# Patient Record
Sex: MALE | Race: WHITE
[De-identification: names, ages, dates, MRNs, and addresses within clinical notes are randomized per-mention and may not be internally consistent; named-entity substitution may affect disease eponyms.]

---

## 2017-05-17 ENCOUNTER — APPOINTMENT (OUTPATIENT)
Dept: ELECTROPHYSIOLOGY | Facility: CLINIC | Age: 82
End: 2017-05-17

## 2017-05-17 VITALS
DIASTOLIC BLOOD PRESSURE: 70 MMHG | SYSTOLIC BLOOD PRESSURE: 150 MMHG | HEIGHT: 68 IN | BODY MASS INDEX: 28.34 KG/M2 | WEIGHT: 187 LBS | HEART RATE: 60 BPM

## 2017-07-12 ENCOUNTER — INPATIENT (INPATIENT)
Facility: HOSPITAL | Age: 82
LOS: 4 days | Discharge: HOME | End: 2017-07-17
Attending: INTERNAL MEDICINE | Admitting: FAMILY MEDICINE
Payer: MEDICARE

## 2017-07-12 VITALS — WEIGHT: 190.04 LBS | HEIGHT: 69 IN

## 2017-07-12 LAB
ADD ON TEST-SPECIMEN IN LAB: SIGNIFICANT CHANGE UP
ALBUMIN SERPL ELPH-MCNC: 3.3 G/DL — SIGNIFICANT CHANGE UP (ref 3.3–5)
ALP SERPL-CCNC: 75 U/L — SIGNIFICANT CHANGE UP (ref 40–120)
ALT FLD-CCNC: 18 U/L — SIGNIFICANT CHANGE UP (ref 12–78)
ANION GAP SERPL CALC-SCNC: 6 MMOL/L — SIGNIFICANT CHANGE UP (ref 5–17)
APPEARANCE UR: (no result)
APTT BLD: 32.1 SEC — SIGNIFICANT CHANGE UP (ref 27.5–37.4)
AST SERPL-CCNC: 19 U/L — SIGNIFICANT CHANGE UP (ref 15–37)
BACTERIA # UR AUTO: (no result)
BASOPHILS # BLD AUTO: 0.1 K/UL — SIGNIFICANT CHANGE UP (ref 0–0.2)
BASOPHILS NFR BLD AUTO: 0.8 % — SIGNIFICANT CHANGE UP (ref 0–2)
BILIRUB SERPL-MCNC: 0.9 MG/DL — SIGNIFICANT CHANGE UP (ref 0.2–1.2)
BILIRUB UR-MCNC: (no result)
BUN SERPL-MCNC: 38 MG/DL — HIGH (ref 7–23)
CALCIUM SERPL-MCNC: 8.9 MG/DL — SIGNIFICANT CHANGE UP (ref 8.5–10.1)
CHLORIDE SERPL-SCNC: 102 MMOL/L — SIGNIFICANT CHANGE UP (ref 96–108)
CO2 SERPL-SCNC: 28 MMOL/L — SIGNIFICANT CHANGE UP (ref 22–31)
COLOR SPEC: YELLOW — SIGNIFICANT CHANGE UP
CREAT SERPL-MCNC: 2.11 MG/DL — HIGH (ref 0.5–1.3)
DIFF PNL FLD: (no result)
EOSINOPHIL # BLD AUTO: 0.1 K/UL — SIGNIFICANT CHANGE UP (ref 0–0.5)
EOSINOPHIL NFR BLD AUTO: 0.4 % — SIGNIFICANT CHANGE UP (ref 0–6)
EPI CELLS # UR: NEGATIVE — SIGNIFICANT CHANGE UP
GLUCOSE SERPL-MCNC: 142 MG/DL — HIGH (ref 70–99)
GLUCOSE UR QL: NEGATIVE MG/DL — SIGNIFICANT CHANGE UP
HCT VFR BLD CALC: 34.6 % — LOW (ref 39–50)
HGB BLD-MCNC: 11.6 G/DL — LOW (ref 13–17)
INR BLD: 1.65 RATIO — HIGH (ref 0.88–1.16)
KETONES UR-MCNC: NEGATIVE — SIGNIFICANT CHANGE UP
LACTATE SERPL-SCNC: 1.5 MMOL/L — SIGNIFICANT CHANGE UP (ref 0.7–2)
LEUKOCYTE ESTERASE UR-ACNC: (no result)
LYMPHOCYTES # BLD AUTO: 1.6 K/UL — SIGNIFICANT CHANGE UP (ref 1–3.3)
LYMPHOCYTES # BLD AUTO: 10.3 % — LOW (ref 13–44)
MCHC RBC-ENTMCNC: 32.3 PG — SIGNIFICANT CHANGE UP (ref 27–34)
MCHC RBC-ENTMCNC: 33.4 GM/DL — SIGNIFICANT CHANGE UP (ref 32–36)
MCV RBC AUTO: 96.7 FL — SIGNIFICANT CHANGE UP (ref 80–100)
MONOCYTES # BLD AUTO: 1.3 K/UL — HIGH (ref 0–0.9)
MONOCYTES NFR BLD AUTO: 8.2 % — SIGNIFICANT CHANGE UP (ref 2–14)
NEUTROPHILS # BLD AUTO: 12.5 K/UL — HIGH (ref 1.8–7.4)
NEUTROPHILS NFR BLD AUTO: 80.3 % — HIGH (ref 43–77)
NITRITE UR-MCNC: POSITIVE
PH UR: 5 — SIGNIFICANT CHANGE UP (ref 5–8)
PLATELET # BLD AUTO: 170 K/UL — SIGNIFICANT CHANGE UP (ref 150–400)
POTASSIUM SERPL-MCNC: 5.1 MMOL/L — SIGNIFICANT CHANGE UP (ref 3.5–5.3)
POTASSIUM SERPL-SCNC: 5.1 MMOL/L — SIGNIFICANT CHANGE UP (ref 3.5–5.3)
PROT SERPL-MCNC: 7.5 GM/DL — SIGNIFICANT CHANGE UP (ref 6–8.3)
PROT UR-MCNC: 500 MG/DL
PROTHROM AB SERPL-ACNC: 18 SEC — HIGH (ref 9.8–12.7)
RBC # BLD: 3.58 M/UL — LOW (ref 4.2–5.8)
RBC # FLD: 13.6 % — SIGNIFICANT CHANGE UP (ref 10.3–14.5)
RBC CASTS # UR COMP ASSIST: >50 /HPF (ref 0–4)
SODIUM SERPL-SCNC: 136 MMOL/L — SIGNIFICANT CHANGE UP (ref 135–145)
SP GR SPEC: 1.02 — SIGNIFICANT CHANGE UP (ref 1.01–1.02)
TROPONIN I SERPL-MCNC: 0.04 NG/ML — SIGNIFICANT CHANGE UP (ref 0.01–0.04)
UROBILINOGEN FLD QL: NEGATIVE MG/DL — SIGNIFICANT CHANGE UP
WBC # BLD: 15.5 K/UL — HIGH (ref 3.8–10.5)
WBC # FLD AUTO: 15.5 K/UL — HIGH (ref 3.8–10.5)
WBC UR QL: >50

## 2017-07-12 PROCEDURE — 93010 ELECTROCARDIOGRAM REPORT: CPT

## 2017-07-12 PROCEDURE — 71010: CPT | Mod: 26

## 2017-07-12 PROCEDURE — 99285 EMERGENCY DEPT VISIT HI MDM: CPT

## 2017-07-12 RX ORDER — VENLAFAXINE HCL 75 MG
75 CAPSULE, EXT RELEASE 24 HR ORAL DAILY
Qty: 0 | Refills: 0 | Status: DISCONTINUED | OUTPATIENT
Start: 2017-07-12 | End: 2017-07-17

## 2017-07-12 RX ORDER — DEXTROSE 50 % IN WATER 50 %
25 SYRINGE (ML) INTRAVENOUS ONCE
Qty: 0 | Refills: 0 | Status: DISCONTINUED | OUTPATIENT
Start: 2017-07-12 | End: 2017-07-17

## 2017-07-12 RX ORDER — CEFTRIAXONE 500 MG/1
1 INJECTION, POWDER, FOR SOLUTION INTRAMUSCULAR; INTRAVENOUS EVERY 24 HOURS
Qty: 0 | Refills: 0 | Status: DISCONTINUED | OUTPATIENT
Start: 2017-07-13 | End: 2017-07-17

## 2017-07-12 RX ORDER — INSULIN LISPRO 100/ML
VIAL (ML) SUBCUTANEOUS
Qty: 0 | Refills: 0 | Status: DISCONTINUED | OUTPATIENT
Start: 2017-07-12 | End: 2017-07-17

## 2017-07-12 RX ORDER — INSULIN GLARGINE 100 [IU]/ML
10 INJECTION, SOLUTION SUBCUTANEOUS AT BEDTIME
Qty: 0 | Refills: 0 | Status: DISCONTINUED | OUTPATIENT
Start: 2017-07-12 | End: 2017-07-17

## 2017-07-12 RX ORDER — SODIUM CHLORIDE 9 MG/ML
1000 INJECTION, SOLUTION INTRAVENOUS
Qty: 0 | Refills: 0 | Status: DISCONTINUED | OUTPATIENT
Start: 2017-07-12 | End: 2017-07-17

## 2017-07-12 RX ORDER — SIMVASTATIN 20 MG/1
20 TABLET, FILM COATED ORAL AT BEDTIME
Qty: 0 | Refills: 0 | Status: DISCONTINUED | OUTPATIENT
Start: 2017-07-12 | End: 2017-07-17

## 2017-07-12 RX ORDER — WARFARIN SODIUM 2.5 MG/1
5 TABLET ORAL ONCE
Qty: 0 | Refills: 0 | Status: COMPLETED | OUTPATIENT
Start: 2017-07-12 | End: 2017-07-12

## 2017-07-12 RX ORDER — DEXTROSE 50 % IN WATER 50 %
12.5 SYRINGE (ML) INTRAVENOUS ONCE
Qty: 0 | Refills: 0 | Status: DISCONTINUED | OUTPATIENT
Start: 2017-07-12 | End: 2017-07-17

## 2017-07-12 RX ORDER — GLUCAGON INJECTION, SOLUTION 0.5 MG/.1ML
1 INJECTION, SOLUTION SUBCUTANEOUS ONCE
Qty: 0 | Refills: 0 | Status: DISCONTINUED | OUTPATIENT
Start: 2017-07-12 | End: 2017-07-17

## 2017-07-12 RX ORDER — GABAPENTIN 400 MG/1
100 CAPSULE ORAL THREE TIMES A DAY
Qty: 0 | Refills: 0 | Status: DISCONTINUED | OUTPATIENT
Start: 2017-07-12 | End: 2017-07-17

## 2017-07-12 RX ORDER — DOXAZOSIN MESYLATE 4 MG
4 TABLET ORAL AT BEDTIME
Qty: 0 | Refills: 0 | Status: DISCONTINUED | OUTPATIENT
Start: 2017-07-12 | End: 2017-07-17

## 2017-07-12 RX ORDER — DEXTROSE 50 % IN WATER 50 %
1 SYRINGE (ML) INTRAVENOUS ONCE
Qty: 0 | Refills: 0 | Status: DISCONTINUED | OUTPATIENT
Start: 2017-07-12 | End: 2017-07-17

## 2017-07-12 RX ORDER — ACETAMINOPHEN 500 MG
650 TABLET ORAL EVERY 6 HOURS
Qty: 0 | Refills: 0 | Status: DISCONTINUED | OUTPATIENT
Start: 2017-07-12 | End: 2017-07-17

## 2017-07-12 RX ORDER — SODIUM CHLORIDE 9 MG/ML
1000 INJECTION INTRAMUSCULAR; INTRAVENOUS; SUBCUTANEOUS
Qty: 0 | Refills: 0 | Status: DISCONTINUED | OUTPATIENT
Start: 2017-07-12 | End: 2017-07-14

## 2017-07-12 RX ORDER — CEFTRIAXONE 500 MG/1
1 INJECTION, POWDER, FOR SOLUTION INTRAMUSCULAR; INTRAVENOUS ONCE
Qty: 0 | Refills: 0 | Status: COMPLETED | OUTPATIENT
Start: 2017-07-12 | End: 2017-07-12

## 2017-07-12 RX ADMIN — SODIUM CHLORIDE 75 MILLILITER(S): 9 INJECTION INTRAMUSCULAR; INTRAVENOUS; SUBCUTANEOUS at 22:38

## 2017-07-12 RX ADMIN — Medication 4 MILLIGRAM(S): at 23:10

## 2017-07-12 RX ADMIN — CEFTRIAXONE 100 GRAM(S): 500 INJECTION, POWDER, FOR SOLUTION INTRAMUSCULAR; INTRAVENOUS at 20:03

## 2017-07-12 RX ADMIN — INSULIN GLARGINE 10 UNIT(S): 100 INJECTION, SOLUTION SUBCUTANEOUS at 22:51

## 2017-07-12 RX ADMIN — SIMVASTATIN 20 MILLIGRAM(S): 20 TABLET, FILM COATED ORAL at 23:10

## 2017-07-12 RX ADMIN — WARFARIN SODIUM 5 MILLIGRAM(S): 2.5 TABLET ORAL at 22:50

## 2017-07-12 RX ADMIN — GABAPENTIN 100 MILLIGRAM(S): 400 CAPSULE ORAL at 23:10

## 2017-07-12 RX ADMIN — Medication 650 MILLIGRAM(S): at 21:47

## 2017-07-12 RX ADMIN — Medication 75 MILLIGRAM(S): at 23:10

## 2017-07-12 RX ADMIN — Medication 1 TABLET(S): at 22:51

## 2017-07-12 NOTE — H&P ADULT - NSHPREVIEWOFSYSTEMS_GEN_ALL_CORE
(-) cough, chest pain, sob, headache, dizziness, palpitations, abd pain, leg swelling  (+)fever, chills, nausea, weakness

## 2017-07-12 NOTE — H&P ADULT - HISTORY OF PRESENT ILLNESS
Patient is 89yo male with PMhx of Afib on Coumadin, HLD, IDDM, HTN, PPM, presents from home with fatigue, weakness, and nausea. Pt's son who provided most of the history, reports patients symptoms began yesterday characterized by weakness, fatigue, nausea without vomiting, associated with fever/chills. Denies wosening sob, cp, cough, dysuria, diarrhea, abd pain. No other constitutional symptoms.     In the ER, febrile, 102.2

## 2017-07-12 NOTE — ED PROVIDER NOTE - OBJECTIVE STATEMENT
87 y/o M with PMHx of bradycardia, pacemaker, IDDM, HTN presents to ED for weakness and nausea x1 day. Pt was seen by a NP for  earlier today and went home but started feeling worse, increasing weakness, so family was advised to come to ED for an evaluation. Pt was sent to Trauma room from intake because  felt pts HR was in the upper 30's. No HA, SOB, CP, fever, chills, V/D, body aches. PMD Moe patient

## 2017-07-12 NOTE — ED ADULT NURSE NOTE - OBJECTIVE STATEMENT
pt presents to ED from home, sent in by MD's office 2* to feeling increasingly weak over the last two days. Pt is pale and drowsy. Family at bedside, denies pain. Pt is short of breath, placed on 2lpm nasal cannula. Dr. Willson in to examine

## 2017-07-12 NOTE — H&P ADULT - NSHPPHYSICALEXAM_GEN_ALL_CORE
T(C): 39 (07-12-17 @ 19:50), Max: 39 (07-12-17 @ 19:50)  HR: 127 (07-12-17 @ 18:57) (127 - 127)  BP: 138/63 (07-12-17 @ 18:57) (138/63 - 138/63)  RR: 18 (07-12-17 @ 18:57) (18 - 18)  SpO2: 97% (07-12-17 @ 18:57) (97% - 97%)  Wt(kg): --    Gen: AAOx3, NAD  HEENT: NCAT, EOMI  Neck: Supple  CV: nml S1S2, RRR  Lungs: CTABL  Abd: Soft, NT, ND, BS+, +hernia  Ext: No edema  Neuro: Non focal

## 2017-07-12 NOTE — ED STATDOCS - PROGRESS NOTE DETAILS
Alec Patel, on behalf of Attending. 88 y Male c/o N/V for the past two days. Patient has been instructed by RABIA Mccabe at [facility] to go to ER due to Gradual increase of weakness and pale appearing skin. Patient has a PMHx of Bradycardia. Patient sent to the Main ED for further workup of sepsis and Bradycardia.

## 2017-07-12 NOTE — H&P ADULT - ASSESSMENT
87yo male a/w UTI    # Weakness/UTI/Fever/Leukocytosis/Nausea  - currently febrile, HD stable, comfortable  - elevated WBC, positive UA  - aaox3  - Rocephin  - hold ARB  - IVF hydration  - Zofran PRN    # HTN  - Hold ARB, Torpol XL  - -110s    # DM  - Lantus  - sliding scale    # Psych  - Effexor    # Afib  - Coumadin  - hold Toprol XL for now, SBP 100s    # DVT ppx, Coumadin    # Admit, stable

## 2017-07-12 NOTE — H&P ADULT - NSHPLABSRESULTS_GEN_ALL_CORE
CARDIAC MARKERS ( 2017 19:24 )  0.039 ng/mL / x     / x     / x     / x                                11.6   15.5  )-----------( 170      ( 2017 19:24 )             34.6     2017 19:24    136    |  102    |  38     ----------------------------<  142    5.1     |  28     |  2.11     Ca    8.9        2017 19:24  Mg     2.0       2017 19:24    TPro  7.5    /  Alb  3.3    /  TBili  0.9    /  DBili  x      /  AST  19     /  ALT  18     /  AlkPhos  75     2017 19:24    PT/INR - ( 2017 19:24 )   PT: 18.0 sec;   INR: 1.65 ratio         PTT - ( 2017 19:24 )  PTT:32.1 sec  CAPILLARY BLOOD GLUCOSE        LIVER FUNCTIONS - ( 2017 19:24 )  Alb: 3.3 g/dL / Pro: 7.5 gm/dL / ALK PHOS: 75 U/L / ALT: 18 U/L / AST: 19 U/L / GGT: x           Urinalysis Basic - ( 2017 19:24 )    Color: Yellow / Appearance: x / S.020 / pH: x  Gluc: x / Ketone: Negative  / Bili: Small / Urobili: Negative mg/dL   Blood: x / Protein: 500 mg/dL / Nitrite: Positive   Leuk Esterase: Moderate / RBC: >50 /HPF / WBC >50   Sq Epi: x / Non Sq Epi: Negative / Bacteria: Many

## 2017-07-13 LAB
ANION GAP SERPL CALC-SCNC: 6 MMOL/L — SIGNIFICANT CHANGE UP (ref 5–17)
ANISOCYTOSIS BLD QL: SLIGHT — SIGNIFICANT CHANGE UP
BUN SERPL-MCNC: 45 MG/DL — HIGH (ref 7–23)
CALCIUM SERPL-MCNC: 8.4 MG/DL — LOW (ref 8.5–10.1)
CHLORIDE SERPL-SCNC: 104 MMOL/L — SIGNIFICANT CHANGE UP (ref 96–108)
CO2 SERPL-SCNC: 25 MMOL/L — SIGNIFICANT CHANGE UP (ref 22–31)
CREAT SERPL-MCNC: 2.35 MG/DL — HIGH (ref 0.5–1.3)
ELLIPTOCYTES BLD QL SMEAR: SLIGHT — SIGNIFICANT CHANGE UP
GLUCOSE SERPL-MCNC: 147 MG/DL — HIGH (ref 70–99)
HBA1C BLD-MCNC: 7.4 % — HIGH (ref 4–5.6)
HCT VFR BLD CALC: 31.3 % — LOW (ref 39–50)
HGB BLD-MCNC: 10.6 G/DL — LOW (ref 13–17)
HYPOCHROMIA BLD QL: SLIGHT — SIGNIFICANT CHANGE UP
INR BLD: 1.88 RATIO — HIGH (ref 0.88–1.16)
LYMPHOCYTES # BLD AUTO: 8 % — LOW (ref 13–44)
MANUAL DIF COMMENT BLD-IMP: SIGNIFICANT CHANGE UP
MCHC RBC-ENTMCNC: 32.8 PG — SIGNIFICANT CHANGE UP (ref 27–34)
MCHC RBC-ENTMCNC: 33.8 GM/DL — SIGNIFICANT CHANGE UP (ref 32–36)
MCV RBC AUTO: 97.1 FL — SIGNIFICANT CHANGE UP (ref 80–100)
MONOCYTES NFR BLD AUTO: 10 % — SIGNIFICANT CHANGE UP (ref 2–14)
NEUTROPHILS NFR BLD AUTO: 65 % — SIGNIFICANT CHANGE UP (ref 43–77)
NEUTS BAND # BLD: 17 % — HIGH (ref 0–8)
OVALOCYTES BLD QL SMEAR: SLIGHT — SIGNIFICANT CHANGE UP
PLAT MORPH BLD: NORMAL — SIGNIFICANT CHANGE UP
PLATELET # BLD AUTO: 130 K/UL — LOW (ref 150–400)
POIKILOCYTOSIS BLD QL AUTO: SLIGHT — SIGNIFICANT CHANGE UP
POLYCHROMASIA BLD QL SMEAR: SLIGHT — SIGNIFICANT CHANGE UP
POTASSIUM SERPL-MCNC: 5.2 MMOL/L — SIGNIFICANT CHANGE UP (ref 3.5–5.3)
POTASSIUM SERPL-SCNC: 5.2 MMOL/L — SIGNIFICANT CHANGE UP (ref 3.5–5.3)
PROTHROM AB SERPL-ACNC: 20.6 SEC — HIGH (ref 9.8–12.7)
RBC # BLD: 3.22 M/UL — LOW (ref 4.2–5.8)
RBC # FLD: 13.6 % — SIGNIFICANT CHANGE UP (ref 10.3–14.5)
RBC BLD AUTO: (no result)
SODIUM SERPL-SCNC: 135 MMOL/L — SIGNIFICANT CHANGE UP (ref 135–145)
WBC # BLD: 19 K/UL — HIGH (ref 3.8–10.5)
WBC # FLD AUTO: 19 K/UL — HIGH (ref 3.8–10.5)

## 2017-07-13 RX ORDER — ONDANSETRON 8 MG/1
4 TABLET, FILM COATED ORAL EVERY 8 HOURS
Qty: 0 | Refills: 0 | Status: DISCONTINUED | OUTPATIENT
Start: 2017-07-13 | End: 2017-07-17

## 2017-07-13 RX ORDER — WARFARIN SODIUM 2.5 MG/1
5 TABLET ORAL DAILY
Qty: 0 | Refills: 0 | Status: COMPLETED | OUTPATIENT
Start: 2017-07-13 | End: 2017-07-15

## 2017-07-13 RX ADMIN — INSULIN GLARGINE 10 UNIT(S): 100 INJECTION, SOLUTION SUBCUTANEOUS at 21:28

## 2017-07-13 RX ADMIN — Medication 75 MILLIGRAM(S): at 15:14

## 2017-07-13 RX ADMIN — Medication 4 MILLIGRAM(S): at 21:25

## 2017-07-13 RX ADMIN — SIMVASTATIN 20 MILLIGRAM(S): 20 TABLET, FILM COATED ORAL at 21:25

## 2017-07-13 RX ADMIN — Medication 1 TABLET(S): at 15:12

## 2017-07-13 RX ADMIN — CEFTRIAXONE 100 GRAM(S): 500 INJECTION, POWDER, FOR SOLUTION INTRAMUSCULAR; INTRAVENOUS at 09:58

## 2017-07-13 RX ADMIN — WARFARIN SODIUM 5 MILLIGRAM(S): 2.5 TABLET ORAL at 21:25

## 2017-07-13 RX ADMIN — ONDANSETRON 4 MILLIGRAM(S): 8 TABLET, FILM COATED ORAL at 03:41

## 2017-07-13 RX ADMIN — GABAPENTIN 100 MILLIGRAM(S): 400 CAPSULE ORAL at 21:25

## 2017-07-13 RX ADMIN — GABAPENTIN 100 MILLIGRAM(S): 400 CAPSULE ORAL at 18:45

## 2017-07-13 RX ADMIN — GABAPENTIN 100 MILLIGRAM(S): 400 CAPSULE ORAL at 05:55

## 2017-07-13 RX ADMIN — SODIUM CHLORIDE 75 MILLILITER(S): 9 INJECTION INTRAMUSCULAR; INTRAVENOUS; SUBCUTANEOUS at 18:46

## 2017-07-13 NOTE — PROGRESS NOTE ADULT - SUBJECTIVE AND OBJECTIVE BOX
Patient is 87yo male with PMhx of Afib on Coumadin, HLD, IDDM, HTN, PPM, presents from home with fatigue, weakness, and nausea. C/o suprapubic tenderness, and abd pain with urianry frequency. 3rd uti this year. denies having any cysto with his urologist . Cannot recall name of his urologist. Feels better today however his wbc is rising          ICU Vital Signs Last 24 Hrs  T(C): 37.3 (13 Jul 2017 05:00), Max: 39 (12 Jul 2017 19:50)  T(F): 99.1 (13 Jul 2017 05:00), Max: 102.2 (12 Jul 2017 19:50)  HR: 61 (13 Jul 2017 05:00) (61 - 127)  BP: 144/57 (13 Jul 2017 05:00) (124/51 - 144/57)  BP(mean): 78 (12 Jul 2017 18:57) (78 - 78)  ABP: --  ABP(mean): --  RR: 20 (12 Jul 2017 22:19) (18 - 20)  SpO2: 94% (13 Jul 2017 05:00) (94% - 100%)            PHYSICAL EXAM:    Constitutional: NAD, awake and alert, well-developed  HEENT: PERR, EOMI, Normal Hearing, MMM  Neck: Soft and supple, No LAD, No JVD  Respiratory: Breath sounds are clear bilaterally, No wheezing, rales or rhonchi  Cardiovascular: S1 and S2, regular rate and rhythm, no Murmurs, gallops or rubs  Gastrointestinal: Bowel Sounds present, soft, nontender, nondistended, no guarding, no rebound  Extremities: No peripheral edema  Vascular: 2+ peripheral pulses  Neurological: A/O x 3, no focal deficits  Musculoskeletal: 5/5 strength b/l upper and lower extremities  Skin: No rashes    MEDICATIONS:  MEDICATIONS  (STANDING):  simvastatin 20 milliGRAM(s) Oral at bedtime  venlafaxine XR. 75 milliGRAM(s) Oral daily  gabapentin 100 milliGRAM(s) Oral three times a day  sodium chloride 0.9%. 1000 milliLiter(s) (75 mL/Hr) IV Continuous <Continuous>  multivitamin 1 Tablet(s) Oral daily  cefTRIAXone   IVPB 1 Gram(s) IV Intermittent every 24 hours  insulin glargine Injectable (LANTUS) 10 Unit(s) SubCutaneous at bedtime  insulin lispro (HumaLOG) corrective regimen sliding scale   SubCutaneous three times a day before meals  dextrose 5%. 1000 milliLiter(s) (50 mL/Hr) IV Continuous <Continuous>  dextrose 50% Injectable 12.5 Gram(s) IV Push once  dextrose 50% Injectable 25 Gram(s) IV Push once  dextrose 50% Injectable 25 Gram(s) IV Push once  doxazosin 4 milliGRAM(s) Oral at bedtime  warfarin 5 milliGRAM(s) Oral daily      LABS: All Labs Reviewed:                        10.6   19.0  )-----------( 130      ( 13 Jul 2017 08:12 )             31.3     07-13    135  |  104  |  45<H>  ----------------------------<  147<H>  5.2   |  25  |  2.35<H>    Ca    8.4<L>      13 Jul 2017 08:12  Mg     2.0     07-12    TPro  7.5  /  Alb  3.3  /  TBili  0.9  /  DBili  x   /  AST  19  /  ALT  18  /  AlkPhos  75  07-12    PT/INR - ( 13 Jul 2017 12:03 )   PT: 20.6 sec;   INR: 1.88 ratio         PTT - ( 12 Jul 2017 19:24 )  PTT:32.1 sec  CARDIAC MARKERS ( 12 Jul 2017 19:24 )  0.039 ng/mL / x     / x     / x     / x              Blood Culture:     RADIOLOGY/EKG:  < from: Xray Chest 1 View AP/PA. (07.12.17 @ 19:47) >  Impression: No active pulmonary disease.    < end of copied text >

## 2017-07-14 LAB
-  AMIKACIN: SIGNIFICANT CHANGE UP
-  AMPICILLIN/SULBACTAM: SIGNIFICANT CHANGE UP
-  AMPICILLIN: SIGNIFICANT CHANGE UP
-  AZTREONAM: SIGNIFICANT CHANGE UP
-  CEFAZOLIN: SIGNIFICANT CHANGE UP
-  CEFEPIME: SIGNIFICANT CHANGE UP
-  CEFOXITIN: SIGNIFICANT CHANGE UP
-  CEFTAZIDIME: SIGNIFICANT CHANGE UP
-  CEFTRIAXONE: SIGNIFICANT CHANGE UP
-  CIPROFLOXACIN: SIGNIFICANT CHANGE UP
-  ERTAPENEM: SIGNIFICANT CHANGE UP
-  GENTAMICIN: SIGNIFICANT CHANGE UP
-  IMIPENEM: SIGNIFICANT CHANGE UP
-  LEVOFLOXACIN: SIGNIFICANT CHANGE UP
-  MEROPENEM: SIGNIFICANT CHANGE UP
-  NITROFURANTOIN: SIGNIFICANT CHANGE UP
-  PIPERACILLIN/TAZOBACTAM: SIGNIFICANT CHANGE UP
-  TOBRAMYCIN: SIGNIFICANT CHANGE UP
-  TRIMETHOPRIM/SULFAMETHOXAZOLE: SIGNIFICANT CHANGE UP
ANION GAP SERPL CALC-SCNC: 5 MMOL/L — SIGNIFICANT CHANGE UP (ref 5–17)
BUN SERPL-MCNC: 45 MG/DL — HIGH (ref 7–23)
CALCIUM SERPL-MCNC: 8.2 MG/DL — LOW (ref 8.5–10.1)
CHLORIDE SERPL-SCNC: 105 MMOL/L — SIGNIFICANT CHANGE UP (ref 96–108)
CO2 SERPL-SCNC: 26 MMOL/L — SIGNIFICANT CHANGE UP (ref 22–31)
CREAT SERPL-MCNC: 2.15 MG/DL — HIGH (ref 0.5–1.3)
CULTURE RESULTS: SIGNIFICANT CHANGE UP
GLUCOSE SERPL-MCNC: 139 MG/DL — HIGH (ref 70–99)
HCT VFR BLD CALC: 30.4 % — LOW (ref 39–50)
HGB BLD-MCNC: 9.9 G/DL — LOW (ref 13–17)
INR BLD: 2.21 RATIO — HIGH (ref 0.88–1.16)
MCHC RBC-ENTMCNC: 32.4 PG — SIGNIFICANT CHANGE UP (ref 27–34)
MCHC RBC-ENTMCNC: 32.5 GM/DL — SIGNIFICANT CHANGE UP (ref 32–36)
MCV RBC AUTO: 99.7 FL — SIGNIFICANT CHANGE UP (ref 80–100)
METHOD TYPE: SIGNIFICANT CHANGE UP
ORGANISM # SPEC MICROSCOPIC CNT: SIGNIFICANT CHANGE UP
ORGANISM # SPEC MICROSCOPIC CNT: SIGNIFICANT CHANGE UP
PLATELET # BLD AUTO: 112 K/UL — LOW (ref 150–400)
POTASSIUM SERPL-MCNC: 4.6 MMOL/L — SIGNIFICANT CHANGE UP (ref 3.5–5.3)
POTASSIUM SERPL-SCNC: 4.6 MMOL/L — SIGNIFICANT CHANGE UP (ref 3.5–5.3)
PROTHROM AB SERPL-ACNC: 24.3 SEC — HIGH (ref 9.8–12.7)
RBC # BLD: 3.05 M/UL — LOW (ref 4.2–5.8)
RBC # FLD: 13.8 % — SIGNIFICANT CHANGE UP (ref 10.3–14.5)
SODIUM SERPL-SCNC: 136 MMOL/L — SIGNIFICANT CHANGE UP (ref 135–145)
SPECIMEN SOURCE: SIGNIFICANT CHANGE UP
WBC # BLD: 14.9 K/UL — HIGH (ref 3.8–10.5)
WBC # FLD AUTO: 14.9 K/UL — HIGH (ref 3.8–10.5)

## 2017-07-14 PROCEDURE — 71010: CPT | Mod: 26

## 2017-07-14 PROCEDURE — 76770 US EXAM ABDO BACK WALL COMP: CPT | Mod: 26

## 2017-07-14 RX ORDER — FUROSEMIDE 40 MG
40 TABLET ORAL ONCE
Qty: 0 | Refills: 0 | Status: COMPLETED | OUTPATIENT
Start: 2017-07-14 | End: 2017-07-14

## 2017-07-14 RX ORDER — ALBUTEROL 90 UG/1
1 AEROSOL, METERED ORAL EVERY 4 HOURS
Qty: 0 | Refills: 0 | Status: DISCONTINUED | OUTPATIENT
Start: 2017-07-14 | End: 2017-07-17

## 2017-07-14 RX ORDER — ALBUTEROL 90 UG/1
2.5 AEROSOL, METERED ORAL EVERY 6 HOURS
Qty: 0 | Refills: 0 | Status: DISCONTINUED | OUTPATIENT
Start: 2017-07-14 | End: 2017-07-17

## 2017-07-14 RX ADMIN — Medication 4: at 17:52

## 2017-07-14 RX ADMIN — GABAPENTIN 100 MILLIGRAM(S): 400 CAPSULE ORAL at 16:08

## 2017-07-14 RX ADMIN — ALBUTEROL 2.5 MILLIGRAM(S): 90 AEROSOL, METERED ORAL at 12:45

## 2017-07-14 RX ADMIN — Medication 75 MILLIGRAM(S): at 11:44

## 2017-07-14 RX ADMIN — ALBUTEROL 2.5 MILLIGRAM(S): 90 AEROSOL, METERED ORAL at 19:48

## 2017-07-14 RX ADMIN — WARFARIN SODIUM 5 MILLIGRAM(S): 2.5 TABLET ORAL at 22:55

## 2017-07-14 RX ADMIN — GABAPENTIN 100 MILLIGRAM(S): 400 CAPSULE ORAL at 22:55

## 2017-07-14 RX ADMIN — Medication 40 MILLIGRAM(S): at 16:11

## 2017-07-14 RX ADMIN — SODIUM CHLORIDE 75 MILLILITER(S): 9 INJECTION INTRAMUSCULAR; INTRAVENOUS; SUBCUTANEOUS at 05:24

## 2017-07-14 RX ADMIN — Medication 1 TABLET(S): at 11:41

## 2017-07-14 RX ADMIN — GABAPENTIN 100 MILLIGRAM(S): 400 CAPSULE ORAL at 05:23

## 2017-07-14 RX ADMIN — INSULIN GLARGINE 10 UNIT(S): 100 INJECTION, SOLUTION SUBCUTANEOUS at 22:55

## 2017-07-14 RX ADMIN — CEFTRIAXONE 100 GRAM(S): 500 INJECTION, POWDER, FOR SOLUTION INTRAMUSCULAR; INTRAVENOUS at 10:20

## 2017-07-14 RX ADMIN — Medication 4 MILLIGRAM(S): at 22:55

## 2017-07-14 NOTE — PROGRESS NOTE ADULT - SUBJECTIVE AND OBJECTIVE BOX
Patient is 89yo male with PMhx of Afib on Coumadin, HLD, IDDM, HTN, PPM, presents from home with fatigue, weakness, and nausea. C/o suprapubic tenderness, and abd pain with urianry frequency. 3rd uti this year. denies having any cysto with his urologist . Cannot recall name of his urologist. Feels better today however his wbc is rising    7/14: weak appearing today. c/o of sob desaturated to 88 % on RA. states he has abd hernia which causes him intermittent sob. IVF running o/v @ 75 for poss leah.           ICU Vital Signs Last 24 Hrs  T(C): 37.3 (13 Jul 2017 05:00), Max: 39 (12 Jul 2017 19:50)  T(F): 99.1 (13 Jul 2017 05:00), Max: 102.2 (12 Jul 2017 19:50)  HR: 61 (13 Jul 2017 05:00) (61 - 127)  BP: 144/57 (13 Jul 2017 05:00) (124/51 - 144/57)  BP(mean): 78 (12 Jul 2017 18:57) (78 - 78)  ABP: --  ABP(mean): --  RR: 20 (12 Jul 2017 22:19) (18 - 20)  SpO2: 94% (13 Jul 2017 05:00) (94% - 100%)            PHYSICAL EXAM:    Constitutional: NAD, awake and alert, well-developed  HEENT: PERR, EOMI, Normal Hearing, MMM  Neck: Soft and supple, No LAD, No JVD  Respiratory: rales noted at left base no wheezing  Cardiovascular: S1 and S2, regular rate and rhythm, no Murmurs, gallops or rubs  Gastrointestinal: Bowel Sounds present, soft, nontender, nondistended, no guarding, no rebound; no cva tenderness  Extremities: No peripheral edema  Vascular: 2+ peripheral pulses  Neurological: A/O x 3, no focal deficits  Musculoskeletal: 5/5 strength b/l upper and lower extremities  Skin: No rashes    MEDICATIONS:  MEDICATIONS  (STANDING):  simvastatin 20 milliGRAM(s) Oral at bedtime  venlafaxine XR. 75 milliGRAM(s) Oral daily  gabapentin 100 milliGRAM(s) Oral three times a day  sodium chloride 0.9%. 1000 milliLiter(s) (75 mL/Hr) IV Continuous <Continuous>  multivitamin 1 Tablet(s) Oral daily  cefTRIAXone   IVPB 1 Gram(s) IV Intermittent every 24 hours  insulin glargine Injectable (LANTUS) 10 Unit(s) SubCutaneous at bedtime  insulin lispro (HumaLOG) corrective regimen sliding scale   SubCutaneous three times a day before meals  dextrose 5%. 1000 milliLiter(s) (50 mL/Hr) IV Continuous <Continuous>  dextrose 50% Injectable 12.5 Gram(s) IV Push once  dextrose 50% Injectable 25 Gram(s) IV Push once  dextrose 50% Injectable 25 Gram(s) IV Push once  doxazosin 4 milliGRAM(s) Oral at bedtime  warfarin 5 milliGRAM(s) Oral daily      LABS: All Labs Reviewed:                        10.6   19.0  )-----------( 130      ( 13 Jul 2017 08:12 )             31.3     07-13    135  |  104  |  45<H>  ----------------------------<  147<H>  5.2   |  25  |  2.35<H>    Ca    8.4<L>      13 Jul 2017 08:12  Mg     2.0     07-12    TPro  7.5  /  Alb  3.3  /  TBili  0.9  /  DBili  x   /  AST  19  /  ALT  18  /  AlkPhos  75  07-12    PT/INR - ( 13 Jul 2017 12:03 )   PT: 20.6 sec;   INR: 1.88 ratio         PTT - ( 12 Jul 2017 19:24 )  PTT:32.1 sec  CARDIAC MARKERS ( 12 Jul 2017 19:24 )  0.039 ng/mL / x     / x     / x     / x              Blood Culture:     RADIOLOGY/EKG:  < from: Xray Chest 1 View AP/PA. (07.12.17 @ 19:47) >  Impression: No active pulmonary disease.    < end of copied text >

## 2017-07-14 NOTE — PHYSICAL THERAPY INITIAL EVALUATION ADULT - LEVEL OF INDEPENDENCE: CHAIR TO BED, REHAB EVAL
supervision/chair to bed-at end of session pt refusing sitting in chair and transferred self to bedside, refusing supine as well-nsg notified, bed alrm on

## 2017-07-14 NOTE — PROGRESS NOTE ADULT - ASSESSMENT
87yo male a/w UTI    # Weakness/UTI/Fever/Leukocytosis/Nausea  await pan cultures  Cont current abx   clinically feel improved  will adjust abx as appropriate  - IVF hydration  - Zofran PRN    #RICHY  No baseline lab values for comparison  Obtain urine studies  Cont IVF  Avoid ARB  U/S kidney (blood in UA r/o calculi) obstructive uropathy/source of infection?  If no improvement and no  obstruction on ultrasound today move for renal consult    # HTN  - Hold ARB, Torpol XL  - -110s    # DM  - Lantus  - sliding scale    # Psych  - Effexor    # Afib  - Coumadin  - hold Toprol XL for now, SBP 100s    # DVT ppx, Coumadin
87yo male a/w UTI    # Weakness/UTI/Fever/Leukocytosis/Nausea  Ucx: Ecoli-> await C/S  Cont ceftriaxone for now  U/S neg for hydro or obstructive uropathy  Creatinine improving; Dr Digna Felton office will send fax over most recent labs to hospitalist office  Hold IVF today as developing dyspnea and rales  No documentation as of yet to prove he has underlying heart condition      #Dyspnea  Stop IVF  CXR  Duonebs  Stat dose lasix now and re-asses in several hours  Supplemental O2    #RICHY  No baseline lab values for comparison  as above  Avoid ARB  U/S neg for stone or hydro      # HTN  - Hold ARB, Torpol XL  - -110s    # DM  - Lantus  - sliding scale    # Psych  - Effexor    # Afib  - Coumadin  - hold Toprol XL for now, SBP 100s    # DVT ppx, Coumadin

## 2017-07-15 LAB
ANION GAP SERPL CALC-SCNC: 9 MMOL/L — SIGNIFICANT CHANGE UP (ref 5–17)
BUN SERPL-MCNC: 48 MG/DL — HIGH (ref 7–23)
CALCIUM SERPL-MCNC: 8.7 MG/DL — SIGNIFICANT CHANGE UP (ref 8.5–10.1)
CHLORIDE SERPL-SCNC: 102 MMOL/L — SIGNIFICANT CHANGE UP (ref 96–108)
CO2 SERPL-SCNC: 25 MMOL/L — SIGNIFICANT CHANGE UP (ref 22–31)
CREAT SERPL-MCNC: 2.19 MG/DL — HIGH (ref 0.5–1.3)
GLUCOSE SERPL-MCNC: 146 MG/DL — HIGH (ref 70–99)
HCT VFR BLD CALC: 31.6 % — LOW (ref 39–50)
HGB BLD-MCNC: 10.3 G/DL — LOW (ref 13–17)
INR BLD: 2.16 RATIO — HIGH (ref 0.88–1.16)
MCHC RBC-ENTMCNC: 32.2 PG — SIGNIFICANT CHANGE UP (ref 27–34)
MCHC RBC-ENTMCNC: 32.6 GM/DL — SIGNIFICANT CHANGE UP (ref 32–36)
MCV RBC AUTO: 98.7 FL — SIGNIFICANT CHANGE UP (ref 80–100)
PLATELET # BLD AUTO: 137 K/UL — LOW (ref 150–400)
POTASSIUM SERPL-MCNC: 3.9 MMOL/L — SIGNIFICANT CHANGE UP (ref 3.5–5.3)
POTASSIUM SERPL-SCNC: 3.9 MMOL/L — SIGNIFICANT CHANGE UP (ref 3.5–5.3)
PROTHROM AB SERPL-ACNC: 23.7 SEC — HIGH (ref 9.8–12.7)
RBC # BLD: 3.21 M/UL — LOW (ref 4.2–5.8)
RBC # FLD: 13.8 % — SIGNIFICANT CHANGE UP (ref 10.3–14.5)
SODIUM SERPL-SCNC: 136 MMOL/L — SIGNIFICANT CHANGE UP (ref 135–145)
WBC # BLD: 13.4 K/UL — HIGH (ref 3.8–10.5)
WBC # FLD AUTO: 13.4 K/UL — HIGH (ref 3.8–10.5)

## 2017-07-15 RX ADMIN — ALBUTEROL 2.5 MILLIGRAM(S): 90 AEROSOL, METERED ORAL at 13:19

## 2017-07-15 RX ADMIN — CEFTRIAXONE 100 GRAM(S): 500 INJECTION, POWDER, FOR SOLUTION INTRAMUSCULAR; INTRAVENOUS at 11:51

## 2017-07-15 RX ADMIN — Medication 4 MILLIGRAM(S): at 21:55

## 2017-07-15 RX ADMIN — Medication 1 TABLET(S): at 12:01

## 2017-07-15 RX ADMIN — GABAPENTIN 100 MILLIGRAM(S): 400 CAPSULE ORAL at 16:03

## 2017-07-15 RX ADMIN — SIMVASTATIN 20 MILLIGRAM(S): 20 TABLET, FILM COATED ORAL at 21:55

## 2017-07-15 RX ADMIN — Medication 75 MILLIGRAM(S): at 16:01

## 2017-07-15 RX ADMIN — GABAPENTIN 100 MILLIGRAM(S): 400 CAPSULE ORAL at 06:02

## 2017-07-15 RX ADMIN — GABAPENTIN 100 MILLIGRAM(S): 400 CAPSULE ORAL at 21:55

## 2017-07-15 RX ADMIN — Medication 6: at 17:10

## 2017-07-15 RX ADMIN — ALBUTEROL 2.5 MILLIGRAM(S): 90 AEROSOL, METERED ORAL at 07:58

## 2017-07-15 RX ADMIN — WARFARIN SODIUM 5 MILLIGRAM(S): 2.5 TABLET ORAL at 21:55

## 2017-07-15 RX ADMIN — Medication 6: at 16:04

## 2017-07-15 RX ADMIN — ALBUTEROL 2.5 MILLIGRAM(S): 90 AEROSOL, METERED ORAL at 19:53

## 2017-07-15 RX ADMIN — INSULIN GLARGINE 10 UNIT(S): 100 INJECTION, SOLUTION SUBCUTANEOUS at 21:56

## 2017-07-15 NOTE — PROGRESS NOTE ADULT - SUBJECTIVE AND OBJECTIVE BOX
CHIEF COMPLAINT:    SUBJECTIVE:     REVIEW OF SYSTEMS:    CONSTITUTIONAL: No weakness, fevers or chills  EYES/ENT: No visual changes;  No vertigo or throat pain   NECK: No pain or stiffness  RESPIRATORY: No cough, wheezing, hemoptysis; No shortness of breath  CARDIOVASCULAR: No chest pain or palpitations  GASTROINTESTINAL: No abdominal or epigastric pain. No nausea, vomiting, or hematemesis; No diarrhea or constipation. No melena or hematochezia.  GENITOURINARY: No dysuria, frequency or hematuria  NEUROLOGICAL: No numbness or weakness  SKIN: No itching, burning, rashes, or lesions   All other review of systems is negative unless indicated above    Vital Signs Last 24 Hrs  T(C): 37.4 (15 Jul 2017 05:37), Max: 37.6 (14 Jul 2017 16:36)  T(F): 99.4 (15 Jul 2017 05:37), Max: 99.7 (14 Jul 2017 16:36)  HR: 69 (15 Jul 2017 08:07) (56 - 78)  BP: 121/51 (15 Jul 2017 05:37) (120/62 - 151/56)  BP(mean): --  RR: 17 (15 Jul 2017 05:37) (16 - 17)  SpO2: 96% (15 Jul 2017 08:07) (93% - 100%)    I&O's Summary      CAPILLARY BLOOD GLUCOSE  145 (15 Jul 2017 08:11)  149 (14 Jul 2017 22:49)  221 (14 Jul 2017 16:47)  143 (14 Jul 2017 12:10)          PHYSICAL EXAM:    Constitutional: NAD, awake and alert, well-developed  HEENT: PERR, EOMI, Normal Hearing, MMM  Neck: Soft and supple, No LAD, No JVD  Respiratory: Breath sounds are clear bilaterally, No wheezing, rales or rhonchi  Cardiovascular: S1 and S2, regular rate and rhythm, no Murmurs, gallops or rubs  Gastrointestinal: Bowel Sounds present, soft, nontender, nondistended, no guarding, no rebound  Extremities: No peripheral edema  Vascular: 2+ peripheral pulses  Neurological: A/O x 3, no focal deficits  Musculoskeletal: 5/5 strength b/l upper and lower extremities  Skin: No rashes    MEDICATIONS:  MEDICATIONS  (STANDING):  simvastatin 20 milliGRAM(s) Oral at bedtime  venlafaxine XR. 75 milliGRAM(s) Oral daily  gabapentin 100 milliGRAM(s) Oral three times a day  multivitamin 1 Tablet(s) Oral daily  cefTRIAXone   IVPB 1 Gram(s) IV Intermittent every 24 hours  insulin glargine Injectable (LANTUS) 10 Unit(s) SubCutaneous at bedtime  insulin lispro (HumaLOG) corrective regimen sliding scale   SubCutaneous three times a day before meals  dextrose 5%. 1000 milliLiter(s) (50 mL/Hr) IV Continuous <Continuous>  dextrose 50% Injectable 12.5 Gram(s) IV Push once  dextrose 50% Injectable 25 Gram(s) IV Push once  dextrose 50% Injectable 25 Gram(s) IV Push once  doxazosin 4 milliGRAM(s) Oral at bedtime  warfarin 5 milliGRAM(s) Oral daily  ALBUTerol    0.083% 2.5 milliGRAM(s) Nebulizer every 6 hours  ALBUTerol    90 MICROgram(s) HFA Inhaler 1 Puff(s) Inhalation every 4 hours      LABS: All Labs Reviewed:                        10.3   13.4  )-----------( 137      ( 15 Jul 2017 08:41 )             31.6     07-15    136  |  102  |  48<H>  ----------------------------<  146<H>  3.9   |  25  |  2.19<H>    Ca    8.7      15 Jul 2017 08:41      PT/INR - ( 15 Jul 2017 08:41 )   PT: 23.7 sec;   INR: 2.16 ratio                 Patient is 87yo male with PMhx of Afib on Coumadin, HLD, IDDM, HTN, PPM, presents from home with fatigue, weakness, and nausea. C/o suprapubic tenderness, and abd pain with urianry frequency. 3rd uti this year. denies having any cysto with his urologist . Cannot recall name of his urologist. Feels better today however his wbc is rising    7/14: weak appearing today. c/o of sob desaturated to 88 % on RA. states he has abd hernia which causes him intermittent sob. IVF running o/v @ 75 for poss richy.       # Weakness/UTI/Fever/Leukocytosis/Nausea  Ucx: Ecoli-> await C/S  Cont ceftriaxone for now  U/S neg for hydro or obstructive uropathy  Creatinine improving; Dr Digna Felton office will send fax over most recent labs to hospitalist office  Hold IVF today as developing dyspnea and rales  No documentation as of yet to prove he has underlying heart condition      #Dyspnea  Stop IVF  CXR  Duonebs  Stat dose lasix now and re-asses in several hours  Supplemental O2    #RICHY  No baseline lab values for comparison  as above  Avoid ARB  U/S neg for stone or hydro      # HTN  - Hold ARB, Torpol XL  - -110s    # DM  - Lantus  - sliding scale    # Psych  - Effexor    # Afib  - Coumadin  - hold Toprol XL for now, SBP 100s    # DVT ppx, Coumadin CHIEF COMPLAINT/Diagnosis: UTI/ ARF/ SOB    SUBJECTIVE: no complaints    REVIEW OF SYSTEMS:    CONSTITUTIONAL: No weakness, fevers or chills  EYES/ENT: No visual changes;  No vertigo or throat pain   NECK: No pain or stiffness  RESPIRATORY: No cough, wheezing, hemoptysis; No shortness of breath  CARDIOVASCULAR: No chest pain or palpitations  GASTROINTESTINAL: No abdominal or epigastric pain. No nausea, vomiting, or hematemesis; No diarrhea or constipation. No melena or hematochezia.  GENITOURINARY: No dysuria, frequency or hematuria  NEUROLOGICAL: No numbness or weakness  SKIN: No itching, burning, rashes, or lesions   All other review of systems is negative unless indicated above    Vital Signs Last 24 Hrs  T(C): 37.4 (15 Jul 2017 05:37), Max: 37.6 (14 Jul 2017 16:36)  T(F): 99.4 (15 Jul 2017 05:37), Max: 99.7 (14 Jul 2017 16:36)  HR: 69 (15 Jul 2017 08:07) (56 - 78)  BP: 121/51 (15 Jul 2017 05:37) (120/62 - 151/56)  BP(mean): --  RR: 17 (15 Jul 2017 05:37) (16 - 17)  SpO2: 96% (15 Jul 2017 08:07) (93% - 100%)    I&O's Summary      CAPILLARY BLOOD GLUCOSE  145 (15 Jul 2017 08:11)  149 (14 Jul 2017 22:49)  221 (14 Jul 2017 16:47)  143 (14 Jul 2017 12:10)          PHYSICAL EXAM:    Constitutional: NAD, awake and alert, well-developed  HEENT: PERR, EOMI, Normal Hearing, MMM  Neck: Soft and supple, No LAD, No JVD  Respiratory: Breath sounds are clear bilaterally, No wheezing, rales or rhonchi  Cardiovascular: S1 and S2, regular rate and rhythm, no Murmurs, gallops or rubs  Gastrointestinal: Bowel Sounds present, soft, nontender, nondistended, no guarding, no rebound  Extremities: No peripheral edema  Vascular: 2+ peripheral pulses  Neurological: A/O x 3, no focal deficits  Musculoskeletal: 5/5 strength b/l upper and lower extremities  Skin: No rashes    MEDICATIONS:  MEDICATIONS  (STANDING):  simvastatin 20 milliGRAM(s) Oral at bedtime  venlafaxine XR. 75 milliGRAM(s) Oral daily  gabapentin 100 milliGRAM(s) Oral three times a day  multivitamin 1 Tablet(s) Oral daily  cefTRIAXone   IVPB 1 Gram(s) IV Intermittent every 24 hours  insulin glargine Injectable (LANTUS) 10 Unit(s) SubCutaneous at bedtime  insulin lispro (HumaLOG) corrective regimen sliding scale   SubCutaneous three times a day before meals  dextrose 5%. 1000 milliLiter(s) (50 mL/Hr) IV Continuous <Continuous>  dextrose 50% Injectable 12.5 Gram(s) IV Push once  dextrose 50% Injectable 25 Gram(s) IV Push once  dextrose 50% Injectable 25 Gram(s) IV Push once  doxazosin 4 milliGRAM(s) Oral at bedtime  warfarin 5 milliGRAM(s) Oral daily  ALBUTerol    0.083% 2.5 milliGRAM(s) Nebulizer every 6 hours  ALBUTerol    90 MICROgram(s) HFA Inhaler 1 Puff(s) Inhalation every 4 hours      LABS: All Labs Reviewed:                        10.3   13.4  )-----------( 137      ( 15 Jul 2017 08:41 )             31.6     07-15    136  |  102  |  48<H>  ----------------------------<  146<H>  3.9   |  25  |  2.19<H>    Ca    8.7      15 Jul 2017 08:41      PT/INR - ( 15 Jul 2017 08:41 )   PT: 23.7 sec;   INR: 2.16 ratio                 Patient is 89yo male with PMhx of Afib on Coumadin, HLD, IDDM, HTN, PPM, hx of freuent UTIS, presents from home with fatigue, weakness, and nausea.     1- Weakness/UTI/Fever/Leukocytosis/Nausea secondary to Ecoli UTI  Ucx: Ecoli- , pan sensitive  Cont ceftriaxone for now  U/S neg for hydro or obstructive uropathy      2-Dyspnea  -apparently patient desaturated yesturdays 88 RA, responded to lasix  -last echo on file is in from [id number]  -Repeat echo.    3-Acute renal failure on CKD Stage III  -baseline renal function is 1-58-1.63, and at that time patient has glucosuria and protein uria  -currently  48/2.19  -hold ace/arb   -will get nephrology consultation      4-HTN  - Hold ARB, Torpol XL  - -110s    5-DM  -hba1c 7.4  - Lantus  - sliding scale    6-Psych  - Effexor    7-Afib  - Coumadin  - hold Toprol XL for now, SBP 100s  -inr 2.16    8-DVT ppx, Coumadin

## 2017-07-16 LAB
ANION GAP SERPL CALC-SCNC: 7 MMOL/L — SIGNIFICANT CHANGE UP (ref 5–17)
BUN SERPL-MCNC: 40 MG/DL — HIGH (ref 7–23)
CALCIUM SERPL-MCNC: 8.8 MG/DL — SIGNIFICANT CHANGE UP (ref 8.5–10.1)
CHLORIDE SERPL-SCNC: 103 MMOL/L — SIGNIFICANT CHANGE UP (ref 96–108)
CO2 SERPL-SCNC: 27 MMOL/L — SIGNIFICANT CHANGE UP (ref 22–31)
CREAT ?TM UR-MCNC: 108 MG/DL — SIGNIFICANT CHANGE UP
CREAT SERPL-MCNC: 1.82 MG/DL — HIGH (ref 0.5–1.3)
GLUCOSE SERPL-MCNC: 107 MG/DL — HIGH (ref 70–99)
HCT VFR BLD CALC: 29.5 % — LOW (ref 39–50)
HGB BLD-MCNC: 9.9 G/DL — LOW (ref 13–17)
INR BLD: 3.4 RATIO — HIGH (ref 0.88–1.16)
MCHC RBC-ENTMCNC: 32.9 PG — SIGNIFICANT CHANGE UP (ref 27–34)
MCHC RBC-ENTMCNC: 33.6 GM/DL — SIGNIFICANT CHANGE UP (ref 32–36)
MCV RBC AUTO: 98 FL — SIGNIFICANT CHANGE UP (ref 80–100)
PLATELET # BLD AUTO: 122 K/UL — LOW (ref 150–400)
POTASSIUM SERPL-MCNC: 4.3 MMOL/L — SIGNIFICANT CHANGE UP (ref 3.5–5.3)
POTASSIUM SERPL-SCNC: 4.3 MMOL/L — SIGNIFICANT CHANGE UP (ref 3.5–5.3)
PROTHROM AB SERPL-ACNC: 37.7 SEC — HIGH (ref 9.8–12.7)
RBC # BLD: 3.01 M/UL — LOW (ref 4.2–5.8)
RBC # FLD: 13.3 % — SIGNIFICANT CHANGE UP (ref 10.3–14.5)
SODIUM SERPL-SCNC: 137 MMOL/L — SIGNIFICANT CHANGE UP (ref 135–145)
SODIUM UR-SCNC: 30 MMOL/L — SIGNIFICANT CHANGE UP
WBC # BLD: 14.2 K/UL — HIGH (ref 3.8–10.5)
WBC # FLD AUTO: 14.2 K/UL — HIGH (ref 3.8–10.5)

## 2017-07-16 RX ORDER — FUROSEMIDE 40 MG
20 TABLET ORAL
Qty: 0 | Refills: 0 | Status: DISCONTINUED | OUTPATIENT
Start: 2017-07-16 | End: 2017-07-17

## 2017-07-16 RX ORDER — SIMETHICONE 80 MG/1
80 TABLET, CHEWABLE ORAL DAILY
Qty: 0 | Refills: 0 | Status: DISCONTINUED | OUTPATIENT
Start: 2017-07-16 | End: 2017-07-17

## 2017-07-16 RX ORDER — FUROSEMIDE 40 MG
20 TABLET ORAL ONCE
Qty: 0 | Refills: 0 | Status: COMPLETED | OUTPATIENT
Start: 2017-07-16 | End: 2017-07-16

## 2017-07-16 RX ADMIN — Medication 4 MILLIGRAM(S): at 22:10

## 2017-07-16 RX ADMIN — GABAPENTIN 100 MILLIGRAM(S): 400 CAPSULE ORAL at 16:27

## 2017-07-16 RX ADMIN — Medication 1 TABLET(S): at 12:39

## 2017-07-16 RX ADMIN — SIMVASTATIN 20 MILLIGRAM(S): 20 TABLET, FILM COATED ORAL at 22:10

## 2017-07-16 RX ADMIN — Medication 6: at 18:38

## 2017-07-16 RX ADMIN — Medication 6: at 12:53

## 2017-07-16 RX ADMIN — ALBUTEROL 2.5 MILLIGRAM(S): 90 AEROSOL, METERED ORAL at 07:59

## 2017-07-16 RX ADMIN — CEFTRIAXONE 100 GRAM(S): 500 INJECTION, POWDER, FOR SOLUTION INTRAMUSCULAR; INTRAVENOUS at 12:38

## 2017-07-16 RX ADMIN — GABAPENTIN 100 MILLIGRAM(S): 400 CAPSULE ORAL at 05:45

## 2017-07-16 RX ADMIN — ALBUTEROL 2.5 MILLIGRAM(S): 90 AEROSOL, METERED ORAL at 21:05

## 2017-07-16 RX ADMIN — GABAPENTIN 100 MILLIGRAM(S): 400 CAPSULE ORAL at 22:10

## 2017-07-16 RX ADMIN — Medication 20 MILLIGRAM(S): at 18:11

## 2017-07-16 RX ADMIN — ALBUTEROL 2.5 MILLIGRAM(S): 90 AEROSOL, METERED ORAL at 13:09

## 2017-07-16 RX ADMIN — Medication 75 MILLIGRAM(S): at 12:43

## 2017-07-16 NOTE — PROGRESS NOTE ADULT - SUBJECTIVE AND OBJECTIVE BOX
CHIEF COMPLAINT:    SUBJECTIVE:     REVIEW OF SYSTEMS:    CONSTITUTIONAL: No weakness, fevers or chills  EYES/ENT: No visual changes;  No vertigo or throat pain   NECK: No pain or stiffness  RESPIRATORY: No cough, wheezing, hemoptysis; No shortness of breath  CARDIOVASCULAR: No chest pain or palpitations  GASTROINTESTINAL: No abdominal or epigastric pain. No nausea, vomiting, or hematemesis; No diarrhea or constipation. No melena or hematochezia.  GENITOURINARY: No dysuria, frequency or hematuria  NEUROLOGICAL: No numbness or weakness  SKIN: No itching, burning, rashes, or lesions   All other review of systems is negative unless indicated above    Vital Signs Last 24 Hrs  T(C): 37.3 (16 Jul 2017 05:34), Max: 37.7 (15 Jul 2017 21:51)  T(F): 99.2 (16 Jul 2017 05:34), Max: 99.9 (15 Jul 2017 21:51)  HR: 69 (16 Jul 2017 07:50) (64 - 72)  BP: 119/48 (16 Jul 2017 05:34) (117/58 - 123/55)  BP(mean): --  RR: 19 (16 Jul 2017 05:34) (17 - 19)  SpO2: 97% (16 Jul 2017 05:34) (97% - 100%)    I&O's Summary      CAPILLARY BLOOD GLUCOSE  121 (16 Jul 2017 08:32)  130 (15 Jul 2017 21:19)  277 (15 Jul 2017 17:09)  290 (15 Jul 2017 11:50)          PHYSICAL EXAM:    Constitutional: NAD, awake and alert, well-developed  HEENT: PERR, EOMI, Normal Hearing, MMM  Neck: Soft and supple, No LAD, No JVD  Respiratory: Breath sounds are clear bilaterally, No wheezing, rales or rhonchi  Cardiovascular: S1 and S2, regular rate and rhythm, no Murmurs, gallops or rubs  Gastrointestinal: Bowel Sounds present, soft, nontender, nondistended, no guarding, no rebound  Extremities: No peripheral edema  Vascular: 2+ peripheral pulses  Neurological: A/O x 3, no focal deficits  Musculoskeletal: 5/5 strength b/l upper and lower extremities  Skin: No rashes    MEDICATIONS:  MEDICATIONS  (STANDING):  simvastatin 20 milliGRAM(s) Oral at bedtime  venlafaxine XR. 75 milliGRAM(s) Oral daily  gabapentin 100 milliGRAM(s) Oral three times a day  multivitamin 1 Tablet(s) Oral daily  cefTRIAXone   IVPB 1 Gram(s) IV Intermittent every 24 hours  insulin glargine Injectable (LANTUS) 10 Unit(s) SubCutaneous at bedtime  insulin lispro (HumaLOG) corrective regimen sliding scale   SubCutaneous three times a day before meals  dextrose 5%. 1000 milliLiter(s) (50 mL/Hr) IV Continuous <Continuous>  dextrose 50% Injectable 12.5 Gram(s) IV Push once  dextrose 50% Injectable 25 Gram(s) IV Push once  dextrose 50% Injectable 25 Gram(s) IV Push once  doxazosin 4 milliGRAM(s) Oral at bedtime  ALBUTerol    0.083% 2.5 milliGRAM(s) Nebulizer every 6 hours  ALBUTerol    90 MICROgram(s) HFA Inhaler 1 Puff(s) Inhalation every 4 hours      LABS: All Labs Reviewed:                        9.9    14.2  )-----------( 122      ( 16 Jul 2017 08:19 )             29.5     07-16    137  |  103  |  40<H>  ----------------------------<  107<H>  4.3   |  27  |  1.82<H>    Ca    8.8      16 Jul 2017 08:19      PT/INR - ( 15 Jul 2017 08:41 )   PT: 23.7 sec;   INR: 2.16 ratio         Assessment and plan:      Patient is 87yo male with PMhx of Afib on Coumadin, HLD, IDDM, HTN, PPM, hx of freuent UTIS, presents from home with fatigue, weakness, and nausea.     1- Weakness/UTI/Fever/Leukocytosis/Nausea secondary to Ecoli UTI  Ucx: Ecoli- , pan sensitive  Cont ceftriaxone for now  U/S neg for hydro or obstructive uropathy      2-Dyspnea  -apparently patient desaturated yesturdays 88 RA, responded to lasix  -last echo on file is in from [id number]  -Repeat echo.    3-Acute renal failure on CKD Stage III  -baseline renal function is 1-58-1.63, and at that time patient has glucosuria and protein uria  -currently  48/2.19  -hold ace/arb   -will get nephrology consultation      4-HTN  - Hold ARB, Torpol XL  - -110s    5-DM  -hba1c 7.4  - Lantus  - sliding scale    6-Psych  - Effexor    7-Afib  - Coumadin  - hold Toprol XL for now, SBP 100s  -inr 2.16    8-DVT ppx, Coumadin CHIEF COMPLAINT/Diagnosis: SOB/ hypoxia/     SUBJECTIVE: no complaints    REVIEW OF SYSTEMS:    CONSTITUTIONAL: No weakness, fevers or chills  EYES/ENT: No visual changes;  No vertigo or throat pain   NECK: No pain or stiffness  RESPIRATORY: No cough, wheezing, hemoptysis; No shortness of breath  CARDIOVASCULAR: No chest pain or palpitations  GASTROINTESTINAL: No abdominal or epigastric pain. No nausea, vomiting, or hematemesis; No diarrhea or constipation. No melena or hematochezia.  GENITOURINARY: No dysuria, frequency or hematuria  NEUROLOGICAL: No numbness or weakness  SKIN: No itching, burning, rashes, or lesions   All other review of systems is negative unless indicated above    Vital Signs Last 24 Hrs  T(C): 37.3 (16 Jul 2017 05:34), Max: 37.7 (15 Jul 2017 21:51)  T(F): 99.2 (16 Jul 2017 05:34), Max: 99.9 (15 Jul 2017 21:51)  HR: 69 (16 Jul 2017 07:50) (64 - 72)  BP: 119/48 (16 Jul 2017 05:34) (117/58 - 123/55)  BP(mean): --  RR: 19 (16 Jul 2017 05:34) (17 - 19)  SpO2: 97% (16 Jul 2017 05:34) (97% - 100%)    I&O's Summary      CAPILLARY BLOOD GLUCOSE  121 (16 Jul 2017 08:32)  130 (15 Jul 2017 21:19)  277 (15 Jul 2017 17:09)  290 (15 Jul 2017 11:50)          PHYSICAL EXAM:    Constitutional: NAD, awake and alert, well-developed  HEENT: PERR, EOMI, Normal Hearing, MMM  Neck: Soft and supple, No LAD, No JVD  Respiratory: Breath sounds are clear bilaterally, No wheezing, rales or rhonchi  Cardiovascular: S1 and S2, regular rate and rhythm, no Murmurs, gallops or rubs  Gastrointestinal: Bowel Sounds present, soft, nontender, nondistended, no guarding, no rebound  Extremities: No peripheral edema  Vascular: 2+ peripheral pulses  Neurological: A/O x 3, no focal deficits  Musculoskeletal: 5/5 strength b/l upper and lower extremities  Skin: No rashes    MEDICATIONS:  MEDICATIONS  (STANDING):  simvastatin 20 milliGRAM(s) Oral at bedtime  venlafaxine XR. 75 milliGRAM(s) Oral daily  gabapentin 100 milliGRAM(s) Oral three times a day  multivitamin 1 Tablet(s) Oral daily  cefTRIAXone   IVPB 1 Gram(s) IV Intermittent every 24 hours  insulin glargine Injectable (LANTUS) 10 Unit(s) SubCutaneous at bedtime  insulin lispro (HumaLOG) corrective regimen sliding scale   SubCutaneous three times a day before meals  dextrose 5%. 1000 milliLiter(s) (50 mL/Hr) IV Continuous <Continuous>  dextrose 50% Injectable 12.5 Gram(s) IV Push once  dextrose 50% Injectable 25 Gram(s) IV Push once  dextrose 50% Injectable 25 Gram(s) IV Push once  doxazosin 4 milliGRAM(s) Oral at bedtime  ALBUTerol    0.083% 2.5 milliGRAM(s) Nebulizer every 6 hours  ALBUTerol    90 MICROgram(s) HFA Inhaler 1 Puff(s) Inhalation every 4 hours      LABS: All Labs Reviewed:                        9.9    14.2  )-----------( 122      ( 16 Jul 2017 08:19 )             29.5     07-16    137  |  103  |  40<H>  ----------------------------<  107<H>  4.3   |  27  |  1.82<H>    Ca    8.8      16 Jul 2017 08:19      PT/INR - ( 15 Jul 2017 08:41 )   PT: 23.7 sec;   INR: 2.16 ratio         Assessment and plan:      Patient is 87yo male with PMhx of Afib on Coumadin, HLD, IDDM, HTN, PPM, hx of freuent UTIS, presents from home with fatigue, weakness, and nausea.     1-Weakness/UTI/Fever/Leukocytosis/Nausea secondary to Ecoli UTI  Ucx: Ecoli- , pan sensitive  Cont ceftriaxone for now  U/S neg for hydro or obstructive uropathy      2-Dyspnea/ acute respiratory failure/ hypoxia  -apparently patient desaturated yesturdays 88 RA, responded to lasix  -last echo on file is in from [id number]  -Repeat echo.  -will start lasix 20 iv bid  -patient ambulated in hallway, destaturated with nurses to 88 % on ambulation  -will re-eval in am after lasix    3-Acute renal failure on CKD Stage III  -baseline renal function is 1-58-1.63, and at that time patient has glucosuria and protein uria  -currently  48/2.19  -hold ace/arb   -will get nephrology consultation      4-HTN  - Hold ARB, Torpol XL  - -110s    5-DM  -hba1c 7.4  - Lantus  - sliding scale    6-Psych  - Effexor    7-Afib  - Coumadin  - hold Toprol XL for now, SBP 100s  -inr 2.16    8-DVT ppx, Coumadin

## 2017-07-17 VITALS — OXYGEN SATURATION: 92 %

## 2017-07-17 LAB
ANION GAP SERPL CALC-SCNC: 7 MMOL/L — SIGNIFICANT CHANGE UP (ref 5–17)
APTT BLD: 34.8 SEC — SIGNIFICANT CHANGE UP (ref 27.5–37.4)
BUN SERPL-MCNC: 38 MG/DL — HIGH (ref 7–23)
CALCIUM SERPL-MCNC: 9 MG/DL — SIGNIFICANT CHANGE UP (ref 8.5–10.1)
CHLORIDE SERPL-SCNC: 103 MMOL/L — SIGNIFICANT CHANGE UP (ref 96–108)
CO2 SERPL-SCNC: 27 MMOL/L — SIGNIFICANT CHANGE UP (ref 22–31)
CREAT SERPL-MCNC: 1.71 MG/DL — HIGH (ref 0.5–1.3)
GLUCOSE SERPL-MCNC: 120 MG/DL — HIGH (ref 70–99)
INR BLD: 3.35 RATIO — HIGH (ref 0.88–1.16)
POTASSIUM SERPL-MCNC: 4 MMOL/L — SIGNIFICANT CHANGE UP (ref 3.5–5.3)
POTASSIUM SERPL-SCNC: 4 MMOL/L — SIGNIFICANT CHANGE UP (ref 3.5–5.3)
PROTHROM AB SERPL-ACNC: 37.1 SEC — HIGH (ref 9.8–12.7)
SODIUM SERPL-SCNC: 137 MMOL/L — SIGNIFICANT CHANGE UP (ref 135–145)

## 2017-07-17 PROCEDURE — 93306 TTE W/DOPPLER COMPLETE: CPT | Mod: 26

## 2017-07-17 RX ORDER — DOXAZOSIN MESYLATE 4 MG
1 TABLET ORAL
Qty: 0 | Refills: 0 | COMMUNITY

## 2017-07-17 RX ORDER — GABAPENTIN 400 MG/1
1 CAPSULE ORAL
Qty: 0 | Refills: 0 | COMMUNITY

## 2017-07-17 RX ORDER — DOXAZOSIN MESYLATE 4 MG
1 TABLET ORAL
Qty: 0 | Refills: 0 | DISCHARGE
Start: 2017-07-17

## 2017-07-17 RX ORDER — GABAPENTIN 400 MG/1
1 CAPSULE ORAL
Qty: 0 | Refills: 0 | COMMUNITY
Start: 2017-07-17

## 2017-07-17 RX ORDER — SIMVASTATIN 20 MG/1
1 TABLET, FILM COATED ORAL
Qty: 0 | Refills: 0 | COMMUNITY
Start: 2017-07-17

## 2017-07-17 RX ORDER — FUROSEMIDE 40 MG
1 TABLET ORAL
Qty: 60 | Refills: 0 | OUTPATIENT
Start: 2017-07-17 | End: 2017-08-16

## 2017-07-17 RX ORDER — WARFARIN SODIUM 2.5 MG/1
1 TABLET ORAL
Qty: 0 | Refills: 0 | COMMUNITY

## 2017-07-17 RX ORDER — SIMVASTATIN 20 MG/1
1 TABLET, FILM COATED ORAL
Qty: 0 | Refills: 0 | COMMUNITY

## 2017-07-17 RX ADMIN — SIMETHICONE 80 MILLIGRAM(S): 80 TABLET, CHEWABLE ORAL at 12:01

## 2017-07-17 RX ADMIN — ALBUTEROL 2.5 MILLIGRAM(S): 90 AEROSOL, METERED ORAL at 08:42

## 2017-07-17 RX ADMIN — GABAPENTIN 100 MILLIGRAM(S): 400 CAPSULE ORAL at 06:22

## 2017-07-17 RX ADMIN — Medication 8: at 12:06

## 2017-07-17 RX ADMIN — ALBUTEROL 2.5 MILLIGRAM(S): 90 AEROSOL, METERED ORAL at 14:02

## 2017-07-17 RX ADMIN — GABAPENTIN 100 MILLIGRAM(S): 400 CAPSULE ORAL at 13:42

## 2017-07-17 RX ADMIN — Medication 75 MILLIGRAM(S): at 12:02

## 2017-07-17 RX ADMIN — Medication 20 MILLIGRAM(S): at 06:22

## 2017-07-17 RX ADMIN — Medication 1 TABLET(S): at 12:01

## 2017-07-17 RX ADMIN — CEFTRIAXONE 100 GRAM(S): 500 INJECTION, POWDER, FOR SOLUTION INTRAMUSCULAR; INTRAVENOUS at 10:17

## 2017-07-17 NOTE — DISCHARGE NOTE ADULT - HOSPITAL COURSE
89yo male with PMhx of Afib on Coumadin, HLD, IDDM, HTN, PPM, hx of freuent UTIS, presents from home with fatigue, weakness, and nausea. Patient was admitted with Ecoli UTI and was started on IV antibiotic which has since been compelted.     Vital Signs Last 24 Hrs  T(C): 37 (17 Jul 2017 12:00), Max: 37.3 (16 Jul 2017 21:31)  T(F): 98.6 (17 Jul 2017 12:00), Max: 99.1 (16 Jul 2017 21:31)  HR: 71 (17 Jul 2017 12:00) (61 - 72)  BP: 160/54 (17 Jul 2017 12:00) (122/50 - 160/54)  BP(mean): --  RR: 18 (17 Jul 2017 12:00) (17 - 18)  SpO2: 94% (17 Jul 2017 12:00) (94% - 100%)    ROS:   All 10 systems reviewed and found to be negative with the exception of what has been described above.    PE:  Constitutional: NAD, laying in bed  HEENT: NC/AT, EOMI, PERRLA  Neck: supple  Back: no tenderness  Respiratory: LCTA  Cardiovascular: S1S2 regular, no murmurs  Abdomen: soft, not tender, not distended, positive BS  Genitourinary: voiding  Rectal: deferred  Musculoskeletal: no muscle tenderness, no joint swelling or tenderness  Extremities: no pedal edema   Neurological: no focal deficits    Medications: Reviewed  Laboratory Values- Reviewed    PLAN    1-Weakness/UTI/Fever/Leukocytosis/Nausea secondary to Ecoli UTI  Ucx: Ecoli- , pan sensitive  Completed IV antibiotic Rocephin  U/S neg for hydro or obstructive uropathy      2-Dyspnea/ acute respiratory failure/ hypoxia  -apparently patient desaturated yesterdays' 88 RA, responded to lasix  -last echo on file is in from [id number]  -Repeat echo- f/u results  -continue lasix 20 iv bid  -patient ambulated in hallway, destaturated with nurses to 88 % on ambulation  -Pulse oximetry on ambulation 90-92 today- patient melendrez snot need Home O2    3-Acute renal failure on CKD Stage III  -baseline renal function is 1-58-1.63, and at that time patient has glucosuria and proteinuria  -currently  48/2.19  -restart BP meds on discharge  - recommend repeat Renal function panel in 2-3 days      4-HTN  - cotnienu BP meds    5-DM  -hba1c 7.4  - Lantus- switch to home solostar and tradjenta on discharge  - sliding scale    6-Psych  - Effexor    7-Afib  - Coumadin  - hold Toprol XL for now, SBP 100s  - Supratherapeutic INR  recommend hold coumadin for now and repeat INR on Thursday with his PCP if level within 2-3 recommend to restart coumadin at the previous dose.     Case discussed with Dr Kirkland- plan for discharge home today. 89yo male with PMhx of Afib on Coumadin, HLD, IDDM, HTN, PPM, hx of freuent UTIS, presents from home with fatigue, weakness, and nausea. Patient was admitted with Ecoli UTI and was started on IV antibiotic which has since been compelted.     Vital Signs Last 24 Hrs  T(C): 37 (17 Jul 2017 12:00), Max: 37.3 (16 Jul 2017 21:31)  T(F): 98.6 (17 Jul 2017 12:00), Max: 99.1 (16 Jul 2017 21:31)  HR: 71 (17 Jul 2017 12:00) (61 - 72)  BP: 160/54 (17 Jul 2017 12:00) (122/50 - 160/54)  BP(mean): --  RR: 18 (17 Jul 2017 12:00) (17 - 18)  SpO2: 94% (17 Jul 2017 12:00) (94% - 100%)    ROS:   All 10 systems reviewed and found to be negative with the exception of what has been described above.    PE:  Constitutional: NAD, laying in bed  HEENT: NC/AT, EOMI, PERRLA  Neck: supple  Back: no tenderness  Respiratory: LCTA  Cardiovascular: S1S2 regular, no murmurs  Abdomen: soft, not tender, not distended, positive BS  Genitourinary: voiding  Rectal: deferred  Musculoskeletal: no muscle tenderness, no joint swelling or tenderness  Extremities: no pedal edema   Neurological: no focal deficits    Medications: Reviewed  Laboratory Values- Reviewed    PLAN    1-Weakness/UTI/Fever/Leukocytosis/Nausea secondary to Ecoli UTI  Ucx: Ecoli- , pan sensitive  Completed IV antibiotic Rocephin  U/S neg for hydro or obstructive uropathy      2-Dyspnea/ acute respiratory failure/ hypoxia  -apparently patient desaturated yesterdays' 88 RA, responded to lasix  -last echo on file is in from [id number]  -Repeat echo- f/u results  -continue lasix 20 iv bid  -patient ambulated in hallway, destaturated with nurses to 88 % on ambulation  -Pulse oximetry on ambulation 90-92 today- patient melendrez snot need Home O2  - < from: Transthoracic Echocardiogram (07.17.17 @ 11:23) >  Moderate to severe pulmonary hypertension.- will need to f/u with Pulmonologist for further outapteitn management of pulmonary hypertension.     3-Acute renal failure on CKD Stage III  -baseline renal function is 1-58-1.63, and at that time patient has glucosuria and proteinuria  -currently  48/2.19  -restart BP meds on discharge  - recommend repeat Renal function panel in 2-3 days      4-HTN  - cotnienu BP meds    5-DM  -hba1c 7.4  - Lantus- switch to home solostar and tradjenta on discharge  - sliding scale    6-Psych  - Effexor    7-Afib  - Coumadin  - hold Toprol XL for now, SBP 100s  - Supratherapeutic INR  recommend hold coumadin for now and repeat INR on Thursday with his PCP if level within 2-3 recommend to restart coumadin at the previous dose.     Case discussed with Dr Kirkland- plan for discharge home today.

## 2017-07-17 NOTE — DISCHARGE NOTE ADULT - MEDICATION SUMMARY - MEDICATIONS TO TAKE
I will START or STAY ON the medications listed below when I get home from the hospital:    aspirin 81 mg oral tablet  -- 1 tab(s) by mouth once a day  -- Indication: For Home meds    losartan 25 mg oral tablet  -- 1 tab(s) by mouth once a day  -- Indication: For Hypertension    doxazosin 4 mg oral tablet  -- 1 tab(s) by mouth once a day (at bedtime)  -- Indication: For Hypertension    warfarin 4 mg oral tablet  -- 1 tab(s) by mouth once a day- Will need a repeat INR on thursday before starting dose  -- Please tell patient that he will need a repeat INR on Thursday before he can start his coumadin dose under the supervision of his PCP  -- Indication: For atrial fibrillation    gabapentin 100 mg oral capsule  -- 1 cap(s) by mouth 3 times a day  -- Indication: For pain    clonazePAM 0.5 mg oral tablet  -- 1 tab(s) by mouth once a day (at bedtime)  -- Indication: For Home meds    venlafaxine 75 mg oral capsule, extended release  -- 1 cap(s) by mouth once a day  -- Indication: For antidepressant    Lantus Solostar Pen 100 units/mL subcutaneous solution  -- 25 unit(s) subcutaneous once a day (at bedtime)  -- Indication: For diabeted    Tradjenta 5 mg oral tablet  -- 1 tab(s) by mouth once a day  -- Indication: For diabetes    promethazine 25 mg oral tablet  -- 1 tab(s) by mouth once a day, As Needed for nausea and vomiting  -- Indication: For antinausea    simvastatin 20 mg oral tablet  -- 1 tab(s) by mouth once a day (at bedtime)  -- Indication: For Hyperlipidemia    Toprol-XL 50 mg oral tablet, extended release  -- 1 tab(s) by mouth once a day  -- Indication: For Hypertension/ atrial fibrillation    Lasix 20 mg oral tablet  -- 1 tab(s) by mouth 2 times a day  -- Avoid prolonged or excessive exposure to direct and/or artificial sunlight while taking this medication.  It is very important that you take or use this exactly as directed.  Do not skip doses or discontinue unless directed by your doctor.  It may be advisable to drink a full glass orange juice or eat a banana daily while taking this medication.    -- Indication: For swelling    RABEprazole 20 mg oral delayed release tablet  -- 1 tab(s) by mouth 2 times a day  -- Indication: For GERD    Multiple Vitamins oral tablet  -- 1 tab(s) by mouth once a day  -- Indication: For supplement I will START or STAY ON the medications listed below when I get home from the hospital:    aspirin 81 mg oral tablet  -- 1 tab(s) by mouth once a day  -- Indication: For Home meds    losartan 25 mg oral tablet  -- 1 tab(s) by mouth once a day  -- Indication: For Hypertension    doxazosin 4 mg oral tablet  -- 1 tab(s) by mouth once a day (at bedtime)  -- Indication: For Hypertension    warfarin 4 mg oral tablet  -- 1 tab(s) by mouth once a day- Will need a repeat INR on thursday before starting dose  -- Please tell patient that he will need a repeat INR on Thursday before he can start his coumadin dose under the supervision of his PCP  -- Indication: For Home meds    gabapentin 100 mg oral capsule  -- 1 cap(s) by mouth 3 times a day  -- Indication: For Pain    clonazePAM 0.5 mg oral tablet  -- 1 tab(s) by mouth once a day (at bedtime)  -- Indication: For Pain    venlafaxine 75 mg oral capsule, extended release  -- 1 cap(s) by mouth once a day  -- Indication: For antidepressants     Lantus Solostar Pen 100 units/mL subcutaneous solution  -- 25 unit(s) subcutaneous once a day (at bedtime)  -- Indication: For diabetes    Tradjenta 5 mg oral tablet  -- 1 tab(s) by mouth once a day  -- Indication: For diabetes    promethazine 25 mg oral tablet  -- 1 tab(s) by mouth once a day, As Needed for nausea and vomiting  -- Indication: For antiemetic    simvastatin 20 mg oral tablet  -- 1 tab(s) by mouth once a day (at bedtime)  -- Indication: For Hyperlipidemi    Toprol-XL 50 mg oral tablet, extended release  -- 1 tab(s) by mouth once a day  -- Indication: For Hypertension    Lasix 20 mg oral tablet  -- 1 tab(s) by mouth 2 times a day  -- Avoid prolonged or excessive exposure to direct and/or artificial sunlight while taking this medication.  It is very important that you take or use this exactly as directed.  Do not skip doses or discontinue unless directed by your doctor.  It may be advisable to drink a full glass orange juice or eat a banana daily while taking this medication.    -- Indication: For Hypertnesion    RABEprazole 20 mg oral delayed release tablet  -- 1 tab(s) by mouth 2 times a day  -- Indication: For GERD    Multiple Vitamins oral tablet  -- 1 tab(s) by mouth once a day  -- Indication: For Supplement

## 2017-07-17 NOTE — DISCHARGE NOTE ADULT - CARE PROVIDERS DIRECT ADDRESSES
[email or web address]

## 2017-07-17 NOTE — DISCHARGE NOTE ADULT - PATIENT PORTAL LINK FT
“You can access the Follow[facility] Patient Portal, offered by [facility], by registering with the following website: [email or web address]”

## 2017-07-17 NOTE — DISCHARGE NOTE ADULT - CARE PROVIDER_API CALL
Digna Rosa), Internal Medicine  [address] [place] [address]  Phone: [phone]  Fax: [phone] Digna Rosa), Internal Medicine  [address] [place] [address]  Phone: [phone]  Fax: [phone]    Obdulia Bardales), Critical Care Medicine; Internal Medicine; Pulmonary Disease; Sleep Medicine  [address] [place] [address]  Phone: [phone]  Fax: [phone]

## 2017-07-17 NOTE — DISCHARGE NOTE ADULT - PLAN OF CARE
prevetn recurrence completed IV antibiotic as an inpatient- no further antibiotic requirement prevent worsening continue ral antihypertensive regimen symptom free continue current insulin regimen with solostar and tradjenta  f/u with your PCP as an outpatient INR goal between 2-3----> INR 7/17 3.35 Hold coumadin Mon-Wednesday. Make an appointment to see your PCP on Thursday and have your INR checked if it's within 2-3 restart Coumadin under the supervision of your PCP. found on Echocardiogram continue lasix - repeat blood work in 2-3 days  Please call [facility] Pulmonary Doctor- Dr Bardales to make an appointment to be seen in the next week

## 2017-07-17 NOTE — DISCHARGE NOTE ADULT - CARE PLAN
Principal Discharge DX:	Urinary tract infection, site unspecified  Goal:	prevetn recurrence  Instructions for follow-up, activity and diet:	completed IV antibiotic as an inpatient- no further antibiotic requirement  Secondary Diagnosis:	Secondary hypertension  Goal:	prevent worsening  Instructions for follow-up, activity and diet:	continue ral antihypertensive regimen  Secondary Diagnosis:	IDDM (insulin dependent diabetes mellitus)  Goal:	symptom free  Instructions for follow-up, activity and diet:	continue current insulin regimen with solostar and tradjenta  f/u with your PCP as an outpatient  Secondary Diagnosis:	Paroxysmal atrial fibrillation  Goal:	INR goal between 2-3----> INR 7/17 3.35  Instructions for follow-up, activity and diet:	Hold coumadin Mon-Wednesday. Make an appointment to see your PCP on Thursday and have your INR checked if it's within 2-3 restart Coumadin under the supervision of your PCP. Principal Discharge DX:	Urinary tract infection, site unspecified  Goal:	prevetn recurrence  Instructions for follow-up, activity and diet:	completed IV antibiotic as an inpatient- no further antibiotic requirement  Secondary Diagnosis:	Secondary hypertension  Goal:	prevent worsening  Instructions for follow-up, activity and diet:	continue ral antihypertensive regimen  Secondary Diagnosis:	IDDM (insulin dependent diabetes mellitus)  Goal:	symptom free  Instructions for follow-up, activity and diet:	continue current insulin regimen with solostar and tradjenta  f/u with your PCP as an outpatient  Secondary Diagnosis:	Paroxysmal atrial fibrillation  Goal:	INR goal between 2-3----> INR 7/17 3.35  Instructions for follow-up, activity and diet:	Hold coumadin Mon-Wednesday. Make an appointment to see your PCP on Thursday and have your INR checked if it's within 2-3 restart Coumadin under the supervision of your PCP.  Secondary Diagnosis:	Pulmonary hypertension  Goal:	found on Echocardiogram  Instructions for follow-up, activity and diet:	continue lasix - repeat blood work in 2-3 days  Please call [facility] Pulmonary Doctor- Dr Bardales to make an appointment to be seen in the next week Principal Discharge DX:	Urinary tract infection, site unspecified  Goal:	prevetn recurrence  Instructions for follow-up, activity and diet:	completed IV antibiotic as an inpatient- no further antibiotic requirement  Secondary Diagnosis:	Secondary hypertension  Goal:	prevent worsening  Instructions for follow-up, activity and diet:	continue ral antihypertensive regimen  Secondary Diagnosis:	IDDM (insulin dependent diabetes mellitus)  Goal:	symptom free  Instructions for follow-up, activity and diet:	continue current insulin regimen with solostar and tradjenta  f/u with your PCP as an outpatient  Secondary Diagnosis:	Paroxysmal atrial fibrillation  Goal:	INR goal between 2-3----> INR 7/17 3.35  Instructions for follow-up, activity and diet:	Hold coumadin Mon-Wednesday. Make an appointment to see your PCP on Thursday and have your INR checked if it's within 2-3 restart Coumadin under the supervision of your PCP.  Secondary Diagnosis:	Pulmonary hypertension  Goal:	found on Echocardiogram  Instructions for follow-up, activity and diet:	continue lasix - repeat blood work in 2-3 days  Please call [facility] group Pulmonary Doctor- Dr Bardales to make an appointment to be seen in the next week

## 2017-07-17 NOTE — DISCHARGE NOTE ADULT - SECONDARY DIAGNOSIS.
Secondary hypertension IDDM (insulin dependent diabetes mellitus) Paroxysmal atrial fibrillation Pulmonary hypertension

## 2017-07-18 LAB
CULTURE RESULTS: SIGNIFICANT CHANGE UP
CULTURE RESULTS: SIGNIFICANT CHANGE UP
SPECIMEN SOURCE: SIGNIFICANT CHANGE UP
SPECIMEN SOURCE: SIGNIFICANT CHANGE UP

## 2017-07-20 DIAGNOSIS — I27.2 OTHER SECONDARY PULMONARY HYPERTENSION: ICD-10-CM

## 2017-07-20 DIAGNOSIS — Z79.01 LONG TERM (CURRENT) USE OF ANTICOAGULANTS: ICD-10-CM

## 2017-07-20 DIAGNOSIS — Z95.0 PRESENCE OF CARDIAC PACEMAKER: ICD-10-CM

## 2017-07-20 DIAGNOSIS — I48.91 UNSPECIFIED ATRIAL FIBRILLATION: ICD-10-CM

## 2017-07-20 DIAGNOSIS — J96.01 ACUTE RESPIRATORY FAILURE WITH HYPOXIA: ICD-10-CM

## 2017-07-20 DIAGNOSIS — N39.0 URINARY TRACT INFECTION, SITE NOT SPECIFIED: ICD-10-CM

## 2017-07-20 DIAGNOSIS — E11.9 TYPE 2 DIABETES MELLITUS WITHOUT COMPLICATIONS: ICD-10-CM

## 2017-07-20 DIAGNOSIS — N18.3 CHRONIC KIDNEY DISEASE, STAGE 3 (MODERATE): ICD-10-CM

## 2017-07-20 DIAGNOSIS — Z79.4 LONG TERM (CURRENT) USE OF INSULIN: ICD-10-CM

## 2017-07-20 DIAGNOSIS — N17.9 ACUTE KIDNEY FAILURE, UNSPECIFIED: ICD-10-CM

## 2017-07-20 DIAGNOSIS — I12.9 HYPERTENSIVE CHRONIC KIDNEY DISEASE WITH STAGE 1 THROUGH STAGE 4 CHRONIC KIDNEY DISEASE, OR UNSPECIFIED CHRONIC KIDNEY DISEASE: ICD-10-CM

## 2017-07-20 RX ORDER — WARFARIN SODIUM 2.5 MG/1
1 TABLET ORAL
Qty: 15 | Refills: 0 | OUTPATIENT
Start: 2017-07-20 | End: 2017-08-04

## 2017-08-22 ENCOUNTER — APPOINTMENT (OUTPATIENT)
Dept: ELECTROPHYSIOLOGY | Facility: CLINIC | Age: 82
End: 2017-08-22
Payer: MEDICARE

## 2017-08-22 VITALS
BODY MASS INDEX: 28.34 KG/M2 | SYSTOLIC BLOOD PRESSURE: 144 MMHG | HEIGHT: 68 IN | DIASTOLIC BLOOD PRESSURE: 65 MMHG | HEART RATE: 60 BPM | WEIGHT: 187 LBS

## 2017-08-22 PROBLEM — I10 ESSENTIAL (PRIMARY) HYPERTENSION: Chronic | Status: ACTIVE | Noted: 2017-07-12

## 2017-08-22 PROBLEM — E11.9 TYPE 2 DIABETES MELLITUS WITHOUT COMPLICATIONS: Chronic | Status: ACTIVE | Noted: 2017-07-12

## 2017-08-22 PROCEDURE — 93279 PRGRMG DEV EVAL PM/LDLS PM: CPT

## 2017-11-29 ENCOUNTER — APPOINTMENT (OUTPATIENT)
Dept: ELECTROPHYSIOLOGY | Facility: CLINIC | Age: 82
End: 2017-11-29
Payer: MEDICARE

## 2017-11-29 VITALS
HEIGHT: 68 IN | BODY MASS INDEX: 28.34 KG/M2 | SYSTOLIC BLOOD PRESSURE: 138 MMHG | WEIGHT: 187 LBS | HEART RATE: 64 BPM | DIASTOLIC BLOOD PRESSURE: 86 MMHG

## 2017-11-29 PROCEDURE — 93279 PRGRMG DEV EVAL PM/LDLS PM: CPT

## 2017-12-01 ENCOUNTER — OUTPATIENT (OUTPATIENT)
Dept: OUTPATIENT SERVICES | Facility: HOSPITAL | Age: 82
LOS: 1 days | Discharge: HOME | End: 2017-12-01

## 2017-12-01 DIAGNOSIS — I48.91 UNSPECIFIED ATRIAL FIBRILLATION: ICD-10-CM

## 2017-12-01 LAB
INR BLD: 1.78 RATIO — HIGH (ref 0.88–1.16)
PROTHROM AB SERPL-ACNC: 19.5 SEC — HIGH (ref 9.8–12.7)

## 2017-12-01 RX ORDER — LINAGLIPTIN 5 MG/1
1 TABLET, FILM COATED ORAL
Qty: 0 | Refills: 0 | COMMUNITY

## 2017-12-01 RX ORDER — RABEPRAZOLE 20 MG/1
1 TABLET, DELAYED RELEASE ORAL
Qty: 0 | Refills: 0 | COMMUNITY

## 2017-12-01 RX ORDER — LOSARTAN POTASSIUM 100 MG/1
1 TABLET, FILM COATED ORAL
Qty: 0 | Refills: 0 | COMMUNITY

## 2017-12-01 RX ORDER — ENOXAPARIN SODIUM 100 MG/ML
25 INJECTION SUBCUTANEOUS
Qty: 0 | Refills: 0 | COMMUNITY

## 2017-12-01 NOTE — ASU PATIENT PROFILE, ADULT - ABILITY TO HEAR (WITH HEARING AID OR HEARING APPLIANCE IF NORMALLY USED):
bilateral hearing aides in place/Mildly to Moderately Impaired: difficulty hearing in some environments or speaker may need to increase volume or speak distinctly

## 2017-12-05 ENCOUNTER — OUTPATIENT (OUTPATIENT)
Dept: OUTPATIENT SERVICES | Facility: HOSPITAL | Age: 82
LOS: 1 days | Discharge: HOME | End: 2017-12-05
Payer: MEDICARE

## 2017-12-05 VITALS
SYSTOLIC BLOOD PRESSURE: 178 MMHG | HEART RATE: 65 BPM | OXYGEN SATURATION: 99 % | HEIGHT: 68 IN | WEIGHT: 184.97 LBS | RESPIRATION RATE: 18 BRPM | DIASTOLIC BLOOD PRESSURE: 86 MMHG | TEMPERATURE: 97 F

## 2017-12-05 DIAGNOSIS — Z95.0 PRESENCE OF CARDIAC PACEMAKER: Chronic | ICD-10-CM

## 2017-12-05 LAB
APTT BLD: 35 SEC — SIGNIFICANT CHANGE UP (ref 27.5–37.4)
GLUCOSE BLDC GLUCOMTR-MCNC: 116 MG/DL — HIGH (ref 70–99)
GLUCOSE BLDC GLUCOMTR-MCNC: 156 MG/DL — HIGH (ref 70–99)
GLUCOSE BLDC GLUCOMTR-MCNC: 174 MG/DL — HIGH (ref 70–99)
INR BLD: 1.78 RATIO — HIGH (ref 0.88–1.16)
PROTHROM AB SERPL-ACNC: 19.5 SEC — HIGH (ref 9.8–12.7)

## 2017-12-05 PROCEDURE — 71010: CPT | Mod: 26

## 2017-12-05 PROCEDURE — 33233 REMOVAL OF PM GENERATOR: CPT

## 2017-12-05 PROCEDURE — 0387T: CPT

## 2017-12-05 RX ORDER — LOSARTAN POTASSIUM 100 MG/1
25 TABLET, FILM COATED ORAL DAILY
Qty: 0 | Refills: 0 | Status: DISCONTINUED | OUTPATIENT
Start: 2017-12-05 | End: 2017-12-06

## 2017-12-05 RX ORDER — SODIUM CHLORIDE 9 MG/ML
1000 INJECTION, SOLUTION INTRAVENOUS
Qty: 0 | Refills: 0 | Status: DISCONTINUED | OUTPATIENT
Start: 2017-12-05 | End: 2017-12-06

## 2017-12-05 RX ORDER — METOPROLOL TARTRATE 50 MG
50 TABLET ORAL DAILY
Qty: 0 | Refills: 0 | Status: DISCONTINUED | OUTPATIENT
Start: 2017-12-05 | End: 2017-12-06

## 2017-12-05 RX ORDER — CEFAZOLIN SODIUM 1 G
1000 VIAL (EA) INJECTION EVERY 8 HOURS
Qty: 0 | Refills: 0 | Status: COMPLETED | OUTPATIENT
Start: 2017-12-05 | End: 2017-12-06

## 2017-12-05 RX ORDER — CEFAZOLIN SODIUM 1 G
2000 VIAL (EA) INJECTION ONCE
Qty: 0 | Refills: 0 | Status: COMPLETED | OUTPATIENT
Start: 2017-12-05 | End: 2017-12-05

## 2017-12-05 RX ORDER — INSULIN GLARGINE 100 [IU]/ML
15 INJECTION, SOLUTION SUBCUTANEOUS AT BEDTIME
Qty: 0 | Refills: 0 | Status: DISCONTINUED | OUTPATIENT
Start: 2017-12-05 | End: 2017-12-06

## 2017-12-05 RX ORDER — CLONAZEPAM 1 MG
0.5 TABLET ORAL AT BEDTIME
Qty: 0 | Refills: 0 | Status: DISCONTINUED | OUTPATIENT
Start: 2017-12-05 | End: 2017-12-06

## 2017-12-05 RX ORDER — SIMVASTATIN 20 MG/1
20 TABLET, FILM COATED ORAL AT BEDTIME
Qty: 0 | Refills: 0 | Status: DISCONTINUED | OUTPATIENT
Start: 2017-12-05 | End: 2017-12-06

## 2017-12-05 RX ORDER — DEXTROSE 50 % IN WATER 50 %
1 SYRINGE (ML) INTRAVENOUS ONCE
Qty: 0 | Refills: 0 | Status: DISCONTINUED | OUTPATIENT
Start: 2017-12-05 | End: 2017-12-06

## 2017-12-05 RX ORDER — GLUCAGON INJECTION, SOLUTION 0.5 MG/.1ML
1 INJECTION, SOLUTION SUBCUTANEOUS ONCE
Qty: 0 | Refills: 0 | Status: DISCONTINUED | OUTPATIENT
Start: 2017-12-05 | End: 2017-12-06

## 2017-12-05 RX ORDER — CLONAZEPAM 1 MG
0.5 TABLET ORAL ONCE
Qty: 0 | Refills: 0 | Status: DISCONTINUED | OUTPATIENT
Start: 2017-12-05 | End: 2018-01-06

## 2017-12-05 RX ORDER — ASPIRIN/CALCIUM CARB/MAGNESIUM 324 MG
81 TABLET ORAL DAILY
Qty: 0 | Refills: 0 | Status: DISCONTINUED | OUTPATIENT
Start: 2017-12-05 | End: 2017-12-06

## 2017-12-05 RX ORDER — VENLAFAXINE HCL 75 MG
75 CAPSULE, EXT RELEASE 24 HR ORAL ONCE
Qty: 0 | Refills: 0 | Status: DISCONTINUED | OUTPATIENT
Start: 2017-12-05 | End: 2018-01-06

## 2017-12-05 RX ORDER — INSULIN LISPRO 100/ML
VIAL (ML) SUBCUTANEOUS
Qty: 0 | Refills: 0 | Status: DISCONTINUED | OUTPATIENT
Start: 2017-12-05 | End: 2017-12-06

## 2017-12-05 RX ORDER — METOPROLOL TARTRATE 50 MG
50 TABLET ORAL ONCE
Qty: 0 | Refills: 0 | Status: DISCONTINUED | OUTPATIENT
Start: 2017-12-05 | End: 2018-01-06

## 2017-12-05 RX ORDER — CEFAZOLIN SODIUM 1 G
1000 VIAL (EA) INJECTION ONCE
Qty: 0 | Refills: 0 | Status: DISCONTINUED | OUTPATIENT
Start: 2017-12-05 | End: 2018-01-06

## 2017-12-05 RX ORDER — GABAPENTIN 400 MG/1
100 CAPSULE ORAL DAILY
Qty: 0 | Refills: 0 | Status: DISCONTINUED | OUTPATIENT
Start: 2017-12-05 | End: 2017-12-06

## 2017-12-05 RX ORDER — LOSARTAN POTASSIUM 100 MG/1
25 TABLET, FILM COATED ORAL ONCE
Qty: 0 | Refills: 0 | Status: DISCONTINUED | OUTPATIENT
Start: 2017-12-05 | End: 2018-01-06

## 2017-12-05 RX ORDER — DOXAZOSIN MESYLATE 4 MG
4 TABLET ORAL AT BEDTIME
Qty: 0 | Refills: 0 | Status: DISCONTINUED | OUTPATIENT
Start: 2017-12-05 | End: 2017-12-06

## 2017-12-05 RX ORDER — SIMVASTATIN 20 MG/1
20 TABLET, FILM COATED ORAL ONCE
Qty: 0 | Refills: 0 | Status: DISCONTINUED | OUTPATIENT
Start: 2017-12-05 | End: 2018-01-06

## 2017-12-05 RX ORDER — PANTOPRAZOLE SODIUM 20 MG/1
40 TABLET, DELAYED RELEASE ORAL ONCE
Qty: 0 | Refills: 0 | Status: DISCONTINUED | OUTPATIENT
Start: 2017-12-05 | End: 2018-01-06

## 2017-12-05 RX ORDER — PANTOPRAZOLE SODIUM 20 MG/1
40 TABLET, DELAYED RELEASE ORAL
Qty: 0 | Refills: 0 | Status: DISCONTINUED | OUTPATIENT
Start: 2017-12-05 | End: 2017-12-06

## 2017-12-05 RX ORDER — WARFARIN SODIUM 2.5 MG/1
4 TABLET ORAL DAILY
Qty: 0 | Refills: 0 | Status: DISCONTINUED | OUTPATIENT
Start: 2017-12-05 | End: 2017-12-06

## 2017-12-05 RX ORDER — WARFARIN SODIUM 2.5 MG/1
4 TABLET ORAL ONCE
Qty: 0 | Refills: 0 | Status: DISCONTINUED | OUTPATIENT
Start: 2017-12-05 | End: 2018-01-06

## 2017-12-05 RX ORDER — DOXAZOSIN MESYLATE 4 MG
4 TABLET ORAL ONCE
Qty: 0 | Refills: 0 | Status: DISCONTINUED | OUTPATIENT
Start: 2017-12-05 | End: 2018-01-06

## 2017-12-05 RX ORDER — VENLAFAXINE HCL 75 MG
75 CAPSULE, EXT RELEASE 24 HR ORAL DAILY
Qty: 0 | Refills: 0 | Status: DISCONTINUED | OUTPATIENT
Start: 2017-12-05 | End: 2017-12-06

## 2017-12-05 RX ORDER — NITROFURANTOIN MACROCRYSTAL 50 MG
100 CAPSULE ORAL ONCE
Qty: 0 | Refills: 0 | Status: DISCONTINUED | OUTPATIENT
Start: 2017-12-05 | End: 2018-01-06

## 2017-12-05 RX ORDER — ASPIRIN/CALCIUM CARB/MAGNESIUM 324 MG
81 TABLET ORAL ONCE
Qty: 0 | Refills: 0 | Status: DISCONTINUED | OUTPATIENT
Start: 2017-12-05 | End: 2017-12-05

## 2017-12-05 RX ORDER — GABAPENTIN 400 MG/1
100 CAPSULE ORAL ONCE
Qty: 0 | Refills: 0 | Status: DISCONTINUED | OUTPATIENT
Start: 2017-12-05 | End: 2018-01-06

## 2017-12-05 RX ORDER — ASPIRIN/CALCIUM CARB/MAGNESIUM 324 MG
81 TABLET ORAL ONCE
Qty: 0 | Refills: 0 | Status: DISCONTINUED | OUTPATIENT
Start: 2017-12-05 | End: 2018-01-06

## 2017-12-05 RX ORDER — DEXTROSE 50 % IN WATER 50 %
12.5 SYRINGE (ML) INTRAVENOUS ONCE
Qty: 0 | Refills: 0 | Status: DISCONTINUED | OUTPATIENT
Start: 2017-12-05 | End: 2017-12-06

## 2017-12-05 RX ADMIN — Medication 2: at 17:21

## 2017-12-05 RX ADMIN — Medication 4 MILLIGRAM(S): at 21:16

## 2017-12-05 RX ADMIN — GABAPENTIN 100 MILLIGRAM(S): 400 CAPSULE ORAL at 16:08

## 2017-12-05 RX ADMIN — Medication 81 MILLIGRAM(S): at 16:08

## 2017-12-05 RX ADMIN — PANTOPRAZOLE SODIUM 40 MILLIGRAM(S): 20 TABLET, DELAYED RELEASE ORAL at 16:09

## 2017-12-05 RX ADMIN — Medication 100 MILLIGRAM(S): at 20:32

## 2017-12-05 RX ADMIN — Medication 100 MILLIGRAM(S): at 11:57

## 2017-12-05 RX ADMIN — Medication 0.5 MILLIGRAM(S): at 21:20

## 2017-12-05 RX ADMIN — Medication 50 MILLIGRAM(S): at 16:09

## 2017-12-05 RX ADMIN — Medication 75 MILLIGRAM(S): at 16:09

## 2017-12-05 RX ADMIN — INSULIN GLARGINE 15 UNIT(S): 100 INJECTION, SOLUTION SUBCUTANEOUS at 21:16

## 2017-12-05 RX ADMIN — LOSARTAN POTASSIUM 25 MILLIGRAM(S): 100 TABLET, FILM COATED ORAL at 16:08

## 2017-12-05 RX ADMIN — SIMVASTATIN 20 MILLIGRAM(S): 20 TABLET, FILM COATED ORAL at 21:16

## 2017-12-05 RX ADMIN — WARFARIN SODIUM 4 MILLIGRAM(S): 2.5 TABLET ORAL at 21:41

## 2017-12-06 ENCOUNTER — TRANSCRIPTION ENCOUNTER (OUTPATIENT)
Age: 82
End: 2017-12-06

## 2017-12-06 VITALS
WEIGHT: 178.57 LBS | OXYGEN SATURATION: 91 % | DIASTOLIC BLOOD PRESSURE: 52 MMHG | HEART RATE: 55 BPM | RESPIRATION RATE: 17 BRPM | SYSTOLIC BLOOD PRESSURE: 104 MMHG | TEMPERATURE: 98 F

## 2017-12-06 DIAGNOSIS — Z86.39 PERSONAL HISTORY OF OTHER ENDOCRINE, NUTRITIONAL AND METABOLIC DISEASE: ICD-10-CM

## 2017-12-06 DIAGNOSIS — Z87.440 PERSONAL HISTORY OF URINARY (TRACT) INFECTIONS: ICD-10-CM

## 2017-12-06 DIAGNOSIS — Z86.79 PERSONAL HISTORY OF OTHER DISEASES OF THE CIRCULATORY SYSTEM: ICD-10-CM

## 2017-12-06 DIAGNOSIS — N18.3 CHRONIC KIDNEY DISEASE, STAGE 3 (MODERATE): ICD-10-CM

## 2017-12-06 LAB
ANION GAP SERPL CALC-SCNC: 5 MMOL/L — SIGNIFICANT CHANGE UP (ref 5–17)
BASOPHILS # BLD AUTO: 0 K/UL — SIGNIFICANT CHANGE UP (ref 0–0.2)
BASOPHILS NFR BLD AUTO: 0.4 % — SIGNIFICANT CHANGE UP (ref 0–2)
BUN SERPL-MCNC: 35 MG/DL — HIGH (ref 7–23)
CALCIUM SERPL-MCNC: 8.5 MG/DL — SIGNIFICANT CHANGE UP (ref 8.5–10.1)
CHLORIDE SERPL-SCNC: 107 MMOL/L — SIGNIFICANT CHANGE UP (ref 96–108)
CO2 SERPL-SCNC: 30 MMOL/L — SIGNIFICANT CHANGE UP (ref 22–31)
CREAT SERPL-MCNC: 1.67 MG/DL — HIGH (ref 0.5–1.3)
EOSINOPHIL # BLD AUTO: 0.2 K/UL — SIGNIFICANT CHANGE UP (ref 0–0.5)
EOSINOPHIL NFR BLD AUTO: 2.1 % — SIGNIFICANT CHANGE UP (ref 0–6)
GLUCOSE BLDC GLUCOMTR-MCNC: 102 MG/DL — HIGH (ref 70–99)
GLUCOSE SERPL-MCNC: 109 MG/DL — HIGH (ref 70–99)
HBA1C BLD-MCNC: 7.3 % — HIGH (ref 4–5.6)
HCT VFR BLD CALC: 32.4 % — LOW (ref 39–50)
HGB BLD-MCNC: 10 G/DL — LOW (ref 13–17)
INR BLD: 1.92 RATIO — HIGH (ref 0.88–1.16)
LYMPHOCYTES # BLD AUTO: 1.6 K/UL — SIGNIFICANT CHANGE UP (ref 1–3.3)
LYMPHOCYTES # BLD AUTO: 16 % — SIGNIFICANT CHANGE UP (ref 13–44)
MCHC RBC-ENTMCNC: 31 GM/DL — LOW (ref 32–36)
MCHC RBC-ENTMCNC: 31 PG — SIGNIFICANT CHANGE UP (ref 27–34)
MCV RBC AUTO: 100.1 FL — HIGH (ref 80–100)
MONOCYTES # BLD AUTO: 1 K/UL — HIGH (ref 0–0.9)
MONOCYTES NFR BLD AUTO: 9.9 % — SIGNIFICANT CHANGE UP (ref 2–14)
NEUTROPHILS # BLD AUTO: 7.3 K/UL — SIGNIFICANT CHANGE UP (ref 1.8–7.4)
NEUTROPHILS NFR BLD AUTO: 71.6 % — SIGNIFICANT CHANGE UP (ref 43–77)
PLATELET # BLD AUTO: 125 K/UL — LOW (ref 150–400)
POTASSIUM SERPL-MCNC: 4.9 MMOL/L — SIGNIFICANT CHANGE UP (ref 3.5–5.3)
POTASSIUM SERPL-SCNC: 4.9 MMOL/L — SIGNIFICANT CHANGE UP (ref 3.5–5.3)
PROTHROM AB SERPL-ACNC: 21 SEC — HIGH (ref 9.8–12.7)
RBC # BLD: 3.23 M/UL — LOW (ref 4.2–5.8)
RBC # FLD: 14.6 % — HIGH (ref 10.3–14.5)
SODIUM SERPL-SCNC: 142 MMOL/L — SIGNIFICANT CHANGE UP (ref 135–145)
WBC # BLD: 10.2 K/UL — SIGNIFICANT CHANGE UP (ref 3.8–10.5)
WBC # FLD AUTO: 10.2 K/UL — SIGNIFICANT CHANGE UP (ref 3.8–10.5)

## 2017-12-06 PROCEDURE — 93010 ELECTROCARDIOGRAM REPORT: CPT

## 2017-12-06 RX ADMIN — PANTOPRAZOLE SODIUM 40 MILLIGRAM(S): 20 TABLET, DELAYED RELEASE ORAL at 05:35

## 2017-12-06 RX ADMIN — Medication 100 MILLIGRAM(S): at 05:23

## 2017-12-06 RX ADMIN — LOSARTAN POTASSIUM 25 MILLIGRAM(S): 100 TABLET, FILM COATED ORAL at 05:35

## 2017-12-06 RX ADMIN — Medication 50 MILLIGRAM(S): at 05:36

## 2017-12-06 NOTE — DISCHARGE NOTE ADULT - MEDICATION SUMMARY - MEDICATIONS TO TAKE
I will START or STAY ON the medications listed below when I get home from the hospital:    aspirin 81 mg oral tablet  -- 1 tab(s) by mouth once a day  -- Indication: For CADHTN    Cozaar 25 mg oral tablet  -- 1 tab(s) by mouth once a day  -- Indication: For HTN    doxazosin 4 mg oral tablet  -- 1 tab(s) by mouth once a day (at bedtime)  -- Indication: For BPH    warfarin 4 mg oral tablet  -- 1 tab(s) by mouth once a day- Will need a repeat INR on thursday before starting dose  -- Please tell patient that he will need a repeat INR on Thursday before he can start his coumadin dose under the supervision of his PCP  -- Indication: For AFib    gabapentin 100 mg oral capsule  -- 1 cap(s) orally  -- Indication: For Pain    clonazePAM 0.5 mg oral tablet  -- 1 tab(s) by mouth once a day (at bedtime)  -- Indication: For Anxiety    venlafaxine 75 mg oral capsule, extended release  -- 1 cap(s) by mouth once a day  -- Indication: For Anxiety    Lantus Solostar Pen 100 units/mL subcutaneous solution  -- 15 unit(s) subcutaneous once a day (at bedtime)  -- Indication: For diabetes    Tradjenta 5 mg oral tablet  -- 1 tab(s) by mouth once a day (in the evening)  -- Indication: For diabetes    simvastatin 20 mg oral tablet  -- 1 tab(s) by mouth once a day (at bedtime)  -- Indication: For HL    Toprol-XL 50 mg oral tablet, extended release  -- 1 tab(s) by mouth once a day  -- Indication: For HTN    Gas-X  -- orally 2 times a day  -- Indication: For bloating    AcipHex 20 mg oral delayed release tablet  -- 1 tab(s) by mouth once a day  -- Indication: For GERD    RABEprazole 20 mg oral delayed release tablet  -- 1 tab(s) by mouth , As Needed  -- Indication: For GERD    nitrofurantoin macrocrystals 100 mg oral capsule  -- Indication: For urinary prophylaxis    Atkinson Caps oral capsule  -- 1 cap(s) by mouth once a day  -- Indication: For supplement

## 2017-12-06 NOTE — DISCHARGE NOTE ADULT - CARE PLAN
Principal Discharge DX:	Complete heart block  Goal:	to remain free of infection  Instructions for follow-up, activity and diet:	No heavy lifting or straining.  Avoid unnecessary stairs for 48 hrs.  Monitor right groin for bleeding, drainage or hematoma.  Secondary Diagnosis:	Artificial cardiac pacemaker

## 2017-12-06 NOTE — DISCHARGE NOTE ADULT - PLAN OF CARE
to remain free of infection No heavy lifting or straining.  Avoid unnecessary stairs for 48 hrs.  Monitor right groin for bleeding, drainage or hematoma.

## 2017-12-06 NOTE — DISCHARGE NOTE ADULT - PATIENT PORTAL LINK FT
“You can access the [facility] Patient Portal, offered by [facility], by registering with the following website: [email or web address]”

## 2017-12-06 NOTE — DISCHARGE NOTE ADULT - CARE PROVIDER_API CALL
Chad Aronld), Cardiac Electrophysiology; Cardiovascular Disease  [address] [place] [address]  Phone: [phone]  Fax: [phone]

## 2017-12-06 NOTE — DISCHARGE NOTE ADULT - HOSPITAL COURSE
This is an 88 yr old male with PMhx This is an 88 yr old male with PMhx of atrial fibrillation on coumadin, PPM, HLD, HTN, CKD stage III, frequent UTI's, DM whose PPM reached end of service.  His RV lead was also failing with low impedance and with chronic AF he was candidate for MICRA implant.    12/6/17: s/p MICRA implant, removal of right sided PPM generator (RV lead now capped) POD #1.  TELE: v-paced 60-70 bpm.    PAST MEDICAL & SURGICAL HISTORY:  HTN (hypertension)  IDDM (insulin dependent diabetes mellitus)  Artificial cardiac pacemaker     MEDICATIONS  (STANDING):  aspirin  chewable 81 milliGRAM(s) Oral daily  clonazePAM Tablet 0.5 milliGRAM(s) Oral at bedtime  dextrose 5%. 1000 milliLiter(s) (50 mL/Hr) IV Continuous <Continuous>  dextrose 50% Injectable 12.5 Gram(s) IV Push once  doxazosin 4 milliGRAM(s) Oral at bedtime  gabapentin 100 milliGRAM(s) Oral daily  insulin glargine Injectable (LANTUS) 15 Unit(s) SubCutaneous at bedtime  insulin lispro (HumaLOG) corrective regimen sliding scale   SubCutaneous three times a day before meals  losartan 25 milliGRAM(s) Oral daily  metoprolol succinate ER 50 milliGRAM(s) Oral daily  pantoprazole    Tablet 40 milliGRAM(s) Oral before breakfast  simvastatin 20 milliGRAM(s) Oral at bedtime  venlafaxine XR. 75 milliGRAM(s) Oral daily  warfarin 4 milliGRAM(s) Oral daily    MEDICATIONS  (PRN):  dextrose Gel 1 Dose(s) Oral once PRN Blood Glucose LESS THAN 70 milliGRAM(s)/deciliter  glucagon  Injectable 1 milliGRAM(s) IntraMuscular once PRN Glucose LESS THAN 70 milligrams/deciliter    Allergies    Aleve (Vomiting)  codeine (Unknown)  morphine (Nausea)  naproxen (Vomiting)    Vital Signs Last 24 Hrs  T(C): 36.6 (06 Dec 2017 05:30), Max: 36.8 (05 Dec 2017 13:17)  T(F): 97.8 (06 Dec 2017 05:30), Max: 98.2 (05 Dec 2017 13:17)  HR: 60 (06 Dec 2017 05:30) (60 - 68)  BP: 107/58 (06 Dec 2017 05:30) (107/58 - 178/86)  BP(mean): --  RR: 18 (06 Dec 2017 05:30) (16 - 20)  SpO2: 100% (06 Dec 2017 05:30) (95% - 100%)  12-06    142  |  107  |  35<H>  ----------------------------<  109<H>                        10.0   10.2  )-----------( 125      ( 06 Dec 2017 06:32 )             32.4   4.9   |  30  |  1.67<H>  PT/INR - ( 06 Dec 2017 06:32 )   PT: 21.0 sec;   INR: 1.92 ratio         PTT - ( 05 Dec 2017 20:49 )  PTT:35.0 sec    Ca    8.5      06 Dec 2017 06:32    REVIEW OF SYSTEMS:    CONSTITUTIONAL: No weakness, fevers or chills  EYES/ENT: No visual changes;  No vertigo or throat pain   NECK: No pain or stiffness  RESPIRATORY: No cough, wheezing, hemoptysis; No shortness of breath  CARDIOVASCULAR: No chest pain or palpitations  GASTROINTESTINAL: No abdominal or epigastric pain. No nausea, vomiting, or hematemesis; No diarrhea or constipation. No melena or hematochezia.  GENITOURINARY: No dysuria, frequency or hematuria  NEUROLOGICAL: No numbness or weakness  SKIN: No itching, burning, rashes, or lesions   All other review of systems is negative unless indicated above    PHYSICAL EXAM:    General: WN/WD NAD  HEENT: NC/AT, neck supple, no JVD, EOMI, PERRLA  Neurology: A&Ox3, nonfocal, MONTES x 4  Respiratory: CTA B/L  CV: S1, S2, + systolic murmur III/VI, no rubs or gallops  Abdominal: Soft, NT, ND +BS  Extremities: No edema, + peripheral pulses.  Right groin stitch removed, no bleeding, drainage or hematoma.    PLAN:  Pt to resume all prior meds.  Post op instructions reviewed with pt.  F/U as outpatient on 12/19/17 in device clinic.  Device interrogation reveals normal function.  Pt to call with any questions or concerns.  Discharge to home today.

## 2017-12-11 ENCOUNTER — INPATIENT (INPATIENT)
Facility: HOSPITAL | Age: 82
LOS: 3 days | Discharge: HOME | End: 2017-12-15
Attending: FAMILY MEDICINE | Admitting: INTERNAL MEDICINE
Payer: MEDICARE

## 2017-12-11 VITALS
TEMPERATURE: 100 F | OXYGEN SATURATION: 100 % | DIASTOLIC BLOOD PRESSURE: 75 MMHG | HEIGHT: 68 IN | WEIGHT: 184.97 LBS | HEART RATE: 58 BPM | RESPIRATION RATE: 16 BRPM | SYSTOLIC BLOOD PRESSURE: 167 MMHG

## 2017-12-11 DIAGNOSIS — Z95.0 PRESENCE OF CARDIAC PACEMAKER: Chronic | ICD-10-CM

## 2017-12-11 LAB
ALBUMIN SERPL ELPH-MCNC: 3.1 G/DL — LOW (ref 3.3–5)
ALP SERPL-CCNC: 93 U/L — SIGNIFICANT CHANGE UP (ref 40–120)
ALT FLD-CCNC: 17 U/L — SIGNIFICANT CHANGE UP (ref 12–78)
ANION GAP SERPL CALC-SCNC: 4 MMOL/L — LOW (ref 5–17)
APTT BLD: 32.4 SEC — SIGNIFICANT CHANGE UP (ref 27.5–37.4)
AST SERPL-CCNC: 24 U/L — SIGNIFICANT CHANGE UP (ref 15–37)
BASOPHILS # BLD AUTO: 0.1 K/UL — SIGNIFICANT CHANGE UP (ref 0–0.2)
BASOPHILS NFR BLD AUTO: 0.8 % — SIGNIFICANT CHANGE UP (ref 0–2)
BILIRUB SERPL-MCNC: 0.5 MG/DL — SIGNIFICANT CHANGE UP (ref 0.2–1.2)
BUN SERPL-MCNC: 46 MG/DL — HIGH (ref 7–23)
CALCIUM SERPL-MCNC: 9.1 MG/DL — SIGNIFICANT CHANGE UP (ref 8.5–10.1)
CHLORIDE SERPL-SCNC: 104 MMOL/L — SIGNIFICANT CHANGE UP (ref 96–108)
CO2 SERPL-SCNC: 29 MMOL/L — SIGNIFICANT CHANGE UP (ref 22–31)
CREAT SERPL-MCNC: 1.91 MG/DL — HIGH (ref 0.5–1.3)
EOSINOPHIL # BLD AUTO: 0.2 K/UL — SIGNIFICANT CHANGE UP (ref 0–0.5)
EOSINOPHIL NFR BLD AUTO: 1.6 % — SIGNIFICANT CHANGE UP (ref 0–6)
GLUCOSE SERPL-MCNC: 210 MG/DL — HIGH (ref 70–99)
HCT VFR BLD CALC: 30.1 % — LOW (ref 39–50)
HGB BLD-MCNC: 10.2 G/DL — LOW (ref 13–17)
INR BLD: 1.78 RATIO — HIGH (ref 0.88–1.16)
LYMPHOCYTES # BLD AUTO: 1 K/UL — SIGNIFICANT CHANGE UP (ref 1–3.3)
LYMPHOCYTES # BLD AUTO: 8.2 % — LOW (ref 13–44)
MCHC RBC-ENTMCNC: 32.5 PG — SIGNIFICANT CHANGE UP (ref 27–34)
MCHC RBC-ENTMCNC: 33.9 GM/DL — SIGNIFICANT CHANGE UP (ref 32–36)
MCV RBC AUTO: 95.9 FL — SIGNIFICANT CHANGE UP (ref 80–100)
MONOCYTES # BLD AUTO: 1.2 K/UL — HIGH (ref 0–0.9)
MONOCYTES NFR BLD AUTO: 9.6 % — SIGNIFICANT CHANGE UP (ref 2–14)
NEUTROPHILS # BLD AUTO: 10.1 K/UL — HIGH (ref 1.8–7.4)
NEUTROPHILS NFR BLD AUTO: 79.9 % — HIGH (ref 43–77)
NT-PROBNP SERPL-SCNC: 4510 PG/ML — HIGH (ref 0–450)
PLATELET # BLD AUTO: 149 K/UL — LOW (ref 150–400)
POTASSIUM SERPL-MCNC: 5.4 MMOL/L — HIGH (ref 3.5–5.3)
POTASSIUM SERPL-SCNC: 5.4 MMOL/L — HIGH (ref 3.5–5.3)
PROT SERPL-MCNC: 7.5 GM/DL — SIGNIFICANT CHANGE UP (ref 6–8.3)
PROTHROM AB SERPL-ACNC: 19.4 SEC — HIGH (ref 9.8–12.7)
RBC # BLD: 3.14 M/UL — LOW (ref 4.2–5.8)
RBC # FLD: 13.8 % — SIGNIFICANT CHANGE UP (ref 10.3–14.5)
SODIUM SERPL-SCNC: 137 MMOL/L — SIGNIFICANT CHANGE UP (ref 135–145)
TROPONIN I SERPL-MCNC: 0.02 NG/ML — SIGNIFICANT CHANGE UP (ref 0.01–0.04)
WBC # BLD: 12.6 K/UL — HIGH (ref 3.8–10.5)
WBC # FLD AUTO: 12.6 K/UL — HIGH (ref 3.8–10.5)

## 2017-12-11 PROCEDURE — 99285 EMERGENCY DEPT VISIT HI MDM: CPT

## 2017-12-11 PROCEDURE — 71010: CPT | Mod: 26

## 2017-12-11 PROCEDURE — 93010 ELECTROCARDIOGRAM REPORT: CPT

## 2017-12-11 RX ORDER — FUROSEMIDE 40 MG
40 TABLET ORAL ONCE
Qty: 0 | Refills: 0 | Status: COMPLETED | OUTPATIENT
Start: 2017-12-11 | End: 2017-12-11

## 2017-12-11 RX ORDER — SODIUM CHLORIDE 9 MG/ML
3 INJECTION INTRAMUSCULAR; INTRAVENOUS; SUBCUTANEOUS ONCE
Qty: 0 | Refills: 0 | Status: COMPLETED | OUTPATIENT
Start: 2017-12-11 | End: 2017-12-11

## 2017-12-11 RX ADMIN — SODIUM CHLORIDE 3 MILLILITER(S): 9 INJECTION INTRAMUSCULAR; INTRAVENOUS; SUBCUTANEOUS at 21:21

## 2017-12-11 NOTE — ED PROVIDER NOTE - OBJECTIVE STATEMENT
87 y/o male with hx of pacemaker removal on 12/05, HTN, IDDM presents to the ED c/o SOB. Pt states he feels his heart beating really fast and then becomes SOB. Slight fever at home 99F. Denies CP, cough, contact with anyone who is sick, abd pain.  Pt has no follow up with cardiologist until the 19th of this month. Pt's wife states he also has a hernia which influences his breathing. PCP- Dr. Whelan.

## 2017-12-11 NOTE — ED PROVIDER NOTE - NS_ ATTENDINGSCRIBEDETAILS _ED_A_ED_FT
I, William Miranda MD,  performed the initial face to face bedside interview with this patient regarding history of present illness, review of symptoms and relevant past medical, social and family history.  I completed an independent physical examination.    The history, relevant review of systems, past medical and surgical history, medical decision making, and physical examination was documented by the scribe in my presence and I attest to the accuracy of the documentation.

## 2017-12-11 NOTE — ED ADULT NURSE NOTE - OBJECTIVE STATEMENT
Pt c/o SOB and palpitations s/p pacemaker replacement at  on 12/5. Pt states that he has been feeling "lousy" since his discharge on 12/6. Denies chest pain. On coumadin.

## 2017-12-11 NOTE — ED ADULT NURSE REASSESSMENT NOTE - COMFORT CARE
9pm rounding complete, no other assistance needed
repositioned/warm blanket provided/side rails up/hourly rounding completed

## 2017-12-11 NOTE — ED PROVIDER NOTE - SKIN, MLM
Skin normal color for race, warm, dry and intact. No evidence of rash. +Lean, dry, intact, wound site.

## 2017-12-12 LAB
ALBUMIN SERPL ELPH-MCNC: 3 G/DL — LOW (ref 3.3–5)
ALP SERPL-CCNC: 85 U/L — SIGNIFICANT CHANGE UP (ref 40–120)
ALT FLD-CCNC: 14 U/L — SIGNIFICANT CHANGE UP (ref 12–78)
ANION GAP SERPL CALC-SCNC: 3 MMOL/L — LOW (ref 5–17)
APPEARANCE UR: CLEAR — SIGNIFICANT CHANGE UP
AST SERPL-CCNC: 19 U/L — SIGNIFICANT CHANGE UP (ref 15–37)
BACTERIA # UR AUTO: (no result)
BASOPHILS # BLD AUTO: 0.1 K/UL — SIGNIFICANT CHANGE UP (ref 0–0.2)
BASOPHILS NFR BLD AUTO: 0.6 % — SIGNIFICANT CHANGE UP (ref 0–2)
BILIRUB SERPL-MCNC: 0.8 MG/DL — SIGNIFICANT CHANGE UP (ref 0.2–1.2)
BILIRUB UR-MCNC: NEGATIVE — SIGNIFICANT CHANGE UP
BUN SERPL-MCNC: 42 MG/DL — HIGH (ref 7–23)
CALCIUM SERPL-MCNC: 9.3 MG/DL — SIGNIFICANT CHANGE UP (ref 8.5–10.1)
CHLORIDE SERPL-SCNC: 103 MMOL/L — SIGNIFICANT CHANGE UP (ref 96–108)
CO2 SERPL-SCNC: 32 MMOL/L — HIGH (ref 22–31)
COLOR SPEC: YELLOW — SIGNIFICANT CHANGE UP
CREAT SERPL-MCNC: 1.87 MG/DL — HIGH (ref 0.5–1.3)
DIFF PNL FLD: (no result)
EOSINOPHIL # BLD AUTO: 0.3 K/UL — SIGNIFICANT CHANGE UP (ref 0–0.5)
EOSINOPHIL NFR BLD AUTO: 2.6 % — SIGNIFICANT CHANGE UP (ref 0–6)
EPI CELLS # UR: SIGNIFICANT CHANGE UP
GLUCOSE BLDC GLUCOMTR-MCNC: 130 MG/DL — HIGH (ref 70–99)
GLUCOSE BLDC GLUCOMTR-MCNC: 166 MG/DL — HIGH (ref 70–99)
GLUCOSE BLDC GLUCOMTR-MCNC: 186 MG/DL — HIGH (ref 70–99)
GLUCOSE BLDC GLUCOMTR-MCNC: 196 MG/DL — HIGH (ref 70–99)
GLUCOSE SERPL-MCNC: 123 MG/DL — HIGH (ref 70–99)
GLUCOSE UR QL: NEGATIVE MG/DL — SIGNIFICANT CHANGE UP
HCT VFR BLD CALC: 31.4 % — LOW (ref 39–50)
HGB BLD-MCNC: 10.3 G/DL — LOW (ref 13–17)
INR BLD: 1.82 RATIO — HIGH (ref 0.88–1.16)
KETONES UR-MCNC: NEGATIVE — SIGNIFICANT CHANGE UP
LEUKOCYTE ESTERASE UR-ACNC: (no result)
LYMPHOCYTES # BLD AUTO: 1.9 K/UL — SIGNIFICANT CHANGE UP (ref 1–3.3)
LYMPHOCYTES # BLD AUTO: 18.2 % — SIGNIFICANT CHANGE UP (ref 13–44)
MAGNESIUM SERPL-MCNC: 2.3 MG/DL — SIGNIFICANT CHANGE UP (ref 1.6–2.6)
MCHC RBC-ENTMCNC: 32.2 PG — SIGNIFICANT CHANGE UP (ref 27–34)
MCHC RBC-ENTMCNC: 32.8 GM/DL — SIGNIFICANT CHANGE UP (ref 32–36)
MCV RBC AUTO: 98.1 FL — SIGNIFICANT CHANGE UP (ref 80–100)
MONOCYTES # BLD AUTO: 1.2 K/UL — HIGH (ref 0–0.9)
MONOCYTES NFR BLD AUTO: 11.4 % — SIGNIFICANT CHANGE UP (ref 2–14)
NEUTROPHILS # BLD AUTO: 6.9 K/UL — SIGNIFICANT CHANGE UP (ref 1.8–7.4)
NEUTROPHILS NFR BLD AUTO: 67.2 % — SIGNIFICANT CHANGE UP (ref 43–77)
NITRITE UR-MCNC: NEGATIVE — SIGNIFICANT CHANGE UP
PH UR: 5 — SIGNIFICANT CHANGE UP (ref 5–8)
PHOSPHATE SERPL-MCNC: 3.3 MG/DL — SIGNIFICANT CHANGE UP (ref 2.5–4.5)
PLATELET # BLD AUTO: 145 K/UL — LOW (ref 150–400)
POTASSIUM SERPL-MCNC: 4.1 MMOL/L — SIGNIFICANT CHANGE UP (ref 3.5–5.3)
POTASSIUM SERPL-SCNC: 4.1 MMOL/L — SIGNIFICANT CHANGE UP (ref 3.5–5.3)
PROT SERPL-MCNC: 7.6 GM/DL — SIGNIFICANT CHANGE UP (ref 6–8.3)
PROT UR-MCNC: 100 MG/DL
PROTHROM AB SERPL-ACNC: 19.9 SEC — HIGH (ref 9.8–12.7)
RBC # BLD: 3.2 M/UL — LOW (ref 4.2–5.8)
RBC # FLD: 14 % — SIGNIFICANT CHANGE UP (ref 10.3–14.5)
RBC CASTS # UR COMP ASSIST: SIGNIFICANT CHANGE UP /HPF (ref 0–4)
SODIUM SERPL-SCNC: 138 MMOL/L — SIGNIFICANT CHANGE UP (ref 135–145)
SP GR SPEC: 1.02 — SIGNIFICANT CHANGE UP (ref 1.01–1.02)
TROPONIN I SERPL-MCNC: 0.03 NG/ML — SIGNIFICANT CHANGE UP (ref 0.01–0.04)
TROPONIN I SERPL-MCNC: 0.04 NG/ML — SIGNIFICANT CHANGE UP (ref 0.01–0.04)
UROBILINOGEN FLD QL: NEGATIVE MG/DL — SIGNIFICANT CHANGE UP
WBC # BLD: 10.2 K/UL — SIGNIFICANT CHANGE UP (ref 3.8–10.5)
WBC # FLD AUTO: 10.2 K/UL — SIGNIFICANT CHANGE UP (ref 3.8–10.5)
WBC UR QL: (no result)

## 2017-12-12 PROCEDURE — 93306 TTE W/DOPPLER COMPLETE: CPT | Mod: 26

## 2017-12-12 PROCEDURE — 0389T: CPT | Mod: 26

## 2017-12-12 PROCEDURE — 71250 CT THORAX DX C-: CPT | Mod: 26

## 2017-12-12 RX ORDER — ASPIRIN/CALCIUM CARB/MAGNESIUM 324 MG
81 TABLET ORAL DAILY
Qty: 0 | Refills: 0 | Status: DISCONTINUED | OUTPATIENT
Start: 2017-12-12 | End: 2017-12-15

## 2017-12-12 RX ORDER — LOSARTAN POTASSIUM 100 MG/1
1 TABLET, FILM COATED ORAL
Qty: 0 | Refills: 0 | COMMUNITY

## 2017-12-12 RX ORDER — GLUCAGON INJECTION, SOLUTION 0.5 MG/.1ML
1 INJECTION, SOLUTION SUBCUTANEOUS ONCE
Qty: 0 | Refills: 0 | Status: DISCONTINUED | OUTPATIENT
Start: 2017-12-12 | End: 2017-12-15

## 2017-12-12 RX ORDER — TAMSULOSIN HYDROCHLORIDE 0.4 MG/1
1 CAPSULE ORAL
Qty: 0 | Refills: 0 | COMMUNITY

## 2017-12-12 RX ORDER — DOXAZOSIN MESYLATE 4 MG
4 TABLET ORAL AT BEDTIME
Qty: 0 | Refills: 0 | Status: DISCONTINUED | OUTPATIENT
Start: 2017-12-12 | End: 2017-12-12

## 2017-12-12 RX ORDER — DEXTROSE 50 % IN WATER 50 %
1 SYRINGE (ML) INTRAVENOUS ONCE
Qty: 0 | Refills: 0 | Status: DISCONTINUED | OUTPATIENT
Start: 2017-12-12 | End: 2017-12-15

## 2017-12-12 RX ORDER — RABEPRAZOLE 20 MG/1
1 TABLET, DELAYED RELEASE ORAL
Qty: 0 | Refills: 0 | COMMUNITY

## 2017-12-12 RX ORDER — WARFARIN SODIUM 2.5 MG/1
4 TABLET ORAL ONCE
Qty: 0 | Refills: 0 | Status: COMPLETED | OUTPATIENT
Start: 2017-12-12 | End: 2017-12-12

## 2017-12-12 RX ORDER — METOPROLOL TARTRATE 50 MG
50 TABLET ORAL DAILY
Qty: 0 | Refills: 0 | Status: DISCONTINUED | OUTPATIENT
Start: 2017-12-12 | End: 2017-12-14

## 2017-12-12 RX ORDER — DEXTROSE 50 % IN WATER 50 %
25 SYRINGE (ML) INTRAVENOUS ONCE
Qty: 0 | Refills: 0 | Status: DISCONTINUED | OUTPATIENT
Start: 2017-12-12 | End: 2017-12-15

## 2017-12-12 RX ORDER — GABAPENTIN 400 MG/1
100 CAPSULE ORAL THREE TIMES A DAY
Qty: 0 | Refills: 0 | Status: DISCONTINUED | OUTPATIENT
Start: 2017-12-12 | End: 2017-12-15

## 2017-12-12 RX ORDER — VENLAFAXINE HCL 75 MG
75 CAPSULE, EXT RELEASE 24 HR ORAL DAILY
Qty: 0 | Refills: 0 | Status: DISCONTINUED | OUTPATIENT
Start: 2017-12-12 | End: 2017-12-15

## 2017-12-12 RX ORDER — ACETAMINOPHEN 500 MG
650 TABLET ORAL EVERY 6 HOURS
Qty: 0 | Refills: 0 | Status: DISCONTINUED | OUTPATIENT
Start: 2017-12-12 | End: 2017-12-15

## 2017-12-12 RX ORDER — DEXTROSE 50 % IN WATER 50 %
12.5 SYRINGE (ML) INTRAVENOUS ONCE
Qty: 0 | Refills: 0 | Status: DISCONTINUED | OUTPATIENT
Start: 2017-12-12 | End: 2017-12-15

## 2017-12-12 RX ORDER — PANTOPRAZOLE SODIUM 20 MG/1
40 TABLET, DELAYED RELEASE ORAL
Qty: 0 | Refills: 0 | Status: DISCONTINUED | OUTPATIENT
Start: 2017-12-12 | End: 2017-12-15

## 2017-12-12 RX ORDER — SIMVASTATIN 20 MG/1
20 TABLET, FILM COATED ORAL AT BEDTIME
Qty: 0 | Refills: 0 | Status: DISCONTINUED | OUTPATIENT
Start: 2017-12-12 | End: 2017-12-15

## 2017-12-12 RX ORDER — ASPIRIN/CALCIUM CARB/MAGNESIUM 324 MG
81 TABLET ORAL DAILY
Qty: 0 | Refills: 0 | Status: DISCONTINUED | OUTPATIENT
Start: 2017-12-12 | End: 2017-12-12

## 2017-12-12 RX ORDER — INSULIN LISPRO 100/ML
VIAL (ML) SUBCUTANEOUS
Qty: 0 | Refills: 0 | Status: DISCONTINUED | OUTPATIENT
Start: 2017-12-12 | End: 2017-12-15

## 2017-12-12 RX ORDER — SIMETHICONE 80 MG/1
0 TABLET, CHEWABLE ORAL
Qty: 0 | Refills: 0 | COMMUNITY

## 2017-12-12 RX ORDER — WARFARIN SODIUM 2.5 MG/1
4 TABLET ORAL DAILY
Qty: 0 | Refills: 0 | Status: DISCONTINUED | OUTPATIENT
Start: 2017-12-12 | End: 2017-12-13

## 2017-12-12 RX ORDER — INSULIN GLARGINE 100 [IU]/ML
15 INJECTION, SOLUTION SUBCUTANEOUS AT BEDTIME
Qty: 0 | Refills: 0 | Status: DISCONTINUED | OUTPATIENT
Start: 2017-12-12 | End: 2017-12-12

## 2017-12-12 RX ORDER — TAMSULOSIN HYDROCHLORIDE 0.4 MG/1
0.4 CAPSULE ORAL AT BEDTIME
Qty: 0 | Refills: 0 | Status: DISCONTINUED | OUTPATIENT
Start: 2017-12-12 | End: 2017-12-15

## 2017-12-12 RX ORDER — METHENAMINE MANDELATE 1 G
0 TABLET ORAL
Qty: 0 | Refills: 0 | COMMUNITY

## 2017-12-12 RX ORDER — CLONAZEPAM 1 MG
0.5 TABLET ORAL AT BEDTIME
Qty: 0 | Refills: 0 | Status: DISCONTINUED | OUTPATIENT
Start: 2017-12-12 | End: 2017-12-15

## 2017-12-12 RX ORDER — SODIUM CHLORIDE 9 MG/ML
1000 INJECTION, SOLUTION INTRAVENOUS
Qty: 0 | Refills: 0 | Status: DISCONTINUED | OUTPATIENT
Start: 2017-12-12 | End: 2017-12-15

## 2017-12-12 RX ORDER — INSULIN GLARGINE 100 [IU]/ML
15 INJECTION, SOLUTION SUBCUTANEOUS AT BEDTIME
Qty: 0 | Refills: 0 | Status: DISCONTINUED | OUTPATIENT
Start: 2017-12-12 | End: 2017-12-15

## 2017-12-12 RX ADMIN — WARFARIN SODIUM 4 MILLIGRAM(S): 2.5 TABLET ORAL at 02:19

## 2017-12-12 RX ADMIN — GABAPENTIN 100 MILLIGRAM(S): 400 CAPSULE ORAL at 13:06

## 2017-12-12 RX ADMIN — GABAPENTIN 100 MILLIGRAM(S): 400 CAPSULE ORAL at 05:50

## 2017-12-12 RX ADMIN — GABAPENTIN 100 MILLIGRAM(S): 400 CAPSULE ORAL at 23:22

## 2017-12-12 RX ADMIN — TAMSULOSIN HYDROCHLORIDE 0.4 MILLIGRAM(S): 0.4 CAPSULE ORAL at 23:22

## 2017-12-12 RX ADMIN — Medication 75 MILLIGRAM(S): at 13:06

## 2017-12-12 RX ADMIN — SIMVASTATIN 20 MILLIGRAM(S): 20 TABLET, FILM COATED ORAL at 23:23

## 2017-12-12 RX ADMIN — Medication 50 MILLIGRAM(S): at 05:50

## 2017-12-12 RX ADMIN — Medication 40 MILLIGRAM(S): at 00:27

## 2017-12-12 RX ADMIN — Medication 1: at 13:05

## 2017-12-12 RX ADMIN — Medication 0.5 MILLIGRAM(S): at 23:21

## 2017-12-12 RX ADMIN — Medication 81 MILLIGRAM(S): at 13:06

## 2017-12-12 RX ADMIN — WARFARIN SODIUM 4 MILLIGRAM(S): 2.5 TABLET ORAL at 23:22

## 2017-12-12 RX ADMIN — INSULIN GLARGINE 15 UNIT(S): 100 INJECTION, SOLUTION SUBCUTANEOUS at 23:23

## 2017-12-12 RX ADMIN — PANTOPRAZOLE SODIUM 40 MILLIGRAM(S): 20 TABLET, DELAYED RELEASE ORAL at 05:50

## 2017-12-12 RX ADMIN — Medication 1: at 18:01

## 2017-12-12 NOTE — PROCEDURE NOTE - ADDITIONAL PROCEDURE DETAILS
Right chest with resolving ecchymosis, s/p generator explant.   Normal intracardiac pacemaker function.

## 2017-12-12 NOTE — PROGRESS NOTE ADULT - SUBJECTIVE AND OBJECTIVE BOX
Subjective:  Patient is a 88y old  Male who presents with a chief complaint of weakness     HPI:     87 yo M with PMH of a-fib (on coumadin), dyslipidemia, IDDM, HTN, PPM, GERD, BPH, CKD, admitted on 12/12/17 with c/o  weakness. Patient states he has been feeling generalized weakness that's getting worse, and yesterday he felt so weak he felt like he was going to fall off the his chair while sitting down. He has also been feeling his heart race, and this sometimes makes him feel SOB. No orthopnea.  Patient had PPM replaced on Tuesday. C/o pain in his chest after procedure.  Denies nausea, vomiting, abdominal pain, fever, chills, cough, runny nose, sore throat.     12/12 - Patient seen and examined at bedside earlier today, reports weakness, lightheadedness,  denies fever, cough, chills, HA, abd pain, urinary or BM problems, + chooking on thin liquids    Review of system- Rest of the review of system are negative except mentioned in HPI    OBJECTIVE:   T(C): 36.6 (12-12-17 @ 14:04), Max: 37.6 (12-11-17 @ 19:38)  HR: 60 (12-12-17 @ 14:04) (58 - 62)  BP: 116/58 (12-12-17 @ 14:04) (109/49 - 167/75)  RR: 17 (12-12-17 @ 14:04) (16 - 19)  SpO2: 99% (12-12-17 @ 14:04) (98% - 100%)  Wt(kg): --  Daily Height in cm: 172.72 (11 Dec 2017 19:38)    Daily     PHYSICAL EXAM:  GENERAL: NAD  NERVOUS SYSTEM:  Alert & Oriented X3, non- focal exam, Motor Strength 5/5 B/L upper and lower extremities; DTRs 2+ intact and symmetric  HEAD:  Atraumatic, Normocephalic  EYES: EOMI, PERRLA, conjunctiva and sclera clear  HEENT: Moist mucous membranes  NECK: Supple, No JVD  CHEST/LUNG: Clear to auscultation bilaterally; No rales, no rhonchi, no wheezing, or rubs + right upper chest PPM   HEART: Regular rate and rhythm; No murmurs, rubs, or gallops  ABDOMEN: Soft, Nontender, Nondistended; Bowel sounds present  GENITOURINARY- Voiding, no suprapubic tenderness  EXTREMITIES:  2+ Peripheral Pulses, No clubbing, cyanosis, or edema  MUSCULOSKELETAL:- No muscle tenderness, Muscle tone normal, No joint tenderness, no Joint swelling, Joint range of motion-normal  SKIN-no rash, no lesion    LABS:                        10.3   10.2  )-----------( 145      ( 12 Dec 2017 05:55 )             31.4     12-12    138  |  103  |  42<H>  ----------------------------<  123<H>  4.1   |  32<H>  |  1.87<H>    Ca    9.3      12 Dec 2017 05:55  Phos  3.3     12-12  Mg     2.3     12-12    TPro  7.6  /  Alb  3.0<L>  /  TBili  0.8  /  DBili  x   /  AST  19  /  ALT  14  /  AlkPhos  85  12-12    PT/INR - ( 12 Dec 2017 05:55 )   PT: 19.9 sec;   INR: 1.82 ratio         PTT - ( 11 Dec 2017 20:48 )  PTT:32.4 sec  CARDIAC MARKERS ( 12 Dec 2017 03:30 )  0.037 ng/mL / x     / x     / x     / x      CARDIAC MARKERS ( 12 Dec 2017 00:26 )  0.025 ng/mL / x     / x     / x     / x      CARDIAC MARKERS ( 11 Dec 2017 20:48 )  0.021 ng/mL / x     / x     / x     / x        CAPILLARY BLOOD GLUCOSE      POCT Blood Glucose.: 166 mg/dL (12 Dec 2017 13:00)  POCT Blood Glucose.: 130 mg/dL (12 Dec 2017 07:24)        RECENT CULTURES:    RADIOLOGY & ADDITIONAL TESTS:    < from: CT Chest No Cont (12.12.17 @ 08:39) >    HEART AND VESSELS: Retained right-sided pacing wires are noted without   generator. There is mild soft tissue swelling and air at the generator   site, likely from recent manipulation. No definable fluid collection.   Implantable Micra pacemaker is noted within the right ventricle. Normal   heart size. No pericardial effusion. Extensive coronary artery and aortic   valve calcification. Atherosclerotic thoracic aorta is normal in caliber.    MEDIASTINUM AND CHANI: Stable mediastinal lymph nodes.    UPPER ABDOMEN: Limited visualization is unremarkable.    BONES AND SOFT TISSUES: No acute bony abnormality.    IMPRESSION:     Right-sided pacemaker generator site with a small amount of fluid and   air, likely from recent manipulation. No drainable collection.    Micra pacemaker noted withinthe right ventricle.    < end of copied text >  < from: Xray Chest 1 View AP/PA. (12.11.17 @ 21:46) >  Cardiomegaly with clear lungs.      < end of copied text >    < from: Transthoracic Echocardiogram (07.17.17 @ 11:23) >  The left ventricle is normal in size, wall motion and contractility.   Mild concentric left ventricular hypertrophy is present.   Estimated left ventricular ejection fraction is 60-65 %.   The left atrium is mildly dilated.   The right atrium appears mildly dilated.   A device wire is seen in the RV and RA.   Mild (1+) aortic regurgitation is present.   Mild aortic stenosis is present.   Mild to moderate mitral regurgitation is present.   Mild mitral annular calcification is present.   Moderate (2+) tricuspid valve regurgitation is present.   Moderate to severe pulmonary hypertension.    < end of copied text >    Current medications:  acetaminophen   Tablet 650 milliGRAM(s) Oral every 6 hours PRN  aspirin enteric coated 81 milliGRAM(s) Oral daily  clonazePAM Tablet 0.5 milliGRAM(s) Oral at bedtime  dextrose 5%. 1000 milliLiter(s) IV Continuous <Continuous>  dextrose 50% Injectable 12.5 Gram(s) IV Push once  dextrose 50% Injectable 25 Gram(s) IV Push once  dextrose 50% Injectable 25 Gram(s) IV Push once  dextrose Gel 1 Dose(s) Oral once PRN  gabapentin 100 milliGRAM(s) Oral three times a day  glucagon  Injectable 1 milliGRAM(s) IntraMuscular once PRN  insulin glargine Injectable (LANTUS) 15 Unit(s) SubCutaneous at bedtime  insulin lispro (HumaLOG) corrective regimen sliding scale   SubCutaneous three times a day before meals  metoprolol succinate ER 50 milliGRAM(s) Oral daily  pantoprazole    Tablet 40 milliGRAM(s) Oral before breakfast  simvastatin 20 milliGRAM(s) Oral at bedtime  tamsulosin 0.4 milliGRAM(s) Oral at bedtime  venlafaxine XR. 75 milliGRAM(s) Oral daily

## 2017-12-12 NOTE — PROGRESS NOTE ADULT - ASSESSMENT
87 yo M with PMH of a-fib (on coumadin), dyslipidemia, IDDM, HTN, PPM, GERD, BPH, CKD, p/w weakness    *Palpitations / Weakness /Lightheadedness- r/o underlying infection after recent PPM placement procedure  -Blood cx / UA  -CT chest  - no absess   -Echo - pending  -EP consult for Dr. Palmer  -PPM interrogated - normal function  -SOB is only a result of the palpitations as per patient - no signs of acute CHF at this time  -Trops negative x 2  - check orthostatics  - check O2 on mabulation    *A-fib  -On coumadin  4 mg tonight  -Monitor INR  -Rate controlled     *Anemia, macrocytic, suspected b12 , folate deficiency, thyroid dysfunction  -Trend H/H during hospitalization -> if stable, will need to f/u outpatient for further management    *Hyperkalemia  -Repeat potassium level -> if still elevated -> will need kayexelate  -Will hold losartan for now    *Acute on CKD   -Encourage oral hydration and trend creatinine in AM for improvement. If no improvement, will need to consider gentle hydration  -Losartan held temporarily  - US kidney to r/o obstruction given h/o BPH    * r/o aspiration, as per RN pt chocking on  thin liquids  - speech and swallow evaluation    *DM2  -Humalog ISS  -Diabetic diet   -Lantus    *HTN / GERD / BPH  -C/w home meds + outpatient management     *DVT ppx  -On coumadin    dispo - PT, 2 d echo, us of kidney, check orthostatics and O2 on ambulation

## 2017-12-12 NOTE — H&P ADULT - HISTORY OF PRESENT ILLNESS
89 yo M with PMH of a-fib (on coumadin), dyslipidemia, IDDM, HTN, PPM, GERD, BPH, CKD, p/w weakness. Patient states he has been feeling generalized weakness that's getting worse, and yesterday he felt so weak he felt like he was going to fall off the his chair while sitting down. He has also been feeling his heart race, and this sometimes makes him feel SOB. No orthopnea.     Patient had PPM replaced on Tuesday. C/o pain in his chest after procedure.     Denies nausea, vomiting, abdominal pain, fever, chills, cough, runny nose, sore throat.     PSH: appendectomy, shoulder repair, cholecystectomy   Social Hx: tobacco - quit 1961  Family Hx: Denies

## 2017-12-12 NOTE — H&P ADULT - ASSESSMENT
87 yo M with PMH of a-fib (on coumadin), dyslipidemia, IDDM, HTN, PPM, GERD, BPH, CKD, p/w weakness    *Palpitations / Weakness - r/o underlying infection after recent PPM placement procedure  -Blood cx / UA  -CT chest for further evaluation  -Echo  -EP consult for Dr. Palmer  -SOB is only a result of the palpitations as per patient - no signs of acute CHF at this time  -Trops negative x 2    *A-fib  -On coumadin - will give stat dose as patient did not have his dose yesterday  -Monitor INR  -Rate controlled     *Anemia  -Trend H/H during hospitalization -> if stable, will need to f/u outpatient for further management    *Hyperkalemia  -Repeat potassium level -> if still elevated -> will need kayexelate  -Will hold losartan for now    *Acute on CKD   -Encourage oral hydration and trend creatinine in AM for improvement. If no improvement, will need to consider gentle hydration  -Losartan held temporarily    *DM2  -Humalog ISS  -Diabetic diet   -Lantus    *HTN / GERD / BPH  -C/w home meds + outpatient management     *DVT ppx  -On coumadin

## 2017-12-12 NOTE — ED ADULT NURSE REASSESSMENT NOTE - NS ED NURSE REASSESS COMMENT FT1
assumed pt care laying in bed no c/o sob or chest pain. monitor pacing hr 60. f/s 130 no insulin coverage needed. breakfast was called by pt to dietary.

## 2017-12-13 LAB
ANION GAP SERPL CALC-SCNC: 5 MMOL/L — SIGNIFICANT CHANGE UP (ref 5–17)
BUN SERPL-MCNC: 46 MG/DL — HIGH (ref 7–23)
CALCIUM SERPL-MCNC: 8.8 MG/DL — SIGNIFICANT CHANGE UP (ref 8.5–10.1)
CHLORIDE SERPL-SCNC: 106 MMOL/L — SIGNIFICANT CHANGE UP (ref 96–108)
CO2 SERPL-SCNC: 31 MMOL/L — SIGNIFICANT CHANGE UP (ref 22–31)
CREAT SERPL-MCNC: 1.77 MG/DL — HIGH (ref 0.5–1.3)
FOLATE SERPL-MCNC: >20 NG/ML — SIGNIFICANT CHANGE UP (ref 4.8–24.2)
GLUCOSE BLDC GLUCOMTR-MCNC: 138 MG/DL — HIGH (ref 70–99)
GLUCOSE BLDC GLUCOMTR-MCNC: 191 MG/DL — HIGH (ref 70–99)
GLUCOSE BLDC GLUCOMTR-MCNC: 212 MG/DL — HIGH (ref 70–99)
GLUCOSE BLDC GLUCOMTR-MCNC: 278 MG/DL — HIGH (ref 70–99)
GLUCOSE SERPL-MCNC: 135 MG/DL — HIGH (ref 70–99)
HCT VFR BLD CALC: 31.3 % — LOW (ref 39–50)
HGB BLD-MCNC: 10.2 G/DL — LOW (ref 13–17)
INR BLD: 1.82 RATIO — HIGH (ref 0.88–1.16)
MAGNESIUM SERPL-MCNC: 2.3 MG/DL — SIGNIFICANT CHANGE UP (ref 1.6–2.6)
MCHC RBC-ENTMCNC: 31.9 PG — SIGNIFICANT CHANGE UP (ref 27–34)
MCHC RBC-ENTMCNC: 32.4 GM/DL — SIGNIFICANT CHANGE UP (ref 32–36)
MCV RBC AUTO: 98.4 FL — SIGNIFICANT CHANGE UP (ref 80–100)
PLATELET # BLD AUTO: 145 K/UL — LOW (ref 150–400)
POTASSIUM SERPL-MCNC: 4.4 MMOL/L — SIGNIFICANT CHANGE UP (ref 3.5–5.3)
POTASSIUM SERPL-SCNC: 4.4 MMOL/L — SIGNIFICANT CHANGE UP (ref 3.5–5.3)
PROTHROM AB SERPL-ACNC: 19.9 SEC — HIGH (ref 9.8–12.7)
RBC # BLD: 3.18 M/UL — LOW (ref 4.2–5.8)
RBC # FLD: 14.1 % — SIGNIFICANT CHANGE UP (ref 10.3–14.5)
SODIUM SERPL-SCNC: 142 MMOL/L — SIGNIFICANT CHANGE UP (ref 135–145)
TSH SERPL-MCNC: 0.62 UU/ML — SIGNIFICANT CHANGE UP (ref 0.36–3.74)
VIT B12 SERPL-MCNC: 472 PG/ML — SIGNIFICANT CHANGE UP (ref 243–894)
WBC # BLD: 9.7 K/UL — SIGNIFICANT CHANGE UP (ref 3.8–10.5)
WBC # FLD AUTO: 9.7 K/UL — SIGNIFICANT CHANGE UP (ref 3.8–10.5)

## 2017-12-13 PROCEDURE — 73630 X-RAY EXAM OF FOOT: CPT | Mod: 26

## 2017-12-13 PROCEDURE — 76770 US EXAM ABDO BACK WALL COMP: CPT | Mod: 26

## 2017-12-13 RX ORDER — CEFTRIAXONE 500 MG/1
1000 INJECTION, POWDER, FOR SOLUTION INTRAMUSCULAR; INTRAVENOUS EVERY 24 HOURS
Qty: 0 | Refills: 0 | Status: DISCONTINUED | OUTPATIENT
Start: 2017-12-13 | End: 2017-12-15

## 2017-12-13 RX ORDER — CEFTRIAXONE 500 MG/1
INJECTION, POWDER, FOR SOLUTION INTRAMUSCULAR; INTRAVENOUS
Qty: 0 | Refills: 0 | Status: DISCONTINUED | OUTPATIENT
Start: 2017-12-13 | End: 2017-12-13

## 2017-12-13 RX ORDER — WARFARIN SODIUM 2.5 MG/1
5 TABLET ORAL DAILY
Qty: 0 | Refills: 0 | Status: DISCONTINUED | OUTPATIENT
Start: 2017-12-13 | End: 2017-12-15

## 2017-12-13 RX ORDER — SODIUM CHLORIDE 9 MG/ML
1000 INJECTION INTRAMUSCULAR; INTRAVENOUS; SUBCUTANEOUS
Qty: 0 | Refills: 0 | Status: DISCONTINUED | OUTPATIENT
Start: 2017-12-13 | End: 2017-12-15

## 2017-12-13 RX ORDER — LIDOCAINE 4 G/100G
1 CREAM TOPICAL DAILY
Qty: 0 | Refills: 0 | Status: DISCONTINUED | OUTPATIENT
Start: 2017-12-13 | End: 2017-12-15

## 2017-12-13 RX ADMIN — CEFTRIAXONE 1000 MILLIGRAM(S): 500 INJECTION, POWDER, FOR SOLUTION INTRAMUSCULAR; INTRAVENOUS at 11:50

## 2017-12-13 RX ADMIN — INSULIN GLARGINE 15 UNIT(S): 100 INJECTION, SOLUTION SUBCUTANEOUS at 22:51

## 2017-12-13 RX ADMIN — Medication 0.5 MILLIGRAM(S): at 22:51

## 2017-12-13 RX ADMIN — Medication 2: at 17:25

## 2017-12-13 RX ADMIN — PANTOPRAZOLE SODIUM 40 MILLIGRAM(S): 20 TABLET, DELAYED RELEASE ORAL at 06:23

## 2017-12-13 RX ADMIN — Medication 75 MILLIGRAM(S): at 11:51

## 2017-12-13 RX ADMIN — Medication 3: at 13:13

## 2017-12-13 RX ADMIN — Medication 50 MILLIGRAM(S): at 06:23

## 2017-12-13 RX ADMIN — Medication 81 MILLIGRAM(S): at 11:51

## 2017-12-13 RX ADMIN — GABAPENTIN 100 MILLIGRAM(S): 400 CAPSULE ORAL at 06:23

## 2017-12-13 RX ADMIN — GABAPENTIN 100 MILLIGRAM(S): 400 CAPSULE ORAL at 22:51

## 2017-12-13 RX ADMIN — Medication 650 MILLIGRAM(S): at 12:01

## 2017-12-13 RX ADMIN — GABAPENTIN 100 MILLIGRAM(S): 400 CAPSULE ORAL at 13:14

## 2017-12-13 RX ADMIN — LIDOCAINE 1 PATCH: 4 CREAM TOPICAL at 20:46

## 2017-12-13 RX ADMIN — SODIUM CHLORIDE 50 MILLILITER(S): 9 INJECTION INTRAMUSCULAR; INTRAVENOUS; SUBCUTANEOUS at 17:26

## 2017-12-13 RX ADMIN — SIMVASTATIN 20 MILLIGRAM(S): 20 TABLET, FILM COATED ORAL at 22:51

## 2017-12-13 RX ADMIN — WARFARIN SODIUM 5 MILLIGRAM(S): 2.5 TABLET ORAL at 22:51

## 2017-12-13 RX ADMIN — TAMSULOSIN HYDROCHLORIDE 0.4 MILLIGRAM(S): 0.4 CAPSULE ORAL at 22:51

## 2017-12-13 NOTE — DIETITIAN INITIAL EVALUATION ADULT. - DIET TYPE
fiber/residue restricted/consistent carbohydrate (evening snack)/DASH/TLC (sodium and cholesterol restricted diet)

## 2017-12-13 NOTE — PROGRESS NOTE ADULT - ASSESSMENT
87 yo M with PMH of a-fib (on coumadin), dyslipidemia, IDDM, HTN, PPM, GERD, BPH, CKD, p/w weakness on 12/12/17    *Palpitations / Weakness /Lightheadedness-  r/o underlying infection after recent PPM placement procedure  -Blood cx / UA  -CT chest  - no abscess   -Echo - EF wnl, moderate AS, 2+ TR  -EP consult for Dr. Arnold  -PPM interrogated - normal function  -SOB is only a result of the palpitations as per patient - no signs of acute CHF at this time  -Trops negative x 2  - check orthostatics  - check O2 on ambulation  - tele - no event - V paced   - cardiology consult to r/o cardiac etiology    * UTI  - start ceftriaxone  - urine cx  - monitor    *A-fib  -On coumadin  4 mg --> 5 mg  -Monitor INR  -Rate controlled     *Anemia, macrocytic, suspected b12 , folate deficiency, thyroid dysfunction  -Trend H/H during hospitalization -> if stable, will need to f/u outpatient for further management    *Hyperkalemia, resolved  -Repeat potassium level -> if still elevated -> will need kayexelate  -Will hold losartan for now    *Acute on CKD stage 3, improving  -Encourage oral hydration and trend creatinine in AM   -Losartan held temporarily  - US kidney - no obstruction  - gentle hydration    * r/o aspiration, as per RN pt chocking on  thin liquids  - speech and swallow evaluation - regular food    *DM2  -Humalog ISS  -Diabetic diet   -Lantus    *HTN / GERD / BPH  -C/w home meds + outpatient management     *DVT ppx  -On coumadin    dispo - PT - home, IV abx, cardiology evaluation

## 2017-12-13 NOTE — SWALLOW BEDSIDE ASSESSMENT ADULT - SWALLOW EVAL: RECOMMENDED FEEDING/EATING TECHNIQUES
PT ENCOURAGED TO EAT SAQIB SLOWED PACE./position upright (90 degrees) position upright (90 degrees)/PT ENCOURAGED TO EAT AT A SLOWED PACE.

## 2017-12-13 NOTE — SWALLOW BEDSIDE ASSESSMENT ADULT - COMMENTS
Pt was admitted to  with generalized weakness. Hospital course is notable for generalized weakness, macrocytic anemia, hyperkalemia, RICHY, and thyroid dysfunction. This profile is superimposed upon a h/o CKD, HTN, IDDM, A-Fib s/p PPM, BPH and GERD

## 2017-12-13 NOTE — SWALLOW BEDSIDE ASSESSMENT ADULT - ORAL PREPARATORY PHASE
Within functional limits/Pt was oriented to feeding situ and demonstrated adequate oral containment.

## 2017-12-13 NOTE — SWALLOW BEDSIDE ASSESSMENT ADULT - SWALLOW EVAL: DIAGNOSIS
1) Pt demonstrates Oropharyngeal Swallowing which subjectively appears to be stable and within functional parameters for age. No behavioral aspiration signs exhibited on exam.   2) Pt is alert and able to verbalize intents without a primary speech-language pathology in conversation. No dysarthria, verbal apraxia or aphasia were evident on exam.

## 2017-12-13 NOTE — SWALLOW BEDSIDE ASSESSMENT ADULT - ASR SWALLOW RECOMMEND DIAG
DEFER MBS AS PT APPEARED CLINICALLY TOLERANT OF SUGGESTED PO TEXTURES ON EXAM AND DID NOT APPEAR TO BE ASPIRATING DURING CLINICAL PROBES. DEFER MBS AS PT APPEARED CLINICALLY TOLERANT OF SUGGESTED PO TEXTURES FROM OROPHARYNGEAL SWALLOW STANCE ON EXAM AND DID NOT APPEAR TO BE ASPIRATING DURING CLINICAL PROBES.

## 2017-12-13 NOTE — SWALLOW BEDSIDE ASSESSMENT ADULT - ORAL PHASE
Within functional limits/Bolus formation/transfer were mechanically functional without an overt oral motor focality or oral pooling.

## 2017-12-13 NOTE — PROGRESS NOTE ADULT - SUBJECTIVE AND OBJECTIVE BOX
Subjective:  Patient is a 88y old  Male who presents with a chief complaint of weakness     HPI:     89 yo M with PMH of a-fib (on coumadin), dyslipidemia, IDDM, HTN, PPM, GERD, BPH, CKD, admitted on 17 with c/o  weakness. Patient states he has been feeling generalized weakness that's getting worse, and yesterday he felt so weak he felt like he was going to fall off the his chair while sitting down. He has also been feeling his heart race, and this sometimes makes him feel SOB. No orthopnea.  Patient had PPM replaced on Tuesday. C/o pain in his chest after procedure.  Denies nausea, vomiting, abdominal pain, fever, chills, cough, runny nose, sore throat.      - Patient seen and examined at bedside earlier today, reports weakness, lightheadedness,  denies fever, cough, chills, HA, abd pain, urinary or BM problems, + chooking on thin liquids  - feels better, reports dysuria, afebrile, denies CP, + weakness, denies abd pain, diarrhea, hard to hear  Review of system- Rest of the review of system are negative except mentioned in HPI    OBJECTIVE:   T(C): 36.2 (17 @ 10:40), Max: 36.7 (17 @ 21:35)  T(F): 97.2 (17 @ 10:40), Max: 98 (17 @ 21:35)  HR: 59 (17 @ 10:40) (59 - 61)  BP: 129/58 (17 @ 10:40) (102/48 - 153/65)  RR: 17 (17 @ 10:40) (16 - 17)  SpO2: 93% (17 @ 10:40) (93% - 99%)  Wt(kg): --    PHYSICAL EXAM:  GENERAL: NAD  NERVOUS SYSTEM:  Alert & Oriented X3, non- focal exam, Motor Strength 5/5 B/L upper and lower extremities; DTRs 2+ intact and symmetric  HEAD:  Atraumatic, Normocephalic  EYES: EOMI, PERRLA, conjunctiva and sclera clear  HEENT: Moist mucous membranes  NECK: Supple, No JVD  CHEST/LUNG: Clear to auscultation bilaterally; No rales, no rhonchi, no wheezing, or rubs + right upper chest PPM   HEART: Regular rate and rhythm; No murmurs, rubs, or gallops  ABDOMEN: Soft, Nontender, Nondistended; Bowel sounds present  GENITOURINARY- Voiding, no suprapubic tenderness  EXTREMITIES:  2+ Peripheral Pulses, No clubbing, cyanosis, or edema  MUSCULOSKELETAL:- No muscle tenderness, Muscle tone normal, No joint tenderness, no Joint swelling, Joint range of motion-normal  SKIN-no rash, no lesion    LABS:      142  |  106  |  46<H>  ----------------------------<  135<H>  4.4   |  31  |  1.77<H>    Ca    8.8      13 Dec 2017 06:46  Phos  3.3       Mg     2.3         TPro  7.6  /  Alb  3.0<L>  /  TBili  0.8  /  DBili  x   /  AST  19  /  ALT  14  /  AlkPhos  85                              10.2   9.7   )-----------( 145      ( 13 Dec 2017 06:46 )             31.3       CARDIAC MARKERS ( 12 Dec 2017 03:30 )  0.037 ng/mL / x     / x     / x     / x      CARDIAC MARKERS ( 12 Dec 2017 00:26 )  0.025 ng/mL / x     / x     / x     / x      CARDIAC MARKERS ( 11 Dec 2017 20:48 )  0.021 ng/mL / x     / x     / x     / x            LIVER FUNCTIONS - ( 12 Dec 2017 05:55 )  Alb: 3.0 g/dL / Pro: 7.6 gm/dL / ALK PHOS: 85 U/L / ALT: 14 U/L / AST: 19 U/L / GGT: x             PT/INR - ( 13 Dec 2017 06:46 )   PT: 19.9 sec;   INR: 1.82 ratio         PTT - ( 11 Dec 2017 20:48 )  PTT:32.4 sec    Urinalysis Basic - ( 12 Dec 2017 18:20 )    Color: Yellow / Appearance: Clear / S.020 / pH: x  Gluc: x / Ketone: Negative  / Bili: Negative / Urobili: Negative mg/dL   Blood: x / Protein: 100 mg/dL / Nitrite: Negative   Leuk Esterase: Small / RBC: 0-5 /HPF / WBC 6-10   Sq Epi: x / Non Sq Epi: Occasional / Bacteria: Few                            10.3   10.2  )-----------( 145      ( 12 Dec 2017 05:55 )             31.4         138  |  103  |  42<H>  ----------------------------<  123<H>  4.1   |  32<H>  |  1.87<H>    Ca    9.3      12 Dec 2017 05:55  Phos  3.3       Mg     2.3         TPro  7.6  /  Alb  3.0<L>  /  TBili  0.8  /  DBili  x   /  AST  19  /  ALT  14  /  AlkPhos  85      PT/INR - ( 12 Dec 2017 05:55 )   PT: 19.9 sec;   INR: 1.82 ratio         PTT - ( 11 Dec 2017 20:48 )  PTT:32.4 sec  CARDIAC MARKERS ( 12 Dec 2017 03:30 )  0.037 ng/mL / x     / x     / x     / x      CARDIAC MARKERS ( 12 Dec 2017 00:26 )  0.025 ng/mL / x     / x     / x     / x      CARDIAC MARKERS ( 11 Dec 2017 20:48 )  0.021 ng/mL / x     / x     / x     / x        CAPILLARY BLOOD GLUCOSE      POCT Blood Glucose.: 166 mg/dL (12 Dec 2017 13:00)  POCT Blood Glucose.: 130 mg/dL (12 Dec 2017 07:24)        RECENT CULTURES:    RADIOLOGY & ADDITIONAL TESTS:  < from: US Kidney and Bladder (17 @ 08:06) >  Right kidney:  11.8 cm. No renal mass, hydronephrosis or calculus.    Normal cortical echogenicity. Large interpolar cyst measuring 8.2 cm   containing a septation. Smaller lower pole cyst measuring 1.9 cm.    Left kidney:  11.3 cm. No renal mass, hydronephrosis or calculus.  Normal   cortical echogenicity. Several cysts, measuring up to 3.7 cm in the upper   pole.    Urinary bladder: Unremarkable. Bladder volume is 166 mL.    IMPRESSION:     No hydronephrosis.      < end of copied text >  < from: Transthoracic Echocardiogram (17 @ 10:38) >  Mild (1+) aortic regurgitation is present.   Moderate aortic stenosis is present.   Normal appearing tricuspid valve structure and function.   Moderate (2+) tricuspid valve regurgitation is present. Mild to Moderate   pulmonary hypertension.   The left atrium is moderately dilated.   Mild concentric left ventricular hypertrophy is present.   Estimated left ventricular ejection fraction is preserved at 50-55 %.    < end of copied text >    < from: CT Chest No Cont (17 @ 08:39) >    HEART AND VESSELS: Retained right-sided pacing wires are noted without   generator. There is mild soft tissue swelling and air at the generator   site, likely from recent manipulation. No definable fluid collection.   Implantable Micra pacemaker is noted within the right ventricle. Normal   heart size. No pericardial effusion. Extensive coronary artery and aortic   valve calcification. Atherosclerotic thoracic aorta is normal in caliber.    MEDIASTINUM AND CHANI: Stable mediastinal lymph nodes.    UPPER ABDOMEN: Limited visualization is unremarkable.    BONES AND SOFT TISSUES: No acute bony abnormality.    IMPRESSION:     Right-sided pacemaker generator site with a small amount of fluid and   air, likely from recent manipulation. No drainable collection.    Micra pacemaker noted withinthe right ventricle.    < end of copied text >  < from: Xray Chest 1 View AP/PA. (17 @ 21:46) >  Cardiomegaly with clear lungs.      < end of copied text >    < from: Transthoracic Echocardiogram (17 @ 11:23) >  The left ventricle is normal in size, wall motion and contractility.   Mild concentric left ventricular hypertrophy is present.   Estimated left ventricular ejection fraction is 60-65 %.   The left atrium is mildly dilated.   The right atrium appears mildly dilated.   A device wire is seen in the RV and RA.   Mild (1+) aortic regurgitation is present.   Mild aortic stenosis is present.   Mild to moderate mitral regurgitation is present.   Mild mitral annular calcification is present.   Moderate (2+) tricuspid valve regurgitation is present.   Moderate to severe pulmonary hypertension.    < end of copied text >    MEDICATIONS  (STANDING):  aspirin enteric coated 81 milliGRAM(s) Oral daily  cefTRIAXone Injectable 1000 milliGRAM(s) IV Push every 24 hours  clonazePAM Tablet 0.5 milliGRAM(s) Oral at bedtime  dextrose 5%. 1000 milliLiter(s) (50 mL/Hr) IV Continuous <Continuous>  dextrose 50% Injectable 12.5 Gram(s) IV Push once  dextrose 50% Injectable 25 Gram(s) IV Push once  dextrose 50% Injectable 25 Gram(s) IV Push once  gabapentin 100 milliGRAM(s) Oral three times a day  insulin glargine Injectable (LANTUS) 15 Unit(s) SubCutaneous at bedtime  insulin lispro (HumaLOG) corrective regimen sliding scale   SubCutaneous three times a day before meals  metoprolol succinate ER 50 milliGRAM(s) Oral daily  pantoprazole    Tablet 40 milliGRAM(s) Oral before breakfast  simvastatin 20 milliGRAM(s) Oral at bedtime  sodium chloride 0.9%. 1000 milliLiter(s) (50 mL/Hr) IV Continuous <Continuous>  tamsulosin 0.4 milliGRAM(s) Oral at bedtime  venlafaxine XR. 75 milliGRAM(s) Oral daily  warfarin 5 milliGRAM(s) Oral daily    MEDICATIONS  (PRN):  acetaminophen   Tablet 650 milliGRAM(s) Oral every 6 hours PRN For Temp greater than 38 C (100.4 F)  dextrose Gel 1 Dose(s) Oral once PRN Blood Glucose LESS THAN 70 milliGRAM(s)/deciliter  glucagon  Injectable 1 milliGRAM(s) IntraMuscular once PRN Glucose LESS THAN 70 milligrams/deciliter

## 2017-12-13 NOTE — SWALLOW BEDSIDE ASSESSMENT ADULT - SWALLOW EVAL: CURRENT DIET
Diet suggestion by ricardo Diet suggestion by speech path is pending a swallowing consult at this time.

## 2017-12-13 NOTE — DIETITIAN INITIAL EVALUATION ADULT. - ENERGY NEEDS
Ht.   68   "        Wt.    84    kg               BMI   28               IBW   68    kg               Pt is at   123 %  IBW

## 2017-12-13 NOTE — CDI QUERY NOTE - NSCDIOTHERTXTBX_GEN_ALL_CORE_HH
Acute on CKD documented    Bun=  46 > 42 > 46  Creat= 1.91 > 1.87 > 1.77  GFR = 31 > 31 > 34    Please further clarify Acute on CKD and stage the CKD  a) RICHY and CKD ( stage of CKD ) ?  b) Chronic CKD , No evidence of Acute renal failure ?  c) Acute renal failure and CKD ( please stage ) ?  d) Other ? Please clarify

## 2017-12-13 NOTE — SWALLOW BEDSIDE ASSESSMENT ADULT - PHARYNGEAL PHASE
Swallow trigger was timely with adequate laryngeal lift on palpation during swallow trials without overt aspiration signs. Odynophagia and Dyspepsia were denied./Within functional limits

## 2017-12-13 NOTE — DIETITIAN INITIAL EVALUATION ADULT. - OTHER INFO
Consult for chew/swallow because pt had trouble swallowing a pill.  Pt observed eating breakfast, reports no problems with food.  Says he "swallows fine. Pt given education and written information on heart health, consistent carbohydrates, and coumadin.  Pt is [place].  "

## 2017-12-13 NOTE — SWALLOW BEDSIDE ASSESSMENT ADULT - SWALLOW EVAL: CRITERIA FOR SKILLED INTERVENTION MET
no problems identified which require skilled intervention/No need for speech or swallow therapy. Thus, WILL NOT ACTIVELY FOLLOW. RECONSULT PRN.

## 2017-12-13 NOTE — SWALLOW BEDSIDE ASSESSMENT ADULT - SWALLOW EVAL: RECOMMENDED DIET
SUGGEST A REGULAR TEXTURE DIET WITH THIN LIQUID CONSISTENCIES AS PT APPEARS TO TOLERATE SAME FROM OROPHARYNGEAL SWALLOW STANCE.

## 2017-12-13 NOTE — SWALLOW BEDSIDE ASSESSMENT ADULT - DIET PRIOR TO ADMI
Pt was reportedly on a regular texture diet with thin liquids PTH. The pt was reportedly on a regular texture diet with thin liquids PTH.

## 2017-12-13 NOTE — SWALLOW BEDSIDE ASSESSMENT ADULT - SLP GENERAL OBSERVATIONS
Pt is alert and able to verbalize intents without a primary speech-language pathology in conversation. No dysarthria, verbal apraxia or aphasia were evident on exam. Pt than asked about swallowing and he stated that he has a tendency to eat a rapid pace but denied Dysphagia.

## 2017-12-14 DIAGNOSIS — N18.3 CHRONIC KIDNEY DISEASE, STAGE 3 (MODERATE): ICD-10-CM

## 2017-12-14 DIAGNOSIS — Z79.01 LONG TERM (CURRENT) USE OF ANTICOAGULANTS: ICD-10-CM

## 2017-12-14 DIAGNOSIS — I49.5 SICK SINUS SYNDROME: ICD-10-CM

## 2017-12-14 DIAGNOSIS — E78.5 HYPERLIPIDEMIA, UNSPECIFIED: ICD-10-CM

## 2017-12-14 DIAGNOSIS — I48.2 CHRONIC ATRIAL FIBRILLATION: ICD-10-CM
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
## 2017-12-14 DIAGNOSIS — I44.2 ATRIOVENTRICULAR BLOCK, COMPLETE: ICD-10-CM

## 2017-12-14 DIAGNOSIS — Z79.4 LONG TERM (CURRENT) USE OF INSULIN: ICD-10-CM

## 2017-12-14 DIAGNOSIS — T82.190A OTHER MECHANICAL COMPLICATION OF CARDIAC ELECTRODE, INITIAL ENCOUNTER: ICD-10-CM

## 2017-12-14 DIAGNOSIS — Z79.82 LONG TERM (CURRENT) USE OF ASPIRIN: ICD-10-CM

## 2017-12-14 DIAGNOSIS — I12.9 HYPERTENSIVE CHRONIC KIDNEY DISEASE WITH STAGE 1 THROUGH STAGE 4 CHRONIC KIDNEY DISEASE, OR UNSPECIFIED CHRONIC KIDNEY DISEASE: ICD-10-CM

## 2017-12-14 LAB
ANION GAP SERPL CALC-SCNC: 6 MMOL/L — SIGNIFICANT CHANGE UP (ref 5–17)
BUN SERPL-MCNC: 48 MG/DL — HIGH (ref 7–23)
CALCIUM SERPL-MCNC: 8.9 MG/DL — SIGNIFICANT CHANGE UP (ref 8.5–10.1)
CHLORIDE SERPL-SCNC: 107 MMOL/L — SIGNIFICANT CHANGE UP (ref 96–108)
CO2 SERPL-SCNC: 31 MMOL/L — SIGNIFICANT CHANGE UP (ref 22–31)
CREAT SERPL-MCNC: 1.66 MG/DL — HIGH (ref 0.5–1.3)
GLUCOSE BLDC GLUCOMTR-MCNC: 126 MG/DL — HIGH (ref 70–99)
GLUCOSE BLDC GLUCOMTR-MCNC: 176 MG/DL — HIGH (ref 70–99)
GLUCOSE BLDC GLUCOMTR-MCNC: 195 MG/DL — HIGH (ref 70–99)
GLUCOSE BLDC GLUCOMTR-MCNC: 279 MG/DL — HIGH (ref 70–99)
GLUCOSE SERPL-MCNC: 121 MG/DL — HIGH (ref 70–99)
HCT VFR BLD CALC: 30.9 % — LOW (ref 39–50)
HGB BLD-MCNC: 10 G/DL — LOW (ref 13–17)
INR BLD: 1.98 RATIO — HIGH (ref 0.88–1.16)
MCHC RBC-ENTMCNC: 31.8 PG — SIGNIFICANT CHANGE UP (ref 27–34)
MCHC RBC-ENTMCNC: 32.3 GM/DL — SIGNIFICANT CHANGE UP (ref 32–36)
MCV RBC AUTO: 98.4 FL — SIGNIFICANT CHANGE UP (ref 80–100)
PLATELET # BLD AUTO: 162 K/UL — SIGNIFICANT CHANGE UP (ref 150–400)
POTASSIUM SERPL-MCNC: 4.4 MMOL/L — SIGNIFICANT CHANGE UP (ref 3.5–5.3)
POTASSIUM SERPL-SCNC: 4.4 MMOL/L — SIGNIFICANT CHANGE UP (ref 3.5–5.3)
PROTHROM AB SERPL-ACNC: 21.7 SEC — HIGH (ref 9.8–12.7)
RBC # BLD: 3.14 M/UL — LOW (ref 4.2–5.8)
RBC # FLD: 13.8 % — SIGNIFICANT CHANGE UP (ref 10.3–14.5)
SODIUM SERPL-SCNC: 144 MMOL/L — SIGNIFICANT CHANGE UP (ref 135–145)
WBC # BLD: 9.8 K/UL — SIGNIFICANT CHANGE UP (ref 3.8–10.5)
WBC # FLD AUTO: 9.8 K/UL — SIGNIFICANT CHANGE UP (ref 3.8–10.5)

## 2017-12-14 RX ORDER — LISINOPRIL 2.5 MG/1
5 TABLET ORAL AT BEDTIME
Qty: 0 | Refills: 0 | Status: DISCONTINUED | OUTPATIENT
Start: 2017-12-14 | End: 2017-12-15

## 2017-12-14 RX ORDER — DILTIAZEM HCL 120 MG
120 CAPSULE, EXT RELEASE 24 HR ORAL DAILY
Qty: 0 | Refills: 0 | Status: DISCONTINUED | OUTPATIENT
Start: 2017-12-14 | End: 2017-12-15

## 2017-12-14 RX ADMIN — PANTOPRAZOLE SODIUM 40 MILLIGRAM(S): 20 TABLET, DELAYED RELEASE ORAL at 06:23

## 2017-12-14 RX ADMIN — Medication 120 MILLIGRAM(S): at 12:15

## 2017-12-14 RX ADMIN — Medication 81 MILLIGRAM(S): at 12:16

## 2017-12-14 RX ADMIN — INSULIN GLARGINE 15 UNIT(S): 100 INJECTION, SOLUTION SUBCUTANEOUS at 22:00

## 2017-12-14 RX ADMIN — CEFTRIAXONE 1000 MILLIGRAM(S): 500 INJECTION, POWDER, FOR SOLUTION INTRAMUSCULAR; INTRAVENOUS at 12:15

## 2017-12-14 RX ADMIN — GABAPENTIN 100 MILLIGRAM(S): 400 CAPSULE ORAL at 06:23

## 2017-12-14 RX ADMIN — SIMVASTATIN 20 MILLIGRAM(S): 20 TABLET, FILM COATED ORAL at 21:59

## 2017-12-14 RX ADMIN — WARFARIN SODIUM 5 MILLIGRAM(S): 2.5 TABLET ORAL at 21:59

## 2017-12-14 RX ADMIN — Medication 1: at 18:18

## 2017-12-14 RX ADMIN — LISINOPRIL 5 MILLIGRAM(S): 2.5 TABLET ORAL at 21:59

## 2017-12-14 RX ADMIN — Medication 75 MILLIGRAM(S): at 16:50

## 2017-12-14 RX ADMIN — GABAPENTIN 100 MILLIGRAM(S): 400 CAPSULE ORAL at 14:16

## 2017-12-14 RX ADMIN — Medication 0.5 MILLIGRAM(S): at 21:58

## 2017-12-14 RX ADMIN — LIDOCAINE 1 PATCH: 4 CREAM TOPICAL at 12:16

## 2017-12-14 RX ADMIN — Medication 50 MILLIGRAM(S): at 06:23

## 2017-12-14 RX ADMIN — Medication 3: at 14:16

## 2017-12-14 RX ADMIN — TAMSULOSIN HYDROCHLORIDE 0.4 MILLIGRAM(S): 0.4 CAPSULE ORAL at 21:59

## 2017-12-14 RX ADMIN — GABAPENTIN 100 MILLIGRAM(S): 400 CAPSULE ORAL at 21:59

## 2017-12-14 NOTE — PROGRESS NOTE ADULT - ASSESSMENT
89 yo M with PMH of a-fib (on coumadin), dyslipidemia, IDDM, HTN, PPM, GERD, BPH, CKD, p/w weakness on 12/12/17    *Palpitations / Weakness /Lightheadedness-  r/o underlying infection after recent PPM placement procedure due to PVC  -Blood cx / UA  -CT chest  - no abscess   -Echo - EF wnl, moderate AS, 2+ TR  -EP consult for Dr. Arnold  -PPM interrogated - normal function  -SOB is only a result of the palpitations as per patient - no signs of acute CHF at this time  -Trops negative x 2  - check orthostatics  - check O2 on ambulation  - tele - no event - V paced   - cardiology consult  - SOB due to PVCs, multifactorial , change BB to cardizem    * UTI  - start ceftriaxone  - urine cx  - monitor    *A-fib  -On coumadin  4 mg --> 5 mg  -Monitor INR 1.9  -Rate controlled     *Anemia, macrocytic, rule out B12/folate deficiency, normal thyroid function  -Trend H/H during hospitalization -> if stable, will need to f/u outpatient for further management    *Hyperkalemia, resolved  -Repeat potassium level -> if still elevated -> will need kayexelate  -Will hold losartan for now    *Acute on CKD stage 3, improving  -Encourage oral hydration and trend creatinine in AM   -Losartan held temporarily  -  kidney - no obstruction  - gentle hydration    * r/o aspiration, as per RN pt chocking on  thin liquids  - speech and swallow evaluation - regular food    *DM2  -Humalog ISS  -Diabetic diet   -Lantus    *HTN / GERD / BPH  -C/w home meds + outpatient management     *DVT ppx  -On coumadin    dispo - PT - home, IV abx, cardiology evaluation, d/c planning

## 2017-12-14 NOTE — CONSULT NOTE ADULT - SUBJECTIVE AND OBJECTIVE BOX
CC Weakness and SOB    HPI  89 yo M with PMH of a-fib (on coumadin), dyslipidemia, IDDM, HTN, PPM, GERD, BPH, CKD, p/w weakness. Patient states he has been feeling generalized weakness that's getting worse, and yesterday he felt so weak he felt like he was going to fall off the his chair while sitting down. He has also been feeling his heart race, and this sometimes makes him feel SOB. No orthopnea. Patient had PPM replaced on Tuesday. C/o pain in his chest after procedure. Denies nausea, vomiting, abdominal pain, fever, chills, cough, runny nose, sore throat.     ROS  Constitutional - No fever, chills  CV - denies  PND, orthopnea.   Resp - denies hemoptysis, wheezing.    GI - denies nausea, no vomiting.     denies frequency   Neuro - denies numbness, motor weakness.   Rest of systems reviewed and negative	    Past med Hx:   HTN  HLP  DM  Permanent AF  CHB  Mild aortic regurgitation  Moderate aortic stenosis.  Moderate tricuspid regurgitation  Mild to Moderate pulmonary artery hypertension.  Mild concentric left ventricular hypertrophy  Possible diastolic heart failure      PSH:   PPM ( with recent gen change) dual ch but currently VVI LRL 60.    Social hx:  no current etoh, drugs, tobacco use, former smoker    Meds: reviewed with pt, see home med tab    Allergy: NSAIDS, opiates.    Fam Hx: (-) CAD     Physical exam  Const - NAD, SV stable at the moment  HEENT - atraumatic, EOMI, PERRLA.   Neck - No JVD, supple neck, no thyromegaly  CV - iRR, normal S1/S2, no S3 or S4, no g/r.      Resp  BLAE, no w//r/r  Abd - Benign, no abd bruits. No visceromegaly.   Hem - No adenopathies noted.   Ext - Normal palpable PP, no cyanosis  Neuro - no motor deficit    ECG and tele: A fib, CHB, mostly V paced at 60 bpm, occasional PVCs (few couplets).  CE nl  CT chest irrelevant from CV stand point  except for recent gen change findings  ECHO reviewed.     Labs. Reviewed  CXR reviewed      A/P  * SOB when having palpitations, possibly d/t occasional PVCs ( sometimes in couplets), most likely multifactorial, possibly also related to his underlying PAH, possible diastolic HF, atrial fibrillation, no tachyarrhythmias on tele or device interrogation other than the occasional PVCs mentioned above. Since his SOB happens occasionally only and associated with palpitations, and PVCs couplets are only possible tachyarrhythmia noted, suggest trial of switching him from metoprolol to cardizem XL. IF SOB was predominantly exertional, would then suggest a trial of PO Lasix.    * HTN, slightly elevated BP, suggest starting low dose ACEi  * HLP, on a statin.   * Permanent AF, cont OAC.  * CHB, s/p recent gen change. Working fine on recent interrogation. IF weakness/fatigue persist, since he has CHB and is being almost 100% , could benefit from d/w EP about future consideration for CRT ( to prevent future potential excessive RV pacing CMP and/or switching PPM mode to VVIR if device is capable).  * Mild aortic regurgitation, optimal BP control.  * Moderate aortic stenosis. optimal BP control.  * Moderate tricuspid regurgitation. Optimal volume status.   * Mild to Moderate pulmonary artery hypertension. Optimal volume status.   * Mild concentric left ventricular hypertrophy. optimal BP control.   Possible diastolic heart failure. Optimal BP and volume status. IF SOB is predominantly exertional or recumbent, suggest trial of Lasix.     I personally reviewed office records, labs, tele, echo, imaging, device interrogation.   Will follow.

## 2017-12-14 NOTE — PROGRESS NOTE ADULT - SUBJECTIVE AND OBJECTIVE BOX
Subjective:  Patient is a 88y old  Male who presents with a chief complaint of weakness     HPI:     89 yo M with PMH of a-fib (on coumadin), dyslipidemia, IDDM, HTN, PPM, GERD, BPH, CKD, admitted on 17 with c/o  weakness. Patient states he has been feeling generalized weakness that's getting worse, and yesterday he felt so weak he felt like he was going to fall off the his chair while sitting down. He has also been feeling his heart race, and this sometimes makes him feel SOB. No orthopnea.  Patient had PPM replaced on Tuesday. C/o pain in his chest after procedure.  Denies nausea, vomiting, abdominal pain, fever, chills, cough, runny nose, sore throat.      - Patient seen and examined at bedside earlier today, reports weakness, lightheadedness,  denies fever, cough, chills, HA, abd pain, urinary or BM problems, + chooking on thin liquids  - feels better, reports dysuria, afebrile, denies CP, + weakness, denies abd pain, diarrhea, hard to hear   - feels better, denies CP ,palpitations , abdominal pain, eager to go home    Review of system- Rest of the review of system are negative except mentioned in HPI    OBJECTIVE:   T(C): 36.2 (17 @ 10:40), Max: 36.7 (17 @ 21:35)  T(F): 97.2 (17 @ 10:40), Max: 98 (17 @ 21:35)  HR: 59 (17 @ 10:40) (59 - 61)  BP: 129/58 (17 @ 10:40) (102/48 - 153/65)  RR: 17 (17 @ 10:40) (16 - 17)  SpO2: 93% (17 @ 10:40) (93% - 99%)  Wt(kg): --    PHYSICAL EXAM:  GENERAL: NAD  NERVOUS SYSTEM:  Alert & Oriented X3, non- focal exam, Motor Strength 5/5 B/L upper and lower extremities; DTRs 2+ intact and symmetric  HEAD:  Atraumatic, Normocephalic  EYES: EOMI, PERRLA, conjunctiva and sclera clear  HEENT: Moist mucous membranes  NECK: Supple, No JVD  CHEST/LUNG: Clear to auscultation bilaterally; No rales, no rhonchi, no wheezing, or rubs + right upper chest PPM   HEART: Regular rate and rhythm; No murmurs, rubs, or gallops  ABDOMEN: Soft, Nontender, Nondistended; Bowel sounds present  GENITOURINARY- Voiding, no suprapubic tenderness  EXTREMITIES:  2+ Peripheral Pulses, No clubbing, cyanosis, or edema  MUSCULOSKELETAL:- No muscle tenderness, Muscle tone normal, No joint tenderness, no Joint swelling, Joint range of motion-normal  SKIN-no rash, no lesion    LABS:      144  |  107  |  48<H>  ----------------------------<  121<H>  4.4   |  31  |  1.66<H>    Ca    8.9      14 Dec 2017 07:16  Mg     2.3                             10.0   9.8   )-----------( 162      ( 14 Dec 2017 07:16 )             30.9     PT/INR - ( 14 Dec 2017 07:16 )   PT: 21.7 sec;   INR: 1.98 ratio       Urinalysis Basic - ( 12 Dec 2017 18:20 )    Color: Yellow / Appearance: Clear / S.020 / pH: x  Gluc: x / Ketone: Negative  / Bili: Negative / Urobili: Negative mg/dL   Blood: x / Protein: 100 mg/dL / Nitrite: Negative   Leuk Esterase: Small / RBC: 0-5 /HPF / WBC 6-10   Sq Epi: x / Non Sq Epi: Occasional / Bacteria: Few          142  |  106  |  46<H>  ----------------------------<  135<H>  4.4   |  31  |  1.77<H>    Ca    8.8      13 Dec 2017 06:46  Phos  3.3       Mg     2.3         TPro  7.6  /  Alb  3.0<L>  /  TBili  0.8  /  DBili  x   /  AST  19  /  ALT  14  /  AlkPhos  85                              10.2   9.7   )-----------( 145      ( 13 Dec 2017 06:46 )             31.3       CARDIAC MARKERS ( 12 Dec 2017 03:30 )  0.037 ng/mL / x     / x     / x     / x      CARDIAC MARKERS ( 12 Dec 2017 00:26 )  0.025 ng/mL / x     / x     / x     / x      CARDIAC MARKERS ( 11 Dec 2017 20:48 )  0.021 ng/mL / x     / x     / x     / x            LIVER FUNCTIONS - ( 12 Dec 2017 05:55 )  Alb: 3.0 g/dL / Pro: 7.6 gm/dL / ALK PHOS: 85 U/L / ALT: 14 U/L / AST: 19 U/L / GGT: x             PT/INR - ( 13 Dec 2017 06:46 )   PT: 19.9 sec;   INR: 1.82 ratio         PTT - ( 11 Dec 2017 20:48 )  PTT:32.4 sec    Urinalysis Basic - ( 12 Dec 2017 18:20 )    Color: Yellow / Appearance: Clear / S.020 / pH: x  Gluc: x / Ketone: Negative  / Bili: Negative / Urobili: Negative mg/dL   Blood: x / Protein: 100 mg/dL / Nitrite: Negative   Leuk Esterase: Small / RBC: 0-5 /HPF / WBC 6-10   Sq Epi: x / Non Sq Epi: Occasional / Bacteria: Few                            10.3   10.2  )-----------( 145      ( 12 Dec 2017 05:55 )             31.4     12-12    138  |  103  |  42<H>  ----------------------------<  123<H>  4.1   |  32<H>  |  1.87<H>    Ca    9.3      12 Dec 2017 05:55  Phos  3.3     12-12  Mg     2.3     12-12    TPro  7.6  /  Alb  3.0<L>  /  TBili  0.8  /  DBili  x   /  AST  19  /  ALT  14  /  AlkPhos  85  12-12    PT/INR - ( 12 Dec 2017 05:55 )   PT: 19.9 sec;   INR: 1.82 ratio         PTT - ( 11 Dec 2017 20:48 )  PTT:32.4 sec  CARDIAC MARKERS ( 12 Dec 2017 03:30 )  0.037 ng/mL / x     / x     / x     / x      CARDIAC MARKERS ( 12 Dec 2017 00:26 )  0.025 ng/mL / x     / x     / x     / x      CARDIAC MARKERS ( 11 Dec 2017 20:48 )  0.021 ng/mL / x     / x     / x     / x        CAPILLARY BLOOD GLUCOSE      POCT Blood Glucose.: 166 mg/dL (12 Dec 2017 13:00)  POCT Blood Glucose.: 130 mg/dL (12 Dec 2017 07:24)        RECENT CULTURES:  Culture - Blood (17 @ 18:33)    Specimen Source: .Blood None    Culture Results:   No growth to date.      RADIOLOGY & ADDITIONAL TESTS:  < from: US Kidney and Bladder (17 @ 08:06) >  Right kidney:  11.8 cm. No renal mass, hydronephrosis or calculus.    Normal cortical echogenicity. Large interpolar cyst measuring 8.2 cm   containing a septation. Smaller lower pole cyst measuring 1.9 cm.    Left kidney:  11.3 cm. No renal mass, hydronephrosis or calculus.  Normal   cortical echogenicity. Several cysts, measuring up to 3.7 cm in the upper   pole.    Urinary bladder: Unremarkable. Bladder volume is 166 mL.    IMPRESSION:     No hydronephrosis.      < end of copied text >  < from: Transthoracic Echocardiogram (17 @ 10:38) >  Mild (1+) aortic regurgitation is present.   Moderate aortic stenosis is present.   Normal appearing tricuspid valve structure and function.   Moderate (2+) tricuspid valve regurgitation is present. Mild to Moderate   pulmonary hypertension.   The left atrium is moderately dilated.   Mild concentric left ventricular hypertrophy is present.   Estimated left ventricular ejection fraction is preserved at 50-55 %.    < end of copied text >    < from: CT Chest No Cont (17 @ 08:39) >    HEART AND VESSELS: Retained right-sided pacing wires are noted without   generator. There is mild soft tissue swelling and air at the generator   site, likely from recent manipulation. No definable fluid collection.   Implantable Micra pacemaker is noted within the right ventricle. Normal   heart size. No pericardial effusion. Extensive coronary artery and aortic   valve calcification. Atherosclerotic thoracic aorta is normal in caliber.    MEDIASTINUM AND CHANI: Stable mediastinal lymph nodes.    UPPER ABDOMEN: Limited visualization is unremarkable.    BONES AND SOFT TISSUES: No acute bony abnormality.    IMPRESSION:     Right-sided pacemaker generator site with a small amount of fluid and   air, likely from recent manipulation. No drainable collection.    Micra pacemaker noted withinthe right ventricle.    < end of copied text >  < from: Xray Chest 1 View AP/PA. (17 @ 21:46) >  Cardiomegaly with clear lungs.      < end of copied text >    < from: Transthoracic Echocardiogram (17 @ 11:23) >  The left ventricle is normal in size, wall motion and contractility.   Mild concentric left ventricular hypertrophy is present.   Estimated left ventricular ejection fraction is 60-65 %.   The left atrium is mildly dilated.   The right atrium appears mildly dilated.   A device wire is seen in the RV and RA.   Mild (1+) aortic regurgitation is present.   Mild aortic stenosis is present.   Mild to moderate mitral regurgitation is present.   Mild mitral annular calcification is present.   Moderate (2+) tricuspid valve regurgitation is present.   Moderate to severe pulmonary hypertension.    < end of copied text >    MEDICATIONS  (STANDING):  aspirin enteric coated 81 milliGRAM(s) Oral daily  cefTRIAXone Injectable 1000 milliGRAM(s) IV Push every 24 hours  clonazePAM Tablet 0.5 milliGRAM(s) Oral at bedtime  dextrose 5%. 1000 milliLiter(s) (50 mL/Hr) IV Continuous <Continuous>  dextrose 50% Injectable 12.5 Gram(s) IV Push once  dextrose 50% Injectable 25 Gram(s) IV Push once  dextrose 50% Injectable 25 Gram(s) IV Push once  gabapentin 100 milliGRAM(s) Oral three times a day  insulin glargine Injectable (LANTUS) 15 Unit(s) SubCutaneous at bedtime  insulin lispro (HumaLOG) corrective regimen sliding scale   SubCutaneous three times a day before meals  metoprolol succinate ER 50 milliGRAM(s) Oral daily  pantoprazole    Tablet 40 milliGRAM(s) Oral before breakfast  simvastatin 20 milliGRAM(s) Oral at bedtime  sodium chloride 0.9%. 1000 milliLiter(s) (50 mL/Hr) IV Continuous <Continuous>  tamsulosin 0.4 milliGRAM(s) Oral at bedtime  venlafaxine XR. 75 milliGRAM(s) Oral daily  warfarin 5 milliGRAM(s) Oral daily    MEDICATIONS  (PRN):  acetaminophen   Tablet 650 milliGRAM(s) Oral every 6 hours PRN For Temp greater than 38 C (100.4 F)  dextrose Gel 1 Dose(s) Oral once PRN Blood Glucose LESS THAN 70 milliGRAM(s)/deciliter  glucagon  Injectable 1 milliGRAM(s) IntraMuscular once PRN Glucose LESS THAN 70 milligrams/deciliter

## 2017-12-15 ENCOUNTER — TRANSCRIPTION ENCOUNTER (OUTPATIENT)
Age: 82
End: 2017-12-15

## 2017-12-15 VITALS — WEIGHT: 178.35 LBS

## 2017-12-15 LAB
-  AMPICILLIN: SIGNIFICANT CHANGE UP
-  CIPROFLOXACIN: SIGNIFICANT CHANGE UP
-  NITROFURANTOIN: SIGNIFICANT CHANGE UP
-  TETRACYCLINE: SIGNIFICANT CHANGE UP
-  VANCOMYCIN: SIGNIFICANT CHANGE UP
CULTURE RESULTS: SIGNIFICANT CHANGE UP
GLUCOSE BLDC GLUCOMTR-MCNC: 209 MG/DL — HIGH (ref 70–99)
GLUCOSE BLDC GLUCOMTR-MCNC: 88 MG/DL — SIGNIFICANT CHANGE UP (ref 70–99)
INR BLD: 2.06 RATIO — HIGH (ref 0.88–1.16)
METHOD TYPE: SIGNIFICANT CHANGE UP
ORGANISM # SPEC MICROSCOPIC CNT: SIGNIFICANT CHANGE UP
ORGANISM # SPEC MICROSCOPIC CNT: SIGNIFICANT CHANGE UP
PROTHROM AB SERPL-ACNC: 22.6 SEC — HIGH (ref 9.8–12.7)
SPECIMEN SOURCE: SIGNIFICANT CHANGE UP

## 2017-12-15 RX ORDER — METOPROLOL TARTRATE 50 MG
1 TABLET ORAL
Qty: 0 | Refills: 0 | COMMUNITY

## 2017-12-15 RX ORDER — CEFUROXIME AXETIL 250 MG
1 TABLET ORAL
Qty: 8 | Refills: 0 | OUTPATIENT
Start: 2017-12-15 | End: 2017-12-18

## 2017-12-15 RX ORDER — DILTIAZEM HCL 120 MG
1 CAPSULE, EXT RELEASE 24 HR ORAL
Qty: 30 | Refills: 0 | OUTPATIENT
Start: 2017-12-15 | End: 2018-01-13

## 2017-12-15 RX ADMIN — Medication 81 MILLIGRAM(S): at 11:48

## 2017-12-15 RX ADMIN — CEFTRIAXONE 1000 MILLIGRAM(S): 500 INJECTION, POWDER, FOR SOLUTION INTRAMUSCULAR; INTRAVENOUS at 11:48

## 2017-12-15 RX ADMIN — Medication 120 MILLIGRAM(S): at 05:39

## 2017-12-15 RX ADMIN — LIDOCAINE 1 PATCH: 4 CREAM TOPICAL at 11:49

## 2017-12-15 RX ADMIN — GABAPENTIN 100 MILLIGRAM(S): 400 CAPSULE ORAL at 05:39

## 2017-12-15 RX ADMIN — PANTOPRAZOLE SODIUM 40 MILLIGRAM(S): 20 TABLET, DELAYED RELEASE ORAL at 05:39

## 2017-12-15 RX ADMIN — Medication 2: at 11:58

## 2017-12-15 NOTE — PROGRESS NOTE ADULT - SUBJECTIVE AND OBJECTIVE BOX
CC Follow up weakness, sob, htn, pvc, atrial fibrillation, ppm.    Interval hx:  12/15 - feels fine, no more sob or palpitations, wants to go home.   Has stable htn, atrial fibrillation, pvc.    HPI  89 yo M with PMH of a-fib (on coumadin), dyslipidemia, IDDM, HTN, PPM, GERD, BPH, CKD, p/w weakness. Patient states he has been feeling generalized weakness that's getting worse, and yesterday he felt so weak he felt like he was going to fall off the his chair while sitting down. He has also been feeling his heart race, and this sometimes makes him feel SOB. No orthopnea. Patient had PPM replaced on Tuesday. C/o pain in his chest after procedure. Denies nausea, vomiting, abdominal pain, fever, chills, cough, runny nose, sore throat.     ROS  Constitutional - No fever, chills  CV - denies  PND, orthopnea.   Resp - denies hemoptysis, wheezing.    GI - denies nausea, no vomiting.     denies frequency   Neuro - denies numbness, motor weakness.   Rest of systems reviewed and negative	    Past med Hx:   HTN  HLP  DM  Permanent AF  CHB  Mild aortic regurgitation  Moderate aortic stenosis.  Moderate tricuspid regurgitation  Mild to Moderate pulmonary artery hypertension.  Mild concentric left ventricular hypertrophy  Possible diastolic heart failure      PSH:   PPM ( with recent gen change) dual ch but currently VVI LRL 60.    Social hx:  no current etoh, drugs, tobacco use, former smoker    Meds: reviewed with pt, see home med tab    Allergy: NSAIDS, opiates.    Fam Hx: (-) CAD     Physical exam  Const - NAD, SV stable at the moment  HEENT - atraumatic, EOMI, PERRLA.   Neck - No JVD, supple neck, no thyromegaly  CV - iRR, normal S1/S2, no S3 or S4, no g/r.      Resp  BLAE, no w//r/r  Abd - Benign, no abd bruits. No visceromegaly.   Hem - No adenopathies noted.   Ext - Normal palpable PP, no cyanosis  Neuro - no motor deficit    ECG and tele: A fib, CHB, mostly V paced at 60 bpm, occasional PVCs (few couplets).  CE nl  CT chest irrelevant from CV stand point  except for recent gen change findings  ECHO reviewed.     Labs. Reviewed  CXR reviewed      A/P  12/15 - feels fine, can go home on current cv regimen.  Follow up with me in the office in 2 weeks  I personally reviewed labs, tele, ecg.   Will follow up while in-house.    ------        * SOB when having palpitations, possibly d/t occasional PVCs ( sometimes in couplets), most likely multifactorial, possibly also related to his underlying PAH, possible diastolic HF, atrial fibrillation, no tachyarrhythmias on tele or device interrogation other than the occasional PVCs mentioned above. Since his SOB happens occasionally only and associated with palpitations, and PVCs couplets are only possible tachyarrhythmia noted, suggest trial of switching him from metoprolol to cardizem XL. IF SOB was predominantly exertional, would then suggest a trial of PO Lasix.    * HTN, slightly elevated BP, suggest starting low dose ACEi  * HLP, on a statin.   * Permanent AF, cont OAC.  * CHB, s/p recent gen change. Working fine on recent interrogation. IF weakness/fatigue persist, since he has CHB and is being almost 100% , could benefit from d/w EP about future consideration for CRT ( to prevent future potential excessive RV pacing CMP and/or switching PPM mode to VVIR if device is capable).  * Mild aortic regurgitation, optimal BP control.  * Moderate aortic stenosis. optimal BP control.  * Moderate tricuspid regurgitation. Optimal volume status.   * Mild to Moderate pulmonary artery hypertension. Optimal volume status.   * Mild concentric left ventricular hypertrophy. optimal BP control.   Possible diastolic heart failure. Optimal BP and volume status. IF SOB is predominantly exertional or recumbent, suggest trial of Lasix.     I personally reviewed office records, labs, tele, echo, imaging, device interrogation.   Will follow.

## 2017-12-15 NOTE — DISCHARGE NOTE ADULT - CARE PROVIDERS DIRECT ADDRESSES
[email or web address]

## 2017-12-15 NOTE — DISCHARGE NOTE ADULT - PLAN OF CARE
prevent recurrence stop toprolol , continue Cardizem , follow up with cardiology within 1 week complete antibiotics, follow up with PCP within 1 week stop losartan, check renal function in 1-2 weeks stop losartan, monitor potassium level outpatient work-up as per PCP continue coumadin 5 mg check INR in 2-3 days follow up with Dr. Arnold within 1-2 weeks

## 2017-12-15 NOTE — DISCHARGE NOTE ADULT - CARE PROVIDER_API CALL
Digna Rosa), Internal Medicine  [address] [place] [address]  Phone: [phone]  Fax: [phone]    Michael Mehta (MD), Internal Medicine  [address] [place] [address]  Phone: [phone]  Fax: [phone]    Chad Arnold), Cardiac Electrophysiology; Cardiovascular Disease  [address] [place] [address]  Phone: [phone]  Fax: [phone]

## 2017-12-15 NOTE — DISCHARGE NOTE ADULT - CARE PLAN
Principal Discharge DX:	Palpitations  Goal:	prevent recurrence  Instructions for follow-up, activity and diet:	stop toprolol , continue Cardizem , follow up with cardiology within 1 week  Secondary Diagnosis:	UTI (urinary tract infection)  Instructions for follow-up, activity and diet:	complete antibiotics, follow up with PCP within 1 week  Secondary Diagnosis:	Acute renal failure  Instructions for follow-up, activity and diet:	stop losartan, check renal function in 1-2 weeks  Secondary Diagnosis:	Hyperkalemia  Instructions for follow-up, activity and diet:	stop losartan, monitor potassium level  Secondary Diagnosis:	Anemia  Instructions for follow-up, activity and diet:	outpatient work-up as per PCP  Secondary Diagnosis:	Atrial fibrillation  Instructions for follow-up, activity and diet:	continue coumadin 5 mg check INR in 2-3 days  Secondary Diagnosis:	Artificial cardiac pacemaker  Instructions for follow-up, activity and diet:	follow up with Dr. Arnold within 1-2 weeks

## 2017-12-15 NOTE — DISCHARGE NOTE ADULT - HOSPITAL COURSE
Patient is a 88y old  Male who presents with a chief complaint of weakness     HPI:     89 yo M with PMH of a-fib (on coumadin), dyslipidemia, IDDM, HTN, PPM, GERD, BPH, CKD, admitted on 12/12/17 with c/o  weakness. Patient states he has been feeling generalized weakness that's getting worse, and yesterday he felt so weak he felt like he was going to fall off the his chair while sitting down. He has also been feeling his heart race, and this sometimes makes him feel SOB. No orthopnea.  Patient had PPM replaced on Tuesday. C/o pain in his chest after procedure.  Denies nausea, vomiting, abdominal pain, fever, chills, cough, runny nose, sore throat.     12/12 - Patient seen and examined at bedside earlier today, reports weakness, lightheadedness,  denies fever, cough, chills, HA, abd pain, urinary or BM problems, + chooking on thin liquids  12/13- feels better, reports dysuria, afebrile, denies CP, + weakness, denies abd pain, diarrhea, hard to hear  12/14 - feels better, denies CP ,palpitations , abdominal pain, eager to go home  12/15 - feels better, eager to go home, denies CP, palpitations, dizziness, dyspnea  Review of system- Rest of the review of system are negative except mentioned in HPI  T(C): 36.8 (12-15-17 @ 05:29), Max: 36.8 (12-14-17 @ 20:00)  T(F): 98.3 (12-15-17 @ 05:29), Max: 98.3 (12-15-17 @ 05:29)  HR: 60 (12-15-17 @ 05:29) (60 - 60)  BP: 146/62 (12-15-17 @ 05:29) (133/50 - 150/64)  RR: 17 (12-15-17 @ 05:29) (17 - 18)  SpO2: 98% (12-15-17 @ 05:29) (93% - 98%)  Wt(kg): --  PHYSICAL EXAM:  GENERAL: NAD  NERVOUS SYSTEM:  Alert & Oriented X3, non- focal exam, Motor Strength 5/5 B/L upper and lower extremities; DTRs 2+ intact and symmetric  HEAD:  Atraumatic, Normocephalic  EYES: EOMI, PERRLA, conjunctiva and sclera clear  HEENT: Moist mucous membranes  NECK: Supple, No JVD  CHEST/LUNG: Clear to auscultation bilaterally; No rales, no rhonchi, no wheezing, or rubs + right upper chest PPM   HEART: Regular rate and rhythm; No murmurs, rubs, or gallops  ABDOMEN: Soft, Nontender, Nondistended; Bowel sounds present  GENITOURINARY- Voiding, no suprapubic tenderness  EXTREMITIES:  2+ Peripheral Pulses, No clubbing, cyanosis, or edema  MUSCULOSKELETAL:- No muscle tenderness, Muscle tone normal, No joint tenderness, no Joint swelling, Joint range of motion-normal  SKIN-no rash, no lesion  89 yo M with PMH of a-fib (on coumadin), dyslipidemia, IDDM, HTN, PPM, GERD, BPH, CKD, p/w weakness on 12/12/17    *Palpitations / Weakness /Lightheadedness-  r/o underlying infection after recent PPM placement procedure due to PVC  -Blood cx / UA  -CT chest  - no abscess   -Echo - EF wnl, moderate AS, 2+ TR  -EP consult for Dr. Arnold  -PPM interrogated - normal function  -SOB is only a result of the palpitations as per patient - no signs of acute CHF at this time  -Trops negative x 2  - check orthostatics - neg  - check O2 on ambulation - wnl  - tele - no event - V paced   - cardiology consult  - SOB due to PVCs, multifactorial , change BB to cardizem   - tele - less PVCs, d/w Dr. Mehta c/w CCB, cleared for discharge  * UTI due to E coli  - start ceftriaxone--> ceftin for 4 days  - urine cx - + E coli  *A-fib  -On coumadin  4 mg --> 5 mg  -Monitor INR 1.9--> 2  -Rate controlled   *Anemia, macrocytic, rule out B12/folate deficiency, normal thyroid function  -Trend H/H during hospitalization -> if stable, will need to f/u outpatient for further management  *Hyperkalemia, resolved  -Repeat potassium level -> if still elevated -> will need kayexelate  -Will hold losartan for now  *Acute on CKD stage 3, improving  -Encourage oral hydration and trend creatinine in AM   -Losartan held temporarily  - US kidney - no obstruction  - gentle hydration  * r/o aspiration, as per RN pt chocking on  thin liquids  - speech and swallow evaluation - regular food  *DM2 A1C 7.3, well controlled  -Humalog ISS  -Diabetic diet   -Lantus  *HTN / GERD / BPH  -C/w home meds + outpatient management   - consider tapering doxasosin - can cause dizziness  Disposition - medically optimized to be discharged home with close follow up with PCP, cardiology within 1 week  complete antibiotics  return to ED if fever, abdominal pain, nausea, vomiting, chest pain, dyspnea  Discharge plan discussed with patient, RN  Patient advised to follow up with PCP within 3-7 days  time spend 40 min  call PCP left the message, note faxed

## 2017-12-15 NOTE — DISCHARGE NOTE ADULT - PATIENT PORTAL LINK FT
“You can access the [facility] Patient Portal, offered by [facility], by registering with the following website: [email or web address]”

## 2017-12-15 NOTE — DISCHARGE NOTE ADULT - MEDICATION SUMMARY - MEDICATIONS TO STOP TAKING
I will STOP taking the medications listed below when I get home from the hospital:    Toprol-XL 50 mg oral tablet, extended release  -- 1 tab(s) by mouth once a day    Flomax 0.4 mg oral capsule  -- 1 cap(s) by mouth once a day

## 2017-12-15 NOTE — DISCHARGE NOTE ADULT - SECONDARY DIAGNOSIS.
UTI (urinary tract infection) Acute renal failure Hyperkalemia Anemia Atrial fibrillation Artificial cardiac pacemaker

## 2017-12-15 NOTE — DISCHARGE NOTE ADULT - MEDICATION SUMMARY - MEDICATIONS TO TAKE
I will START or STAY ON the medications listed below when I get home from the hospital:    aspirin 81 mg oral tablet  -- 1 tab(s) by mouth once a day  -- Indication: For home meds    doxazosin 4 mg oral tablet  -- 1 tab(s) by mouth once a day (at bedtime)  -- Indication: For home meds    dilTIAZem 120 mg/24 hours oral capsule, extended release  -- 1 cap(s) by mouth once a day  -- Indication: For palpitations    warfarin 5 mg oral tablet  -- 1 tab(s) by mouth once a day, check INR in 2-3 days  -- Indication: For  atrial fibrillation    clonazePAM 0.5 mg oral tablet  -- 1 tab(s) by mouth once a day (at bedtime)  -- Indication: For home meds    gabapentin 100 mg oral tablet  -- orally 3 times a day  -- Indication: For home meds    venlafaxine 75 mg oral capsule, extended release  -- 1 cap(s) by mouth once a day  -- Indication: For home meds    Lantus Solostar Pen 100 units/mL subcutaneous solution  -- 15 unit(s) subcutaneous once a day (at bedtime)  -- Indication: For home meds    Tradjenta 5 mg oral tablet  -- 1 tab(s) by mouth once a day (in the evening)  -- Indication: For home meds    simvastatin 20 mg oral tablet  -- 1 tab(s) by mouth once a day (at bedtime)  -- Indication: For home meds    Ceftin 250 mg oral tablet  -- 1 tab(s) by mouth 2 times a day   -- Finish all this medication unless otherwise directed by prescriber.  Medication should be taken with plenty of water.  Take with food or milk.    -- Indication: For UTI    RABEprazole 20 mg oral delayed release tablet  -- 1 tab(s) by mouth , As Needed  -- Indication: For home meds    Perdido Caps oral capsule  -- 1 cap(s) by mouth once a day  -- Indication: For home meds

## 2017-12-15 NOTE — DISCHARGE NOTE ADULT - OTHER SIGNIFICANT FINDINGS
Complete Blood Count in AM (12.14.17 @ 07:16)    WBC Count: 9.8 K/uL    RBC Count: 3.14 M/uL    Hemoglobin: 10.0 g/dL    Hematocrit: 30.9 %    Mean Cell Volume: 98.4 fl    Mean Cell Hemoglobin: 31.8 pg    Mean Cell Hemoglobin Conc: 32.3 gm/dL    Red Cell Distrib Width: 13.8 %    Platelet Count - Automated: 162 K/uL    Basic Metabolic Panel in AM (12.14.17 @ 07:16)    Sodium, Serum: 144 mmol/L    Potassium, Serum: 4.4 mmol/L    Chloride, Serum: 107 mmol/L    Carbon Dioxide, Serum: 31 mmol/L    Anion Gap, Serum: 6 mmol/L    Blood Urea Nitrogen, Serum: 48 mg/dL    Creatinine, Serum: 1.66 mg/dL    Glucose, Serum: 121 mg/dL    Calcium, Total Serum: 8.9 mg/dL    Troponin I, Serum (12.12.17 @ 03:30)    Troponin I, Serum: 0.037: High Sensitivity Troponin and new reference  range effective 7/6/2016 ng/mL    Serum Pro-Brain Natriuretic Peptide (12.11.17 @ 20:48)    Serum Pro-Brain Natriuretic Peptide: 4510 pg/mL    Thyroid Stimulating Hormone, Serum in AM (12.13.17 @ 06:46)    Thyroid Stimulating Hormone, Serum: 0.619 uU/mL    Vitamin B12, Serum in AM (12.13.17 @ 06:46)    Vitamin B12, Serum: 472 pg/mL    < from: Xray Foot AP + Lateral, Right (12.13.17 @ 16:45) >  No acute fracture or dislocation.    < end of copied text >    < from: US Kidney and Bladder (12.13.17 @ 08:06) >  No hydronephrosis.    < end of copied text >  < from: CT Chest No Cont (12.12.17 @ 08:39) >  Right-sided pacemaker generator site with a small amount of fluid and   air, likely from recent manipulation. No drainable collection.    Micra pacemaker noted withinthe right ventricle.      < end of copied text >  < from: Transthoracic Echocardiogram (12.12.17 @ 10:38) >  Mild (1+) aortic regurgitation is present.   Moderate aortic stenosis is present.   Normal appearing tricuspid valve structure and function.   Moderate (2+) tricuspid valve regurgitation is present. Mild to Moderate   pulmonary hypertension.   The left atrium is moderately dilated.   Mild concentric left ventricular hypertrophy is present.   Estimated left ventricular ejection fraction is preserved at 50-55 %.      < end of copied text >

## 2017-12-15 NOTE — DISCHARGE NOTE ADULT - MEDICATION SUMMARY - MEDICATIONS TO CHANGE
I will SWITCH the dose or number of times a day I take the medications listed below when I get home from the hospital:    warfarin 4 mg oral tablet  -- 1 tab(s) by mouth once a day- Will need a repeat INR on thursday before starting dose  -- Please tell patient that he will need a repeat INR on Thursday before he can start his coumadin dose under the supervision of his PCP

## 2017-12-17 ENCOUNTER — INPATIENT (INPATIENT)
Facility: HOSPITAL | Age: 82
LOS: 2 days | Discharge: HOME | End: 2017-12-20
Attending: FAMILY MEDICINE | Admitting: HOSPITALIST
Payer: MEDICARE

## 2017-12-17 VITALS — HEIGHT: 69 IN | WEIGHT: 190.04 LBS

## 2017-12-17 DIAGNOSIS — Z95.0 PRESENCE OF CARDIAC PACEMAKER: Chronic | ICD-10-CM

## 2017-12-17 LAB
ALBUMIN SERPL ELPH-MCNC: 3.1 G/DL — LOW (ref 3.3–5)
ALP SERPL-CCNC: 95 U/L — SIGNIFICANT CHANGE UP (ref 40–120)
ALT FLD-CCNC: 41 U/L — SIGNIFICANT CHANGE UP (ref 12–78)
ANION GAP SERPL CALC-SCNC: 8 MMOL/L — SIGNIFICANT CHANGE UP (ref 5–17)
APTT BLD: 33.7 SEC — SIGNIFICANT CHANGE UP (ref 27.5–37.4)
AST SERPL-CCNC: 66 U/L — HIGH (ref 15–37)
BASOPHILS # BLD AUTO: 0.1 K/UL — SIGNIFICANT CHANGE UP (ref 0–0.2)
BASOPHILS NFR BLD AUTO: 0.4 % — SIGNIFICANT CHANGE UP (ref 0–2)
BILIRUB SERPL-MCNC: 1 MG/DL — SIGNIFICANT CHANGE UP (ref 0.2–1.2)
BLD GP AB SCN SERPL QL: SIGNIFICANT CHANGE UP
BUN SERPL-MCNC: 37 MG/DL — HIGH (ref 7–23)
CALCIUM SERPL-MCNC: 8.8 MG/DL — SIGNIFICANT CHANGE UP (ref 8.5–10.1)
CHLORIDE SERPL-SCNC: 101 MMOL/L — SIGNIFICANT CHANGE UP (ref 96–108)
CO2 SERPL-SCNC: 27 MMOL/L — SIGNIFICANT CHANGE UP (ref 22–31)
CREAT SERPL-MCNC: 1.98 MG/DL — HIGH (ref 0.5–1.3)
EOSINOPHIL # BLD AUTO: 0.2 K/UL — SIGNIFICANT CHANGE UP (ref 0–0.5)
EOSINOPHIL NFR BLD AUTO: 1.2 % — SIGNIFICANT CHANGE UP (ref 0–6)
GLUCOSE BLDC GLUCOMTR-MCNC: 182 MG/DL — HIGH (ref 70–99)
GLUCOSE SERPL-MCNC: 178 MG/DL — HIGH (ref 70–99)
HCT VFR BLD CALC: 33.1 % — LOW (ref 39–50)
HGB BLD-MCNC: 11 G/DL — LOW (ref 13–17)
INR BLD: 2.03 RATIO — HIGH (ref 0.88–1.16)
LACTATE SERPL-SCNC: 2.2 MMOL/L — HIGH (ref 0.7–2)
LIDOCAIN IGE QN: 136 U/L — SIGNIFICANT CHANGE UP (ref 73–393)
LYMPHOCYTES # BLD AUTO: 1.1 K/UL — SIGNIFICANT CHANGE UP (ref 1–3.3)
LYMPHOCYTES # BLD AUTO: 6.8 % — LOW (ref 13–44)
MCHC RBC-ENTMCNC: 32.2 PG — SIGNIFICANT CHANGE UP (ref 27–34)
MCHC RBC-ENTMCNC: 33.3 GM/DL — SIGNIFICANT CHANGE UP (ref 32–36)
MCV RBC AUTO: 96.8 FL — SIGNIFICANT CHANGE UP (ref 80–100)
MONOCYTES # BLD AUTO: 1 K/UL — HIGH (ref 0–0.9)
MONOCYTES NFR BLD AUTO: 6.3 % — SIGNIFICANT CHANGE UP (ref 2–14)
NEUTROPHILS # BLD AUTO: 13.9 K/UL — HIGH (ref 1.8–7.4)
NEUTROPHILS NFR BLD AUTO: 85.4 % — HIGH (ref 43–77)
NT-PROBNP SERPL-SCNC: 8485 PG/ML — HIGH (ref 0–450)
PLATELET # BLD AUTO: 165 K/UL — SIGNIFICANT CHANGE UP (ref 150–400)
POTASSIUM SERPL-MCNC: 4.5 MMOL/L — SIGNIFICANT CHANGE UP (ref 3.5–5.3)
POTASSIUM SERPL-SCNC: 4.5 MMOL/L — SIGNIFICANT CHANGE UP (ref 3.5–5.3)
PROT SERPL-MCNC: 7.7 GM/DL — SIGNIFICANT CHANGE UP (ref 6–8.3)
PROTHROM AB SERPL-ACNC: 22.2 SEC — HIGH (ref 9.8–12.7)
RBC # BLD: 3.42 M/UL — LOW (ref 4.2–5.8)
RBC # FLD: 13.5 % — SIGNIFICANT CHANGE UP (ref 10.3–14.5)
SODIUM SERPL-SCNC: 136 MMOL/L — SIGNIFICANT CHANGE UP (ref 135–145)
TROPONIN I SERPL-MCNC: 0.08 NG/ML — HIGH (ref 0.01–0.04)
TYPE + AB SCN PNL BLD: SIGNIFICANT CHANGE UP
WBC # BLD: 16.3 K/UL — HIGH (ref 3.8–10.5)
WBC # FLD AUTO: 16.3 K/UL — HIGH (ref 3.8–10.5)

## 2017-12-17 PROCEDURE — 71010: CPT | Mod: 26

## 2017-12-17 PROCEDURE — 93010 ELECTROCARDIOGRAM REPORT: CPT

## 2017-12-17 PROCEDURE — 99285 EMERGENCY DEPT VISIT HI MDM: CPT

## 2017-12-17 RX ORDER — SODIUM CHLORIDE 9 MG/ML
250 INJECTION INTRAMUSCULAR; INTRAVENOUS; SUBCUTANEOUS ONCE
Qty: 0 | Refills: 0 | Status: COMPLETED | OUTPATIENT
Start: 2017-12-17 | End: 2017-12-17

## 2017-12-17 RX ORDER — ACETAMINOPHEN 500 MG
650 TABLET ORAL ONCE
Qty: 0 | Refills: 0 | Status: COMPLETED | OUTPATIENT
Start: 2017-12-17 | End: 2017-12-17

## 2017-12-17 RX ORDER — CEFEPIME 1 G/1
2000 INJECTION, POWDER, FOR SOLUTION INTRAMUSCULAR; INTRAVENOUS EVERY 12 HOURS
Qty: 0 | Refills: 0 | Status: DISCONTINUED | OUTPATIENT
Start: 2017-12-17 | End: 2017-12-18

## 2017-12-17 RX ORDER — SODIUM CHLORIDE 9 MG/ML
3 INJECTION INTRAMUSCULAR; INTRAVENOUS; SUBCUTANEOUS ONCE
Qty: 0 | Refills: 0 | Status: COMPLETED | OUTPATIENT
Start: 2017-12-17 | End: 2017-12-17

## 2017-12-17 RX ORDER — VANCOMYCIN HCL 1 G
1000 VIAL (EA) INTRAVENOUS ONCE
Qty: 0 | Refills: 0 | Status: COMPLETED | OUTPATIENT
Start: 2017-12-17 | End: 2017-12-17

## 2017-12-17 RX ORDER — ONDANSETRON 8 MG/1
4 TABLET, FILM COATED ORAL ONCE
Qty: 0 | Refills: 0 | Status: COMPLETED | OUTPATIENT
Start: 2017-12-17 | End: 2017-12-17

## 2017-12-17 RX ORDER — SODIUM CHLORIDE 9 MG/ML
500 INJECTION INTRAMUSCULAR; INTRAVENOUS; SUBCUTANEOUS
Qty: 0 | Refills: 0 | Status: DISCONTINUED | OUTPATIENT
Start: 2017-12-17 | End: 2017-12-17

## 2017-12-17 RX ADMIN — SODIUM CHLORIDE 2000 MILLILITER(S): 9 INJECTION INTRAMUSCULAR; INTRAVENOUS; SUBCUTANEOUS at 20:14

## 2017-12-17 RX ADMIN — ONDANSETRON 4 MILLIGRAM(S): 8 TABLET, FILM COATED ORAL at 20:18

## 2017-12-17 RX ADMIN — Medication 250 MILLIGRAM(S): at 23:51

## 2017-12-17 RX ADMIN — SODIUM CHLORIDE 3 MILLILITER(S): 9 INJECTION INTRAMUSCULAR; INTRAVENOUS; SUBCUTANEOUS at 20:18

## 2017-12-17 RX ADMIN — SODIUM CHLORIDE 2000 MILLILITER(S): 9 INJECTION INTRAMUSCULAR; INTRAVENOUS; SUBCUTANEOUS at 19:59

## 2017-12-17 RX ADMIN — CEFEPIME 100 MILLIGRAM(S): 1 INJECTION, POWDER, FOR SOLUTION INTRAMUSCULAR; INTRAVENOUS at 22:41

## 2017-12-17 RX ADMIN — Medication 650 MILLIGRAM(S): at 20:18

## 2017-12-17 RX ADMIN — SODIUM CHLORIDE 250 MILLILITER(S): 9 INJECTION INTRAMUSCULAR; INTRAVENOUS; SUBCUTANEOUS at 22:17

## 2017-12-17 NOTE — ED PROVIDER NOTE - OBJECTIVE STATEMENT
89 y/o male with PMHx of UTI, HTN, IDDM presents to the ED c/o generalized CP, abd pain. Pacemaker placed on the 5th, dc'ed in stable condition on 12/6. He presented to  ED on 12/11 with generalized myalgias, LA'ed on 12/15 with dx of UTI. Today, pt presents with CP, abd pain, generalized myalgias, weakness. Notes max T of 99.8. Pt has not taken any medications today.

## 2017-12-17 NOTE — ED ADULT NURSE REASSESSMENT NOTE - NS ED NURSE REASSESS COMMENT FT1
asumed pt care at 2215. pt awake, denies pain, comfortable at this time. will monitor. Cefepime IVABT started.

## 2017-12-18 DIAGNOSIS — Z90.49 ACQUIRED ABSENCE OF OTHER SPECIFIED PARTS OF DIGESTIVE TRACT: Chronic | ICD-10-CM

## 2017-12-18 DIAGNOSIS — M19.019 PRIMARY OSTEOARTHRITIS, UNSPECIFIED SHOULDER: Chronic | ICD-10-CM

## 2017-12-18 LAB
APPEARANCE UR: CLEAR — SIGNIFICANT CHANGE UP
BACTERIA # UR AUTO: (no result)
BILIRUB UR-MCNC: NEGATIVE — SIGNIFICANT CHANGE UP
CK SERPL-CCNC: 452 U/L — HIGH (ref 26–308)
COLOR SPEC: YELLOW — SIGNIFICANT CHANGE UP
COMMENT - URINE: SIGNIFICANT CHANGE UP
CRP SERPL-MCNC: 9.9 MG/DL — HIGH (ref 0–0.4)
CULTURE RESULTS: SIGNIFICANT CHANGE UP
CULTURE RESULTS: SIGNIFICANT CHANGE UP
DIFF PNL FLD: (no result)
EPI CELLS # UR: (no result)
ERYTHROCYTE [SEDIMENTATION RATE] IN BLOOD: 80 MM/HR — HIGH (ref 0–20)
GLUCOSE BLDC GLUCOMTR-MCNC: 134 MG/DL — HIGH (ref 70–99)
GLUCOSE BLDC GLUCOMTR-MCNC: 219 MG/DL — HIGH (ref 70–99)
GLUCOSE BLDC GLUCOMTR-MCNC: 231 MG/DL — HIGH (ref 70–99)
GLUCOSE BLDC GLUCOMTR-MCNC: 248 MG/DL — HIGH (ref 70–99)
GLUCOSE UR QL: 50 MG/DL
INR BLD: 2.17 RATIO — HIGH (ref 0.88–1.16)
KETONES UR-MCNC: NEGATIVE — SIGNIFICANT CHANGE UP
LACTATE SERPL-SCNC: 1 MMOL/L — SIGNIFICANT CHANGE UP (ref 0.7–2)
LEUKOCYTE ESTERASE UR-ACNC: (no result)
NITRITE UR-MCNC: NEGATIVE — SIGNIFICANT CHANGE UP
PH UR: 5 — SIGNIFICANT CHANGE UP (ref 5–8)
PROT UR-MCNC: 100 MG/DL
PROTHROM AB SERPL-ACNC: 23.8 SEC — HIGH (ref 9.8–12.7)
RAPID RVP RESULT: SIGNIFICANT CHANGE UP
RBC CASTS # UR COMP ASSIST: (no result) /HPF (ref 0–4)
SP GR SPEC: 1.01 — SIGNIFICANT CHANGE UP (ref 1.01–1.02)
SPECIMEN SOURCE: SIGNIFICANT CHANGE UP
SPECIMEN SOURCE: SIGNIFICANT CHANGE UP
TROPONIN I SERPL-MCNC: 0.06 NG/ML — HIGH (ref 0.01–0.04)
TROPONIN I SERPL-MCNC: 0.08 NG/ML — HIGH (ref 0.01–0.04)
UROBILINOGEN FLD QL: NEGATIVE MG/DL — SIGNIFICANT CHANGE UP
WBC UR QL: (no result)

## 2017-12-18 PROCEDURE — 93306 TTE W/DOPPLER COMPLETE: CPT | Mod: 26

## 2017-12-18 RX ORDER — ACETAMINOPHEN 500 MG
650 TABLET ORAL EVERY 6 HOURS
Qty: 0 | Refills: 0 | Status: DISCONTINUED | OUTPATIENT
Start: 2017-12-18 | End: 2017-12-20

## 2017-12-18 RX ORDER — DOCUSATE SODIUM 100 MG
100 CAPSULE ORAL THREE TIMES A DAY
Qty: 0 | Refills: 0 | Status: DISCONTINUED | OUTPATIENT
Start: 2017-12-18 | End: 2017-12-20

## 2017-12-18 RX ORDER — VENLAFAXINE HCL 75 MG
75 CAPSULE, EXT RELEASE 24 HR ORAL DAILY
Qty: 0 | Refills: 0 | Status: DISCONTINUED | OUTPATIENT
Start: 2017-12-18 | End: 2017-12-20

## 2017-12-18 RX ORDER — SODIUM CHLORIDE 9 MG/ML
1000 INJECTION, SOLUTION INTRAVENOUS
Qty: 0 | Refills: 0 | Status: DISCONTINUED | OUTPATIENT
Start: 2017-12-18 | End: 2017-12-20

## 2017-12-18 RX ORDER — CEFEPIME 1 G/1
1000 INJECTION, POWDER, FOR SOLUTION INTRAMUSCULAR; INTRAVENOUS EVERY 24 HOURS
Qty: 0 | Refills: 0 | Status: DISCONTINUED | OUTPATIENT
Start: 2017-12-18 | End: 2017-12-18

## 2017-12-18 RX ORDER — WARFARIN SODIUM 2.5 MG/1
5 TABLET ORAL DAILY
Qty: 0 | Refills: 0 | Status: DISCONTINUED | OUTPATIENT
Start: 2017-12-18 | End: 2017-12-20

## 2017-12-18 RX ORDER — ASPIRIN/CALCIUM CARB/MAGNESIUM 324 MG
81 TABLET ORAL DAILY
Qty: 0 | Refills: 0 | Status: DISCONTINUED | OUTPATIENT
Start: 2017-12-18 | End: 2017-12-20

## 2017-12-18 RX ORDER — DEXTROSE 50 % IN WATER 50 %
1 SYRINGE (ML) INTRAVENOUS ONCE
Qty: 0 | Refills: 0 | Status: DISCONTINUED | OUTPATIENT
Start: 2017-12-18 | End: 2017-12-20

## 2017-12-18 RX ORDER — GABAPENTIN 400 MG/1
100 CAPSULE ORAL THREE TIMES A DAY
Qty: 0 | Refills: 0 | Status: DISCONTINUED | OUTPATIENT
Start: 2017-12-18 | End: 2017-12-20

## 2017-12-18 RX ORDER — GLUCAGON INJECTION, SOLUTION 0.5 MG/.1ML
1 INJECTION, SOLUTION SUBCUTANEOUS ONCE
Qty: 0 | Refills: 0 | Status: DISCONTINUED | OUTPATIENT
Start: 2017-12-18 | End: 2017-12-20

## 2017-12-18 RX ORDER — ASPIRIN/CALCIUM CARB/MAGNESIUM 324 MG
325 TABLET ORAL ONCE
Qty: 0 | Refills: 0 | Status: COMPLETED | OUTPATIENT
Start: 2017-12-18 | End: 2017-12-18

## 2017-12-18 RX ORDER — SODIUM CHLORIDE 9 MG/ML
1000 INJECTION INTRAMUSCULAR; INTRAVENOUS; SUBCUTANEOUS
Qty: 0 | Refills: 0 | Status: DISCONTINUED | OUTPATIENT
Start: 2017-12-18 | End: 2017-12-20

## 2017-12-18 RX ORDER — ONDANSETRON 8 MG/1
4 TABLET, FILM COATED ORAL ONCE
Qty: 0 | Refills: 0 | Status: COMPLETED | OUTPATIENT
Start: 2017-12-18 | End: 2017-12-18

## 2017-12-18 RX ORDER — SIMVASTATIN 20 MG/1
20 TABLET, FILM COATED ORAL AT BEDTIME
Qty: 0 | Refills: 0 | Status: DISCONTINUED | OUTPATIENT
Start: 2017-12-18 | End: 2017-12-19

## 2017-12-18 RX ORDER — DEXTROSE 50 % IN WATER 50 %
12.5 SYRINGE (ML) INTRAVENOUS ONCE
Qty: 0 | Refills: 0 | Status: DISCONTINUED | OUTPATIENT
Start: 2017-12-18 | End: 2017-12-20

## 2017-12-18 RX ORDER — DILTIAZEM HCL 120 MG
120 CAPSULE, EXT RELEASE 24 HR ORAL DAILY
Qty: 0 | Refills: 0 | Status: DISCONTINUED | OUTPATIENT
Start: 2017-12-18 | End: 2017-12-20

## 2017-12-18 RX ORDER — INSULIN LISPRO 100/ML
VIAL (ML) SUBCUTANEOUS
Qty: 0 | Refills: 0 | Status: DISCONTINUED | OUTPATIENT
Start: 2017-12-18 | End: 2017-12-20

## 2017-12-18 RX ORDER — PANTOPRAZOLE SODIUM 20 MG/1
40 TABLET, DELAYED RELEASE ORAL
Qty: 0 | Refills: 0 | Status: DISCONTINUED | OUTPATIENT
Start: 2017-12-18 | End: 2017-12-20

## 2017-12-18 RX ORDER — DEXTROSE 50 % IN WATER 50 %
25 SYRINGE (ML) INTRAVENOUS ONCE
Qty: 0 | Refills: 0 | Status: DISCONTINUED | OUTPATIENT
Start: 2017-12-18 | End: 2017-12-20

## 2017-12-18 RX ORDER — INSULIN GLARGINE 100 [IU]/ML
15 INJECTION, SOLUTION SUBCUTANEOUS AT BEDTIME
Qty: 0 | Refills: 0 | Status: DISCONTINUED | OUTPATIENT
Start: 2017-12-18 | End: 2017-12-20

## 2017-12-18 RX ORDER — ASPIRIN/CALCIUM CARB/MAGNESIUM 324 MG
81 TABLET ORAL DAILY
Qty: 0 | Refills: 0 | Status: DISCONTINUED | OUTPATIENT
Start: 2017-12-18 | End: 2017-12-18

## 2017-12-18 RX ORDER — INSULIN GLARGINE 100 [IU]/ML
15 INJECTION, SOLUTION SUBCUTANEOUS AT BEDTIME
Qty: 0 | Refills: 0 | Status: DISCONTINUED | OUTPATIENT
Start: 2017-12-18 | End: 2017-12-18

## 2017-12-18 RX ORDER — DOXAZOSIN MESYLATE 4 MG
4 TABLET ORAL AT BEDTIME
Qty: 0 | Refills: 0 | Status: DISCONTINUED | OUTPATIENT
Start: 2017-12-18 | End: 2017-12-20

## 2017-12-18 RX ORDER — CLONAZEPAM 1 MG
0.5 TABLET ORAL AT BEDTIME
Qty: 0 | Refills: 0 | Status: DISCONTINUED | OUTPATIENT
Start: 2017-12-18 | End: 2017-12-20

## 2017-12-18 RX ORDER — CEFEPIME 1 G/1
1000 INJECTION, POWDER, FOR SOLUTION INTRAMUSCULAR; INTRAVENOUS EVERY 12 HOURS
Qty: 0 | Refills: 0 | Status: DISCONTINUED | OUTPATIENT
Start: 2017-12-18 | End: 2017-12-19

## 2017-12-18 RX ADMIN — ONDANSETRON 4 MILLIGRAM(S): 8 TABLET, FILM COATED ORAL at 21:29

## 2017-12-18 RX ADMIN — Medication 2: at 13:22

## 2017-12-18 RX ADMIN — Medication 1 TABLET(S): at 13:20

## 2017-12-18 RX ADMIN — GABAPENTIN 100 MILLIGRAM(S): 400 CAPSULE ORAL at 13:16

## 2017-12-18 RX ADMIN — Medication 2 DROP(S): at 06:31

## 2017-12-18 RX ADMIN — Medication 0.5 MILLIGRAM(S): at 21:29

## 2017-12-18 RX ADMIN — INSULIN GLARGINE 15 UNIT(S): 100 INJECTION, SOLUTION SUBCUTANEOUS at 21:34

## 2017-12-18 RX ADMIN — Medication 325 MILLIGRAM(S): at 02:13

## 2017-12-18 RX ADMIN — SODIUM CHLORIDE 50 MILLILITER(S): 9 INJECTION INTRAMUSCULAR; INTRAVENOUS; SUBCUTANEOUS at 09:13

## 2017-12-18 RX ADMIN — SIMVASTATIN 20 MILLIGRAM(S): 20 TABLET, FILM COATED ORAL at 21:29

## 2017-12-18 RX ADMIN — GABAPENTIN 100 MILLIGRAM(S): 400 CAPSULE ORAL at 21:30

## 2017-12-18 RX ADMIN — Medication 2: at 17:46

## 2017-12-18 RX ADMIN — Medication 81 MILLIGRAM(S): at 13:20

## 2017-12-18 RX ADMIN — GABAPENTIN 100 MILLIGRAM(S): 400 CAPSULE ORAL at 05:42

## 2017-12-18 RX ADMIN — SODIUM CHLORIDE 50 MILLILITER(S): 9 INJECTION INTRAMUSCULAR; INTRAVENOUS; SUBCUTANEOUS at 02:19

## 2017-12-18 RX ADMIN — Medication 120 MILLIGRAM(S): at 06:31

## 2017-12-18 RX ADMIN — Medication 4 MILLIGRAM(S): at 21:30

## 2017-12-18 RX ADMIN — Medication 2 DROP(S): at 13:21

## 2017-12-18 RX ADMIN — PANTOPRAZOLE SODIUM 40 MILLIGRAM(S): 20 TABLET, DELAYED RELEASE ORAL at 08:16

## 2017-12-18 RX ADMIN — Medication 2 DROP(S): at 21:29

## 2017-12-18 RX ADMIN — Medication 75 MILLIGRAM(S): at 13:17

## 2017-12-18 RX ADMIN — WARFARIN SODIUM 5 MILLIGRAM(S): 2.5 TABLET ORAL at 21:29

## 2017-12-18 RX ADMIN — CEFEPIME 100 MILLIGRAM(S): 1 INJECTION, POWDER, FOR SOLUTION INTRAMUSCULAR; INTRAVENOUS at 17:46

## 2017-12-18 NOTE — CONSULT NOTE ADULT - ASSESSMENT
87 y/o Male with h/o A.fib (on coumadin), dyslipidemia, IDDM, HTN, PPM, GERD, BPH, CKD was admitted on 12/17 for increased weakness. States that he was recently discharged from hospital, and for two days after discharge, he felt great. After that he started feeling weak again. Patient states all the "feelings in his body came back" again, but he is unable to elaborate. In ER, he was given cefepime.    1. Febrile syndrome. Pyuria. Possible UTI. Acute on CRF.  -leukocytosis   -obtain BC x 2, urine c/s  -agree with cefepime 1 gm IV q12h  -reason for abx use and side effects reviewed with patient; monitor BMP  -monitor temps  -f/u CBC  -supportive care  2. Other issues:   -care per medicine

## 2017-12-18 NOTE — CONSULT NOTE ADULT - SUBJECTIVE AND OBJECTIVE BOX
CC " I feel weak"     HPI      Examined at bed side, feels better, denies shortness of breath, palpitations or dizziness so far.  No syncope or pre syncope.    ROS  Constitutional - No fever, chills, weight loss.   CV - denies  PND, orthopnea. Or angina.   Resp - denies hemoptysis, sputum production.    GI - denies diarrhea no vomiting.     denies frequency   Neuro - denies numbness, motor weakness.   Rest of systems reviewed and negative	    Past med Hx:       PSH:       Social hx:  no current etoh, drugs, tobacco use    Meds: reviewed with pt, see home med tab    Allergy: reviewed with pt, see allergy tab.     Fam Hx: (-) CAD     Physical exam  Const - NAD, SV stable at the moment  HEENT - atraumatic, EOMI, PERRLA.   Neck - No JVD, supple neck, no thyromegaly.   CV - RRR, normal S1/S2, no S3 or S4, no g/r.     Resp  BLAE, No rales  Abd - Benign, no abd bruits. No visceromegaly.   Hem - No adenopathies noted.   Ext - Normal palpable PP, no cyanosis, No leg edema  Neuro - no focal motor deficit    ECG- A Fib, V paced. Tele: AF, V paced.      Labs. Reviewed  Imaging reviewed       A/P  * Elevated troponins, possibly d/t type 2 MI. In setting of acute illness. Sugegst 2d echo, MPI. Cont optimal CV care in the interim.     * HTN, controlled  * HLP, on a statin.   * Permanent AF, cont OAC, and Vpaced 100%.   * CHB, s/p recent gen change. Working fine on recent interrogation. IF weakness/fatigue persist, since he has CHB and is being almost 100% , could benefit from d/w EP about future consideration for CRT ( to prevent future potential excessive RV pacing CMP and/or switching PPM mode to VVIR if device is capable).  * Mild aortic regurgitation, optimal BP control.  * Moderate aortic stenosis. optimal BP control.  * Moderate tricuspid regurgitation. Optimal volume status.   * Mild to Moderate pulmonary artery hypertension. Optimal volume status.   * Mild concentric left ventricular hypertrophy. optimal BP control.   * Possible diastolic heart failure. Optimal BP and volume status.    I personally reviewed past hospital records, labs, ecg, tele and imaging.   Will follow. CC " I feel weak"     HPI  87 yo M with PMH of a-fib (on coumadin), dyslipidemia, IDDM, HTN, PPM, GERD, BPH, CKD, p/w weakness. States that he was recently discharged from hospital, and for two days after discharge, he felt great. After that he started feeling weak again. Patient states all the "feelings in his body came back" again, but he is unable to elaborate what the feelings are even though he was asked multiple times to elaborate. ED chart notes that patient c/o CP, however, during my interview patient denied feeling any CP currently or previously. Patient denies any abdominal pain, nausea, vomiting, SOB, cough, runny nose, sore throat, HA, dysuria, diarrhea.     Examined at bed side, feels better, denies shortness of breath, palpitations or dizziness so far.  No syncope or pre syncope.    ROS  Constitutional - No fever, chills, weight loss.   CV - denies  PND, orthopnea. Or angina.   Resp - denies hemoptysis, sputum production.    GI - denies diarrhea no vomiting.     denies frequency   Neuro - denies numbness, motor weakness.   Rest of systems reviewed and negative	    PMH:  HTN, HLP, AF, CHB    PSH: appendectomy, shoulder repair, cholecystectomy   Social Hx: tobacco - quit 1961, denies etoh / drugs   Family Hx: Denies      Meds: reviewed with pt, see home med tab    Allergy: reviewed with pt, see allergy tab.     Fam Hx: (-) CAD     Physical exam  Const - NAD, SV stable at the moment  HEENT - atraumatic, EOMI, PERRLA.   Neck - No JVD, supple neck, no thyromegaly.   CV - RRR, normal S1/S2, no S3 or S4, no g/r.     Resp  BLAE, No rales  Abd - Benign, no abd bruits. No visceromegaly.   Hem - No adenopathies noted.   Ext - Normal palpable PP, no cyanosis, No leg edema  Neuro - no focal motor deficit    ECG- A Fib, V paced. Tele: AF, V paced.      Labs. Reviewed  Imaging reviewed       A/P  * Elevated troponins, possibly d/t type 2 MI. In setting of acute illness. Sugegst 2d echo, MPI. Cont optimal CV care in the interim.     * HTN, controlled  * HLP, on a statin.   * Permanent AF, cont OAC, and Vpaced 100%.   * CHB, s/p recent gen change. Working fine on recent interrogation. IF weakness/fatigue persist, since he has CHB and is being almost 100% , could benefit from d/w EP about future consideration for CRT ( to prevent future potential excessive RV pacing CMP and/or switching PPM mode to VVIR if device is capable).  * Mild aortic regurgitation, optimal BP control.  * Moderate aortic stenosis. optimal BP control.  * Moderate tricuspid regurgitation. Optimal volume status.   * Mild to Moderate pulmonary artery hypertension. Optimal volume status.   * Mild concentric left ventricular hypertrophy. optimal BP control.   * Possible diastolic heart failure. Optimal BP and volume status.    I personally reviewed past hospital records, labs, ecg, tele and imaging.   Will follow.

## 2017-12-18 NOTE — H&P ADULT - ASSESSMENT
87 yo M with PMH of a-fib (on coumadin), dyslipidemia, IDDM, HTN, PPM, GERD, BPH, CKD, p/w weakness    *SIRS vs Sepsis - unknown etiology  -S/p vanco / cefepime in ED  -Will need to do CT chest / CT abdomen for source evaluation  -ESR / CRP  -UA / RVP pending   -Blood cultures  -ID consult   -IVF  -Lactate 2.2 -> 1    *Elevated troponins - possibly 2/2 sepsis vs RICHY  -Denies CP  -Trend JENNIFER   -Tele monitoring  -On ASA  -Cardio consult     *Acute on CKD  -Trend creatinine in AM after IVF  -UA  -Avoid nephrotoxic agents     *A-fib  -C/w coumadin, therapeutic INR    *IDDM  -Humalog ISS  -C/w lantus  -Diabetic diet    *HTN / PPM / GERD/ BPH / transaminitis / anemia   -F/u outpatient for further management     *DVT ppx  -On coumadin 87 yo M with PMH of a-fib (on coumadin), dyslipidemia, IDDM, HTN, PPM, GERD, BPH, CKD, p/w weakness    *SIRS vs Sepsis - unknown etiology  -S/p vanco / cefepime in ED  -Will need to do CT chest / CT abdomen for source evaluation  -ESR / CRP  -UA / RVP pending   -Blood cultures  -ID consult   -IVF  -Lactate 2.2 -> 1    *Elevated troponins - possibly 2/2 sepsis vs RICHY  -Denies CP  -Trend JENNIFER   -Tele monitoring  -On ASA  -Cardio consult     *Acute on CKD  -Trend creatinine in AM after IVF  -UA  -Avoid nephrotoxic agents   -I/Os     *A-fib  -C/w coumadin, therapeutic INR    *IDDM  -Humalog ISS  -C/w lantus  -Diabetic diet    *HTN / PPM / GERD/ BPH / transaminitis / anemia   -F/u outpatient for further management     *DVT ppx  -On coumadin 89 yo M with PMH of a-fib (on coumadin), dyslipidemia, IDDM, HTN, PPM, GERD, BPH, CKD, p/w weakness    *SIRS vs Sepsis - unknown etiology  -S/p vanco / cefepime in ED  -UA / RVP are pending, but if negative -> Will need to do CT chest / CT abdomen for further source evaluation  -ESR / CRP  -Blood cultures  -ID consult   -IVF  -Lactate 2.2 -> 1    *Elevated troponins - possibly 2/2 sepsis vs RICHY  -Denies CP  -Trend JENNIFER   -Tele monitoring  -On ASA  -Cardio consult     *Acute on CKD  -Trend creatinine in AM after IVF  -UA  -Avoid nephrotoxic agents   -I/Os     *A-fib  -C/w coumadin, therapeutic INR    *IDDM  -Humalog ISS  -C/w lantus  -Diabetic diet    *HTN / PPM / GERD/ BPH / transaminitis / anemia   -F/u outpatient for further management     *DVT ppx  -On coumadin

## 2017-12-18 NOTE — PATIENT PROFILE ADULT. - PMH
Atrial fibrillation, unspecified type    Benign prostatic hyperplasia without lower urinary tract symptoms    Chronic congestive heart failure, unspecified congestive heart failure type    Gastroesophageal reflux disease, esophagitis presence not specified    HTN (hypertension)    IDDM (insulin dependent diabetes mellitus)

## 2017-12-18 NOTE — H&P ADULT - HISTORY OF PRESENT ILLNESS
87 yo M with PMH of a-fib (on coumadin), dyslipidemia, IDDM, HTN, PPM, GERD, BPH, CKD, p/w weakness. States that he was recently discharged from hospital, and for two days after discharge, he felt great. After that he started feeling weak again. Patient states all the "feelings in his body came back" again, but he is unable to elaborate what the feelings are even though he was asked multiple times to elaborate. ED chart notes that patient c/o CP, however, during my interview patient denied feeling any CP currently or previously. Patient denies any abdominal pain, nausea, vomiting, SOB, cough, runny nose, sore throat, HA, dysuria, diarrhea.     PSH: appendectomy, shoulder repair, cholecystectomy   Social Hx: tobacco - quit 1961, denies etoh / drugs   Family Hx: Denies

## 2017-12-18 NOTE — CONSULT NOTE ADULT - SUBJECTIVE AND OBJECTIVE BOX
Patient is a 88y old  Male who presents with a chief complaint of weakness/ fever (18 Dec 2017 08:29)    HPI:  89 y/o Male with h/o A.fib (on coumadin), dyslipidemia, IDDM, HTN, PPM, GERD, BPH, CKD was admitted on  for increased weakness. States that he was recently discharged from hospital, and for two days after discharge, he felt great. After that he started feeling weak again. Patient states all the "feelings in his body came back" again, but he is unable to elaborate. In ER, he was found febrile to 102F and was given cefepime.      PSH: appendectomy, shoulder repair, cholecystectomy   PMH: as above  Meds: per reconciliation sheet, noted below  MEDICATIONS  (STANDING):  artificial  tears Solution 2 Drop(s) Both EYES every 8 hours  aspirin enteric coated 81 milliGRAM(s) Oral daily  cefepime  IVPB 1000 milliGRAM(s) IV Intermittent every 24 hours  clonazePAM Tablet 0.5 milliGRAM(s) Oral at bedtime  dextrose 5%. 1000 milliLiter(s) (50 mL/Hr) IV Continuous <Continuous>  dextrose 50% Injectable 12.5 Gram(s) IV Push once  dextrose 50% Injectable 25 Gram(s) IV Push once  dextrose 50% Injectable 25 Gram(s) IV Push once  diltiazem    milliGRAM(s) Oral daily  doxazosin 4 milliGRAM(s) Oral at bedtime  gabapentin 100 milliGRAM(s) Oral three times a day  insulin glargine Injectable (LANTUS) 15 Unit(s) SubCutaneous at bedtime  insulin lispro (HumaLOG) corrective regimen sliding scale   SubCutaneous three times a day before meals  multivitamin 1 Tablet(s) Oral daily  pantoprazole    Tablet 40 milliGRAM(s) Oral before breakfast  simvastatin 20 milliGRAM(s) Oral at bedtime  sodium chloride 0.9%. 1000 milliLiter(s) (50 mL/Hr) IV Continuous <Continuous>  venlafaxine XR. 75 milliGRAM(s) Oral daily  warfarin 5 milliGRAM(s) Oral daily    MEDICATIONS  (PRN):  acetaminophen   Tablet 650 milliGRAM(s) Oral every 6 hours PRN For Temp greater than 38 C (100.4 F)  dextrose Gel 1 Dose(s) Oral once PRN Blood Glucose LESS THAN 70 milliGRAM(s)/deciliter  docusate sodium 100 milliGRAM(s) Oral three times a day PRN Constipation  glucagon  Injectable 1 milliGRAM(s) IntraMuscular once PRN Glucose LESS THAN 70 milligrams/deciliter    Allergies    Aleve (Vomiting)  codeine (Unknown)  morphine (Nausea)  naproxen (Vomiting)    Intolerances      Social: prior smoking, no alcohol, no illegal drugs; no recent travel, no exposure to TB  FAMILY HISTORY:  No pertinent family history in first degree relatives    ROS: the patient denies fever, no chills, no HA, no dizziness, no sore throat, no blurry vision, no CP, no palpitations, no SOB, no cough, no abdominal pain, no diarrhea, no N/V, no dysuria, no leg pain, no claudication, no rash, no joint aches, no rectal pain or bleeding, no night sweats    Vital Signs Last 24 Hrs  T(C): 37.1 (18 Dec 2017 08:00), Max: 38.9 (17 Dec 2017 20:07)  T(F): 98.7 (18 Dec 2017 08:00), Max: 102.1 (17 Dec 2017 20:07)  HR: 60 (18 Dec 2017 08:00) (60 - 76)  BP: 159/55 (18 Dec 2017 08:00) (107/46 - 162/53)  BP(mean): --  RR: 18 (18 Dec 2017 08:00) (16 - 20)  SpO2: 97% (18 Dec 2017 08:00) (97% - 100%)  Daily Height in cm: 175.26 (17 Dec 2017 19:49)    Daily     PE:    Constitutional: frail looking  HEENT: NC/AT, EOMI, PERRLA  Neck: supple  Back: no tenderness  Respiratory: clear  Cardiovascular: S1S2 regular, no murmurs  Abdomen: soft, not tender, not distended, positive BS  Genitourinary: no LN palpable  Rectal: deferred  Musculoskeletal: no muscle tenderness, no joint swelling or tenderness  Extremities: no pedal edema  Neurological: AxOx3, moving all extremities  Skin: no rashes    Labs:                        11.0   16.3  )-----------( 165      ( 17 Dec 2017 20:01 )             33.1     12-17    136  |  101  |  37<H>  ----------------------------<  178<H>  4.5   |  27  |  1.98<H>    Ca    8.8      17 Dec 2017 20:01    TPro  7.7  /  Alb  3.1<L>  /  TBili  1.0  /  DBili  x   /  AST  66<H>  /  ALT  41  /  AlkPhos  95  12-17     LIVER FUNCTIONS - ( 17 Dec 2017 20:01 )  Alb: 3.1 g/dL / Pro: 7.7 gm/dL / ALK PHOS: 95 U/L / ALT: 41 U/L / AST: 66 U/L / GGT: x           Urinalysis Basic - ( 17 Dec 2017 05:27 )    Color: Yellow / Appearance: Clear / S.015 / pH: x  Gluc: x / Ketone: Negative  / Bili: Negative / Urobili: Negative mg/dL   Blood: x / Protein: 100 mg/dL / Nitrite: Negative   Leuk Esterase: Small / RBC: 3-5 /HPF / WBC 11-25   Sq Epi: x / Non Sq Epi: Moderate / Bacteria: Few          Radiology:    < from: Xray Chest 1 View AP/PA. (17 @ 20:26) >   No active pulmonary disease.    < end of copied text >      Advanced directives addressed: full resuscitation

## 2017-12-19 ENCOUNTER — APPOINTMENT (OUTPATIENT)
Dept: ELECTROPHYSIOLOGY | Facility: CLINIC | Age: 82
End: 2017-12-19

## 2017-12-19 LAB
ANION GAP SERPL CALC-SCNC: 5 MMOL/L — SIGNIFICANT CHANGE UP (ref 5–17)
BUN SERPL-MCNC: 38 MG/DL — HIGH (ref 7–23)
CALCIUM SERPL-MCNC: 8.6 MG/DL — SIGNIFICANT CHANGE UP (ref 8.5–10.1)
CHLORIDE SERPL-SCNC: 109 MMOL/L — HIGH (ref 96–108)
CO2 SERPL-SCNC: 28 MMOL/L — SIGNIFICANT CHANGE UP (ref 22–31)
CREAT SERPL-MCNC: 1.73 MG/DL — HIGH (ref 0.5–1.3)
CULTURE RESULTS: NO GROWTH — SIGNIFICANT CHANGE UP
GLUCOSE BLDC GLUCOMTR-MCNC: 133 MG/DL — HIGH (ref 70–99)
GLUCOSE BLDC GLUCOMTR-MCNC: 153 MG/DL — HIGH (ref 70–99)
GLUCOSE BLDC GLUCOMTR-MCNC: 200 MG/DL — HIGH (ref 70–99)
GLUCOSE BLDC GLUCOMTR-MCNC: 249 MG/DL — HIGH (ref 70–99)
GLUCOSE SERPL-MCNC: 122 MG/DL — HIGH (ref 70–99)
HCT VFR BLD CALC: 29.7 % — LOW (ref 39–50)
HGB BLD-MCNC: 9.5 G/DL — LOW (ref 13–17)
INR BLD: 1.97 RATIO — HIGH (ref 0.88–1.16)
MCHC RBC-ENTMCNC: 31.7 PG — SIGNIFICANT CHANGE UP (ref 27–34)
MCHC RBC-ENTMCNC: 31.8 GM/DL — LOW (ref 32–36)
MCV RBC AUTO: 99.6 FL — SIGNIFICANT CHANGE UP (ref 80–100)
PLATELET # BLD AUTO: 154 K/UL — SIGNIFICANT CHANGE UP (ref 150–400)
POTASSIUM SERPL-MCNC: 4.6 MMOL/L — SIGNIFICANT CHANGE UP (ref 3.5–5.3)
POTASSIUM SERPL-SCNC: 4.6 MMOL/L — SIGNIFICANT CHANGE UP (ref 3.5–5.3)
PROTHROM AB SERPL-ACNC: 21.6 SEC — HIGH (ref 9.8–12.7)
RBC # BLD: 2.98 M/UL — LOW (ref 4.2–5.8)
RBC # FLD: 14 % — SIGNIFICANT CHANGE UP (ref 10.3–14.5)
SODIUM SERPL-SCNC: 142 MMOL/L — SIGNIFICANT CHANGE UP (ref 135–145)
SPECIMEN SOURCE: SIGNIFICANT CHANGE UP
WBC # BLD: 8.7 K/UL — SIGNIFICANT CHANGE UP (ref 3.8–10.5)
WBC # FLD AUTO: 8.7 K/UL — SIGNIFICANT CHANGE UP (ref 3.8–10.5)

## 2017-12-19 PROCEDURE — 78452 HT MUSCLE IMAGE SPECT MULT: CPT | Mod: 26

## 2017-12-19 RX ORDER — REGADENOSON 0.08 MG/ML
0.4 INJECTION, SOLUTION INTRAVENOUS ONCE
Qty: 0 | Refills: 0 | Status: COMPLETED | OUTPATIENT
Start: 2017-12-19 | End: 2017-12-19

## 2017-12-19 RX ORDER — ISOSORBIDE MONONITRATE 60 MG/1
30 TABLET, EXTENDED RELEASE ORAL DAILY
Qty: 0 | Refills: 0 | Status: DISCONTINUED | OUTPATIENT
Start: 2017-12-19 | End: 2017-12-20

## 2017-12-19 RX ORDER — SIMVASTATIN 20 MG/1
40 TABLET, FILM COATED ORAL AT BEDTIME
Qty: 0 | Refills: 0 | Status: DISCONTINUED | OUTPATIENT
Start: 2017-12-19 | End: 2017-12-20

## 2017-12-19 RX ADMIN — Medication 0.5 MILLIGRAM(S): at 21:30

## 2017-12-19 RX ADMIN — Medication 2 DROP(S): at 05:42

## 2017-12-19 RX ADMIN — Medication 1: at 13:28

## 2017-12-19 RX ADMIN — GABAPENTIN 100 MILLIGRAM(S): 400 CAPSULE ORAL at 13:30

## 2017-12-19 RX ADMIN — WARFARIN SODIUM 5 MILLIGRAM(S): 2.5 TABLET ORAL at 21:28

## 2017-12-19 RX ADMIN — INSULIN GLARGINE 15 UNIT(S): 100 INJECTION, SOLUTION SUBCUTANEOUS at 21:28

## 2017-12-19 RX ADMIN — Medication 81 MILLIGRAM(S): at 13:27

## 2017-12-19 RX ADMIN — Medication 2: at 17:32

## 2017-12-19 RX ADMIN — Medication 2 DROP(S): at 21:28

## 2017-12-19 RX ADMIN — CEFEPIME 100 MILLIGRAM(S): 1 INJECTION, POWDER, FOR SOLUTION INTRAMUSCULAR; INTRAVENOUS at 05:42

## 2017-12-19 RX ADMIN — Medication 4 MILLIGRAM(S): at 21:28

## 2017-12-19 RX ADMIN — Medication 1 TABLET(S): at 13:26

## 2017-12-19 RX ADMIN — SIMVASTATIN 40 MILLIGRAM(S): 20 TABLET, FILM COATED ORAL at 21:28

## 2017-12-19 RX ADMIN — ISOSORBIDE MONONITRATE 30 MILLIGRAM(S): 60 TABLET, EXTENDED RELEASE ORAL at 17:35

## 2017-12-19 RX ADMIN — GABAPENTIN 100 MILLIGRAM(S): 400 CAPSULE ORAL at 21:28

## 2017-12-19 RX ADMIN — REGADENOSON 0.4 MILLIGRAM(S): 0.08 INJECTION, SOLUTION INTRAVENOUS at 10:35

## 2017-12-19 RX ADMIN — Medication 2 DROP(S): at 13:30

## 2017-12-19 RX ADMIN — Medication 75 MILLIGRAM(S): at 13:27

## 2017-12-19 NOTE — CDI QUERY NOTE - NSCDIOTHERTXTBX_GEN_ALL_CORE_HH
(1) Cardiology consult documenting " possible type 2 MI"  Trop: 0.084, 0.082, 0.060, If you agree with the diagnosis of " possible type 2 MI" please document it in the medical record, if you disagree please document that as well  (2) Documentation indicates SIRS vs Sepsis Lactate=2.2, WBC: 16.3, HR=60, ID consult documenting " Febrile syndrome, doubt UTI". Please clarify  (a) Sepsis ruled out, (b) Sepsis resolved  (c) SIRS with organ dysfunction  (d) Other (please specify condition)  Thanks

## 2017-12-19 NOTE — PROGRESS NOTE ADULT - ASSESSMENT
89 y/o Male with h/o A.fib (on coumadin), dyslipidemia, IDDM, HTN, PPM, GERD, BPH, CKD was admitted on 12/17 for increased weakness. States that he was recently discharged from hospital, and for two days after discharge, he felt great. After that he started feeling weak again. Patient states all the "feelings in his body came back" again, but he is unable to elaborate. In ER, he was given cefepime.    1. Febrile syndrome resolving. Pyuria. Doubt UTI. Acute on CRF.  -leukocytosis is resolving  -BC x 2 are negative to date  -urine culture is negative  -on cefepime 1 gm IV q12h # 2  -tolerating abx well so far; no side effects noted  -tolerating abx well so far; no side effects noted  -d/c cefepime  -continue to observe off abx therapy  -monitor temps  -f/u CBC  -supportive care  2. Other issues:   -care per medicine
89 yo M with PMH of a-fib (on coumadin), dyslipidemia, IDDM, HTN, PPM, GERD, BPH, CKD, p/w weakness    * Febrile syndrome- no clear source of infection (POA)  -S/p vanco / cefepime in ED- now off abx  -cultures - urine and blood, RVP all negative  -ESR / CRP very high?  -ID consult appreicated  -Lactate 2.2 -> 1  - monitor overnight off abx  - outpatient rheumatology eval for high esr/crp    *Elevated troponins - possibly type 2 MI (POA)  -Denies CP  -Tele monitoring  - mildly reversible defect on stress. plan for medical mgmt.  -On ASA  - imdur started, zocor increased  -Cardio consult appreciated    *Acute on CKD 3  -UA  -Avoid nephrotoxic agents   -I/Os     *A-fib  -C/w coumadin, therapeutic INR  - cw dilt    *IDDM  -Humalog ISS  -C/w lantus  -Diabetic diet    *HTN / PPM / GERD/ BPH / transaminitis / anemia   -F/u outpatient for further management     *DVT ppx  -On coumadin

## 2017-12-19 NOTE — PHYSICAL THERAPY INITIAL EVALUATION ADULT - GENERAL OBSERVATIONS, REHAB EVAL
Patient received in bed on 3E, alert and cooperative.  Patient [place], Batteries ran out from hearing aids. R sided PPM incision CD&I. Patient received in bed on 3E, alert and cooperative. Patient denied pain.  Patient [place], Batteries ran out from hearing aids. R sided PPM incision CD&I.

## 2017-12-19 NOTE — PHYSICAL THERAPY INITIAL EVALUATION ADULT - PERTINENT HX OF CURRENT PROBLEM, REHAB EVAL
89 yo M admitted on 12/17 for increased weakness. States that he was recently discharged from hospital for PPM insertion, and for two days after discharge, he felt great. After that he started feeling weak again. Patient states all the "feelings in his body came back" again, but he is unable to elaborate. In ER, he was found febrile to 102F  Blood and urine cultures (-)

## 2017-12-19 NOTE — PHYSICAL THERAPY INITIAL EVALUATION ADULT - MODALITIES TREATMENT COMMENTS
Patient left in chair with CBIR and chair alarm active.  Patient independent in functional mobility, no skilled need in this setting. RW for maximal safety.  Patient has rollator at home.  May ambulate per nursing.  Will d/c from PT. RN informed.

## 2017-12-19 NOTE — PHYSICAL THERAPY INITIAL EVALUATION ADULT - ACTIVE RANGE OF MOTION EXAMINATION, REHAB EVAL
except R shoulder to 90* for PPM precautions./bilateral upper extremity Active ROM was WFL (within functional limits)/bilateral  lower extremity Active ROM was WFL (within functional limits)

## 2017-12-20 ENCOUNTER — TRANSCRIPTION ENCOUNTER (OUTPATIENT)
Age: 82
End: 2017-12-20

## 2017-12-20 VITALS — WEIGHT: 171.96 LBS

## 2017-12-20 DIAGNOSIS — I49.3 VENTRICULAR PREMATURE DEPOLARIZATION: ICD-10-CM

## 2017-12-20 DIAGNOSIS — B96.20 UNSPECIFIED ESCHERICHIA COLI [E. COLI] AS THE CAUSE OF DISEASES CLASSIFIED ELSEWHERE: ICD-10-CM

## 2017-12-20 DIAGNOSIS — N18.3 CHRONIC KIDNEY DISEASE, STAGE 3 (MODERATE): ICD-10-CM

## 2017-12-20 DIAGNOSIS — N39.0 URINARY TRACT INFECTION, SITE NOT SPECIFIED: ICD-10-CM

## 2017-12-20 DIAGNOSIS — I48.2 CHRONIC ATRIAL FIBRILLATION: ICD-10-CM

## 2017-12-20 DIAGNOSIS — I08.2 RHEUMATIC DISORDERS OF BOTH AORTIC AND TRICUSPID VALVES: ICD-10-CM

## 2017-12-20 DIAGNOSIS — Z79.01 LONG TERM (CURRENT) USE OF ANTICOAGULANTS: ICD-10-CM

## 2017-12-20 DIAGNOSIS — E78.5 HYPERLIPIDEMIA, UNSPECIFIED: ICD-10-CM

## 2017-12-20 DIAGNOSIS — I50.9 HEART FAILURE, UNSPECIFIED: ICD-10-CM

## 2017-12-20 DIAGNOSIS — E53.8 DEFICIENCY OF OTHER SPECIFIED B GROUP VITAMINS: ICD-10-CM

## 2017-12-20 DIAGNOSIS — I13.0 HYPERTENSIVE HEART AND CHRONIC KIDNEY DISEASE WITH HEART FAILURE AND STAGE 1 THROUGH STAGE 4 CHRONIC KIDNEY DISEASE, OR UNSPECIFIED CHRONIC KIDNEY DISEASE: ICD-10-CM

## 2017-12-20 DIAGNOSIS — I27.20 PULMONARY HYPERTENSION, UNSPECIFIED: ICD-10-CM

## 2017-12-20 DIAGNOSIS — R00.2 PALPITATIONS: ICD-10-CM

## 2017-12-20 DIAGNOSIS — E11.9 TYPE 2 DIABETES MELLITUS WITHOUT COMPLICATIONS: ICD-10-CM

## 2017-12-20 DIAGNOSIS — R53.1 WEAKNESS: ICD-10-CM

## 2017-12-20 DIAGNOSIS — D53.9 NUTRITIONAL ANEMIA, UNSPECIFIED: ICD-10-CM

## 2017-12-20 DIAGNOSIS — K21.9 GASTRO-ESOPHAGEAL REFLUX DISEASE WITHOUT ESOPHAGITIS: ICD-10-CM

## 2017-12-20 DIAGNOSIS — Z90.49 ACQUIRED ABSENCE OF OTHER SPECIFIED PARTS OF DIGESTIVE TRACT: ICD-10-CM

## 2017-12-20 DIAGNOSIS — N40.0 BENIGN PROSTATIC HYPERPLASIA WITHOUT LOWER URINARY TRACT SYMPTOMS: ICD-10-CM

## 2017-12-20 DIAGNOSIS — Z79.4 LONG TERM (CURRENT) USE OF INSULIN: ICD-10-CM

## 2017-12-20 DIAGNOSIS — Z88.8 ALLERGY STATUS TO OTHER DRUGS, MEDICAMENTS AND BIOLOGICAL SUBSTANCES STATUS: ICD-10-CM

## 2017-12-20 LAB
GLUCOSE BLDC GLUCOMTR-MCNC: 109 MG/DL — HIGH (ref 70–99)
GLUCOSE BLDC GLUCOMTR-MCNC: 257 MG/DL — HIGH (ref 70–99)
INR BLD: 2.18 RATIO — HIGH (ref 0.88–1.16)
PROTHROM AB SERPL-ACNC: 23.9 SEC — HIGH (ref 9.8–12.7)

## 2017-12-20 RX ORDER — ISOSORBIDE MONONITRATE 60 MG/1
1 TABLET, EXTENDED RELEASE ORAL
Qty: 30 | Refills: 0 | OUTPATIENT
Start: 2017-12-20 | End: 2018-01-18

## 2017-12-20 RX ORDER — SIMVASTATIN 20 MG/1
1 TABLET, FILM COATED ORAL
Qty: 0 | Refills: 0 | COMMUNITY

## 2017-12-20 RX ORDER — SIMVASTATIN 20 MG/1
1 TABLET, FILM COATED ORAL
Qty: 0 | Refills: 0 | DISCHARGE
Start: 2017-12-20

## 2017-12-20 RX ORDER — DILTIAZEM HCL 120 MG
1 CAPSULE, EXT RELEASE 24 HR ORAL
Qty: 30 | Refills: 0
Start: 2017-12-20 | End: 2018-01-18

## 2017-12-20 RX ORDER — WARFARIN SODIUM 2.5 MG/1
1 TABLET ORAL
Qty: 30 | Refills: 0 | OUTPATIENT
Start: 2017-12-20 | End: 2018-01-18

## 2017-12-20 RX ADMIN — Medication 120 MILLIGRAM(S): at 06:10

## 2017-12-20 RX ADMIN — Medication 75 MILLIGRAM(S): at 13:14

## 2017-12-20 RX ADMIN — Medication 1 TABLET(S): at 13:14

## 2017-12-20 RX ADMIN — GABAPENTIN 100 MILLIGRAM(S): 400 CAPSULE ORAL at 13:15

## 2017-12-20 RX ADMIN — Medication 2 DROP(S): at 06:11

## 2017-12-20 RX ADMIN — Medication 81 MILLIGRAM(S): at 13:12

## 2017-12-20 RX ADMIN — ISOSORBIDE MONONITRATE 30 MILLIGRAM(S): 60 TABLET, EXTENDED RELEASE ORAL at 13:13

## 2017-12-20 RX ADMIN — Medication 3: at 13:11

## 2017-12-20 RX ADMIN — GABAPENTIN 100 MILLIGRAM(S): 400 CAPSULE ORAL at 06:10

## 2017-12-20 RX ADMIN — PANTOPRAZOLE SODIUM 40 MILLIGRAM(S): 20 TABLET, DELAYED RELEASE ORAL at 06:10

## 2017-12-20 RX ADMIN — Medication 2 DROP(S): at 13:14

## 2017-12-20 NOTE — DISCHARGE NOTE ADULT - PLAN OF CARE
medical management continue the same medications except, start new medication imdur and simvastatin dose has been doubled to 40mg.  Follow up with Dr. Mehta

## 2017-12-20 NOTE — DISCHARGE NOTE ADULT - HOSPITAL COURSE
CHIEF COMPLAINT:  weakness  SUBJECTIVE: No chest pain, sob, n/v. No c/o musce or joint pains. no cough, dysuria. afebrile. +weakness.  REVIEW OF SYSTEMS: All other review of systems is negative unless indicated above    Vital Signs Last 24 Hrs  T(C): 36.6 (20 Dec 2017 10:33), Max: 37.1 (19 Dec 2017 16:54)  T(F): 97.9 (20 Dec 2017 10:33), Max: 98.7 (19 Dec 2017 16:54)  HR: 60 (20 Dec 2017 10:33) (60 - 60)  BP: 105/43 (20 Dec 2017 10:33) (105/43 - 160/55)  BP(mean): 58 (20 Dec 2017 06:21) (58 - 58)  RR: 18 (20 Dec 2017 10:33) (18 - 18)  SpO2: 98% (20 Dec 2017 10:33) (97% - 98%)    PHYSICAL EXAM:  Constitutional: NAD, well-groomed, well-developed  HEENT: PERR, EOMI, [place], MMM  Neck: No LAD, No JVD  Back: Normal spine flexure, No CVA tenderness  Respiratory: CTABL  Cardiovascular: S1 and S2, RRR, no M/G/R  Gastrointestinal: BS+, soft, NT/ND  Extremities: No peripheral edema  Vascular: 2+ peripheral pulses  Neurological: A/O x 3, no focal deficits  Psychiatric: Normal mood, normal affect  Musculoskeletal: 5/5 strength b/l upper and lower extremities with normal ROM all ext.  Skin: No rashes                        9.5    8.7   )-----------( 154      ( 19 Dec 2017 05:39 )             29.7     142  |  109<H>  |  38<H>  ----------------------------<  122<H>  4.6   |  28  |  1.73<H>    Ca    8.6      19 Dec 2017 05:39    PT/INR - ( 20 Dec 2017 06:53 )   PT: 23.9 sec;   INR: 2.18 ratio      Course/Assessment and plan  89 yo M with PMH of a-fib (on coumadin), dyslipidemia, IDDM, HTN, PPM, GERD, BPH, CKD, p/w weakness    * Febrile syndrome- no clear source of infection (POA)  -S/p vanco / cefepime in ED- no recurrence of fever after abx stopped  -cultures - urine and blood, RVP all negative  -ESR / CRP very high?   -ID consult appreicated  -Lactate 2.2 -> 1  - monitor overnight off abx  - outpatient rheumatology eval for high esr/crp    *Elevated troponins - possibly type 2 MI (POA)  * CAD  -Denies CP  - mildly reversible defect on stress. plan for medical mgmt.  -On ASA  - imdur started, zocor increased  -Cardio consult appreciated  - Per cardio note "IF weakness/fatigue persists and it is limiting, since he has CHB and is being consistently 100% , could benefit from d/w EP about future consideration for BiV pacing ( to prevent future potential excessive RV pacing CMP)  and/or switching PPM mode to VVIR if device is capable."  - Pt to f/u with cardio, EP after dc.    * CKD 3    *A-fib  -C/w coumadin, therapeutic INR  - cw dilt    *IDDM  -cw home reg at dc    *HTN / PPM / GERD/ BPH / transaminitis / anemia   -F/u outpatient for further management

## 2017-12-20 NOTE — DISCHARGE NOTE ADULT - MEDICATION SUMMARY - MEDICATIONS TO TAKE
I will START or STAY ON the medications listed below when I get home from the hospital:    aspirin 81 mg oral tablet  -- 1 tab(s) by mouth once a day  -- Indication: For .    doxazosin 4 mg oral tablet  -- 1 tab(s) by mouth once a day (at bedtime)  -- Indication: For .    isosorbide mononitrate 30 mg oral tablet, extended release  -- 1 tab(s) by mouth once a day  -- Indication: For .    dilTIAZem 120 mg/24 hours oral capsule, extended release  -- 1 cap(s) by mouth once a day  -- Indication: For .    warfarin 5 mg oral tablet  -- 1 tab(s) by mouth once a day, check INR in 2-3 days  -- Indication: For .    gabapentin 100 mg oral tablet  -- orally 3 times a day  -- Indication: For .    clonazePAM 0.5 mg oral tablet  -- 1 tab(s) by mouth once a day (at bedtime)  -- Indication: For .    venlafaxine 75 mg oral capsule, extended release  -- 1 cap(s) by mouth once a day  -- Indication: For .    Lantus Solostar Pen 100 units/mL subcutaneous solution  -- 15 unit(s) subcutaneous once a day (at bedtime)  -- Indication: For ..    Tradjenta 5 mg oral tablet  -- 1 tab(s) by mouth once a day (in the evening)  -- Indication: For .    simvastatin 40 mg oral tablet  -- 1 tab(s) by mouth once a day (at bedtime)  -- Indication: For .    RABEprazole 20 mg oral delayed release tablet  -- 1 tab(s) by mouth , As Needed  -- Indication: For .    [place] Caps oral capsule  -- 1 cap(s) by mouth once a day  -- Indication: For . I will START or STAY ON the medications listed below when I get home from the hospital:    aspirin 81 mg oral tablet  -- 1 tab(s) by mouth once a day  -- Indication: For .    doxazosin 4 mg oral tablet  -- 1 tab(s) by mouth once a day (at bedtime)  -- Indication: For .    isosorbide mononitrate 30 mg oral tablet, extended release  -- 1 tab(s) by mouth once a day  -- Indication: For .    dilTIAZem 120 mg/24 hours oral capsule, extended release  -- 1 cap(s) by mouth once a day  -- Indication: For .    warfarin 5 mg oral tablet  -- 1 tab(s) by mouth once a day, check INR in 2-3 days  -- Indication: For .    gabapentin 100 mg oral tablet  -- orally 3 times a day  -- Indication: For .    clonazePAM 0.5 mg oral tablet  -- 1 tab(s) by mouth once a day (at bedtime)  -- Indication: For .    venlafaxine 75 mg oral capsule, extended release  -- 1 cap(s) by mouth once a day  -- Indication: For .    Lantus Solostar Pen 100 units/mL subcutaneous solution  -- 15 unit(s) subcutaneous once a day (at bedtime)  -- Indication: For ..    Tradjenta 5 mg oral tablet  -- 1 tab(s) by mouth once a day (in the evening)  -- Indication: For .    simvastatin 40 mg oral tablet  -- 1 tab(s) by mouth once a day (at bedtime)  -- Indication: For .    RABEprazole 20 mg oral delayed release tablet  -- 1 tab(s) by mouth , As Needed  -- Indication: For .    [place] Caps oral capsule  -- 1 cap(s) by mouth once a day  -- Indication: For .

## 2017-12-20 NOTE — DISCHARGE NOTE ADULT - CARE PROVIDER_API CALL
Michael Mehta), Internal Medicine  [address] [place] [address]  Phone: [phone]  Fax: [phone]    Digna Rosa), Internal Medicine  [address] [place] [address]  Phone: [phone]  Fax: [phone]    Reilly Arreaga (DO), Internal Medicine; Rheumatology  [address] [place] [address]  Phone: [phone]  Fax: [phone]

## 2017-12-20 NOTE — DISCHARGE NOTE ADULT - PATIENT PORTAL LINK FT
“You can access the Follow[facility] Patient Portal, offered by [facility], by registering with the following website: [email or web address]”

## 2017-12-20 NOTE — DISCHARGE NOTE ADULT - CARE PLAN
Principal Discharge DX:	Coronary artery disease  Goal:	medical management  Instructions for follow-up, activity and diet:	continue the same medications except, start new medication imdur and simvastatin dose has been doubled to 40mg.  Follow up with Dr. Mehta

## 2017-12-20 NOTE — DISCHARGE NOTE ADULT - CARE PROVIDERS DIRECT ADDRESSES
,DirectAddress_Unknown,[email or web address]

## 2017-12-27 DIAGNOSIS — I21.A1 MYOCARDIAL INFARCTION TYPE 2: ICD-10-CM

## 2017-12-27 DIAGNOSIS — I12.9 HYPERTENSIVE CHRONIC KIDNEY DISEASE WITH STAGE 1 THROUGH STAGE 4 CHRONIC KIDNEY DISEASE, OR UNSPECIFIED CHRONIC KIDNEY DISEASE: ICD-10-CM

## 2017-12-27 DIAGNOSIS — E78.5 HYPERLIPIDEMIA, UNSPECIFIED: ICD-10-CM

## 2017-12-27 DIAGNOSIS — E11.22 TYPE 2 DIABETES MELLITUS WITH DIABETIC CHRONIC KIDNEY DISEASE: ICD-10-CM

## 2017-12-27 DIAGNOSIS — K21.9 GASTRO-ESOPHAGEAL REFLUX DISEASE WITHOUT ESOPHAGITIS: ICD-10-CM

## 2017-12-27 DIAGNOSIS — R50.9 FEVER, UNSPECIFIED: ICD-10-CM

## 2017-12-27 DIAGNOSIS — I25.10 ATHEROSCLEROTIC HEART DISEASE OF NATIVE CORONARY ARTERY WITHOUT ANGINA PECTORIS: ICD-10-CM

## 2017-12-27 DIAGNOSIS — Z95.0 PRESENCE OF CARDIAC PACEMAKER: ICD-10-CM

## 2017-12-27 DIAGNOSIS — N18.3 CHRONIC KIDNEY DISEASE, STAGE 3 (MODERATE): ICD-10-CM

## 2017-12-27 DIAGNOSIS — I08.3 COMBINED RHEUMATIC DISORDERS OF MITRAL, AORTIC AND TRICUSPID VALVES: ICD-10-CM

## 2017-12-27 DIAGNOSIS — N39.0 URINARY TRACT INFECTION, SITE NOT SPECIFIED: ICD-10-CM

## 2017-12-27 DIAGNOSIS — I48.91 UNSPECIFIED ATRIAL FIBRILLATION: ICD-10-CM

## 2017-12-27 DIAGNOSIS — N40.0 BENIGN PROSTATIC HYPERPLASIA WITHOUT LOWER URINARY TRACT SYMPTOMS: ICD-10-CM

## 2018-04-05 ENCOUNTER — OUTPATIENT (OUTPATIENT)
Dept: OUTPATIENT SERVICES | Facility: HOSPITAL | Age: 83
LOS: 1 days | Discharge: HOME | End: 2018-04-05

## 2018-04-05 ENCOUNTER — APPOINTMENT (OUTPATIENT)
Dept: ELECTROPHYSIOLOGY | Facility: CLINIC | Age: 83
End: 2018-04-05
Payer: MEDICARE

## 2018-04-05 VITALS
DIASTOLIC BLOOD PRESSURE: 71 MMHG | WEIGHT: 174 LBS | HEIGHT: 68 IN | SYSTOLIC BLOOD PRESSURE: 133 MMHG | BODY MASS INDEX: 26.37 KG/M2 | HEART RATE: 60 BPM

## 2018-04-05 DIAGNOSIS — Z95.0 PRESENCE OF CARDIAC PACEMAKER: Chronic | ICD-10-CM

## 2018-04-05 DIAGNOSIS — Z95.0 PRESENCE OF CARDIAC PACEMAKER: ICD-10-CM

## 2018-04-05 DIAGNOSIS — M19.019 PRIMARY OSTEOARTHRITIS, UNSPECIFIED SHOULDER: Chronic | ICD-10-CM

## 2018-04-05 DIAGNOSIS — Z90.49 ACQUIRED ABSENCE OF OTHER SPECIFIED PARTS OF DIGESTIVE TRACT: Chronic | ICD-10-CM

## 2018-04-05 LAB
BASOPHILS # BLD AUTO: 0.05 K/UL — SIGNIFICANT CHANGE UP (ref 0–0.2)
BASOPHILS NFR BLD AUTO: 0.5 % — SIGNIFICANT CHANGE UP (ref 0–2)
EOSINOPHIL # BLD AUTO: 0.26 K/UL — SIGNIFICANT CHANGE UP (ref 0–0.5)
EOSINOPHIL NFR BLD AUTO: 2.4 % — SIGNIFICANT CHANGE UP (ref 0–6)
HCT VFR BLD CALC: 35 % — LOW (ref 39–50)
HGB BLD-MCNC: 11.7 G/DL — LOW (ref 13–17)
IMM GRANULOCYTES NFR BLD AUTO: 0.3 % — SIGNIFICANT CHANGE UP (ref 0–1.5)
INR BLD: 2.29 RATIO — HIGH (ref 0.88–1.16)
LYMPHOCYTES # BLD AUTO: 1.41 K/UL — SIGNIFICANT CHANGE UP (ref 1–3.3)
LYMPHOCYTES # BLD AUTO: 12.9 % — LOW (ref 13–44)
MCHC RBC-ENTMCNC: 30.6 PG — SIGNIFICANT CHANGE UP (ref 27–34)
MCHC RBC-ENTMCNC: 33.4 GM/DL — SIGNIFICANT CHANGE UP (ref 32–36)
MCV RBC AUTO: 91.6 FL — SIGNIFICANT CHANGE UP (ref 80–100)
MONOCYTES # BLD AUTO: 0.77 K/UL — SIGNIFICANT CHANGE UP (ref 0–0.9)
MONOCYTES NFR BLD AUTO: 7.1 % — SIGNIFICANT CHANGE UP (ref 2–14)
NEUTROPHILS # BLD AUTO: 8.39 K/UL — HIGH (ref 1.8–7.4)
NEUTROPHILS NFR BLD AUTO: 76.8 % — SIGNIFICANT CHANGE UP (ref 43–77)
NRBC # BLD: 0 /100 WBCS — SIGNIFICANT CHANGE UP (ref 0–0)
PLATELET # BLD AUTO: 215 K/UL — SIGNIFICANT CHANGE UP (ref 150–400)
PROTHROM AB SERPL-ACNC: 25.2 SEC — HIGH (ref 9.8–12.7)
RBC # BLD: 3.82 M/UL — LOW (ref 4.2–5.8)
RBC # FLD: 14.7 % — HIGH (ref 10.3–14.5)
WBC # BLD: 10.91 K/UL — HIGH (ref 3.8–10.5)
WBC # FLD AUTO: 10.91 K/UL — HIGH (ref 3.8–10.5)

## 2018-04-05 PROCEDURE — 0389T: CPT | Mod: Q0

## 2018-04-05 RX ORDER — LOSARTAN POTASSIUM 100 MG/1
TABLET, FILM COATED ORAL
Refills: 0 | Status: DISCONTINUED | COMMUNITY
End: 2018-04-05

## 2018-04-05 RX ORDER — METOPROLOL SUCCINATE 100 MG/1
TABLET, EXTENDED RELEASE ORAL
Refills: 0 | Status: DISCONTINUED | COMMUNITY
End: 2018-04-05

## 2018-04-21 ENCOUNTER — EMERGENCY (EMERGENCY)
Facility: HOSPITAL | Age: 83
LOS: 0 days | Discharge: HOME | End: 2018-04-21
Attending: EMERGENCY MEDICINE | Admitting: EMERGENCY MEDICINE
Payer: MEDICARE

## 2018-04-21 VITALS
OXYGEN SATURATION: 98 % | TEMPERATURE: 98 F | RESPIRATION RATE: 16 BRPM | HEART RATE: 62 BPM | SYSTOLIC BLOOD PRESSURE: 144 MMHG | DIASTOLIC BLOOD PRESSURE: 74 MMHG

## 2018-04-21 VITALS — WEIGHT: 175.05 LBS

## 2018-04-21 DIAGNOSIS — R11.10 VOMITING, UNSPECIFIED: ICD-10-CM

## 2018-04-21 DIAGNOSIS — Z79.01 LONG TERM (CURRENT) USE OF ANTICOAGULANTS: ICD-10-CM

## 2018-04-21 DIAGNOSIS — E10.9 TYPE 1 DIABETES MELLITUS WITHOUT COMPLICATIONS: ICD-10-CM

## 2018-04-21 DIAGNOSIS — I50.9 HEART FAILURE, UNSPECIFIED: ICD-10-CM

## 2018-04-21 DIAGNOSIS — Z90.49 ACQUIRED ABSENCE OF OTHER SPECIFIED PARTS OF DIGESTIVE TRACT: Chronic | ICD-10-CM

## 2018-04-21 DIAGNOSIS — Z95.0 PRESENCE OF CARDIAC PACEMAKER: Chronic | ICD-10-CM

## 2018-04-21 DIAGNOSIS — M19.019 PRIMARY OSTEOARTHRITIS, UNSPECIFIED SHOULDER: Chronic | ICD-10-CM

## 2018-04-21 DIAGNOSIS — I11.0 HYPERTENSIVE HEART DISEASE WITH HEART FAILURE: ICD-10-CM

## 2018-04-21 DIAGNOSIS — R42 DIZZINESS AND GIDDINESS: ICD-10-CM

## 2018-04-21 DIAGNOSIS — I48.91 UNSPECIFIED ATRIAL FIBRILLATION: ICD-10-CM

## 2018-04-21 DIAGNOSIS — N40.0 BENIGN PROSTATIC HYPERPLASIA WITHOUT LOWER URINARY TRACT SYMPTOMS: ICD-10-CM

## 2018-04-21 DIAGNOSIS — Z95.0 PRESENCE OF CARDIAC PACEMAKER: ICD-10-CM

## 2018-04-21 LAB
ALBUMIN SERPL ELPH-MCNC: 2.9 G/DL — LOW (ref 3.3–5)
ALP SERPL-CCNC: 92 U/L — SIGNIFICANT CHANGE UP (ref 40–120)
ALT FLD-CCNC: 21 U/L — SIGNIFICANT CHANGE UP (ref 12–78)
ANION GAP SERPL CALC-SCNC: 5 MMOL/L — SIGNIFICANT CHANGE UP (ref 5–17)
AST SERPL-CCNC: 20 U/L — SIGNIFICANT CHANGE UP (ref 15–37)
BASOPHILS # BLD AUTO: 0.04 K/UL — SIGNIFICANT CHANGE UP (ref 0–0.2)
BASOPHILS NFR BLD AUTO: 0.4 % — SIGNIFICANT CHANGE UP (ref 0–2)
BILIRUB SERPL-MCNC: 0.4 MG/DL — SIGNIFICANT CHANGE UP (ref 0.2–1.2)
BUN SERPL-MCNC: 29 MG/DL — HIGH (ref 7–23)
CALCIUM SERPL-MCNC: 9.1 MG/DL — SIGNIFICANT CHANGE UP (ref 8.5–10.1)
CHLORIDE SERPL-SCNC: 103 MMOL/L — SIGNIFICANT CHANGE UP (ref 96–108)
CO2 SERPL-SCNC: 32 MMOL/L — HIGH (ref 22–31)
CREAT SERPL-MCNC: 1.59 MG/DL — HIGH (ref 0.5–1.3)
EOSINOPHIL # BLD AUTO: 0.15 K/UL — SIGNIFICANT CHANGE UP (ref 0–0.5)
EOSINOPHIL NFR BLD AUTO: 1.6 % — SIGNIFICANT CHANGE UP (ref 0–6)
GLUCOSE SERPL-MCNC: 171 MG/DL — HIGH (ref 70–99)
HCT VFR BLD CALC: 32.1 % — LOW (ref 39–50)
HGB BLD-MCNC: 10.7 G/DL — LOW (ref 13–17)
IMM GRANULOCYTES NFR BLD AUTO: 0.4 % — SIGNIFICANT CHANGE UP (ref 0–1.5)
LYMPHOCYTES # BLD AUTO: 1.17 K/UL — SIGNIFICANT CHANGE UP (ref 1–3.3)
LYMPHOCYTES # BLD AUTO: 12.3 % — LOW (ref 13–44)
MCHC RBC-ENTMCNC: 31.2 PG — SIGNIFICANT CHANGE UP (ref 27–34)
MCHC RBC-ENTMCNC: 33.3 GM/DL — SIGNIFICANT CHANGE UP (ref 32–36)
MCV RBC AUTO: 93.6 FL — SIGNIFICANT CHANGE UP (ref 80–100)
MONOCYTES # BLD AUTO: 0.76 K/UL — SIGNIFICANT CHANGE UP (ref 0–0.9)
MONOCYTES NFR BLD AUTO: 8 % — SIGNIFICANT CHANGE UP (ref 2–14)
NEUTROPHILS # BLD AUTO: 7.37 K/UL — SIGNIFICANT CHANGE UP (ref 1.8–7.4)
NEUTROPHILS NFR BLD AUTO: 77.3 % — HIGH (ref 43–77)
NRBC # BLD: 0 /100 WBCS — SIGNIFICANT CHANGE UP (ref 0–0)
PLATELET # BLD AUTO: 193 K/UL — SIGNIFICANT CHANGE UP (ref 150–400)
POTASSIUM SERPL-MCNC: 4.8 MMOL/L — SIGNIFICANT CHANGE UP (ref 3.5–5.3)
POTASSIUM SERPL-SCNC: 4.8 MMOL/L — SIGNIFICANT CHANGE UP (ref 3.5–5.3)
PROT SERPL-MCNC: 7.3 GM/DL — SIGNIFICANT CHANGE UP (ref 6–8.3)
RBC # BLD: 3.43 M/UL — LOW (ref 4.2–5.8)
RBC # FLD: 15.3 % — HIGH (ref 10.3–14.5)
SODIUM SERPL-SCNC: 140 MMOL/L — SIGNIFICANT CHANGE UP (ref 135–145)
TROPONIN I SERPL-MCNC: <0.015 NG/ML — SIGNIFICANT CHANGE UP (ref 0.01–0.04)
TROPONIN I SERPL-MCNC: <0.015 NG/ML — SIGNIFICANT CHANGE UP (ref 0.01–0.04)
WBC # BLD: 9.53 K/UL — SIGNIFICANT CHANGE UP (ref 3.8–10.5)
WBC # FLD AUTO: 9.53 K/UL — SIGNIFICANT CHANGE UP (ref 3.8–10.5)

## 2018-04-21 PROCEDURE — 93010 ELECTROCARDIOGRAM REPORT: CPT

## 2018-04-21 PROCEDURE — 71045 X-RAY EXAM CHEST 1 VIEW: CPT | Mod: 26

## 2018-04-21 PROCEDURE — 74176 CT ABD & PELVIS W/O CONTRAST: CPT | Mod: 26

## 2018-04-21 PROCEDURE — 99284 EMERGENCY DEPT VISIT MOD MDM: CPT

## 2018-04-21 RX ORDER — ONDANSETRON 8 MG/1
4 TABLET, FILM COATED ORAL ONCE
Qty: 0 | Refills: 0 | Status: COMPLETED | OUTPATIENT
Start: 2018-04-21 | End: 2018-04-21

## 2018-04-21 RX ORDER — SODIUM CHLORIDE 9 MG/ML
500 INJECTION INTRAMUSCULAR; INTRAVENOUS; SUBCUTANEOUS ONCE
Qty: 0 | Refills: 0 | Status: COMPLETED | OUTPATIENT
Start: 2018-04-21 | End: 2018-04-21

## 2018-04-21 RX ORDER — FAMOTIDINE 10 MG/ML
20 INJECTION INTRAVENOUS ONCE
Qty: 0 | Refills: 0 | Status: COMPLETED | OUTPATIENT
Start: 2018-04-21 | End: 2018-04-21

## 2018-04-21 RX ADMIN — ONDANSETRON 4 MILLIGRAM(S): 8 TABLET, FILM COATED ORAL at 15:05

## 2018-04-21 RX ADMIN — FAMOTIDINE 20 MILLIGRAM(S): 10 INJECTION INTRAVENOUS at 15:15

## 2018-04-21 RX ADMIN — ONDANSETRON 4 MILLIGRAM(S): 8 TABLET, FILM COATED ORAL at 15:15

## 2018-04-21 RX ADMIN — SODIUM CHLORIDE 500 MILLILITER(S): 9 INJECTION INTRAMUSCULAR; INTRAVENOUS; SUBCUTANEOUS at 15:07

## 2018-04-21 NOTE — ED PROVIDER NOTE - PROGRESS NOTE DETAILS
Received sign out from Dr. Melton. Plan is to follow up with labs, CT imaging. As per sign out patient can go home if CT does not show any emergent findings or if patient is feeling well.

## 2018-04-21 NOTE — ED ADULT TRIAGE NOTE - CHIEF COMPLAINT QUOTE
wife states pt woke up sweating with vomitting. pt is a diabetic with cardiac hx, advised to go to ED by MD, pt denies abdominal pain, c/o nausea and vomitting pt blood sugar 145

## 2018-04-21 NOTE — ED PROVIDER NOTE - OBJECTIVE STATEMENT
89 y/o Male with PMHx of A-Fib, CHF, IDDM, HTN, PPM, BPH and a PSHx of Arthropathy of shoulder region, appendectomy presents to ED BIB wife c/o Nausea. +Dizziness, +lightheadedness. Pt's wife states that pt woke up diaphoretic and had small emetic episode. No abd pain. Pt's wife gave pt Primidone. Per family, pt has had similar Sx in the past due to dehydration. Last BM about 10 minutes ago. PMD Moe.

## 2018-04-21 NOTE — ED STATDOCS - PROGRESS NOTE DETAILS
Donaldo Patel on behalf of Attending Dr. Collazo. 89 y/o M with PMHx of BIB wife c/o N/V. Pt's wife states that Pt woke up diaphoretic and Pt started vomiting. Pt's wife gave Pt Primidone. +nausea at this time. +lightheadedness and dizziness. Last BM about 10 minutes ago. Pt will be sent to the Main ED for further evaluation.

## 2018-05-04 ENCOUNTER — INPATIENT (INPATIENT)
Facility: HOSPITAL | Age: 83
LOS: 1 days | Discharge: HOME | End: 2018-05-06
Attending: FAMILY MEDICINE | Admitting: FAMILY MEDICINE
Payer: MEDICARE

## 2018-05-04 VITALS — TEMPERATURE: 98 F

## 2018-05-04 DIAGNOSIS — Z90.49 ACQUIRED ABSENCE OF OTHER SPECIFIED PARTS OF DIGESTIVE TRACT: Chronic | ICD-10-CM

## 2018-05-04 DIAGNOSIS — Z95.0 PRESENCE OF CARDIAC PACEMAKER: Chronic | ICD-10-CM

## 2018-05-04 DIAGNOSIS — M19.019 PRIMARY OSTEOARTHRITIS, UNSPECIFIED SHOULDER: Chronic | ICD-10-CM

## 2018-05-04 LAB
ALBUMIN SERPL ELPH-MCNC: 3.1 G/DL — LOW (ref 3.3–5)
ALP SERPL-CCNC: 106 U/L — SIGNIFICANT CHANGE UP (ref 40–120)
ALT FLD-CCNC: 26 U/L — SIGNIFICANT CHANGE UP (ref 12–78)
ANION GAP SERPL CALC-SCNC: 6 MMOL/L — SIGNIFICANT CHANGE UP (ref 5–17)
APTT BLD: 38.1 SEC — HIGH (ref 27.5–37.4)
AST SERPL-CCNC: 23 U/L — SIGNIFICANT CHANGE UP (ref 15–37)
BASOPHILS # BLD AUTO: 0.04 K/UL — SIGNIFICANT CHANGE UP (ref 0–0.2)
BASOPHILS NFR BLD AUTO: 0.4 % — SIGNIFICANT CHANGE UP (ref 0–2)
BILIRUB SERPL-MCNC: 0.3 MG/DL — SIGNIFICANT CHANGE UP (ref 0.2–1.2)
BUN SERPL-MCNC: 35 MG/DL — HIGH (ref 7–23)
CALCIUM SERPL-MCNC: 9.3 MG/DL — SIGNIFICANT CHANGE UP (ref 8.5–10.1)
CHLORIDE SERPL-SCNC: 105 MMOL/L — SIGNIFICANT CHANGE UP (ref 96–108)
CO2 SERPL-SCNC: 28 MMOL/L — SIGNIFICANT CHANGE UP (ref 22–31)
CREAT SERPL-MCNC: 1.74 MG/DL — HIGH (ref 0.5–1.3)
EOSINOPHIL # BLD AUTO: 0.19 K/UL — SIGNIFICANT CHANGE UP (ref 0–0.5)
EOSINOPHIL NFR BLD AUTO: 1.9 % — SIGNIFICANT CHANGE UP (ref 0–6)
GLUCOSE SERPL-MCNC: 207 MG/DL — HIGH (ref 70–99)
HCT VFR BLD CALC: 34.6 % — LOW (ref 39–50)
HGB BLD-MCNC: 11.5 G/DL — LOW (ref 13–17)
IMM GRANULOCYTES NFR BLD AUTO: 0.2 % — SIGNIFICANT CHANGE UP (ref 0–1.5)
INR BLD: 1.95 RATIO — HIGH (ref 0.88–1.16)
LYMPHOCYTES # BLD AUTO: 1.68 K/UL — SIGNIFICANT CHANGE UP (ref 1–3.3)
LYMPHOCYTES # BLD AUTO: 17.1 % — SIGNIFICANT CHANGE UP (ref 13–44)
MCHC RBC-ENTMCNC: 30.9 PG — SIGNIFICANT CHANGE UP (ref 27–34)
MCHC RBC-ENTMCNC: 33.2 GM/DL — SIGNIFICANT CHANGE UP (ref 32–36)
MCV RBC AUTO: 93 FL — SIGNIFICANT CHANGE UP (ref 80–100)
MONOCYTES # BLD AUTO: 0.97 K/UL — HIGH (ref 0–0.9)
MONOCYTES NFR BLD AUTO: 9.9 % — SIGNIFICANT CHANGE UP (ref 2–14)
NEUTROPHILS # BLD AUTO: 6.93 K/UL — SIGNIFICANT CHANGE UP (ref 1.8–7.4)
NEUTROPHILS NFR BLD AUTO: 70.5 % — SIGNIFICANT CHANGE UP (ref 43–77)
NRBC # BLD: 0 /100 WBCS — SIGNIFICANT CHANGE UP (ref 0–0)
PLATELET # BLD AUTO: 208 K/UL — SIGNIFICANT CHANGE UP (ref 150–400)
POTASSIUM SERPL-MCNC: 4.6 MMOL/L — SIGNIFICANT CHANGE UP (ref 3.5–5.3)
POTASSIUM SERPL-SCNC: 4.6 MMOL/L — SIGNIFICANT CHANGE UP (ref 3.5–5.3)
PROT SERPL-MCNC: 7.7 GM/DL — SIGNIFICANT CHANGE UP (ref 6–8.3)
PROTHROM AB SERPL-ACNC: 21.3 SEC — HIGH (ref 9.8–12.7)
RBC # BLD: 3.72 M/UL — LOW (ref 4.2–5.8)
RBC # FLD: 15.6 % — HIGH (ref 10.3–14.5)
SODIUM SERPL-SCNC: 139 MMOL/L — SIGNIFICANT CHANGE UP (ref 135–145)
TROPONIN I SERPL-MCNC: <0.015 NG/ML — SIGNIFICANT CHANGE UP (ref 0.01–0.04)
WBC # BLD: 9.83 K/UL — SIGNIFICANT CHANGE UP (ref 3.8–10.5)
WBC # FLD AUTO: 9.83 K/UL — SIGNIFICANT CHANGE UP (ref 3.8–10.5)

## 2018-05-04 PROCEDURE — 70450 CT HEAD/BRAIN W/O DYE: CPT | Mod: 26

## 2018-05-04 PROCEDURE — 93010 ELECTROCARDIOGRAM REPORT: CPT

## 2018-05-04 PROCEDURE — 71045 X-RAY EXAM CHEST 1 VIEW: CPT | Mod: 26

## 2018-05-04 PROCEDURE — 99285 EMERGENCY DEPT VISIT HI MDM: CPT

## 2018-05-04 RX ORDER — SODIUM CHLORIDE 9 MG/ML
3 INJECTION INTRAMUSCULAR; INTRAVENOUS; SUBCUTANEOUS ONCE
Qty: 0 | Refills: 0 | Status: COMPLETED | OUTPATIENT
Start: 2018-05-04 | End: 2018-05-04

## 2018-05-04 RX ORDER — ONDANSETRON 8 MG/1
4 TABLET, FILM COATED ORAL ONCE
Qty: 0 | Refills: 0 | Status: COMPLETED | OUTPATIENT
Start: 2018-05-04 | End: 2018-05-04

## 2018-05-04 RX ADMIN — SODIUM CHLORIDE 3 MILLILITER(S): 9 INJECTION INTRAMUSCULAR; INTRAVENOUS; SUBCUTANEOUS at 18:21

## 2018-05-04 RX ADMIN — ONDANSETRON 4 MILLIGRAM(S): 8 TABLET, FILM COATED ORAL at 18:30

## 2018-05-04 NOTE — ED PROVIDER NOTE - OBJECTIVE STATEMENT
88 y/o male with PMHx of Afib, BPH, CHF, HTN, HLD DM, GERD, restless leg syndrome, neuropathy, s/p cardiac pacemaker presents to the ED c/o near-syncope, weakness/fatigue x few days. Notes lightheadedness with associated episode of inability to palpate own pulse. States that he feels pulsations in bilateral ears. +dizziness. +nausea. Pt took Promethazine for nausea at 4pm with no relief. Denies abd pain. On ASA, Coumadin. Pacemaker, Dr. Arnold. Called Dr. Arnold who recommended pt see PCP Dr. Whelan in office. Cardio/Dr. Michael Mehta, [facility].

## 2018-05-04 NOTE — ED PROVIDER NOTE - NS_ ATTENDINGSCRIBEDETAILS _ED_A_ED_FT
I Rigo Rizzo MD saw and examined the patient. Scribe documented for me and under my supervision. I have modified the scribe's documentation where necessary to reflect my history, physical exam and other relevant documentations pertinent to the care of the patient.

## 2018-05-04 NOTE — ED PROVIDER NOTE - NEUROLOGICAL, MLM
Alert and oriented, no focal deficits, no motor or sensory deficits. Alert and oriented, no focal deficits, no motor or sensory deficits. CNs 2-12 grossly intact. No nuchal rigidity.

## 2018-05-04 NOTE — ED PROVIDER NOTE - RESPIRATORY, MLM
Breath sounds clear and equal bilaterally. Breath sounds clear and equal bilaterally. No retractions.

## 2018-05-04 NOTE — ED ADULT NURSE NOTE - OBJECTIVE STATEMENT
Pt presents to ED c/o "popping in his ears". Pt's wife states he was concerned when he was unable to palpate a pulse in his neck and wrists earlier in the day. Pt has strong palpable b/l radial and carotid pulses. Pt reports nausea and weakness. Pt reports "feeling terrible from top to bottom".

## 2018-05-05 LAB
AMMONIA BLD-MCNC: 30 UMOL/L — SIGNIFICANT CHANGE UP (ref 11–32)
ANION GAP SERPL CALC-SCNC: 7 MMOL/L — SIGNIFICANT CHANGE UP (ref 5–17)
BASOPHILS # BLD AUTO: 0.07 K/UL — SIGNIFICANT CHANGE UP (ref 0–0.2)
BASOPHILS NFR BLD AUTO: 0.6 % — SIGNIFICANT CHANGE UP (ref 0–2)
BUN SERPL-MCNC: 30 MG/DL — HIGH (ref 7–23)
CALCIUM SERPL-MCNC: 9 MG/DL — SIGNIFICANT CHANGE UP (ref 8.5–10.1)
CHLORIDE SERPL-SCNC: 106 MMOL/L — SIGNIFICANT CHANGE UP (ref 96–108)
CO2 SERPL-SCNC: 27 MMOL/L — SIGNIFICANT CHANGE UP (ref 22–31)
CREAT SERPL-MCNC: 1.7 MG/DL — HIGH (ref 0.5–1.3)
EOSINOPHIL # BLD AUTO: 0.26 K/UL — SIGNIFICANT CHANGE UP (ref 0–0.5)
EOSINOPHIL NFR BLD AUTO: 2.4 % — SIGNIFICANT CHANGE UP (ref 0–6)
GLUCOSE SERPL-MCNC: 136 MG/DL — HIGH (ref 70–99)
HBA1C BLD-MCNC: 8.6 % — HIGH (ref 4–5.6)
HCT VFR BLD CALC: 36.4 % — LOW (ref 39–50)
HGB BLD-MCNC: 11.7 G/DL — LOW (ref 13–17)
IMM GRANULOCYTES NFR BLD AUTO: 0.4 % — SIGNIFICANT CHANGE UP (ref 0–1.5)
INR BLD: 1.87 RATIO — HIGH (ref 0.88–1.16)
LYMPHOCYTES # BLD AUTO: 19.1 % — SIGNIFICANT CHANGE UP (ref 13–44)
LYMPHOCYTES # BLD AUTO: 2.11 K/UL — SIGNIFICANT CHANGE UP (ref 1–3.3)
MCHC RBC-ENTMCNC: 29.9 PG — SIGNIFICANT CHANGE UP (ref 27–34)
MCHC RBC-ENTMCNC: 32.1 GM/DL — SIGNIFICANT CHANGE UP (ref 32–36)
MCV RBC AUTO: 93.1 FL — SIGNIFICANT CHANGE UP (ref 80–100)
MONOCYTES # BLD AUTO: 1.08 K/UL — HIGH (ref 0–0.9)
MONOCYTES NFR BLD AUTO: 9.8 % — SIGNIFICANT CHANGE UP (ref 2–14)
NEUTROPHILS # BLD AUTO: 7.48 K/UL — HIGH (ref 1.8–7.4)
NEUTROPHILS NFR BLD AUTO: 67.7 % — SIGNIFICANT CHANGE UP (ref 43–77)
NRBC # BLD: 0 /100 WBCS — SIGNIFICANT CHANGE UP (ref 0–0)
PLATELET # BLD AUTO: 213 K/UL — SIGNIFICANT CHANGE UP (ref 150–400)
POTASSIUM SERPL-MCNC: 4.3 MMOL/L — SIGNIFICANT CHANGE UP (ref 3.5–5.3)
POTASSIUM SERPL-SCNC: 4.3 MMOL/L — SIGNIFICANT CHANGE UP (ref 3.5–5.3)
PROTHROM AB SERPL-ACNC: 20.5 SEC — HIGH (ref 9.8–12.7)
RBC # BLD: 3.91 M/UL — LOW (ref 4.2–5.8)
RBC # FLD: 15.7 % — HIGH (ref 10.3–14.5)
SODIUM SERPL-SCNC: 140 MMOL/L — SIGNIFICANT CHANGE UP (ref 135–145)
TROPONIN I SERPL-MCNC: <0.015 NG/ML — SIGNIFICANT CHANGE UP (ref 0.01–0.04)
WBC # BLD: 11.04 K/UL — HIGH (ref 3.8–10.5)
WBC # FLD AUTO: 11.04 K/UL — HIGH (ref 3.8–10.5)

## 2018-05-05 PROCEDURE — 93279 PRGRMG DEV EVAL PM/LDLS PM: CPT | Mod: 26

## 2018-05-05 RX ORDER — SUCRALFATE 1 G
1 TABLET ORAL ONCE
Qty: 0 | Refills: 0 | Status: COMPLETED | OUTPATIENT
Start: 2018-05-05 | End: 2018-05-05

## 2018-05-05 RX ORDER — DOXAZOSIN MESYLATE 4 MG
4 TABLET ORAL AT BEDTIME
Qty: 0 | Refills: 0 | Status: DISCONTINUED | OUTPATIENT
Start: 2018-05-05 | End: 2018-05-06

## 2018-05-05 RX ORDER — GLUCAGON INJECTION, SOLUTION 0.5 MG/.1ML
1 INJECTION, SOLUTION SUBCUTANEOUS ONCE
Qty: 0 | Refills: 0 | Status: DISCONTINUED | OUTPATIENT
Start: 2018-05-05 | End: 2018-05-06

## 2018-05-05 RX ORDER — INSULIN LISPRO 100/ML
VIAL (ML) SUBCUTANEOUS
Qty: 0 | Refills: 0 | Status: DISCONTINUED | OUTPATIENT
Start: 2018-05-05 | End: 2018-05-06

## 2018-05-05 RX ORDER — SODIUM CHLORIDE 9 MG/ML
1000 INJECTION, SOLUTION INTRAVENOUS
Qty: 0 | Refills: 0 | Status: DISCONTINUED | OUTPATIENT
Start: 2018-05-05 | End: 2018-05-06

## 2018-05-05 RX ORDER — GABAPENTIN 400 MG/1
100 CAPSULE ORAL THREE TIMES A DAY
Qty: 0 | Refills: 0 | Status: DISCONTINUED | OUTPATIENT
Start: 2018-05-05 | End: 2018-05-06

## 2018-05-05 RX ORDER — DEXTROSE 50 % IN WATER 50 %
1 SYRINGE (ML) INTRAVENOUS ONCE
Qty: 0 | Refills: 0 | Status: DISCONTINUED | OUTPATIENT
Start: 2018-05-05 | End: 2018-05-06

## 2018-05-05 RX ORDER — DEXTROSE 50 % IN WATER 50 %
12.5 SYRINGE (ML) INTRAVENOUS ONCE
Qty: 0 | Refills: 0 | Status: DISCONTINUED | OUTPATIENT
Start: 2018-05-05 | End: 2018-05-06

## 2018-05-05 RX ORDER — CLONAZEPAM 1 MG
0.5 TABLET ORAL AT BEDTIME
Qty: 0 | Refills: 0 | Status: DISCONTINUED | OUTPATIENT
Start: 2018-05-05 | End: 2018-05-06

## 2018-05-05 RX ORDER — DILTIAZEM HCL 120 MG
120 CAPSULE, EXT RELEASE 24 HR ORAL DAILY
Qty: 0 | Refills: 0 | Status: DISCONTINUED | OUTPATIENT
Start: 2018-05-05 | End: 2018-05-06

## 2018-05-05 RX ORDER — VENLAFAXINE HCL 75 MG
75 CAPSULE, EXT RELEASE 24 HR ORAL DAILY
Qty: 0 | Refills: 0 | Status: DISCONTINUED | OUTPATIENT
Start: 2018-05-05 | End: 2018-05-06

## 2018-05-05 RX ORDER — ONDANSETRON 8 MG/1
4 TABLET, FILM COATED ORAL EVERY 6 HOURS
Qty: 0 | Refills: 0 | Status: DISCONTINUED | OUTPATIENT
Start: 2018-05-05 | End: 2018-05-06

## 2018-05-05 RX ORDER — PANTOPRAZOLE SODIUM 20 MG/1
40 TABLET, DELAYED RELEASE ORAL
Qty: 0 | Refills: 0 | Status: DISCONTINUED | OUTPATIENT
Start: 2018-05-05 | End: 2018-05-06

## 2018-05-05 RX ORDER — ASPIRIN/CALCIUM CARB/MAGNESIUM 324 MG
81 TABLET ORAL DAILY
Qty: 0 | Refills: 0 | Status: DISCONTINUED | OUTPATIENT
Start: 2018-05-05 | End: 2018-05-06

## 2018-05-05 RX ORDER — WARFARIN SODIUM 2.5 MG/1
5 TABLET ORAL DAILY
Qty: 0 | Refills: 0 | Status: DISCONTINUED | OUTPATIENT
Start: 2018-05-05 | End: 2018-05-06

## 2018-05-05 RX ORDER — DEXTROSE 50 % IN WATER 50 %
25 SYRINGE (ML) INTRAVENOUS ONCE
Qty: 0 | Refills: 0 | Status: DISCONTINUED | OUTPATIENT
Start: 2018-05-05 | End: 2018-05-06

## 2018-05-05 RX ORDER — SIMVASTATIN 20 MG/1
40 TABLET, FILM COATED ORAL AT BEDTIME
Qty: 0 | Refills: 0 | Status: DISCONTINUED | OUTPATIENT
Start: 2018-05-05 | End: 2018-05-06

## 2018-05-05 RX ORDER — ACETAMINOPHEN 500 MG
650 TABLET ORAL EVERY 8 HOURS
Qty: 0 | Refills: 0 | Status: DISCONTINUED | OUTPATIENT
Start: 2018-05-05 | End: 2018-05-06

## 2018-05-05 RX ADMIN — GABAPENTIN 100 MILLIGRAM(S): 400 CAPSULE ORAL at 22:30

## 2018-05-05 RX ADMIN — Medication 0.5 MILLIGRAM(S): at 22:30

## 2018-05-05 RX ADMIN — SIMVASTATIN 40 MILLIGRAM(S): 20 TABLET, FILM COATED ORAL at 22:30

## 2018-05-05 RX ADMIN — GABAPENTIN 100 MILLIGRAM(S): 400 CAPSULE ORAL at 10:37

## 2018-05-05 RX ADMIN — GABAPENTIN 100 MILLIGRAM(S): 400 CAPSULE ORAL at 13:48

## 2018-05-05 RX ADMIN — Medication 1: at 17:44

## 2018-05-05 RX ADMIN — WARFARIN SODIUM 5 MILLIGRAM(S): 2.5 TABLET ORAL at 22:31

## 2018-05-05 RX ADMIN — Medication 4 MILLIGRAM(S): at 22:30

## 2018-05-05 RX ADMIN — Medication 2: at 13:47

## 2018-05-05 RX ADMIN — Medication 81 MILLIGRAM(S): at 10:36

## 2018-05-05 RX ADMIN — ONDANSETRON 4 MILLIGRAM(S): 8 TABLET, FILM COATED ORAL at 12:19

## 2018-05-05 RX ADMIN — Medication 75 MILLIGRAM(S): at 13:48

## 2018-05-05 RX ADMIN — Medication 1 GRAM(S): at 03:02

## 2018-05-05 RX ADMIN — PANTOPRAZOLE SODIUM 40 MILLIGRAM(S): 20 TABLET, DELAYED RELEASE ORAL at 06:36

## 2018-05-05 RX ADMIN — Medication 120 MILLIGRAM(S): at 10:36

## 2018-05-05 NOTE — PROGRESS NOTE ADULT - SUBJECTIVE AND OBJECTIVE BOX
ELECTROPHYSIOLOGY  Device Interrogation Performed         5/5/18                         Date/Time:        :               TRENT          Model:           MICRA                         Mode:          VVIR                  Rate:                     Ventricular Lead(s):  RV Lead: R wave amplitude:                          mv          Impedence:                   Ohms      Threshold:        0.38        V@      0.24       ms   Battery Status:                     Good                [id number]                     EOL          Underlying Rhythm:           Events/Observation:    No arrhythmia.      Impression/Plan:  Mostly ventricular pacing  Normal Device function

## 2018-05-05 NOTE — H&P ADULT - NSHPPHYSICALEXAM_GEN_ALL_CORE
Vital Signs Last 24 Hrs  T(C): 36.8 (04 May 2018 17:19), Max: 36.8 (04 May 2018 17:18)  T(F): 98.2 (04 May 2018 17:19), Max: 98.2 (04 May 2018 17:18)  HR: 60 (04 May 2018 23:00) (60 - 60)  BP: 136/48 (04 May 2018 23:00) (136/48 - 181/75)  RR: 18 (04 May 2018 23:00) (17 - 19)  SpO2: 99% (04 May 2018 23:00) (99% - 100%)

## 2018-05-05 NOTE — PROGRESS NOTE ADULT - SUBJECTIVE AND OBJECTIVE BOX
CC: weakness/ fever (05 May 2018 04:09)    HPI:  88 y/o male with PMHx of Afib, BPH, CHF, HTN, HLD DM, GERD, restless leg syndrome, neuropathy, s/p cardiac pacemaker presented to the ED c/o syncope, weakness/fatigue x few days. Notes lightheadedness with associated episode of inability to palpate own pulse. States that he feels pulsations in bilateral ears. +dizziness. +nausea. Pt took Promethazine for nausea at 4pm with no relief. Denies abd pain. On ASA, Coumadin. (05 May 2018 03:19)    INTERVAL HPI/OVERNIGHT EVENTS:    Vital Signs Last 24 Hrs  T(C): 36.5 (05 May 2018 04:07), Max: 36.8 (04 May 2018 17:18)  T(F): 97.7 (05 May 2018 04:07), Max: 98.2 (04 May 2018 17:18)  HR: 86 (05 May 2018 10:00) (60 - 86)  BP: 141/58 (05 May 2018 10:00) (136/48 - 181/75)  BP(mean): 79 (05 May 2018 10:00) (79 - 139)  RR: 18 (05 May 2018 10:00) (14 - 20)  SpO2: 92% (05 May 2018 10:00) (90% - 100%)  I&O's Detail    REVIEW OF SYSTEMS:    CONSTITUTIONAL: No weakness, fevers or chills  EYES/ENT: No visual changes;  No vertigo or throat pain   NECK: No pain or stiffness  RESPIRATORY: No cough, wheezing, hemoptysis; No shortness of breath  CARDIOVASCULAR: No chest pain or palpitations  GASTROINTESTINAL: No abdominal or epigastric pain. No nausea, vomiting, or hematemesis; No diarrhea or constipation. No melena or hematochezia.  GENITOURINARY: No dysuria, frequency or hematuria  NEUROLOGICAL: No numbness or weakness  SKIN: No itching, burning, rashes, or lesions   All other review of systems is negative unless indicated above.  PHYSICAL EXAM:    General: Well developed; well nourished; in no acute distress  Eyes: PERRLA, EOMI; conjunctiva and sclera clear  Head: Normocephalic; atraumatic  ENMT: No nasal discharge; airway clear  Neck: Supple; non tender; no masses  Respiratory: No wheezes, rales or rhonchi  Cardiovascular: Regular rate and rhythm. S1 and S2 Normal; No murmurs, gallops or rubs  Gastrointestinal: Soft non-tender non-distended; Normal bowel sounds  Genitourinary: No costovertebral angle tenderness  Extremities: Normal range of motion, No clubbing, cyanosis or edema  Vascular: Peripheral pulses palpable 2+ bilaterally  Neurological: Alert and oriented x4  Skin: Warm and dry. No acute rash  Lymph Nodes: No acute cervical adenopathy  Musculoskeletal: Normal gait, tone, without deformities  Psychiatric: Cooperative and appropriate  CARDIAC MARKERS ( 05 May 2018 00:24 )  <0.015 ng/mL / x     / x     / x     / x      CARDIAC MARKERS ( 04 May 2018 18:13 )  <0.015 ng/mL / x     / x     / x     / x                                11.7   11.04 )-----------( 213      ( 05 May 2018 06:31 )             36.4     05 May 2018 06:31    140    |  106    |  30     ----------------------------<  136    4.3     |  27     |  1.70     Ca    9.0        05 May 2018 06:31    TPro  7.7    /  Alb  3.1    /  TBili  0.3    /  DBili  x      /  AST  23     /  ALT  26     /  AlkPhos  106    04 May 2018 18:13    PT/INR - ( 05 May 2018 06:31 )   PT: 20.5 sec;   INR: 1.87 ratio         PTT - ( 04 May 2018 18:13 )  PTT:38.1 sec  CAPILLARY BLOOD GLUCOSE      POCT Blood Glucose.: 206 mg/dL (05 May 2018 12:49)  POCT Blood Glucose.: 132 mg/dL (05 May 2018 08:31)  POCT Blood Glucose.: 225 mg/dL (04 May 2018 17:37)    LIVER FUNCTIONS - ( 04 May 2018 18:13 )  Alb: 3.1 g/dL / Pro: 7.7 gm/dL / ALK PHOS: 106 U/L / ALT: 26 U/L / AST: 23 U/L / GGT: x               MEDICATIONS  (STANDING):  aspirin enteric coated 81 milliGRAM(s) Oral daily  clonazePAM Tablet 0.5 milliGRAM(s) Oral at bedtime  dextrose 5%. 1000 milliLiter(s) (50 mL/Hr) IV Continuous <Continuous>  dextrose 50% Injectable 12.5 Gram(s) IV Push once  dextrose 50% Injectable 25 Gram(s) IV Push once  dextrose 50% Injectable 25 Gram(s) IV Push once  diltiazem    milliGRAM(s) Oral daily  doxazosin 4 milliGRAM(s) Oral at bedtime  gabapentin 100 milliGRAM(s) Oral three times a day  insulin lispro (HumaLOG) corrective regimen sliding scale   SubCutaneous three times a day before meals  pantoprazole    Tablet 40 milliGRAM(s) Oral before breakfast  simvastatin 40 milliGRAM(s) Oral at bedtime  venlafaxine XR. 75 milliGRAM(s) Oral daily  warfarin 5 milliGRAM(s) Oral daily    MEDICATIONS  (PRN):  acetaminophen   Tablet. 650 milliGRAM(s) Oral every 8 hours PRN Mild Pain (1 - 3)  dextrose Gel 1 Dose(s) Oral once PRN Blood Glucose LESS THAN 70 milliGRAM(s)/deciliter  glucagon  Injectable 1 milliGRAM(s) IntraMuscular once PRN Glucose LESS THAN 70 milligrams/deciliter  ondansetron Injectable 4 milliGRAM(s) IV Push every 6 hours PRN Nausea and/or Vomiting      RADIOLOGY & ADDITIONAL TESTS: CC: weakness/ fever (05 May 2018 04:09)    HPI:  88 y/o male with PMHx of Afib, BPH, CHF, HTN, HLD DM, GERD, restless leg syndrome, neuropathy, s/p cardiac pacemaker presented to the ED c/o syncope, weakness/fatigue x few days. Notes lightheadedness with associated episode of inability to palpate own pulse. States that he feels pulsations in bilateral ears. +dizziness. +nausea. Pt took Promethazine for nausea at 4pm with no relief. Denies abd pain. On ASA, Coumadin. (05 May 2018 03:19)    INTERVAL HPI/ OVERNIGHT EVENTS:  Pt was seen and examined, poor  historian, reports that did not have any syncope, but  felt palpitations and then no Heart beats,  some lightheadedness, states that ist because of his new pacemaker. Also c/o significant nausea and abd discomfort. On anti nausea med,   which was given by physician in [place], had Nausea  on/off for more then a year.  Was not evaluated by GI for that, but was followed by DR Morrow for many years in the past, but Pt is unable to explain why. Denies CP or SOB       Vital Signs Last 24 Hrs  T(C): 36.5 (05 May 2018 04:07), Max: 36.8 (04 May 2018 17:18)  T(F): 97.7 (05 May 2018 04:07), Max: 98.2 (04 May 2018 17:18)  HR: 86 (05 May 2018 10:00) (60 - 86)  BP: 141/58 (05 May 2018 10:00) (136/48 - 181/75)  BP(mean): 79 (05 May 2018 10:00) (79 - 139)  RR: 18 (05 May 2018 10:00) (14 - 20)  SpO2: 92% (05 May 2018 10:00) (90% - 100%)    REVIEW OF SYSTEMS:  All other review of systems is negative unless indicated above.      PHYSICAL EXAM:  General: Well developed; well nourished; in no acute distress  Eyes: PERRLA, EOMI; conjunctiva and sclera clear  Head: Normocephalic; atraumatic  ENMT: No nasal discharge; airway clear  Neck: Supple; non tender; no masses  Respiratory: Good air entry.  No wheezes, rales or rhonchi  Cardiovascular: Regular rate and rhythm. S1 and S2 Normal; No murmurs  Gastrointestinal: Soft non-tender, mildly distended; Normal bowel sounds  Genitourinary: No costovertebral angle tenderness  Extremities: Normal range of motion, No  edema  Vascular: Peripheral pulses palpable 2+ bilaterally  Neurological: Alert and oriented x4  Skin: Warm and dry. No acute rash  Lymph Nodes: No acute cervical adenopathy  Musculoskeletal: Normal muscle  tone, without deformities  Psychiatric: Cooperative and appropriate      LABS;   CARDIAC MARKERS ( 05 May 2018 00:24 )  <0.015 ng/mL / x     / x     / x     / x      CARDIAC MARKERS ( 04 May 2018 18:13 )  <0.015 ng/mL / x     / x     / x     / x                                11.7   11.04 )-----------( 213      ( 05 May 2018 06:31 )             36.4     05 May 2018 06:31    140    |  106    |  30     ----------------------------<  136    4.3     |  27     |  1.70     Ca    9.0        05 May 2018 06:31    TPro  7.7    /  Alb  3.1    /  TBili  0.3    /  DBili  x      /  AST  23     /  ALT  26     /  AlkPhos  106    04 May 2018 18:13    PT/INR - ( 05 May 2018 06:31 )   PT: 20.5 sec;   INR: 1.87 ratio         PTT - ( 04 May 2018 18:13 )  PTT:38.1 sec    CAPILLARY BLOOD GLUCOSE  POCT Blood Glucose.: 206 mg/dL (05 May 2018 12:49)  POCT Blood Glucose.: 132 mg/dL (05 May 2018 08:31)  POCT Blood Glucose.: 225 mg/dL (04 May 2018 17:37)    LIVER FUNCTIONS - ( 04 May 2018 18:13 )  Alb: 3.1 g/dL / Pro: 7.7 gm/dL / ALK PHOS: 106 U/L / ALT: 26 U/L / AST: 23 U/L / GGT: x               MEDICATIONS  (STANDING):  aspirin enteric coated 81 milliGRAM(s) Oral daily  clonazePAM Tablet 0.5 milliGRAM(s) Oral at bedtime  dextrose 5%. 1000 milliLiter(s) (50 mL/Hr) IV Continuous <Continuous>  dextrose 50% Injectable 12.5 Gram(s) IV Push once  dextrose 50% Injectable 25 Gram(s) IV Push once  dextrose 50% Injectable 25 Gram(s) IV Push once  diltiazem    milliGRAM(s) Oral daily  doxazosin 4 milliGRAM(s) Oral at bedtime  gabapentin 100 milliGRAM(s) Oral three times a day  insulin lispro (HumaLOG) corrective regimen sliding scale   SubCutaneous three times a day before meals  pantoprazole    Tablet 40 milliGRAM(s) Oral before breakfast  simvastatin 40 milliGRAM(s) Oral at bedtime  venlafaxine XR. 75 milliGRAM(s) Oral daily  warfarin 5 milliGRAM(s) Oral daily    MEDICATIONS  (PRN):  acetaminophen   Tablet. 650 milliGRAM(s) Oral every 8 hours PRN Mild Pain (1 - 3)  dextrose Gel 1 Dose(s) Oral once PRN Blood Glucose LESS THAN 70 milliGRAM(s)/deciliter  glucagon  Injectable 1 milliGRAM(s) IntraMuscular once PRN Glucose LESS THAN 70 milligrams/deciliter  ondansetron Injectable 4 milliGRAM(s) IV Push every 6 hours PRN Nausea and/or Vomiting      RADIOLOGY & ADDITIONAL TESTS:    EXAM:  CT BRAIN                          FINDINGS:     INTRACRANIAL FINDINGS: There is age-related atrophy and chronic   microvascular ischemic change. No evidence for acute territorial infarct,   mass lesion, mass effect, or recent hemorrhage. There is no abnormal   extra axial collection.    EXTRACRANIAL FINDINGS: No extracalvarial soft tissue swelling. No   depressed calvarial fracture. The orbital contents are unremarkable. The   visualized paranasal sinuses are well aerated. The mastoid air cells are  clear.    IMPRESSION:   No acute intracranial hemorrhage, mass effect, or midline shift.       EXAM:  CT ABDOMEN AND PELVIS OC                       PROCEDURE DATE:  04/21/2018    IMPRESSION:    1. No bowel inflammatory changes or obstruction.  2. Punctate nonobstructive bilateral renal calculi without hydronephrosis   or obstructive uropathy. Bilateral renal cysts, largest measuring up to   8.5 cm in the right kidney. Incompletely characterized 1.5 cm left upper   pole renal hypodensity, complex cyst or solid lesion. Consider   nonemergent correlation with renal ultrasound for better characterization.  3. Multilevel degenerative changes of the spine. Mild heterogeneity of   the vertebral bodies, which may related to patchy osteopenia. Consider   nonemergent correlation with bone scan if there is concern for osseous   metastasis. Minimal anterolisthesis of L4 on L5, likely due to   degenerative facet spondylolysis.  4. Cardiomegaly with single lead pacer overlying right ventricle.Punctate   bubbles of air identified within the right atrium which may related to   recent instrumentation. Clinical correlation is advised.

## 2018-05-05 NOTE — H&P ADULT - ASSESSMENT
89 yo male presented after syncope.     A/P:    1.  Syncope  h/o Pacemaker placement  ? pause in Heart beat  -will follow in telemetry  -follow cardiology consult    2.  h/o CHF  -clinically stable  -will follow    3.  HTN  -follow BP and adjust meds    4.  DM  -follow Dxt  -keep on ISS    5.  CKD stage 3  -stable  -will follow creatinine and urinary output    6.  Chronic A fib  -HR is controlled  -on coumadin  -follow INR    7.  DVT Ppx: on coumadin    8.  BPH  -on home meds  -stable

## 2018-05-05 NOTE — PROGRESS NOTE ADULT - ASSESSMENT
89 yo male presented after syncope.     A/P:    1.  Syncope  h/o Pacemaker placement  ? pause in Heart beat  -will follow in telemetry  -follow cardiology consult    2.  h/o CHF  -clinically stable  -will follow    3.  HTN  -follow BP and adjust meds    4.  DM  -follow Dxt  -keep on ISS    5.  CKD stage 3  -stable  -will follow creatinine and urinary output    6.  Chronic A fib  -HR is controlled  -on coumadin  -follow INR    7.  DVT Ppx: on coumadin    8.  BPH  -on home meds  -stable 88 y/o male with PMHx of Afib, BPH, CHF, HTN, HLD DM, GERD, restless leg syndrome, neuropathy, s/p cardiac pacemaker admitted for:       S1. Dizziness /Palpitations. S/p PPM replacement, dual chamber was changed to MICRA   had no episode of syncope   Feels better   - C/w tele: no acute events   - JENNIFER neg   - Check orthostatics   - Cardio eval appreciated  - PPM interrogation     2. Chronic diastolic CHF  -clinically  not in Failure   -will follow    3. Chronic AFIB   - HR controlled  - C/w diltiazem  - C/w Coumadin  - Check INR daily     4. Nausea, chronic   - unclear etiology , gastroparesis?  - Will check lipase/LFTs  - CT done recently in ED visit for similar complains, negative for acute findings   - RUQ sono ordered  - Zofran PRN   - GI eval     5. HTN  -follow BP and adjust meds PRN     4.DM  -Monitor BS and cover with ISS   - check glyco HB     5.CKD stage 3  -stable  -will follow creatinine and urinary output      7. BPH  -c/w doxazosin   -stable      8. DVT Ppx: on coumadin

## 2018-05-05 NOTE — CONSULT NOTE ADULT - SUBJECTIVE AND OBJECTIVE BOX
Reason for Admission: complain of syncope	  History of Present Illness: 	  88 y/o male with PMHx of Afib, BPH, CHF, HTN, HLD DM, GERD, restless leg syndrome, neuropathy, s/p cardiac pacemaker presented to the ED c/o syncope, weakness/fatigue x few days. Notes lightheadedness with associated episode of inability to palpate own pulse. States that he feels pulsations in bilateral ears. +dizziness. +nausea. Pt took Promethazine for nausea at 4pm with no relief. Denies abd pain. On ASA, Coumadin.     Examined at bed side, feels much better now. States his problem is really a lot of belly gases since he passed out a few when he was in CT suite and now he feels much better.        Review of Systems:  · Negative General Symptoms	no fever; no chills	  · Negative Skin Symptoms	no rash; no itching	  · Negative Ophthalmologic Symptoms	no diplopia; no photophobia	  · Negative ENMT Symptoms	no hearing difficulty; no ear pain	  · Negative Respiratory and Thorax Symptoms	no wheezing; no dyspnea; no cough	  · Negative Cardiovascular Symptoms	no chest pain; no palpitations	  · Negative Gastrointestinal Symptoms	no nausea; no vomiting	  · Negative General Genitourinary Symptoms	no hematuria; no renal colic	  · Negative Musculoskeletal Symptoms	no arthralgia; no arthritis	  · Negative Neurological Symptoms	no transient paralysis; no weakness	  · Negative Psychiatric Symptoms	no suicidal ideation; no depression	  · Negative Hematology Symptoms	no gum bleeding; no nose bleeding	  · Negative Endocrine Symptoms	no cold intolerance; no heat intolerance	      Allergies and Intolerances:        Allergies:  	naproxen: Drug, Vomiting  	morphine: Drug, Nausea  	Aleve: Drug, Vomiting  	codeine: Drug, Unknown    Home Medications:   * Patient Currently Takes Medications as of 05-May-2018 00:23 documented in Structured Notes  · 	Coumadin 5 mg oral tablet: 1 tab(s) orally once a day , Last Dose Taken:    · 	dilTIAZem 120 mg/24 hours oral capsule, extended release: 1 cap(s) orally once a day, Last Dose Taken:    · 	simvastatin 40 mg oral tablet: 1 tab(s) orally once a day (at bedtime), Last Dose Taken:    · 	warfarin 5 mg oral tablet: 1 tab(s) orally once a day, check INR in 2-3 days, Last Dose Taken:    · 	doxazosin 4 mg oral tablet: 1 tab(s) orally once a day (at bedtime), Last Dose Taken:    · 	Lantus Solostar Pen 100 units/mL subcutaneous solution: 15 unit(s) subcutaneous once a day (at bedtime), Last Dose Taken:    · 	RABEprazole 20 mg oral delayed release tablet: 1 tab(s) orally , As Needed, Last Dose Taken:    · 	Tradjenta 5 mg oral tablet: 1 tab(s) orally once a day (in the evening), Last Dose Taken:    · 	gabapentin 100 mg oral tablet: orally 3 times a day, Last Dose Taken:    · 	aspirin 81 mg oral tablet: 1 tab(s) orally once a day, Last Dose Taken:    · 	clonazePAM 0.5 mg oral tablet: 1 tab(s) orally once a day (at bedtime), Last Dose Taken:    · 	venlafaxine 75 mg oral capsule, extended release: 1 cap(s) orally once a day, Last Dose Taken:      Patient History:    Past Medical History:  Atrial fibrillation, unspecified type    Benign prostatic hyperplasia without lower urinary tract symptoms    Chronic congestive heart failure, unspecified congestive heart failure type    Gastroesophageal reflux disease, esophagitis presence not specified    HTN (hypertension)    IDDM (insulin dependent diabetes mellitus).     Past Surgical History:  Arthropathy of shoulder region    Artificial cardiac pacemaker    History of appendectomy.     Family History:  No pertinent family history in first degree relatives.     Social History:  Social History (marital status, living situation, occupation, tobacco use, alcohol and drug use, and sexual history): Lives at home.  Non smoker. Non alcoholic.	     Tobacco Screening:  · Core Measure Site	Yes	  · Has the patient used tobacco in the past 30 days?	No	    Risk Assessment:    Present on Admission:  Deep Venous Thrombosis	no	  Pulmonary Embolus	no	  Urinary Catheter	no	  Central Venous Catheter/PICC Line	no	  Surgical Site Incision	no	  Pressure Ulcer(s)	no	     Heart Failure:  Does this patient have a history of or has been diagnosed with heart failure? yes.     LV Function Assessment (LVS function was evaluated before arrival and/or during hospitalization) unknown.       Physical Exam:  Physical Exam: Vital Signs Last 24 Hrs  T(C): 36.8 (04 May 2018 17:19), Max: 36.8 (04 May 2018 17:18)  T(F): 98.2 (04 May 2018 17:19), Max: 98.2 (04 May 2018 17:18)  HR: 60 (04 May 2018 23:00) (60 - 60)  BP: 136/48 (04 May 2018 23:00) (136/48 - 181/75)  RR: 18 (04 May 2018 23:00) (17 - 19) SpO2: 99% (04 May 2018 23:00) (99% - 100%)	     Physical Exam:  · Constitutional	detailed exam	  · Constitutional Details	well-developed; well-groomed	  · Eyes	detailed exam	  · Eyes Details	PERRL; EOMI	  · ENMT	detailed exam	  · ENMT Details	ear L; ear R	  · Neck	detailed exam	  · Neck Details	supple; no JVD	  · Back	detailed exam	  · Back Details	ROM intact; strength intact	  · Respiratory	detailed exam	  · Respiratory Details	airway patent; breath sounds equal; respirations non-labored; clear to auscultation bilaterally	  · Cardiovascular	detailed exam	  · Cardiovascular Details	positive S1; positive S2	  · Gastrointestinal	detailed exam	  · GI Normal	soft; nontender; no distention	  · Extremities	detailed exam	  · Extremities Details	no cyanosis; no pedal edema	  · Vascular	detailed exam	  · Radial Pulse	right normal; left normal	  · Neurological	detailed exam	  · Neurological Details	alert and oriented x 3; responds to pain; responds to verbal commands; sensation intact; deep reflexes intact; cranial nerves intact; normal strength	  · Skin	detailed exam	  · Skin Details	warm and dry; color normal	  · Lymph Nodes	No lymphadedenopathy	  · Musculoskeletal	detailed exam	  · Musculoskeletal Details	no joint swelling; no joint erythema	  · Psychiatric	detailed exam	  · Psychiatric Details	normal affect; normal behavior	      Laboratory:   Admit:	    05-May-2018 01:51, Bed Request	  Bed Status: CLEAN	  Patient Bed Information: Nurse Station: IS  Room Number: 0336  Bed Number: 2	  General Chemistry:	    04-May-2018 18:13, Comprehensive Metabolic Panel	  Sodium, Serum: 139, [135 - 145 mmol/L]	  Potassium, Serum: 4.6, [3.5 - 5.3 mmol/L]	  Chloride, Serum: 105, [96 - 108 mmol/L]	  Carbon Dioxide, Serum: 28, [22 - 31 mmol/L]	  Anion Gap, Serum: 6, [5 - 17 mmol/L]	  Blood Urea Nitrogen, Serum:    35, [7 - 23 mg/dL]	  Creatinine, Serum:    1.74, [0.50 - 1.30 mg/dL]	  Glucose, Serum:    207, [70 - 99 mg/dL]	  Calcium, Total Serum: 9.3, [8.5 - 10.1 mg/dL]	  Protein Total, Serum: 7.7, [6.0 - 8.3 gm/dL]	  Albumin, Serum:    3.1, [3.3 - 5.0 g/dL]	  Bilirubin Total, Serum: 0.3, [0.2 - 1.2 mg/dL]	  Alkaline Phosphatase, Serum: 106, [40 - 120 U/L]	  Aspartate Aminotransferase (AST/SGOT): 23, [15 - 37 U/L]	  Alanine Aminotransferase (ALT/SGPT): 26, [12 - 78 U/L]	  eGFR if Non :    34, [>=60 mL/min/1.73M2], Interpretative comment  The units for eGFR are ml/min/1.73m2 (normalized body surface area). The  eGFR is calculated from a serum creatinine using the CKD-EPI equation.  Other variables required for calculation are race, age and sex. Among  patients with chronic kidney disease (CKD), the eGFR is useful in  determining the stage of disease according to KDOQI CKD classification.  All eGFR results are reported numerically with the following  interpretation.          GFR                    With                 Without     (ml/min/1.73 m2)    Kidney Damage       Kidney Damage        >= 90                    Stage 1                     Normal        60-89                    Stage 2                     Decreased GFR        30-59     Stage 3                     Stage 3        15-29                    Stage 4                     Stage 4        < 15                      Stage 5                     Stage 5  Each stage of CKD assumes that the associated GFR level has been in  effect for at least 3 months. Determination of stages one and two (with  eGFR > 59 ml/min/m2) requires estimation of kidney damage for at least 3  months as defined by structural or functional abnormalities.  Limitations: All estimates of GFR will be less accurate for patients at  extremes of muscle mass (including but not limited to frail elderly,  critically ill, or cancer patients), those with unusual diets, and those  with conditions associated with reduced secretion or extrarenal  elimination of creatinine. The eGFR equation is not recommended for use  in patients with unstable creatinine levels.	  eGFR if :    39, [>=60 mL/min/1.73M2]	    04-May-2018 18:13, Troponin I, Serum	  Troponin I, Serum: <0.015, [0.015 - 0.045 ng/mL], High Sensitivity Troponin and new reference  range effective 7/6/2016	    05-May-2018 00:24, Troponin I, Serum	  Troponin I, Serum: <0.015, [0.015 - 0.045 ng/mL], High Sensitivity Troponin and new reference  range effective 7/6/2016	  Coagulation:	    04-May-2018 18:13, Activated Partial Thromboplastin Time	  Activated Partial Thromboplastin Time:    38.1, [27.5 - 37.4 sec], The recommended therapeutic heparin range (full dose) is 58-99 seconds.  Recommended therapeutic Argatroban range is 1.5 to 3.0 times the baseline  APTT value, not to exceed 100 seconds. Recommended therapeutic Refludan  range is 1.5 to 2.5 times thebaseline APTT.	    04-May-2018 18:13, Prothrombin Time and INR, Plasma	  Prothrombin Time, Plasma:    21.3, [9.8 - 12.7 sec]	  INR:    1.95, [0.88 - 1.16 ratio]	  Hematology:	    04-May-2018 18:13, Complete Blood Count + Automated Diff	  WBC Count: 9.83, [3.80 - 10.50 K/uL]	  RBC Count:    3.72, [4.20 - 5.80 M/uL]	  Hemoglobin:    11.5, [13.0 - 17.0 g/dL]	  Hematocrit:    34.6, [39.0 - 50.0 %]	  Mean Cell Volume: 93.0, [80.0 - 100.0 fl]	  Mean Cell Hemoglobin: 30.9, [27.0 - 34.0 pg]	  Mean Cell Hemoglobin Conc: 33.2, [32.0 - 36.0 gm/dL]	  Red Cell Distrib Width:    15.6, [10.3 - 14.5 %]	  Platelet Count - Automated: 208, [150 - 400 K/uL]	  Auto Neutrophil #: 6.93, [1.80 - 7.40 K/uL]	  Auto Lymphocyte #: 1.68, [1.00 - 3.30 K/uL]	  Auto Monocyte #:    0.97, [0.00 - 0.90 K/uL]	  Auto Eosinophil #: 0.19, [0.00 - 0.50 K/uL]	  Auto Basophil #: 0.04, [0.00 - 0.20 K/uL]	  Auto Neutrophil %: 70.5, [43.0 - 77.0 %], Differential percentages must be correlated with absolute numbers for  clinical significance.	  Auto Lymphocyte %: 17.1, [13.0 - 44.0 %]	  Auto Monocyte %: 9.9, [2.0 - 14.0 %]	  Auto Eosinophil %: 1.9, [0.0 - 6.0 %]	  Auto Basophil %: 0.4, [0.0 - 2.0 %]	  Auto Immature Granulocyte %: 0.2, [0.0 - 1.5 %]	    04-May-2018 18:13, Nucleated RBC	  Nucleated RBC: 0, [0 - 0 /100 WBCs]	    Radiology:   CT:	    04-May-2018 19:06, CT Head No Cont	  CT Head No Cont: EXAM:  CT BRAIN                            PROCEDURE DATE:  05/04/2018          INTERPRETATION:  CT BRAIN    HISTORY:  Hypertension, near syncope    TECHNIQUE: CT of the head was performed without intravenous contrast.   Multiplanar reformatted images were then generated from the axial   acquired data.  This study was performed using automatic exposure control   (radiation dose reduction software) to obtain a diagnostic image quality   scan with patient dose as low as reasonably achievable.    COMPARISON: Head CT 8/29/2015    FINDINGS:     INTRACRANIAL FINDINGS: There is age-related atrophy and chronic   microvascular ischemic change. No evidence for acute territorial infarct,   mass lesion, mass effect, or recent hemorrhage. There is no abnormal   extra axial collection.    EXTRACRANIAL FINDINGS: No extracalvarial soft tissue swelling. No   depressed calvarial fracture. The orbital contents are unremarkable. The   visualized paranasal sinuses are well aerated. The mastoid air cells are  clear.    IMPRESSION:     No acute intracranial hemorrhage, mass effect, or midline shift.                 YUAN MENESES   This document has been electronically signed. May  4 2018  7:19PM	  X-Ray, Fluoroscopy:	  PACS Image: Image(s) Available	    Assessment and Plan:    Assessment:  · Assessment		      A/P: Admitted after possible pre syncopal episode. Tele, CE, ECG so far irrelevant.    1.  Pre Syncope  Suggest PPM interrogation, if also negative for significant recent chito or tachy arrhythmia and proper device functioning and provided he continues to feel better, can go home and follow up with me in the office.     2.  h/o CHF  -clinically stable  Cont current regimen.     3.  HTN  -follow BP and adjust meds    4.  Chronic A fib  -HR is controlled  -on coumadin  -follow INR    Will follow up device interrogation.  I personally reviewed office records, hospital records, imaging, telemetry, labs.

## 2018-05-05 NOTE — H&P ADULT - HISTORY OF PRESENT ILLNESS
89 y/o male with PMHx of Afib, BPH, CHF, HTN, HLD DM, GERD, restless leg syndrome, neuropathy, s/p cardiac pacemaker presented to the ED c/o syncope, weakness/fatigue x few days. Notes lightheadedness with associated episode of inability to palpate own pulse. States that he feels pulsations in bilateral ears. +dizziness. +nausea. Pt took Promethazine for nausea at 4pm with no relief. Denies abd pain. On ASA, Coumadin.

## 2018-05-05 NOTE — H&P ADULT - NEUROLOGICAL DETAILS
deep reflexes intact/cranial nerves intact/sensation intact/normal strength/alert and oriented x 3/responds to pain/responds to verbal commands

## 2018-05-06 ENCOUNTER — TRANSCRIPTION ENCOUNTER (OUTPATIENT)
Age: 83
End: 2018-05-06

## 2018-05-06 VITALS — WEIGHT: 178.57 LBS

## 2018-05-06 LAB
ALBUMIN SERPL ELPH-MCNC: 2.7 G/DL — LOW (ref 3.3–5)
ALP SERPL-CCNC: 81 U/L — SIGNIFICANT CHANGE UP (ref 40–120)
ALT FLD-CCNC: 20 U/L — SIGNIFICANT CHANGE UP (ref 12–78)
ANION GAP SERPL CALC-SCNC: 7 MMOL/L — SIGNIFICANT CHANGE UP (ref 5–17)
AST SERPL-CCNC: 24 U/L — SIGNIFICANT CHANGE UP (ref 15–37)
BILIRUB DIRECT SERPL-MCNC: <0.1 MG/DL — SIGNIFICANT CHANGE UP (ref 0–0.2)
BILIRUB INDIRECT FLD-MCNC: >0.3 MG/DL — SIGNIFICANT CHANGE UP (ref 0.2–1)
BILIRUB SERPL-MCNC: 0.4 MG/DL — SIGNIFICANT CHANGE UP (ref 0.2–1.2)
BUN SERPL-MCNC: 33 MG/DL — HIGH (ref 7–23)
CALCIUM SERPL-MCNC: 8.9 MG/DL — SIGNIFICANT CHANGE UP (ref 8.5–10.1)
CHLORIDE SERPL-SCNC: 107 MMOL/L — SIGNIFICANT CHANGE UP (ref 96–108)
CO2 SERPL-SCNC: 28 MMOL/L — SIGNIFICANT CHANGE UP (ref 22–31)
CREAT SERPL-MCNC: 1.89 MG/DL — HIGH (ref 0.5–1.3)
GLUCOSE SERPL-MCNC: 156 MG/DL — HIGH (ref 70–99)
HCT VFR BLD CALC: 31.8 % — LOW (ref 39–50)
HGB BLD-MCNC: 10.4 G/DL — LOW (ref 13–17)
INR BLD: 1.63 RATIO — HIGH (ref 0.88–1.16)
LIDOCAIN IGE QN: 122 U/L — SIGNIFICANT CHANGE UP (ref 73–393)
MCHC RBC-ENTMCNC: 30.5 PG — SIGNIFICANT CHANGE UP (ref 27–34)
MCHC RBC-ENTMCNC: 32.7 GM/DL — SIGNIFICANT CHANGE UP (ref 32–36)
MCV RBC AUTO: 93.3 FL — SIGNIFICANT CHANGE UP (ref 80–100)
NRBC # BLD: 0 /100 WBCS — SIGNIFICANT CHANGE UP (ref 0–0)
PLATELET # BLD AUTO: 186 K/UL — SIGNIFICANT CHANGE UP (ref 150–400)
POTASSIUM SERPL-MCNC: 4.3 MMOL/L — SIGNIFICANT CHANGE UP (ref 3.5–5.3)
POTASSIUM SERPL-SCNC: 4.3 MMOL/L — SIGNIFICANT CHANGE UP (ref 3.5–5.3)
PROT SERPL-MCNC: 6.8 GM/DL — SIGNIFICANT CHANGE UP (ref 6–8.3)
PROTHROM AB SERPL-ACNC: 17.8 SEC — HIGH (ref 9.8–12.7)
RBC # BLD: 3.41 M/UL — LOW (ref 4.2–5.8)
RBC # FLD: 15.9 % — HIGH (ref 10.3–14.5)
SODIUM SERPL-SCNC: 142 MMOL/L — SIGNIFICANT CHANGE UP (ref 135–145)
WBC # BLD: 8.88 K/UL — SIGNIFICANT CHANGE UP (ref 3.8–10.5)
WBC # FLD AUTO: 8.88 K/UL — SIGNIFICANT CHANGE UP (ref 3.8–10.5)

## 2018-05-06 RX ORDER — METOCLOPRAMIDE HCL 10 MG
1 TABLET ORAL
Qty: 14 | Refills: 0
Start: 2018-05-06 | End: 2018-05-12

## 2018-05-06 RX ORDER — PRIMIDONE 250 MG/1
1 TABLET ORAL
Qty: 0 | Refills: 0 | COMMUNITY

## 2018-05-06 RX ADMIN — GABAPENTIN 100 MILLIGRAM(S): 400 CAPSULE ORAL at 06:27

## 2018-05-06 RX ADMIN — Medication 1: at 06:27

## 2018-05-06 RX ADMIN — Medication 81 MILLIGRAM(S): at 12:21

## 2018-05-06 RX ADMIN — GABAPENTIN 100 MILLIGRAM(S): 400 CAPSULE ORAL at 12:21

## 2018-05-06 RX ADMIN — Medication 75 MILLIGRAM(S): at 12:21

## 2018-05-06 RX ADMIN — Medication 120 MILLIGRAM(S): at 06:27

## 2018-05-06 RX ADMIN — PANTOPRAZOLE SODIUM 40 MILLIGRAM(S): 20 TABLET, DELAYED RELEASE ORAL at 06:27

## 2018-05-06 NOTE — DISCHARGE NOTE ADULT - PLAN OF CARE
resolve C/w current meds  F/u with Dr Arnold in 2 weeks evaluate trial of new nausea medication  F/u with PCP will need further evaluation with GI control c/w current meds  Need to check INR in 2 days

## 2018-05-06 NOTE — DISCHARGE NOTE ADULT - CARE PROVIDER_API CALL
Digna Rosa), Internal Medicine  [address] [place] [address]  Phone: [phone]  Fax: [phone]    Michael Mehta), Internal Medicine  [address] [place] [address]  Phone: [phone]  Fax: [phone]    Reno Mares), Gastroenterology; Internal Medicine  [address] [place] [address]  [address] [place] [address]  Phone: [phone]  Fax: [phone]

## 2018-05-06 NOTE — DISCHARGE NOTE ADULT - CARE PLAN
Principal Discharge DX:	Palpitations  Goal:	resolve  Assessment and plan of treatment:	C/w current meds  F/u with Dr Arnold in 2 weeks  Secondary Diagnosis:	Nausea  Goal:	evaluate  Assessment and plan of treatment:	trial of new nausea medication  F/u with PCP will need further evaluation with GI  Secondary Diagnosis:	Atrial fibrillation, unspecified type  Goal:	control  Assessment and plan of treatment:	c/w current meds  Need to check INR in 2 days

## 2018-05-06 NOTE — DISCHARGE NOTE ADULT - CARE PROVIDERS DIRECT ADDRESSES
[email or web address]

## 2018-05-06 NOTE — PROGRESS NOTE ADULT - SUBJECTIVE AND OBJECTIVE BOX
Reason for Admission: complain of syncope	  History of Present Illness: 	  89 y/o male with PMHx of Afib, BPH, CHF, HTN, HLD DM, GERD, restless leg syndrome, neuropathy, s/p cardiac pacemaker presented to the ED c/o syncope, weakness/fatigue x few days. Notes lightheadedness with associated episode of inability to palpate own pulse. States that he feels pulsations in bilateral ears. +dizziness. +nausea. Pt took Promethazine for nausea at 4pm with no relief. Denies abd pain. On ASA, Coumadin.     Examined at bed side, feels much better now. States his problem is really a lot of belly gases since he passed out a few when he was in CT suite and now he feels much better.     5/6 - No new complaints from CV stand point. PPM interrogation showed normal device function and no significant recent arrhythmias,. Tele so far irrelevant as well. Can go home or onto med surg floor from CV stand point. Otherwise, rest of medical issues per primary team. Will follow up as needed.        Review of Systems:  · Negative General Symptoms	no fever; no chills	  · Negative Skin Symptoms	no rash; no itching	  · Negative Ophthalmologic Symptoms	no diplopia; no photophobia	  · Negative ENMT Symptoms	no hearing difficulty; no ear pain	  · Negative Respiratory and Thorax Symptoms	no wheezing; no dyspnea; no cough	  · Negative Cardiovascular Symptoms	no chest pain; no palpitations	  · Negative Gastrointestinal Symptoms	no nausea; no vomiting	  · Negative General Genitourinary Symptoms	no hematuria; no renal colic	  · Negative Musculoskeletal Symptoms	no arthralgia; no arthritis	  · Negative Neurological Symptoms	no transient paralysis; no weakness	  · Negative Psychiatric Symptoms	no suicidal ideation; no depression	  · Negative Hematology Symptoms	no gum bleeding; no nose bleeding	  · Negative Endocrine Symptoms	no cold intolerance; no heat intolerance	      Allergies and Intolerances:        Allergies:  	naproxen: Drug, Vomiting  	morphine: Drug, Nausea  	Aleve: Drug, Vomiting  	codeine: Drug, Unknown    Home Medications:   * Patient Currently Takes Medications as of 05-May-2018 00:23 documented in Structured Notes  · 	Coumadin 5 mg oral tablet: 1 tab(s) orally once a day , Last Dose Taken:    · 	dilTIAZem 120 mg/24 hours oral capsule, extended release: 1 cap(s) orally once a day, Last Dose Taken:    · 	simvastatin 40 mg oral tablet: 1 tab(s) orally once a day (at bedtime), Last Dose Taken:    · 	warfarin 5 mg oral tablet: 1 tab(s) orally once a day, check INR in 2-3 days, Last Dose Taken:    · 	doxazosin 4 mg oral tablet: 1 tab(s) orally once a day (at bedtime), Last Dose Taken:    · 	Lantus Solostar Pen 100 units/mL subcutaneous solution: 15 unit(s) subcutaneous once a day (at bedtime), Last Dose Taken:    · 	RABEprazole 20 mg oral delayed release tablet: 1 tab(s) orally , As Needed, Last Dose Taken:    · 	Tradjenta 5 mg oral tablet: 1 tab(s) orally once a day (in the evening), Last Dose Taken:    · 	gabapentin 100 mg oral tablet: orally 3 times a day, Last Dose Taken:    · 	aspirin 81 mg oral tablet: 1 tab(s) orally once a day, Last Dose Taken:    · 	clonazePAM 0.5 mg oral tablet: 1 tab(s) orally once a day (at bedtime), Last Dose Taken:    · 	venlafaxine 75 mg oral capsule, extended release: 1 cap(s) orally once a day, Last Dose Taken:      Patient History:    Past Medical History:  Atrial fibrillation, unspecified type    Benign prostatic hyperplasia without lower urinary tract symptoms    Chronic congestive heart failure, unspecified congestive heart failure type    Gastroesophageal reflux disease, esophagitis presence not specified    HTN (hypertension)    IDDM (insulin dependent diabetes mellitus).     Past Surgical History:  Arthropathy of shoulder region    Artificial cardiac pacemaker    History of appendectomy.     Family History:  No pertinent family history in first degree relatives.     Social History:  Social History (marital status, living situation, occupation, tobacco use, alcohol and drug use, and sexual history): Lives at home.  Non smoker. Non alcoholic.	     Tobacco Screening:  · Core Measure Site	Yes	  · Has the patient used tobacco in the past 30 days?	No	    Risk Assessment:    Present on Admission:  Deep Venous Thrombosis	no	  Pulmonary Embolus	no	  Urinary Catheter	no	  Central Venous Catheter/PICC Line	no	  Surgical Site Incision	no	  Pressure Ulcer(s)	no	     Heart Failure:  Does this patient have a history of or has been diagnosed with heart failure? yes.     LV Function Assessment (LVS function was evaluated before arrival and/or during hospitalization) unknown.       Physical Exam:  Physical Exam: Vital Signs Last 24 Hrs  T(C): 36.8 (04 May 2018 17:19), Max: 36.8 (04 May 2018 17:18)  T(F): 98.2 (04 May 2018 17:19), Max: 98.2 (04 May 2018 17:18)  HR: 60 (04 May 2018 23:00) (60 - 60)  BP: 136/48 (04 May 2018 23:00) (136/48 - 181/75)  RR: 18 (04 May 2018 23:00) (17 - 19) SpO2: 99% (04 May 2018 23:00) (99% - 100%)	     Physical Exam:  · Constitutional	detailed exam	  · Constitutional Details	well-developed; well-groomed	  · Eyes	detailed exam	  · Eyes Details	PERRL; EOMI	  · ENMT	detailed exam	  · ENMT Details	ear L; ear R	  · Neck	detailed exam	  · Neck Details	supple; no JVD	  · Back	detailed exam	  · Back Details	ROM intact; strength intact	  · Respiratory	detailed exam	  · Respiratory Details	airway patent; breath sounds equal; respirations non-labored; clear to auscultation bilaterally	  · Cardiovascular	detailed exam	  · Cardiovascular Details	positive S1; positive S2	  · Gastrointestinal	detailed exam	  · GI Normal	soft; nontender; no distention	  · Extremities	detailed exam	  · Extremities Details	no cyanosis; no pedal edema	  · Vascular	detailed exam	  · Radial Pulse	right normal; left normal	  · Neurological	detailed exam	  · Neurological Details	alert and oriented x 3; responds to pain; responds to verbal commands; sensation intact; deep reflexes intact; cranial nerves intact; normal strength	  · Skin	detailed exam	  · Skin Details	warm and dry; color normal	  · Lymph Nodes	No lymphadedenopathy	  · Musculoskeletal	detailed exam	  · Musculoskeletal Details	no joint swelling; no joint erythema	  · Psychiatric	detailed exam	  · Psychiatric Details	normal affect; normal behavior	      Laboratory:   Admit:	    05-May-2018 01:51, Bed Request	  Bed Status: CLEAN	  Patient Bed Information: Nurse Station: IS  Room Number: 0336  Bed Number: 2	  General Chemistry:	    04-May-2018 18:13, Comprehensive Metabolic Panel	  Sodium, Serum: 139, [135 - 145 mmol/L]	  Potassium, Serum: 4.6, [3.5 - 5.3 mmol/L]	  Chloride, Serum: 105, [96 - 108 mmol/L]	  Carbon Dioxide, Serum: 28, [22 - 31 mmol/L]	  Anion Gap, Serum: 6, [5 - 17 mmol/L]	  Blood Urea Nitrogen, Serum:    35, [7 - 23 mg/dL]	  Creatinine, Serum:    1.74, [0.50 - 1.30 mg/dL]	  Glucose, Serum:    207, [70 - 99 mg/dL]	  Calcium, Total Serum: 9.3, [8.5 - 10.1 mg/dL]	  Protein Total, Serum: 7.7, [6.0 - 8.3 gm/dL]	  Albumin, Serum:    3.1, [3.3 - 5.0 g/dL]	  Bilirubin Total, Serum: 0.3, [0.2 - 1.2 mg/dL]	  Alkaline Phosphatase, Serum: 106, [40 - 120 U/L]	  Aspartate Aminotransferase (AST/SGOT): 23, [15 - 37 U/L]	  Alanine Aminotransferase (ALT/SGPT): 26, [12 - 78 U/L]	  eGFR if Non :    34, [>=60 mL/min/1.73M2], Interpretative comment  The units for eGFR are ml/min/1.73m2 (normalized body surface area). The  eGFR is calculated from a serum creatinine using the CKD-EPI equation.  Other variables required for calculation are race, age and sex. Among  patients with chronic kidney disease (CKD), the eGFR is useful in  determining the stage of disease according to KDOQI CKD classification.  All eGFR results are reported numerically with the following  interpretation.          GFR                    With                 Without     (ml/min/1.73 m2)    Kidney Damage       Kidney Damage        >= 90                    Stage 1                     Normal        60-89                    Stage 2                     Decreased GFR        30-59     Stage 3                     Stage 3        15-29                    Stage 4                     Stage 4        < 15                      Stage 5                     Stage 5  Each stage of CKD assumes that the associated GFR level has been in  effect for at least 3 months. Determination of stages one and two (with  eGFR > 59 ml/min/m2) requires estimation of kidney damage for at least 3  months as defined by structural or functional abnormalities.  Limitations: All estimates of GFR will be less accurate for patients at  extremes of muscle mass (including but not limited to frail elderly,  critically ill, or cancer patients), those with unusual diets, and those  with conditions associated with reduced secretion or extrarenal  elimination of creatinine. The eGFR equation is not recommended for use  in patients with unstable creatinine levels.	  eGFR if :    39, [>=60 mL/min/1.73M2]	    04-May-2018 18:13, Troponin I, Serum	  Troponin I, Serum: <0.015, [0.015 - 0.045 ng/mL], High Sensitivity Troponin and new reference  range effective 7/6/2016	    05-May-2018 00:24, Troponin I, Serum	  Troponin I, Serum: <0.015, [0.015 - 0.045 ng/mL], High Sensitivity Troponin and new reference  range effective 7/6/2016	  Coagulation:	    04-May-2018 18:13, Activated Partial Thromboplastin Time	  Activated Partial Thromboplastin Time:    38.1, [27.5 - 37.4 sec], The recommended therapeutic heparin range (full dose) is 58-99 seconds.  Recommended therapeutic Argatroban range is 1.5 to 3.0 times the baseline  APTT value, not to exceed 100 seconds. Recommended therapeutic Refludan  range is 1.5 to 2.5 times thebaseline APTT.	    04-May-2018 18:13, Prothrombin Time and INR, Plasma	  Prothrombin Time, Plasma:    21.3, [9.8 - 12.7 sec]	  INR:    1.95, [0.88 - 1.16 ratio]	  Hematology:	    04-May-2018 18:13, Complete Blood Count + Automated Diff	  WBC Count: 9.83, [3.80 - 10.50 K/uL]	  RBC Count:    3.72, [4.20 - 5.80 M/uL]	  Hemoglobin:    11.5, [13.0 - 17.0 g/dL]	  Hematocrit:    34.6, [39.0 - 50.0 %]	  Mean Cell Volume: 93.0, [80.0 - 100.0 fl]	  Mean Cell Hemoglobin: 30.9, [27.0 - 34.0 pg]	  Mean Cell Hemoglobin Conc: 33.2, [32.0 - 36.0 gm/dL]	  Red Cell Distrib Width:    15.6, [10.3 - 14.5 %]	  Platelet Count - Automated: 208, [150 - 400 K/uL]	  Auto Neutrophil #: 6.93, [1.80 - 7.40 K/uL]	  Auto Lymphocyte #: 1.68, [1.00 - 3.30 K/uL]	  Auto Monocyte #:    0.97, [0.00 - 0.90 K/uL]	  Auto Eosinophil #: 0.19, [0.00 - 0.50 K/uL]	  Auto Basophil #: 0.04, [0.00 - 0.20 K/uL]	  Auto Neutrophil %: 70.5, [43.0 - 77.0 %], Differential percentages must be correlated with absolute numbers for  clinical significance.	  Auto Lymphocyte %: 17.1, [13.0 - 44.0 %]	  Auto Monocyte %: 9.9, [2.0 - 14.0 %]	  Auto Eosinophil %: 1.9, [0.0 - 6.0 %]	  Auto Basophil %: 0.4, [0.0 - 2.0 %]	  Auto Immature Granulocyte %: 0.2, [0.0 - 1.5 %]	    04-May-2018 18:13, Nucleated RBC	  Nucleated RBC: 0, [0 - 0 /100 WBCs]	    Radiology:   CT:	    04-May-2018 19:06, CT Head No Cont	  CT Head No Cont: EXAM:  CT BRAIN                            PROCEDURE DATE:  05/04/2018          INTERPRETATION:  CT BRAIN    HISTORY:  Hypertension, near syncope    TECHNIQUE: CT of the head was performed without intravenous contrast.   Multiplanar reformatted images were then generated from the axial   acquired data.  This study was performed using automatic exposure control   (radiation dose reduction software) to obtain a diagnostic image quality   scan with patient dose as low as reasonably achievable.    COMPARISON: Head CT 8/29/2015    FINDINGS:     INTRACRANIAL FINDINGS: There is age-related atrophy and chronic   microvascular ischemic change. No evidence for acute territorial infarct,   mass lesion, mass effect, or recent hemorrhage. There is no abnormal   extra axial collection.    EXTRACRANIAL FINDINGS: No extracalvarial soft tissue swelling. No   depressed calvarial fracture. The orbital contents are unremarkable. The   visualized paranasal sinuses are well aerated. The mastoid air cells are  clear.    IMPRESSION:     No acute intracranial hemorrhage, mass effect, or midline shift.                 [facility]   This document has been electronically signed. May  4 2018  7:19PM	  X-Ray, Fluoroscopy:	  PACS Image: Image(s) Available	    Assessment and Plan:    Assessment:  · Assessment		      A/P: Admitted after possible pre syncopal episode. Tele, CE, ECG so far irrelevant.    1.  Pre Syncope  Suggest PPM interrogation, if also negative for significant recent chito or tachy arrhythmia and proper device functioning and provided he continues to feel better, can go home and follow up with me in the office.     2.  h/o CHF  -clinically stable  Cont current regimen.     3.  HTN  -follow BP and adjust meds    4.  Chronic A fib  -HR is controlled  -on coumadin  -follow INR    Will follow up device interrogation.  I personally reviewed office records, hospital records, imaging, telemetry, labs.

## 2018-05-06 NOTE — DISCHARGE NOTE ADULT - HOSPITAL COURSE
88 y/o male with PMHx of Afib, BPH, CHF, HTN, HLD DM, GERD, restless leg syndrome, neuropathy, s/p cardiac pacemaker presented  to the ED c/o feeling palpitations and then irregular pulse,  weakness/fatigue x few days.. Stated that he feels pulsations in bilateral ears.  Pt was admitted for further evaluation  trop was neg, no ischemic EKG changes. ECHO with normal EF. Pt was evaluated by cardio and EP eval recommended. PPM interrogated and found no malFx, ventricular pacing. Pt was cleared by Cardio for discharge. Hospital course notable for episode of Nausea and retching, resolved with Zofran and Pantoprazole. Pt reports that has nausea episodes on/Off for tyears and takes promethazine PRN.  Pts records reviewed, had recent ER visit to ED and CT abd was done at that time, no acute pathology found. Also as per Pt Portal Pt possibly had slowing stomach emptying, but no formal GI eval or evaluation found. Pt states that did not f/u with GI for years. Has h/o DM, could possibly have gastroparesis. RUQ sono and GI eval ordered. Pt Pt does not want to stay for further evaluation. Denies  nausea or Vomiting no palpitations or dizziness. OutPt follow up with PCP  for further evaluation and GI referral recommended and discussed. Meds discussed.   Vital Signs Last 24 Hrs  T(C): 36.3 (06 May 2018 06:45), Max: 36.9 (05 May 2018 23:25)  T(F): 97.3 (06 May 2018 06:45), Max: 98.4 (05 May 2018 23:25)  HR: 73 (06 May 2018 09:00) (60 - 94)  BP: 118/53 (06 May 2018 09:00) (106/40 - 154/53)  BP(mean): 69 (06 May 2018 09:00) (57 - 99)  RR: 16 (06 May 2018 09:00) (16 - 30)  SpO2: 96% (05 May 2018 17:00) (96% - 100%)    PHYSICAL EXAM:  General: Well developed; well nourished; in no acute distress  Eyes: PERRLA, EOMI; conjunctiva and sclera clear  Head: Normocephalic; atraumatic  ENMT: No nasal discharge; airway clear  Neck: Supple; non tender; no masses  Respiratory: Good air entry.  No wheezes, rales or rhonchi  Cardiovascular: Regular rate and rhythm. S1 and S2 Normal; No murmurs  Gastrointestinal: Soft non-tender, mildly distended; Normal bowel sounds  Genitourinary: No costovertebral angle tenderness  Extremities: Normal range of motion, No  edema  Vascular: Peripheral pulses palpable 2+ bilaterally  Neurological: Alert and oriented x4  Skin: Warm and dry. No acute rash  Lymph Nodes: No acute cervical adenopathy  Musculoskeletal: Normal muscle  tone, without deformities  Psychiatric: Cooperative and appropriate      PLAN:     1. Dizziness /Palpitations. S/p PPM replacement, dual chamber was changed to MICRA   had no episode of syncope, but palpitations    Feels better    tele: no acute events   JENNIFER neg   Check orthostatics: neg   PPM interrogated with normal Fx, will need to f/u with Dr Arnold   Cleared by cardio for d/c, will follow with Dr Mehta in 1 week     2. Chronic diastolic CHF  -clinically  not in Failure       3. Chronic AFIB   - HR controlled  - C/w diltiazem  - C/w Coumadin  - NR  sub Tx today, rosetta missed dose on 5/4   - C/w same dose and F/u with cardio office for INR check in 2 days     4. Nausea, chronic   - possible gastroparesis?  - lipase/LFTs  wnl  - CT done recently in ED visit for similar complains, negative for acute findings   - RUQ sono ordered, but Pt does not want to stay for evaluation, discussed will f/u with Dr Rosa for work up   - Reglan PRN, RX sent   - GI eval outPt     5. HTN  -c/w current meds     4.DM  -c/w diabetic diet  - HB A 1c 8.6  - Resume Lantus and Tradjenta     5.CKD stage 3  -stable        7. BPH  -c/w doxazosin   -stable      Stable for d/c home today      D/c summary faxed over to PCP

## 2018-05-06 NOTE — DISCHARGE NOTE ADULT - MEDICATION SUMMARY - MEDICATIONS TO TAKE
I will START or STAY ON the medications listed below when I get home from the hospital:    aspirin 81 mg oral tablet  -- 1 tab(s) by mouth once a day  -- Indication: For CAD    doxazosin 4 mg oral tablet  -- 1 tab(s) by mouth once a day (at bedtime)  -- Indication: For BPH    isosorbide mononitrate 10 mg oral tablet  -- 1 tab(s) by mouth once a day  -- Indication: For CAD     dilTIAZem 120 mg/24 hours oral capsule, extended release  -- 1 cap(s) by mouth once a day  -- Indication: For AFIB    warfarin 5 mg oral tablet  -- 1 tab(s) by mouth once a day, check INR in 2-3 days  -- Indication: For AFIB     gabapentin 100 mg oral tablet  -- orally 3 times a day  -- Indication: For Neuropathy     clonazePAM 0.5 mg oral tablet  -- 1 tab(s) by mouth once a day (at bedtime)  -- Indication: For Anxiety     venlafaxine 75 mg oral capsule, extended release  -- 1 cap(s) by mouth once a day  -- Indication: For Depression     Lantus Solostar Pen 100 units/mL subcutaneous solution  -- 15 unit(s) subcutaneous once a day (at bedtime)  -- Indication: For DM    Tradjenta 5 mg oral tablet  -- 1 tab(s) by mouth once a day (in the evening)  -- Indication: For DM    metoclopramide 5 mg oral tablet  -- 1 tab(s) by mouth 2 times a day, As Needed -for nausea   -- May cause drowsiness.  Alcohol may intensify this effect.  Use care when operating dangerous machinery.  Take medication on an empty stomach 1 hour before or 2 to 3 hours after a meal unless otherwise directed by your doctor.    -- Indication: For NAusea     simvastatin 40 mg oral tablet  -- 1 tab(s) by mouth once a day (at bedtime)  -- Indication: For HLD    RABEprazole 20 mg oral delayed release tablet  -- 1 tab(s) by mouth , As Needed  -- Indication: For GERD    Calos Caps oral capsule  -- 1 cap(s) by mouth once a day  -- Indication: For CKD

## 2018-05-06 NOTE — DISCHARGE NOTE ADULT - PATIENT PORTAL LINK FT
You can access the [facility] Patient Portal, offered by [facility], by registering with the following website: [email or web address]

## 2018-05-09 DIAGNOSIS — N18.3 CHRONIC KIDNEY DISEASE, STAGE 3 (MODERATE): ICD-10-CM

## 2018-05-09 DIAGNOSIS — N40.0 BENIGN PROSTATIC HYPERPLASIA WITHOUT LOWER URINARY TRACT SYMPTOMS: ICD-10-CM

## 2018-05-09 DIAGNOSIS — E11.22 TYPE 2 DIABETES MELLITUS WITH DIABETIC CHRONIC KIDNEY DISEASE: ICD-10-CM

## 2018-05-09 DIAGNOSIS — K31.84 GASTROPARESIS: ICD-10-CM

## 2018-05-09 DIAGNOSIS — K21.9 GASTRO-ESOPHAGEAL REFLUX DISEASE WITHOUT ESOPHAGITIS: ICD-10-CM

## 2018-05-09 DIAGNOSIS — R55 SYNCOPE AND COLLAPSE: ICD-10-CM

## 2018-05-09 DIAGNOSIS — I50.32 CHRONIC DIASTOLIC (CONGESTIVE) HEART FAILURE: ICD-10-CM

## 2018-05-09 DIAGNOSIS — E11.40 TYPE 2 DIABETES MELLITUS WITH DIABETIC NEUROPATHY, UNSPECIFIED: ICD-10-CM

## 2018-05-09 DIAGNOSIS — I13.0 HYPERTENSIVE HEART AND CHRONIC KIDNEY DISEASE WITH HEART FAILURE AND STAGE 1 THROUGH STAGE 4 CHRONIC KIDNEY DISEASE, OR UNSPECIFIED CHRONIC KIDNEY DISEASE: ICD-10-CM

## 2018-05-09 DIAGNOSIS — Z95.0 PRESENCE OF CARDIAC PACEMAKER: ICD-10-CM

## 2018-05-09 DIAGNOSIS — G25.81 RESTLESS LEGS SYNDROME: ICD-10-CM

## 2018-05-09 DIAGNOSIS — R00.2 PALPITATIONS: ICD-10-CM

## 2018-05-09 DIAGNOSIS — I48.2 CHRONIC ATRIAL FIBRILLATION: ICD-10-CM

## 2018-05-09 DIAGNOSIS — E78.5 HYPERLIPIDEMIA, UNSPECIFIED: ICD-10-CM

## 2018-05-16 ENCOUNTER — APPOINTMENT (OUTPATIENT)
Dept: ELECTROPHYSIOLOGY | Facility: CLINIC | Age: 83
End: 2018-05-16
Payer: MEDICARE

## 2018-05-16 ENCOUNTER — NON-APPOINTMENT (OUTPATIENT)
Age: 83
End: 2018-05-16

## 2018-05-16 VITALS
SYSTOLIC BLOOD PRESSURE: 153 MMHG | WEIGHT: 173 LBS | HEIGHT: 68 IN | DIASTOLIC BLOOD PRESSURE: 75 MMHG | BODY MASS INDEX: 26.22 KG/M2 | HEART RATE: 73 BPM

## 2018-05-16 PROCEDURE — 0389T: CPT | Mod: KX

## 2018-05-16 RX ORDER — WARFARIN 4 MG/1
TABLET ORAL
Refills: 0 | Status: DISCONTINUED | COMMUNITY
End: 2018-05-16

## 2018-07-10 ENCOUNTER — APPOINTMENT (OUTPATIENT)
Dept: ELECTROPHYSIOLOGY | Facility: CLINIC | Age: 83
End: 2018-07-10

## 2018-08-21 ENCOUNTER — APPOINTMENT (OUTPATIENT)
Dept: ELECTROPHYSIOLOGY | Facility: CLINIC | Age: 83
End: 2018-08-21
Payer: MEDICARE

## 2018-08-21 VITALS
HEART RATE: 81 BPM | SYSTOLIC BLOOD PRESSURE: 163 MMHG | WEIGHT: 178 LBS | BODY MASS INDEX: 26.98 KG/M2 | HEIGHT: 68 IN | DIASTOLIC BLOOD PRESSURE: 94 MMHG

## 2018-08-21 PROBLEM — K21.9 GASTRO-ESOPHAGEAL REFLUX DISEASE WITHOUT ESOPHAGITIS: Chronic | Status: ACTIVE | Noted: 2017-12-18

## 2018-08-21 PROBLEM — I50.9 HEART FAILURE, UNSPECIFIED: Chronic | Status: ACTIVE | Noted: 2017-12-18

## 2018-08-21 PROBLEM — N40.0 BENIGN PROSTATIC HYPERPLASIA WITHOUT LOWER URINARY TRACT SYMPTOMS: Chronic | Status: ACTIVE | Noted: 2017-12-18

## 2018-08-21 PROBLEM — I48.91 UNSPECIFIED ATRIAL FIBRILLATION: Chronic | Status: ACTIVE | Noted: 2017-12-18

## 2018-08-21 PROCEDURE — 0389T: CPT | Mod: KX,Q0

## 2018-08-21 RX ORDER — SIMVASTATIN 20 MG/1
20 TABLET, FILM COATED ORAL
Refills: 0 | Status: DISCONTINUED | COMMUNITY
End: 2018-08-21

## 2018-08-21 RX ORDER — DOXYCYCLINE HYCLATE 50 MG/1
CAPSULE ORAL
Refills: 0 | Status: DISCONTINUED | COMMUNITY
End: 2018-08-21

## 2018-09-19 NOTE — PROGRESS NOTE ADULT - I WAS PHYSICALLY PRESENT FOR THE KEY PORTIONS OF THE EVALUATION AND MANAGEMENT (E/M) SERVICE PROVIDED.  I AGREE WITH THE ABOVE HISTORY, PHYSICAL, AND PLAN WHICH I HAVE REVIEWED AND EDITED WHERE APPROPRIATE
MRN:[id number]                      After Visit Summary   9/19/2018    Susan Haq    MRN: [id number]           Thank you!     Thank you for choosing [facility] for your care. Our goal is always to provide you with excellent care. Hearing back from our patients is one way we can continue to improve our services. Please take a few minutes to complete the written survey that you may receive in the mail after you visit with us. Thank you!        Patient Information     Date Of Birth          1/10/1932        About your hospital stay     You were admitted on:  September 20, 2018 You last received care in the:  [facility]    You were discharged on:  September 20, 2018        Reason for your hospital stay       COPD exacerbation                  Who to Call     For medical emergencies, please call 911.  For non-urgent questions about your medical care, please call your primary care provider or clinic, [phone]          Attending Provider     Provider Specialty    Reyes, Husam Ozuna MD Emergency Medicine    [facility], Tarik Bryan MD [facility]       Primary Care Provider Office Phone # Fax #    Ema Melendez [phone]      After Care Instructions     Activity       Your activity upon discharge: activity as tolerated            Diet       Follow this diet upon discharge:  Regular Diet Adult            Oxygen Adult       Renew Home Oxygen Order  Renew previous prescription.  Expected treatment length is indefinite (99 months).    Attending Provider: Jerrod Turk MD  Physician signature: See electronic signature associated with these discharge orders  Date of Order: September 20, 2018                  Follow-up Appointments     Follow-up and recommended labs and tests        Follow up with primary care provider, Ema Melendez, within 7 days for hospital follow- up.  No follow up labs or test are  "needed.                  Pending Results     No orders found for last 3 day(s).            Statement of Approval     Ordered          09/20/18 1212  I have reviewed and agree with all the recommendations and orders detailed in this document.  EFFECTIVE NOW     Approved and electronically signed by:  Jerrod Turk MD             Admission Information     Date & Time Provider Department Dept. Phone    9/19/2018 Salazar Shabazz MD [facility] [place] [facility] [phone]      Your Vitals Were     Blood Pressure Pulse Temperature Respirations Height Weight    134/60 78 98.3  F (36.8  C) (Oral) 22 1.651 m (5' 5\") 114.5 kg (252 lb 6.8 oz)    Pulse Oximetry BMI (Body Mass Index)                95% 42.01 kg/m2          Care EveryWhere ID     This is your Care EveryWhere ID. This could be used by other organizations to access your [place] medical records  [id number]        Equal Access to Services     DESIREE PORTILLO AH: Hadkayce Tony, jeff chinchilla, andressa rodriguezalfloresita luu, nyla baltazar . So [facility] [phone].    ATENCIÓN: Si habla español, tiene a petersen disposición servicios gratuitos de asistencia lingüística. Llame al [phone].    We comply with applicable federal civil rights laws and [place] laws. We do not discriminate on the basis of race, color, national origin, age, disability, sex, sexual orientation, or gender identity.               Review of your medicines      START taking        Dose / Directions    levofloxacin 750 MG tablet   Commonly known as:  LEVAQUIN   Indication:  copd exac   Used for:  COPD exacerbation (H)        Dose:  750 mg   Take 1 tablet (750 mg) by mouth every other day Next dose Saturday, 9/22   Quantity:  2 tablet   Refills:  0       predniSONE 20 MG tablet   Commonly known as:  DELTASONE   Used for:  COPD exacerbation (H)        Dose:  40 mg   Take 2 tablets (40 mg) by mouth daily for 3 days Next dose Friday 9/21   Quantity:  6 tablet "   Refills:  0         CONTINUE these medicines which have NOT CHANGED        Dose / Directions    acetaminophen 500 MG tablet   Commonly known as:  TYLENOL        Dose:  1000 mg   Take 2 tablets (1,000 mg) by mouth every 8 hours   Quantity:  60 tablet   Refills:  6       * albuterol 108 (90 Base) MCG/ACT inhaler   Commonly known as:  PROAIR HFA/PROVENTIL HFA/VENTOLIN HFA   Used for:  Chronic obstructive pulmonary disease, unspecified COPD type (H)        Dose:  2 puff   Inhale 2 puffs into the lungs every 4 hours as needed for shortness of breath / dyspnea or wheezing   Quantity:  3 Inhaler   Refills:  3       * albuterol (2.5 MG/3ML) 0.083% neb solution   Used for:  SOB (shortness of breath), Chronic obstructive pulmonary disease, unspecified COPD type (H)        NEBULIZE ONE VIAL (2.5 MG) EVERY FOUR HOURS AS NEEDED FOR SHORTNESS OF BREATH/DYSPNEA OR WHEEZING   Quantity:  120 vial   Refills:  3       ALLERGY RELIEF 10 MG tablet   Used for:  Acute seasonal allergic rhinitis, unspecified trigger   Generic drug:  loratadine        TAKE ONE TABLET BY MOUTH ONCE DAILY   Quantity:  90 tablet   Refills:  1       amLODIPine 10 MG tablet   Commonly known as:  NORVASC   Used for:  Hypertension goal BP (blood pressure) < 140/90        TAKE 1 TABLET (10 MG) BY MOUTH DAILY   Quantity:  90 tablet   Refills:  1       atenolol 50 MG tablet   Commonly known as:  TENORMIN   Used for:  Hypertension goal BP (blood pressure) < 140/90        Dose:  50 mg   Take 1 tablet (50 mg) by mouth 2 times daily   Quantity:  180 tablet   Refills:  3       atorvastatin 20 MG tablet   Commonly known as:  LIPITOR   Used for:  Hyperlipidemia LDL goal <130        Dose:  20 mg   Take 1 tablet (20 mg) by mouth daily   Quantity:  90 tablet   Refills:  3       budesonide-formoterol 160-4.5 MCG/ACT Inhaler   Commonly known as:  SYMBICORT   Used for:  Chronic obstructive pulmonary disease, unspecified COPD type (H)        INHALE 2 PUFFS INTO THE LUNGS 2 TIMES  Statement Selected DAILY   Quantity:  10.2 g   Refills:  5       CALCIUM 600 + D PO        Take  by mouth.   Refills:  0       camphor-menthol 0.5-0.5 % Lotn   Commonly known as:  DERMASARRA   Used for:  Rash and nonspecific skin eruption        Dose:  1 applicator   Apply 1 mL topically every 8 hours as needed for skin care (apply to rash on legs)   Quantity:  1 Bottle   Refills:  1       escitalopram 5 MG tablet   Commonly known as:  LEXAPRO   Used for:  Major depressive disorder, single episode, moderate (H)        Take 1 tablet (5 mg) daily for 2 weeks, then if no improvement in symptoms can increase to 2 tablets (10 mg) daily   Quantity:  30 tablet   Refills:  1       fish oil-omega-3 fatty acids 1000 MG capsule        1 cap daily   Refills:  0       folic acid 1 MG tablet   Commonly known as:  FOLVITE   Used for:  Interstitial pulmonary fibrosis (H)        Dose:  1 mg   Take 1 tablet (1 mg) by mouth daily   Quantity:  90 tablet   Refills:  3       losartan-hydrochlorothiazide 100-25 MG per tablet   Commonly known as:  HYZAAR   Used for:  Type 2 diabetes mellitus without complication, without long-term current use of insulin (H), Hypertension goal BP (blood pressure) < 140/90        Dose:  1 tablet   Take 1 tablet by mouth daily   Quantity:  90 tablet   Refills:  3       Nebulizer Compressor Kit        Dose:  1 kit   1 kit every 4 hours as needed   Quantity:  1 kit   Refills:  0       nystatin [id number] UNIT/GM Powd   Commonly known as:  MYCOSTATIN   Used for:  Rash and nonspecific skin eruption        Apply topically 3 times daily as needed   Quantity:  60 g   Refills:  1       * order for DME   Used for:  SUSSY (obstructive sleep apnea)        1.  CPAP pressure 12 cm/H20 with heated humidity and auto-titrating capability.  2.  Provide mask to fit and CPAP supplies. 3.  Length of need lifetime. 4.  Ok to d/c O2 bleed in to her PAP overnight.   Refills:  0       * order for DME   Used for:  Leg edema        TEDs stockings knee high to  be worn during the day.   Quantity:  1 Units   Refills:  0       * order for DME   Used for:  Hypoxia        Equipment being ordered: Oxygen   Quantity:  1 Units   Refills:  0       * order for DME   Used for:  SUSSY (obstructive sleep apnea)        Equipment being ordered: CPAP supplies   Quantity:  1 Units   Refills:  0       order for DME   Used for:  COPD (chronic obstructive pulmonary disease) (H)        Equipment being ordered: Oxygen - 3 liters continous   Quantity:  1 Device   Refills:  0       order for DME   Used for:  COPD exacerbation (H), SOB (shortness of breath), Chronic obstructive pulmonary disease, unspecified COPD type (H), Interstitial pulmonary fibrosis (H)        Equipment being ordered: Nebulizer   Quantity:  1 each   Refills:  0       potassium chloride 10 MEQ tablet   Commonly known as:  K-TAB,KLOR-CON   Used for:  Hypokalemia        TAKE 1 TABLET (10 MEQ) BY MOUTH 2 TIMES DAILY   Quantity:  60 tablet   Refills:  9       SENNA PLUS 8.6-50 MG per tablet   Used for:  Constipation   Generic drug:  senna-docusate        TAKE ONE TO TWO TABLETS BY MOUTH TWICE DAILY AS NEEDED FOR CONSTIPATION   Quantity:  100 tablet   Refills:  1       SIMETHICONE-80 PO        Dose:  80 mg   Take 80 mg by mouth every morning And TID prn belching   Refills:  0       tiotropium 18 MCG capsule   Commonly known as:  SPIRIVA HANDIHALER   Used for:  Chronic obstructive pulmonary disease, unspecified COPD type (H), SOB (shortness of breath) on exertion        Inhale  into the lungs. Inhale contents of one capsule daily   Quantity:  90 capsule   Refills:  3       * Notice:  This list has 6 medication(s) that are the same as other medications prescribed for you. Read the directions carefully, and ask your doctor or other care provider to review them with you.         Where to get your medicines      These medications were sent to [facility] [place] [address]  [address], [place] [address]     Phone:  [phone]     albuterol (2.5 MG/3ML) 0.083% neb solution    levofloxacin 750 MG tablet    predniSONE 20 MG tablet                Protect others around you: Learn how to safely use, store and throw away your medicines at www.disposemymeds.org.        ANTIBIOTIC INSTRUCTION     You've Been Prescribed an Antibiotic - Now What?  Your healthcare team thinks that you or your loved one might have an infection. Some infections can be treated with antibiotics, which are powerful, life-saving drugs. Like all medications, antibiotics have side effects and should only be used when necessary. There are some important things you should know about your antibiotic treatment.      Your healthcare team may run tests before you start taking an antibiotic.    Your team may take samples (e.g., from your blood, urine or other areas) to run tests to look for bacteria. These test can be important to determine if you need an antibiotic at all and, if you do, which antibiotic will work best.      Within a few days, your healthcare team might change or even stop your antibiotic.    Your team may start you on an antibiotic while they are working to find out what is making you sick.    Your team might change your antibiotic because test results show that a different antibiotic would be better to treat your infection.    In some cases, once your team has more information, they learn that you do not need an antibiotic at all. They may find out that you don't have an infection, or that the antibiotic you're taking won't work against your infection. For example, an infection caused by a virus can't be treated with antibiotics. Staying on an antibiotic when you don't need it is more likely to be harmful than helpful.      You may experience side effects from your antibiotic.    Like all medications, antibiotics have side effects. Some of these can be serious.    Let you healthcare team know  if you have any known allergies when you are admitted to the hospital.    One significant side effect of nearly all antibiotics is the risk of severe and sometimes deadly diarrhea caused by Clostridium difficile (C. Difficile). This occurs when a person takes antibiotics because some good germs are destroyed. Antibiotic use allows C. diificile to take over, putting patients at high risk for this serious infection.    As a patient or caregiver, it is important to understand your or your loved one's antibiotic treatment. It is especially important for caregivers to speak up when patients can't speak for themselves. Here are some important questions to ask your healthcare team.    What infection is this antibiotic treating and how do you know I have that infection?    What side effects might occur from this antibiotic?    How long will I need to take this antibiotic?    Is it safe to take this antibiotic with other medications or supplements (e.g., vitamins) that I am taking?     Are there any special directions I need to know about taking this antibiotic? For example, should I take it with food?    How will I be monitored to know whether my infection is responding to the antibiotic?    What tests may help to make sure the right antibiotic is prescribed for me?      Information provided by:  www.cdc.gov/getsmart  U.S. Department of Health and Human Services  Centers for disease Control and Prevention  National Center for Emerging and Zoonotic Infectious Diseases  Division of Healthcare Quality Promotion             Medication List: This is a list of all your medications and when to take them. Check marks below indicate your daily home schedule. Keep this list as a reference.      Medications           Morning Afternoon Evening Bedtime As Needed    acetaminophen 500 MG tablet   Commonly known as:  TYLENOL   Take 2 tablets (1,000 mg) by mouth every 8 hours                                * albuterol 108 (90 Base) MCG/ACT  inhaler   Commonly known as:  PROAIR HFA/PROVENTIL HFA/VENTOLIN HFA   Inhale 2 puffs into the lungs every 4 hours as needed for shortness of breath / dyspnea or wheezing                                * albuterol (2.5 MG/3ML) 0.083% neb solution   NEBULIZE ONE VIAL (2.5 MG) EVERY FOUR HOURS AS NEEDED FOR SHORTNESS OF BREATH/DYSPNEA OR WHEEZING                                ALLERGY RELIEF 10 MG tablet   TAKE ONE TABLET BY MOUTH ONCE DAILY   Generic drug:  loratadine                                amLODIPine 10 MG tablet   Commonly known as:  NORVASC   TAKE 1 TABLET (10 MG) BY MOUTH DAILY   Last time this was given:  10 mg on 9/20/2018  9:16 AM                                atenolol 50 MG tablet   Commonly known as:  TENORMIN   Take 1 tablet (50 mg) by mouth 2 times daily   Last time this was given:  50 mg on 9/20/2018  9:16 AM                                atorvastatin 20 MG tablet   Commonly known as:  LIPITOR   Take 1 tablet (20 mg) by mouth daily                                budesonide-formoterol 160-4.5 MCG/ACT Inhaler   Commonly known as:  SYMBICORT   INHALE 2 PUFFS INTO THE LUNGS 2 TIMES DAILY                                CALCIUM 600 + D PO   Take  by mouth.                                camphor-menthol 0.5-0.5 % Lotn   Commonly known as:  DERMASARRA   Apply 1 mL topically every 8 hours as needed for skin care (apply to rash on legs)                                escitalopram 5 MG tablet   Commonly known as:  LEXAPRO   Take 1 tablet (5 mg) daily for 2 weeks, then if no improvement in symptoms can increase to 2 tablets (10 mg) daily                                fish oil-omega-3 fatty acids 1000 MG capsule   1 cap daily                                folic acid 1 MG tablet   Commonly known as:  FOLVITE   Take 1 tablet (1 mg) by mouth daily                                levofloxacin 750 MG tablet   Commonly known as:  LEVAQUIN   Take 1 tablet (750 mg) by mouth every other day Next dose Saturday, 9/22    Last time this was given:  750 mg on 9/20/2018 12:18 PM                                losartan-hydrochlorothiazide 100-25 MG per tablet   Commonly known as:  HYZAAR   Take 1 tablet by mouth daily                                Nebulizer Compressor Kit   1 kit every 4 hours as needed                                nystatin 719026 UNIT/GM Powd   Commonly known as:  MYCOSTATIN   Apply topically 3 times daily as needed                                * order for DME   1.  CPAP pressure 12 cm/H20 with heated humidity and auto-titrating capability.  2.  Provide mask to fit and CPAP supplies. 3.  Length of need lifetime. 4.  Ok to d/c O2 bleed in to her PAP overnight.                                * order for DME   TEDs stockings knee high to be worn during the day.                                * order for DME   Equipment being ordered: Oxygen                                * order for DME   Equipment being ordered: CPAP supplies                                order for DME   Equipment being ordered: Oxygen - 3 liters continous                                order for DME   Equipment being ordered: Nebulizer                                potassium chloride 10 MEQ tablet   Commonly known as:  K-TAB,KLOR-CON   TAKE 1 TABLET (10 MEQ) BY MOUTH 2 TIMES DAILY   Last time this was given:  10 mEq on 9/20/2018  9:16 AM                                predniSONE 20 MG tablet   Commonly known as:  DELTASONE   Take 2 tablets (40 mg) by mouth daily for 3 days Next dose Friday 9/21   Last time this was given:  40 mg on 9/20/2018 12:18 PM                                SENNA PLUS 8.6-50 MG per tablet   TAKE ONE TO TWO TABLETS BY MOUTH TWICE DAILY AS NEEDED FOR CONSTIPATION   Generic drug:  senna-docusate                                SIMETHICONE-80 PO   Take 80 mg by mouth every morning And TID prn belching                                tiotropium 18 MCG capsule   Commonly known as:  SPIRIVA HANDIHALER   Inhale  into the lungs.  Inhale contents of one capsule daily                                * Notice:  This list has 6 medication(s) that are the same as other medications prescribed for you. Read the directions carefully, and ask your doctor or other care provider to review them with you.

## 2018-11-20 ENCOUNTER — APPOINTMENT (OUTPATIENT)
Dept: ELECTROPHYSIOLOGY | Facility: CLINIC | Age: 83
End: 2018-11-20
Payer: MEDICARE

## 2018-11-20 VITALS
WEIGHT: 173 LBS | SYSTOLIC BLOOD PRESSURE: 167 MMHG | HEIGHT: 68 IN | HEART RATE: 72 BPM | DIASTOLIC BLOOD PRESSURE: 83 MMHG | BODY MASS INDEX: 26.22 KG/M2

## 2018-11-20 VITALS — DIASTOLIC BLOOD PRESSURE: 77 MMHG | SYSTOLIC BLOOD PRESSURE: 148 MMHG

## 2018-11-20 PROCEDURE — 0389T: CPT | Mod: Q0

## 2019-04-02 NOTE — DISCHARGE NOTE ADULT - PROVIDER RX CONTACT NUMBER
per pt has been increasing anxious, being treated for anxiety related to traveling in vehicles and worsening anxiety over becoming ill.  has intermittent thoughts of death and what would happen if he were to kill himself but reports he does not have a plan and does want to continue living.   , denies intent or plan.  denies HI/I/P [phone]

## 2019-04-24 ENCOUNTER — APPOINTMENT (OUTPATIENT)
Dept: ELECTROPHYSIOLOGY | Facility: CLINIC | Age: 84
End: 2019-04-24
Payer: MEDICARE

## 2019-04-24 VITALS — DIASTOLIC BLOOD PRESSURE: 71 MMHG | SYSTOLIC BLOOD PRESSURE: 151 MMHG | HEART RATE: 83 BPM

## 2019-04-24 VITALS — BODY MASS INDEX: 26.22 KG/M2 | HEIGHT: 68 IN | WEIGHT: 173 LBS

## 2019-04-24 PROCEDURE — 93279 PRGRMG DEV EVAL PM/LDLS PM: CPT

## 2019-07-19 ENCOUNTER — APPOINTMENT (OUTPATIENT)
Dept: ELECTROPHYSIOLOGY | Facility: CLINIC | Age: 84
End: 2019-07-19
Payer: MEDICARE

## 2019-07-19 VITALS
HEIGHT: 68 IN | WEIGHT: 173 LBS | BODY MASS INDEX: 26.22 KG/M2 | HEART RATE: 76 BPM | SYSTOLIC BLOOD PRESSURE: 182 MMHG | DIASTOLIC BLOOD PRESSURE: 88 MMHG

## 2019-07-19 PROCEDURE — 93279 PRGRMG DEV EVAL PM/LDLS PM: CPT

## 2019-10-22 ENCOUNTER — APPOINTMENT (OUTPATIENT)
Dept: ELECTROPHYSIOLOGY | Facility: CLINIC | Age: 84
End: 2019-10-22
Payer: MEDICARE

## 2019-10-22 VITALS
HEIGHT: 68 IN | DIASTOLIC BLOOD PRESSURE: 72 MMHG | SYSTOLIC BLOOD PRESSURE: 156 MMHG | BODY MASS INDEX: 26.83 KG/M2 | HEART RATE: 65 BPM | OXYGEN SATURATION: 98 % | WEIGHT: 177 LBS

## 2019-10-22 DIAGNOSIS — Z86.79 PERSONAL HISTORY OF OTHER DISEASES OF THE CIRCULATORY SYSTEM: ICD-10-CM

## 2019-10-22 DIAGNOSIS — Z95.0 PRESENCE OF CARDIAC PACEMAKER: ICD-10-CM

## 2019-10-22 PROCEDURE — 93279 PRGRMG DEV EVAL PM/LDLS PM: CPT

## 2019-10-22 RX ORDER — LINAGLIPTIN 5 MG/1
5 TABLET, FILM COATED ORAL DAILY
Refills: 0 | Status: ACTIVE | COMMUNITY

## 2019-10-22 RX ORDER — VENLAFAXINE HYDROCHLORIDE 75 MG/1
75 CAPSULE, EXTENDED RELEASE ORAL DAILY
Refills: 0 | Status: ACTIVE | COMMUNITY

## 2019-10-22 RX ORDER — INSULIN GLARGINE 100 [IU]/ML
INJECTION, SOLUTION SUBCUTANEOUS
Refills: 0 | Status: ACTIVE | COMMUNITY

## 2019-10-22 RX ORDER — ASCORBIC ACID, THIAMINE MONONITRATE,RIBOFLAVIN, NIACINAMIDE, PYRIDOXINE HYDROCHLORIDE, FOLIC ACID, CYANOCOBALAMIN, BIOTIN, CALCIUM PANTOTHENATE, 100; 1.5; 1.7; 20; 10; 1; 6000; 150000; 5 MG/1; MG/1; MG/1; MG/1; MG/1; MG/1; UG/1; UG/1; MG/1
CAPSULE, LIQUID FILLED ORAL
Refills: 0 | Status: ACTIVE | COMMUNITY

## 2019-10-22 RX ORDER — GABAPENTIN 100 MG/1
100 CAPSULE ORAL
Refills: 0 | Status: DISCONTINUED | COMMUNITY
End: 2019-10-22

## 2019-10-22 RX ORDER — PRIMIDONE 50 MG/1
50 TABLET ORAL
Refills: 0 | Status: ACTIVE | COMMUNITY

## 2019-10-22 RX ORDER — ISOSORBIDE DINITRATE 10 MG/1
10 TABLET ORAL
Refills: 0 | Status: ACTIVE | COMMUNITY

## 2019-10-22 RX ORDER — WARFARIN SODIUM 5 MG/1
5 TABLET ORAL DAILY
Refills: 0 | Status: ACTIVE | COMMUNITY

## 2019-10-22 RX ORDER — DILTIAZEM HYDROCHLORIDE 240 MG/1
240 CAPSULE, EXTENDED RELEASE ORAL
Refills: 0 | Status: DISCONTINUED | COMMUNITY
End: 2019-10-22

## 2019-10-22 RX ORDER — GABAPENTIN 100 MG/1
100 CAPSULE ORAL 3 TIMES DAILY
Refills: 0 | Status: ACTIVE | COMMUNITY

## 2019-10-22 RX ORDER — CLONAZEPAM 0.5 MG/1
0.5 TABLET ORAL
Refills: 0 | Status: ACTIVE | COMMUNITY

## 2019-11-18 ENCOUNTER — EMERGENCY (EMERGENCY)
Facility: HOSPITAL | Age: 84
LOS: 0 days | Discharge: HOME | End: 2019-11-19
Attending: EMERGENCY MEDICINE
Payer: MEDICARE

## 2019-11-18 VITALS
OXYGEN SATURATION: 96 % | SYSTOLIC BLOOD PRESSURE: 195 MMHG | HEART RATE: 68 BPM | DIASTOLIC BLOOD PRESSURE: 73 MMHG | RESPIRATION RATE: 19 BRPM | TEMPERATURE: 98 F

## 2019-11-18 DIAGNOSIS — N40.0 BENIGN PROSTATIC HYPERPLASIA WITHOUT LOWER URINARY TRACT SYMPTOMS: ICD-10-CM

## 2019-11-18 DIAGNOSIS — Z88.5 ALLERGY STATUS TO NARCOTIC AGENT: ICD-10-CM

## 2019-11-18 DIAGNOSIS — I50.9 HEART FAILURE, UNSPECIFIED: ICD-10-CM

## 2019-11-18 DIAGNOSIS — I16.0 HYPERTENSIVE URGENCY: ICD-10-CM

## 2019-11-18 DIAGNOSIS — Z95.0 PRESENCE OF CARDIAC PACEMAKER: Chronic | ICD-10-CM

## 2019-11-18 DIAGNOSIS — D64.9 ANEMIA, UNSPECIFIED: ICD-10-CM

## 2019-11-18 DIAGNOSIS — E11.40 TYPE 2 DIABETES MELLITUS WITH DIABETIC NEUROPATHY, UNSPECIFIED: ICD-10-CM

## 2019-11-18 DIAGNOSIS — H34.9 UNSPECIFIED RETINAL VASCULAR OCCLUSION: ICD-10-CM

## 2019-11-18 DIAGNOSIS — Z79.4 LONG TERM (CURRENT) USE OF INSULIN: ICD-10-CM

## 2019-11-18 DIAGNOSIS — R53.1 WEAKNESS: ICD-10-CM

## 2019-11-18 DIAGNOSIS — K21.9 GASTRO-ESOPHAGEAL REFLUX DISEASE WITHOUT ESOPHAGITIS: ICD-10-CM

## 2019-11-18 DIAGNOSIS — E11.22 TYPE 2 DIABETES MELLITUS WITH DIABETIC CHRONIC KIDNEY DISEASE: ICD-10-CM

## 2019-11-18 DIAGNOSIS — Z95.0 PRESENCE OF CARDIAC PACEMAKER: ICD-10-CM

## 2019-11-18 DIAGNOSIS — M19.019 PRIMARY OSTEOARTHRITIS, UNSPECIFIED SHOULDER: Chronic | ICD-10-CM

## 2019-11-18 DIAGNOSIS — I13.0 HYPERTENSIVE HEART AND CHRONIC KIDNEY DISEASE WITH HEART FAILURE AND STAGE 1 THROUGH STAGE 4 CHRONIC KIDNEY DISEASE, OR UNSPECIFIED CHRONIC KIDNEY DISEASE: ICD-10-CM

## 2019-11-18 DIAGNOSIS — I48.91 UNSPECIFIED ATRIAL FIBRILLATION: ICD-10-CM

## 2019-11-18 DIAGNOSIS — Z88.6 ALLERGY STATUS TO ANALGESIC AGENT: ICD-10-CM

## 2019-11-18 DIAGNOSIS — N18.3 CHRONIC KIDNEY DISEASE, STAGE 3 (MODERATE): ICD-10-CM

## 2019-11-18 DIAGNOSIS — R79.89 OTHER SPECIFIED ABNORMAL FINDINGS OF BLOOD CHEMISTRY: ICD-10-CM

## 2019-11-18 DIAGNOSIS — Z90.49 ACQUIRED ABSENCE OF OTHER SPECIFIED PARTS OF DIGESTIVE TRACT: Chronic | ICD-10-CM

## 2019-11-18 LAB
ALBUMIN SERPL ELPH-MCNC: 3.2 G/DL — LOW (ref 3.3–5)
ALP SERPL-CCNC: 113 U/L — SIGNIFICANT CHANGE UP (ref 40–120)
ALT FLD-CCNC: 24 U/L — SIGNIFICANT CHANGE UP (ref 12–78)
ANION GAP SERPL CALC-SCNC: 5 MMOL/L — SIGNIFICANT CHANGE UP (ref 5–17)
APTT BLD: 34.3 SEC — SIGNIFICANT CHANGE UP (ref 27.5–36.3)
AST SERPL-CCNC: 25 U/L — SIGNIFICANT CHANGE UP (ref 15–37)
BILIRUB SERPL-MCNC: 0.4 MG/DL — SIGNIFICANT CHANGE UP (ref 0.2–1.2)
BUN SERPL-MCNC: 39 MG/DL — HIGH (ref 7–23)
CALCIUM SERPL-MCNC: 9.1 MG/DL — SIGNIFICANT CHANGE UP (ref 8.5–10.1)
CHLORIDE SERPL-SCNC: 110 MMOL/L — HIGH (ref 96–108)
CO2 SERPL-SCNC: 29 MMOL/L — SIGNIFICANT CHANGE UP (ref 22–31)
CREAT SERPL-MCNC: 2.06 MG/DL — HIGH (ref 0.5–1.3)
GLUCOSE SERPL-MCNC: 182 MG/DL — HIGH (ref 70–99)
HCT VFR BLD CALC: 31.1 % — LOW (ref 39–50)
HGB BLD-MCNC: 9.6 G/DL — LOW (ref 13–17)
INR BLD: 1.71 RATIO — HIGH (ref 0.88–1.16)
MAGNESIUM SERPL-MCNC: 2.4 MG/DL — SIGNIFICANT CHANGE UP (ref 1.6–2.6)
MCHC RBC-ENTMCNC: 30.2 PG — SIGNIFICANT CHANGE UP (ref 27–34)
MCHC RBC-ENTMCNC: 30.9 GM/DL — LOW (ref 32–36)
MCV RBC AUTO: 97.8 FL — SIGNIFICANT CHANGE UP (ref 80–100)
PLATELET # BLD AUTO: 183 K/UL — SIGNIFICANT CHANGE UP (ref 150–400)
POTASSIUM SERPL-MCNC: 5.1 MMOL/L — SIGNIFICANT CHANGE UP (ref 3.5–5.3)
POTASSIUM SERPL-SCNC: 5.1 MMOL/L — SIGNIFICANT CHANGE UP (ref 3.5–5.3)
PROT SERPL-MCNC: 7.9 GM/DL — SIGNIFICANT CHANGE UP (ref 6–8.3)
PROTHROM AB SERPL-ACNC: 19.3 SEC — HIGH (ref 10–12.9)
RBC # BLD: 3.18 M/UL — LOW (ref 4.2–5.8)
RBC # FLD: 16 % — HIGH (ref 10.3–14.5)
SODIUM SERPL-SCNC: 144 MMOL/L — SIGNIFICANT CHANGE UP (ref 135–145)
TROPONIN I SERPL-MCNC: 0.03 NG/ML — SIGNIFICANT CHANGE UP (ref 0.01–0.04)
TROPONIN I SERPL-MCNC: 0.05 NG/ML — HIGH (ref 0.01–0.04)
WBC # BLD: 9.2 K/UL — SIGNIFICANT CHANGE UP (ref 3.8–10.5)
WBC # FLD AUTO: 9.2 K/UL — SIGNIFICANT CHANGE UP (ref 3.8–10.5)

## 2019-11-18 PROCEDURE — 36415 COLL VENOUS BLD VENIPUNCTURE: CPT

## 2019-11-18 PROCEDURE — 70450 CT HEAD/BRAIN W/O DYE: CPT

## 2019-11-18 PROCEDURE — 93005 ELECTROCARDIOGRAM TRACING: CPT

## 2019-11-18 PROCEDURE — 71045 X-RAY EXAM CHEST 1 VIEW: CPT

## 2019-11-18 PROCEDURE — 71045 X-RAY EXAM CHEST 1 VIEW: CPT | Mod: 26

## 2019-11-18 PROCEDURE — 83735 ASSAY OF MAGNESIUM: CPT

## 2019-11-18 PROCEDURE — 84484 ASSAY OF TROPONIN QUANT: CPT

## 2019-11-18 PROCEDURE — 83036 HEMOGLOBIN GLYCOSYLATED A1C: CPT

## 2019-11-18 PROCEDURE — 99285 EMERGENCY DEPT VISIT HI MDM: CPT

## 2019-11-18 PROCEDURE — 85610 PROTHROMBIN TIME: CPT

## 2019-11-18 PROCEDURE — 82962 GLUCOSE BLOOD TEST: CPT

## 2019-11-18 PROCEDURE — 80053 COMPREHEN METABOLIC PANEL: CPT

## 2019-11-18 PROCEDURE — 99285 EMERGENCY DEPT VISIT HI MDM: CPT | Mod: 25

## 2019-11-18 PROCEDURE — 85027 COMPLETE CBC AUTOMATED: CPT

## 2019-11-18 PROCEDURE — 80048 BASIC METABOLIC PNL TOTAL CA: CPT

## 2019-11-18 PROCEDURE — 85730 THROMBOPLASTIN TIME PARTIAL: CPT

## 2019-11-18 PROCEDURE — 93010 ELECTROCARDIOGRAM REPORT: CPT

## 2019-11-18 PROCEDURE — G0378: CPT

## 2019-11-18 PROCEDURE — 70450 CT HEAD/BRAIN W/O DYE: CPT | Mod: 26

## 2019-11-18 RX ORDER — SODIUM CHLORIDE 9 MG/ML
1000 INJECTION, SOLUTION INTRAVENOUS
Refills: 0 | Status: DISCONTINUED | OUTPATIENT
Start: 2019-11-18 | End: 2019-11-19

## 2019-11-18 RX ORDER — SODIUM CHLORIDE 9 MG/ML
1000 INJECTION INTRAMUSCULAR; INTRAVENOUS; SUBCUTANEOUS ONCE
Refills: 0 | Status: COMPLETED | OUTPATIENT
Start: 2019-11-18 | End: 2019-11-18

## 2019-11-18 RX ORDER — ASPIRIN/CALCIUM CARB/MAGNESIUM 324 MG
325 TABLET ORAL ONCE
Refills: 0 | Status: COMPLETED | OUTPATIENT
Start: 2019-11-18 | End: 2019-11-18

## 2019-11-18 RX ORDER — FERROUS SULFATE 325(65) MG
325 TABLET ORAL DAILY
Refills: 0 | Status: DISCONTINUED | OUTPATIENT
Start: 2019-11-18 | End: 2019-11-19

## 2019-11-18 RX ORDER — ASPIRIN/CALCIUM CARB/MAGNESIUM 324 MG
81 TABLET ORAL DAILY
Refills: 0 | Status: DISCONTINUED | OUTPATIENT
Start: 2019-11-18 | End: 2019-11-19

## 2019-11-18 RX ORDER — DEXTROSE 50 % IN WATER 50 %
25 SYRINGE (ML) INTRAVENOUS ONCE
Refills: 0 | Status: DISCONTINUED | OUTPATIENT
Start: 2019-11-18 | End: 2019-11-19

## 2019-11-18 RX ORDER — HYDRALAZINE HCL 50 MG
10 TABLET ORAL EVERY 6 HOURS
Refills: 0 | Status: DISCONTINUED | OUTPATIENT
Start: 2019-11-18 | End: 2019-11-19

## 2019-11-18 RX ORDER — INSULIN LISPRO 100/ML
VIAL (ML) SUBCUTANEOUS AT BEDTIME
Refills: 0 | Status: DISCONTINUED | OUTPATIENT
Start: 2019-11-18 | End: 2019-11-19

## 2019-11-18 RX ORDER — WARFARIN SODIUM 2.5 MG/1
1 TABLET ORAL
Qty: 5 | Refills: 0

## 2019-11-18 RX ORDER — METOPROLOL TARTRATE 50 MG
25 TABLET ORAL
Refills: 0 | Status: DISCONTINUED | OUTPATIENT
Start: 2019-11-18 | End: 2019-11-19

## 2019-11-18 RX ORDER — ENOXAPARIN SODIUM 100 MG/ML
15 INJECTION SUBCUTANEOUS
Qty: 0 | Refills: 0 | DISCHARGE

## 2019-11-18 RX ORDER — LOSARTAN POTASSIUM 100 MG/1
50 TABLET, FILM COATED ORAL DAILY
Refills: 0 | Status: DISCONTINUED | OUTPATIENT
Start: 2019-11-18 | End: 2019-11-19

## 2019-11-18 RX ORDER — SIMETHICONE 80 MG/1
80 TABLET, CHEWABLE ORAL
Refills: 0 | Status: DISCONTINUED | OUTPATIENT
Start: 2019-11-18 | End: 2019-11-19

## 2019-11-18 RX ORDER — PRIMIDONE 250 MG/1
25 TABLET ORAL AT BEDTIME
Refills: 0 | Status: DISCONTINUED | OUTPATIENT
Start: 2019-11-18 | End: 2019-11-19

## 2019-11-18 RX ORDER — VENLAFAXINE HCL 75 MG
75 CAPSULE, EXT RELEASE 24 HR ORAL DAILY
Refills: 0 | Status: DISCONTINUED | OUTPATIENT
Start: 2019-11-18 | End: 2019-11-19

## 2019-11-18 RX ORDER — ISOSORBIDE MONONITRATE 60 MG/1
10 TABLET, EXTENDED RELEASE ORAL DAILY
Refills: 0 | Status: DISCONTINUED | OUTPATIENT
Start: 2019-11-18 | End: 2019-11-19

## 2019-11-18 RX ORDER — DEXTROSE 50 % IN WATER 50 %
15 SYRINGE (ML) INTRAVENOUS ONCE
Refills: 0 | Status: DISCONTINUED | OUTPATIENT
Start: 2019-11-18 | End: 2019-11-19

## 2019-11-18 RX ORDER — PANTOPRAZOLE SODIUM 20 MG/1
40 TABLET, DELAYED RELEASE ORAL
Refills: 0 | Status: DISCONTINUED | OUTPATIENT
Start: 2019-11-18 | End: 2019-11-19

## 2019-11-18 RX ORDER — CLONAZEPAM 1 MG
1 TABLET ORAL
Qty: 0 | Refills: 0 | DISCHARGE

## 2019-11-18 RX ORDER — INSULIN LISPRO 100/ML
VIAL (ML) SUBCUTANEOUS
Refills: 0 | Status: DISCONTINUED | OUTPATIENT
Start: 2019-11-18 | End: 2019-11-19

## 2019-11-18 RX ORDER — DEXTROSE 50 % IN WATER 50 %
12.5 SYRINGE (ML) INTRAVENOUS ONCE
Refills: 0 | Status: DISCONTINUED | OUTPATIENT
Start: 2019-11-18 | End: 2019-11-19

## 2019-11-18 RX ORDER — WARFARIN SODIUM 2.5 MG/1
5 TABLET ORAL DAILY
Refills: 0 | Status: DISCONTINUED | OUTPATIENT
Start: 2019-11-18 | End: 2019-11-19

## 2019-11-18 RX ORDER — RABEPRAZOLE 20 MG/1
1 TABLET, DELAYED RELEASE ORAL
Qty: 0 | Refills: 0 | DISCHARGE

## 2019-11-18 RX ORDER — GLUCAGON INJECTION, SOLUTION 0.5 MG/.1ML
1 INJECTION, SOLUTION SUBCUTANEOUS ONCE
Refills: 0 | Status: DISCONTINUED | OUTPATIENT
Start: 2019-11-18 | End: 2019-11-19

## 2019-11-18 RX ORDER — GABAPENTIN 400 MG/1
100 CAPSULE ORAL THREE TIMES A DAY
Refills: 0 | Status: DISCONTINUED | OUTPATIENT
Start: 2019-11-18 | End: 2019-11-19

## 2019-11-18 RX ADMIN — PRIMIDONE 25 MILLIGRAM(S): 250 TABLET ORAL at 22:27

## 2019-11-18 RX ADMIN — Medication 325 MILLIGRAM(S): at 17:22

## 2019-11-18 RX ADMIN — GABAPENTIN 100 MILLIGRAM(S): 400 CAPSULE ORAL at 22:15

## 2019-11-18 RX ADMIN — SIMETHICONE 80 MILLIGRAM(S): 80 TABLET, CHEWABLE ORAL at 22:14

## 2019-11-18 RX ADMIN — WARFARIN SODIUM 5 MILLIGRAM(S): 2.5 TABLET ORAL at 22:17

## 2019-11-18 RX ADMIN — Medication 10 MILLIGRAM(S): at 19:45

## 2019-11-18 RX ADMIN — SODIUM CHLORIDE 1000 MILLILITER(S): 9 INJECTION INTRAMUSCULAR; INTRAVENOUS; SUBCUTANEOUS at 17:22

## 2019-11-18 RX ADMIN — Medication 25 MILLIGRAM(S): at 19:45

## 2019-11-18 NOTE — ED ADULT NURSE REASSESSMENT NOTE - NS ED NURSE REASSESS COMMENT FT1
Pt alert and oriented x3, slightly hypertensive but asymptomatic. Dr. Pearson called and made aware-medication ordered and given. Pt finishing supper sitting at side of bed-sore from laying in bed. Plan of care reviewed, all needs addressed and safety maintained. Call bell in reach. Pt ready for transport to floor.

## 2019-11-18 NOTE — ED PROVIDER NOTE - NEUROLOGICAL, MLM
Alert and oriented, no focal deficits, no motor or sensory deficits. no saddle anesthesia, GCS 15 Alert and oriented, no focal deficits, no motor or sensory deficits. no saddle anesthesia, GCS 15 NIH = 0

## 2019-11-18 NOTE — ED PROVIDER NOTE - PMH
Atrial fibrillation, unspecified type    Benign prostatic hyperplasia without lower urinary tract symptoms    Chronic congestive heart failure, unspecified congestive heart failure type    Gastroesophageal reflux disease, esophagitis presence not specified    HTN (hypertension)    IDDM (insulin dependent diabetes mellitus) Atrial fibrillation, unspecified type    Benign prostatic hyperplasia without lower urinary tract symptoms    Chronic congestive heart failure, unspecified congestive heart failure type    Gastroesophageal reflux disease, esophagitis presence not specified    HTN (hypertension)    IDDM (insulin dependent diabetes mellitus)    Syncope and collapse  5/4/2018  Type 2 myocardial infarction  12/17/2017  UTI (urinary tract infection)  7/12/2017

## 2019-11-18 NOTE — H&P ADULT - NSICDXPASTMEDICALHX_GEN_ALL_CORE_FT
PAST MEDICAL HISTORY:  Atrial fibrillation, unspecified type     Benign prostatic hyperplasia without lower urinary tract symptoms     Chronic congestive heart failure, unspecified congestive heart failure type     Gastroesophageal reflux disease, esophagitis presence not specified     HTN (hypertension)     IDDM (insulin dependent diabetes mellitus)

## 2019-11-18 NOTE — ED PROVIDER NOTE - EYES, MLM
Clear bilaterally, pupils equal, round and reactive to light. +rotational nystagmus on left and right gaze

## 2019-11-18 NOTE — ED PROVIDER NOTE - GASTROINTESTINAL, MLM
Abdomen soft, non-tender, no guarding. +ventral abdominal hernia Abdomen soft, non-tender, no guarding. +nontender reducible ventral abdominal hernia

## 2019-11-18 NOTE — H&P ADULT - NSHPPHYSICALEXAM_GEN_ALL_CORE
PHYSICAL EXAM:    Daily Height in cm: 172.72 (18 Nov 2019 13:15)      T(C): 36.6 (18 Nov 2019 15:35), Max: 36.6 (18 Nov 2019 13:05)  T(F): 97.9 (18 Nov 2019 15:35), Max: 97.9 (18 Nov 2019 13:05)  HR: 70 (18 Nov 2019 18:58) (65 - 70)  BP: 190/75 (18 Nov 2019 18:58) (190/75 - 196/78)  RR: 18 (18 Nov 2019 15:35) (18 - 19)  SpO2: 97% (18 Nov 2019 15:35) (96% - 97%)      Constitutional: Well appearing  HEENT: Atraumatic, CONOR, Normal, No congestion  Respiratory: Breath Sounds normal, no rhonchi/wheeze  Cardiovascular: N S1S2;   Gastrointestinal: Abdomen soft, non tender, Bowel Sounds present  Extremities: No edema, peripheral pulses present  Neurological: AAO x 3, no gross focal motor deficits  Skin: Non cellulitic, no rash, ulcers  Lymph Nodes: No lymphadenopathy noted  Back: No CVA tenderness   Musculoskeletal: non tender  Breasts: Deferred  Genitourinary: deferred  Rectal: Deferred

## 2019-11-18 NOTE — H&P ADULT - NSICDXPASTSURGICALHX_GEN_ALL_CORE_FT
PAST SURGICAL HISTORY:  Arthropathy of shoulder region     Artificial cardiac pacemaker     History of appendectomy

## 2019-11-18 NOTE — PHARMACOTHERAPY INTERVENTION NOTE - COMMENTS
Med rec is complete. Checked med hx with Ekta and confirmed with patients wife, Carli over the phone. [phone]

## 2019-11-18 NOTE — ED PROVIDER NOTE - PROGRESS NOTE DETAILS
I Alondra Vickers attest that this documentation has been prepared under the direction and in the presence of Doctor Rizzo.

## 2019-11-18 NOTE — H&P ADULT - NSHPLABSRESULTS_GEN_ALL_CORE
9.6    9.20  )-----------( 183      ( 18 Nov 2019 13:53 )             31.1       CBC Full  -  ( 18 Nov 2019 13:53 )  WBC Count : 9.20 K/uL  RBC Count : 3.18 M/uL  Hemoglobin : 9.6 g/dL  Hematocrit : 31.1 %  Platelet Count - Automated : 183 K/uL  Mean Cell Volume : 97.8 fl  Mean Cell Hemoglobin : 30.2 pg  Mean Cell Hemoglobin Concentration : 30.9 gm/dL  Auto Neutrophil # : x  Auto Lymphocyte # : x  Auto Monocyte # : x  Auto Eosinophil # : x  Auto Basophil # : x  Auto Neutrophil % : x  Auto Lymphocyte % : x  Auto Monocyte % : x  Auto Eosinophil % : x  Auto Basophil % : x      11-18    144  |  110<H>  |  39<H>  ----------------------------<  182<H>  5.1   |  29  |  2.06<H>    Ca    9.1      18 Nov 2019 13:53  Mg     2.4     11-18    TPro  7.9  /  Alb  3.2<L>  /  TBili  0.4  /  DBili  x   /  AST  25  /  ALT  24  /  AlkPhos  113  11-18      LIVER FUNCTIONS - ( 18 Nov 2019 13:53 )  Alb: 3.2 g/dL / Pro: 7.9 gm/dL / ALK PHOS: 113 U/L / ALT: 24 U/L / AST: 25 U/L / GGT: x             PT/INR - ( 18 Nov 2019 13:53 )   PT: 19.3 sec;   INR: 1.71 ratio         PTT - ( 18 Nov 2019 13:53 )  PTT:34.3 sec    CARDIAC MARKERS ( 18 Nov 2019 13:53 )  0.049 ng/mL / x     / x     / x     / x            < from: CT Head No Cont (11.18.19 @ 15:16) >      IMPRESSION:       No acute intracranial findings.    < end of copied text >            MEDICATIONS  (STANDING):  artificial  tears Solution 1 Drop(s) Both EYES four times a day  aspirin  chewable 81 milliGRAM(s) Oral daily  ferrous    sulfate 325 milliGRAM(s) Oral daily  gabapentin 100 milliGRAM(s) Oral three times a day  isosorbide    mononitrate Tablet (ISMO) 10 milliGRAM(s) Oral daily  losartan 50 milliGRAM(s) Oral daily  metoprolol tartrate 25 milliGRAM(s) Oral two times a day  multivitamin 1 Tablet(s) Oral daily  pantoprazole    Tablet 40 milliGRAM(s) Oral before breakfast  primidone 25 milliGRAM(s) Oral at bedtime  venlafaxine XR. 75 milliGRAM(s) Oral daily  warfarin 5 milliGRAM(s) Oral daily

## 2019-11-18 NOTE — ED PROVIDER NOTE - OBJECTIVE STATEMENT
89 y/o male with PMHx of Afib, BPH, CHF, GERD, HTN, IDDM, neuropathy, chronic anemia, embolic retinal artery ischemia, s/p pacemaker, s/p appendectomy presents to the ED c/o generalized weakness, dizziness. Pt said it feels like his arms and legs are "like spaghetti." Also reports some right foot pain. Last night, pt took his BP which was 229 systolic and his cardio told him to come to the ED for evaluation. Denies fever, SOB, chest pain. Has had episodes of vertigo in the past No hx of MI, stents, cardiac cath, CVA. Allergic to morphine, codeine, Naproxen. On Lantus, Neurontin, Coumadin. EP: Maccaro. Cardio: Janine. PMD: Caporuscio. 89 year old male with PMHx of Afib, BPH, CHF, GERD, HTN, IDDM, neuropathy, chronic anemia, embolic retinal artery ischemia, s/p pacemaker, s/p appendectomy presents to the ED c/o generalized weakness, dizziness. Pt said it feels like his arms and legs are "like spaghetti." Also reports some right foot pain. Last night, pt took his BP which was 229 systolic and his cardio told him to come to the ED for evaluation. Denies fever, SOB, chest pain. Has had episodes of vertigo in the past No hx of MI, stents, cardiac cath, CVA. Allergic to morphine, codeine, Naproxen. On Lantus, Neurontin, Coumadin. EP: Maccaro. Cardio: Janine. PMD: Caporuscio.

## 2019-11-18 NOTE — H&P ADULT - ASSESSMENT
89/M with PMHx of Afib on coumadin, BPH, CHF, GERD, HTN, DM2, neuropathy, chronic anemia, embolic retinal artery ischemia, s/p pacemaker, s/p appendectomy, admitted with     1) Uncontrolled HTN + mild abn trop: JENNIFER  observe on tele  Add BB and iv hydralazine to ARB, imdur; titrate for optimal BP control  cardio consult  serial cardiac enz  cont asa,   add statin    2) CKD 3: minimally elevated from baseline; no RICHY.  monitor BUN/Cr  no iv fluids needed    3) DM 2:  ISS    4) A fib s/p PPM: cont coumadin    5) DVT PPX: coumadin

## 2019-11-18 NOTE — H&P ADULT - HISTORY OF PRESENT ILLNESS
89/M with PMHx of Afib on coumadin, BPH, CHF, GERD, HTN, DM2, neuropathy, chronic anemia, embolic retinal artery ischemia, s/p pacemaker, s/p appendectomy presents to the ED c/o generalized weakness, dizzines and elevated BP.  Last night, pt took his BP which was 229 systolic and his cardio told him to come to the ED for evaluation. Denies fever, SOB, chest pain. Has had episodes of vertigo in the past. In ED, BP was 195/73.

## 2019-11-18 NOTE — ED PROVIDER NOTE - CLINICAL SUMMARY MEDICAL DECISION MAKING FREE TEXT BOX
89 y/o male with PMHx of Afib, CHF, BPH, HTN, DM, s/p pacemaker presents to the ED c/o weakness, dizziness, HTN. Plan: Considering pt's lightheadedness, weakness, CHF, pacemaker and due to vertiginousness, age and comorbidity, will r/o CVA, posterior infarct.

## 2019-11-18 NOTE — ED ADULT TRIAGE NOTE - CHIEF COMPLAINT QUOTE
Patient presents with wife, reports patient sent in for high blood pressure weakness and dizziness. Patient denies headache

## 2019-11-19 ENCOUNTER — TRANSCRIPTION ENCOUNTER (OUTPATIENT)
Age: 84
End: 2019-11-19

## 2019-11-19 VITALS
OXYGEN SATURATION: 94 % | HEART RATE: 61 BPM | TEMPERATURE: 97 F | DIASTOLIC BLOOD PRESSURE: 62 MMHG | SYSTOLIC BLOOD PRESSURE: 165 MMHG | RESPIRATION RATE: 17 BRPM

## 2019-11-19 LAB
ANION GAP SERPL CALC-SCNC: 6 MMOL/L — SIGNIFICANT CHANGE UP (ref 5–17)
BUN SERPL-MCNC: 40 MG/DL — HIGH (ref 7–23)
CALCIUM SERPL-MCNC: 9.2 MG/DL — SIGNIFICANT CHANGE UP (ref 8.5–10.1)
CHLORIDE SERPL-SCNC: 110 MMOL/L — HIGH (ref 96–108)
CO2 SERPL-SCNC: 27 MMOL/L — SIGNIFICANT CHANGE UP (ref 22–31)
CREAT SERPL-MCNC: 2.07 MG/DL — HIGH (ref 0.5–1.3)
GLUCOSE SERPL-MCNC: 99 MG/DL — SIGNIFICANT CHANGE UP (ref 70–99)
HBA1C BLD-MCNC: 7.7 % — HIGH (ref 4–5.6)
HCT VFR BLD CALC: 30.7 % — LOW (ref 39–50)
HGB BLD-MCNC: 9.9 G/DL — LOW (ref 13–17)
INR BLD: 1.72 RATIO — HIGH (ref 0.88–1.16)
MCHC RBC-ENTMCNC: 30.7 PG — SIGNIFICANT CHANGE UP (ref 27–34)
MCHC RBC-ENTMCNC: 32.2 GM/DL — SIGNIFICANT CHANGE UP (ref 32–36)
MCV RBC AUTO: 95.3 FL — SIGNIFICANT CHANGE UP (ref 80–100)
PLATELET # BLD AUTO: 188 K/UL — SIGNIFICANT CHANGE UP (ref 150–400)
POTASSIUM SERPL-MCNC: 4.8 MMOL/L — SIGNIFICANT CHANGE UP (ref 3.5–5.3)
POTASSIUM SERPL-SCNC: 4.8 MMOL/L — SIGNIFICANT CHANGE UP (ref 3.5–5.3)
PROTHROM AB SERPL-ACNC: 19.4 SEC — HIGH (ref 10–12.9)
RBC # BLD: 3.22 M/UL — LOW (ref 4.2–5.8)
RBC # FLD: 16.4 % — HIGH (ref 10.3–14.5)
SODIUM SERPL-SCNC: 143 MMOL/L — SIGNIFICANT CHANGE UP (ref 135–145)
WBC # BLD: 9.9 K/UL — SIGNIFICANT CHANGE UP (ref 3.8–10.5)
WBC # FLD AUTO: 9.9 K/UL — SIGNIFICANT CHANGE UP (ref 3.8–10.5)

## 2019-11-19 PROCEDURE — 93010 ELECTROCARDIOGRAM REPORT: CPT

## 2019-11-19 RX ORDER — LOSARTAN POTASSIUM 100 MG/1
2 TABLET, FILM COATED ORAL
Qty: 60 | Refills: 0
Start: 2019-11-19 | End: 2019-12-18

## 2019-11-19 RX ORDER — LOSARTAN POTASSIUM 100 MG/1
1 TABLET, FILM COATED ORAL
Qty: 0 | Refills: 0 | DISCHARGE
Start: 2019-11-19

## 2019-11-19 RX ORDER — LOSARTAN POTASSIUM 100 MG/1
1 TABLET, FILM COATED ORAL
Qty: 0 | Refills: 0 | DISCHARGE

## 2019-11-19 RX ADMIN — Medication 325 MILLIGRAM(S): at 11:11

## 2019-11-19 RX ADMIN — LOSARTAN POTASSIUM 50 MILLIGRAM(S): 100 TABLET, FILM COATED ORAL at 05:24

## 2019-11-19 RX ADMIN — Medication 1 DROP(S): at 05:22

## 2019-11-19 RX ADMIN — PANTOPRAZOLE SODIUM 40 MILLIGRAM(S): 20 TABLET, DELAYED RELEASE ORAL at 05:27

## 2019-11-19 RX ADMIN — GABAPENTIN 100 MILLIGRAM(S): 400 CAPSULE ORAL at 05:32

## 2019-11-19 RX ADMIN — Medication 81 MILLIGRAM(S): at 11:11

## 2019-11-19 RX ADMIN — Medication 25 MILLIGRAM(S): at 05:23

## 2019-11-19 RX ADMIN — Medication 1 DROP(S): at 00:05

## 2019-11-19 RX ADMIN — Medication 1 TABLET(S): at 11:11

## 2019-11-19 RX ADMIN — Medication 75 MILLIGRAM(S): at 11:12

## 2019-11-19 RX ADMIN — Medication 1 DROP(S): at 11:10

## 2019-11-19 RX ADMIN — Medication 4: at 11:10

## 2019-11-19 NOTE — PROGRESS NOTE ADULT - SUBJECTIVE AND OBJECTIVE BOX
cc:   hpi: 89y male w/ pmh afib on coumadin, PPM, chronic diastolic CHF, t2d,   p/w weakness, dizziness, elevated sbp 229    ros-    Vital Signs Last 24 Hrs  T(C): 36.5 (19 Nov 2019 05:20), Max: 36.8 (18 Nov 2019 19:46)  T(F): 97.7 (19 Nov 2019 05:20), Max: 98.2 (18 Nov 2019 19:46)  HR: 63 (19 Nov 2019 05:20) (63 - 86)  BP: 145/67 (19 Nov 2019 05:20) (145/67 - 196/78)  BP(mean): --  RR: 18 (19 Nov 2019 05:20) (18 - 19)  SpO2: 98% (19 Nov 2019 05:20) (96% - 98%)    physical          LABS: All Labs Reviewed:                        9.9    9.90  )-----------( 188      ( 19 Nov 2019 08:04 )             30.7     11-18    144  |  110<H>  |  39<H>  ----------------------------<  182<H>  5.1   |  29  |  2.06<H>    Ca    9.1      18 Nov 2019 13:53  Mg     2.4     11-18    TPro  7.9  /  Alb  3.2<L>  /  TBili  0.4  /  DBili  x   /  AST  25  /  ALT  24  /  AlkPhos  113  11-18    PT/INR - ( 18 Nov 2019 13:53 )   PT: 19.3 sec;   INR: 1.71 ratio         PTT - ( 18 Nov 2019 13:53 )  PTT:34.3 sec  CARDIAC MARKERS ( 18 Nov 2019 20:28 )  0.030 ng/mL / x     / x     / x     / x      CARDIAC MARKERS ( 18 Nov 2019 13:53 )  0.049 ng/mL / x     / x     / x     / x        CT head and cxr unremkarable    MEDICATIONS  (STANDING):  artificial  tears Solution 1 Drop(s) Both EYES four times a day  aspirin  chewable 81 milliGRAM(s) Oral daily  dextrose 5%. 1000 milliLiter(s) (50 mL/Hr) IV Continuous <Continuous>  dextrose 50% Injectable 12.5 Gram(s) IV Push once  dextrose 50% Injectable 25 Gram(s) IV Push once  dextrose 50% Injectable 25 Gram(s) IV Push once  ferrous    sulfate 325 milliGRAM(s) Oral daily  gabapentin 100 milliGRAM(s) Oral three times a day  insulin lispro (HumaLOG) corrective regimen sliding scale   SubCutaneous three times a day before meals  insulin lispro (HumaLOG) corrective regimen sliding scale   SubCutaneous at bedtime  isosorbide    mononitrate Tablet (ISMO) 10 milliGRAM(s) Oral daily  losartan 50 milliGRAM(s) Oral daily  metoprolol tartrate 25 milliGRAM(s) Oral two times a day  multivitamin 1 Tablet(s) Oral daily  pantoprazole    Tablet 40 milliGRAM(s) Oral before breakfast  primidone 25 milliGRAM(s) Oral at bedtime  venlafaxine XR. 75 milliGRAM(s) Oral daily  warfarin 5 milliGRAM(s) Oral daily    MEDICATIONS  (PRN):  dextrose 40% Gel 15 Gram(s) Oral once PRN Blood Glucose LESS THAN 70 milliGRAM(s)/deciliter  glucagon  Injectable 1 milliGRAM(s) IntraMuscular once PRN Glucose LESS THAN 70 milligrams/deciliter  hydrALAZINE Injectable 10 milliGRAM(s) IV Push every 6 hours PRN for SBP more than 160  simethicone 80 milliGRAM(s) Chew two times a day PRN Gas      Assessment and Plan:     89y male w/     1. htn urgency  - continue home med arb  - BB s tarted  - iv hydralazine prn  -     2. CKD3  - repeat labs    3. T2D  - A1c-  - ISS, fs    4. afib , PPM  - INR today- cc:   hpi: 89y male w/ pmh afib on coumadin, PPM, chronic diastolic CHF, t2d,  p/w weakness, dizziness, elevated sbp 229.   blood pressure improved s/p iv hydralazine in Ed.  No elevation since.  Pt w/o complaints.  Eager to go home.     REVIEW OF SYSTEMS:    General: No weakness, fevers, chills  HEENT: No HA, neck pain, no visual changes, no hearing loss   Resp: No cough, wheezing, hemoptysis; No shortness of breath  CV: No chest pain or palpitations  GI: No abdominal pain, no n/v/d, no blood in stool  : No f/u/d  Neuro: No weakness or numbness  MSK: No myalgias, arthralgias  SKIN: No itching, rashes, lesions  All other ROS negative unless indicated above.        Vital Signs Last 24 Hrs  T(C): 36.5 (19 Nov 2019 05:20), Max: 36.8 (18 Nov 2019 19:46)  T(F): 97.7 (19 Nov 2019 05:20), Max: 98.2 (18 Nov 2019 19:46)  HR: 63 (19 Nov 2019 05:20) (63 - 86)  BP: 145/67 (19 Nov 2019 05:20) (145/67 - 196/78)  BP(mean): --  RR: 18 (19 Nov 2019 05:20) (18 - 19)  SpO2: 98% (19 Nov 2019 05:20) (96% - 98%)      PHYSICAL EXAM:    General: NAD, comfortable  Neuro: AAOx3, no focal deficits  HEENT: NCAT, PERRLA, EOMI,   Neck: Soft and supple, No JVD  Respiratory: CTA b/l, no w/r/r  Cardiovascular: S1 and S2, RRR  Gastrointestinal: +BS, soft, NTND, no rebound/guarding  Extremities: No c/c/e  Vascular: 2+ peripheral pulses  Musculoskeletal: 5/5 strength b/l UE and LE, sensation intact  Skin: No rashes          LABS: All Labs Reviewed:                        9.9    9.90  )-----------( 188      ( 19 Nov 2019 08:04 )             30.7     11-18    144  |  110<H>  |  39<H>  ----------------------------<  182<H>  5.1   |  29  |  2.06<H>    Ca    9.1      18 Nov 2019 13:53  Mg     2.4     11-18    TPro  7.9  /  Alb  3.2<L>  /  TBili  0.4  /  DBili  x   /  AST  25  /  ALT  24  /  AlkPhos  113  11-18    PT/INR - ( 18 Nov 2019 13:53 )   PT: 19.3 sec;   INR: 1.71 ratio         PTT - ( 18 Nov 2019 13:53 )  PTT:34.3 sec  CARDIAC MARKERS ( 18 Nov 2019 20:28 )  0.030 ng/mL / x     / x     / x     / x      CARDIAC MARKERS ( 18 Nov 2019 13:53 )  0.049 ng/mL / x     / x     / x     / x        CT head and cxr unremkarable    MEDICATIONS  (STANDING):  artificial  tears Solution 1 Drop(s) Both EYES four times a day  aspirin  chewable 81 milliGRAM(s) Oral daily  dextrose 5%. 1000 milliLiter(s) (50 mL/Hr) IV Continuous <Continuous>  dextrose 50% Injectable 12.5 Gram(s) IV Push once  dextrose 50% Injectable 25 Gram(s) IV Push once  dextrose 50% Injectable 25 Gram(s) IV Push once  ferrous    sulfate 325 milliGRAM(s) Oral daily  gabapentin 100 milliGRAM(s) Oral three times a day  insulin lispro (HumaLOG) corrective regimen sliding scale   SubCutaneous three times a day before meals  insulin lispro (HumaLOG) corrective regimen sliding scale   SubCutaneous at bedtime  isosorbide    mononitrate Tablet (ISMO) 10 milliGRAM(s) Oral daily  losartan 50 milliGRAM(s) Oral daily  metoprolol tartrate 25 milliGRAM(s) Oral two times a day  multivitamin 1 Tablet(s) Oral daily  pantoprazole    Tablet 40 milliGRAM(s) Oral before breakfast  primidone 25 milliGRAM(s) Oral at bedtime  venlafaxine XR. 75 milliGRAM(s) Oral daily  warfarin 5 milliGRAM(s) Oral daily    MEDICATIONS  (PRN):  dextrose 40% Gel 15 Gram(s) Oral once PRN Blood Glucose LESS THAN 70 milliGRAM(s)/deciliter  glucagon  Injectable 1 milliGRAM(s) IntraMuscular once PRN Glucose LESS THAN 70 milligrams/deciliter  hydrALAZINE Injectable 10 milliGRAM(s) IV Push every 6 hours PRN for SBP more than 160  simethicone 80 milliGRAM(s) Chew two times a day PRN Gas      Assessment and Plan:     89y male w/     1. htn urgency  - continue home med arb  - BB s tarted  - iv hydralazine prn  - Cardio f/u appreciated- outpt f/u  - increase losartan to 100mg daily, outpt repeat labs this friday to check Cr    2. CKD3  - repeat labs    3. T2D  - A1c-7.7  - ISS, fs    4. afib , PPM  - INR today- 1.72,  continue coumadin and follow up for INR check friday  stable for d/c home.  Time 45min

## 2019-11-19 NOTE — DISCHARGE NOTE PROVIDER - NSDCMRMEDTOKEN_GEN_ALL_CORE_FT
aspirin 81 mg oral tablet: 1 tab(s) orally once a day  docusate sodium 100 mg oral capsule: 1 cap(s) orally once a day, As Needed  Dry Eye Relief preserved ophthalmic solution: 1 drop(s) to each affected eye 4 times a day, As Needed  ferrous sulfate 325 mg (65 mg elemental iron) oral tablet: 1 tab(s) orally once a day  gabapentin 100 mg oral tablet: orally 3 times a day  Gas-X 80 mg oral tablet, chewable: 1 tab(s) orally 2 times a day, As Needed  isosorbide mononitrate 10 mg oral tablet: 1 tab(s) orally once a day  Lantus Solostar Pen 100 units/mL subcutaneous solution: 18 unit(s) subcutaneous once a day (at bedtime)  losartan 50 mg oral tablet: 2 tab(s) orally once a day   primidone 50 mg oral tablet: 0.5 tab(s) orally once a day (at bedtime)  RABEprazole 20 mg oral delayed release tablet: 1 tab(s) orally 2 times a day at lunch and before bedtime  [place] Caps oral capsule: 1 cap(s) orally once a day  Tradjenta 5 mg oral tablet: 1 tab(s) orally once a day (in the evening)  venlafaxine 75 mg oral capsule, extended release: 1 cap(s) orally once a day  warfarin 5 mg oral tablet: 1 tab(s) orally once a day (in the evening)

## 2019-11-19 NOTE — DISCHARGE NOTE NURSING/CASE MANAGEMENT/SOCIAL WORK - NSDCPEPTCOWAR_GEN_ALL_CORE
Warfarin/Coumadin - Compliance/Warfarin/Coumadin - Follow up monitoring/Warfarin/Coumadin - Potential for adverse drug reactions and interactions/Warfarin/Coumadin - Dietary Advice

## 2019-11-19 NOTE — DISCHARGE NOTE NURSING/CASE MANAGEMENT/SOCIAL WORK - NSTRANSFERBELONGINGSDISPO_GEN_A_NUR
Implemented All Fall with Harm Risk Interventions:  [place] to call system. Call bell, personal items and telephone within reach. Instruct patient to call for assistance. Room bathroom lighting operational. Non-slip footwear when patient is off stretcher. Physically safe environment: no spills, clutter or unnecessary equipment. Stretcher in lowest position, wheels locked, appropriate side rails in place. Provide visual cue, wrist band, yellow gown, etc. Monitor gait and stability. Monitor for mental status changes and reorient to person, place, and time. Review medications for side effects contributing to fall risk. Reinforce activity limits and safety measures with patient and family. Provide visual clues: red socks. with patient

## 2019-11-19 NOTE — CONSULT NOTE ADULT - SUBJECTIVE AND OBJECTIVE BOX
History of Present Illness:  Reason for Admission: not feeling good, uncontrolled HTN	  History of Present Illness: 	  89/M with PMHx of Afib on coumadin, BPH, CHF, GERD, HTN, DM2, neuropathy, chronic anemia, embolic retinal artery ischemia, s/p pacemaker, s/p appendectomy presents to the ED c/o generalized weakness, dizzines and elevated BP.  Last night, pt took his BP which was 229 systolic and his cardio told him to come to the ED for evaluation. Denies fever, SOB, chest pain. Has had episodes of vertigo in the past. In ED, BP was 195/73.     Examined at bed side, feels fine now. Tele is irrelevant and BP is under cortol        Review of Systems:  Other Review of Systems: All other review of systems negative, except as noted in HPI	      Allergies and Intolerances:        Allergies:  	naproxen: Drug, Vomiting  	morphine: Drug, Nausea  	Aleve: Drug, Vomiting  	codeine: Drug, Unknown    Home Medications:   * Patient Currently Takes Medications as of 18-Nov-2019 18:51 documented in Structured Notes  · 	Tradjenta 5 mg oral tablet: Last Dose Taken:  , 1 tab(s) orally once a day (in the evening)  · 	gabapentin 100 mg oral tablet: Last Dose Taken:  , orally 3 times a day  · 	[place] Caps oral capsule: Last Dose Taken:  , 1 cap(s) orally once a day  · 	isosorbide mononitrate 10 mg oral tablet: Last Dose Taken:  , 1 tab(s) orally once a day  · 	aspirin 81 mg oral tablet: Last Dose Taken:  , 1 tab(s) orally once a day  · 	venlafaxine 75 mg oral capsule, extended release: Last Dose Taken:  , 1 cap(s) orally once a day  · 	Lantus Solostar Pen 100 units/mL subcutaneous solution: Last Dose Taken:  , 18 unit(s) subcutaneous once a day (at bedtime)  · 	RABEprazole 20 mg oral delayed release tablet: Last Dose Taken:  , 1 tab(s) orally 2 times a day at lunch and before bedtime  · 	warfarin 5 mg oral tablet: Last Dose Taken:  , 1 tab(s) orally once a day (in the evening)  · 	losartan 50 mg oral tablet: Last Dose Taken:  , 1 tab(s) orally once a day  · 	primidone 50 mg oral tablet: Last Dose Taken:  , 0.5 tab(s) orally once a day (at bedtime)  · 	Gas-X 80 mg oral tablet, chewable: Last Dose Taken:  , 1 tab(s) orally 2 times a day, As Needed  · 	ferrous sulfate 325 mg (65 mg elemental iron) oral tablet: Last Dose Taken:  , 1 tab(s) orally once a day  · 	docusate sodium 100 mg oral capsule: Last Dose Taken:  , 1 cap(s) orally once a day, As Needed  · 	Dry Eye Relief preserved ophthalmic solution: Last Dose Taken:  , 1 drop(s) to each affected eye 4 times a day, As Needed    .    Patient History:    Past Medical, Past Surgical, and Family History:  PAST MEDICAL HISTORY:  Atrial fibrillation, unspecified type     Benign prostatic hyperplasia without lower urinary tract symptoms     Chronic congestive heart failure, unspecified congestive heart failure type     Gastroesophageal reflux disease, esophagitis presence not specified     HTN (hypertension)     IDDM (insulin dependent diabetes mellitus).     PAST SURGICAL HISTORY:  Arthropathy of shoulder region     Artificial cardiac pacemaker     History of appendectomy.     FAMILY HISTORY:  No pertinent family history in first degree relatives.     No Pertinent Family History in first degree relatives of: RICHY.     Social History:  Social History (marital status, living situation, occupation, tobacco use, alcohol and drug use, and sexual history): non smoker non alcoholic	     Tobacco Screening:  · Core Measure Site	Yes	  · Has the patient used tobacco in the past 30 days?	No	    Risk Assessment:    Present on Admission:  Deep Venous Thrombosis	no	  Pulmonary Embolus	no	     Heart Failure:  Does this patient have a history of or has been diagnosed with heart failure? yes.     LV Function Assessment (LVS function was evaluated before arrival and/or during hospitalization) unknown.       Physical Exam:  Physical Exam: PHYSICAL EXAM: VS stable.    Constitutional: Well appearing  HEENT: Atraumatic, CONOR, Normal, No congestion  Respiratory: Breath Sounds normal, no rhonchi/wheeze  Cardiovascular: N S1S2;   Gastrointestinal: Abdomen soft, non tender, Bowel Sounds present  Extremities: No edema, peripheral pulses present  Neurological: AAO x 3, no gross focal motor deficits  Skin: Non cellulitic, no rash, ulcers  Lymph Nodes: No lymphadenopathy noted  Back: No CVA tenderness   Musculoskeletal: non tender  Breasts: Deferred  Genitourinary: deferred Rectal: Deferred	       Labs and Results: reviewed.       Assessment and Plan:   89/M with PMHx of Afib on coumadin, BPH, CHF, GERD, HTN, DM2, neuropathy, chronic anemia, embolic retinal artery ischemia, s/p pacemaker, s/p appendectomy, admitted with     1) Mildly elevated troponin x 1, of unknown significance.  Cont current regimen     2) HTN, better controlled at moment.  Cont current meds.     3) CKD 3: minimally elevated from baseline; no RICHY.  monitor BUN/Cr  no iv fluids needed    Pt feels fine, wants to go home.  Can go home today and follow up with me in the office.

## 2019-11-19 NOTE — DISCHARGE NOTE NURSING/CASE MANAGEMENT/SOCIAL WORK - PATIENT PORTAL LINK FT
You can access the FollowMyHealth Patient Portal offered by [facility] by registering at the following website: [email or web address]. By joining [employer] [facility]’s FollowMyHealth portal, you will also be able to view your health information using other applications (apps) compatible with our system.

## 2019-11-19 NOTE — DISCHARGE NOTE PROVIDER - HOSPITAL COURSE
89y male w/ pmh afib on coumadin, PPM, chronic diastolic CHF, t2d, p/w weakness, dizziness, elevated sbp 229.  Received Iv hydralazine in Ed. bp improved and controlled since. Seen by Cardiology and plan to follow up outpatient.   Increase home dose losartan to 100mg daily.  Follow up PMD. Continue coumadin, inr check with PMD.

## 2019-11-19 NOTE — DISCHARGE NOTE PROVIDER - NSDCCPCAREPLAN_GEN_ALL_CORE_FT
PRINCIPAL DISCHARGE DIAGNOSIS  Diagnosis: HTN (hypertension)  Assessment and Plan of Treatment: htn urgency resolved.  You were seen by Cardiology- follow up in office.  Take increased dose of losartan daily (take 100mg daily).  Please follow up with your Cardiologist or Primary doc for repeat labs (to check kidney function- Cr 2.06 today) by Friday.   Also follow up for your INR checks- 1.71 today.

## 2019-11-19 NOTE — DISCHARGE NOTE PROVIDER - CARE PROVIDER_API CALL
Michael Mehta)  Internal Medicine  [address] [place] [address]  Phone: [phone]  Fax: [phone]  Follow Up Time:     Aminata Chauhan)  Internal Medicine  [address] [place] [address]  Phone: [phone]  Fax: [phone]  Follow Up Time:

## 2020-01-01 ENCOUNTER — TRANSCRIPTION ENCOUNTER (OUTPATIENT)
Age: 85
End: 2020-01-01

## 2020-01-01 ENCOUNTER — INPATIENT (INPATIENT)
Facility: HOSPITAL | Age: 85
LOS: 3 days | Discharge: HOME HEALTH | DRG: 247 | End: 2020-11-07
Attending: FAMILY MEDICINE | Admitting: FAMILY MEDICINE
Payer: MEDICARE

## 2020-01-01 VITALS
SYSTOLIC BLOOD PRESSURE: 138 MMHG | OXYGEN SATURATION: 99 % | RESPIRATION RATE: 18 BRPM | HEART RATE: 61 BPM | DIASTOLIC BLOOD PRESSURE: 83 MMHG

## 2020-01-01 VITALS — HEIGHT: 67 IN | WEIGHT: 164.02 LBS

## 2020-01-01 DIAGNOSIS — I13.0 HYPERTENSIVE HEART AND CHRONIC KIDNEY DISEASE WITH HEART FAILURE AND STAGE 1 THROUGH STAGE 4 CHRONIC KIDNEY DISEASE, OR UNSPECIFIED CHRONIC KIDNEY DISEASE: ICD-10-CM

## 2020-01-01 DIAGNOSIS — I21.4 NON-ST ELEVATION (NSTEMI) MYOCARDIAL INFARCTION: ICD-10-CM

## 2020-01-01 DIAGNOSIS — N40.0 BENIGN PROSTATIC HYPERPLASIA WITHOUT LOWER URINARY TRACT SYMPTOMS: ICD-10-CM

## 2020-01-01 DIAGNOSIS — R06.02 SHORTNESS OF BREATH: ICD-10-CM

## 2020-01-01 DIAGNOSIS — E11.22 TYPE 2 DIABETES MELLITUS WITH DIABETIC CHRONIC KIDNEY DISEASE: ICD-10-CM

## 2020-01-01 DIAGNOSIS — Z95.2 PRESENCE OF PROSTHETIC HEART VALVE: Chronic | ICD-10-CM

## 2020-01-01 DIAGNOSIS — I25.119 ATHEROSCLEROTIC HEART DISEASE OF NATIVE CORONARY ARTERY WITH UNSPECIFIED ANGINA PECTORIS: ICD-10-CM

## 2020-01-01 DIAGNOSIS — Z95.0 PRESENCE OF CARDIAC PACEMAKER: Chronic | ICD-10-CM

## 2020-01-01 DIAGNOSIS — I42.9 CARDIOMYOPATHY, UNSPECIFIED: ICD-10-CM

## 2020-01-01 DIAGNOSIS — M19.019 PRIMARY OSTEOARTHRITIS, UNSPECIFIED SHOULDER: Chronic | ICD-10-CM

## 2020-01-01 DIAGNOSIS — Z88.8 ALLERGY STATUS TO OTHER DRUGS, MEDICAMENTS AND BIOLOGICAL SUBSTANCES: ICD-10-CM

## 2020-01-01 DIAGNOSIS — Z79.01 LONG TERM (CURRENT) USE OF ANTICOAGULANTS: ICD-10-CM

## 2020-01-01 DIAGNOSIS — E87.5 HYPERKALEMIA: ICD-10-CM

## 2020-01-01 DIAGNOSIS — Z79.4 LONG TERM (CURRENT) USE OF INSULIN: ICD-10-CM

## 2020-01-01 DIAGNOSIS — I50.22 CHRONIC SYSTOLIC (CONGESTIVE) HEART FAILURE: ICD-10-CM

## 2020-01-01 DIAGNOSIS — Z90.49 ACQUIRED ABSENCE OF OTHER SPECIFIED PARTS OF DIGESTIVE TRACT: Chronic | ICD-10-CM

## 2020-01-01 DIAGNOSIS — I25.84 CORONARY ATHEROSCLEROSIS DUE TO CALCIFIED CORONARY LESION: ICD-10-CM

## 2020-01-01 DIAGNOSIS — N18.4 CHRONIC KIDNEY DISEASE, STAGE 4 (SEVERE): ICD-10-CM

## 2020-01-01 DIAGNOSIS — I48.20 CHRONIC ATRIAL FIBRILLATION, UNSPECIFIED: ICD-10-CM

## 2020-01-01 DIAGNOSIS — Z95.2 PRESENCE OF PROSTHETIC HEART VALVE: ICD-10-CM

## 2020-01-01 DIAGNOSIS — E78.5 HYPERLIPIDEMIA, UNSPECIFIED: ICD-10-CM

## 2020-01-01 DIAGNOSIS — I25.2 OLD MYOCARDIAL INFARCTION: ICD-10-CM

## 2020-01-01 DIAGNOSIS — K21.9 GASTRO-ESOPHAGEAL REFLUX DISEASE WITHOUT ESOPHAGITIS: ICD-10-CM

## 2020-01-01 DIAGNOSIS — Z79.82 LONG TERM (CURRENT) USE OF ASPIRIN: ICD-10-CM

## 2020-01-01 DIAGNOSIS — Z95.0 PRESENCE OF CARDIAC PACEMAKER: ICD-10-CM

## 2020-01-01 LAB
A1C WITH ESTIMATED AVERAGE GLUCOSE RESULT: 7.8 % — HIGH (ref 4–5.6)
ADD ON TEST-SPECIMEN IN LAB: SIGNIFICANT CHANGE UP
ALBUMIN SERPL ELPH-MCNC: 2.9 G/DL — LOW (ref 3.3–5)
ALP SERPL-CCNC: 107 U/L — SIGNIFICANT CHANGE UP (ref 40–120)
ALT FLD-CCNC: 22 U/L — SIGNIFICANT CHANGE UP (ref 12–78)
ANION GAP SERPL CALC-SCNC: 3 MMOL/L — LOW (ref 5–17)
ANION GAP SERPL CALC-SCNC: 3 MMOL/L — LOW (ref 5–17)
ANION GAP SERPL CALC-SCNC: 4 MMOL/L — LOW (ref 5–17)
ANION GAP SERPL CALC-SCNC: 6 MMOL/L — SIGNIFICANT CHANGE UP (ref 5–17)
ANION GAP SERPL CALC-SCNC: 6 MMOL/L — SIGNIFICANT CHANGE UP (ref 5–17)
ANION GAP SERPL CALC-SCNC: 7 MMOL/L — SIGNIFICANT CHANGE UP (ref 5–17)
APTT BLD: 34.8 SEC — SIGNIFICANT CHANGE UP (ref 27.5–35.5)
APTT BLD: 35.4 SEC — SIGNIFICANT CHANGE UP (ref 27.5–35.5)
APTT BLD: 35.6 SEC — HIGH (ref 27.5–35.5)
AST SERPL-CCNC: 23 U/L — SIGNIFICANT CHANGE UP (ref 15–37)
BASOPHILS # BLD AUTO: 0.04 K/UL — SIGNIFICANT CHANGE UP (ref 0–0.2)
BASOPHILS # BLD AUTO: 0.05 K/UL — SIGNIFICANT CHANGE UP (ref 0–0.2)
BASOPHILS NFR BLD AUTO: 0.4 % — SIGNIFICANT CHANGE UP (ref 0–2)
BASOPHILS NFR BLD AUTO: 0.6 % — SIGNIFICANT CHANGE UP (ref 0–2)
BILIRUB SERPL-MCNC: 0.4 MG/DL — SIGNIFICANT CHANGE UP (ref 0.2–1.2)
BUN SERPL-MCNC: 36 MG/DL — HIGH (ref 7–23)
BUN SERPL-MCNC: 36 MG/DL — HIGH (ref 7–23)
BUN SERPL-MCNC: 37 MG/DL — HIGH (ref 7–23)
BUN SERPL-MCNC: 38 MG/DL — HIGH (ref 7–23)
BUN SERPL-MCNC: 38 MG/DL — HIGH (ref 7–23)
CALCIUM SERPL-MCNC: 8.9 MG/DL — SIGNIFICANT CHANGE UP (ref 8.5–10.1)
CALCIUM SERPL-MCNC: 9 MG/DL — SIGNIFICANT CHANGE UP (ref 8.5–10.1)
CALCIUM SERPL-MCNC: 9 MG/DL — SIGNIFICANT CHANGE UP (ref 8.5–10.1)
CALCIUM SERPL-MCNC: 9.2 MG/DL — SIGNIFICANT CHANGE UP (ref 8.5–10.1)
CALCIUM SERPL-MCNC: 9.2 MG/DL — SIGNIFICANT CHANGE UP (ref 8.5–10.1)
CHLORIDE SERPL-SCNC: 109 MMOL/L — HIGH (ref 96–108)
CHLORIDE SERPL-SCNC: 110 MMOL/L — HIGH (ref 96–108)
CHLORIDE SERPL-SCNC: 110 MMOL/L — HIGH (ref 96–108)
CHLORIDE SERPL-SCNC: 111 MMOL/L — HIGH (ref 96–108)
CO2 SERPL-SCNC: 25 MMOL/L — SIGNIFICANT CHANGE UP (ref 22–31)
CO2 SERPL-SCNC: 25 MMOL/L — SIGNIFICANT CHANGE UP (ref 22–31)
CO2 SERPL-SCNC: 26 MMOL/L — SIGNIFICANT CHANGE UP (ref 22–31)
CO2 SERPL-SCNC: 27 MMOL/L — SIGNIFICANT CHANGE UP (ref 22–31)
CO2 SERPL-SCNC: 28 MMOL/L — SIGNIFICANT CHANGE UP (ref 22–31)
CO2 SERPL-SCNC: 28 MMOL/L — SIGNIFICANT CHANGE UP (ref 22–31)
CREAT SERPL-MCNC: 2.11 MG/DL — HIGH (ref 0.5–1.3)
CREAT SERPL-MCNC: 2.13 MG/DL — HIGH (ref 0.5–1.3)
CREAT SERPL-MCNC: 2.15 MG/DL — HIGH (ref 0.5–1.3)
CREAT SERPL-MCNC: 2.22 MG/DL — HIGH (ref 0.5–1.3)
CREAT SERPL-MCNC: 2.29 MG/DL — HIGH (ref 0.5–1.3)
EOSINOPHIL # BLD AUTO: 0.19 K/UL — SIGNIFICANT CHANGE UP (ref 0–0.5)
EOSINOPHIL # BLD AUTO: 0.3 K/UL — SIGNIFICANT CHANGE UP (ref 0–0.5)
EOSINOPHIL NFR BLD AUTO: 2.2 % — SIGNIFICANT CHANGE UP (ref 0–6)
EOSINOPHIL NFR BLD AUTO: 3.2 % — SIGNIFICANT CHANGE UP (ref 0–6)
ESTIMATED AVERAGE GLUCOSE: 177 MG/DL — HIGH (ref 68–114)
GLUCOSE SERPL-MCNC: 121 MG/DL — HIGH (ref 70–99)
GLUCOSE SERPL-MCNC: 60 MG/DL — LOW (ref 70–99)
GLUCOSE SERPL-MCNC: 70 MG/DL — SIGNIFICANT CHANGE UP (ref 70–99)
GLUCOSE SERPL-MCNC: 78 MG/DL — SIGNIFICANT CHANGE UP (ref 70–99)
GLUCOSE SERPL-MCNC: 87 MG/DL — SIGNIFICANT CHANGE UP (ref 70–99)
HCT VFR BLD CALC: 29.6 % — LOW (ref 39–50)
HCT VFR BLD CALC: 30.1 % — LOW (ref 39–50)
HCT VFR BLD CALC: 30.2 % — LOW (ref 39–50)
HCT VFR BLD CALC: 31.6 % — LOW (ref 39–50)
HCT VFR BLD CALC: 31.7 % — LOW (ref 39–50)
HGB BLD-MCNC: 10 G/DL — LOW (ref 13–17)
HGB BLD-MCNC: 10.1 G/DL — LOW (ref 13–17)
HGB BLD-MCNC: 9.4 G/DL — LOW (ref 13–17)
HGB BLD-MCNC: 9.5 G/DL — LOW (ref 13–17)
HGB BLD-MCNC: 9.6 G/DL — LOW (ref 13–17)
IMM GRANULOCYTES NFR BLD AUTO: 0.3 % — SIGNIFICANT CHANGE UP (ref 0–1.5)
IMM GRANULOCYTES NFR BLD AUTO: 0.5 % — SIGNIFICANT CHANGE UP (ref 0–1.5)
INR BLD: 1.9 RATIO — HIGH (ref 0.88–1.16)
INR BLD: 2.06 RATIO — HIGH (ref 0.88–1.16)
INR BLD: 2.19 RATIO — HIGH (ref 0.88–1.16)
INR BLD: 2.43 RATIO — HIGH (ref 0.88–1.16)
INR BLD: 2.6 RATIO — HIGH (ref 0.88–1.16)
LYMPHOCYTES # BLD AUTO: 1.15 K/UL — SIGNIFICANT CHANGE UP (ref 1–3.3)
LYMPHOCYTES # BLD AUTO: 1.26 K/UL — SIGNIFICANT CHANGE UP (ref 1–3.3)
LYMPHOCYTES # BLD AUTO: 13.3 % — SIGNIFICANT CHANGE UP (ref 13–44)
LYMPHOCYTES # BLD AUTO: 13.3 % — SIGNIFICANT CHANGE UP (ref 13–44)
MAGNESIUM SERPL-MCNC: 2.3 MG/DL — SIGNIFICANT CHANGE UP (ref 1.6–2.6)
MCHC RBC-ENTMCNC: 31.5 GM/DL — LOW (ref 32–36)
MCHC RBC-ENTMCNC: 31.6 GM/DL — LOW (ref 32–36)
MCHC RBC-ENTMCNC: 31.8 GM/DL — LOW (ref 32–36)
MCHC RBC-ENTMCNC: 31.8 GM/DL — LOW (ref 32–36)
MCHC RBC-ENTMCNC: 32 GM/DL — SIGNIFICANT CHANGE UP (ref 32–36)
MCHC RBC-ENTMCNC: 32.9 PG — SIGNIFICANT CHANGE UP (ref 27–34)
MCHC RBC-ENTMCNC: 33 PG — SIGNIFICANT CHANGE UP (ref 27–34)
MCHC RBC-ENTMCNC: 33.1 PG — SIGNIFICANT CHANGE UP (ref 27–34)
MCHC RBC-ENTMCNC: 33.1 PG — SIGNIFICANT CHANGE UP (ref 27–34)
MCHC RBC-ENTMCNC: 33.6 PG — SIGNIFICANT CHANGE UP (ref 27–34)
MCV RBC AUTO: 103.5 FL — HIGH (ref 80–100)
MCV RBC AUTO: 103.8 FL — HIGH (ref 80–100)
MCV RBC AUTO: 104.9 FL — HIGH (ref 80–100)
MCV RBC AUTO: 105 FL — HIGH (ref 80–100)
MCV RBC AUTO: 105 FL — HIGH (ref 80–100)
MONOCYTES # BLD AUTO: 0.65 K/UL — SIGNIFICANT CHANGE UP (ref 0–0.9)
MONOCYTES # BLD AUTO: 0.84 K/UL — SIGNIFICANT CHANGE UP (ref 0–0.9)
MONOCYTES NFR BLD AUTO: 7.5 % — SIGNIFICANT CHANGE UP (ref 2–14)
MONOCYTES NFR BLD AUTO: 8.9 % — SIGNIFICANT CHANGE UP (ref 2–14)
NEUTROPHILS # BLD AUTO: 6.6 K/UL — SIGNIFICANT CHANGE UP (ref 1.8–7.4)
NEUTROPHILS # BLD AUTO: 6.95 K/UL — SIGNIFICANT CHANGE UP (ref 1.8–7.4)
NEUTROPHILS NFR BLD AUTO: 73.7 % — SIGNIFICANT CHANGE UP (ref 43–77)
NEUTROPHILS NFR BLD AUTO: 76.1 % — SIGNIFICANT CHANGE UP (ref 43–77)
NT-PROBNP SERPL-SCNC: 8189 PG/ML — HIGH (ref 0–450)
PLATELET # BLD AUTO: 143 K/UL — LOW (ref 150–400)
PLATELET # BLD AUTO: 144 K/UL — LOW (ref 150–400)
PLATELET # BLD AUTO: 146 K/UL — LOW (ref 150–400)
PLATELET # BLD AUTO: 155 K/UL — SIGNIFICANT CHANGE UP (ref 150–400)
PLATELET # BLD AUTO: 168 K/UL — SIGNIFICANT CHANGE UP (ref 150–400)
POTASSIUM SERPL-MCNC: 4.3 MMOL/L — SIGNIFICANT CHANGE UP (ref 3.5–5.3)
POTASSIUM SERPL-MCNC: 4.3 MMOL/L — SIGNIFICANT CHANGE UP (ref 3.5–5.3)
POTASSIUM SERPL-MCNC: 4.4 MMOL/L — SIGNIFICANT CHANGE UP (ref 3.5–5.3)
POTASSIUM SERPL-MCNC: 4.6 MMOL/L — SIGNIFICANT CHANGE UP (ref 3.5–5.3)
POTASSIUM SERPL-MCNC: 4.8 MMOL/L — SIGNIFICANT CHANGE UP (ref 3.5–5.3)
POTASSIUM SERPL-MCNC: 5.4 MMOL/L — HIGH (ref 3.5–5.3)
POTASSIUM SERPL-SCNC: 4.3 MMOL/L — SIGNIFICANT CHANGE UP (ref 3.5–5.3)
POTASSIUM SERPL-SCNC: 4.3 MMOL/L — SIGNIFICANT CHANGE UP (ref 3.5–5.3)
POTASSIUM SERPL-SCNC: 4.4 MMOL/L — SIGNIFICANT CHANGE UP (ref 3.5–5.3)
POTASSIUM SERPL-SCNC: 4.6 MMOL/L — SIGNIFICANT CHANGE UP (ref 3.5–5.3)
POTASSIUM SERPL-SCNC: 4.8 MMOL/L — SIGNIFICANT CHANGE UP (ref 3.5–5.3)
POTASSIUM SERPL-SCNC: 5.4 MMOL/L — HIGH (ref 3.5–5.3)
PROT SERPL-MCNC: 7.7 GM/DL — SIGNIFICANT CHANGE UP (ref 6–8.3)
PROTHROM AB SERPL-ACNC: 21.6 SEC — HIGH (ref 10.6–13.6)
PROTHROM AB SERPL-ACNC: 23.1 SEC — HIGH (ref 10.6–13.6)
PROTHROM AB SERPL-ACNC: 24.7 SEC — HIGH (ref 10.6–13.6)
PROTHROM AB SERPL-ACNC: 27.3 SEC — HIGH (ref 10.6–13.6)
PROTHROM AB SERPL-ACNC: 28.8 SEC — HIGH (ref 10.6–13.6)
RAPID RVP RESULT: SIGNIFICANT CHANGE UP
RBC # BLD: 2.86 M/UL — LOW (ref 4.2–5.8)
RBC # BLD: 2.87 M/UL — LOW (ref 4.2–5.8)
RBC # BLD: 2.91 M/UL — LOW (ref 4.2–5.8)
RBC # BLD: 3.01 M/UL — LOW (ref 4.2–5.8)
RBC # BLD: 3.02 M/UL — LOW (ref 4.2–5.8)
RBC # FLD: 14 % — SIGNIFICANT CHANGE UP (ref 10.3–14.5)
RBC # FLD: 14.1 % — SIGNIFICANT CHANGE UP (ref 10.3–14.5)
RBC # FLD: 14.1 % — SIGNIFICANT CHANGE UP (ref 10.3–14.5)
RBC # FLD: 14.3 % — SIGNIFICANT CHANGE UP (ref 10.3–14.5)
RBC # FLD: 14.4 % — SIGNIFICANT CHANGE UP (ref 10.3–14.5)
SARS-COV-2 IGG SERPL QL IA: NEGATIVE — SIGNIFICANT CHANGE UP
SARS-COV-2 IGM SERPL IA-ACNC: 0.1 INDEX — SIGNIFICANT CHANGE UP
SARS-COV-2 RNA SPEC QL NAA+PROBE: SIGNIFICANT CHANGE UP
SODIUM SERPL-SCNC: 141 MMOL/L — SIGNIFICANT CHANGE UP (ref 135–145)
SODIUM SERPL-SCNC: 142 MMOL/L — SIGNIFICANT CHANGE UP (ref 135–145)
SODIUM SERPL-SCNC: 142 MMOL/L — SIGNIFICANT CHANGE UP (ref 135–145)
SODIUM SERPL-SCNC: 143 MMOL/L — SIGNIFICANT CHANGE UP (ref 135–145)
TROPONIN I SERPL-MCNC: 0.09 NG/ML — HIGH (ref 0.01–0.04)
TROPONIN I SERPL-MCNC: 0.09 NG/ML — HIGH (ref 0.01–0.04)
TROPONIN I SERPL-MCNC: 0.11 NG/ML — HIGH (ref 0.01–0.04)
WBC # BLD: 10.19 K/UL — SIGNIFICANT CHANGE UP (ref 3.8–10.5)
WBC # BLD: 8.55 K/UL — SIGNIFICANT CHANGE UP (ref 3.8–10.5)
WBC # BLD: 8.67 K/UL — SIGNIFICANT CHANGE UP (ref 3.8–10.5)
WBC # BLD: 8.78 K/UL — SIGNIFICANT CHANGE UP (ref 3.8–10.5)
WBC # BLD: 9.44 K/UL — SIGNIFICANT CHANGE UP (ref 3.8–10.5)
WBC # FLD AUTO: 10.19 K/UL — SIGNIFICANT CHANGE UP (ref 3.8–10.5)
WBC # FLD AUTO: 8.55 K/UL — SIGNIFICANT CHANGE UP (ref 3.8–10.5)
WBC # FLD AUTO: 8.67 K/UL — SIGNIFICANT CHANGE UP (ref 3.8–10.5)
WBC # FLD AUTO: 8.78 K/UL — SIGNIFICANT CHANGE UP (ref 3.8–10.5)
WBC # FLD AUTO: 9.44 K/UL — SIGNIFICANT CHANGE UP (ref 3.8–10.5)

## 2020-01-01 PROCEDURE — 93005 ELECTROCARDIOGRAM TRACING: CPT

## 2020-01-01 PROCEDURE — C1894: CPT

## 2020-01-01 PROCEDURE — G0269: CPT

## 2020-01-01 PROCEDURE — 85379 FIBRIN DEGRADATION QUANT: CPT

## 2020-01-01 PROCEDURE — 99232 SBSQ HOSP IP/OBS MODERATE 35: CPT

## 2020-01-01 PROCEDURE — 85027 COMPLETE CBC AUTOMATED: CPT

## 2020-01-01 PROCEDURE — 86769 SARS-COV-2 COVID-19 ANTIBODY: CPT

## 2020-01-01 PROCEDURE — 99223 1ST HOSP IP/OBS HIGH 75: CPT

## 2020-01-01 PROCEDURE — 83036 HEMOGLOBIN GLYCOSYLATED A1C: CPT

## 2020-01-01 PROCEDURE — 86901 BLOOD TYPING SEROLOGIC RH(D): CPT

## 2020-01-01 PROCEDURE — 86900 BLOOD TYPING SEROLOGIC ABO: CPT

## 2020-01-01 PROCEDURE — C1769: CPT

## 2020-01-01 PROCEDURE — 82947 ASSAY GLUCOSE BLOOD QUANT: CPT

## 2020-01-01 PROCEDURE — 80048 BASIC METABOLIC PNL TOTAL CA: CPT

## 2020-01-01 PROCEDURE — 93010 ELECTROCARDIOGRAM REPORT: CPT | Mod: 76

## 2020-01-01 PROCEDURE — C9600: CPT | Mod: RC

## 2020-01-01 PROCEDURE — 85730 THROMBOPLASTIN TIME PARTIAL: CPT

## 2020-01-01 PROCEDURE — C1874: CPT

## 2020-01-01 PROCEDURE — 71045 X-RAY EXAM CHEST 1 VIEW: CPT | Mod: 26

## 2020-01-01 PROCEDURE — 82962 GLUCOSE BLOOD TEST: CPT

## 2020-01-01 PROCEDURE — 82565 ASSAY OF CREATININE: CPT

## 2020-01-01 PROCEDURE — 82310 ASSAY OF CALCIUM: CPT

## 2020-01-01 PROCEDURE — 80051 ELECTROLYTE PANEL: CPT

## 2020-01-01 PROCEDURE — 93306 TTE W/DOPPLER COMPLETE: CPT | Mod: 26

## 2020-01-01 PROCEDURE — 36415 COLL VENOUS BLD VENIPUNCTURE: CPT

## 2020-01-01 PROCEDURE — 93010 ELECTROCARDIOGRAM REPORT: CPT

## 2020-01-01 PROCEDURE — C1725: CPT

## 2020-01-01 PROCEDURE — C1887: CPT

## 2020-01-01 PROCEDURE — 85610 PROTHROMBIN TIME: CPT

## 2020-01-01 PROCEDURE — 99239 HOSP IP/OBS DSCHRG MGMT >30: CPT

## 2020-01-01 PROCEDURE — 93454 CORONARY ARTERY ANGIO S&I: CPT | Mod: 59

## 2020-01-01 PROCEDURE — 84484 ASSAY OF TROPONIN QUANT: CPT

## 2020-01-01 PROCEDURE — 99233 SBSQ HOSP IP/OBS HIGH 50: CPT

## 2020-01-01 PROCEDURE — C1760: CPT

## 2020-01-01 PROCEDURE — 86850 RBC ANTIBODY SCREEN: CPT

## 2020-01-01 PROCEDURE — 84520 ASSAY OF UREA NITROGEN: CPT

## 2020-01-01 PROCEDURE — 85025 COMPLETE CBC W/AUTO DIFF WBC: CPT

## 2020-01-01 PROCEDURE — 93306 TTE W/DOPPLER COMPLETE: CPT

## 2020-01-01 RX ORDER — DEXTROSE 50 % IN WATER 50 %
25 SYRINGE (ML) INTRAVENOUS ONCE
Refills: 0 | Status: DISCONTINUED | OUTPATIENT
Start: 2020-01-01 | End: 2020-01-01

## 2020-01-01 RX ORDER — GLUCAGON INJECTION, SOLUTION 0.5 MG/.1ML
1 INJECTION, SOLUTION SUBCUTANEOUS ONCE
Refills: 0 | Status: DISCONTINUED | OUTPATIENT
Start: 2020-01-01 | End: 2020-01-01

## 2020-01-01 RX ORDER — METOPROLOL TARTRATE 50 MG
25 TABLET ORAL DAILY
Refills: 0 | Status: DISCONTINUED | OUTPATIENT
Start: 2020-01-01 | End: 2020-01-01

## 2020-01-01 RX ORDER — VENLAFAXINE HCL 75 MG
75 CAPSULE, EXT RELEASE 24 HR ORAL DAILY
Refills: 0 | Status: DISCONTINUED | OUTPATIENT
Start: 2020-01-01 | End: 2020-01-01

## 2020-01-01 RX ORDER — ENOXAPARIN SODIUM 100 MG/ML
18 INJECTION SUBCUTANEOUS
Qty: 0 | Refills: 0 | DISCHARGE

## 2020-01-01 RX ORDER — HYDRALAZINE HCL 50 MG
1 TABLET ORAL
Qty: 90 | Refills: 0
Start: 2020-01-01 | End: 2020-01-01

## 2020-01-01 RX ORDER — FERROUS SULFATE 325(65) MG
325 TABLET ORAL DAILY
Refills: 0 | Status: DISCONTINUED | OUTPATIENT
Start: 2020-01-01 | End: 2020-01-01

## 2020-01-01 RX ORDER — WARFARIN SODIUM 2.5 MG/1
5 TABLET ORAL ONCE
Refills: 0 | Status: COMPLETED | OUTPATIENT
Start: 2020-01-01 | End: 2020-01-01

## 2020-01-01 RX ORDER — ISOSORBIDE MONONITRATE 60 MG/1
30 TABLET, EXTENDED RELEASE ORAL DAILY
Refills: 0 | Status: DISCONTINUED | OUTPATIENT
Start: 2020-01-01 | End: 2020-01-01

## 2020-01-01 RX ORDER — SODIUM CHLORIDE 9 MG/ML
1000 INJECTION, SOLUTION INTRAVENOUS
Refills: 0 | Status: DISCONTINUED | OUTPATIENT
Start: 2020-01-01 | End: 2020-01-01

## 2020-01-01 RX ORDER — ISOSORBIDE DINITRATE 5 MG/1
20 TABLET ORAL THREE TIMES A DAY
Refills: 0 | Status: DISCONTINUED | OUTPATIENT
Start: 2020-01-01 | End: 2020-01-01

## 2020-01-01 RX ORDER — INSULIN GLARGINE 100 [IU]/ML
18 INJECTION, SOLUTION SUBCUTANEOUS AT BEDTIME
Refills: 0 | Status: DISCONTINUED | OUTPATIENT
Start: 2020-01-01 | End: 2020-01-01

## 2020-01-01 RX ORDER — METOPROLOL TARTRATE 50 MG
25 TABLET ORAL
Refills: 0 | Status: DISCONTINUED | OUTPATIENT
Start: 2020-01-01 | End: 2020-01-01

## 2020-01-01 RX ORDER — INSULIN GLARGINE 100 [IU]/ML
14 INJECTION, SOLUTION SUBCUTANEOUS AT BEDTIME
Refills: 0 | Status: DISCONTINUED | OUTPATIENT
Start: 2020-01-01 | End: 2020-01-01

## 2020-01-01 RX ORDER — INSULIN GLARGINE 100 [IU]/ML
10 INJECTION, SOLUTION SUBCUTANEOUS AT BEDTIME
Refills: 0 | Status: DISCONTINUED | OUTPATIENT
Start: 2020-01-01 | End: 2020-01-01

## 2020-01-01 RX ORDER — HYDRALAZINE HCL 50 MG
10 TABLET ORAL THREE TIMES A DAY
Refills: 0 | Status: DISCONTINUED | OUTPATIENT
Start: 2020-01-01 | End: 2020-01-01

## 2020-01-01 RX ORDER — PRIMIDONE 250 MG/1
0.5 TABLET ORAL
Qty: 0 | Refills: 0 | DISCHARGE

## 2020-01-01 RX ORDER — GABAPENTIN 400 MG/1
100 CAPSULE ORAL THREE TIMES A DAY
Refills: 0 | Status: DISCONTINUED | OUTPATIENT
Start: 2020-01-01 | End: 2020-01-01

## 2020-01-01 RX ORDER — ASPIRIN/CALCIUM CARB/MAGNESIUM 324 MG
81 TABLET ORAL DAILY
Refills: 0 | Status: DISCONTINUED | OUTPATIENT
Start: 2020-01-01 | End: 2020-01-01

## 2020-01-01 RX ORDER — ATORVASTATIN CALCIUM 80 MG/1
20 TABLET, FILM COATED ORAL AT BEDTIME
Refills: 0 | Status: DISCONTINUED | OUTPATIENT
Start: 2020-01-01 | End: 2020-01-01

## 2020-01-01 RX ORDER — ISOSORBIDE DINITRATE 5 MG/1
1 TABLET ORAL
Qty: 90 | Refills: 0
Start: 2020-01-01 | End: 2020-01-01

## 2020-01-01 RX ORDER — INSULIN LISPRO 100/ML
VIAL (ML) SUBCUTANEOUS AT BEDTIME
Refills: 0 | Status: DISCONTINUED | OUTPATIENT
Start: 2020-01-01 | End: 2020-01-01

## 2020-01-01 RX ORDER — HYDRALAZINE HYDROCHLORIDE AND ISOSORBIDE DINITRATE 37.5; 2 MG/1; MG/1
1 TABLET, FILM COATED ORAL
Qty: 90 | Refills: 0
Start: 2020-01-01 | End: 2020-01-01

## 2020-01-01 RX ORDER — PANTOPRAZOLE SODIUM 20 MG/1
40 TABLET, DELAYED RELEASE ORAL DAILY
Refills: 0 | Status: DISCONTINUED | OUTPATIENT
Start: 2020-01-01 | End: 2020-01-01

## 2020-01-01 RX ORDER — ATORVASTATIN CALCIUM 80 MG/1
1 TABLET, FILM COATED ORAL
Qty: 0 | Refills: 0 | DISCHARGE

## 2020-01-01 RX ORDER — METOPROLOL TARTRATE 50 MG
1 TABLET ORAL
Qty: 0 | Refills: 0 | DISCHARGE

## 2020-01-01 RX ORDER — WARFARIN SODIUM 2.5 MG/1
5 TABLET ORAL DAILY
Refills: 0 | Status: DISCONTINUED | OUTPATIENT
Start: 2020-01-01 | End: 2020-01-01

## 2020-01-01 RX ORDER — ATORVASTATIN CALCIUM 80 MG/1
40 TABLET, FILM COATED ORAL AT BEDTIME
Refills: 0 | Status: DISCONTINUED | OUTPATIENT
Start: 2020-01-01 | End: 2020-01-01

## 2020-01-01 RX ORDER — DEXTROSE 50 % IN WATER 50 %
15 SYRINGE (ML) INTRAVENOUS ONCE
Refills: 0 | Status: DISCONTINUED | OUTPATIENT
Start: 2020-01-01 | End: 2020-01-01

## 2020-01-01 RX ORDER — ISOSORBIDE MONONITRATE 60 MG/1
1 TABLET, EXTENDED RELEASE ORAL
Qty: 0 | Refills: 0 | DISCHARGE

## 2020-01-01 RX ORDER — ISOSORBIDE DINITRATE 5 MG/1
10 TABLET ORAL THREE TIMES A DAY
Refills: 0 | Status: DISCONTINUED | OUTPATIENT
Start: 2020-01-01 | End: 2020-01-01

## 2020-01-01 RX ORDER — CLOPIDOGREL BISULFATE 75 MG/1
1 TABLET, FILM COATED ORAL
Qty: 30 | Refills: 0
Start: 2020-01-01 | End: 2020-01-01

## 2020-01-01 RX ORDER — DEXTROSE 50 % IN WATER 50 %
12.5 SYRINGE (ML) INTRAVENOUS ONCE
Refills: 0 | Status: DISCONTINUED | OUTPATIENT
Start: 2020-01-01 | End: 2020-01-01

## 2020-01-01 RX ORDER — SODIUM CHLORIDE 9 MG/ML
1000 INJECTION INTRAMUSCULAR; INTRAVENOUS; SUBCUTANEOUS
Refills: 0 | Status: DISCONTINUED | OUTPATIENT
Start: 2020-01-01 | End: 2020-01-01

## 2020-01-01 RX ORDER — METOPROLOL TARTRATE 50 MG
1 TABLET ORAL
Qty: 0 | Refills: 0 | DISCHARGE
Start: 2020-01-01

## 2020-01-01 RX ORDER — CLOPIDOGREL BISULFATE 75 MG/1
75 TABLET, FILM COATED ORAL DAILY
Refills: 0 | Status: DISCONTINUED | OUTPATIENT
Start: 2020-01-01 | End: 2020-01-01

## 2020-01-01 RX ORDER — METOPROLOL TARTRATE 50 MG
1 TABLET ORAL
Qty: 60 | Refills: 0
Start: 2020-01-01 | End: 2020-01-01

## 2020-01-01 RX ORDER — ISOSORBIDE DINITRATE 5 MG/1
10 TABLET ORAL DAILY
Refills: 0 | Status: DISCONTINUED | OUTPATIENT
Start: 2020-01-01 | End: 2020-01-01

## 2020-01-01 RX ORDER — ASPIRIN/CALCIUM CARB/MAGNESIUM 324 MG
1 TABLET ORAL
Qty: 0 | Refills: 0 | DISCHARGE

## 2020-01-01 RX ORDER — AMLODIPINE BESYLATE 2.5 MG/1
1 TABLET ORAL
Qty: 0 | Refills: 0 | DISCHARGE

## 2020-01-01 RX ORDER — INSULIN LISPRO 100/ML
VIAL (ML) SUBCUTANEOUS
Refills: 0 | Status: DISCONTINUED | OUTPATIENT
Start: 2020-01-01 | End: 2020-01-01

## 2020-01-01 RX ADMIN — ISOSORBIDE MONONITRATE 30 MILLIGRAM(S): 60 TABLET, EXTENDED RELEASE ORAL at 10:21

## 2020-01-01 RX ADMIN — PANTOPRAZOLE SODIUM 40 MILLIGRAM(S): 20 TABLET, DELAYED RELEASE ORAL at 09:11

## 2020-01-01 RX ADMIN — INSULIN GLARGINE 18 UNIT(S): 100 INJECTION, SOLUTION SUBCUTANEOUS at 22:02

## 2020-01-01 RX ADMIN — GABAPENTIN 100 MILLIGRAM(S): 400 CAPSULE ORAL at 15:14

## 2020-01-01 RX ADMIN — WARFARIN SODIUM 5 MILLIGRAM(S): 2.5 TABLET ORAL at 21:34

## 2020-01-01 RX ADMIN — Medication 10 MILLIGRAM(S): at 05:22

## 2020-01-01 RX ADMIN — INSULIN GLARGINE 10 UNIT(S): 100 INJECTION, SOLUTION SUBCUTANEOUS at 21:33

## 2020-01-01 RX ADMIN — WARFARIN SODIUM 5 MILLIGRAM(S): 2.5 TABLET ORAL at 22:02

## 2020-01-01 RX ADMIN — Medication 325 MILLIGRAM(S): at 10:21

## 2020-01-01 RX ADMIN — Medication 2: at 12:24

## 2020-01-01 RX ADMIN — Medication 10 MILLIGRAM(S): at 13:00

## 2020-01-01 RX ADMIN — Medication 81 MILLIGRAM(S): at 17:46

## 2020-01-01 RX ADMIN — Medication 325 MILLIGRAM(S): at 10:32

## 2020-01-01 RX ADMIN — GABAPENTIN 100 MILLIGRAM(S): 400 CAPSULE ORAL at 05:17

## 2020-01-01 RX ADMIN — GABAPENTIN 100 MILLIGRAM(S): 400 CAPSULE ORAL at 13:59

## 2020-01-01 RX ADMIN — GABAPENTIN 100 MILLIGRAM(S): 400 CAPSULE ORAL at 21:01

## 2020-01-01 RX ADMIN — Medication 25 MILLIGRAM(S): at 09:35

## 2020-01-01 RX ADMIN — Medication 81 MILLIGRAM(S): at 10:21

## 2020-01-01 RX ADMIN — Medication 25 MILLIGRAM(S): at 21:01

## 2020-01-01 RX ADMIN — GABAPENTIN 100 MILLIGRAM(S): 400 CAPSULE ORAL at 05:21

## 2020-01-01 RX ADMIN — Medication 25 MILLIGRAM(S): at 10:33

## 2020-01-01 RX ADMIN — Medication 2: at 21:34

## 2020-01-01 RX ADMIN — Medication 75 MILLIGRAM(S): at 17:46

## 2020-01-01 RX ADMIN — SODIUM CHLORIDE 50 MILLILITER(S): 9 INJECTION INTRAMUSCULAR; INTRAVENOUS; SUBCUTANEOUS at 14:37

## 2020-01-01 RX ADMIN — ISOSORBIDE DINITRATE 10 MILLIGRAM(S): 5 TABLET ORAL at 05:21

## 2020-01-01 RX ADMIN — GABAPENTIN 100 MILLIGRAM(S): 400 CAPSULE ORAL at 13:00

## 2020-01-01 RX ADMIN — Medication 75 MILLIGRAM(S): at 10:32

## 2020-01-01 RX ADMIN — Medication 75 MILLIGRAM(S): at 09:35

## 2020-01-01 RX ADMIN — Medication 10 MILLIGRAM(S): at 15:14

## 2020-01-01 RX ADMIN — ISOSORBIDE DINITRATE 10 MILLIGRAM(S): 5 TABLET ORAL at 17:46

## 2020-01-01 RX ADMIN — ATORVASTATIN CALCIUM 40 MILLIGRAM(S): 80 TABLET, FILM COATED ORAL at 21:38

## 2020-01-01 RX ADMIN — Medication 75 MILLIGRAM(S): at 11:11

## 2020-01-01 RX ADMIN — Medication 325 MILLIGRAM(S): at 09:09

## 2020-01-01 RX ADMIN — ATORVASTATIN CALCIUM 40 MILLIGRAM(S): 80 TABLET, FILM COATED ORAL at 21:01

## 2020-01-01 RX ADMIN — Medication 10 MILLIGRAM(S): at 21:38

## 2020-01-01 RX ADMIN — ISOSORBIDE DINITRATE 10 MILLIGRAM(S): 5 TABLET ORAL at 21:38

## 2020-01-01 RX ADMIN — GABAPENTIN 100 MILLIGRAM(S): 400 CAPSULE ORAL at 22:02

## 2020-01-01 RX ADMIN — GABAPENTIN 100 MILLIGRAM(S): 400 CAPSULE ORAL at 15:21

## 2020-01-01 RX ADMIN — Medication 25 MILLIGRAM(S): at 17:46

## 2020-01-01 RX ADMIN — Medication 6: at 12:14

## 2020-01-01 RX ADMIN — Medication 10 MILLIGRAM(S): at 21:34

## 2020-01-01 RX ADMIN — Medication 25 MILLIGRAM(S): at 09:10

## 2020-01-01 RX ADMIN — ISOSORBIDE DINITRATE 20 MILLIGRAM(S): 5 TABLET ORAL at 13:00

## 2020-01-01 RX ADMIN — Medication 10 MILLIGRAM(S): at 11:55

## 2020-01-01 RX ADMIN — GABAPENTIN 100 MILLIGRAM(S): 400 CAPSULE ORAL at 05:09

## 2020-01-01 RX ADMIN — Medication 10 MILLIGRAM(S): at 05:30

## 2020-01-01 RX ADMIN — PANTOPRAZOLE SODIUM 40 MILLIGRAM(S): 20 TABLET, DELAYED RELEASE ORAL at 09:35

## 2020-01-01 RX ADMIN — Medication 25 MILLIGRAM(S): at 10:21

## 2020-01-01 RX ADMIN — ISOSORBIDE DINITRATE 20 MILLIGRAM(S): 5 TABLET ORAL at 15:14

## 2020-01-01 RX ADMIN — PANTOPRAZOLE SODIUM 40 MILLIGRAM(S): 20 TABLET, DELAYED RELEASE ORAL at 10:21

## 2020-01-01 RX ADMIN — INSULIN GLARGINE 18 UNIT(S): 100 INJECTION, SOLUTION SUBCUTANEOUS at 21:01

## 2020-01-01 RX ADMIN — INSULIN GLARGINE 14 UNIT(S): 100 INJECTION, SOLUTION SUBCUTANEOUS at 21:49

## 2020-01-01 RX ADMIN — GABAPENTIN 100 MILLIGRAM(S): 400 CAPSULE ORAL at 21:38

## 2020-01-01 RX ADMIN — ISOSORBIDE DINITRATE 20 MILLIGRAM(S): 5 TABLET ORAL at 05:30

## 2020-01-01 RX ADMIN — Medication 75 MILLIGRAM(S): at 09:11

## 2020-01-01 RX ADMIN — GABAPENTIN 100 MILLIGRAM(S): 400 CAPSULE ORAL at 05:30

## 2020-01-01 RX ADMIN — ATORVASTATIN CALCIUM 20 MILLIGRAM(S): 80 TABLET, FILM COATED ORAL at 22:01

## 2020-01-01 RX ADMIN — ATORVASTATIN CALCIUM 40 MILLIGRAM(S): 80 TABLET, FILM COATED ORAL at 21:34

## 2020-01-01 RX ADMIN — Medication 325 MILLIGRAM(S): at 17:46

## 2020-01-01 RX ADMIN — ISOSORBIDE DINITRATE 20 MILLIGRAM(S): 5 TABLET ORAL at 21:34

## 2020-01-01 RX ADMIN — Medication 81 MILLIGRAM(S): at 09:09

## 2020-01-01 RX ADMIN — CLOPIDOGREL BISULFATE 75 MILLIGRAM(S): 75 TABLET, FILM COATED ORAL at 09:35

## 2020-01-01 RX ADMIN — GABAPENTIN 100 MILLIGRAM(S): 400 CAPSULE ORAL at 21:34

## 2020-01-01 RX ADMIN — Medication 325 MILLIGRAM(S): at 09:35

## 2020-01-01 RX ADMIN — PANTOPRAZOLE SODIUM 40 MILLIGRAM(S): 20 TABLET, DELAYED RELEASE ORAL at 10:32

## 2020-01-01 RX ADMIN — Medication 4: at 18:58

## 2020-01-01 RX ADMIN — ISOSORBIDE DINITRATE 10 MILLIGRAM(S): 5 TABLET ORAL at 11:32

## 2020-01-01 RX ADMIN — Medication 81 MILLIGRAM(S): at 10:29

## 2020-01-16 NOTE — PROGRESS NOTE ADULT - SUBJECTIVE AND OBJECTIVE BOX
61 y/o female with TIM for surgical mgmt  Pre operative clearance with Dr Adelina Vines, pt seen & examined without changes.   Thorough discussion of surgical risks, benefits, and alternatives including, but not limited to bleeding/clots, infection, injury to nearb
Patient is a 88y old  Male who presents with a chief complaint of weakness/ fever (18 Dec 2017 08:29)    HPI:  89 y/o Male with h/o A.fib (on coumadin), dyslipidemia, IDDM, HTN, PPM, GERD, BPH, CKD was admitted on  for increased weakness. States that he was recently discharged from hospital, and for two days after discharge, he felt great. After that he started feeling weak again. Patient states all the "feelings in his body came back" again, but he is unable to elaborate. In ER, he was found febrile to 102F and was given cefepime.    Lying in bed in NAD  Fever is down    MEDICATIONS  (STANDING):  artificial  tears Solution 2 Drop(s) Both EYES every 8 hours  aspirin enteric coated 81 milliGRAM(s) Oral daily  clonazePAM Tablet 0.5 milliGRAM(s) Oral at bedtime  dextrose 5%. 1000 milliLiter(s) (50 mL/Hr) IV Continuous <Continuous>  dextrose 50% Injectable 12.5 Gram(s) IV Push once  dextrose 50% Injectable 25 Gram(s) IV Push once  dextrose 50% Injectable 25 Gram(s) IV Push once  diltiazem    milliGRAM(s) Oral daily  doxazosin 4 milliGRAM(s) Oral at bedtime  gabapentin 100 milliGRAM(s) Oral three times a day  insulin glargine Injectable (LANTUS) 15 Unit(s) SubCutaneous at bedtime  insulin lispro (HumaLOG) corrective regimen sliding scale   SubCutaneous three times a day before meals  multivitamin 1 Tablet(s) Oral daily  pantoprazole    Tablet 40 milliGRAM(s) Oral before breakfast  regadenoson Injectable 0.4 milliGRAM(s) IV Push once  simvastatin 20 milliGRAM(s) Oral at bedtime  sodium chloride 0.9%. 1000 milliLiter(s) (50 mL/Hr) IV Continuous <Continuous>  venlafaxine XR. 75 milliGRAM(s) Oral daily  warfarin 5 milliGRAM(s) Oral daily    MEDICATIONS  (PRN):  acetaminophen   Tablet 650 milliGRAM(s) Oral every 6 hours PRN For Temp greater than 38 C (100.4 F)  dextrose Gel 1 Dose(s) Oral once PRN Blood Glucose LESS THAN 70 milliGRAM(s)/deciliter  docusate sodium 100 milliGRAM(s) Oral three times a day PRN Constipation  glucagon  Injectable 1 milliGRAM(s) IntraMuscular once PRN Glucose LESS THAN 70 milligrams/deciliter      Vital Signs Last 24 Hrs  T(C): 36.8 (19 Dec 2017 05:13), Max: 37 (18 Dec 2017 17:15)  T(F): 98.2 (19 Dec 2017 05:13), Max: 98.6 (18 Dec 2017 17:15)  HR: 60 (19 Dec 2017 05:13) (60 - 60)  BP: 137/57 (19 Dec 2017 05:13) (129/48 - 137/57)  BP(mean): --  RR: 20 (19 Dec 2017 05:13) (18 - 20)  SpO2: 96% (19 Dec 2017 05:13) (96% - 96%)    Physical Exam:      Constitutional: frail looking  HEENT: NC/AT, EOMI, PERRLA  Neck: supple  Back: no tenderness  Respiratory: clear  Cardiovascular: S1S2 regular, no murmurs  Abdomen: soft, not tender, not distended, positive BS  Genitourinary: no LN palpable  Rectal: deferred  Musculoskeletal: no muscle tenderness, no joint swelling or tenderness  Extremities: no pedal edema  Neurological: AxOx3, moving all extremities  Skin: no rashes    Labs:                        9.5    8.7   )-----------( 154      ( 19 Dec 2017 05:39 )             29.7         142  |  109<H>  |  38<H>  ----------------------------<  122<H>  4.6   |  28  |  1.73<H>    Ca    8.6      19 Dec 2017 05:39    TPro  7.7  /  Alb  3.1<L>  /  TBili  1.0  /  DBili  x   /  AST  66<H>  /  ALT  41  /  AlkPhos  95                          11.0   16.3  )-----------( 165      ( 17 Dec 2017 20:01 )             33.1         136  |  101  |  37<H>  ----------------------------<  178<H>  4.5   |  27  |  1.98<H>    Ca    8.8      17 Dec 2017 20:01    TPro  7.7  /  Alb  3.1<L>  /  TBili  1.0  /  DBili  x   /  AST  66<H>  /  ALT  41  /  AlkPhos  95  12-17     LIVER FUNCTIONS - ( 17 Dec 2017 20:01 )  Alb: 3.1 g/dL / Pro: 7.7 gm/dL / ALK PHOS: 95 U/L / ALT: 41 U/L / AST: 66 U/L / GGT: x           Urinalysis Basic - ( 17 Dec 2017 05:27 )    Color: Yellow / Appearance: Clear / S.015 / pH: x  Gluc: x / Ketone: Negative  / Bili: Negative / Urobili: Negative mg/dL   Blood: x / Protein: 100 mg/dL / Nitrite: Negative   Leuk Esterase: Small / RBC: 3-5 /HPF / WBC 11-25   Sq Epi: x / Non Sq Epi: Moderate / Bacteria: Few      Culture - Urine (collected 18 Dec 2017 05:28)  Source: .Urine None  Final Report (19 Dec 2017 11:42):    No growth    Culture - Blood (collected 17 Dec 2017 20:01)  Source: .Blood Blood-Peripheral  Preliminary Report (19 Dec 2017 01:03):    No growth to date.    Culture - Blood (collected 17 Dec 2017 20:01)  Source: .Blood Blood-Peripheral  Preliminary Report (19 Dec 2017 01:03):    No growth to date.      Radiology:    < from: Xray Chest 1 View AP/PA. (17 @ 20:26) >   No active pulmonary disease.    < end of copied text >      Advanced directives addressed: full resuscitation
CC  Follow up elevated troponin, CHB, AF, HTN, HLP, AR, AS, PAH, LVH.    Interval hx:  12/19 - no new complaints. For stress MPI today.   Has stable CHB, AF, HTN, HLP, AR, AS, LVH.     HPI  89 yo M with PMH of a-fib (on coumadin), dyslipidemia, IDDM, HTN, PPM, GERD, BPH, CKD, p/w weakness. States that he was recently discharged from hospital, and for two days after discharge, he felt great. After that he started feeling weak again. Patient states all the "feelings in his body came back" again, but he is unable to elaborate what the feelings are even though he was asked multiple times to elaborate. ED chart notes that patient c/o CP, however, during my interview patient denied feeling any CP currently or previously. Patient denies any abdominal pain, nausea, vomiting, SOB, cough, runny nose, sore throat, HA, dysuria, diarrhea.     Examined at bed side, feels better, denies shortness of breath, palpitations or dizziness so far.  No syncope or pre syncope.    ROS  Constitutional - No fever, chills, weight loss.   CV - denies  PND, orthopnea. Or angina.   Resp - denies hemoptysis, sputum production.    GI - denies diarrhea no vomiting.     denies frequency   Neuro - denies numbness, motor weakness.   Rest of systems reviewed and negative	    PMH:  HTN, HLP, AF, CHB    PSH: appendectomy, shoulder repair, cholecystectomy   Social Hx: tobacco - quit 1961, denies etoh / drugs   Family Hx: Denies      Meds: reviewed with pt, see home med tab    Allergy: reviewed with pt, see allergy tab.     Fam Hx: (-) CAD     Physical exam  Const - NAD, SV stable at the moment  HEENT - atraumatic, EOMI, PERRLA.   Neck - No JVD, supple neck, no thyromegaly.   CV - RRR, normal S1/S2, no S3 or S4, no g/r.     Resp  BLAE, No rales  Abd - Benign, no abd bruits. No visceromegaly.   Hem - No adenopathies noted.   Ext - Normal palpable PP, no cyanosis, No leg edema  Neuro - no focal motor deficit    ECG- A Fib, V paced. Tele: AF, V paced.      Labs. Reviewed  Imaging reviewed   ECHO reviewed.     A/P  12/19 - For stress MPI today. Cont current care.  I personally reviewed echo, labs, ecg, tele.  Will follow.    ___      * Elevated troponins, possibly d/t type 2 MI. In setting of acute illness. Sugegst 2d echo, MPI. Cont optimal CV care in the interim.     * HTN, controlled  * HLP, on a statin.   * Permanent AF, cont OAC, and Vpaced 100%.   * CHB, s/p recent gen change. Working fine on recent interrogation. IF weakness/fatigue persist, since he has CHB and is being almost 100% , could benefit from d/w EP about future consideration for CRT ( to prevent future potential excessive RV pacing CMP and/or switching PPM mode to VVIR if device is capable).  * Mild aortic regurgitation, optimal BP control.  * Moderate aortic stenosis. optimal BP control.  * Moderate tricuspid regurgitation. Optimal volume status.   * Mild to Moderate pulmonary artery hypertension. Optimal volume status.   * Mild concentric left ventricular hypertrophy. optimal BP control.   * Possible diastolic heart failure. Optimal BP and volume status.    I personally reviewed past hospital records, labs, ecg, tele and imaging.   Will follow.
CC  Follow up elevated troponin, CHB, AF, HTN, HLP, AR, AS, PAH, LVH.    Interval hx:  12/20- c/o feeling always tired and worn out, no angina, palpitations of dyspnea anymore. MPI with old stable mild reversible defects, not a high risk positive test. ECHO with preserved LVEF and no gross WMA. Cont optimal CAD care.  IF weakness/fatigue persist and it is limiting, since he has CHB and is being almost 100% , could benefit from d/w EP about future consideration for BiV pacing ( to prevent future potential excessive RV pacing CMP)  and/or switching PPM mode to VVIR if device is capable.  Has stable CHB, AF, HTN, HLP, AR, AS, LVH.       12/19 - no new complaints. For stress MPI today.   Has stable CHB, AF, HTN, HLP, AR, AS, LVH.     HPI  89 yo M with PMH of a-fib (on coumadin), dyslipidemia, IDDM, HTN, PPM, GERD, BPH, CKD, p/w weakness. States that he was recently discharged from hospital, and for two days after discharge, he felt great. After that he started feeling weak again. Patient states all the "feelings in his body came back" again, but he is unable to elaborate what the feelings are even though he was asked multiple times to elaborate. ED chart notes that patient c/o CP, however, during my interview patient denied feeling any CP currently or previously. Patient denies any abdominal pain, nausea, vomiting, SOB, cough, runny nose, sore throat, HA, dysuria, diarrhea.     Examined at bed side, feels better, denies shortness of breath, palpitations or dizziness so far.  No syncope or pre syncope.    ROS  Constitutional - No fever, chills, weight loss.   CV - denies  PND, orthopnea. Or angina.   Resp - denies hemoptysis, sputum production.    GI - denies diarrhea no vomiting.     denies frequency   Neuro - denies numbness, motor weakness.   Rest of systems reviewed and negative	    PMH:  HTN, HLP, AF, CHB    PSH: appendectomy, shoulder repair, cholecystectomy   Social Hx: tobacco - quit 1961, denies etoh / drugs   Family Hx: Denies      Meds: reviewed with pt, see home med tab    Allergy: reviewed with pt, see allergy tab.     Fam Hx: (-) CAD     Physical exam  Const - NAD, SV stable at the moment  HEENT - atraumatic, EOMI, PERRLA.   Neck - No JVD, supple neck, no thyromegaly.   CV - RRR, normal S1/S2, no S3 or S4, no g/r.     Resp  BLAE, No rales  Abd - Benign, no abd bruits. No visceromegaly.   Hem - No adenopathies noted.   Ext - Normal palpable PP, no cyanosis, No leg edema  Neuro - no focal motor deficit    ECG- A Fib, V paced. Tele: AF, V paced.      Labs. Reviewed  Imaging reviewed   ECHO reviewed.     A/P    12/20- c/o feeling always tired and worn out, no angina, palpitations of dyspnea anymore. MPI with old stable mild reversible defects, not a high risk positive test. ECHO with preserved LVEF and no gross WMA. Cont optimal CAD care.  IF weakness/fatigue persists and it is limiting, since he has CHB and is being consistently 100% , could benefit from d/w EP about future consideration for BiV pacing ( to prevent future potential excessive RV pacing CMP)  and/or switching PPM mode to VVIR if device is capable.  I personally reviewed echo, labs, ecg, tele.  Will follow while in-house.    12/19 - For stress MPI today. Cont current care.  I personally reviewed echo, labs, ecg, tele.  Will follow.    ___      * Elevated troponins, possibly d/t type 2 MI. In setting of acute illness. Sugegst 2d echo, MPI. Cont optimal CV care in the interim.     * HTN, controlled  * HLP, on a statin.   * Permanent AF, cont OAC, and Vpaced 100%.   * CHB, s/p recent gen change. Working fine on recent interrogation. IF weakness/fatigue persist, since he has CHB and is being almost 100% , could benefit from d/w EP about future consideration for CRT ( to prevent future potential excessive RV pacing CMP and/or switching PPM mode to VVIR if device is capable).  * Mild aortic regurgitation, optimal BP control.  * Moderate aortic stenosis. optimal BP control.  * Moderate tricuspid regurgitation. Optimal volume status.   * Mild to Moderate pulmonary artery hypertension. Optimal volume status.   * Mild concentric left ventricular hypertrophy. optimal BP control.   * Possible diastolic heart failure. Optimal BP and volume status.    I personally reviewed past hospital records, labs, ecg, tele and imaging.   Will follow.
CHIEF COMPLAINT:  weakness    SUBJECTIVE: stress with mild reversible defect, plan for medical mgmt. afebrile. cultures neg- abx stopped. no focal complaints.    REVIEW OF SYSTEMS: All other review of systems is negative unless indicated above    Vital Signs Last 24 Hrs  T(C): 36.8 (19 Dec 2017 05:13), Max: 37 (18 Dec 2017 17:15)  T(F): 98.2 (19 Dec 2017 05:13), Max: 98.6 (18 Dec 2017 17:15)  HR: 60 (19 Dec 2017 05:13) (60 - 60)  BP: 137/57 (19 Dec 2017 05:13) (129/48 - 137/57)  BP(mean): --  RR: 20 (19 Dec 2017 05:13) (18 - 20)  SpO2: 96% (19 Dec 2017 05:13) (96% - 96%)    PHYSICAL EXAM:  · Constitutional	Well-developed, well nourished	  · Eyes	EOMI; PERRL; no drainage or redness	  · ENMT	No oral lesions; no gross abnormalities	  · Respiratory	Breath Sounds equal & clear to percussion & auscultation, no accessory muscle use	  · Cardiovascular	Regular rate & rhythm, normal S1, S2; no murmurs, gallops or rubs; no S3, S4	  · Gastrointestinal	Soft, non-tender, no hepatosplenomegaly, normal bowel sounds	  · Extremities	No cyanosis, clubbing or edema	  · Neurological	Alert & oriented; no sensory, motor or coordination deficits, normal reflexes	  · Musculoskeletal	No joint pain, swelling or deformity; no limitation of movement	  · Psychiatric	Affect and characteristics of appearance, verbalizations, behaviors are appropriate	    MEDICATIONS:  MEDICATIONS  (STANDING):  artificial  tears Solution 2 Drop(s) Both EYES every 8 hours  aspirin enteric coated 81 milliGRAM(s) Oral daily  clonazePAM Tablet 0.5 milliGRAM(s) Oral at bedtime  dextrose 5%. 1000 milliLiter(s) (50 mL/Hr) IV Continuous <Continuous>  dextrose 50% Injectable 12.5 Gram(s) IV Push once  dextrose 50% Injectable 25 Gram(s) IV Push once  dextrose 50% Injectable 25 Gram(s) IV Push once  diltiazem    milliGRAM(s) Oral daily  doxazosin 4 milliGRAM(s) Oral at bedtime  gabapentin 100 milliGRAM(s) Oral three times a day  insulin glargine Injectable (LANTUS) 15 Unit(s) SubCutaneous at bedtime  insulin lispro (HumaLOG) corrective regimen sliding scale   SubCutaneous three times a day before meals  isosorbide   mononitrate ER Tablet (IMDUR) 30 milliGRAM(s) Oral daily  multivitamin 1 Tablet(s) Oral daily  pantoprazole    Tablet 40 milliGRAM(s) Oral before breakfast  simvastatin 40 milliGRAM(s) Oral at bedtime  sodium chloride 0.9%. 1000 milliLiter(s) (50 mL/Hr) IV Continuous <Continuous>  venlafaxine XR. 75 milliGRAM(s) Oral daily  warfarin 5 milliGRAM(s) Oral daily    LABS: All Labs Reviewed:                        9.5    8.7   )-----------( 154      ( 19 Dec 2017 05:39 )             29.7     142  |  109<H>  |  38<H>  ----------------------------<  122<H>  4.6   |  28  |  1.73<H>    Ca    8.6      19 Dec 2017 05:39    TPro  7.7  /  Alb  3.1<L>  /  TBili  1.0  /  DBili  x   /  AST  66<H>  /  ALT  41  /  AlkPhos  95  12-17    PT/INR - ( 19 Dec 2017 05:39 )   PT: 21.6 sec;   INR: 1.97 ratio    PTT - ( 17 Dec 2017 20:01 )  PTT:33.7 sec  CARDIAC MARKERS ( 18 Dec 2017 05:52 )  0.060 ng/mL / x     / 452 U/L / x     / x      CARDIAC MARKERS ( 17 Dec 2017 23:52 )  0.082 ng/mL / x     / x     / x     / x      CARDIAC MARKERS ( 17 Dec 2017 20:01 )  0.084 ng/mL / x     / x     / x     / x        Blood Culture: 12-18 @ 05:28  Organism --  Gram Stain Blood -- Gram Stain --  Specimen Source .Urine None  Culture-Blood --    12-17 @ 20:01  Organism --  Gram Stain Blood -- Gram Stain --  Specimen Source .Blood Blood-Peripheral  Culture-Blood --
MPI results noted, possible mild inferolateral and apical ischemia. With preserved EF, no infarctions.   He had MPI in 2016 in our office d/t DAVIS, never angina, with similar results, opted for medical management then when approached with results.  This time his mildly elevated cTn I can be d/t demand - supply mismatch in setting of fixed old coronary stenosis. He has been feeling better otherwise. His recent echo with preserved LVEF and no resting wall motion abnormalities.  Suggest continuing optimal CAD regimen, increasing atorvastatin to 40 mg QHS, and adding low dose Imdur.  Please give him appointment with me in the office 2 weeks upon dc to continuing monitoring closely clinically.

## 2020-05-01 NOTE — PATIENT PROFILE ADULT - ARE ANY OF THE ITEMS ON THE CHART
How Severe Is Your Skin Lesion?: moderate Have Your Skin Lesions Been Treated?: not been treated Is This A New Presentation, Or A Follow-Up?: Skin Lesion no

## 2020-06-12 PROBLEM — R55 SYNCOPE AND COLLAPSE: Chronic | Status: ACTIVE | Noted: 2019-11-29

## 2020-06-12 PROBLEM — I21.A1 MYOCARDIAL INFARCTION TYPE 2: Chronic | Status: ACTIVE | Noted: 2019-11-29

## 2020-06-12 PROBLEM — N39.0 URINARY TRACT INFECTION, SITE NOT SPECIFIED: Chronic | Status: ACTIVE | Noted: 2019-11-29

## 2020-07-02 NOTE — ED PROVIDER NOTE - MUSCULOSKELETAL, MLM
[Sclera] : the sclera and conjunctiva were normal [General Appearance - Alert] : alert [General Appearance - In No Acute Distress] : in no acute distress [] : no respiratory distress [Outer Ear] : the ears and nose were normal in appearance [Neck Appearance] : the appearance of the neck was normal [Abdomen Soft] : soft [Abdomen Tenderness] : non-tender [Apical Impulse] : the apical impulse was normal [Cranial Nerves] : cranial nerves 2-12 were intact [Abnormal Walk] : normal gait [Skin Color & Pigmentation] : normal skin color and pigmentation [Oriented To Time, Place, And Person] : oriented to person, place, and time Spine appears normal, range of motion is not limited, no muscle or joint tenderness Spine appears normal, range of motion is not limited, no muscle or joint tenderness. FROM x 4. 5/5 strength on flexion and extension of all limbs.

## 2020-07-17 RX ORDER — LOSARTAN POTASSIUM 50 MG/1
50 TABLET, FILM COATED ORAL
Refills: 0 | Status: ACTIVE | COMMUNITY

## 2020-07-17 RX ORDER — CHLORHEXIDINE GLUCONATE 4 %
325 (65 FE) LIQUID (ML) TOPICAL
Refills: 0 | Status: ACTIVE | COMMUNITY
Start: 2020-07-17

## 2020-07-17 RX ORDER — CHOLECALCIFEROL (VITAMIN D3) 1250 MCG
1.25 MG CAPSULE ORAL
Refills: 0 | Status: ACTIVE | COMMUNITY
Start: 2020-07-17

## 2020-07-17 RX ORDER — ALPHA-D-GALACTOSIDASE 600 UNIT
CAPSULE ORAL
Refills: 0 | Status: ACTIVE | COMMUNITY
Start: 2020-07-17

## 2020-07-17 RX ORDER — RABEPRAZOLE SODIUM 20 MG/1
20 TABLET, DELAYED RELEASE ORAL TWICE DAILY
Refills: 0 | Status: ACTIVE | COMMUNITY

## 2020-07-21 ENCOUNTER — APPOINTMENT (OUTPATIENT)
Dept: ELECTROPHYSIOLOGY | Facility: CLINIC | Age: 85
End: 2020-07-21
Payer: MEDICARE

## 2020-07-21 VITALS
BODY MASS INDEX: 25.76 KG/M2 | OXYGEN SATURATION: 98 % | DIASTOLIC BLOOD PRESSURE: 86 MMHG | TEMPERATURE: 98.3 F | WEIGHT: 170 LBS | HEART RATE: 83 BPM | SYSTOLIC BLOOD PRESSURE: 150 MMHG | HEIGHT: 68 IN

## 2020-07-21 DIAGNOSIS — Z95.0 PRESENCE OF CARDIAC PACEMAKER: ICD-10-CM

## 2020-07-21 DIAGNOSIS — I48.91 UNSPECIFIED ATRIAL FIBRILLATION: ICD-10-CM

## 2020-07-21 PROCEDURE — 93279 PRGRMG DEV EVAL PM/LDLS PM: CPT

## 2020-08-17 ENCOUNTER — INPATIENT (INPATIENT)
Facility: HOSPITAL | Age: 85
LOS: 1 days | Discharge: HOME | DRG: 291 | End: 2020-08-19
Attending: STUDENT IN AN ORGANIZED HEALTH CARE EDUCATION/TRAINING PROGRAM | Admitting: INTERNAL MEDICINE
Payer: MEDICARE

## 2020-08-17 VITALS
OXYGEN SATURATION: 95 % | RESPIRATION RATE: 18 BRPM | TEMPERATURE: 98 F | DIASTOLIC BLOOD PRESSURE: 80 MMHG | HEART RATE: 80 BPM | SYSTOLIC BLOOD PRESSURE: 160 MMHG

## 2020-08-17 DIAGNOSIS — R06.02 SHORTNESS OF BREATH: ICD-10-CM

## 2020-08-17 DIAGNOSIS — M19.019 PRIMARY OSTEOARTHRITIS, UNSPECIFIED SHOULDER: Chronic | ICD-10-CM

## 2020-08-17 DIAGNOSIS — Z90.49 ACQUIRED ABSENCE OF OTHER SPECIFIED PARTS OF DIGESTIVE TRACT: Chronic | ICD-10-CM

## 2020-08-17 DIAGNOSIS — Z95.0 PRESENCE OF CARDIAC PACEMAKER: Chronic | ICD-10-CM

## 2020-08-17 LAB
ADD ON TEST-SPECIMEN IN LAB: SIGNIFICANT CHANGE UP
ALBUMIN SERPL ELPH-MCNC: 2.9 G/DL — LOW (ref 3.3–5)
ALBUMIN SERPL ELPH-MCNC: 3.3 G/DL — SIGNIFICANT CHANGE UP (ref 3.3–5)
ALP SERPL-CCNC: 112 U/L — SIGNIFICANT CHANGE UP (ref 40–120)
ALP SERPL-CCNC: 99 U/L — SIGNIFICANT CHANGE UP (ref 40–120)
ALT FLD-CCNC: 27 U/L — SIGNIFICANT CHANGE UP (ref 12–78)
ALT FLD-CCNC: 29 U/L — SIGNIFICANT CHANGE UP (ref 12–78)
ANION GAP SERPL CALC-SCNC: 4 MMOL/L — LOW (ref 5–17)
ANION GAP SERPL CALC-SCNC: 5 MMOL/L — SIGNIFICANT CHANGE UP (ref 5–17)
APPEARANCE UR: CLEAR — SIGNIFICANT CHANGE UP
AST SERPL-CCNC: 22 U/L — SIGNIFICANT CHANGE UP (ref 15–37)
AST SERPL-CCNC: 25 U/L — SIGNIFICANT CHANGE UP (ref 15–37)
BASOPHILS # BLD AUTO: 0.03 K/UL — SIGNIFICANT CHANGE UP (ref 0–0.2)
BASOPHILS NFR BLD AUTO: 0.3 % — SIGNIFICANT CHANGE UP (ref 0–2)
BILIRUB SERPL-MCNC: 0.5 MG/DL — SIGNIFICANT CHANGE UP (ref 0.2–1.2)
BILIRUB SERPL-MCNC: 0.7 MG/DL — SIGNIFICANT CHANGE UP (ref 0.2–1.2)
BILIRUB UR-MCNC: NEGATIVE — SIGNIFICANT CHANGE UP
BUN SERPL-MCNC: 42 MG/DL — HIGH (ref 7–23)
BUN SERPL-MCNC: 45 MG/DL — HIGH (ref 7–23)
CALCIUM SERPL-MCNC: 8.8 MG/DL — SIGNIFICANT CHANGE UP (ref 8.5–10.1)
CALCIUM SERPL-MCNC: 9.5 MG/DL — SIGNIFICANT CHANGE UP (ref 8.5–10.1)
CHLORIDE SERPL-SCNC: 102 MMOL/L — SIGNIFICANT CHANGE UP (ref 96–108)
CHLORIDE SERPL-SCNC: 106 MMOL/L — SIGNIFICANT CHANGE UP (ref 96–108)
CO2 SERPL-SCNC: 29 MMOL/L — SIGNIFICANT CHANGE UP (ref 22–31)
CO2 SERPL-SCNC: 30 MMOL/L — SIGNIFICANT CHANGE UP (ref 22–31)
COLOR SPEC: YELLOW — SIGNIFICANT CHANGE UP
CREAT SERPL-MCNC: 2.22 MG/DL — HIGH (ref 0.5–1.3)
CREAT SERPL-MCNC: 2.43 MG/DL — HIGH (ref 0.5–1.3)
D DIMER BLD IA.RAPID-MCNC: 250 NG/ML DDU — HIGH
DIFF PNL FLD: ABNORMAL
EOSINOPHIL # BLD AUTO: 0.19 K/UL — SIGNIFICANT CHANGE UP (ref 0–0.5)
EOSINOPHIL NFR BLD AUTO: 1.9 % — SIGNIFICANT CHANGE UP (ref 0–6)
GLUCOSE SERPL-MCNC: 176 MG/DL — HIGH (ref 70–99)
GLUCOSE SERPL-MCNC: 295 MG/DL — HIGH (ref 70–99)
GLUCOSE UR QL: 100 MG/DL
HCT VFR BLD CALC: 32.2 % — LOW (ref 39–50)
HGB BLD-MCNC: 10.7 G/DL — LOW (ref 13–17)
IMM GRANULOCYTES NFR BLD AUTO: 0.2 % — SIGNIFICANT CHANGE UP (ref 0–1.5)
KETONES UR-MCNC: NEGATIVE — SIGNIFICANT CHANGE UP
LEUKOCYTE ESTERASE UR-ACNC: NEGATIVE — SIGNIFICANT CHANGE UP
LYMPHOCYTES # BLD AUTO: 1.15 K/UL — SIGNIFICANT CHANGE UP (ref 1–3.3)
LYMPHOCYTES # BLD AUTO: 11.6 % — LOW (ref 13–44)
MCHC RBC-ENTMCNC: 33.2 GM/DL — SIGNIFICANT CHANGE UP (ref 32–36)
MCHC RBC-ENTMCNC: 33.3 PG — SIGNIFICANT CHANGE UP (ref 27–34)
MCV RBC AUTO: 100.3 FL — HIGH (ref 80–100)
MONOCYTES # BLD AUTO: 1.09 K/UL — HIGH (ref 0–0.9)
MONOCYTES NFR BLD AUTO: 11 % — SIGNIFICANT CHANGE UP (ref 2–14)
NEUTROPHILS # BLD AUTO: 7.42 K/UL — HIGH (ref 1.8–7.4)
NEUTROPHILS NFR BLD AUTO: 75 % — SIGNIFICANT CHANGE UP (ref 43–77)
NITRITE UR-MCNC: NEGATIVE — SIGNIFICANT CHANGE UP
NT-PROBNP SERPL-SCNC: 7624 PG/ML — HIGH (ref 0–450)
PH UR: 5 — SIGNIFICANT CHANGE UP (ref 5–8)
PLATELET # BLD AUTO: 172 K/UL — SIGNIFICANT CHANGE UP (ref 150–400)
POTASSIUM SERPL-MCNC: 4.9 MMOL/L — SIGNIFICANT CHANGE UP (ref 3.5–5.3)
POTASSIUM SERPL-MCNC: 5.4 MMOL/L — HIGH (ref 3.5–5.3)
POTASSIUM SERPL-SCNC: 4.9 MMOL/L — SIGNIFICANT CHANGE UP (ref 3.5–5.3)
POTASSIUM SERPL-SCNC: 5.4 MMOL/L — HIGH (ref 3.5–5.3)
PROT SERPL-MCNC: 7.8 GM/DL — SIGNIFICANT CHANGE UP (ref 6–8.3)
PROT SERPL-MCNC: 8.9 GM/DL — HIGH (ref 6–8.3)
PROT UR-MCNC: 100 MG/DL
RBC # BLD: 3.21 M/UL — LOW (ref 4.2–5.8)
RBC # FLD: 14.4 % — SIGNIFICANT CHANGE UP (ref 10.3–14.5)
SODIUM SERPL-SCNC: 137 MMOL/L — SIGNIFICANT CHANGE UP (ref 135–145)
SODIUM SERPL-SCNC: 139 MMOL/L — SIGNIFICANT CHANGE UP (ref 135–145)
SP GR SPEC: 1.01 — SIGNIFICANT CHANGE UP (ref 1.01–1.02)
TROPONIN I SERPL-MCNC: 0.04 NG/ML — SIGNIFICANT CHANGE UP (ref 0.01–0.04)
TROPONIN I SERPL-MCNC: 0.04 NG/ML — SIGNIFICANT CHANGE UP (ref 0.01–0.04)
UROBILINOGEN FLD QL: NEGATIVE MG/DL — SIGNIFICANT CHANGE UP
WBC # BLD: 9.9 K/UL — SIGNIFICANT CHANGE UP (ref 3.8–10.5)
WBC # FLD AUTO: 9.9 K/UL — SIGNIFICANT CHANGE UP (ref 3.8–10.5)

## 2020-08-17 PROCEDURE — 85610 PROTHROMBIN TIME: CPT

## 2020-08-17 PROCEDURE — 93010 ELECTROCARDIOGRAM REPORT: CPT

## 2020-08-17 PROCEDURE — U0003: CPT

## 2020-08-17 PROCEDURE — 99223 1ST HOSP IP/OBS HIGH 75: CPT | Mod: AI

## 2020-08-17 PROCEDURE — 85027 COMPLETE CBC AUTOMATED: CPT

## 2020-08-17 PROCEDURE — 86769 SARS-COV-2 COVID-19 ANTIBODY: CPT

## 2020-08-17 PROCEDURE — 81001 URINALYSIS AUTO W/SCOPE: CPT

## 2020-08-17 PROCEDURE — 84484 ASSAY OF TROPONIN QUANT: CPT

## 2020-08-17 PROCEDURE — 80048 BASIC METABOLIC PNL TOTAL CA: CPT

## 2020-08-17 PROCEDURE — 71045 X-RAY EXAM CHEST 1 VIEW: CPT | Mod: 26

## 2020-08-17 PROCEDURE — 80053 COMPREHEN METABOLIC PANEL: CPT

## 2020-08-17 PROCEDURE — 36415 COLL VENOUS BLD VENIPUNCTURE: CPT

## 2020-08-17 PROCEDURE — 83036 HEMOGLOBIN GLYCOSYLATED A1C: CPT

## 2020-08-17 PROCEDURE — 93306 TTE W/DOPPLER COMPLETE: CPT

## 2020-08-17 PROCEDURE — 82962 GLUCOSE BLOOD TEST: CPT

## 2020-08-17 RX ORDER — ENOXAPARIN SODIUM 100 MG/ML
18 INJECTION SUBCUTANEOUS
Qty: 0 | Refills: 0 | DISCHARGE

## 2020-08-17 RX ORDER — AMLODIPINE BESYLATE 2.5 MG/1
5 TABLET ORAL DAILY
Refills: 0 | Status: DISCONTINUED | OUTPATIENT
Start: 2020-08-17 | End: 2020-08-19

## 2020-08-17 RX ORDER — INSULIN GLARGINE 100 [IU]/ML
16 INJECTION, SOLUTION SUBCUTANEOUS AT BEDTIME
Refills: 0 | Status: DISCONTINUED | OUTPATIENT
Start: 2020-08-17 | End: 2020-08-19

## 2020-08-17 RX ORDER — DEXTROSE 50 % IN WATER 50 %
25 SYRINGE (ML) INTRAVENOUS ONCE
Refills: 0 | Status: DISCONTINUED | OUTPATIENT
Start: 2020-08-17 | End: 2020-08-19

## 2020-08-17 RX ORDER — FUROSEMIDE 40 MG
40 TABLET ORAL ONCE
Refills: 0 | Status: COMPLETED | OUTPATIENT
Start: 2020-08-17 | End: 2020-08-17

## 2020-08-17 RX ORDER — VENLAFAXINE HCL 75 MG
75 CAPSULE, EXT RELEASE 24 HR ORAL DAILY
Refills: 0 | Status: DISCONTINUED | OUTPATIENT
Start: 2020-08-17 | End: 2020-08-19

## 2020-08-17 RX ORDER — PRIMIDONE 250 MG/1
25 TABLET ORAL AT BEDTIME
Refills: 0 | Status: DISCONTINUED | OUTPATIENT
Start: 2020-08-17 | End: 2020-08-19

## 2020-08-17 RX ORDER — ISOSORBIDE MONONITRATE 60 MG/1
15 TABLET, EXTENDED RELEASE ORAL DAILY
Refills: 0 | Status: DISCONTINUED | OUTPATIENT
Start: 2020-08-17 | End: 2020-08-19

## 2020-08-17 RX ORDER — FUROSEMIDE 40 MG
40 TABLET ORAL
Refills: 0 | Status: DISCONTINUED | OUTPATIENT
Start: 2020-08-17 | End: 2020-08-18

## 2020-08-17 RX ORDER — GABAPENTIN 400 MG/1
0 CAPSULE ORAL
Qty: 0 | Refills: 0 | DISCHARGE

## 2020-08-17 RX ORDER — PANTOPRAZOLE SODIUM 20 MG/1
40 TABLET, DELAYED RELEASE ORAL
Refills: 0 | Status: DISCONTINUED | OUTPATIENT
Start: 2020-08-17 | End: 2020-08-19

## 2020-08-17 RX ORDER — GLUCAGON INJECTION, SOLUTION 0.5 MG/.1ML
1 INJECTION, SOLUTION SUBCUTANEOUS ONCE
Refills: 0 | Status: DISCONTINUED | OUTPATIENT
Start: 2020-08-17 | End: 2020-08-19

## 2020-08-17 RX ORDER — DEXTROSE 50 % IN WATER 50 %
12.5 SYRINGE (ML) INTRAVENOUS ONCE
Refills: 0 | Status: DISCONTINUED | OUTPATIENT
Start: 2020-08-17 | End: 2020-08-19

## 2020-08-17 RX ORDER — LINAGLIPTIN 5 MG/1
1 TABLET, FILM COATED ORAL
Qty: 0 | Refills: 0 | DISCHARGE

## 2020-08-17 RX ORDER — DOCUSATE SODIUM 100 MG
1 CAPSULE ORAL
Qty: 0 | Refills: 0 | DISCHARGE

## 2020-08-17 RX ORDER — SODIUM CHLORIDE 9 MG/ML
1000 INJECTION, SOLUTION INTRAVENOUS
Refills: 0 | Status: DISCONTINUED | OUTPATIENT
Start: 2020-08-17 | End: 2020-08-19

## 2020-08-17 RX ORDER — ENOXAPARIN SODIUM 100 MG/ML
16 INJECTION SUBCUTANEOUS
Qty: 0 | Refills: 0 | DISCHARGE

## 2020-08-17 RX ORDER — ERYTHROMYCIN BASE 5 MG/GRAM
1 OINTMENT (GRAM) OPHTHALMIC (EYE)
Qty: 0 | Refills: 0 | DISCHARGE

## 2020-08-17 RX ORDER — DEXTROSE 50 % IN WATER 50 %
15 SYRINGE (ML) INTRAVENOUS ONCE
Refills: 0 | Status: DISCONTINUED | OUTPATIENT
Start: 2020-08-17 | End: 2020-08-19

## 2020-08-17 RX ORDER — CHOLECALCIFEROL (VITAMIN D3) 125 MCG
1000 CAPSULE ORAL DAILY
Refills: 0 | Status: DISCONTINUED | OUTPATIENT
Start: 2020-08-17 | End: 2020-08-19

## 2020-08-17 RX ORDER — INSULIN LISPRO 100/ML
VIAL (ML) SUBCUTANEOUS
Refills: 0 | Status: DISCONTINUED | OUTPATIENT
Start: 2020-08-17 | End: 2020-08-19

## 2020-08-17 RX ORDER — FERROUS SULFATE 325(65) MG
325 TABLET ORAL DAILY
Refills: 0 | Status: DISCONTINUED | OUTPATIENT
Start: 2020-08-17 | End: 2020-08-19

## 2020-08-17 RX ORDER — ASPIRIN/CALCIUM CARB/MAGNESIUM 324 MG
81 TABLET ORAL DAILY
Refills: 0 | Status: DISCONTINUED | OUTPATIENT
Start: 2020-08-17 | End: 2020-08-19

## 2020-08-17 RX ORDER — GABAPENTIN 400 MG/1
100 CAPSULE ORAL THREE TIMES A DAY
Refills: 0 | Status: DISCONTINUED | OUTPATIENT
Start: 2020-08-17 | End: 2020-08-19

## 2020-08-17 RX ORDER — SIMETHICONE 80 MG/1
1 TABLET, CHEWABLE ORAL
Qty: 0 | Refills: 0 | DISCHARGE

## 2020-08-17 RX ORDER — ONDANSETRON 8 MG/1
4 TABLET, FILM COATED ORAL EVERY 6 HOURS
Refills: 0 | Status: DISCONTINUED | OUTPATIENT
Start: 2020-08-17 | End: 2020-08-19

## 2020-08-17 RX ORDER — WARFARIN SODIUM 2.5 MG/1
1 TABLET ORAL
Qty: 0 | Refills: 0 | DISCHARGE

## 2020-08-17 RX ADMIN — Medication 40 MILLIGRAM(S): at 13:29

## 2020-08-17 RX ADMIN — GABAPENTIN 100 MILLIGRAM(S): 400 CAPSULE ORAL at 17:29

## 2020-08-17 RX ADMIN — Medication 75 MILLIGRAM(S): at 17:13

## 2020-08-17 RX ADMIN — PRIMIDONE 25 MILLIGRAM(S): 250 TABLET ORAL at 23:04

## 2020-08-17 RX ADMIN — AMLODIPINE BESYLATE 5 MILLIGRAM(S): 2.5 TABLET ORAL at 17:13

## 2020-08-17 RX ADMIN — Medication 325 MILLIGRAM(S): at 17:13

## 2020-08-17 RX ADMIN — INSULIN GLARGINE 16 UNIT(S): 100 INJECTION, SOLUTION SUBCUTANEOUS at 23:04

## 2020-08-17 RX ADMIN — Medication 81 MILLIGRAM(S): at 17:13

## 2020-08-17 RX ADMIN — GABAPENTIN 100 MILLIGRAM(S): 400 CAPSULE ORAL at 23:04

## 2020-08-17 RX ADMIN — Medication 1 TABLET(S): at 17:13

## 2020-08-17 RX ADMIN — Medication 40 MILLIGRAM(S): at 17:14

## 2020-08-17 RX ADMIN — Medication 1000 UNIT(S): at 17:14

## 2020-08-17 RX ADMIN — PANTOPRAZOLE SODIUM 40 MILLIGRAM(S): 20 TABLET, DELAYED RELEASE ORAL at 17:13

## 2020-08-17 NOTE — H&P ADULT - NSHPPHYSICALEXAM_GEN_ALL_CORE
T(C): 36.7 (08-17-20 @ 10:59), Max: 36.7 (08-17-20 @ 10:59)  HR: 60 (08-17-20 @ 13:30) (60 - 80)  BP: 171/74 (08-17-20 @ 13:30) (160/80 - 171/74)  RR: 20 (08-17-20 @ 13:30) (18 - 26)  SpO2: 95% (08-17-20 @ 13:30) (95% - 98%)  Wt(kg): --      Gen: AAOx3, NAD  HEENT: NCAT, EOMI  Neck: Supple  CV: nml S1S2, RRR  Lungs: decreased breath sounds at base  Abd: Soft, NT, ND, BS+  Ext: No edema  Neuro: Non focal  Skin: normal  Psych: normal

## 2020-08-17 NOTE — ED PROVIDER NOTE - PHYSICAL EXAMINATION
Constitutional: NAD AAOx3  Eyes: PERRLA EOMI  Head: Normocephalic atraumatic  Mouth: MMM  Cardiac: regular rate   Resp: Diminished breath sounds at bases.   GI: Abd s/nt/nd  Neuro: CN2-12 intact  Skin: No rashes. 2+ pitting edema b/l LE. Constitutional: mild distress AAOx3  Eyes: PERRLA EOMI  Head: Normocephalic atraumatic  Mouth: MMM  Cardiac: regular rate   Resp: Diminished breath sounds at bases.   GI: Abd s/nt/nd  Neuro: CN2-12 intact  Skin: No rashes. 2+ pitting edema b/l LE.

## 2020-08-17 NOTE — H&P ADULT - ASSESSMENT
89yo male with PMHx as stated a/w aucte on chronic CHF exacerbation        Afib on A/C  HTN  HLD  CHF, acute on chronic diastolic exacerbation  Pulm HTN  DMII      PLAN:  - supp o2 as needed, MDI PRN  - NO ID issues  - HOLD Coumadin, INR 3  - check INR AM  - Lasix 40mg IV BID  - Sliding scale, Lantus 16u QHS  - resume home BP meds  - Effexor  - DVT ppx, Coumadin  - Admit, Tele

## 2020-08-17 NOTE — ED PROVIDER NOTE - NS_ ATTENDINGSCRIBEDETAILS _ED_A_ED_FT
I, Jeremiah Hdez MD,  performed the initial face to face bedside interview with this patient regarding history of present illness, review of symptoms and relevant past medical, social and family history.  I completed an independent physical examination.  I was the initial provider who evaluated this patient.  The history, relevant review of systems, past medical and surgical history, medical decision making, and physical examination was documented by the scribe in my presence and I attest to the accuracy of the documentation.

## 2020-08-17 NOTE — ED PROVIDER NOTE - OBJECTIVE STATEMENT
89 y/o male with a PMHx of Afib, BPH, chronic CHF, GERD, HTN, IDDM, MI, UTI presents to the ED c/o SOB starting yesterday. Denies cough, fevers, CP. Nonsmoker. No EtOH use. Allergies: Codeine, Morphine, Naproxen, Aleve. No other complaints at this time. 89 y/o male with a PMHx of Afib, BPH, chronic CHF, GERD, HTN, IDDM, MI, UTI presents to the ED c/o SOB starting yesterday. Denies cough, fevers, CP. Nonsmoker. + exertional dyspnea. no orthopnea. No EtOH use. Allergies: Codeine, Morphine, Naproxen, Aleve. No other complaints at this time.

## 2020-08-17 NOTE — ED ADULT TRIAGE NOTE - CHIEF COMPLAINT QUOTE
pt c/o trouble breathing since yesterday. p pt denies chest pain dizziness and weakness. pt denies couhg or fever. pt speaking clearly but [place].  O2 sat at triage 96 percent on room air

## 2020-08-17 NOTE — ED PROVIDER NOTE - CLINICAL SUMMARY MEDICAL DECISION MAKING FREE TEXT BOX
89 y/o male hx of DM, HTN, Afib on Coumadin presents to the ED for dyspnea. Symptoms worsening over the last few days. Exam with diminished breath sounds at bases and 2+ pitting edema b/l LE. Concern for ACS/CHF. No infectious symptoms. Will obtain labs, reassess.

## 2020-08-17 NOTE — H&P ADULT - NSHPREVIEWOFSYSTEMS_GEN_ALL_CORE
(-)Fever, chills, cough, chest pain, headache, dizziness, palpitations, abd pain, n/v/d, leg swelling  (+)SOB

## 2020-08-17 NOTE — ED PROVIDER NOTE - NS ED ROS FT
Constitutional: No fever or chills  Eyes: No visual changes  HEENT: No throat pain  CV: No chest pain  Resp: no cough. +SOB.   GI: No abd pain, nausea or vomiting  : No dysuria  MSK: No musculoskeletal pain  Skin: No rash  Neuro: No headache

## 2020-08-17 NOTE — ED PROVIDER NOTE - PMH
Atrial fibrillation, unspecified type    Benign prostatic hyperplasia without lower urinary tract symptoms    Chronic congestive heart failure, unspecified congestive heart failure type    Gastroesophageal reflux disease, esophagitis presence not specified    HTN (hypertension)    IDDM (insulin dependent diabetes mellitus)    Syncope and collapse  5/4/2018  Type 2 myocardial infarction  12/17/2017  UTI (urinary tract infection)  7/12/2017

## 2020-08-17 NOTE — PHARMACOTHERAPY INTERVENTION NOTE - COMMENTS
Spoke with Dr. Gunter, patient takes IMDUR 10 mg QD.  Closest dose we have is IMDUR 30 mg - To be taken as 1/2 tablet (15 mg) QD.

## 2020-08-17 NOTE — H&P ADULT - HISTORY OF PRESENT ILLNESS
Patient is 89yo male with PMHx of Afib on Coumadin, s/p PPM, chronic diastolic chf ef 55%, DMII, HTN presents with acute on chronic SOB, DAVIS. PT denies fever, chills, cough, CP, palpitations, HA, dizziness. No other constitutional symptoms. Given Lasix in the ER, with good UOP, reports symptoms significantly improved.

## 2020-08-17 NOTE — ED ADULT NURSE NOTE - NSIMPLEMENTINTERV_GEN_ALL_ED
Implemented All Fall with Harm Risk Interventions:  [place] to call system. Call bell, personal items and telephone within reach. Instruct patient to call for assistance. Room bathroom lighting operational. Non-slip footwear when patient is off stretcher. Physically safe environment: no spills, clutter or unnecessary equipment. Stretcher in lowest position, wheels locked, appropriate side rails in place. Provide visual cue, wrist band, yellow gown, etc. Monitor gait and stability. Monitor for mental status changes and reorient to person, place, and time. Review medications for side effects contributing to fall risk. Reinforce activity limits and safety measures with patient and family. Provide visual clues: red socks.

## 2020-08-17 NOTE — H&P ADULT - NSHPLABSRESULTS_GEN_ALL_CORE
CARDIAC MARKERS ( 17 Aug 2020 14:14 )  0.038 ng/mL / x     / x     / x     / x      CARDIAC MARKERS ( 17 Aug 2020 11:33 )  0.041 ng/mL / x     / x     / x     / x                                10.7   9.90  )-----------( 172      ( 17 Aug 2020 11:33 )             32.2     17 Aug 2020 11:33    139    |  106    |  42     ----------------------------<  176    5.4     |  29     |  2.22     Ca    8.8        17 Aug 2020 11:33    TPro  7.8    /  Alb  2.9    /  TBili  0.5    /  DBili  x      /  AST  25     /  ALT  27     /  AlkPhos  99     17 Aug 2020 11:33    PT/INR - ( 17 Aug 2020 11:33 )   PT: 33.3 sec;   INR: 3.00 ratio           CAPILLARY BLOOD GLUCOSE        LIVER FUNCTIONS - ( 17 Aug 2020 11:33 )  Alb: 2.9 g/dL / Pro: 7.8 gm/dL / ALK PHOS: 99 U/L / ALT: 27 U/L / AST: 25 U/L / GGT: x           Urinalysis Basic - ( 17 Aug 2020 15:00 )    Color: Yellow / Appearance: Clear / S.010 / pH: x  Gluc: x / Ketone: Negative  / Bili: Negative / Urobili: Negative mg/dL   Blood: x / Protein: 100 mg/dL / Nitrite: Negative   Leuk Esterase: Negative / RBC: 6-10 /HPF / WBC 0-2   Sq Epi: x / Non Sq Epi: Few / Bacteria: Occasional

## 2020-08-17 NOTE — H&P ADULT - NSICDXPASTMEDICALHX_GEN_ALL_CORE_FT
PAST MEDICAL HISTORY:  Atrial fibrillation, unspecified type     Benign prostatic hyperplasia without lower urinary tract symptoms     Chronic congestive heart failure, unspecified congestive heart failure type     Gastroesophageal reflux disease, esophagitis presence not specified     HTN (hypertension)     IDDM (insulin dependent diabetes mellitus)     Syncope and collapse 5/4/2018    Type 2 myocardial infarction 12/17/2017    UTI (urinary tract infection) 7/12/2017

## 2020-08-18 ENCOUNTER — TRANSCRIPTION ENCOUNTER (OUTPATIENT)
Age: 85
End: 2020-08-18

## 2020-08-18 LAB
A1C WITH ESTIMATED AVERAGE GLUCOSE RESULT: 7.3 % — HIGH (ref 4–5.6)
ESTIMATED AVERAGE GLUCOSE: 163 MG/DL — HIGH (ref 68–114)
HCT VFR BLD CALC: 35.2 % — LOW (ref 39–50)
HGB BLD-MCNC: 11.5 G/DL — LOW (ref 13–17)
INR BLD: 2.85 RATIO — HIGH (ref 0.88–1.16)
MCHC RBC-ENTMCNC: 32.5 PG — SIGNIFICANT CHANGE UP (ref 27–34)
MCHC RBC-ENTMCNC: 32.7 GM/DL — SIGNIFICANT CHANGE UP (ref 32–36)
MCV RBC AUTO: 99.4 FL — SIGNIFICANT CHANGE UP (ref 80–100)
PLATELET # BLD AUTO: 193 K/UL — SIGNIFICANT CHANGE UP (ref 150–400)
PROTHROM AB SERPL-ACNC: 31.4 SEC — HIGH (ref 10.6–13.6)
RBC # BLD: 3.54 M/UL — LOW (ref 4.2–5.8)
RBC # FLD: 14.1 % — SIGNIFICANT CHANGE UP (ref 10.3–14.5)
SARS-COV-2 IGG SERPL QL IA: NEGATIVE — SIGNIFICANT CHANGE UP
SARS-COV-2 IGM SERPL IA-ACNC: 0.06 INDEX — SIGNIFICANT CHANGE UP
SARS-COV-2 RNA SPEC QL NAA+PROBE: SIGNIFICANT CHANGE UP
WBC # BLD: 9.53 K/UL — SIGNIFICANT CHANGE UP (ref 3.8–10.5)
WBC # FLD AUTO: 9.53 K/UL — SIGNIFICANT CHANGE UP (ref 3.8–10.5)

## 2020-08-18 PROCEDURE — 99232 SBSQ HOSP IP/OBS MODERATE 35: CPT

## 2020-08-18 RX ORDER — LANOLIN ALCOHOL/MO/W.PET/CERES
3 CREAM (GRAM) TOPICAL AT BEDTIME
Refills: 0 | Status: DISCONTINUED | OUTPATIENT
Start: 2020-08-18 | End: 2020-08-19

## 2020-08-18 RX ORDER — WARFARIN SODIUM 2.5 MG/1
1 TABLET ORAL
Qty: 0 | Refills: 0 | DISCHARGE
Start: 2020-08-18

## 2020-08-18 RX ORDER — INSULIN LISPRO 100/ML
VIAL (ML) SUBCUTANEOUS AT BEDTIME
Refills: 0 | Status: DISCONTINUED | OUTPATIENT
Start: 2020-08-18 | End: 2020-08-19

## 2020-08-18 RX ORDER — WARFARIN SODIUM 2.5 MG/1
5 TABLET ORAL DAILY
Refills: 0 | Status: DISCONTINUED | OUTPATIENT
Start: 2020-08-18 | End: 2020-08-19

## 2020-08-18 RX ORDER — FUROSEMIDE 40 MG
1 TABLET ORAL
Qty: 30 | Refills: 0
Start: 2020-08-18 | End: 2020-09-01

## 2020-08-18 RX ADMIN — WARFARIN SODIUM 5 MILLIGRAM(S): 2.5 TABLET ORAL at 21:04

## 2020-08-18 RX ADMIN — ISOSORBIDE MONONITRATE 15 MILLIGRAM(S): 60 TABLET, EXTENDED RELEASE ORAL at 09:28

## 2020-08-18 RX ADMIN — GABAPENTIN 100 MILLIGRAM(S): 400 CAPSULE ORAL at 15:23

## 2020-08-18 RX ADMIN — INSULIN GLARGINE 16 UNIT(S): 100 INJECTION, SOLUTION SUBCUTANEOUS at 21:05

## 2020-08-18 RX ADMIN — GABAPENTIN 100 MILLIGRAM(S): 400 CAPSULE ORAL at 21:09

## 2020-08-18 RX ADMIN — Medication 4: at 17:09

## 2020-08-18 RX ADMIN — AMLODIPINE BESYLATE 5 MILLIGRAM(S): 2.5 TABLET ORAL at 09:28

## 2020-08-18 RX ADMIN — PRIMIDONE 25 MILLIGRAM(S): 250 TABLET ORAL at 21:04

## 2020-08-18 RX ADMIN — Medication 4: at 12:49

## 2020-08-18 RX ADMIN — GABAPENTIN 100 MILLIGRAM(S): 400 CAPSULE ORAL at 06:22

## 2020-08-18 RX ADMIN — Medication 81 MILLIGRAM(S): at 09:28

## 2020-08-18 RX ADMIN — Medication 1000 UNIT(S): at 09:28

## 2020-08-18 RX ADMIN — PANTOPRAZOLE SODIUM 40 MILLIGRAM(S): 20 TABLET, DELAYED RELEASE ORAL at 09:31

## 2020-08-18 RX ADMIN — Medication 1 TABLET(S): at 09:28

## 2020-08-18 RX ADMIN — Medication 40 MILLIGRAM(S): at 09:29

## 2020-08-18 RX ADMIN — Medication 3 MILLIGRAM(S): at 21:03

## 2020-08-18 RX ADMIN — Medication 75 MILLIGRAM(S): at 09:31

## 2020-08-18 RX ADMIN — Medication 325 MILLIGRAM(S): at 09:31

## 2020-08-18 NOTE — DISCHARGE NOTE PROVIDER - CARE PROVIDER_API CALL
Michael Mehta  INTERNAL MEDICINE  [address] [place] [address]  Phone: [phone]  Fax: [phone]  Follow Up Time:

## 2020-08-18 NOTE — PROGRESS NOTE ADULT - SUBJECTIVE AND OBJECTIVE BOX
Reason for Admission: SOB, DAVIS	  History of Present Illness: 	  Patient is 89yo male with PMHx of Afib on Coumadin, s/p PPM, chronic diastolic chf ef 55%, DMII, HTN presents with acute on chronic SOB, DAVIS. PT denies fever, chills, cough, CP, palpitations, HA, dizziness. No other constitutional symptoms. Given Lasix in the ER, with good UOP, reports symptoms significantly improved.     REVIEW OF SYSTEMS:  General: NAD, hemodynamically stable   HEENT:  Eyes:  No visual loss   SKIN:  No rash or itching.  CARDIOVASCULAR:  No chest pain   RESPIRATORY:  No shortness of breath   GASTROINTESTINAL:  No anorexia, nausea   NEUROLOGICAL:  No headache, dizziness   MUSCULOSKELETAL:  No muscle, back pain   HEMATOLOGIC:  No anemia   LYMPHATICS:  No enlarged nodes. No history of splenectomy.  ENDOCRINOLOGIC:  No reports of sweating, cold or heat intolerance. No polyuria or polydipsia.  ALLERGIES:  No history of asthma, hives, eczema or rhinitis.    Physical Exam:   GENERAL APPEARANCE:  NAD, hemodynamically stable  T(C): 36.2 (20 @ 09:01), Max: 36.9 (20 @ 16:37)  HR: 62 (20 @ 11:25) (60 - 62)  BP: 138/68 (20 @ 11:25) (138/68 - 182/81)  RR: 18 (20 @ 09:01) (16 - 18)  SpO2: 100% (20 @ 09:01) (96% - 100%)  Wt(kg): --  HEENT:  Head is normocephalic    Skin:  Warm and dry without any rash   NECK:  Supple without lymphadenopathy.   HEART:  Regular rate and rhythm. normal S1 and S2, No M/R/G  LUNGS:  Good ins/exp effort, no W/R/R/C  ABDOMEN:  Soft, nontender, nondistended with good bowel sounds heard  EXTREMITIES:  Without cyanosis, clubbing or edema.   NEUROLOGICAL:  Gross nonfocal     Labs:   CBC Full  -  ( 18 Aug 2020 07:38 )  WBC Count : 9.53 K/uL  RBC Count : 3.54 M/uL  Hemoglobin : 11.5 g/dL  Hematocrit : 35.2 %  Platelet Count - Automated : 193 K/uL    PT/INR - ( 18 Aug 2020 07:38 )   PT: 31.4 sec;   INR: 2.85 ratio           Urinalysis Basic - ( 17 Aug 2020 15:00 )    Color: Yellow / Appearance: Clear / S.010 / pH: x  Gluc: x / Ketone: Negative  / Bili: Negative / Urobili: Negative mg/dL   Blood: x / Protein: 100 mg/dL / Nitrite: Negative   Leuk Esterase: Negative / RBC: 6-10 /HPF / WBC 0-2   Sq Epi: x / Non Sq Epi: Few / Bacteria: Occasional          137  |  102  |  45<H>  ----------------------------<  295<H>  4.9   |  30  |  2.43<H>    Ca    9.5      17 Aug 2020 20:12    TPro  8.9<H>  /  Alb  3.3  /  TBili  0.7  /  DBili  x   /  AST  22  /  ALT  29  /  AlkPhos  112  08-      89yo male with PMHx as stated a/w aucte on chronic CHF exacerbation    # acute on chronic diastolic exacerbation  - Lasix 40mg IV BID  - daily weights  - low salt diet    # Afib on A/C  - restart coumadin  - goal INR 2-3    # HTN  - continue with home meds    # HLD  - STATIN    # Pulm HTN    # DMII  - Sliding scale, Lantus 16u QHS  - ISS Reason for Admission: SOB, DAVIS	  History of Present Illness: 	  Patient is 89yo male with PMHx of Afib on Coumadin, s/p PPM, chronic diastolic chf ef 55%, DMII, HTN presents with acute on chronic SOB, DAVIS. PT denies fever, chills, cough, CP, palpitations, HA, dizziness. No other constitutional symptoms. Given Lasix in the ER, with good UOP, reports symptoms significantly improved.     REVIEW OF SYSTEMS:  General: NAD, hemodynamically stable   HEENT:  Eyes:  No visual loss   SKIN:  No rash or itching.  CARDIOVASCULAR:  No chest pain   RESPIRATORY:  No shortness of breath   GASTROINTESTINAL:  No anorexia, nausea   NEUROLOGICAL:  No headache, dizziness   MUSCULOSKELETAL:  No muscle, back pain   HEMATOLOGIC:  No anemia   LYMPHATICS:  No enlarged nodes. No history of splenectomy.  ENDOCRINOLOGIC:  No reports of sweating, cold or heat intolerance. No polyuria or polydipsia.  ALLERGIES:  No history of asthma, hives, eczema or rhinitis.    Physical Exam:   GENERAL APPEARANCE:  NAD, hemodynamically stable  T(C): 36.2 (20 @ 09:01), Max: 36.9 (20 @ 16:37)  HR: 62 (20 @ 11:25) (60 - 62)  BP: 138/68 (20 @ 11:25) (138/68 - 182/81)  RR: 18 (20 @ 09:01) (16 - 18)  SpO2: 100% (20 @ 09:01) (96% - 100%)  Wt(kg): --  HEENT:  Head is normocephalic    Skin:  Warm and dry without any rash   NECK:  Supple without lymphadenopathy.   HEART:  Regular rate and rhythm. normal S1 and S2, No M/R/G  LUNGS:  Good ins/exp effort, no W/R/R/C  ABDOMEN:  Soft, nontender, nondistended with good bowel sounds heard  EXTREMITIES:  Without cyanosis, clubbing or edema.   NEUROLOGICAL:  Gross nonfocal     Labs:   CBC Full  -  ( 18 Aug 2020 07:38 )  WBC Count : 9.53 K/uL  RBC Count : 3.54 M/uL  Hemoglobin : 11.5 g/dL  Hematocrit : 35.2 %  Platelet Count - Automated : 193 K/uL    PT/INR - ( 18 Aug 2020 07:38 )   PT: 31.4 sec;   INR: 2.85 ratio           Urinalysis Basic - ( 17 Aug 2020 15:00 )    Color: Yellow / Appearance: Clear / S.010 / pH: x  Gluc: x / Ketone: Negative  / Bili: Negative / Urobili: Negative mg/dL   Blood: x / Protein: 100 mg/dL / Nitrite: Negative   Leuk Esterase: Negative / RBC: 6-10 /HPF / WBC 0-2   Sq Epi: x / Non Sq Epi: Few / Bacteria: Occasional          137  |  102  |  45<H>  ----------------------------<  295<H>  4.9   |  30  |  2.43<H>    Ca    9.5      17 Aug 2020 20:12    TPro  8.9<H>  /  Alb  3.3  /  TBili  0.7  /  DBili  x   /  AST  22  /  ALT  29  /  AlkPhos  112  -      89yo male with PMHx as stated a/w aucte on chronic CHF exacerbation    # acute on chronic diastolic exacerbation  - Lasix 40mg IV BID  - daily weights  - low salt diet    # Acute on chronic renal failure  - patient has a baseline CKD appears to be between 1.6-2  - patient has a worsening renal function  - hold lasix    # Afib on A/C  - restart coumadin  - goal INR 2-3    # HTN  - continue with home meds    # HLD  - STATIN    # Pulm HTN    # DMII  - Sliding scale, Lantus 16u QHS  - ISS

## 2020-08-18 NOTE — DISCHARGE NOTE PROVIDER - HOSPITAL COURSE
Reason for Admission: SOB, DAVIS    History of Present Illness:     Patient is 89yo male with PMHx of Afib on Coumadin, s/p PPM, chronic diastolic chf ef 55%, DMII, HTN presents with acute on chronic SOB, DAVIS. PT denies fever, chills, cough, CP, palpitations, HA, dizziness. No other constitutional symptoms. Given Lasix in the ER, with good UOP, reports symptoms significantly improved.         patient feels great after receiving IV lasix with good urination. Denies any chest pain, jaw pain, shoulder pain or back pain. SOB completely improved. patient's care discussed with Dr. Quevedo -  recommend follow up this week,         REVIEW OF SYSTEMS:    General: NAD, hemodynamically stable     HEENT:  Eyes:  No visual loss     SKIN:  No rash or itching.    CARDIOVASCULAR:  No chest pain     RESPIRATORY:  SOB improved    GASTROINTESTINAL:  No anorexia, nausea     NEUROLOGICAL:  No headache, dizziness     MUSCULOSKELETAL:  No muscle, back pain, joint pain or stiffness.    HEMATOLOGIC:  No anemia, bleeding or bruising.    LYMPHATICS:  No enlarged nodes. No history of splenectomy.    ENDOCRINOLOGIC:  No reports of sweating, cold or heat intolerance. No polyuria or polydipsia.    ALLERGIES:  No history of asthma, hives, eczema or rhinitis.        Physical Exam:     GENERAL APPEARANCE:  NAD, hemodynamically stable, elderly, deconditioned     T(C): 36.2 (08-18-20 @ 09:01), Max: 36.9 (08-17-20 @ 16:37)    HR: 62 (08-18-20 @ 11:25) (60 - 62)    BP: 138/68 (08-18-20 @ 11:25) (138/68 - 182/81)    RR: 18 (08-18-20 @ 09:01) (16 - 18)    SpO2: 100% (08-18-20 @ 09:01) (96% - 100%)    HEENT:  Head is normocephalic      Skin:  Warm and dry without any rash     NECK:  Supple without lymphadenopathy.     HEART:  Regular rate and rhythm. normal S1 and S2, No M/R/G    LUNGS:  Good ins/exp effort, no W/R/R/C    ABDOMEN:  Soft, nontender, nondistended with good bowel sounds heard    EXTREMITIES:  Without cyanosis, clubbing or edema.     NEUROLOGICAL:  Gross nonfocal Reason for Admission: SOB, DAVIS    History of Present Illness:     Patient is 89yo male with PMHx of Afib on Coumadin, s/p PPM, chronic diastolic chf ef 55%, DMII, HTN presents with acute on chronic SOB, DAVIS. PT denies fever, chills, cough, CP, palpitations, HA, dizziness. No other constitutional symptoms. Given Lasix in the ER, with good UOP, reports symptoms significantly improved.         patient feels great after receiving IV lasix with good urination. Denies any chest pain, jaw pain, shoulder pain or back pain. SOB completely improved. patient's care discussed with Dr. Quevedo -  recommend follow up this week,        Patient was in euvolumic state this morning. Chest pain free. Patient's echo cardiogram suggestive of new low EF. Patient wants to go home to help his wife. Patient is scheduled to follow up with Dr. Quevedo on friday 1;30. Care discussed with patient and patient's wife post discharge over the phone. I discussed care with Dr. Quevedo - patient might benefit from pacemaker upgrade to CRT. I have discussed care with Dr. Quevedo -  8/19 8:42 pm over the phone. .               ECHO: Left ventricle systolic function appears severely impaired; segmental wall     motion abnormalities noted. Estimated Ejection Fraction is 20-25%.     Right ventricle systolic function is mildly impaired.            REVIEW OF SYSTEMS:    General: NAD, hemodynamically stable     HEENT:  Eyes:  No visual loss     SKIN:  No rash or itching.    CARDIOVASCULAR:  No chest pain     RESPIRATORY:  SOB improved    GASTROINTESTINAL:  No anorexia, nausea     NEUROLOGICAL:  No headache, dizziness     MUSCULOSKELETAL:  No muscle, back pain, joint pain or stiffness.    HEMATOLOGIC:  No anemia, bleeding or bruising.    LYMPHATICS:  No enlarged nodes. No history of splenectomy.    ENDOCRINOLOGIC:  No reports of sweating, cold or heat intolerance. No polyuria or polydipsia.    ALLERGIES:  No history of asthma, hives, eczema or rhinitis.        Physical Exam:     GENERAL APPEARANCE:  NAD, hemodynamically stable, elderly, deconditioned     ICU Vital Signs Last 24 Hrs    T(C): 35.8 (19 Aug 2020 09:00), Max: 36.7 (18 Aug 2020 23:01)    T(F): 96.4 (19 Aug 2020 09:00), Max: 98.1 (18 Aug 2020 23:01)    HR: 60 (19 Aug 2020 09:00) (60 - 62)    BP: 134/70 (19 Aug 2020 09:00) (131/51 - 134/70)    BP(mean): --    ABP: --    ABP(mean): --    RR: 16 (19 Aug 2020 09:00) (16 - 18)    SpO2: 99% (19 Aug 2020 09:00) (94% - 99%)        HEENT:  Head is normocephalic      Skin:  Warm and dry without any rash     NECK:  Supple without lymphadenopathy.     HEART:  Regular rate and rhythm. normal S1 and S2, No M/R/G    LUNGS:  Good ins/exp effort, no W/R/R/C    ABDOMEN:  Soft, nontender, nondistended with good bowel sounds heard    EXTREMITIES:  Without cyanosis, clubbing or edema.     NEUROLOGICAL:  Gross nonfocal

## 2020-08-18 NOTE — DISCHARGE NOTE PROVIDER - NSDCCPCAREPLAN_GEN_ALL_CORE_FT
PRINCIPAL DISCHARGE DIAGNOSIS  Diagnosis: Shortness of breath  Assessment and Plan of Treatment: # acute on chronic diastolic exacerbation  - lasix 40 mg po BID  - daily weights  - low salt diet  - close follow up with Dr. Quevedo -  late this week PRINCIPAL DISCHARGE DIAGNOSIS  Diagnosis: Shortness of breath  Assessment and Plan of Treatment: # acute on chronic diastolic exacerbation  - lasix 20 mg qdaily starting 8/21  - daily weights  - low salt diet  - close follow up with Dr. Quevedo -  early next week        SECONDARY DISCHARGE DIAGNOSES  Diagnosis: Acute kidney injury  Assessment and Plan of Treatment: - most likely some level of cardiorenal / on IV lasix  - Scr -  2.46 discharge  - you must follow up with Dr. Yg Galloway for kidney function monitoring PRINCIPAL DISCHARGE DIAGNOSIS  Diagnosis: Shortness of breath  Assessment and Plan of Treatment: # acute on chronic diastolic exacerbation  - lasix 20 mg qdaily starting 8/21  - daily weights  - low salt diet  - close follow up with Dr. Quevedo -  Friday 8/21 13:30,         SECONDARY DISCHARGE DIAGNOSES  Diagnosis: Acute kidney injury  Assessment and Plan of Treatment: - most likely some level of cardiorenal / on IV lasix  - Scr -  2.46 discharge  - you must follow up with Dr. Yg Galloway for kidney function monitoring

## 2020-08-19 ENCOUNTER — TRANSCRIPTION ENCOUNTER (OUTPATIENT)
Age: 85
End: 2020-08-19

## 2020-08-19 VITALS
SYSTOLIC BLOOD PRESSURE: 134 MMHG | TEMPERATURE: 96 F | HEART RATE: 60 BPM | OXYGEN SATURATION: 99 % | RESPIRATION RATE: 16 BRPM | DIASTOLIC BLOOD PRESSURE: 70 MMHG

## 2020-08-19 LAB
ANION GAP SERPL CALC-SCNC: 4 MMOL/L — LOW (ref 5–17)
BUN SERPL-MCNC: 49 MG/DL — HIGH (ref 7–23)
CALCIUM SERPL-MCNC: 9.4 MG/DL — SIGNIFICANT CHANGE UP (ref 8.5–10.1)
CHLORIDE SERPL-SCNC: 103 MMOL/L — SIGNIFICANT CHANGE UP (ref 96–108)
CO2 SERPL-SCNC: 33 MMOL/L — HIGH (ref 22–31)
CREAT SERPL-MCNC: 2.46 MG/DL — HIGH (ref 0.5–1.3)
GLUCOSE SERPL-MCNC: 82 MG/DL — SIGNIFICANT CHANGE UP (ref 70–99)
HCT VFR BLD CALC: 37.5 % — LOW (ref 39–50)
HGB BLD-MCNC: 12.2 G/DL — LOW (ref 13–17)
MCHC RBC-ENTMCNC: 32.5 GM/DL — SIGNIFICANT CHANGE UP (ref 32–36)
MCHC RBC-ENTMCNC: 32.9 PG — SIGNIFICANT CHANGE UP (ref 27–34)
MCV RBC AUTO: 101.1 FL — HIGH (ref 80–100)
PLATELET # BLD AUTO: 199 K/UL — SIGNIFICANT CHANGE UP (ref 150–400)
POTASSIUM SERPL-MCNC: 4 MMOL/L — SIGNIFICANT CHANGE UP (ref 3.5–5.3)
POTASSIUM SERPL-SCNC: 4 MMOL/L — SIGNIFICANT CHANGE UP (ref 3.5–5.3)
RBC # BLD: 3.71 M/UL — LOW (ref 4.2–5.8)
RBC # FLD: 14 % — SIGNIFICANT CHANGE UP (ref 10.3–14.5)
SODIUM SERPL-SCNC: 140 MMOL/L — SIGNIFICANT CHANGE UP (ref 135–145)
WBC # BLD: 9.56 K/UL — SIGNIFICANT CHANGE UP (ref 3.8–10.5)
WBC # FLD AUTO: 9.56 K/UL — SIGNIFICANT CHANGE UP (ref 3.8–10.5)

## 2020-08-19 PROCEDURE — 93279 PRGRMG DEV EVAL PM/LDLS PM: CPT | Mod: 26

## 2020-08-19 PROCEDURE — 99232 SBSQ HOSP IP/OBS MODERATE 35: CPT

## 2020-08-19 PROCEDURE — 93306 TTE W/DOPPLER COMPLETE: CPT | Mod: 26

## 2020-08-19 RX ORDER — FUROSEMIDE 40 MG
20 TABLET ORAL
Qty: 15 | Refills: 0
Start: 2020-08-19

## 2020-08-19 RX ADMIN — PANTOPRAZOLE SODIUM 40 MILLIGRAM(S): 20 TABLET, DELAYED RELEASE ORAL at 10:41

## 2020-08-19 RX ADMIN — Medication 1000 UNIT(S): at 10:42

## 2020-08-19 RX ADMIN — GABAPENTIN 100 MILLIGRAM(S): 400 CAPSULE ORAL at 05:57

## 2020-08-19 RX ADMIN — Medication 325 MILLIGRAM(S): at 10:41

## 2020-08-19 RX ADMIN — Medication 2: at 12:42

## 2020-08-19 RX ADMIN — AMLODIPINE BESYLATE 5 MILLIGRAM(S): 2.5 TABLET ORAL at 10:41

## 2020-08-19 RX ADMIN — Medication 75 MILLIGRAM(S): at 10:41

## 2020-08-19 RX ADMIN — GABAPENTIN 100 MILLIGRAM(S): 400 CAPSULE ORAL at 13:42

## 2020-08-19 RX ADMIN — ISOSORBIDE MONONITRATE 15 MILLIGRAM(S): 60 TABLET, EXTENDED RELEASE ORAL at 10:41

## 2020-08-19 RX ADMIN — Medication 1 TABLET(S): at 10:41

## 2020-08-19 RX ADMIN — Medication 81 MILLIGRAM(S): at 10:41

## 2020-08-19 NOTE — CONSULT NOTE ADULT - ASSESSMENT
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    CKD III/IV  -Subtle rise in renal function w/ IV diuresis, suspect requires a pre-renal state to maintain euvolemia  -Agree hold lasix for now. If truly lasix naive then agree with lasix 20 qd in 2 days to start. If was on 40 bid lasix PTA then would start 20 bid in 2 days  -See me within 5-7 days, labs within 3  -Renal imaging if further rise in function when outpatient    CM/SOB  -Stabilized clinically, cvs noted  -Tele/lasix as above    HTN  [facility]    Thanks, will follow  d/c with RN staff and Dr Jeffery

## 2020-08-19 NOTE — CONSULT NOTE ADULT - SUBJECTIVE AND OBJECTIVE BOX
History of Present Illness:  Reason for Admission: SOB, DAVIS	  History of Present Illness: 	  Patient is 89yo male with PMHx of Afib on Coumadin, s/p PPM, chronic diastolic chf ef 55%, DMII, HTN presents with acute on chronic SOB, DAVIS. PT denies fever, chills, cough, CP, palpitations, HA, dizziness. No other constitutional symptoms. Examined at bed side today, states feeling much better, denies orthopnea, leg edema or angina.      Review of Systems:  Review of Systems: (-)Fever, chills, cough, chest pain, headache, dizziness, palpitations, abd pain, n/v/d, leg swelling (+)SOB	  Other Review of Systems: All other review of systems negative, except as noted in HPI	      Allergies and Intolerances:        Allergies:  	naproxen: Drug, Vomiting  	morphine: Drug, Nausea  	Aleve: Drug, Vomiting  	codeine: Drug, Unknown    Home Medications:   * Patient Currently Takes Medications as of 17-Aug-2020 15:00 documented in Structured Notes  · 	isosorbide mononitrate 10 mg oral tablet: Last Dose Taken:  , 1 tab(s) orally once a day  · 	aspirin 81 mg oral tablet: Last Dose Taken:  , 1 tab(s) orally once a day  · 	venlafaxine 75 mg oral capsule, extended release: Last Dose Taken:  , 1 cap(s) orally once a day  · 	Vitamin D3 5000 intl units (125 mcg) oral tablet: Last Dose Taken:  , 1 tab(s) orally once a day  · 	B Complex 50 oral tablet, extended release: Last Dose Taken:  , 1 tab(s) orally once a day  · 	gabapentin 100 mg oral capsule: Last Dose Taken:  , 1 cap(s) orally 3 times a day with breakfast, lunch and dinner  · 	amLODIPine 5 mg oral tablet: Last Dose Taken:  , 1 tab(s) orally once a day  · 	RABEprazole 20 mg oral delayed release tablet: Last Dose Taken:  , 1 tab(s) orally 2 times a day at lunch and before bedtime  · 	primidone 50 mg oral tablet: Last Dose Taken:  , 0.5 tab(s) orally once a day (at bedtime)  · 	ferrous sulfate 325 mg (65 mg elemental iron) oral tablet: Last Dose Taken:  , 1 tab(s) orally once a day, As Needed    Patient History:    Past Medical, Past Surgical, and Family History:  PAST MEDICAL HISTORY:  Atrial fibrillation, unspecified type     Benign prostatic hyperplasia without lower urinary tract symptoms     Chronic congestive heart failure, unspecified congestive heart failure type     Gastroesophageal reflux disease, esophagitis presence not specified     HTN (hypertension)     IDDM (insulin dependent diabetes mellitus)     Syncope and collapse 5/4/2018    Type 2 myocardial infarction 12/17/2017    UTI (urinary tract infection) 7/12/2017.     PAST SURGICAL HISTORY:  Arthropathy of shoulder region     Artificial cardiac pacemaker     History of appendectomy.     FAMILY HISTORY:  No pertinent family history in first degree relatives.     No Pertinent Family History in first degree relatives of: NA.     Social History:  Social History (marital status, living situation, occupation, tobacco use, alcohol and drug use, and sexual history): former smoker  former   denies etoh, drug use retired	     Tobacco Screening:  · Core Measure Site	Yes	  · Has the patient used tobacco in the past 30 days?	No	    Risk Assessment:    Present on Admission:  Deep Venous Thrombosis	no	  Pulmonary Embolus	no	     Heart Failure:  Does this patient have a history of or has been diagnosed with heart failure? yes.     LV Function Assessment (LVS function was evaluated before arrival and/or during hospitalization) yes.     Is the Ejection Fraction >40% ? yes.     normal LV function.    Physical Exam:   Physical Exam: VS stable.    Gen: AAOx3, NAD  HEENT: NCAT, EOMI  Neck: Supple  CV: nml S1S2, RRR  Lungs: decreased breath sounds at base  Abd: Soft, NT, ND, BS+  Ext: No edema  Neuro: Non focal  Skin: normal Psych: normal	       Labs and Results: reviewed.       Assessment and Plan:    Assessment:  · Assessment		  89yo male with PMHx as stated a/w aucte on chronic CHF exacerbation  Responded well to IV lasix. Improved clinically from CV stand point  Has mild RICHY on CKD, possibly related to acute decompensation and/or Lasix use.  Holding Lasix now - IF cr stable or better this AM labs, suggest dc home on Lower dose PO Lasix and follow up in the office next week with cards and nephro  ECHO pending today - if no gross abnormalities NEW from past echos, would suggest dc home. IF EF is reduced (NEW) would consider then repeat stress test and/or getting EP consult re: CRT eval.  EP consult placed to interrogate PPM as well.  Otherwise, cont rest of meds as they are.         Afib, stable  HTN, stable  HLD, stable  Pulm HTN, pending echo
89y Male whom presented to the hospital with CKD III/IV with baseline near 2-2.1 Afib on Coumadin, s/p PPM, chronic diastolic chf ef 55%, DMII, HTN presents with acute on chronic SOB, DAVIS. Patient admitted and diuresed with IV lasix x 2 doses, noted with rising creatinine so accordingly renal evaluation. Patient denies lasix use, based on med rec on 40 bid? (pharmacy staff verified as none at home as well). Reports feeling much better, good UOP as well.     PAST MEDICAL & SURGICAL HISTORY:  UTI (urinary tract infection): 2017  Type 2 myocardial infarction: 2017  Syncope and collapse: 2018  Benign prostatic hyperplasia without lower urinary tract symptoms  Gastroesophageal reflux disease, esophagitis presence not specified  Atrial fibrillation, unspecified type  Chronic congestive heart failure, unspecified congestive heart failure type  HTN (hypertension)  IDDM (insulin dependent diabetes mellitus)  Arthropathy of shoulder region  History of appendectomy  Artificial cardiac pacemaker      MEDICATIONS  (STANDING):  amLODIPine   Tablet 5 milliGRAM(s) Oral daily  aspirin  chewable 81 milliGRAM(s) Oral daily  cholecalciferol 1000 Unit(s) Oral daily  dextrose 5%. 1000 milliLiter(s) (50 mL/Hr) IV Continuous <Continuous>  dextrose 50% Injectable 12.5 Gram(s) IV Push once  dextrose 50% Injectable 25 Gram(s) IV Push once  dextrose 50% Injectable 25 Gram(s) IV Push once  ferrous    sulfate 325 milliGRAM(s) Oral daily  gabapentin 100 milliGRAM(s) Oral three times a day  insulin glargine Injectable (LANTUS) 16 Unit(s) SubCutaneous at bedtime  insulin lispro (HumaLOG) corrective regimen sliding scale   SubCutaneous at bedtime  insulin lispro (HumaLOG) corrective regimen sliding scale   SubCutaneous three times a day before meals  isosorbide   mononitrate ER Tablet (IMDUR) 15 milliGRAM(s) Oral daily  melatonin 3 milliGRAM(s) Oral at bedtime  multivitamin 1 Tablet(s) Oral daily  pantoprazole    Tablet 40 milliGRAM(s) Oral before breakfast  primidone 25 milliGRAM(s) Oral at bedtime  venlafaxine XR. 75 milliGRAM(s) Oral daily  warfarin 5 milliGRAM(s) Oral daily    MEDICATIONS  (PRN):  dextrose 40% Gel 15 Gram(s) Oral once PRN Blood Glucose LESS THAN 70 milliGRAM(s)/deciliter  glucagon  Injectable 1 milliGRAM(s) IntraMuscular once PRN Glucose LESS THAN 70 milligrams/deciliter  ondansetron Injectable 4 milliGRAM(s) IV Push every 6 hours PRN Nausea      Allergies    Aleve (Vomiting)  codeine (Unknown)  morphine (Nausea)  naproxen (Vomiting)    Intolerances        SOCIAL HISTORY:  no etoh/cigg    FAMILY HISTORY:  No pertinent family history in first degree relatives      REVIEW OF SYSTEMS:    CONSTITUTIONAL: No weakness, fevers or chills  EYES/ENT: No visual changes;  No vertigo or throat pain   NECK: No pain or stiffness  RESPIRATORY: No cough, wheezing, hemoptysis; No shortness of breath  CARDIOVASCULAR: No chest pain or palpitations  GASTROINTESTINAL: No abdominal or epigastric pain. No nausea, vomiting, or hematemesis; No diarrhea or constipation. No melena or hematochezia.  GENITOURINARY: No dysuria, frequency or hematuria  NEUROLOGICAL: No numbness or weakness  SKIN: No itching, burning, rashes, or lesions   All other review of systems is negative unless indicated above.      T(C): , Max: 36.7 (20 @ 23:01)  T(F): , Max: 98.1 (20 @ 23:01)  HR: 60 (20 @ 09:00)  BP: 134/70 (20 @ 09:00)  BP(mean): --  RR: 16 (20 @ 09:00)  SpO2: 99% (20 @ 09:00)  Wt(kg): --     @ 07:01  -   @ 07:00  --------------------------------------------------------  IN: 0 mL / OUT: 1000 mL / NET: -1000 mL        Weight (kg): 76.5 ( @ 08:04)    PHYSICAL EXAM:    Constitutional: NAD, frail  HEENT: dry  MMM  Neck: No LAD, No JVD  Respiratory: good aeratoin  Cardiovascular: S1 and S2, RRR  Gastrointestinal: BS+, soft, NT/ND  Extremities: No peripheral edema  Neurological: A/O x 3, no focal deficits  Psychiatric: Normal mood, normal affect,  [place]  : No [place]  Skin: No rashes  Access: Not applicable        LABS:                        12.2   9.56  )-----------( 199      ( 19 Aug 2020 08:55 )             37.5     19 Aug 2020 08:55    140    |  103    |  49     ----------------------------<  82     4.0     |  33     |  2.46   17 Aug 2020 20:12    137    |  102    |  45     ----------------------------<  295    4.9     |  30     |  2.43   17 Aug 2020 11:33    139    |  106    |  42     ----------------------------<  176    5.4     |  29     |  2.22     Ca    9.4        19 Aug 2020 08:55  Ca    9.5        17 Aug 2020 20:12  Ca    8.8        17 Aug 2020 11:33    TPro  8.9    /  Alb  3.3    /  TBili  0.7    /  DBili  x      /  AST  22     /  ALT  29     /  AlkPhos  112    17 Aug 2020 20:12  TPro  7.8    /  Alb  2.9    /  TBili  0.5    /  DBili  x      /  AST  25     /  ALT  27     /  AlkPhos  99     17 Aug 2020 11:33          Urine Studies:  Urinalysis Basic - ( 17 Aug 2020 15:00 )    Color: Yellow / Appearance: Clear / S.010 / pH: x  Gluc: x / Ketone: Negative  / Bili: Negative / Urobili: Negative mg/dL   Blood: x / Protein: 100 mg/dL / Nitrite: Negative   Leuk Esterase: Negative / RBC: 6-10 /HPF / WBC 0-2   Sq Epi: x / Non Sq Epi: Few / Bacteria: Occasional            RADIOLOGY & ADDITIONAL STUDIES:

## 2020-08-19 NOTE — PROVIDER CONTACT NOTE (OTHER) - SITUATION
Spoke with Dona
Office made aware of consult, spoke with Rhonda.
followup appt made for patient to see Dr. Mehta for Friday, 8/21 at 1:30pm

## 2020-08-19 NOTE — DISCHARGE NOTE NURSING/CASE MANAGEMENT/SOCIAL WORK - PATIENT PORTAL LINK FT
You can access the FollowMyHealth Patient Portal offered by [facility] by registering at the following website: [email or web address]. By joining [employer]’s FollowMyHealth portal, you will also be able to view your health information using other applications (apps) compatible with our system.

## 2020-08-21 DIAGNOSIS — Z79.4 LONG TERM (CURRENT) USE OF INSULIN: ICD-10-CM

## 2020-08-21 DIAGNOSIS — I25.2 OLD MYOCARDIAL INFARCTION: ICD-10-CM

## 2020-08-21 DIAGNOSIS — N18.4 CHRONIC KIDNEY DISEASE, STAGE 4 (SEVERE): ICD-10-CM

## 2020-08-21 DIAGNOSIS — I13.0 HYPERTENSIVE HEART AND CHRONIC KIDNEY DISEASE WITH HEART FAILURE AND STAGE 1 THROUGH STAGE 4 CHRONIC KIDNEY DISEASE, OR UNSPECIFIED CHRONIC KIDNEY DISEASE: ICD-10-CM

## 2020-08-21 DIAGNOSIS — E11.22 TYPE 2 DIABETES MELLITUS WITH DIABETIC CHRONIC KIDNEY DISEASE: ICD-10-CM

## 2020-08-21 DIAGNOSIS — I50.33 ACUTE ON CHRONIC DIASTOLIC (CONGESTIVE) HEART FAILURE: ICD-10-CM

## 2020-08-21 DIAGNOSIS — Z88.8 ALLERGY STATUS TO OTHER DRUGS, MEDICAMENTS AND BIOLOGICAL SUBSTANCES STATUS: ICD-10-CM

## 2020-08-21 DIAGNOSIS — Z88.5 ALLERGY STATUS TO NARCOTIC AGENT: ICD-10-CM

## 2020-08-21 DIAGNOSIS — K21.9 GASTRO-ESOPHAGEAL REFLUX DISEASE WITHOUT ESOPHAGITIS: ICD-10-CM

## 2020-08-21 DIAGNOSIS — Z87.891 PERSONAL HISTORY OF NICOTINE DEPENDENCE: ICD-10-CM

## 2020-08-21 DIAGNOSIS — N17.9 ACUTE KIDNEY FAILURE, UNSPECIFIED: ICD-10-CM

## 2020-08-21 DIAGNOSIS — I27.20 PULMONARY HYPERTENSION, UNSPECIFIED: ICD-10-CM

## 2020-08-21 DIAGNOSIS — I42.9 CARDIOMYOPATHY, UNSPECIFIED: ICD-10-CM

## 2020-08-21 DIAGNOSIS — N40.0 BENIGN PROSTATIC HYPERPLASIA WITHOUT LOWER URINARY TRACT SYMPTOMS: ICD-10-CM

## 2020-08-21 DIAGNOSIS — Z79.01 LONG TERM (CURRENT) USE OF ANTICOAGULANTS: ICD-10-CM

## 2020-08-21 DIAGNOSIS — Z79.82 LONG TERM (CURRENT) USE OF ASPIRIN: ICD-10-CM

## 2020-08-21 DIAGNOSIS — I48.91 UNSPECIFIED ATRIAL FIBRILLATION: ICD-10-CM

## 2020-08-21 DIAGNOSIS — Z95.0 PRESENCE OF CARDIAC PACEMAKER: ICD-10-CM

## 2020-08-22 NOTE — DIETITIAN INITIAL EVALUATION ADULT. - 25 CAL
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 The clinical team will fulfill your request as soon as they review your message when the office opens on Monday (or after the holiday).\"    
Spoke with patient due to work she is unable to come in. Patient was off of work due to the pandemic previously and is now working 7 days week to make up for it. Patient has scheduled a medication follow up via phone call on 10/06/2020 with Dr Lizarraga at 1:00 pm     Patient was informed her script was sent by Dr Lizarraga this morning to her pharmacy No further questions or concerns   
2100

## 2020-09-01 ENCOUNTER — APPOINTMENT (OUTPATIENT)
Dept: DISASTER EMERGENCY | Facility: CLINIC | Age: 85
End: 2020-09-01

## 2020-09-01 DIAGNOSIS — Z01.818 ENCOUNTER FOR OTHER PREPROCEDURAL EXAMINATION: ICD-10-CM

## 2020-09-02 LAB — SARS-COV-2 N GENE NPH QL NAA+PROBE: NOT DETECTED

## 2020-09-02 NOTE — H&P ADULT - PROBLEM SELECTOR PLAN 1
-Consent obtained for cardiac catheterization w/ coronary angiogram and possible stent placement. Pt is competent, has capacity, and understands risks and benefits of procedure. Risks and benefits discussed. Risk discussed included, but not limited to MI, stroke, mortality, major bleeding, arrythmia, or infection. All questions answered

## 2020-09-02 NOTE — H&P ADULT - NSHPREVIEWOFSYSTEMS_GEN_ALL_CORE
REVIEW OF SYSTEMS:    CONSTITUTIONAL: No fever, weight loss, chills, shakes, or fatigue  EYES: No eye pain, visual disturbances, or discharge  ENMT:  No difficulty hearing, tinnitus, vertigo; No sinus or throat pain  NECK: No pain or stiffness  RESPIRATORY: No cough, wheezing, hemoptysis, or shortness of breath  CARDIOVASCULAR: No chest pain, dyspnea, palpitations, dizziness, syncope, paroxysmal nocturnal dyspnea, orthopnea, or arm or leg swelling  GASTROINTESTINAL: No abdominal  or epigastric pain, nausea, vomiting, hematemesis, diarrhea, constipation, melena or bright red blood.  GENITOURINARY: No dysuria, nocturia, hematuria, or urinary incontinence  NEUROLOGICAL: No headaches, memory loss, slurred speech, limb weakness, loss of strength, numbness, or tremors  SKIN: No itching, burning, rashes, or lesions   MUSCULOSKELETAL: No joint pain or swelling, muscle, back, or extremity pain  PSYCHIATRIC: No depression, anxiety, or difficulty sleeping General: Pt denies recent weight loss/fever/chills    Neurological: denies numbness or  sensation loss    Cardiovascular: denies chest pain/palpitations/leg edema    Respiratory and Thorax: denies cough/wheezing (+)DAVIS    Gastrointestinal: denies abdominal pain/diarrhea/constipation/bloody stool    Genitourinary: denies urinary frequency/urgency/ dysuria    Musculoskeletal: denies joint pain or swelling, denies restricted motion    Hematologic: denies abnormal bleeding

## 2020-09-02 NOTE — H&P ADULT - HISTORY OF PRESENT ILLNESS
89 y/o male with PMHx of HTN, CKD, PPM for CHB, Afib on coumadin last dose 8/30/2020, HLD, CAD, DM presented to cardiology for c/o DAVIS. ECHO done revealing moderate AS with downtrending EF. Referred for cardiac catheterization. COVID negative PST.

## 2020-09-02 NOTE — H&P ADULT - NSHPPHYSICALEXAM_GEN_ALL_CORE
PHYSICAL EXAM  GENERAL: NAD, AAOx3  HEAD:  Atraumatic, Normocephalic  EYES: EOMI, PERRLA, conjunctiva and sclera clear  NECK: Supple, No JVD, No LAD  CHEST/LUNG: Clear to auscultation bilaterally; No wheeze  HEART: s1 s2 Regular rate and rhythm; No murmurs, rubs, or gallops  ABDOMEN: Soft, Nontender, Nondistended; Bowel sounds present X 4 quadrants   EXTREMITIES:  2+ Peripheral Pulses, No clubbing, cyanosis, or edema  SKIN: No rashes or lesions,  b/l LE not red, cool to touch,  no open skin no drainage  NEURO: nonfocal CN/motor/sensory/reflexes  Psych: normal affect and behavior, calm and cooperative Vital Signs : /90       HR 66      RR 16                Constitutional: well developed, well nourished, no deformities and no acute distress    Neurological: Alert & Oriented x 3, MONTES, no focal deficits    HEENT: NC/AT, PERRLA, EOMI,  Neck supple.    Respiratory: CTA B/L, No wheezing/crackles/rhonchi    Cardiovascular: (+) S1 & S2, RRR, No m/r/g    Gastrointestinal: soft, NT, nondistended, (+) BS    Genitourinary: non distended bladder, voiding freely    Extremities: No pedal edema, No clubbing, No cyanosis    Skin:  normal skin color and pigmentation, no skin lesions

## 2020-09-02 NOTE — H&P ADULT - ASSESSMENT
89 y/o male with PMHx of HTN, CKD, PPM for CHB, Afib on coumadin last dose 8/30/2020, HLD, CAD, DM presented to cardiology for c/o DAVIS. ECHO done revealing moderate AS with downtrending EF. Referred for cardiac catheterization. COVID negative PST.    ASA class:  Creatinine:  GFR:  Bleeding  Risk score: 89 y/o male with PMHx of HTN, CKD, PPM for CHB, Afib on coumadin last dose 8/30/2020, HLD, CAD, DM presented to cardiology for c/o DAVIS. ECHO done revealing moderate AS with downtrending EF. Referred for cardiac catheterization. COVID negative PST.    ASA class: II  Creatinine:  GFR:  Bleeding  Risk score: 89 y/o male with PMHx of HTN, CKD, PPM for CHB, Afib on coumadin last dose 8/30/2020, HLD, CAD, DM presented to cardiology for c/o DAVIS. ECHO done revealing moderate AS with downtrending EF. Referred for Rt & Lt  heart cardiac catheterization. COVID negative PST.    ASA class: II  Creatinine: 2.36  GFR: 23  Bleeding  Risk score: 3.6%

## 2020-09-02 NOTE — H&P ADULT - NSHPLABSRESULTS_GEN_ALL_CORE
EKG  -    ECHO- 7/2020- mild aortic regurg/AS    Stress- 7/2020- fixed apical anterior defect. EF 20-25% EKG  - Afib V-paced 63 ppm    ECHO- 7/2020- mild aortic regurg/AS    Stress- 7/2020- fixed apical anterior defect. EF 20-25% EKG  - Afib V-paced 63 ppm    ECHO- 7/2020- EF 45-50% mod AS    Stress- 7/2020- fixed apical anterior defect. EF 20-25% EKG  - Afib V-paced 63 ppm    ECHO- Aug/2020- EF 45-50% mod AS    Stress- 7/2019- fixed apical anterior defect. EF 41%

## 2020-09-03 ENCOUNTER — OUTPATIENT (OUTPATIENT)
Dept: OUTPATIENT SERVICES | Facility: HOSPITAL | Age: 85
LOS: 1 days | Discharge: HOME | End: 2020-09-03
Payer: MEDICARE

## 2020-09-03 VITALS
DIASTOLIC BLOOD PRESSURE: 91 MMHG | SYSTOLIC BLOOD PRESSURE: 157 MMHG | HEIGHT: 67 IN | TEMPERATURE: 98 F | HEART RATE: 69 BPM | RESPIRATION RATE: 18 BRPM | WEIGHT: 169.98 LBS

## 2020-09-03 VITALS — HEART RATE: 63 BPM | RESPIRATION RATE: 18 BRPM | SYSTOLIC BLOOD PRESSURE: 145 MMHG | DIASTOLIC BLOOD PRESSURE: 93 MMHG

## 2020-09-03 DIAGNOSIS — I25.10 ATHEROSCLEROTIC HEART DISEASE OF NATIVE CORONARY ARTERY WITHOUT ANGINA PECTORIS: ICD-10-CM

## 2020-09-03 DIAGNOSIS — R94.39 ABNORMAL RESULT OF OTHER CARDIOVASCULAR FUNCTION STUDY: ICD-10-CM

## 2020-09-03 DIAGNOSIS — Z95.0 PRESENCE OF CARDIAC PACEMAKER: Chronic | ICD-10-CM

## 2020-09-03 DIAGNOSIS — M19.019 PRIMARY OSTEOARTHRITIS, UNSPECIFIED SHOULDER: Chronic | ICD-10-CM

## 2020-09-03 DIAGNOSIS — Z90.49 ACQUIRED ABSENCE OF OTHER SPECIFIED PARTS OF DIGESTIVE TRACT: Chronic | ICD-10-CM

## 2020-09-03 LAB
ANION GAP SERPL CALC-SCNC: 5 MMOL/L — SIGNIFICANT CHANGE UP (ref 5–17)
BUN SERPL-MCNC: 46 MG/DL — HIGH (ref 7–23)
CALCIUM SERPL-MCNC: 9.5 MG/DL — SIGNIFICANT CHANGE UP (ref 8.5–10.1)
CHLORIDE SERPL-SCNC: 110 MMOL/L — HIGH (ref 96–108)
CO2 SERPL-SCNC: 27 MMOL/L — SIGNIFICANT CHANGE UP (ref 22–31)
CREAT SERPL-MCNC: 2.36 MG/DL — HIGH (ref 0.5–1.3)
GLUCOSE SERPL-MCNC: 159 MG/DL — HIGH (ref 70–99)
POTASSIUM SERPL-MCNC: 5.3 MMOL/L — SIGNIFICANT CHANGE UP (ref 3.5–5.3)
POTASSIUM SERPL-SCNC: 5.3 MMOL/L — SIGNIFICANT CHANGE UP (ref 3.5–5.3)
SODIUM SERPL-SCNC: 142 MMOL/L — SIGNIFICANT CHANGE UP (ref 135–145)

## 2020-09-03 PROCEDURE — C1769: CPT

## 2020-09-03 PROCEDURE — 80048 BASIC METABOLIC PNL TOTAL CA: CPT

## 2020-09-03 PROCEDURE — 93010 ELECTROCARDIOGRAM REPORT: CPT

## 2020-09-03 PROCEDURE — C1889: CPT

## 2020-09-03 PROCEDURE — C1760: CPT

## 2020-09-03 PROCEDURE — C1894: CPT

## 2020-09-03 PROCEDURE — 36415 COLL VENOUS BLD VENIPUNCTURE: CPT

## 2020-09-03 PROCEDURE — 93005 ELECTROCARDIOGRAM TRACING: CPT | Mod: XU

## 2020-09-03 PROCEDURE — C1887: CPT

## 2020-09-03 PROCEDURE — 93460 R&L HRT ART/VENTRICLE ANGIO: CPT

## 2020-09-03 RX ORDER — ACETAMINOPHEN 500 MG
650 TABLET ORAL ONCE
Refills: 0 | Status: COMPLETED | OUTPATIENT
Start: 2020-09-03 | End: 2020-09-03

## 2020-09-03 RX ADMIN — Medication 650 MILLIGRAM(S): at 15:37

## 2020-09-03 NOTE — PROGRESS NOTE ADULT - SUBJECTIVE AND OBJECTIVE BOX
Cath Lab Nurse Practitioner Note  HPI:  89 y/o male with PMHx of HTN, CKD, PPM for CHB, Afib on coumadin last dose 8/30/2020, HLD, CAD, DM presented to cardiology for c/o DAVIS. ECHO done revealing moderate AS with downtrending EF. Referred for cardiac catheterization. COVID negative PST.    s/p Rt & LHC : RCA ostial stenosis with severe AS (full report to follow)  Pt denies chest pain/SOB/palpitations post cath.    Physical exam:  Vital Signs:  T(C): 36.8   HR: 60  BP: 150/80  RR: 18   Spo2: 99%    Neuro: A&Ox3  Cardiac : (+)S1S2, (+) JOJO  Lungs: CTA B/L  Abd: soft, NT, (+)BS  Ext: Rt groin (+) perclose closure device , 1+ Rt DP    Labs:    09-03    142  |  110<H>  |  46<H>  ----------------------------<  159<H>  5.3   |  27  |  2.36<H>    Ca    9.5      03 Sep 2020 10:45    A/P : CAD, s/p Rt & LHC : RCA ostial stenosis with severe AS (full report to follow)  -encourage po hydration  -resume coumadin tonight  -d/c home today  -f/u with Dr. Mehta next week to determine plan, possible TAVR/PCI of RCA  - f/u renal function & INR next week by PMD  -Plan discussed with pt/Dr. Morin.

## 2020-09-03 NOTE — PACU DISCHARGE NOTE - COMMENTS
Pt. and son verb. agreement and understanding to and teach back to written and verbal discharge instructions and medication list and MD follow up.   Pt. discharged to home via private auto accompanied by son; escorted to auto via w/c by transport tech.

## 2020-09-04 DIAGNOSIS — I25.2 OLD MYOCARDIAL INFARCTION: ICD-10-CM

## 2020-09-04 DIAGNOSIS — E11.9 TYPE 2 DIABETES MELLITUS WITHOUT COMPLICATIONS: ICD-10-CM

## 2020-09-04 DIAGNOSIS — I35.0 NONRHEUMATIC AORTIC (VALVE) STENOSIS: ICD-10-CM

## 2020-09-04 DIAGNOSIS — N40.0 BENIGN PROSTATIC HYPERPLASIA WITHOUT LOWER URINARY TRACT SYMPTOMS: ICD-10-CM

## 2020-09-04 DIAGNOSIS — I25.119 ATHEROSCLEROTIC HEART DISEASE OF NATIVE CORONARY ARTERY WITH UNSPECIFIED ANGINA PECTORIS: ICD-10-CM

## 2020-09-04 DIAGNOSIS — K21.9 GASTRO-ESOPHAGEAL REFLUX DISEASE WITHOUT ESOPHAGITIS: ICD-10-CM

## 2020-09-04 DIAGNOSIS — I48.91 UNSPECIFIED ATRIAL FIBRILLATION: ICD-10-CM

## 2020-09-04 DIAGNOSIS — I10 ESSENTIAL (PRIMARY) HYPERTENSION: ICD-10-CM

## 2020-09-04 DIAGNOSIS — I20.9 ANGINA PECTORIS, UNSPECIFIED: ICD-10-CM

## 2020-09-23 ENCOUNTER — EMERGENCY (EMERGENCY)
Facility: HOSPITAL | Age: 85
LOS: 0 days | Discharge: HOME | End: 2020-09-23
Attending: STUDENT IN AN ORGANIZED HEALTH CARE EDUCATION/TRAINING PROGRAM
Payer: MEDICARE

## 2020-09-23 VITALS — WEIGHT: 179.9 LBS | HEIGHT: 67 IN

## 2020-09-23 VITALS
RESPIRATION RATE: 17 BRPM | OXYGEN SATURATION: 99 % | TEMPERATURE: 98 F | HEART RATE: 63 BPM | DIASTOLIC BLOOD PRESSURE: 82 MMHG | SYSTOLIC BLOOD PRESSURE: 149 MMHG

## 2020-09-23 DIAGNOSIS — E11.9 TYPE 2 DIABETES MELLITUS WITHOUT COMPLICATIONS: ICD-10-CM

## 2020-09-23 DIAGNOSIS — Z88.6 ALLERGY STATUS TO ANALGESIC AGENT: ICD-10-CM

## 2020-09-23 DIAGNOSIS — N40.0 BENIGN PROSTATIC HYPERPLASIA WITHOUT LOWER URINARY TRACT SYMPTOMS: ICD-10-CM

## 2020-09-23 DIAGNOSIS — I25.10 ATHEROSCLEROTIC HEART DISEASE OF NATIVE CORONARY ARTERY WITHOUT ANGINA PECTORIS: ICD-10-CM

## 2020-09-23 DIAGNOSIS — M19.019 PRIMARY OSTEOARTHRITIS, UNSPECIFIED SHOULDER: Chronic | ICD-10-CM

## 2020-09-23 DIAGNOSIS — Z88.8 ALLERGY STATUS TO OTHER DRUGS, MEDICAMENTS AND BIOLOGICAL SUBSTANCES: ICD-10-CM

## 2020-09-23 DIAGNOSIS — I48.91 UNSPECIFIED ATRIAL FIBRILLATION: ICD-10-CM

## 2020-09-23 DIAGNOSIS — Z95.0 PRESENCE OF CARDIAC PACEMAKER: ICD-10-CM

## 2020-09-23 DIAGNOSIS — Z88.5 ALLERGY STATUS TO NARCOTIC AGENT: ICD-10-CM

## 2020-09-23 DIAGNOSIS — I50.9 HEART FAILURE, UNSPECIFIED: ICD-10-CM

## 2020-09-23 DIAGNOSIS — I11.0 HYPERTENSIVE HEART DISEASE WITH HEART FAILURE: ICD-10-CM

## 2020-09-23 DIAGNOSIS — R06.02 SHORTNESS OF BREATH: ICD-10-CM

## 2020-09-23 DIAGNOSIS — Z95.0 PRESENCE OF CARDIAC PACEMAKER: Chronic | ICD-10-CM

## 2020-09-23 DIAGNOSIS — Z90.49 ACQUIRED ABSENCE OF OTHER SPECIFIED PARTS OF DIGESTIVE TRACT: Chronic | ICD-10-CM

## 2020-09-23 DIAGNOSIS — Z79.4 LONG TERM (CURRENT) USE OF INSULIN: ICD-10-CM

## 2020-09-23 DIAGNOSIS — Z79.01 LONG TERM (CURRENT) USE OF ANTICOAGULANTS: ICD-10-CM

## 2020-09-23 DIAGNOSIS — K21.9 GASTRO-ESOPHAGEAL REFLUX DISEASE WITHOUT ESOPHAGITIS: ICD-10-CM

## 2020-09-23 LAB
ALBUMIN SERPL ELPH-MCNC: 3 G/DL — LOW (ref 3.3–5)
ALP SERPL-CCNC: 105 U/L — SIGNIFICANT CHANGE UP (ref 40–120)
ALT FLD-CCNC: 25 U/L — SIGNIFICANT CHANGE UP (ref 12–78)
ANION GAP SERPL CALC-SCNC: 2 MMOL/L — LOW (ref 5–17)
APTT BLD: 43.8 SEC — HIGH (ref 27.5–35.5)
AST SERPL-CCNC: 25 U/L — SIGNIFICANT CHANGE UP (ref 15–37)
BASOPHILS # BLD AUTO: 0.04 K/UL — SIGNIFICANT CHANGE UP (ref 0–0.2)
BASOPHILS NFR BLD AUTO: 0.4 % — SIGNIFICANT CHANGE UP (ref 0–2)
BILIRUB SERPL-MCNC: 0.6 MG/DL — SIGNIFICANT CHANGE UP (ref 0.2–1.2)
BUN SERPL-MCNC: 42 MG/DL — HIGH (ref 7–23)
CALCIUM SERPL-MCNC: 9.1 MG/DL — SIGNIFICANT CHANGE UP (ref 8.5–10.1)
CHLORIDE SERPL-SCNC: 112 MMOL/L — HIGH (ref 96–108)
CO2 SERPL-SCNC: 28 MMOL/L — SIGNIFICANT CHANGE UP (ref 22–31)
CREAT SERPL-MCNC: 2.29 MG/DL — HIGH (ref 0.5–1.3)
EOSINOPHIL # BLD AUTO: 0.18 K/UL — SIGNIFICANT CHANGE UP (ref 0–0.5)
EOSINOPHIL NFR BLD AUTO: 2 % — SIGNIFICANT CHANGE UP (ref 0–6)
GLUCOSE SERPL-MCNC: 110 MG/DL — HIGH (ref 70–99)
HCT VFR BLD CALC: 36 % — LOW (ref 39–50)
HGB BLD-MCNC: 11.5 G/DL — LOW (ref 13–17)
IMM GRANULOCYTES NFR BLD AUTO: 0.3 % — SIGNIFICANT CHANGE UP (ref 0–1.5)
INR BLD: 3.7 RATIO — HIGH (ref 0.88–1.16)
LYMPHOCYTES # BLD AUTO: 1.24 K/UL — SIGNIFICANT CHANGE UP (ref 1–3.3)
LYMPHOCYTES # BLD AUTO: 13.5 % — SIGNIFICANT CHANGE UP (ref 13–44)
MCHC RBC-ENTMCNC: 31.9 GM/DL — LOW (ref 32–36)
MCHC RBC-ENTMCNC: 32.8 PG — SIGNIFICANT CHANGE UP (ref 27–34)
MCV RBC AUTO: 102.6 FL — HIGH (ref 80–100)
MONOCYTES # BLD AUTO: 0.79 K/UL — SIGNIFICANT CHANGE UP (ref 0–0.9)
MONOCYTES NFR BLD AUTO: 8.6 % — SIGNIFICANT CHANGE UP (ref 2–14)
NEUTROPHILS # BLD AUTO: 6.91 K/UL — SIGNIFICANT CHANGE UP (ref 1.8–7.4)
NEUTROPHILS NFR BLD AUTO: 75.2 % — SIGNIFICANT CHANGE UP (ref 43–77)
NT-PROBNP SERPL-SCNC: HIGH PG/ML (ref 0–450)
PLATELET # BLD AUTO: 157 K/UL — SIGNIFICANT CHANGE UP (ref 150–400)
POTASSIUM SERPL-MCNC: 4.9 MMOL/L — SIGNIFICANT CHANGE UP (ref 3.5–5.3)
POTASSIUM SERPL-SCNC: 4.9 MMOL/L — SIGNIFICANT CHANGE UP (ref 3.5–5.3)
PROT SERPL-MCNC: 7.7 GM/DL — SIGNIFICANT CHANGE UP (ref 6–8.3)
PROTHROM AB SERPL-ACNC: 40.3 SEC — HIGH (ref 10.6–13.6)
RBC # BLD: 3.51 M/UL — LOW (ref 4.2–5.8)
RBC # FLD: 14.3 % — SIGNIFICANT CHANGE UP (ref 10.3–14.5)
SARS-COV-2 RNA SPEC QL NAA+PROBE: SIGNIFICANT CHANGE UP
SODIUM SERPL-SCNC: 142 MMOL/L — SIGNIFICANT CHANGE UP (ref 135–145)
TROPONIN I SERPL-MCNC: 0.06 NG/ML — HIGH (ref 0.01–0.04)
WBC # BLD: 9.19 K/UL — SIGNIFICANT CHANGE UP (ref 3.8–10.5)
WBC # FLD AUTO: 9.19 K/UL — SIGNIFICANT CHANGE UP (ref 3.8–10.5)

## 2020-09-23 PROCEDURE — 85025 COMPLETE CBC W/AUTO DIFF WBC: CPT

## 2020-09-23 PROCEDURE — 71045 X-RAY EXAM CHEST 1 VIEW: CPT

## 2020-09-23 PROCEDURE — 99285 EMERGENCY DEPT VISIT HI MDM: CPT

## 2020-09-23 PROCEDURE — 93010 ELECTROCARDIOGRAM REPORT: CPT

## 2020-09-23 PROCEDURE — U0003: CPT

## 2020-09-23 PROCEDURE — 83880 ASSAY OF NATRIURETIC PEPTIDE: CPT

## 2020-09-23 PROCEDURE — 93005 ELECTROCARDIOGRAM TRACING: CPT

## 2020-09-23 PROCEDURE — 85730 THROMBOPLASTIN TIME PARTIAL: CPT

## 2020-09-23 PROCEDURE — 84484 ASSAY OF TROPONIN QUANT: CPT

## 2020-09-23 PROCEDURE — 85610 PROTHROMBIN TIME: CPT

## 2020-09-23 PROCEDURE — 99284 EMERGENCY DEPT VISIT MOD MDM: CPT | Mod: 25

## 2020-09-23 PROCEDURE — 80053 COMPREHEN METABOLIC PANEL: CPT

## 2020-09-23 PROCEDURE — 71045 X-RAY EXAM CHEST 1 VIEW: CPT | Mod: 26

## 2020-09-23 PROCEDURE — 36415 COLL VENOUS BLD VENIPUNCTURE: CPT

## 2020-09-23 NOTE — ED PROVIDER NOTE - NS ED ROS FT
Constitutional: No fever.  Eyes: No vision changes.   Ears, Nose, Mouth, Throat: No sore throat.  Cardiovascular: No chest pain.  Respiratory: +difficulty breathing.  Gastrointestinal: No nausea or vomiting.  Genitourinary: No dysuria.  Musculoskeletal: No joint pain.  Integumentary (skin and/or breast): No rash.  Neurological: No headache.  Psychiatric: No depression.  Endocrine:  No heat / cold intolerance.  Hematologic/Lymphatic: No easy bruising   Allergic/Immunologic:  No current allergic reactions.

## 2020-09-23 NOTE — ED PROVIDER NOTE - CLINICAL SUMMARY MEDICAL DECISION MAKING FREE TEXT BOX
89 y/o male with SOB when lying flat, now resolving. r/o CHF exacerbation, ACS, may be related to aortic stenosis.

## 2020-09-23 NOTE — ED PROVIDER NOTE - PROGRESS NOTE DETAILS
pt feels better, ambulating in ED. Spoke with Dr. Vargas- results / presentation discussed. patient was for outpatient stress test but missed appointment. patient offered inpatient admission for stress. patient opts for outpatient work up. He understands that his lab work was abnormal. He states he does not want to stay and accepts the risk that he may have a worse outcome because he is leaving.

## 2020-09-23 NOTE — ED STATDOCS - PROGRESS NOTE DETAILS
Amanda Rivas for attending Dr. Song: 89 y/o male with a PMHx of HTN, Afib, IDDM, CHF, peacemaker, presents to the ED c/o SOB today. Pt also notes epistaxis today. No other complaints. Will send pt to main ED for further evaluation.

## 2020-09-23 NOTE — ED PROVIDER NOTE - NS_ ATTENDINGSCRIBEDETAILS _ED_A_ED_FT
I, Abigail Gayle DO,  performed the initial face to face bedside interview with this patient regarding history of present illness, review of symptoms and relevant past medical, social and family history. I completed an independent physical examination. I was the initial provider who evaluated this patient.    The history, relevant review of systems, past medical and surgical history, medical decision making, and physical examination was documented by the scribe in my presence and I attest to the accuracy of the documentation.

## 2020-09-23 NOTE — ED PROVIDER NOTE - PATIENT PORTAL LINK FT
You can access the FollowMyHealth Patient Portal offered by [facility] by registering at the following website: [email or web address]. By joining [employer]’s FollowMyHealth portal, you will also be able to view your health information using other applications (apps) compatible with our system.

## 2020-09-23 NOTE — ED PROVIDER NOTE - CARE PROVIDER_API CALL
Michael Mehta  INTERNAL MEDICINE  [address] [place] [address]  Phone: [phone]  Fax: [phone]  Established Patient  Follow Up Time: Urgent

## 2020-09-23 NOTE — ED PROVIDER NOTE - OBJECTIVE STATEMENT
89 y/o male with PMHx of CAD, MI, BPH, Afib, CHF, HTN, IDDM presents to the ED c/o SOB since today. States SOB was exacerbated last night when lying down, somewhat improved now in ED, but pt states he still does not feel like he has returned to baseline. Denies chest pain, edema, cough. Pt self-ambulatory at baseline. No other complaints at this time.

## 2020-09-23 NOTE — ED PROVIDER NOTE - PHYSICAL EXAMINATION
GENERALIZED APPEARANCE:  Comfortable, no acute distress.  SKIN:  Warm and dry.  HEAD:  Normocephalic.  EYES:  Conjunctiva pink, no icterus.  ENMT:  Mucus membranes moist.  CHEST AND RESPIRATORY:  Clear to auscultation with good air entry bilaterally.  HEART AND CARDIOVASCULAR:  Regular rate, no obvious murmur.  ABDOMEN AND GI:  Soft, non-tender, non-distended.  No rebound, no guarding.  EXTREMITIES:  No deformity, edema, or calf tenderness.  NEURO: AAOx3, gross motor and sensory intact.  PSYCH: Normal affect. GENERALIZED APPEARANCE:  Comfortable, no acute distress.  SKIN:  Warm and dry.  HEAD:  Normocephalic.  EYES:  Conjunctiva pink, no icterus.  ENMT:  Mucus membranes moist.  CHEST AND RESPIRATORY:  Clear to auscultation with good air entry bilaterally.  HEART AND CARDIOVASCULAR:  Regular rate, +systolic crescendo decrescendo murmur heard at left sternal border  ABDOMEN AND GI:  Soft, non-tender, non-distended.  No rebound, no guarding.  EXTREMITIES:  No deformity, edema, or calf tenderness.  NEURO: AAOx3, gross motor and sensory intact.  PSYCH: Normal affect.

## 2020-10-05 ENCOUNTER — EMERGENCY (EMERGENCY)
Facility: HOSPITAL | Age: 85
LOS: 0 days | Discharge: HOME | End: 2020-10-05
Attending: EMERGENCY MEDICINE
Payer: MEDICARE

## 2020-10-05 VITALS
RESPIRATION RATE: 27 BRPM | OXYGEN SATURATION: 99 % | DIASTOLIC BLOOD PRESSURE: 62 MMHG | HEIGHT: 67 IN | HEART RATE: 77 BPM | SYSTOLIC BLOOD PRESSURE: 167 MMHG | TEMPERATURE: 98 F

## 2020-10-05 VITALS
OXYGEN SATURATION: 100 % | TEMPERATURE: 98 F | SYSTOLIC BLOOD PRESSURE: 170 MMHG | HEART RATE: 75 BPM | RESPIRATION RATE: 20 BRPM | DIASTOLIC BLOOD PRESSURE: 80 MMHG

## 2020-10-05 DIAGNOSIS — Z95.0 PRESENCE OF CARDIAC PACEMAKER: ICD-10-CM

## 2020-10-05 DIAGNOSIS — I25.2 OLD MYOCARDIAL INFARCTION: ICD-10-CM

## 2020-10-05 DIAGNOSIS — I11.0 HYPERTENSIVE HEART DISEASE WITH HEART FAILURE: ICD-10-CM

## 2020-10-05 DIAGNOSIS — R06.09 OTHER FORMS OF DYSPNEA: ICD-10-CM

## 2020-10-05 DIAGNOSIS — Z95.0 PRESENCE OF CARDIAC PACEMAKER: Chronic | ICD-10-CM

## 2020-10-05 DIAGNOSIS — Z88.8 ALLERGY STATUS TO OTHER DRUGS, MEDICAMENTS AND BIOLOGICAL SUBSTANCES: ICD-10-CM

## 2020-10-05 DIAGNOSIS — E10.9 TYPE 1 DIABETES MELLITUS WITHOUT COMPLICATIONS: ICD-10-CM

## 2020-10-05 DIAGNOSIS — M19.019 PRIMARY OSTEOARTHRITIS, UNSPECIFIED SHOULDER: Chronic | ICD-10-CM

## 2020-10-05 DIAGNOSIS — Z88.5 ALLERGY STATUS TO NARCOTIC AGENT: ICD-10-CM

## 2020-10-05 DIAGNOSIS — R06.00 DYSPNEA, UNSPECIFIED: ICD-10-CM

## 2020-10-05 DIAGNOSIS — Z90.49 ACQUIRED ABSENCE OF OTHER SPECIFIED PARTS OF DIGESTIVE TRACT: Chronic | ICD-10-CM

## 2020-10-05 DIAGNOSIS — I25.10 ATHEROSCLEROTIC HEART DISEASE OF NATIVE CORONARY ARTERY WITHOUT ANGINA PECTORIS: ICD-10-CM

## 2020-10-05 DIAGNOSIS — K21.9 GASTRO-ESOPHAGEAL REFLUX DISEASE WITHOUT ESOPHAGITIS: ICD-10-CM

## 2020-10-05 DIAGNOSIS — I48.91 UNSPECIFIED ATRIAL FIBRILLATION: ICD-10-CM

## 2020-10-05 DIAGNOSIS — I50.9 HEART FAILURE, UNSPECIFIED: ICD-10-CM

## 2020-10-05 DIAGNOSIS — Z79.01 LONG TERM (CURRENT) USE OF ANTICOAGULANTS: ICD-10-CM

## 2020-10-05 LAB
ALBUMIN SERPL ELPH-MCNC: 3.2 G/DL — LOW (ref 3.3–5)
ALP SERPL-CCNC: 125 U/L — HIGH (ref 40–120)
ALT FLD-CCNC: 27 U/L — SIGNIFICANT CHANGE UP (ref 12–78)
ANION GAP SERPL CALC-SCNC: 6 MMOL/L — SIGNIFICANT CHANGE UP (ref 5–17)
AST SERPL-CCNC: 29 U/L — SIGNIFICANT CHANGE UP (ref 15–37)
BASOPHILS # BLD AUTO: 0.04 K/UL — SIGNIFICANT CHANGE UP (ref 0–0.2)
BASOPHILS NFR BLD AUTO: 0.4 % — SIGNIFICANT CHANGE UP (ref 0–2)
BILIRUB SERPL-MCNC: 0.5 MG/DL — SIGNIFICANT CHANGE UP (ref 0.2–1.2)
BUN SERPL-MCNC: 56 MG/DL — HIGH (ref 7–23)
CALCIUM SERPL-MCNC: 9.2 MG/DL — SIGNIFICANT CHANGE UP (ref 8.5–10.1)
CHLORIDE SERPL-SCNC: 110 MMOL/L — HIGH (ref 96–108)
CO2 SERPL-SCNC: 25 MMOL/L — SIGNIFICANT CHANGE UP (ref 22–31)
CREAT SERPL-MCNC: 2.63 MG/DL — HIGH (ref 0.5–1.3)
EOSINOPHIL # BLD AUTO: 0.12 K/UL — SIGNIFICANT CHANGE UP (ref 0–0.5)
EOSINOPHIL NFR BLD AUTO: 1.3 % — SIGNIFICANT CHANGE UP (ref 0–6)
GLUCOSE SERPL-MCNC: 269 MG/DL — HIGH (ref 70–99)
HCT VFR BLD CALC: 39.3 % — SIGNIFICANT CHANGE UP (ref 39–50)
HGB BLD-MCNC: 12.4 G/DL — LOW (ref 13–17)
IMM GRANULOCYTES NFR BLD AUTO: 0.4 % — SIGNIFICANT CHANGE UP (ref 0–1.5)
LYMPHOCYTES # BLD AUTO: 1.88 K/UL — SIGNIFICANT CHANGE UP (ref 1–3.3)
LYMPHOCYTES # BLD AUTO: 21 % — SIGNIFICANT CHANGE UP (ref 13–44)
MAGNESIUM SERPL-MCNC: 2.3 MG/DL — SIGNIFICANT CHANGE UP (ref 1.6–2.6)
MCHC RBC-ENTMCNC: 31.6 GM/DL — LOW (ref 32–36)
MCHC RBC-ENTMCNC: 32.9 PG — SIGNIFICANT CHANGE UP (ref 27–34)
MCV RBC AUTO: 104.2 FL — HIGH (ref 80–100)
MONOCYTES # BLD AUTO: 0.63 K/UL — SIGNIFICANT CHANGE UP (ref 0–0.9)
MONOCYTES NFR BLD AUTO: 7 % — SIGNIFICANT CHANGE UP (ref 2–14)
NEUTROPHILS # BLD AUTO: 6.24 K/UL — SIGNIFICANT CHANGE UP (ref 1.8–7.4)
NEUTROPHILS NFR BLD AUTO: 69.9 % — SIGNIFICANT CHANGE UP (ref 43–77)
NT-PROBNP SERPL-SCNC: 6067 PG/ML — HIGH (ref 0–450)
PLATELET # BLD AUTO: 195 K/UL — SIGNIFICANT CHANGE UP (ref 150–400)
POTASSIUM SERPL-MCNC: 5.1 MMOL/L — SIGNIFICANT CHANGE UP (ref 3.5–5.3)
POTASSIUM SERPL-SCNC: 5.1 MMOL/L — SIGNIFICANT CHANGE UP (ref 3.5–5.3)
PROT SERPL-MCNC: 8.5 GM/DL — HIGH (ref 6–8.3)
RBC # BLD: 3.77 M/UL — LOW (ref 4.2–5.8)
RBC # FLD: 14.2 % — SIGNIFICANT CHANGE UP (ref 10.3–14.5)
SODIUM SERPL-SCNC: 141 MMOL/L — SIGNIFICANT CHANGE UP (ref 135–145)
TROPONIN I SERPL-MCNC: 0.05 NG/ML — HIGH (ref 0.01–0.04)
WBC # BLD: 8.95 K/UL — SIGNIFICANT CHANGE UP (ref 3.8–10.5)
WBC # FLD AUTO: 8.95 K/UL — SIGNIFICANT CHANGE UP (ref 3.8–10.5)

## 2020-10-05 PROCEDURE — 99285 EMERGENCY DEPT VISIT HI MDM: CPT

## 2020-10-05 PROCEDURE — 84484 ASSAY OF TROPONIN QUANT: CPT

## 2020-10-05 PROCEDURE — 71045 X-RAY EXAM CHEST 1 VIEW: CPT

## 2020-10-05 PROCEDURE — 93005 ELECTROCARDIOGRAM TRACING: CPT

## 2020-10-05 PROCEDURE — 36415 COLL VENOUS BLD VENIPUNCTURE: CPT

## 2020-10-05 PROCEDURE — 80053 COMPREHEN METABOLIC PANEL: CPT

## 2020-10-05 PROCEDURE — 85025 COMPLETE CBC W/AUTO DIFF WBC: CPT

## 2020-10-05 PROCEDURE — 99284 EMERGENCY DEPT VISIT MOD MDM: CPT | Mod: 25

## 2020-10-05 PROCEDURE — 71045 X-RAY EXAM CHEST 1 VIEW: CPT | Mod: 26

## 2020-10-05 PROCEDURE — 83735 ASSAY OF MAGNESIUM: CPT

## 2020-10-05 PROCEDURE — 83880 ASSAY OF NATRIURETIC PEPTIDE: CPT

## 2020-10-05 PROCEDURE — 93010 ELECTROCARDIOGRAM REPORT: CPT

## 2020-10-05 NOTE — ED PROVIDER NOTE - OBJECTIVE STATEMENT
89 y/o male with PMHx of CAD, MI, BPH, GERD, Afib on Coumadin, CHF, HTN, IDDM presents to the ED c/o SOB, tachypnea, x3 weeks. Pt is having trouble speaking in full sentences. Pt had an angiogram last week, with no acute findings. Next week has a stress test scheduled. Pt not on supplemental oxygen at home. Denies chest pain, abd pain. Pt lives at home with his wife. Cardiologist: Dr. Mehta.

## 2020-10-05 NOTE — ED PROVIDER NOTE - SECONDARY DIAGNOSIS.
Artificial cardiac pacemaker Chronic congestive heart failure, unspecified congestive heart failure type

## 2020-10-05 NOTE — ED ADULT TRIAGE NOTE - CHIEF COMPLAINT QUOTE
Pt p/w c/o SOB and tachypnea.  hx of HLD, HTN.  pt having trouble speaking in full sentences upon arrival to ED.

## 2020-10-05 NOTE — ED ADULT NURSE NOTE - OBJECTIVE STATEMENT
PT TO ED FROM HOME FOR SOB THAT HAS BEEN FOR WEEKS BUT WORSENED TODAY. REPORTS HX OF AORTIC STENOSIS. PT REPORTS NEEDING TO GO TO [facility] AS PER HIS MD FOR "TESTING". pt 02 % ON ROOM AIR, RR 2. PT A&OX3

## 2020-10-05 NOTE — ED PROVIDER NOTE - CARE PLAN
Principal Discharge DX:	Dyspnea on exertion  Secondary Diagnosis:	Artificial cardiac pacemaker  Secondary Diagnosis:	Chronic congestive heart failure, unspecified congestive heart failure type

## 2020-10-05 NOTE — ED PROVIDER NOTE - PATIENT PORTAL LINK FT
You can access the FollowMyHealth Patient Portal offered by [facility] by registering at the following website: [email or web address]. By joining [facility]’s FollowMyHealth portal, you will also be able to view your health information using other applications (apps) compatible with our system.

## 2020-10-05 NOTE — ED ADULT NURSE NOTE - NSIMPLEMENTINTERV_GEN_ALL_ED
Implemented All Fall Risk Interventions:  [place] to call system. Call bell, personal items and telephone within reach. Instruct patient to call for assistance. Room bathroom lighting operational. Non-slip footwear when patient is off stretcher. Physically safe environment: no spills, clutter or unnecessary equipment. Stretcher in lowest position, wheels locked, appropriate side rails in place. Provide visual cue, wrist band, yellow gown, etc. Monitor gait and stability. Monitor for mental status changes and reorient to person, place, and time. Review medications for side effects contributing to fall risk. Reinforce activity limits and safety measures with patient and family.

## 2020-10-05 NOTE — ED PROVIDER NOTE - PROGRESS NOTE DETAILS
Amanda MORELAND for ED attending, Dr. Nicole: Spoke with Dr. Mehta, pt has severe aortic stenosis, abnormal coronary artery, which led him to refer pt to [facility]. Due to pt's condition, his course and continued complaints are to be expected. As long as there is nothing acute on workup, including no decline in renal function, pt is free to go home from a cardiac standpoint. Amanda MORELAND for ED attending, Dr. Nicole: Spoke with Dr. Mehta, pt has severe aortic stenosis, abnormal coronary artery, which led him to refer pt to [facility]. Due to pt's condition, his course and continued complaints are to be expected. As long as there is nothing acute on workup, including no decline in renal function, pt is free to go home from a cardiac standpoint.  Rediscussed with patient and he states he feels at his baseline and the only reason he came to the ER today was because he couldn't get in touch with Dr Mehta today, nor his docs at [facility].  Pt would like to return home.  will dc with strict return precautions

## 2020-10-24 NOTE — H&P ADULT - NSCORESITESY/N_GEN_A_CORE_RD
Yes [Well Developed] : well developed [NAD] : in no acute distress [PERRL] : pupils were equal, round, reactive to light  [Moist & Pink Mucous Membranes] : moist and pink mucous membranes [CTAB] : lungs clear to auscultation bilaterally [Regular Rate and Rhythm] : regular rate and rhythm [Normal S1, S2] : normal S1 and S2 [Soft] : soft  [Normal Bowel Sounds] : normal bowel sounds [No HSM] : no hepatosplenomegaly appreciated [Normal Tone] : normal tone [Well-Perfused] : well-perfused [Interactive] : interactive [icteric] : anicteric [Respiratory Distress] : no respiratory distress  [Distended] : non distended [Tender] : non tender [Edema] : no edema [Cyanosis] : no cyanosis [Rash] : no rash [Jaundice] : no jaundice

## 2020-11-03 NOTE — ED ADULT TRIAGE NOTE - CHIEF COMPLAINT QUOTE
pt c/o intermittent SOB since yesterday, pt had recent TAVR on 10/22/2020.  pt denies sob upn arrival to ED, oxygen 99%, hr 80s.    pritesh- wife  -  justo -son

## 2020-11-03 NOTE — ED PROVIDER NOTE - OBJECTIVE STATEMENT
89 y/o male with a PMHx of MI, syncope, BPH, GERD, CHF, Afib, HTN, IDDM, s/p appendectomy, s/p cardiac peacemaker placement, presents to the ED c/o intermittent shortness of breath that began this morning. Pt states a nurse was at his house today and sent pt to the ED for concern of SOB. Pt s/p TAVR on 10/22/2020 at [facility]. Denies chest pain. No other complaints at this time. Allergies: codeine, morphine, naproxen. Cardio: Dr. Mehta 89 y/o male with a PMHx of MI, syncope, BPH, GERD, CHF, Afib, HTN, IDDM, s/p appendectomy, s/p cardiac pacemaker placement, presents to the ED c/o intermittent shortness of breath that began this morning. Pt states a nurse was at his house today and sent pt to the ED for concern of SOB. Pt s/p TAVR on 10/22/2020 at [facility]. Denies chest pain. No other complaints at this time. Allergies: codeine, morphine, naproxen. Cardio: Dr. Mehta

## 2020-11-03 NOTE — H&P ADULT - NSHPPHYSICALEXAM_GEN_ALL_CORE
PHYSICAL EXAM:    General: elderly male in no acute distress  Eyes: PERRLA, EOMI; conjunctiva and sclera clear  Head: Normocephalic; atraumatic  ENMT: No nasal discharge; airway clear  Neck: Supple; non tender; no masses  Respiratory: No wheezes, rales or rhonchi  Cardiovascular: S1, S2 paced, left upper chest old PPM site  Gastrointestinal: Soft non-tender non-distended; Normal bowel sounds  Genitourinary: No costovertebral angle tenderness  Extremities: No clubbing, cyanosis or edema  Vascular: Peripheral pulses palpable 2+ bilaterally  Neurological: Alert and oriented x4  Skin: Warm and dry.   Musculoskeletal: Normal tone, without deformities  Psychiatric: Cooperative and appropriate

## 2020-11-03 NOTE — ED PROVIDER NOTE - PROGRESS NOTE DETAILS
Fawn PIMENTEL: Patient to be admitted at this time; on coumadin- therapeutic; endorsed to Dr. Fuchs for admission. Fawn PIMENTEL: Dr. Smiley contacted- aware that patient is admitted at this time.

## 2020-11-03 NOTE — ED ADULT NURSE NOTE - OBJECTIVE STATEMENT
patient came into ER for shortness of breath beginning this morning while resting in bed and felt worse upon getting out of bed. patient reports feeling this way in the past but "worse this morning". Describes feeling as "breathing in stomach instead of chest", but reports feeling a lot better now than how he felt earlier. Patient denies any pain or additional symptoms but reports a hx of cardiac surgery/pacemaker and hernia repair.  patient alert/orientedx4 and conversive. patient resting in position of comfort while connected to cardiac monitor.

## 2020-11-03 NOTE — H&P ADULT - HISTORY OF PRESENT ILLNESS
89 y.o. male with PMHx of CMP/HFrEF, Severe AS, HTN, HLD, CKD st III, Afib on coumadin, CAD, DMII s/p recent TAVR at [facility] p/w episode of SOB this am - resolved now. Pt denies any other complaints at the moment.

## 2020-11-03 NOTE — ED ADULT NURSE REASSESSMENT NOTE - NS ED NURSE REASSESS COMMENT FT1
patient alert and oriented x4 and conversive, resting in position of comfort. One hearing aid battery  which made conversing difficult. patient given menu and ordered food based on prescribed diet. patient given phone to call wife. Reminded pt of plan of care to be admitted to hospital once bed is available.

## 2020-11-03 NOTE — ED PROVIDER NOTE - CARE PLAN
Principal Discharge DX:	SOB (shortness of breath)  Secondary Diagnosis:	NSTEMI (non-ST elevated myocardial infarction)

## 2020-11-03 NOTE — ED ADULT NURSE REASSESSMENT NOTE - NS ED NURSE REASSESS COMMENT FT1
Reassessed patient symptoms, pt states he is feeling fine now. Discussed plan of care with patient and elevated troponin levels. Notified DO Fawn that patient would like to discuss troponin with doctor

## 2020-11-03 NOTE — PROVIDER CONTACT NOTE (CRITICAL VALUE NOTIFICATION) - PERSON GIVING RESULT:
Telephone Encounter by Lita Samayoa RN at 01/05/17 04:42 PM     Author:  Lita Samayoa RN Service:  (none) Author Type:  Registered Nurse     Filed:  01/05/17 04:45 PM Encounter Date:  1/5/2017 Status:  Signed     :  Lita Samayoa RN (Registered Nurse)            Med ordered  Placed in outgoing mail for patient[AS1.1M]       Revision History        User Key Date/Time User Provider Type Action    > AS1.1 01/05/17 04:45 PM Lita Samayoa RN Registered Nurse Sign    M - Manual             darren cantu from lab

## 2020-11-03 NOTE — H&P ADULT - ASSESSMENT
89 y.o. male with PMHx of CMP/HFrEF, Severe AS, HTN, HLD, CKD st III, Afib on coumadin, CAD, DMII s/p recent TAVR at [facility] p/w episode of SOB this am - resolved now.     1. Episode of SOB this am self limiting with mildly elevated troponin and BNP and no clinical evidence of ADHF s/p recent TAVR - ? etiology   - admit to tele, cardiology eval, TTE, series CE, strict I&Os    2. Afib on coumadin paced - monitor INR    3. CKD st III stable - monitor K as mildly elevated, no ARB/ACIE    4. HFrEF/CMP - no exacerbation, f/u TTE     89 y.o. male with PMHx of CMP/HFrEF, Severe AS, HTN, HLD, CKD st III, Afib on coumadin, CAD, DMII s/p recent TAVR at [facility] p/w episode of SOB this am - resolved now.     1. Episode of SOB this am self limiting with mildly elevated troponin and BNP and no clinical evidence of ADHF s/p recent TAVR - ? etiology   - admit to tele, cardiology eval, TTE, series CE, strict I&Os    2. Afib on coumadin paced - monitor INR    3. CKD st III stable - monitor K as mildly elevated, no ARB/ACIE    4. HFrEF/CMP - no exacerbation, f/u TTE    5. DMII on lantus + HISS

## 2020-11-03 NOTE — H&P ADULT - NSICDXPASTSURGICALHX_GEN_ALL_CORE_FT
PAST SURGICAL HISTORY:  Arthropathy of shoulder region     Artificial cardiac pacemaker     History of appendectomy     S/P TAVR (transcatheter aortic valve replacement)

## 2020-11-03 NOTE — ED ADULT NURSE NOTE - NSIMPLEMENTINTERV_GEN_ALL_ED
Implemented All Fall with Harm Risk Interventions:  [place] to call system. Call bell, personal items and telephone within reach. Instruct patient to call for assistance. Room bathroom lighting operational. Non-slip footwear when patient is off stretcher. Physically safe environment: no spills, clutter or unnecessary equipment. Stretcher in lowest position, wheels locked, appropriate side rails in place. Provide visual cue, wrist band, yellow gown, etc. Monitor gait and stability. Monitor for mental status changes and reorient to person, place, and time. Review medications for side effects contributing to fall risk. Reinforce activity limits and safety measures with patient and family. Provide visual clues: red socks.

## 2020-11-04 NOTE — PROGRESS NOTE ADULT - SUBJECTIVE AND OBJECTIVE BOX
Reason for Admission: SOB  History of Present Illness:   89 y.o. male with PMHx of CMP/HFrEF, Severe AS, HTN, HLD, CKD st III, Afib on coumadin, CAD, DMII s/p recent TAVR at [facility] p/w episode of SOB this am - resolved now. Pt denies any other complaints at the moment.    REVIEW OF SYSTEMS:  General: NAD, hemodynamically stable, (-)  fever, (-) chills, (-) weakness  HEENT:  Eyes:  No visual loss, blurred vision, double vision or yellow sclerae. Ears, Nose, Throat:  No hearing loss, sneezing, congestion, runny nose or sore throat.  SKIN:  No rash or itching.  CARDIOVASCULAR:  No chest pain, chest pressure or chest discomfort. No palpitations or edema.  RESPIRATORY:  No shortness of breath, cough or sputum.  GASTROINTESTINAL:  No anorexia, nausea, vomiting or diarrhea. No abdominal pain or blood.  NEUROLOGICAL:  No headache, dizziness, syncope, paralysis, ataxia, numbness or tingling in the extremities. No change in bowel or bladder control.  MUSCULOSKELETAL:  No muscle, back pain, joint pain or stiffness.  HEMATOLOGIC:  No anemia, bleeding or bruising.  LYMPHATICS:  No enlarged nodes. No history of splenectomy.  ENDOCRINOLOGIC:  No reports of sweating, cold or heat intolerance. No polyuria or polydipsia.  ALLERGIES:  No history of asthma, hives, eczema or rhinitis.    Physical Exam:   GENERAL APPEARANCE:  NAD, hemodynamically stable  T(C): 36.4 (11-04-20 @ 08:45), Max: 36.7 (11-04-20 @ 01:00)  HR: 61 (11-04-20 @ 08:45) (60 - 84)  BP: 152/86 (11-04-20 @ 08:45) (127/56 - 162/87)  RR: 18 (11-04-20 @ 08:45) (18 - 24)  SpO2: 98% (11-04-20 @ 08:45) (95% - 100%)  Wt(kg): --  HEENT:  Head is normocephalic    Skin:  Warm and dry without any rash   NECK:  Supple without lymphadenopathy.   HEART:  Regular rate and rhythm. normal S1 and S2, No M/R/G  LUNGS:  Good ins/exp effort, no W/R/R/C  ABDOMEN:  Soft, nontender, nondistended with good bowel sounds heard  EXTREMITIES:  Without cyanosis, clubbing or edema.   NEUROLOGICAL:  Gross nonfocal       CBC Full  -  ( 04 Nov 2020 07:46 )  WBC Count : 8.55 K/uL  RBC Count : 3.02 M/uL  Hemoglobin : 10.0 g/dL  Hematocrit : 31.7 %  Platelet Count - Automated : 168 K/uL    PT/INR - ( 04 Nov 2020 07:46 )   PT: 28.8 sec;   INR: 2.60 ratio      PTT - ( 03 Nov 2020 12:06 )  PTT:35.4 sec    142  |  111<H>  |  37<H>  ----------------------------<  70  4.6   |  27  |  2.13<H>    Ca    8.9      04 Nov 2020 07:46  Mg     2.3     11-03    TPro  7.7  /  Alb  2.9<L>  /  TBili  0.4  /  DBili  x   /  AST  23  /  ALT  22  /  AlkPhos  107  11-03    Pt is a 89 y.o. male with PMHx of CMP/HFrEF, Severe AS, HTN, HLD, CKD st III, Afib on coumadin, CAD, DMII s/p recent TAVR at [facility] p/w episode of SOB this am - resolved now.     # SOB this am self limiting with mildly elevated troponin and BNP and no clinical evidence of ADHF s/p recent TAVR   - possibly anginal episode, he has a known 80% oRCA that was not stented during recent admission for TAVR. Will discuss with interventional cardiology regarding this to determine whether it is feasible and warranted to try to stent this lesion at the moment.  - will follow up on echo already ordered to assess for any issues post TAVR  - Will also optimize his antianginal regimen    # Afib on coumadin paced   - monitor INR    # CKD st III stable   - monitor K as mildly elevated, no ARB/ACIE    # HFrEF/CMP - no exacerbation, f/u TTE    # DMII on lantus + HISS     Reason for Admission: SOB  History of Present Illness:   89 y.o. male with PMHx of CMP/HFrEF, Severe AS, HTN, HLD, CKD st III, Afib on coumadin, CAD, DMII s/p recent TAVR at [facility] p/w episode of SOB this am - resolved now. Pt denies any other complaints at the moment. Patient notes of having recent TAVR 2 weeks ago -  has been doing well at home.     11/4: Denies any HA, CP, SOB. Comfortable. anticipated angiogram in the am.     REVIEW OF SYSTEMS:  General: NAD, hemodynamically stable  HEENT:  Eyes:  No visual loss  SKIN:  No rash or itching.  CARDIOVASCULAR:  No chest pain, chest pressure or chest discomfort. No palpitations or edema.  RESPIRATORY: some SOB  GASTROINTESTINAL:  No anorexia, nausea, vomiting or diarrhea. No abdominal pain or blood.  NEUROLOGICAL:  No headache, dizziness, syncope, paralysis, ataxia, numbness or tingling in the extremities. No change in bowel or bladder control.  MUSCULOSKELETAL:  No muscle, back pain, joint pain or stiffness.  HEMATOLOGIC:  No anemia, bleeding or bruising.  LYMPHATICS:  No enlarged nodes. No history of splenectomy.  ENDOCRINOLOGIC:  No reports of sweating, cold or heat intolerance. No polyuria or polydipsia.  ALLERGIES:  No history of asthma, hives, eczema or rhinitis.    Physical Exam:   GENERAL APPEARANCE:  deconditioned, frail  T(C): 36.4 (11-04-20 @ 08:45), Max: 36.7 (11-04-20 @ 01:00)  HR: 61 (11-04-20 @ 08:45) (60 - 84)  BP: 152/86 (11-04-20 @ 08:45) (127/56 - 162/87)  RR: 18 (11-04-20 @ 08:45) (18 - 24)  SpO2: 98% (11-04-20 @ 08:45) (95% - 100%)  HEENT:  Head is normocephalic    Skin:  Warm and dry without any rash   NECK:  Supple without lymphadenopathy.   HEART:  Regular rate and rhythm. normal S1 and S2, No M/R/G  LUNGS:  Good ins/exp effort, no W/R/R/C  ABDOMEN:  Soft, nontender, nondistended with good bowel sounds heard  EXTREMITIES:  Without cyanosis, clubbing or edema.   NEUROLOGICAL:  Gross nonfocal     Labs:   CBC Full  -  ( 04 Nov 2020 07:46 )  WBC Count : 8.55 K/uL  RBC Count : 3.02 M/uL  Hemoglobin : 10.0 g/dL  Hematocrit : 31.7 %  Platelet Count - Automated : 168 K/uL    PT/INR - ( 04 Nov 2020 07:46 )   PT: 28.8 sec;   INR: 2.60 ratio      PTT - ( 03 Nov 2020 12:06 )  PTT:35.4 sec    142  |  111<H>  |  37<H>  ----------------------------<  70  4.6   |  27  |  2.13<H>    Ca    8.9      04 Nov 2020 07:46  Mg     2.3     11-03    TPro  7.7  /  Alb  2.9<L>  /  TBili  0.4  /  DBili  x   /  AST  23  /  ALT  22  /  AlkPhos  107  11-03    Pt is a 89 y.o. male with PMHx of CMP/HFrEF, Severe AS, HTN, HLD, CKD st III, Afib on coumadin, CAD, DMII s/p recent TAVR at [facility] p/w episode of SOB this am - resolved now.     # ADHF s/p recent TAVR   - ? 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 Yg Galloway. Last discharge Scr 2.22    # CM/SOB  - Stabilized clinically, cvs noted  -Tele/lasix as above    # DMII on lantus + HISS

## 2020-11-04 NOTE — CONSULT NOTE ADULT - SUBJECTIVE AND OBJECTIVE BOX
History of Present Illness:  Reason for Admission: SOB  History of Present Illness:   89 y.o. male with PMHx of CMP/HFrEF, Severe AS, HTN, HLD, CKD st III, Afib on coumadin, CAD, DMII s/p recent TAVR at [facility] p/w episode of SOB this am - resolved now. Pt denies any other complaints at the moment.  Examined at bed side, states he was experiencing melendrez and chest pain during his physical therapy session yesterday.  He is not having any symptoms at the moment or since admission, but he has been in bed at all time.      Review of Systems:  Review of Systems: none  Other Review of Systems: All other review of systems negative, except as noted in HPI      Allergies and Intolerances:        Allergies:  	naproxen: Drug, Vomiting  	morphine: Drug, Nausea  	Aleve: Drug, Vomiting  	codeine: Drug, Unknown    Home Medications:   * Patient Currently Takes Medications as of 03-Nov-2020 13:22 documented in Structured Notes  · 	warfarin 5 mg oral tablet: Last Dose Taken:  , 1 tab(s) orally once a day  · 	isosorbide mononitrate 10 mg oral tablet: Last Dose Taken:  , 1 tab(s) orally once a day  · 	aspirin 81 mg oral tablet: Last Dose Taken:  , 1 tab(s) orally once a day  · 	venlafaxine 75 mg oral capsule, extended release: Last Dose Taken:  , 1 cap(s) orally once a day at lunch  · 	Vitamin D3 5000 intl units (125 mcg) oral tablet: Last Dose Taken:  , 1 tab(s) orally once a day  · 	B Complex 50 oral tablet, extended release: Last Dose Taken:  , 1 tab(s) orally once a day  · 	gabapentin 100 mg oral capsule: Last Dose Taken:  , 1 cap(s) orally 3 times a day with breakfast, lunch and dinner  · 	RABEprazole 20 mg oral delayed release tablet: Last Dose Taken:  , 1 tab(s) orally 2 times a day at lunch and before dinner  · 	ferrous sulfate 325 mg (65 mg elemental iron) oral tablet: Last Dose Taken:  , 1 tab(s) orally once a day, As Needed  · 	Metoprolol Succinate ER 25 mg oral tablet, extended release: Last Dose Taken:  , 1 tab(s) orally once a day  · 	atorvastatin 20 mg oral tablet: Last Dose Taken:  , 1 tab(s) orally once a day  · 	Lantus Solostar Pen 100 units/mL subcutaneous solution: Last Dose Taken:  , 18 unit(s) subcutaneously once a day (at bedtime)  · 	Iron Caps: Last Dose Taken:  , 1 cap(s) orally once a day  · 	Tradjenta 5 mg oral tablet: Last Dose Taken:  , 1 tab(s) orally once a day  · 	Gas-X 80 mg oral tablet, chewable: Last Dose Taken:  , 1 tab(s) orally 3 times a day (with meals)  · 	repaglinide 0.5 mg oral tablet: Last Dose Taken:  , 1 tab(s) orally once a day before dinner  · 	promethazine 25 mg oral tablet: Last Dose Taken:  , 1 tab(s) orally every 6 hours, As Needed - for nausea    .    Patient History:    Past Medical, Past Surgical, and Family History:  PAST MEDICAL HISTORY:  Atrial fibrillation, unspecified type     Benign prostatic hyperplasia without lower urinary tract symptoms     Chronic congestive heart failure, unspecified congestive heart failure type     Gastroesophageal reflux disease, esophagitis presence not specified     HTN (hypertension)     IDDM (insulin dependent diabetes mellitus)     Syncope and collapse 5/4/2018    Type 2 myocardial infarction 12/17/2017    UTI (urinary tract infection) 7/12/2017.     PAST SURGICAL HISTORY:  Arthropathy of shoulder region     Artificial cardiac pacemaker     History of appendectomy     S/P TAVR (transcatheter aortic valve replacement).     FAMILY HISTORY:  No pertinent family history in first degree relatives.     No Pertinent Family History in first degree relatives of: CAD.     Social History:  Social History (marital status, living situation, occupation, tobacco use, alcohol and drug use, and sexual history): retired, no tobacco, ETOh, IVDA     Tobacco Screening:  · Core Measure Site	Yes  · Has the patient used tobacco in the past 30 days?	No    Risk Assessment:    Present on Admission:  Deep Venous Thrombosis	no  Pulmonary Embolus	no     Heart Failure:  Does this patient have a history of or has been diagnosed with heart failure? yes.     LV Function Assessment (LVS function was evaluated before arrival and/or during hospitalization) yes.     Is the Ejection Fraction >40% ? no.     severely reduced LV function.     Are there any contraindications to ACEI/ARB therapy? Yes: moderate or severe aortic stenosis; hyperkalemia; worsening renal function/renal disease/dysfunction.    Physical Exam:   Physical Exam: PHYSICAL EXAM:    General: elderly male in no acute distress  Eyes: PERRLA, EOMI; conjunctiva and sclera clear  Head: Normocephalic; atraumatic  ENMT: No nasal discharge; airway clear  Neck: Supple; non tender; no masses  Respiratory: No wheezes, rales or rhonchi  Cardiovascular: S1, S2 paced, left upper chest old PPM site  Gastrointestinal: Soft non-tender non-distended; Normal bowel sounds  Genitourinary: No costovertebral angle tenderness  Extremities: No clubbing, cyanosis or edema  Vascular: Peripheral pulses palpable 2+ bilaterally  Neurological: Alert and oriented x4  Skin: Warm and dry.   Musculoskeletal: Normal tone, without deformities  Psychiatric: Cooperative and appropriate      Laboratory: reviewed       Assessment and Plan:   89 y.o. male with PMHx of CMP/HFrEF, Severe AS, HTN, HLD, CKD st III, Afib on coumadin, CAD, DMII s/p recent TAVR at [facility] p/w episode of SOB this am - resolved now.     1. Episode of SOB this am self limiting with mildly elevated troponin and BNP and no clinical evidence of ADHF s/p recent TAVR - possibly anginal episode, he has a known 80% oRCA that was not stented during recent admission for TAVR. Will discuss with interventional cardiology regarding this to determine whether it is feasible and warranted to try to stent this lesion at the moment.  Otherwise, will follow up on echo already ordered to assess for any issues post TAVR  Will also optimize his antianginal regimen.     2. Afib on coumadin paced - monitor INR    3. CKD st III stable - monitor K as mildly elevated, no ARB/ACIE    4. HFrEF/CMP - no exacerbation, f/u TTE

## 2020-11-05 NOTE — PROGRESS NOTE ADULT - SUBJECTIVE AND OBJECTIVE BOX
History of Present Illness:  Reason for Admission: SOB  History of Present Illness:   89 y.o. male with PMHx of CMP/HFrEF, Severe AS, HTN, HLD, CKD st III, Afib on coumadin, CAD, DMII s/p recent TAVR at [facility] p/w episode of SOB this am - resolved now. Pt denies any other complaints at the moment.  Examined at bed side, states he was experiencing melendrez and chest pain during his physical therapy session yesterday.  He is not having any symptoms at the moment or since admission, but he has been in bed at all time.     11/5 - feels ok while in bed, pending INR today, if < 2.3, already planned for cath.  I also changed his isosorbide mononitrate to dinitrate and added hydralazine, if tolerating current dose, can dc on BiDil tablets ( 1/2 tablet TID). Cn not tolerate Aldactone, ACEi or ARBs due to recurrent hyperkalemia.  ECHO results reviewed.   Cont current meds otherwise, dc planning after cath.       Review of Systems:  Review of Systems: none  Other Review of Systems: All other review of systems negative, except as noted in HPI      Allergies and Intolerances:        Allergies:  	naproxen: Drug, Vomiting  	morphine: Drug, Nausea  	Aleve: Drug, Vomiting  	codeine: Drug, Unknown    Home Medications:   * Patient Currently Takes Medications as of 03-Nov-2020 13:22 documented in Structured Notes  · 	warfarin 5 mg oral tablet: Last Dose Taken:  , 1 tab(s) orally once a day  · 	isosorbide mononitrate 10 mg oral tablet: Last Dose Taken:  , 1 tab(s) orally once a day  · 	aspirin 81 mg oral tablet: Last Dose Taken:  , 1 tab(s) orally once a day  · 	venlafaxine 75 mg oral capsule, extended release: Last Dose Taken:  , 1 cap(s) orally once a day at lunch  · 	Vitamin D3 5000 intl units (125 mcg) oral tablet: Last Dose Taken:  , 1 tab(s) orally once a day  · 	B Complex 50 oral tablet, extended release: Last Dose Taken:  , 1 tab(s) orally once a day  · 	gabapentin 100 mg oral capsule: Last Dose Taken:  , 1 cap(s) orally 3 times a day with breakfast, lunch and dinner  · 	RABEprazole 20 mg oral delayed release tablet: Last Dose Taken:  , 1 tab(s) orally 2 times a day at lunch and before dinner  · 	ferrous sulfate 325 mg (65 mg elemental iron) oral tablet: Last Dose Taken:  , 1 tab(s) orally once a day, As Needed  · 	Metoprolol Succinate ER 25 mg oral tablet, extended release: Last Dose Taken:  , 1 tab(s) orally once a day  · 	atorvastatin 20 mg oral tablet: Last Dose Taken:  , 1 tab(s) orally once a day  · 	Lantus Solostar Pen 100 units/mL subcutaneous solution: Last Dose Taken:  , 18 unit(s) subcutaneously once a day (at bedtime)  · 	[place] Caps: Last Dose Taken:  , 1 cap(s) orally once a day  · 	Tradjenta 5 mg oral tablet: Last Dose Taken:  , 1 tab(s) orally once a day  · 	Gas-X 80 mg oral tablet, chewable: Last Dose Taken:  , 1 tab(s) orally 3 times a day (with meals)  · 	repaglinide 0.5 mg oral tablet: Last Dose Taken:  , 1 tab(s) orally once a day before dinner  · 	promethazine 25 mg oral tablet: Last Dose Taken:  , 1 tab(s) orally every 6 hours, As Needed - for nausea    .    Patient History:    Past Medical, Past Surgical, and Family History:  PAST MEDICAL HISTORY:  Atrial fibrillation, unspecified type     Benign prostatic hyperplasia without lower urinary tract symptoms     Chronic congestive heart failure, unspecified congestive heart failure type     Gastroesophageal reflux disease, esophagitis presence not specified     HTN (hypertension)     IDDM (insulin dependent diabetes mellitus)     Syncope and collapse 5/4/2018    Type 2 myocardial infarction 12/17/2017    UTI (urinary tract infection) 7/12/2017.     PAST SURGICAL HISTORY:  Arthropathy of shoulder region     Artificial cardiac pacemaker     History of appendectomy     S/P TAVR (transcatheter aortic valve replacement).     FAMILY HISTORY:  No pertinent family history in first degree relatives.     No Pertinent Family History in first degree relatives of: CAD.     Social History:  Social History (marital status, living situation, occupation, tobacco use, alcohol and drug use, and sexual history): retired, no tobacco, ETOh, IVDA     Tobacco Screening:  · Core Measure Site	Yes  · Has the patient used tobacco in the past 30 days?	No    Risk Assessment:    Present on Admission:  Deep Venous Thrombosis	no  Pulmonary Embolus	no     Heart Failure:  Does this patient have a history of or has been diagnosed with heart failure? yes.     LV Function Assessment (LVS function was evaluated before arrival and/or during hospitalization) yes.     Is the Ejection Fraction >40% ? no.     severely reduced LV function.     Are there any contraindications to ACEI/ARB therapy? Yes: moderate or severe aortic stenosis; hyperkalemia; worsening renal function/renal disease/dysfunction.    Physical Exam:   Physical Exam: PHYSICAL EXAM:    General: elderly male in no acute distress  Eyes: PERRLA, EOMI; conjunctiva and sclera clear  Head: Normocephalic; atraumatic  ENMT: No nasal discharge; airway clear  Neck: Supple; non tender; no masses  Respiratory: No wheezes, rales or rhonchi  Cardiovascular: S1, S2 paced, left upper chest old PPM site  Gastrointestinal: Soft non-tender non-distended; Normal bowel sounds  Genitourinary: No costovertebral angle tenderness  Extremities: No clubbing, cyanosis or edema  Vascular: Peripheral pulses palpable 2+ bilaterally  Neurological: Alert and oriented x4  Skin: Warm and dry.   Musculoskeletal: Normal tone, without deformities  Psychiatric: Cooperative and appropriate      Laboratory: reviewed       Assessment and Plan:   89 y.o. male with PMHx of CMP/HFrEF, Severe AS, HTN, HLD, CKD st III, Afib on coumadin, CAD, DMII s/p recent TAVR at [facility] p/w episode of SOB this am - resolved now.     1. Episode of SOB this am self limiting with mildly elevated troponin and BNP and no clinical evidence of ADHF s/p recent TAVR - possibly anginal episode, he has a known 80% oRCA that was not stented during recent admission for TAVR. Will discuss with interventional cardiology regarding this to determine whether it is feasible and warranted to try to stent this lesion at the moment.  Otherwise, will follow up on echo already ordered to assess for any issues post TAVR  Will also optimize his antianginal regimen.     2. Afib on coumadin paced - monitor INR    3. CKD st III stable - monitor K as mildly elevated, no ARB/ACIE    4. HFrEF/CMP - no exacerbation, f/u TTE

## 2020-11-05 NOTE — PROGRESS NOTE ADULT - SUBJECTIVE AND OBJECTIVE BOX
CC: SOB (05 Nov 2020 08:19)    HPI: 89 y.o. male with PMHx of CAD,  CMP/HFrEF, Severe AS, S/p TAVR, HTN, HLD, CKD III, Afib on coumadin, CAD, DMII had  recent TAVR at [place] p/w episode of SOB.  Pt denied  any other complaints at the moment of evaluation in ED .     INTERVAL HPI/ OVERNIGHT EVENTS: Chart reviewed.  Pt was seen and examined, poor historian due to [place] but denies any other episodes of SOB or CP, states feels well. Awaiting for  cardiac cath     Vital Signs Last 24 Hrs  T(C): 36.1 (05 Nov 2020 13:34), Max: 36.6 (05 Nov 2020 11:37)  T(F): 97 (05 Nov 2020 13:34), Max: 97.8 (05 Nov 2020 11:37)  HR: 80 (05 Nov 2020 13:34) (60 - 80)  BP: 110/80 (05 Nov 2020 13:34) (110/80 - 185/85)  RR: 17 (05 Nov 2020 13:34) (16 - 18)  SpO2: 100% (05 Nov 2020 13:34) (94% - 100%)        REVIEW OF SYSTEMS:  All other review of systems is negative unless indicated above.      PHYSICAL EXAM:  General: Well developed; malnourished; frail elderly male,  in no acute distress  Eyes: PERRLA, EOMI; conjunctiva and sclera clear  Head: Normocephalic; atraumatic  ENMT: No nasal discharge; airway clear  Neck: Supple; non tender; no masses  Respiratory: Decreased BS at bases, No wheezes, rales or rhonchi  Cardiovascular: Regular rate and rhythm. S1 and S2 Normal;  Gastrointestinal: Soft non-tender non-distended; Normal bowel sounds  Genitourinary: No  suprapubic  tenderness  Extremities: No edema  Neurological: Alert and oriented x2-3, non focal   Skin: Warm and dry. No acute rash  Lymph Nodes: No acute cervical adenopathy  Musculoskeletal: Normal muscle tone and strength   Psychiatric: Cooperative    LABS:   CARDIAC MARKERS ( 03 Nov 2020 17:07 )  0.092 ng/mL / x     / x     / x     / x      CARDIAC MARKERS ( 03 Nov 2020 15:05 )  0.091 ng/mL / x     / x     / x     / x                                9.5    10.19 )-----------( 143      ( 05 Nov 2020 08:07 )             30.1     05 Nov 2020 08:07    142    |  111    |  38     ----------------------------<  78     4.8     |  28     |  2.29     Ca    9.2        05 Nov 2020 08:07      PT/INR - ( 05 Nov 2020 08:07 )   PT: 27.3 sec;   INR: 2.43 ratio    PTT - ( 05 Nov 2020 08:07 )  PTT:35.6 sec      CAPILLARY BLOOD GLUCOSE  POCT Blood Glucose.: 102 mg/dL (05 Nov 2020 11:38)  POCT Blood Glucose.: 89 mg/dL (05 Nov 2020 06:08)  POCT Blood Glucose.: 193 mg/dL (04 Nov 2020 20:51)  POCT Blood Glucose.: 147 mg/dL (04 Nov 2020 16:56)          MEDICATIONS  (STANDING):  aspirin  chewable 81 milliGRAM(s) Oral daily  atorvastatin 40 milliGRAM(s) Oral at bedtime  dextrose 5%. 1000 milliLiter(s) (50 mL/Hr) IV Continuous <Continuous>  dextrose 50% Injectable 12.5 Gram(s) IV Push once  dextrose 50% Injectable 25 Gram(s) IV Push once  dextrose 50% Injectable 25 Gram(s) IV Push once  ferrous    sulfate 325 milliGRAM(s) Oral daily  gabapentin 100 milliGRAM(s) Oral three times a day  hydrALAZINE 10 milliGRAM(s) Oral three times a day  insulin glargine Injectable (LANTUS) 14 Unit(s) SubCutaneous at bedtime  insulin lispro (ADMELOG) corrective regimen sliding scale   SubCutaneous three times a day before meals  insulin lispro (ADMELOG) corrective regimen sliding scale   SubCutaneous at bedtime  isosorbide   dinitrate Tablet (ISORDIL) 10 milliGRAM(s) Oral three times a day  metoprolol succinate ER 25 milliGRAM(s) Oral daily  pantoprazole    Tablet 40 milliGRAM(s) Oral daily  venlafaxine XR. 75 milliGRAM(s) Oral daily    MEDICATIONS  (PRN):  dextrose 40% Gel 15 Gram(s) Oral once PRN Blood Glucose LESS THAN 70 milliGRAM(s)/deciliter  glucagon  Injectable 1 milliGRAM(s) IntraMuscular once PRN Glucose LESS THAN 70 milligrams/deciliter  Mentholatum Ointment 28 Gram(s) 1 Application(s) Topical three times a day PRN For minor aches and pains of muscles and joints  promethazine 25 milliGRAM(s) Oral every 6 hours PRN for nausea      RADIOLOGY & ADDITIONAL TESTS:  < from: Xray Chest 1 View- PORTABLE-Urgent (Xray Chest 1 View- PORTABLE-Urgent .) (11.03.20 @ 12:56) >  EXAM:  XR CHEST PORTABLE URGENT 1V                            PROCEDURE DATE:  11/03/2020          INTERPRETATION:  DATE OF STUDY: 11/3/2020    PRIOR:10/5/20    CLINICAL INDICATION: Covid-19 BICC status.    TECHNIQUE: portable chest - done upright.    FINDINGS:  Disconnected cardiac device wire lead is within the right ventricle; no battery pack.  S/P TAVR.  Micra TPS cardiac device in the right ventricle.  Stable cardiomegaly. No hilar mass. Trachea in midline.  Lungs are clear. No pleural effusion or pneumothorax.  No acute bony finding.    IMPRESSION:  No evidence of active chest disease.

## 2020-11-05 NOTE — PROGRESS NOTE ADULT - ASSESSMENT
89 y.o. male with PMHx of CMP/HFrEF, Severe AS, HTN, HLD, CKD st III, Afib on coumadin, CAD, DMII s/p recent TAVR admitted for:       1. Episode of SOB,  self limiting with mildly elevated troponin and BNP. H/o CAD and Chronic systolic CHF  -  clinically not volume overloaded     - ECHO checked, EF  3--45%,  stable AV prosthesis in place    - C/w Toprol, ASA,  statins, on Hydralazine+ isosorbide for reduced EF ( not on ACEI/ARB 2/2 renal Fx)   - Cardio eval appreciated, has known RCA stenosis, plan for  cardiac cath today     2. Afib on coumadin paced   - monitor INR, TX   - C/w Toprol   - Coumadin on hold for procedure, will resume after     3. CKD st III stable   - was K as mildly elevated on admission, improved  - Monitor renal Fx after cardiac cath       4. DMII on lantus + HISS      Dispo: for cardiac cath today

## 2020-11-06 NOTE — PACU DISCHARGE NOTE - COMMENTS
Patient educated on  instructions including medication regime, activity level, follow up appointments, and the signs and symptoms requiring the notification of their provider.  Verbalized understanding utilizing the teach back method.  Written instructions provided as well.

## 2020-11-06 NOTE — CHART NOTE - NSCHARTNOTEFT_GEN_A_CORE
S/P PCI of RCA and OM1. Right femoral site with no bleeding or hematoma  Cardiac rehab recommended. Pt reported that he has been in cardiac rehab since valvular surgery

## 2020-11-06 NOTE — PROGRESS NOTE ADULT - SUBJECTIVE AND OBJECTIVE BOX
History of Present Illness:  Reason for Admission: SOB  History of Present Illness:   89 y.o. male with PMHx of CMP/HFrEF, Severe AS, HTN, HLD, CKD st III, Afib on coumadin, CAD, DMII s/p recent TAVR at [facility] p/w episode of SOB this am - resolved now. Pt denies any other complaints at the moment.  Examined at bed side, states he was experiencing melendrez and chest pain during his physical therapy session yesterday.  He is not having any symptoms at the moment or since admission, but he has been in bed at all time.     11/5 - feels ok while in bed, pending INR today, if < 2.3, already planned for cath.  I also changed his isosorbide mononitrate to dinitrate and added hydralazine, if tolerating current dose, can dc on BiDil tablets ( 1/2 tablet TID). Cn not tolerate Aldactone, ACEi or ARBs due to recurrent hyperkalemia.  ECHO results reviewed.   Cont current meds otherwise, dc planning after cath.    11/6 - Feels ok while at rest, waiting for INR to be < 2.3 to proceed with cath.  I increased his isosorbide today, he can go home on full dose BiDil once dc.      Review of Systems:  Review of Systems: none  Other Review of Systems: All other review of systems negative, except as noted in HPI      Allergies and Intolerances:        Allergies:  	naproxen: Drug, Vomiting  	morphine: Drug, Nausea  	Aleve: Drug, Vomiting  	codeine: Drug, Unknown    Home Medications:   * Patient Currently Takes Medications as of 03-Nov-2020 13:22 documented in Structured Notes  · 	warfarin 5 mg oral tablet: Last Dose Taken:  , 1 tab(s) orally once a day  · 	isosorbide mononitrate 10 mg oral tablet: Last Dose Taken:  , 1 tab(s) orally once a day  · 	aspirin 81 mg oral tablet: Last Dose Taken:  , 1 tab(s) orally once a day  · 	venlafaxine 75 mg oral capsule, extended release: Last Dose Taken:  , 1 cap(s) orally once a day at lunch  · 	Vitamin D3 5000 intl units (125 mcg) oral tablet: Last Dose Taken:  , 1 tab(s) orally once a day  · 	B Complex 50 oral tablet, extended release: Last Dose Taken:  , 1 tab(s) orally once a day  · 	gabapentin 100 mg oral capsule: Last Dose Taken:  , 1 cap(s) orally 3 times a day with breakfast, lunch and dinner  · 	RABEprazole 20 mg oral delayed release tablet: Last Dose Taken:  , 1 tab(s) orally 2 times a day at lunch and before dinner  · 	ferrous sulfate 325 mg (65 mg elemental iron) oral tablet: Last Dose Taken:  , 1 tab(s) orally once a day, As Needed  · 	Metoprolol Succinate ER 25 mg oral tablet, extended release: Last Dose Taken:  , 1 tab(s) orally once a day  · 	atorvastatin 20 mg oral tablet: Last Dose Taken:  , 1 tab(s) orally once a day  · 	Lantus Solostar Pen 100 units/mL subcutaneous solution: Last Dose Taken:  , 18 unit(s) subcutaneously once a day (at bedtime)  · 	[place] Caps: Last Dose Taken:  , 1 cap(s) orally once a day  · 	Tradjenta 5 mg oral tablet: Last Dose Taken:  , 1 tab(s) orally once a day  · 	Gas-X 80 mg oral tablet, chewable: Last Dose Taken:  , 1 tab(s) orally 3 times a day (with meals)  · 	repaglinide 0.5 mg oral tablet: Last Dose Taken:  , 1 tab(s) orally once a day before dinner  · 	promethazine 25 mg oral tablet: Last Dose Taken:  , 1 tab(s) orally every 6 hours, As Needed - for nausea    .    Patient History:    Past Medical, Past Surgical, and Family History:  PAST MEDICAL HISTORY:  Atrial fibrillation, unspecified type     Benign prostatic hyperplasia without lower urinary tract symptoms     Chronic congestive heart failure, unspecified congestive heart failure type     Gastroesophageal reflux disease, esophagitis presence not specified     HTN (hypertension)     IDDM (insulin dependent diabetes mellitus)     Syncope and collapse 5/4/2018    Type 2 myocardial infarction 12/17/2017    UTI (urinary tract infection) 7/12/2017.     PAST SURGICAL HISTORY:  Arthropathy of shoulder region     Artificial cardiac pacemaker     History of appendectomy     S/P TAVR (transcatheter aortic valve replacement).     FAMILY HISTORY:  No pertinent family history in first degree relatives.     No Pertinent Family History in first degree relatives of: CAD.     Social History:  Social History (marital status, living situation, occupation, tobacco use, alcohol and drug use, and sexual history): retired, no tobacco, ETOh, IVDA     Tobacco Screening:  · Core Measure Site	Yes  · Has the patient used tobacco in the past 30 days?	No    Risk Assessment:    Present on Admission:  Deep Venous Thrombosis	no  Pulmonary Embolus	no     Heart Failure:  Does this patient have a history of or has been diagnosed with heart failure? yes.     LV Function Assessment (LVS function was evaluated before arrival and/or during hospitalization) yes.     Is the Ejection Fraction >40% ? no.     severely reduced LV function.     Are there any contraindications to ACEI/ARB therapy? Yes: moderate or severe aortic stenosis; hyperkalemia; worsening renal function/renal disease/dysfunction.    Physical Exam:   Physical Exam: PHYSICAL EXAM:    General: elderly male in no acute distress  Eyes: PERRLA, EOMI; conjunctiva and sclera clear  Head: Normocephalic; atraumatic  ENMT: No nasal discharge; airway clear  Neck: Supple; non tender; no masses  Respiratory: No wheezes, rales or rhonchi  Cardiovascular: S1, S2 paced, left upper chest old PPM site  Gastrointestinal: Soft non-tender non-distended; Normal bowel sounds  Genitourinary: No costovertebral angle tenderness  Extremities: No clubbing, cyanosis or edema  Vascular: Peripheral pulses palpable 2+ bilaterally  Neurological: Alert and oriented x4  Skin: Warm and dry.   Musculoskeletal: Normal tone, without deformities  Psychiatric: Cooperative and appropriate      Laboratory: reviewed       Assessment and Plan:   89 y.o. male with PMHx of CMP/HFrEF, Severe AS, HTN, HLD, CKD st III, Afib on coumadin, CAD, DMII s/p recent TAVR at [facility] p/w episode of SOB this am - resolved now.     1. Episode of SOB this am self limiting with mildly elevated troponin and BNP and no clinical evidence of ADHF s/p recent TAVR - possibly anginal episode, he has a known 80% oRCA that was not stented during recent admission for TAVR. Will discuss with interventional cardiology regarding this to determine whether it is feasible and warranted to try to stent this lesion at the moment.  Otherwise, will follow up on echo already ordered to assess for any issues post TAVR  Will also optimize his antianginal regimen.     2. Afib on coumadin paced - monitor INR    3. CKD st III stable - monitor K as mildly elevated, no ARB/ACIE    4. HFrEF/CMP - no exacerbation, f/u TTE

## 2020-11-06 NOTE — CHART NOTE - NSCHARTNOTEFT_GEN_A_CORE
Nurse Practitioner Progress note:   s/p PCI.  Denies chest pain, shortness of breath, dizziness or palpitations at this time    A+Ox3  CV  Respiratory:  Right groin procedure site CDI.  no bleeding, no hematoma, site soft, non tender, positive pedal pulses bilaterally    T(C): 36.7 (11-06-20 @ 09:53), Max: 36.7 (11-06-20 @ 08:40)  HR: 60 (11-06-20 @ 09:53) (60 - 66)  BP: 112/92 (11-06-20 @ 09:53) (112/92 - 145/56)  RR: 16 (11-06-20 @ 09:53) (16 - 18)  SpO2: 100% (11-06-20 @ 09:53) (97% - 100%)  Wt(kg): --  CBC Full  -  ( 06 Nov 2020 08:21 )  WBC Count : 8.78 K/uL  RBC Count : 2.86 M/uL  Hemoglobin : 9.4 g/dL  Hematocrit : 29.6 %  Platelet Count - Automated : 146 K/uL  Mean Cell Volume : 103.5 fl  Mean Cell Hemoglobin : 32.9 pg  Mean Cell Hemoglobin Concentration : 31.8 gm/dL  Auto Neutrophil # : x  Auto Lymphocyte # : x  Auto Monocyte # : x  Auto Eosinophil # : x  Auto Basophil # : x  Auto Neutrophil % : x  Auto Lymphocyte % : x  Auto Monocyte % : x  Auto Eosinophil % : x  Auto Basophil % : x    11-06    143  |  111<H>  |  36<H>  ----------------------------<  60<L>  4.3   |  26  |  2.15<H>    Ca    9.0      06 Nov 2020 08:21              MEDICATIONS  (STANDING):  atorvastatin 40 milliGRAM(s) Oral at bedtime  dextrose 5%. 1000 milliLiter(s) (50 mL/Hr) IV Continuous <Continuous>  dextrose 50% Injectable 12.5 Gram(s) IV Push once  dextrose 50% Injectable 25 Gram(s) IV Push once  dextrose 50% Injectable 25 Gram(s) IV Push once  ferrous    sulfate 325 milliGRAM(s) Oral daily  gabapentin 100 milliGRAM(s) Oral three times a day  hydrALAZINE 10 milliGRAM(s) Oral three times a day  insulin glargine Injectable (LANTUS) 14 Unit(s) SubCutaneous at bedtime  insulin lispro (ADMELOG) corrective regimen sliding scale   SubCutaneous three times a day before meals  insulin lispro (ADMELOG) corrective regimen sliding scale   SubCutaneous at bedtime  isosorbide   dinitrate Tablet (ISORDIL) 20 milliGRAM(s) Oral three times a day  metoprolol succinate ER 25 milliGRAM(s) Oral daily  pantoprazole    Tablet 40 milliGRAM(s) Oral daily  venlafaxine XR. 75 milliGRAM(s) Oral daily  warfarin 5 milliGRAM(s) Oral once    MEDICATIONS  (PRN):  dextrose 40% Gel 15 Gram(s) Oral once PRN Blood Glucose LESS THAN 70 milliGRAM(s)/deciliter  glucagon  Injectable 1 milliGRAM(s) IntraMuscular once PRN Glucose LESS THAN 70 milligrams/deciliter  Mentholatum Ointment 28 Gram(s) 1 Application(s) Topical three times a day PRN For minor aches and pains of muscles and joints  promethazine 25 milliGRAM(s) Oral every 6 hours PRN for nausea        HPI:  89 y.o. male with PMHx of CMP/HFrEF, Severe AS, HTN, HLD, CKD st III, Afib on coumadin, CAD, DMII s/p recent TAVR at [facility] p/w episode of SOB this am - resolved now. Pt denies any other complaints at the moment. (03 Nov 2020 16:11)  LHC on 9/3 prior to TAVR revealed  ostial RCA stenosis    s/p PCI of RCA and OM  ASSESSMENT/PLAN:    Coronary artery disease/NSTEMI  -Admit to CICU  -VS, labs, diet, activity as per PCI orders  -IV hydration  -Encourage PO fluids  -Plavix  600 mg po load x1 then 75mg PO daily  -Resume Coumadin 5 mg PO today  -No ASA with therapeutic INR and Plavix  -Plan of care discussed with patient and MD  -Follow-up with attending MD  within 1 week after discharge  -Discussed therapeutic lifestyle changes to reduce risk factors such as following a cardiac diet, weight loss, maintaining a healthy weight, exercise, smoking cessation, medication compliance, and regular follow-up  with MD Nurse Practitioner Progress note:   89 year old male with PMHx of AS S/P recent TAVR presented with SOB  s/p PCI.  Denies chest pain, shortness of breath, dizziness or palpitations at this time    A+Ox3  CV: S1 S2 reg  Respiratory: CTAB  Right groin procedure site CDI.  no bleeding, no hematoma, site soft, non tender, positive pedal pulses bilaterally    T(C): 36.7 (11-06-20 @ 09:53), Max: 36.7 (11-06-20 @ 08:40)  HR: 60 (11-06-20 @ 09:53) (60 - 66)  BP: 112/92 (11-06-20 @ 09:53) (112/92 - 145/56)  RR: 16 (11-06-20 @ 09:53) (16 - 18)  SpO2: 100% (11-06-20 @ 09:53) (97% - 100%)  Wt(kg): --  CBC Full  -  ( 06 Nov 2020 08:21 )  WBC Count : 8.78 K/uL  RBC Count : 2.86 M/uL  Hemoglobin : 9.4 g/dL  Hematocrit : 29.6 %  Platelet Count - Automated : 146 K/uL  Mean Cell Volume : 103.5 fl  Mean Cell Hemoglobin : 32.9 pg  Mean Cell Hemoglobin Concentration : 31.8 gm/dL  Auto Neutrophil # : x  Auto Lymphocyte # : x  Auto Monocyte # : x  Auto Eosinophil # : x  Auto Basophil # : x  Auto Neutrophil % : x  Auto Lymphocyte % : x  Auto Monocyte % : x  Auto Eosinophil % : x  Auto Basophil % : x    11-06    143  |  111<H>  |  36<H>  ----------------------------<  60<L>  4.3   |  26  |  2.15<H>    Ca    9.0      06 Nov 2020 08:21              MEDICATIONS  (STANDING):  atorvastatin 40 milliGRAM(s) Oral at bedtime  dextrose 5%. 1000 milliLiter(s) (50 mL/Hr) IV Continuous <Continuous>  dextrose 50% Injectable 12.5 Gram(s) IV Push once  dextrose 50% Injectable 25 Gram(s) IV Push once  dextrose 50% Injectable 25 Gram(s) IV Push once  ferrous    sulfate 325 milliGRAM(s) Oral daily  gabapentin 100 milliGRAM(s) Oral three times a day  hydrALAZINE 10 milliGRAM(s) Oral three times a day  insulin glargine Injectable (LANTUS) 14 Unit(s) SubCutaneous at bedtime  insulin lispro (ADMELOG) corrective regimen sliding scale   SubCutaneous three times a day before meals  insulin lispro (ADMELOG) corrective regimen sliding scale   SubCutaneous at bedtime  isosorbide   dinitrate Tablet (ISORDIL) 20 milliGRAM(s) Oral three times a day  metoprolol succinate ER 25 milliGRAM(s) Oral daily  pantoprazole    Tablet 40 milliGRAM(s) Oral daily  venlafaxine XR. 75 milliGRAM(s) Oral daily  warfarin 5 milliGRAM(s) Oral once    MEDICATIONS  (PRN):  dextrose 40% Gel 15 Gram(s) Oral once PRN Blood Glucose LESS THAN 70 milliGRAM(s)/deciliter  glucagon  Injectable 1 milliGRAM(s) IntraMuscular once PRN Glucose LESS THAN 70 milligrams/deciliter  Mentholatum Ointment 28 Gram(s) 1 Application(s) Topical three times a day PRN For minor aches and pains of muscles and joints  promethazine 25 milliGRAM(s) Oral every 6 hours PRN for nausea        HPI:  89 y.o. male with PMHx of CMP/HFrEF, Severe AS, HTN, HLD, CKD st III, Afib on coumadin, CAD, DMII s/p recent TAVR at [facility] p/w episode of SOB this am - resolved now. Pt denies any other complaints at the moment. (03 Nov 2020 16:11)  LHC on 9/3 prior to TAVR revealed  ostial RCA stenosis    s/p PCI of RCA and OM  ASSESSMENT/PLAN:    Coronary artery disease /NSTEMI  -VS, labs, diet, activity as per PCI orders  -Gentle IV hydration  -Encourage PO fluids  -Plavix  600 mg po load x1 then 75mg PO daily  -Resume Coumadin 5 mg PO today  -No ASA with therapeutic INR and Plavix  -Plan of care discussed with patient and MD  -Follow-up with attending MD  within 1 week after discharge  -Discussed therapeutic lifestyle changes to reduce risk factors such as following a cardiac diet, weight loss, maintaining a healthy weight, exercise, smoking cessation, medication compliance, and regular follow-up  with MD Nurse Practitioner Progress note:   89 year old male with PMHx of AS S/P recent TAVR presented with SOB  s/p PCI.  Denies chest pain, shortness of breath, dizziness or palpitations at this time    A+Ox3  CV: S1 S2 reg  Respiratory: CTAB  Right groin procedure site CDI.  no bleeding, no hematoma, site soft, non tender, positive pedal pulses bilaterally    T(C): 36.7 (11-06-20 @ 09:53), Max: 36.7 (11-06-20 @ 08:40)  HR: 60 (11-06-20 @ 09:53) (60 - 66)  BP: 112/92 (11-06-20 @ 09:53) (112/92 - 145/56)  RR: 16 (11-06-20 @ 09:53) (16 - 18)  SpO2: 100% (11-06-20 @ 09:53) (97% - 100%)  Wt(kg): --  CBC Full  -  ( 06 Nov 2020 08:21 )  WBC Count : 8.78 K/uL  RBC Count : 2.86 M/uL  Hemoglobin : 9.4 g/dL  Hematocrit : 29.6 %  Platelet Count - Automated : 146 K/uL  Mean Cell Volume : 103.5 fl  Mean Cell Hemoglobin : 32.9 pg  Mean Cell Hemoglobin Concentration : 31.8 gm/dL  Auto Neutrophil # : x  Auto Lymphocyte # : x  Auto Monocyte # : x  Auto Eosinophil # : x  Auto Basophil # : x  Auto Neutrophil % : x  Auto Lymphocyte % : x  Auto Monocyte % : x  Auto Eosinophil % : x  Auto Basophil % : x    11-06    143  |  111<H>  |  36<H>  ----------------------------<  60<L>  4.3   |  26  |  2.15<H>    Ca    9.0      06 Nov 2020 08:21              MEDICATIONS  (STANDING):  atorvastatin 40 milliGRAM(s) Oral at bedtime  dextrose 5%. 1000 milliLiter(s) (50 mL/Hr) IV Continuous <Continuous>  dextrose 50% Injectable 12.5 Gram(s) IV Push once  dextrose 50% Injectable 25 Gram(s) IV Push once  dextrose 50% Injectable 25 Gram(s) IV Push once  ferrous    sulfate 325 milliGRAM(s) Oral daily  gabapentin 100 milliGRAM(s) Oral three times a day  hydrALAZINE 10 milliGRAM(s) Oral three times a day  insulin glargine Injectable (LANTUS) 14 Unit(s) SubCutaneous at bedtime  insulin lispro (ADMELOG) corrective regimen sliding scale   SubCutaneous three times a day before meals  insulin lispro (ADMELOG) corrective regimen sliding scale   SubCutaneous at bedtime  isosorbide   dinitrate Tablet (ISORDIL) 20 milliGRAM(s) Oral three times a day  metoprolol succinate ER 25 milliGRAM(s) Oral daily  pantoprazole    Tablet 40 milliGRAM(s) Oral daily  venlafaxine XR. 75 milliGRAM(s) Oral daily  warfarin 5 milliGRAM(s) Oral once    MEDICATIONS  (PRN):  dextrose 40% Gel 15 Gram(s) Oral once PRN Blood Glucose LESS THAN 70 milliGRAM(s)/deciliter  glucagon  Injectable 1 milliGRAM(s) IntraMuscular once PRN Glucose LESS THAN 70 milligrams/deciliter  Mentholatum Ointment 28 Gram(s) 1 Application(s) Topical three times a day PRN For minor aches and pains of muscles and joints  promethazine 25 milliGRAM(s) Oral every 6 hours PRN for nausea        HPI:  89 y.o. male with PMHx of CMP/HFrEF, Severe AS, HTN, HLD, CKD st III, Afib on coumadin, CAD, DMII s/p recent TAVR at [facility] p/w episode of SOB this am - resolved now. Pt denies any other complaints at the moment. (03 Nov 2020 16:11)  LHC on 9/3 prior to TAVR revealed  ostial RCA stenosis    s/p PCI of RCA and OM  ASSESSMENT/PLAN:    Coronary artery disease /NSTEMI  -VS, labs, diet, activity as per PCI orders  -Gentle IV hydration  -Encourage PO fluids  -Plavix  600 mg po load x1 then 75mg PO daily  -Resume Coumadin 5 mg PO today  -No ASA with therapeutic INR and Plavix  -Continue BB, statin  -Plan of care discussed with patient and MD  -Follow-up with attending MD  within 1 week after discharge  -Discussed therapeutic lifestyle changes to reduce risk factors such as following a cardiac diet, weight loss, maintaining a healthy weight, exercise, smoking cessation, medication compliance, and regular follow-up  with MD

## 2020-11-06 NOTE — PROGRESS NOTE ADULT - SUBJECTIVE AND OBJECTIVE BOX
CC: SOB (05 Nov 2020 08:19)    HPI: 89 y.o. male with PMHx of CAD,  CMP/HFrEF, Severe AS, S/p TAVR, HTN, HLD, CKD III, Afib on coumadin, CAD, DMII had  recent TAVR at [facility] p/w episode of SOB.  Pt denied  any other complaints at the moment of evaluation in ED .     INTERVAL HPI/ OVERNIGHT EVENTS: Pt was seen and examined,  S/p LHC with stent to RCA and OM, tolerated well, report sno complains, No CP or SOB. Voided     Vital Signs Last 24 Hrs  T(C): 36.3 (06 Nov 2020 16:31), Max: 36.7 (06 Nov 2020 08:40)  T(F): 97.3 (06 Nov 2020 16:31), Max: 98 (06 Nov 2020 08:40)  HR: 80 (06 Nov 2020 16:31) (60 - 80)  BP: 142/64 (06 Nov 2020 16:31) (112/92 - 167/87)  BP(mean): 78 (06 Nov 2020 05:18) (78 - 78)  RR: 17 (06 Nov 2020 16:31) (16 - 18)  SpO2: 99% (06 Nov 2020 16:31) (97% - 100%)    REVIEW OF SYSTEMS:  All other review of systems is negative unless indicated above.      PHYSICAL EXAM:  General: Well developed; malnourished; frail elderly male,  in no acute distress  Eyes: PERRLA, EOMI; conjunctiva and sclera clear  Head: Normocephalic; atraumatic  ENMT: No nasal discharge; airway clear  Neck: Supple; non tender; no masses  Respiratory: Decreased BS at bases, No wheezes, rales or rhonchi  Cardiovascular: Regular rate and rhythm. S1 and S2 Normal;  Gastrointestinal: Soft non-tender non-distended; Normal bowel sounds  Genitourinary: No  suprapubic  tenderness  Extremities: No edema  Neurological: Alert and oriented x2-3, non focal   Skin: Warm and dry. No acute rash  Lymph Nodes: No acute cervical adenopathy  Musculoskeletal: Normal muscle tone and strength   Psychiatric: Cooperative    LABS:                         9.4    8.78  )-----------( 146      ( 06 Nov 2020 08:21 )             29.6     11-06    141  |  109<H>  |  36<H>  ----------------------------<  87  4.3   |  25  |  2.22<H>    Ca    9.0      06 Nov 2020 16:47      PT/INR - ( 06 Nov 2020 08:21 )   PT: 23.1 sec;   INR: 2.06 ratio    PTT - ( 05 Nov 2020 08:07 )  PTT:35.6 sec      CARDIAC MARKERS ( 03 Nov 2020 17:07 )  0.092 ng/mL / x     / x     / x     / x      CARDIAC MARKERS ( 03 Nov 2020 15:05 )  0.091 ng/mL / x     / x     / x     / x                                9.5    10.19 )-----------( 143      ( 05 Nov 2020 08:07 )             30.1     05 Nov 2020 08:07    142    |  111    |  38     ----------------------------<  78     4.8     |  28     |  2.29     Ca    9.2        05 Nov 2020 08:07      PT/INR - ( 05 Nov 2020 08:07 )   PT: 27.3 sec;   INR: 2.43 ratio    PTT - ( 05 Nov 2020 08:07 )  PTT:35.6 sec      CAPILLARY BLOOD GLUCOSE:   POCT Blood Glucose.: 94 mg/dL (06 Nov 2020 09:39)  POCT Blood Glucose.: 69 mg/dL (06 Nov 2020 09:00)  POCT Blood Glucose.: 108 mg/dL (05 Nov 2020 21:36)  POCT Blood Glucose.: 248 mg/dL (05 Nov 2020 18:48)        MEDICATIONS  (STANDING):  atorvastatin 40 milliGRAM(s) Oral at bedtime  dextrose 5%. 1000 milliLiter(s) (50 mL/Hr) IV Continuous <Continuous>  dextrose 50% Injectable 12.5 Gram(s) IV Push once  dextrose 50% Injectable 25 Gram(s) IV Push once  dextrose 50% Injectable 25 Gram(s) IV Push once  ferrous    sulfate 325 milliGRAM(s) Oral daily  gabapentin 100 milliGRAM(s) Oral three times a day  hydrALAZINE 10 milliGRAM(s) Oral three times a day  insulin glargine Injectable (LANTUS) 10 Unit(s) SubCutaneous at bedtime  insulin lispro (ADMELOG) corrective regimen sliding scale   SubCutaneous three times a day before meals  insulin lispro (ADMELOG) corrective regimen sliding scale   SubCutaneous at bedtime  isosorbide   dinitrate Tablet (ISORDIL) 20 milliGRAM(s) Oral three times a day  metoprolol succinate ER 25 milliGRAM(s) Oral daily  pantoprazole    Tablet 40 milliGRAM(s) Oral daily  sodium chloride 0.9%. 1000 milliLiter(s) (50 mL/Hr) IV Continuous <Continuous>  venlafaxine XR. 75 milliGRAM(s) Oral daily  warfarin 5 milliGRAM(s) Oral once    MEDICATIONS  (PRN):  dextrose 40% Gel 15 Gram(s) Oral once PRN Blood Glucose LESS THAN 70 milliGRAM(s)/deciliter  glucagon  Injectable 1 milliGRAM(s) IntraMuscular once PRN Glucose LESS THAN 70 milligrams/deciliter  Mentholatum Ointment 28 Gram(s) 1 Application(s) Topical three times a day PRN For minor aches and pains of muscles and joints  promethazine 25 milliGRAM(s) Oral every 6 hours PRN for nausea        RADIOLOGY & ADDITIONAL TESTS:  < from: Xray Chest 1 View- PORTABLE-Urgent (Xray Chest 1 View- PORTABLE-Urgent .) (11.03.20 @ 12:56) >  EXAM:  XR CHEST PORTABLE URGENT 1V                            PROCEDURE DATE:  11/03/2020          INTERPRETATION:  DATE OF STUDY: 11/3/2020    PRIOR:10/5/20    CLINICAL INDICATION: Covid-19 BICC status.    TECHNIQUE: portable chest - done upright.    FINDINGS:  Disconnected cardiac device wire lead is within the right ventricle; no battery pack.  S/P TAVR.  Micra TPS cardiac device in the right ventricle.  Stable cardiomegaly. No hilar mass. Trachea in midline.  Lungs are clear. No pleural effusion or pneumothorax.  No acute bony finding.    IMPRESSION:  No evidence of active chest disease.

## 2020-11-06 NOTE — PROGRESS NOTE ADULT - ASSESSMENT
89 y.o. male with PMHx of CMP/HFrEF, Severe AS, HTN, HLD, CKD st III, Afib on coumadin, CAD, DMII s/p recent TAVR admitted for:       1. Episode of SOB,  self limiting with mildly elevated troponin and BNP. H/o CAD and Chronic systolic CHF  -  clinically not volume overloaded     - ECHO checked, EF  35--45%,  stable,  AV prosthesis in place    - C/w Toprol, ASA,  statins, on Hydralazine+ isosorbide for reduced EF ( not on ACEI/ARB 2/2 renal Fx)   - S/p [facility] with MALORIE to  RCA and OM    - D/w cardio     2. Afib on coumadin,  paced   - monitor INR  - C/w Toprol   -  resume Coumadin     3. CKD st III stable   - was K as mildly elevated on admission, improved  - Monitor renal Fx after cardiac cath       4. DMII on lantus + HISS  - BS on low side, will decrease lantus       Dispo: monitor overnight, repeat labs in am, d/c planning id stable

## 2020-11-07 NOTE — DISCHARGE NOTE PROVIDER - HOSPITAL COURSE
89 y.o. male with PMHx of CAD,  CMP/HFrEF, Severe AS, S/p TAVR, HTN, HLD, CKD III, Afib on coumadin, CAD, DMII had  recent TAVR at [facility] p/w episode of SOB.  Pt denied  any other complaints at the moment of evaluation in ED . Found to have mildly elevated troponin and elevated BNP. CXR neg.  ECHO with stable EF, normal Fx AV prosthesis.  Clinically no signs of volume overload. Pt had LHC  with MALORIE to RCA and OM. Started on Plavix, c/w coumadin, stop ASA. CHF meds adjusted, started on Hydralazine with Nitrates  as ACEI/ARBs c/indicated in not stable CR.   Pt today feels well, no complains. D/c planning discussed   Vital Signs Last 24 Hrs  T(C): 36.7 (07 Nov 2020 08:08), Max: 36.8 (06 Nov 2020 21:29)  T(F): 98.1 (07 Nov 2020 08:08), Max: 98.3 (06 Nov 2020 21:29)  HR: 61 (07 Nov 2020 13:00) (59 - 91)  BP: 138/83 (07 Nov 2020 13:00) (133/65 - 167/87)  BP(mean): 74 (06 Nov 2020 21:29) (74 - 74)  RR: 18 (07 Nov 2020 13:00) (16 - 18)  SpO2: 99% (07 Nov 2020 13:00) (98% - 100%)    PHYSICAL EXAM:  General: Well developed; malnourished; frail elderly male,  in no acute distress  Eyes: PERRLA, EOMI; conjunctiva and sclera clear  Head: Normocephalic; atraumatic  ENMT: No nasal discharge; airway clear  Neck: Supple; non tender; no masses  Respiratory: Decreased BS at bases, No wheezes, rales or rhonchi  Cardiovascular: Regular rate and rhythm. S1 and S2 Normal;  Gastrointestinal: Soft non-tender non-distended; Normal bowel sounds  Genitourinary: No  suprapubic  tenderness  Extremities: No edema  Neurological: Alert and oriented x2-3, non focal   Skin: Warm and dry. No acute rash  Lymph Nodes: No acute cervical adenopathy  Musculoskeletal: Normal muscle tone and strength   Psychiatric: Cooperative    89 y.o. male with PMHx of CMP/HFrEF, Severe AS, HTN, HLD, CKD st III, Afib on coumadin, CAD, DMII s/p recent TAVR admitted for:       1. Episode of SOB,  self limiting with mildly elevated troponin and BNP. H/o CAD and Chronic systolic CHF  -  clinically not volume overloaded  , likely anginal episode. NSTEMI  - S/p [facility] with MALORIE to  RCA and OM    - ECHO checked, EF  35--45%,  stable,  AV prosthesis in place    - C/w Toprol, ASA,  statins, on Hydralazine+ isosorbide for reduced EF ( not on ACEI/ARB 2/2 renal Fx)   -  cardio  f/u appreciated, cleared for d/c     2. Afib on coumadin,    - telemetry: V-paced   - monitor INR, SubTx today   - C/w Toprol   -  resumed Coumadin   - Will need to f/u with CArdio to check INR on Monday     3. CKD st III stable   -  K  was mildly elevated on admission, improved  - Monitor renal Fx after cardiac cath , l CR stable this am  - Pt to f/u outPt with Dr Galloway     4. DMII  - resume  on lantus  dose decreased, also on Trajenda and repaglinide         Dispo: Stable for d/c home today   Fax d/c summary to PCP  Total time 40 min

## 2020-11-07 NOTE — PROGRESS NOTE ADULT - SUBJECTIVE AND OBJECTIVE BOX
History of Present Illness:  Reason for Admission: SOB  History of Present Illness:   89 y.o. male with PMHx of CMP/HFrEF, Severe AS, HTN, HLD, CKD st III, Afib on coumadin, CAD, DMII s/p recent TAVR at [facility] p/w episode of SOB this am - resolved now. Pt denies any other complaints at the moment.  Examined at bed side, states he was experiencing melendrez and chest pain during his physical therapy session yesterday.  He is not having any symptoms at the moment or since admission, but he has been in bed at all time.     11/5 - feels ok while in bed, pending INR today, if < 2.3, already planned for cath.  I also changed his isosorbide mononitrate to dinitrate and added hydralazine, if tolerating current dose, can dc on BiDil tablets ( 1/2 tablet TID). Cn not tolerate Aldactone, ACEi or ARBs due to recurrent hyperkalemia.  ECHO results reviewed.   Cont current meds otherwise, dc planning after cath.    11/6 - Feels ok while at rest, waiting for INR to be < 2.3 to proceed with cath.  I increased his isosorbide today, he can go home on full dose BiDil once dc.     11/7 - comfortable in bed, s/p PCIx2 yesterday. Can go home today from cardiac stand point  Please dc home on full dose BiDil TID and I also increased his BB dose today for better BP control.   Needs to have INR and BMP check in our office on Monday.      Review of Systems:  Review of Systems: none  Other Review of Systems: All other review of systems negative, except as noted in HPI      Allergies and Intolerances:        Allergies:  	naproxen: Drug, Vomiting  	morphine: Drug, Nausea  	Aleve: Drug, Vomiting  	codeine: Drug, Unknown    Home Medications:   * Patient Currently Takes Medications as of 03-Nov-2020 13:22 documented in Structured Notes  · 	warfarin 5 mg oral tablet: Last Dose Taken:  , 1 tab(s) orally once a day  · 	isosorbide mononitrate 10 mg oral tablet: Last Dose Taken:  , 1 tab(s) orally once a day  · 	aspirin 81 mg oral tablet: Last Dose Taken:  , 1 tab(s) orally once a day  · 	venlafaxine 75 mg oral capsule, extended release: Last Dose Taken:  , 1 cap(s) orally once a day at lunch  · 	Vitamin D3 5000 intl units (125 mcg) oral tablet: Last Dose Taken:  , 1 tab(s) orally once a day  · 	B Complex 50 oral tablet, extended release: Last Dose Taken:  , 1 tab(s) orally once a day  · 	gabapentin 100 mg oral capsule: Last Dose Taken:  , 1 cap(s) orally 3 times a day with breakfast, lunch and dinner  · 	RABEprazole 20 mg oral delayed release tablet: Last Dose Taken:  , 1 tab(s) orally 2 times a day at lunch and before dinner  · 	ferrous sulfate 325 mg (65 mg elemental iron) oral tablet: Last Dose Taken:  , 1 tab(s) orally once a day, As Needed  · 	Metoprolol Succinate ER 25 mg oral tablet, extended release: Last Dose Taken:  , 1 tab(s) orally once a day  · 	atorvastatin 20 mg oral tablet: Last Dose Taken:  , 1 tab(s) orally once a day  · 	Lantus Solostar Pen 100 units/mL subcutaneous solution: Last Dose Taken:  , 18 unit(s) subcutaneously once a day (at bedtime)  · 	Calos Caps: Last Dose Taken:  , 1 cap(s) orally once a day  · 	Tradjenta 5 mg oral tablet: Last Dose Taken:  , 1 tab(s) orally once a day  · 	Gas-X 80 mg oral tablet, chewable: Last Dose Taken:  , 1 tab(s) orally 3 times a day (with meals)  · 	repaglinide 0.5 mg oral tablet: Last Dose Taken:  , 1 tab(s) orally once a day before dinner  · 	promethazine 25 mg oral tablet: Last Dose Taken:  , 1 tab(s) orally every 6 hours, As Needed - for nausea    .    Patient History:    Past Medical, Past Surgical, and Family History:  PAST MEDICAL HISTORY:  Atrial fibrillation, unspecified type     Benign prostatic hyperplasia without lower urinary tract symptoms     Chronic congestive heart failure, unspecified congestive heart failure type     Gastroesophageal reflux disease, esophagitis presence not specified     HTN (hypertension)     IDDM (insulin dependent diabetes mellitus)     Syncope and collapse 5/4/2018    Type 2 myocardial infarction 12/17/2017    UTI (urinary tract infection) 7/12/2017.     PAST SURGICAL HISTORY:  Arthropathy of shoulder region     Artificial cardiac pacemaker     History of appendectomy     S/P TAVR (transcatheter aortic valve replacement).     FAMILY HISTORY:  No pertinent family history in first degree relatives.     No Pertinent Family History in first degree relatives of: CAD.     Social History:  Social History (marital status, living situation, occupation, tobacco use, alcohol and drug use, and sexual history): retired, no tobacco, ETOh, IVDA     Tobacco Screening:  · Core Measure Site	Yes  · Has the patient used tobacco in the past 30 days?	No    Risk Assessment:    Present on Admission:  Deep Venous Thrombosis	no  Pulmonary Embolus	no     Heart Failure:  Does this patient have a history of or has been diagnosed with heart failure? yes.     LV Function Assessment (LVS function was evaluated before arrival and/or during hospitalization) yes.     Is the Ejection Fraction >40% ? no.     severely reduced LV function.     Are there any contraindications to ACEI/ARB therapy? Yes: moderate or severe aortic stenosis; hyperkalemia; worsening renal function/renal disease/dysfunction.    Physical Exam:   Physical Exam: PHYSICAL EXAM:    General: elderly male in no acute distress  Eyes: PERRLA, EOMI; conjunctiva and sclera clear  Head: Normocephalic; atraumatic  ENMT: No nasal discharge; airway clear  Neck: Supple; non tender; no masses  Respiratory: No wheezes, rales or rhonchi  Cardiovascular: S1, S2 paced, left upper chest old PPM site  Gastrointestinal: Soft non-tender non-distended; Normal bowel sounds  Genitourinary: No costovertebral angle tenderness  Extremities: No clubbing, cyanosis or edema  Vascular: Peripheral pulses palpable 2+ bilaterally  Neurological: Alert and oriented x4  Skin: Warm and dry.   Musculoskeletal: Normal tone, without deformities  Psychiatric: Cooperative and appropriate      Laboratory: reviewed       Assessment and Plan:   89 y.o. male with PMHx of CMP/HFrEF, Severe AS, HTN, HLD, CKD st III, Afib on coumadin, CAD, DMII s/p recent TAVR at [facility] p/w episode of SOB this am - resolved now.     1. Episode of SOB this am self limiting with mildly elevated troponin and BNP and no clinical evidence of ADHF s/p recent TAVR - possibly anginal episode, he has a known 80% oRCA that was not stented during recent admission for TAVR. Will discuss with interventional cardiology regarding this to determine whether it is feasible and warranted to try to stent this lesion at the moment.  Otherwise, will follow up on echo already ordered to assess for any issues post TAVR  Will also optimize his antianginal regimen.     2. Afib on coumadin paced - monitor INR    3. CKD st III stable - monitor K as mildly elevated, no ARB/ACIE    4. HFrEF/CMP - no exacerbation, f/u TTE

## 2020-11-07 NOTE — DISCHARGE NOTE PROVIDER - CARE PROVIDER_API CALL
Michael Mehta  INTERNAL MEDICINE  [address] [place] [address]  Phone: [phone]  Fax: [phone]  Follow Up Time: 1 week    Aminata Chauhan  INTERNAL MEDICINE  [address] [place] [address]  Phone: [phone]  Fax: [phone]  Follow Up Time: 1 week    Yg Galloway  INTERNAL MEDICINE  [address] [place] [address]  Phone: [phone]  Fax: [phone]  Follow Up Time: 1 week

## 2020-11-07 NOTE — DISCHARGE NOTE PROVIDER - PROVIDER TOKENS
PROVIDER:[TOKEN:[[id number]],FOLLOWUP:[1 week]],PROVIDER:[TOKEN:[[id number]],FOLLOWUP:[1 week]],PROVIDER:[TOKEN:[[id number]],FOLLOWUP:[1 week]]

## 2020-11-07 NOTE — DISCHARGE NOTE PROVIDER - NSDCMRMEDTOKEN_GEN_ALL_CORE_FT
atorvastatin 20 mg oral tablet: 2 tab(s) orally once a day  B Complex 50 oral tablet, extended release: 1 tab(s) orally once a day  BiDil 20 mg-37.5 mg oral tablet: 1 tab(s) orally 3 times a day   clopidogrel 75 mg oral tablet: 1 tab(s) orally once a day  ferrous sulfate 325 mg (65 mg elemental iron) oral tablet: 1 tab(s) orally once a day, As Needed  gabapentin 100 mg oral capsule: 1 cap(s) orally 3 times a day with breakfast, lunch and dinner  Gas-X 80 mg oral tablet, chewable: 1 tab(s) orally 3 times a day (with meals)  Lantus Solostar Pen 100 units/mL subcutaneous solution: 10 unit(s) subcutaneously once a day (at bedtime)  Metoprolol Succinate ER 25 mg oral tablet, extended release: 1 tab(s) orally 2 times a day  promethazine 25 mg oral tablet: 1 tab(s) orally every 6 hours, As Needed - for nausea  RABEprazole 20 mg oral delayed release tablet: 1 tab(s) orally 2 times a day at lunch and before dinner  [place] Caps: 1 cap(s) orally once a day  repaglinide 0.5 mg oral tablet: 1 tab(s) orally once a day before dinner  Tradjenta 5 mg oral tablet: 1 tab(s) orally once a day  venlafaxine 75 mg oral capsule, extended release: 1 cap(s) orally once a day at lunch  Vitamin D3 5000 intl units (125 mcg) oral tablet: 1 tab(s) orally once a day  warfarin 5 mg oral tablet: 1 tab(s) orally once a day  Check INR on Monday with your cardio    atorvastatin 20 mg oral tablet: 2 tab(s) orally once a day  B Complex 50 oral tablet, extended release: 1 tab(s) orally once a day  clopidogrel 75 mg oral tablet: 1 tab(s) orally once a day  ferrous sulfate 325 mg (65 mg elemental iron) oral tablet: 1 tab(s) orally once a day, As Needed  gabapentin 100 mg oral capsule: 1 cap(s) orally 3 times a day with breakfast, lunch and dinner  Gas-X 80 mg oral tablet, chewable: 1 tab(s) orally 3 times a day (with meals)  hydrALAZINE 10 mg oral tablet: 1 tab(s) orally 3 times a day  isosorbide dinitrate 20 mg oral tablet: 1 tab(s) orally 3 times a day  Lantus Solostar Pen 100 units/mL subcutaneous solution: 10 unit(s) subcutaneously once a day (at bedtime)  Metoprolol Succinate ER 25 mg oral tablet, extended release: 1 tab(s) orally 2 times a day  promethazine 25 mg oral tablet: 1 tab(s) orally every 6 hours, As Needed - for nausea  RABEprazole 20 mg oral delayed release tablet: 1 tab(s) orally 2 times a day at lunch and before dinner  Calos Caps: 1 cap(s) orally once a day  repaglinide 0.5 mg oral tablet: 1 tab(s) orally once a day before dinner  Tradjenta 5 mg oral tablet: 1 tab(s) orally once a day  venlafaxine 75 mg oral capsule, extended release: 1 cap(s) orally once a day at lunch  Vitamin D3 5000 intl units (125 mcg) oral tablet: 1 tab(s) orally once a day  warfarin 5 mg oral tablet: 1 tab(s) orally once a day  Check INR on Monday with your cardio

## 2020-11-07 NOTE — DISCHARGE NOTE PROVIDER - NSDCCPCAREPLAN_GEN_ALL_CORE_FT
PRINCIPAL DISCHARGE DIAGNOSIS  Diagnosis: CAD (coronary atherosclerotic disease)  Assessment and Plan of Treatment: S/p stents   C/w Plavix and coumadin   Increased dose of Metoprolol and Lipitor  F/u with Cardiologist      SECONDARY DISCHARGE DIAGNOSES  Diagnosis: CKD (chronic kidney disease)  Assessment and Plan of Treatment: f/u with Dr butler    Diagnosis: Afib  Assessment and Plan of Treatment: C/w Toprol   C/w Coumadin 5mg, will need to check INR on Monday with cardio    Diagnosis: Diabetes  Assessment and Plan of Treatment: Lantus decreased to 10Units  C/w Repaglinide and Trajenda  Monitor blood sugar and f/u with endocrinologist or PCP    Diagnosis: CHF with cardiomyopathy  Assessment and Plan of Treatment: C/w Toprol, was increased to twice daily, , Bidil  three times a day  Monitor weight   F/u with CArdiology

## 2020-11-07 NOTE — DISCHARGE NOTE NURSING/CASE MANAGEMENT/SOCIAL WORK - PATIENT PORTAL LINK FT
You can access the FollowMyHealth Patient Portal offered by [facility] by registering at the following website: [email or web address]. By joining [employer]’s FollowMyHealth portal, you will also be able to view your health information using other applications (apps) compatible with our system.

## 2020-11-07 NOTE — DISCHARGE NOTE PROVIDER - CARE PROVIDERS DIRECT ADDRESSES
,DirectAddress_Unknown,[email or web address]

## 2020-12-01 NOTE — ED ADULT TRIAGE NOTE - WEIGHT IN KG
`REVIEW OF SYSTEMS:    CONSTITUTIONAL: No weakness, fevers or chills  EYES/ENT: No visual changes;  No vertigo or throat pain   NECK: No pain or stiffness  RESPIRATORY: +SOB; No cough, wheezing, hemoptysis;  CARDIOVASCULAR: +chest pain no palpitations  GASTROINTESTINAL: No abdominal or epigastric pain. No nausea, vomiting, or hematemesis; No diarrhea or constipation. No melena or hematochezia.  GENITOURINARY: No dysuria, frequency or hematuria  NEUROLOGICAL: No numbness or weakness  SKIN: No itching, rashes 99.8

## 2020-12-08 NOTE — ED PROVIDER NOTE - NS_ ATTENDINGSCRIBEDETAILS _ED_A_ED_FT
Applied I Rigo Rizzo MD saw and examined the patient. Scribe documented for me and under my supervision. I have modified the scribe's documentation where necessary to reflect my history, physical exam and other relevant documentations pertinent to the care of the patient.

## 2020-12-21 NOTE — DISCHARGE NOTE NURSING/CASE MANAGEMENT/SOCIAL WORK - NSDCPEPTCOWACOMP_GEN_ALL_CORE
The patient has received written discharge instructions for Warfarin/Coumadin, including the Warfarin/Coumadin discharge booklet, which contains all of the information listed below. Warfarin/Coumadin is used to prevent new blood clots and keep existing ones from getting bigger. Never skip a dose of Warfarin/Coumadin. If you forget to take your Warfarin/Coumadin, DO NOT take an extra pill to 'catch up'. NEVER TAKE A DOUBLE DOSE. Notify your doctor that you missed a dose. Take Warfarin/Coumadin in the evening at the same time. Warfarin/Coumadin may be taken with other medications or food.
negative...

## 2021-01-01 ENCOUNTER — INPATIENT (INPATIENT)
Facility: HOSPITAL | Age: 86
LOS: 2 days | Discharge: HOME HEALTH | DRG: 86 | End: 2021-09-10
Attending: NEUROLOGICAL SURGERY | Admitting: NEUROLOGICAL SURGERY
Payer: MEDICARE

## 2021-01-01 ENCOUNTER — TRANSCRIPTION ENCOUNTER (OUTPATIENT)
Age: 86
End: 2021-01-01

## 2021-01-01 ENCOUNTER — INPATIENT (INPATIENT)
Facility: HOSPITAL | Age: 86
LOS: 2 days | DRG: 177 | End: 2021-10-28
Attending: HOSPITALIST | Admitting: INTERNAL MEDICINE
Payer: MEDICARE

## 2021-01-01 ENCOUNTER — INPATIENT (INPATIENT)
Facility: HOSPITAL | Age: 86
LOS: 7 days | Discharge: HOME HEALTH | DRG: 65 | End: 2021-09-27
Attending: NEUROLOGICAL SURGERY | Admitting: NEUROLOGICAL SURGERY
Payer: MEDICARE

## 2021-01-01 ENCOUNTER — APPOINTMENT (OUTPATIENT)
Dept: OTOLARYNGOLOGY | Facility: CLINIC | Age: 86
End: 2021-01-01
Payer: MEDICARE

## 2021-01-01 ENCOUNTER — INPATIENT (INPATIENT)
Facility: HOSPITAL | Age: 86
LOS: 2 days | Discharge: NURSING, REHAB OR LONG-TERM CARE | DRG: 521 | End: 2021-10-05
Attending: SURGERY | Admitting: SURGERY
Payer: MEDICARE

## 2021-01-01 ENCOUNTER — EMERGENCY (EMERGENCY)
Facility: HOSPITAL | Age: 86
LOS: 0 days | Discharge: HOME | End: 2021-08-24
Attending: EMERGENCY MEDICINE
Payer: MEDICARE

## 2021-01-01 ENCOUNTER — RESULT REVIEW (OUTPATIENT)
Age: 86
End: 2021-01-01

## 2021-01-01 VITALS
TEMPERATURE: 97 F | HEIGHT: 78 IN | OXYGEN SATURATION: 98 % | WEIGHT: 108.91 LBS | HEART RATE: 76 BPM | SYSTOLIC BLOOD PRESSURE: 144 MMHG | RESPIRATION RATE: 16 BRPM | DIASTOLIC BLOOD PRESSURE: 84 MMHG

## 2021-01-01 VITALS
WEIGHT: 169.98 LBS | OXYGEN SATURATION: 100 % | HEIGHT: 67 IN | RESPIRATION RATE: 26 BRPM | HEART RATE: 81 BPM | TEMPERATURE: 99 F | DIASTOLIC BLOOD PRESSURE: 105 MMHG | SYSTOLIC BLOOD PRESSURE: 212 MMHG

## 2021-01-01 VITALS
DIASTOLIC BLOOD PRESSURE: 70 MMHG | RESPIRATION RATE: 21 BRPM | TEMPERATURE: 98 F | OXYGEN SATURATION: 98 % | HEART RATE: 67 BPM | SYSTOLIC BLOOD PRESSURE: 150 MMHG

## 2021-01-01 VITALS
TEMPERATURE: 98 F | SYSTOLIC BLOOD PRESSURE: 117 MMHG | DIASTOLIC BLOOD PRESSURE: 56 MMHG | RESPIRATION RATE: 18 BRPM | HEART RATE: 94 BPM | OXYGEN SATURATION: 98 %

## 2021-01-01 VITALS
HEART RATE: 71 BPM | WEIGHT: 159.61 LBS | RESPIRATION RATE: 18 BRPM | DIASTOLIC BLOOD PRESSURE: 70 MMHG | OXYGEN SATURATION: 90 % | SYSTOLIC BLOOD PRESSURE: 164 MMHG | HEIGHT: 67 IN | TEMPERATURE: 98 F

## 2021-01-01 VITALS
SYSTOLIC BLOOD PRESSURE: 130 MMHG | DIASTOLIC BLOOD PRESSURE: 76 MMHG | WEIGHT: 170 LBS | BODY MASS INDEX: 25.76 KG/M2 | HEIGHT: 68 IN | TEMPERATURE: 96.8 F | HEART RATE: 80 BPM

## 2021-01-01 VITALS — HEART RATE: 60 BPM | OXYGEN SATURATION: 86 %

## 2021-01-01 VITALS
SYSTOLIC BLOOD PRESSURE: 126 MMHG | RESPIRATION RATE: 16 BRPM | TEMPERATURE: 97 F | DIASTOLIC BLOOD PRESSURE: 60 MMHG | OXYGEN SATURATION: 98 % | HEART RATE: 78 BPM

## 2021-01-01 VITALS — TEMPERATURE: 97 F

## 2021-01-01 VITALS — WEIGHT: 175.05 LBS | HEIGHT: 67 IN

## 2021-01-01 VITALS
SYSTOLIC BLOOD PRESSURE: 144 MMHG | RESPIRATION RATE: 20 BRPM | OXYGEN SATURATION: 97 % | HEART RATE: 66 BPM | TEMPERATURE: 98 F | DIASTOLIC BLOOD PRESSURE: 74 MMHG

## 2021-01-01 DIAGNOSIS — F03.91 UNSPECIFIED DEMENTIA WITH BEHAVIORAL DISTURBANCE: ICD-10-CM

## 2021-01-01 DIAGNOSIS — Z83.3 FAMILY HISTORY OF DIABETES MELLITUS: ICD-10-CM

## 2021-01-01 DIAGNOSIS — Z95.2 PRESENCE OF PROSTHETIC HEART VALVE: ICD-10-CM

## 2021-01-01 DIAGNOSIS — N18.4 CHRONIC KIDNEY DISEASE, STAGE 4 (SEVERE): ICD-10-CM

## 2021-01-01 DIAGNOSIS — Z95.3 PRESENCE OF XENOGENIC HEART VALVE: ICD-10-CM

## 2021-01-01 DIAGNOSIS — S22.009A UNSPECIFIED FRACTURE OF UNSPECIFIED THORACIC VERTEBRA, INITIAL ENCOUNTER FOR CLOSED FRACTURE: ICD-10-CM

## 2021-01-01 DIAGNOSIS — S72.011A UNSPECIFIED INTRACAPSULAR FRACTURE OF RIGHT FEMUR, INITIAL ENCOUNTER FOR CLOSED FRACTURE: ICD-10-CM

## 2021-01-01 DIAGNOSIS — Z88.6 ALLERGY STATUS TO ANALGESIC AGENT: ICD-10-CM

## 2021-01-01 DIAGNOSIS — I13.0 HYPERTENSIVE HEART AND CHRONIC KIDNEY DISEASE WITH HEART FAILURE AND STAGE 1 THROUGH STAGE 4 CHRONIC KIDNEY DISEASE, OR UNSPECIFIED CHRONIC KIDNEY DISEASE: ICD-10-CM

## 2021-01-01 DIAGNOSIS — I50.9 HEART FAILURE, UNSPECIFIED: ICD-10-CM

## 2021-01-01 DIAGNOSIS — F03.90 UNSPECIFIED DEMENTIA, UNSPECIFIED SEVERITY, WITHOUT BEHAVIORAL DISTURBANCE, PSYCHOTIC DISTURBANCE, MOOD DISTURBANCE, AND ANXIETY: ICD-10-CM

## 2021-01-01 DIAGNOSIS — Z90.49 ACQUIRED ABSENCE OF OTHER SPECIFIED PARTS OF DIGESTIVE TRACT: Chronic | ICD-10-CM

## 2021-01-01 DIAGNOSIS — R09.82 POSTNASAL DRIP: ICD-10-CM

## 2021-01-01 DIAGNOSIS — Z95.2 PRESENCE OF PROSTHETIC HEART VALVE: Chronic | ICD-10-CM

## 2021-01-01 DIAGNOSIS — Z95.0 PRESENCE OF CARDIAC PACEMAKER: Chronic | ICD-10-CM

## 2021-01-01 DIAGNOSIS — I48.20 CHRONIC ATRIAL FIBRILLATION, UNSPECIFIED: ICD-10-CM

## 2021-01-01 DIAGNOSIS — Z87.820 PERSONAL HISTORY OF TRAUMATIC BRAIN INJURY: ICD-10-CM

## 2021-01-01 DIAGNOSIS — R07.89 OTHER CHEST PAIN: ICD-10-CM

## 2021-01-01 DIAGNOSIS — S06.6X0A TRAUMATIC SUBARACHNOID HEMORRHAGE WITHOUT LOSS OF CONSCIOUSNESS, INITIAL ENCOUNTER: ICD-10-CM

## 2021-01-01 DIAGNOSIS — R64 CACHEXIA: ICD-10-CM

## 2021-01-01 DIAGNOSIS — Z95.5 PRESENCE OF CORONARY ANGIOPLASTY IMPLANT AND GRAFT: ICD-10-CM
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
## 2021-01-01 DIAGNOSIS — K21.9 GASTRO-ESOPHAGEAL REFLUX DISEASE WITHOUT ESOPHAGITIS: ICD-10-CM
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
## 2021-01-01 DIAGNOSIS — M19.019 PRIMARY OSTEOARTHRITIS, UNSPECIFIED SHOULDER: Chronic | ICD-10-CM

## 2021-01-01 DIAGNOSIS — E11.22 TYPE 2 DIABETES MELLITUS WITH DIABETIC CHRONIC KIDNEY DISEASE: ICD-10-CM

## 2021-01-01 DIAGNOSIS — I61.0 NONTRAUMATIC INTRACEREBRAL HEMORRHAGE IN HEMISPHERE, SUBCORTICAL: ICD-10-CM

## 2021-01-01 DIAGNOSIS — I25.2 OLD MYOCARDIAL INFARCTION: ICD-10-CM

## 2021-01-01 DIAGNOSIS — Z95.0 PRESENCE OF CARDIAC PACEMAKER: ICD-10-CM

## 2021-01-01 DIAGNOSIS — E11.69 TYPE 2 DIABETES MELLITUS WITH OTHER SPECIFIED COMPLICATION: ICD-10-CM

## 2021-01-01 DIAGNOSIS — I61.9 NONTRAUMATIC INTRACEREBRAL HEMORRHAGE, UNSPECIFIED: ICD-10-CM
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
## 2021-01-01 DIAGNOSIS — Z88.5 ALLERGY STATUS TO NARCOTIC AGENT: ICD-10-CM

## 2021-01-01 DIAGNOSIS — I25.10 ATHEROSCLEROTIC HEART DISEASE OF NATIVE CORONARY ARTERY WITHOUT ANGINA PECTORIS: ICD-10-CM

## 2021-01-01 DIAGNOSIS — N40.0 BENIGN PROSTATIC HYPERPLASIA WITHOUT LOWER URINARY TRACT SYMPTOMS: ICD-10-CM

## 2021-01-01 DIAGNOSIS — Z20.822 CONTACT WITH AND (SUSPECTED) EXPOSURE TO COVID-19: ICD-10-CM

## 2021-01-01 DIAGNOSIS — I50.22 CHRONIC SYSTOLIC (CONGESTIVE) HEART FAILURE: ICD-10-CM

## 2021-01-01 DIAGNOSIS — I16.1 HYPERTENSIVE EMERGENCY: ICD-10-CM

## 2021-01-01 DIAGNOSIS — Z90.49 ACQUIRED ABSENCE OF OTHER SPECIFIED PARTS OF DIGESTIVE TRACT: ICD-10-CM

## 2021-01-01 DIAGNOSIS — I50.23 ACUTE ON CHRONIC SYSTOLIC (CONGESTIVE) HEART FAILURE: ICD-10-CM

## 2021-01-01 DIAGNOSIS — Z79.01 LONG TERM (CURRENT) USE OF ANTICOAGULANTS: ICD-10-CM

## 2021-01-01 DIAGNOSIS — J31.2 CHRONIC PHARYNGITIS: ICD-10-CM

## 2021-01-01 DIAGNOSIS — Z79.82 LONG TERM (CURRENT) USE OF ASPIRIN: ICD-10-CM

## 2021-01-01 DIAGNOSIS — I48.91 UNSPECIFIED ATRIAL FIBRILLATION: ICD-10-CM

## 2021-01-01 DIAGNOSIS — R53.1 WEAKNESS: ICD-10-CM

## 2021-01-01 DIAGNOSIS — H90.3 SENSORINEURAL HEARING LOSS, BILATERAL: ICD-10-CM

## 2021-01-01 DIAGNOSIS — R45.1 RESTLESSNESS AND AGITATION: ICD-10-CM

## 2021-01-01 DIAGNOSIS — K21.00 GASTRO-ESOPHAGEAL REFLUX DISEASE WITH ESOPHAGITIS, WITHOUT BLEEDING: ICD-10-CM

## 2021-01-01 DIAGNOSIS — Z88.8 ALLERGY STATUS TO OTHER DRUGS, MEDICAMENTS AND BIOLOGICAL SUBSTANCES: ICD-10-CM

## 2021-01-01 DIAGNOSIS — Z86.73 PERSONAL HISTORY OF TRANSIENT ISCHEMIC ATTACK (TIA), AND CEREBRAL INFARCTION WITHOUT RESIDUAL DEFICITS: ICD-10-CM

## 2021-01-01 DIAGNOSIS — Z86.69 PERSONAL HISTORY OF OTHER DISEASES OF THE NERVOUS SYSTEM AND SENSE ORGANS: ICD-10-CM

## 2021-01-01 DIAGNOSIS — S09.90XA UNSPECIFIED INJURY OF HEAD, INITIAL ENCOUNTER: ICD-10-CM

## 2021-01-01 DIAGNOSIS — Z87.891 PERSONAL HISTORY OF NICOTINE DEPENDENCE: ICD-10-CM

## 2021-01-01 DIAGNOSIS — R09.02 HYPOXEMIA: ICD-10-CM

## 2021-01-01 DIAGNOSIS — K59.00 CONSTIPATION, UNSPECIFIED: ICD-10-CM

## 2021-01-01 DIAGNOSIS — N18.30 CHRONIC KIDNEY DISEASE, STAGE 3 UNSPECIFIED: ICD-10-CM
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
## 2021-01-01 DIAGNOSIS — H91.90 UNSPECIFIED HEARING LOSS, UNSPECIFIED EAR: ICD-10-CM

## 2021-01-01 DIAGNOSIS — Z88.8 ALLERGY STATUS TO OTHER DRUGS, MEDICAMENTS AND BIOLOGICAL SUBSTANCES STATUS: ICD-10-CM

## 2021-01-01 DIAGNOSIS — E87.5 HYPERKALEMIA: ICD-10-CM

## 2021-01-01 DIAGNOSIS — R32 UNSPECIFIED URINARY INCONTINENCE: ICD-10-CM

## 2021-01-01 DIAGNOSIS — S72.001A FRACTURE OF UNSPECIFIED PART OF NECK OF RIGHT FEMUR, INITIAL ENCOUNTER FOR CLOSED FRACTURE: ICD-10-CM

## 2021-01-01 DIAGNOSIS — R49.0 DYSPHONIA: ICD-10-CM

## 2021-01-01 DIAGNOSIS — H61.22 IMPACTED CERUMEN, LEFT EAR: ICD-10-CM

## 2021-01-01 DIAGNOSIS — G93.41 METABOLIC ENCEPHALOPATHY: ICD-10-CM

## 2021-01-01 DIAGNOSIS — S06.6X9A TRAUMATIC SUBARACHNOID HEMORRHAGE WITH LOSS OF CONSCIOUSNESS OF UNSPECIFIED DURATION, INITIAL ENCOUNTER: ICD-10-CM

## 2021-01-01 DIAGNOSIS — Z79.4 LONG TERM (CURRENT) USE OF INSULIN: ICD-10-CM
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
## 2021-01-01 DIAGNOSIS — S01.01XA LACERATION WITHOUT FOREIGN BODY OF SCALP, INITIAL ENCOUNTER: ICD-10-CM

## 2021-01-01 DIAGNOSIS — Z23 ENCOUNTER FOR IMMUNIZATION: ICD-10-CM

## 2021-01-01 DIAGNOSIS — R26.0 ATAXIC GAIT: ICD-10-CM

## 2021-01-01 DIAGNOSIS — Z78.1 PHYSICAL RESTRAINT STATUS: ICD-10-CM

## 2021-01-01 DIAGNOSIS — T41.205A ADVERSE EFFECT OF UNSPECIFIED GENERAL ANESTHETICS, INITIAL ENCOUNTER: ICD-10-CM

## 2021-01-01 DIAGNOSIS — F05 DELIRIUM DUE TO KNOWN PHYSIOLOGICAL CONDITION: ICD-10-CM

## 2021-01-01 DIAGNOSIS — I07.1 RHEUMATIC TRICUSPID INSUFFICIENCY: ICD-10-CM
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
## 2021-01-01 DIAGNOSIS — Z79.84 LONG TERM (CURRENT) USE OF ORAL HYPOGLYCEMIC DRUGS: ICD-10-CM

## 2021-01-01 DIAGNOSIS — J96.01 ACUTE RESPIRATORY FAILURE WITH HYPOXIA: ICD-10-CM

## 2021-01-01 DIAGNOSIS — Z98.890 OTHER SPECIFIED POSTPROCEDURAL STATES: ICD-10-CM

## 2021-01-01 DIAGNOSIS — K21.9 GASTRO-ESOPHAGEAL REFLUX DISEASE W/OUT ESOPHAGITIS: ICD-10-CM

## 2021-01-01 DIAGNOSIS — D62 ACUTE POSTHEMORRHAGIC ANEMIA: ICD-10-CM

## 2021-01-01 DIAGNOSIS — J98.11 ATELECTASIS: ICD-10-CM

## 2021-01-01 DIAGNOSIS — I08.1 RHEUMATIC DISORDERS OF BOTH MITRAL AND TRICUSPID VALVES: ICD-10-CM

## 2021-01-01 DIAGNOSIS — R41.0 DISORIENTATION, UNSPECIFIED: ICD-10-CM

## 2021-01-01 LAB
24R-OH-CALCIDIOL SERPL-MCNC: 37.5 NG/ML — SIGNIFICANT CHANGE UP (ref 30–80)
A1C WITH ESTIMATED AVERAGE GLUCOSE RESULT: 8.7 % — HIGH (ref 4–5.6)
A1C WITH ESTIMATED AVERAGE GLUCOSE RESULT: 9 % — HIGH (ref 4–5.6)
ACETONE SERPL-MCNC: ABNORMAL
ADD ON TEST-SPECIMEN IN LAB: SIGNIFICANT CHANGE UP
ADD ON TEST-SPECIMEN IN LAB: SIGNIFICANT CHANGE UP
ALBUMIN SERPL ELPH-MCNC: 2.3 G/DL — LOW (ref 3.3–5)
ALBUMIN SERPL ELPH-MCNC: 2.5 G/DL — LOW (ref 3.3–5)
ALBUMIN SERPL ELPH-MCNC: 3.1 G/DL — LOW (ref 3.3–5)
ALBUMIN SERPL ELPH-MCNC: 3.2 G/DL — LOW (ref 3.3–5)
ALBUMIN SERPL ELPH-MCNC: 3.3 G/DL — SIGNIFICANT CHANGE UP (ref 3.3–5)
ALBUMIN SERPL ELPH-MCNC: 3.3 G/DL — SIGNIFICANT CHANGE UP (ref 3.3–5)
ALBUMIN SERPL ELPH-MCNC: 3.4 G/DL — SIGNIFICANT CHANGE UP (ref 3.3–5)
ALP SERPL-CCNC: 128 U/L — HIGH (ref 40–120)
ALP SERPL-CCNC: 132 U/L — HIGH (ref 40–120)
ALP SERPL-CCNC: 144 U/L — HIGH (ref 40–120)
ALP SERPL-CCNC: 149 U/L — HIGH (ref 40–120)
ALP SERPL-CCNC: 150 U/L — HIGH (ref 40–120)
ALP SERPL-CCNC: 156 U/L — HIGH (ref 40–120)
ALP SERPL-CCNC: 157 U/L — HIGH (ref 40–120)
ALT FLD-CCNC: 18 U/L — SIGNIFICANT CHANGE UP (ref 12–78)
ALT FLD-CCNC: 23 U/L — SIGNIFICANT CHANGE UP (ref 12–78)
ALT FLD-CCNC: 30 U/L — SIGNIFICANT CHANGE UP (ref 12–78)
ALT FLD-CCNC: 33 U/L — SIGNIFICANT CHANGE UP (ref 12–78)
ALT FLD-CCNC: 36 U/L — SIGNIFICANT CHANGE UP (ref 12–78)
ALT FLD-CCNC: 42 U/L — SIGNIFICANT CHANGE UP (ref 12–78)
ALT FLD-CCNC: 61 U/L — SIGNIFICANT CHANGE UP (ref 12–78)
ANION GAP SERPL CALC-SCNC: 10 MMOL/L — SIGNIFICANT CHANGE UP (ref 5–17)
ANION GAP SERPL CALC-SCNC: 2 MMOL/L — LOW (ref 5–17)
ANION GAP SERPL CALC-SCNC: 3 MMOL/L — LOW (ref 5–17)
ANION GAP SERPL CALC-SCNC: 5 MMOL/L — SIGNIFICANT CHANGE UP (ref 5–17)
ANION GAP SERPL CALC-SCNC: 6 MMOL/L — SIGNIFICANT CHANGE UP (ref 5–17)
ANION GAP SERPL CALC-SCNC: 7 MMOL/L — SIGNIFICANT CHANGE UP (ref 5–17)
ANION GAP SERPL CALC-SCNC: 7 MMOL/L — SIGNIFICANT CHANGE UP (ref 5–17)
ANION GAP SERPL CALC-SCNC: 8 MMOL/L — SIGNIFICANT CHANGE UP (ref 5–17)
ANION GAP SERPL CALC-SCNC: 9 MMOL/L — SIGNIFICANT CHANGE UP (ref 5–17)
ANION GAP SERPL CALC-SCNC: 9 MMOL/L — SIGNIFICANT CHANGE UP (ref 5–17)
APPEARANCE UR: CLEAR — SIGNIFICANT CHANGE UP
APTT BLD: 22.9 SEC — LOW (ref 27.5–35.5)
APTT BLD: 30.7 SEC — SIGNIFICANT CHANGE UP (ref 27.5–35.5)
APTT BLD: 34.8 SEC — SIGNIFICANT CHANGE UP (ref 27.5–35.5)
AST SERPL-CCNC: 30 U/L — SIGNIFICANT CHANGE UP (ref 15–37)
AST SERPL-CCNC: 31 U/L — SIGNIFICANT CHANGE UP (ref 15–37)
AST SERPL-CCNC: 34 U/L — SIGNIFICANT CHANGE UP (ref 15–37)
AST SERPL-CCNC: 39 U/L — HIGH (ref 15–37)
AST SERPL-CCNC: 39 U/L — HIGH (ref 15–37)
AST SERPL-CCNC: 48 U/L — HIGH (ref 15–37)
AST SERPL-CCNC: 64 U/L — HIGH (ref 15–37)
BASE EXCESS BLDA CALC-SCNC: -0.2 MMOL/L — SIGNIFICANT CHANGE UP (ref -2–3)
BASE EXCESS BLDV CALC-SCNC: 2 MMOL/L — SIGNIFICANT CHANGE UP
BASOPHILS # BLD AUTO: 0.02 K/UL — SIGNIFICANT CHANGE UP (ref 0–0.2)
BASOPHILS # BLD AUTO: 0.02 K/UL — SIGNIFICANT CHANGE UP (ref 0–0.2)
BASOPHILS # BLD AUTO: 0.04 K/UL — SIGNIFICANT CHANGE UP (ref 0–0.2)
BASOPHILS # BLD AUTO: 0.05 K/UL — SIGNIFICANT CHANGE UP (ref 0–0.2)
BASOPHILS # BLD AUTO: 0.05 K/UL — SIGNIFICANT CHANGE UP (ref 0–0.2)
BASOPHILS # BLD AUTO: 0.06 K/UL — SIGNIFICANT CHANGE UP (ref 0–0.2)
BASOPHILS NFR BLD AUTO: 0.1 % — SIGNIFICANT CHANGE UP (ref 0–2)
BASOPHILS NFR BLD AUTO: 0.3 % — SIGNIFICANT CHANGE UP (ref 0–2)
BASOPHILS NFR BLD AUTO: 0.3 % — SIGNIFICANT CHANGE UP (ref 0–2)
BASOPHILS NFR BLD AUTO: 0.4 % — SIGNIFICANT CHANGE UP (ref 0–2)
BASOPHILS NFR BLD AUTO: 0.5 % — SIGNIFICANT CHANGE UP (ref 0–2)
BASOPHILS NFR BLD AUTO: 0.7 % — SIGNIFICANT CHANGE UP (ref 0–2)
BILIRUB SERPL-MCNC: 0.4 MG/DL — SIGNIFICANT CHANGE UP (ref 0.2–1.2)
BILIRUB SERPL-MCNC: 0.4 MG/DL — SIGNIFICANT CHANGE UP (ref 0.2–1.2)
BILIRUB SERPL-MCNC: 0.5 MG/DL — SIGNIFICANT CHANGE UP (ref 0.2–1.2)
BILIRUB SERPL-MCNC: 0.7 MG/DL — SIGNIFICANT CHANGE UP (ref 0.2–1.2)
BILIRUB SERPL-MCNC: 0.8 MG/DL — SIGNIFICANT CHANGE UP (ref 0.2–1.2)
BILIRUB SERPL-MCNC: 0.9 MG/DL — SIGNIFICANT CHANGE UP (ref 0.2–1.2)
BILIRUB SERPL-MCNC: 0.9 MG/DL — SIGNIFICANT CHANGE UP (ref 0.2–1.2)
BILIRUB UR-MCNC: NEGATIVE — SIGNIFICANT CHANGE UP
BLOOD GAS COMMENTS ARTERIAL: SIGNIFICANT CHANGE UP
BUN SERPL-MCNC: 46 MG/DL — HIGH (ref 7–23)
BUN SERPL-MCNC: 49 MG/DL — HIGH (ref 7–23)
BUN SERPL-MCNC: 50 MG/DL — HIGH (ref 7–23)
BUN SERPL-MCNC: 52 MG/DL — HIGH (ref 7–23)
BUN SERPL-MCNC: 52 MG/DL — HIGH (ref 7–23)
BUN SERPL-MCNC: 54 MG/DL — HIGH (ref 7–23)
BUN SERPL-MCNC: 55 MG/DL — HIGH (ref 7–23)
BUN SERPL-MCNC: 56 MG/DL — HIGH (ref 7–23)
BUN SERPL-MCNC: 57 MG/DL — HIGH (ref 7–23)
BUN SERPL-MCNC: 59 MG/DL — HIGH (ref 7–23)
BUN SERPL-MCNC: 59 MG/DL — HIGH (ref 7–23)
BUN SERPL-MCNC: 61 MG/DL — HIGH (ref 7–23)
BUN SERPL-MCNC: 61 MG/DL — HIGH (ref 7–23)
BUN SERPL-MCNC: 62 MG/DL — HIGH (ref 7–23)
BUN SERPL-MCNC: 65 MG/DL — HIGH (ref 7–23)
BUN SERPL-MCNC: 66 MG/DL — HIGH (ref 7–23)
BUN SERPL-MCNC: 68 MG/DL — HIGH (ref 7–23)
CALCIUM SERPL-MCNC: 8.8 MG/DL — SIGNIFICANT CHANGE UP (ref 8.5–10.1)
CALCIUM SERPL-MCNC: 8.9 MG/DL — SIGNIFICANT CHANGE UP (ref 8.5–10.1)
CALCIUM SERPL-MCNC: 9.1 MG/DL — SIGNIFICANT CHANGE UP (ref 8.5–10.1)
CALCIUM SERPL-MCNC: 9.2 MG/DL — SIGNIFICANT CHANGE UP (ref 8.5–10.1)
CALCIUM SERPL-MCNC: 9.3 MG/DL — SIGNIFICANT CHANGE UP (ref 8.5–10.1)
CALCIUM SERPL-MCNC: 9.4 MG/DL — SIGNIFICANT CHANGE UP (ref 8.5–10.1)
CALCIUM SERPL-MCNC: 9.4 MG/DL — SIGNIFICANT CHANGE UP (ref 8.5–10.1)
CALCIUM SERPL-MCNC: 9.5 MG/DL — SIGNIFICANT CHANGE UP (ref 8.5–10.1)
CALCIUM SERPL-MCNC: 9.5 MG/DL — SIGNIFICANT CHANGE UP (ref 8.5–10.1)
CALCIUM SERPL-MCNC: 9.8 MG/DL — SIGNIFICANT CHANGE UP (ref 8.5–10.1)
CHLORIDE SERPL-SCNC: 103 MMOL/L — SIGNIFICANT CHANGE UP (ref 96–108)
CHLORIDE SERPL-SCNC: 104 MMOL/L — SIGNIFICANT CHANGE UP (ref 96–108)
CHLORIDE SERPL-SCNC: 105 MMOL/L — SIGNIFICANT CHANGE UP (ref 96–108)
CHLORIDE SERPL-SCNC: 105 MMOL/L — SIGNIFICANT CHANGE UP (ref 96–108)
CHLORIDE SERPL-SCNC: 106 MMOL/L — SIGNIFICANT CHANGE UP (ref 96–108)
CHLORIDE SERPL-SCNC: 106 MMOL/L — SIGNIFICANT CHANGE UP (ref 96–108)
CHLORIDE SERPL-SCNC: 107 MMOL/L — SIGNIFICANT CHANGE UP (ref 96–108)
CHLORIDE SERPL-SCNC: 107 MMOL/L — SIGNIFICANT CHANGE UP (ref 96–108)
CHLORIDE SERPL-SCNC: 108 MMOL/L — SIGNIFICANT CHANGE UP (ref 96–108)
CHLORIDE SERPL-SCNC: 109 MMOL/L — HIGH (ref 96–108)
CHLORIDE SERPL-SCNC: 110 MMOL/L — HIGH (ref 96–108)
CHLORIDE SERPL-SCNC: 113 MMOL/L — HIGH (ref 96–108)
CHOLEST SERPL-MCNC: 101 MG/DL — SIGNIFICANT CHANGE UP
CO2 BLDA-SCNC: 27 MMOL/L — HIGH (ref 19–24)
CO2 BLDV-SCNC: 29 MMOL/L — HIGH (ref 22–26)
CO2 SERPL-SCNC: 20 MMOL/L — LOW (ref 22–31)
CO2 SERPL-SCNC: 24 MMOL/L — SIGNIFICANT CHANGE UP (ref 22–31)
CO2 SERPL-SCNC: 24 MMOL/L — SIGNIFICANT CHANGE UP (ref 22–31)
CO2 SERPL-SCNC: 25 MMOL/L — SIGNIFICANT CHANGE UP (ref 22–31)
CO2 SERPL-SCNC: 26 MMOL/L — SIGNIFICANT CHANGE UP (ref 22–31)
CO2 SERPL-SCNC: 27 MMOL/L — SIGNIFICANT CHANGE UP (ref 22–31)
CO2 SERPL-SCNC: 29 MMOL/L — SIGNIFICANT CHANGE UP (ref 22–31)
CO2 SERPL-SCNC: 29 MMOL/L — SIGNIFICANT CHANGE UP (ref 22–31)
COLOR SPEC: YELLOW — SIGNIFICANT CHANGE UP
COVID-19 SPIKE DOMAIN AB INTERP: POSITIVE
COVID-19 SPIKE DOMAIN ANTIBODY RESULT: 240 U/ML — HIGH
COVID-19 SPIKE DOMAIN ANTIBODY RESULT: >250 U/ML — HIGH
CREAT SERPL-MCNC: 2.29 MG/DL — HIGH (ref 0.5–1.3)
CREAT SERPL-MCNC: 2.35 MG/DL — HIGH (ref 0.5–1.3)
CREAT SERPL-MCNC: 2.38 MG/DL — HIGH (ref 0.5–1.3)
CREAT SERPL-MCNC: 2.44 MG/DL — HIGH (ref 0.5–1.3)
CREAT SERPL-MCNC: 2.52 MG/DL — HIGH (ref 0.5–1.3)
CREAT SERPL-MCNC: 2.56 MG/DL — HIGH (ref 0.5–1.3)
CREAT SERPL-MCNC: 2.56 MG/DL — HIGH (ref 0.5–1.3)
CREAT SERPL-MCNC: 2.62 MG/DL — HIGH (ref 0.5–1.3)
CREAT SERPL-MCNC: 2.68 MG/DL — HIGH (ref 0.5–1.3)
CREAT SERPL-MCNC: 2.68 MG/DL — HIGH (ref 0.5–1.3)
CREAT SERPL-MCNC: 2.7 MG/DL — HIGH (ref 0.5–1.3)
CREAT SERPL-MCNC: 2.77 MG/DL — HIGH (ref 0.5–1.3)
CREAT SERPL-MCNC: 2.85 MG/DL — HIGH (ref 0.5–1.3)
CREAT SERPL-MCNC: 2.98 MG/DL — HIGH (ref 0.5–1.3)
CREAT SERPL-MCNC: 3.03 MG/DL — HIGH (ref 0.5–1.3)
CREAT SERPL-MCNC: 3.13 MG/DL — HIGH (ref 0.5–1.3)
CREAT SERPL-MCNC: 3.15 MG/DL — HIGH (ref 0.5–1.3)
CREAT SERPL-MCNC: 3.16 MG/DL — HIGH (ref 0.5–1.3)
CREAT SERPL-MCNC: 3.22 MG/DL — HIGH (ref 0.5–1.3)
CRP SERPL-MCNC: 190 MG/L — HIGH
CULTURE RESULTS: SIGNIFICANT CHANGE UP
CULTURE RESULTS: SIGNIFICANT CHANGE UP
DIFF PNL FLD: ABNORMAL
EOSINOPHIL # BLD AUTO: 0.01 K/UL — SIGNIFICANT CHANGE UP (ref 0–0.5)
EOSINOPHIL # BLD AUTO: 0.04 K/UL — SIGNIFICANT CHANGE UP (ref 0–0.5)
EOSINOPHIL # BLD AUTO: 0.06 K/UL — SIGNIFICANT CHANGE UP (ref 0–0.5)
EOSINOPHIL # BLD AUTO: 0.15 K/UL — SIGNIFICANT CHANGE UP (ref 0–0.5)
EOSINOPHIL # BLD AUTO: 0.19 K/UL — SIGNIFICANT CHANGE UP (ref 0–0.5)
EOSINOPHIL # BLD AUTO: 0.21 K/UL — SIGNIFICANT CHANGE UP (ref 0–0.5)
EOSINOPHIL NFR BLD AUTO: 0.1 % — SIGNIFICANT CHANGE UP (ref 0–6)
EOSINOPHIL NFR BLD AUTO: 0.4 % — SIGNIFICANT CHANGE UP (ref 0–6)
EOSINOPHIL NFR BLD AUTO: 0.4 % — SIGNIFICANT CHANGE UP (ref 0–6)
EOSINOPHIL NFR BLD AUTO: 2 % — SIGNIFICANT CHANGE UP (ref 0–6)
EOSINOPHIL NFR BLD AUTO: 2.1 % — SIGNIFICANT CHANGE UP (ref 0–6)
EOSINOPHIL NFR BLD AUTO: 2.2 % — SIGNIFICANT CHANGE UP (ref 0–6)
ESTIMATED AVERAGE GLUCOSE: 203 MG/DL — HIGH (ref 68–114)
ESTIMATED AVERAGE GLUCOSE: 212 MG/DL — HIGH (ref 68–114)
FERRITIN SERPL-MCNC: 515 NG/ML — HIGH (ref 30–400)
GAS PNL BLDV: SIGNIFICANT CHANGE UP
GLUCOSE BLDC GLUCOMTR-MCNC: 110 MG/DL — HIGH (ref 70–99)
GLUCOSE BLDC GLUCOMTR-MCNC: 115 MG/DL — HIGH (ref 70–99)
GLUCOSE BLDC GLUCOMTR-MCNC: 123 MG/DL — HIGH (ref 70–99)
GLUCOSE BLDC GLUCOMTR-MCNC: 128 MG/DL — HIGH (ref 70–99)
GLUCOSE BLDC GLUCOMTR-MCNC: 128 MG/DL — HIGH (ref 70–99)
GLUCOSE BLDC GLUCOMTR-MCNC: 143 MG/DL — HIGH (ref 70–99)
GLUCOSE BLDC GLUCOMTR-MCNC: 148 MG/DL — HIGH (ref 70–99)
GLUCOSE BLDC GLUCOMTR-MCNC: 150 MG/DL — HIGH (ref 70–99)
GLUCOSE BLDC GLUCOMTR-MCNC: 156 MG/DL — HIGH (ref 70–99)
GLUCOSE BLDC GLUCOMTR-MCNC: 160 MG/DL — HIGH (ref 70–99)
GLUCOSE BLDC GLUCOMTR-MCNC: 166 MG/DL — HIGH (ref 70–99)
GLUCOSE BLDC GLUCOMTR-MCNC: 180 MG/DL — HIGH (ref 70–99)
GLUCOSE BLDC GLUCOMTR-MCNC: 183 MG/DL — HIGH (ref 70–99)
GLUCOSE BLDC GLUCOMTR-MCNC: 186 MG/DL — HIGH (ref 70–99)
GLUCOSE BLDC GLUCOMTR-MCNC: 200 MG/DL — HIGH (ref 70–99)
GLUCOSE BLDC GLUCOMTR-MCNC: 200 MG/DL — HIGH (ref 70–99)
GLUCOSE BLDC GLUCOMTR-MCNC: 201 MG/DL — HIGH (ref 70–99)
GLUCOSE BLDC GLUCOMTR-MCNC: 208 MG/DL — HIGH (ref 70–99)
GLUCOSE BLDC GLUCOMTR-MCNC: 218 MG/DL — HIGH (ref 70–99)
GLUCOSE BLDC GLUCOMTR-MCNC: 221 MG/DL — HIGH (ref 70–99)
GLUCOSE BLDC GLUCOMTR-MCNC: 226 MG/DL — HIGH (ref 70–99)
GLUCOSE BLDC GLUCOMTR-MCNC: 233 MG/DL — HIGH (ref 70–99)
GLUCOSE BLDC GLUCOMTR-MCNC: 233 MG/DL — HIGH (ref 70–99)
GLUCOSE BLDC GLUCOMTR-MCNC: 239 MG/DL — HIGH (ref 70–99)
GLUCOSE BLDC GLUCOMTR-MCNC: 239 MG/DL — HIGH (ref 70–99)
GLUCOSE BLDC GLUCOMTR-MCNC: 272 MG/DL — HIGH (ref 70–99)
GLUCOSE BLDC GLUCOMTR-MCNC: 287 MG/DL — HIGH (ref 70–99)
GLUCOSE BLDC GLUCOMTR-MCNC: 287 MG/DL — HIGH (ref 70–99)
GLUCOSE BLDC GLUCOMTR-MCNC: 293 MG/DL — HIGH (ref 70–99)
GLUCOSE BLDC GLUCOMTR-MCNC: 317 MG/DL — HIGH (ref 70–99)
GLUCOSE BLDC GLUCOMTR-MCNC: 321 MG/DL — HIGH (ref 70–99)
GLUCOSE BLDC GLUCOMTR-MCNC: 325 MG/DL — HIGH (ref 70–99)
GLUCOSE BLDC GLUCOMTR-MCNC: 338 MG/DL — HIGH (ref 70–99)
GLUCOSE BLDC GLUCOMTR-MCNC: 346 MG/DL — HIGH (ref 70–99)
GLUCOSE BLDC GLUCOMTR-MCNC: 347 MG/DL — HIGH (ref 70–99)
GLUCOSE BLDC GLUCOMTR-MCNC: 366 MG/DL — HIGH (ref 70–99)
GLUCOSE BLDC GLUCOMTR-MCNC: 379 MG/DL — HIGH (ref 70–99)
GLUCOSE BLDC GLUCOMTR-MCNC: 399 MG/DL — HIGH (ref 70–99)
GLUCOSE BLDC GLUCOMTR-MCNC: 67 MG/DL — LOW (ref 70–99)
GLUCOSE BLDC GLUCOMTR-MCNC: 67 MG/DL — LOW (ref 70–99)
GLUCOSE BLDC GLUCOMTR-MCNC: 85 MG/DL — SIGNIFICANT CHANGE UP (ref 70–99)
GLUCOSE BLDC GLUCOMTR-MCNC: 87 MG/DL — SIGNIFICANT CHANGE UP (ref 70–99)
GLUCOSE BLDC GLUCOMTR-MCNC: 89 MG/DL — SIGNIFICANT CHANGE UP (ref 70–99)
GLUCOSE BLDC GLUCOMTR-MCNC: 91 MG/DL — SIGNIFICANT CHANGE UP (ref 70–99)
GLUCOSE SERPL-MCNC: 107 MG/DL — HIGH (ref 70–99)
GLUCOSE SERPL-MCNC: 112 MG/DL — HIGH (ref 70–99)
GLUCOSE SERPL-MCNC: 152 MG/DL — HIGH (ref 70–99)
GLUCOSE SERPL-MCNC: 159 MG/DL — HIGH (ref 70–99)
GLUCOSE SERPL-MCNC: 171 MG/DL — HIGH (ref 70–99)
GLUCOSE SERPL-MCNC: 174 MG/DL — HIGH (ref 70–99)
GLUCOSE SERPL-MCNC: 184 MG/DL — HIGH (ref 70–99)
GLUCOSE SERPL-MCNC: 192 MG/DL — HIGH (ref 70–99)
GLUCOSE SERPL-MCNC: 218 MG/DL — HIGH (ref 70–99)
GLUCOSE SERPL-MCNC: 229 MG/DL — HIGH (ref 70–99)
GLUCOSE SERPL-MCNC: 253 MG/DL — HIGH (ref 70–99)
GLUCOSE SERPL-MCNC: 275 MG/DL — HIGH (ref 70–99)
GLUCOSE SERPL-MCNC: 278 MG/DL — HIGH (ref 70–99)
GLUCOSE SERPL-MCNC: 284 MG/DL — HIGH (ref 70–99)
GLUCOSE SERPL-MCNC: 296 MG/DL — HIGH (ref 70–99)
GLUCOSE SERPL-MCNC: 347 MG/DL — HIGH (ref 70–99)
GLUCOSE SERPL-MCNC: 57 MG/DL — LOW (ref 70–99)
GLUCOSE SERPL-MCNC: 90 MG/DL — SIGNIFICANT CHANGE UP (ref 70–99)
GLUCOSE SERPL-MCNC: 98 MG/DL — SIGNIFICANT CHANGE UP (ref 70–99)
GLUCOSE UR QL: NEGATIVE — SIGNIFICANT CHANGE UP
HCO3 BLDA-SCNC: 25 MMOL/L — SIGNIFICANT CHANGE UP (ref 21–28)
HCO3 BLDV-SCNC: 27 MMOL/L — SIGNIFICANT CHANGE UP (ref 22–29)
HCT VFR BLD CALC: 26.4 % — LOW (ref 39–50)
HCT VFR BLD CALC: 27.8 % — LOW (ref 39–50)
HCT VFR BLD CALC: 28 % — LOW (ref 39–50)
HCT VFR BLD CALC: 28 % — LOW (ref 39–50)
HCT VFR BLD CALC: 28.1 % — LOW (ref 39–50)
HCT VFR BLD CALC: 29.8 % — LOW (ref 39–50)
HCT VFR BLD CALC: 30.9 % — LOW (ref 39–50)
HCT VFR BLD CALC: 31.9 % — LOW (ref 39–50)
HCT VFR BLD CALC: 32.8 % — LOW (ref 39–50)
HCT VFR BLD CALC: 33.2 % — LOW (ref 39–50)
HCT VFR BLD CALC: 33.4 % — LOW (ref 39–50)
HCT VFR BLD CALC: 34.2 % — LOW (ref 39–50)
HCT VFR BLD CALC: 34.6 % — LOW (ref 39–50)
HCT VFR BLD CALC: 35 % — LOW (ref 39–50)
HCT VFR BLD CALC: 35.3 % — LOW (ref 39–50)
HCT VFR BLD CALC: 35.7 % — LOW (ref 39–50)
HCT VFR BLD CALC: 37.5 % — LOW (ref 39–50)
HCT VFR BLD CALC: 39.3 % — SIGNIFICANT CHANGE UP (ref 39–50)
HDLC SERPL-MCNC: 44 MG/DL — SIGNIFICANT CHANGE UP
HGB BLD-MCNC: 10.2 G/DL — LOW (ref 13–17)
HGB BLD-MCNC: 10.4 G/DL — LOW (ref 13–17)
HGB BLD-MCNC: 10.6 G/DL — LOW (ref 13–17)
HGB BLD-MCNC: 11 G/DL — LOW (ref 13–17)
HGB BLD-MCNC: 11.1 G/DL — LOW (ref 13–17)
HGB BLD-MCNC: 11.6 G/DL — LOW (ref 13–17)
HGB BLD-MCNC: 11.7 G/DL — LOW (ref 13–17)
HGB BLD-MCNC: 12.1 G/DL — LOW (ref 13–17)
HGB BLD-MCNC: 12.2 G/DL — LOW (ref 13–17)
HGB BLD-MCNC: 13.1 G/DL — SIGNIFICANT CHANGE UP (ref 13–17)
HGB BLD-MCNC: 8.6 G/DL — LOW (ref 13–17)
HGB BLD-MCNC: 8.7 G/DL — LOW (ref 13–17)
HGB BLD-MCNC: 9 G/DL — LOW (ref 13–17)
HGB BLD-MCNC: 9.2 G/DL — LOW (ref 13–17)
HGB BLD-MCNC: 9.2 G/DL — LOW (ref 13–17)
HGB BLD-MCNC: 9.7 G/DL — LOW (ref 13–17)
IMM GRANULOCYTES NFR BLD AUTO: 0.2 % — SIGNIFICANT CHANGE UP (ref 0–1.5)
IMM GRANULOCYTES NFR BLD AUTO: 0.4 % — SIGNIFICANT CHANGE UP (ref 0–1.5)
IMM GRANULOCYTES NFR BLD AUTO: 0.4 % — SIGNIFICANT CHANGE UP (ref 0–1.5)
IMM GRANULOCYTES NFR BLD AUTO: 0.5 % — SIGNIFICANT CHANGE UP (ref 0–1.5)
IMM GRANULOCYTES NFR BLD AUTO: 0.7 % — SIGNIFICANT CHANGE UP (ref 0–1.5)
IMM GRANULOCYTES NFR BLD AUTO: 0.7 % — SIGNIFICANT CHANGE UP (ref 0–1.5)
INR BLD: 1.13 RATIO — SIGNIFICANT CHANGE UP (ref 0.88–1.16)
INR BLD: 1.18 RATIO — HIGH (ref 0.88–1.16)
INR BLD: 1.22 RATIO — HIGH (ref 0.88–1.16)
INR BLD: 1.23 RATIO — HIGH (ref 0.88–1.16)
INR BLD: 1.54 RATIO — HIGH (ref 0.88–1.16)
INR BLD: 2.15 RATIO — HIGH (ref 0.88–1.16)
KETONES UR-MCNC: NEGATIVE — SIGNIFICANT CHANGE UP
LACTATE SERPL-SCNC: 1 MMOL/L — SIGNIFICANT CHANGE UP (ref 0.7–2)
LACTATE SERPL-SCNC: 1.7 MMOL/L — SIGNIFICANT CHANGE UP (ref 0.7–2)
LDH SERPL L TO P-CCNC: 356 U/L — HIGH (ref 84–241)
LEUKOCYTE ESTERASE UR-ACNC: NEGATIVE — SIGNIFICANT CHANGE UP
LIPID PNL WITH DIRECT LDL SERPL: 35 MG/DL — SIGNIFICANT CHANGE UP
LYMPHOCYTES # BLD AUTO: 0.81 K/UL — LOW (ref 1–3.3)
LYMPHOCYTES # BLD AUTO: 0.87 K/UL — LOW (ref 1–3.3)
LYMPHOCYTES # BLD AUTO: 0.96 K/UL — LOW (ref 1–3.3)
LYMPHOCYTES # BLD AUTO: 1.37 K/UL — SIGNIFICANT CHANGE UP (ref 1–3.3)
LYMPHOCYTES # BLD AUTO: 1.43 K/UL — SIGNIFICANT CHANGE UP (ref 1–3.3)
LYMPHOCYTES # BLD AUTO: 12.6 % — LOW (ref 13–44)
LYMPHOCYTES # BLD AUTO: 12.7 % — LOW (ref 13–44)
LYMPHOCYTES # BLD AUTO: 13.9 % — SIGNIFICANT CHANGE UP (ref 13–44)
LYMPHOCYTES # BLD AUTO: 2.03 K/UL — SIGNIFICANT CHANGE UP (ref 1–3.3)
LYMPHOCYTES # BLD AUTO: 23.4 % — SIGNIFICANT CHANGE UP (ref 13–44)
LYMPHOCYTES # BLD AUTO: 4.9 % — LOW (ref 13–44)
LYMPHOCYTES # BLD AUTO: 5.4 % — LOW (ref 13–44)
MAGNESIUM SERPL-MCNC: 2.3 MG/DL — SIGNIFICANT CHANGE UP (ref 1.6–2.6)
MAGNESIUM SERPL-MCNC: 2.3 MG/DL — SIGNIFICANT CHANGE UP (ref 1.6–2.6)
MAGNESIUM SERPL-MCNC: 2.4 MG/DL — SIGNIFICANT CHANGE UP (ref 1.6–2.6)
MAGNESIUM SERPL-MCNC: 2.6 MG/DL — SIGNIFICANT CHANGE UP (ref 1.6–2.6)
MAGNESIUM SERPL-MCNC: 2.7 MG/DL — HIGH (ref 1.6–2.6)
MAGNESIUM SERPL-MCNC: 2.8 MG/DL — HIGH (ref 1.6–2.6)
MAGNESIUM SERPL-MCNC: 2.9 MG/DL — HIGH (ref 1.6–2.6)
MCHC RBC-ENTMCNC: 31.3 GM/DL — LOW (ref 32–36)
MCHC RBC-ENTMCNC: 31.7 GM/DL — LOW (ref 32–36)
MCHC RBC-ENTMCNC: 32 GM/DL — SIGNIFICANT CHANGE UP (ref 32–36)
MCHC RBC-ENTMCNC: 32.1 GM/DL — SIGNIFICANT CHANGE UP (ref 32–36)
MCHC RBC-ENTMCNC: 32.3 GM/DL — SIGNIFICANT CHANGE UP (ref 32–36)
MCHC RBC-ENTMCNC: 32.5 GM/DL — SIGNIFICANT CHANGE UP (ref 32–36)
MCHC RBC-ENTMCNC: 32.6 GM/DL — SIGNIFICANT CHANGE UP (ref 32–36)
MCHC RBC-ENTMCNC: 32.6 GM/DL — SIGNIFICANT CHANGE UP (ref 32–36)
MCHC RBC-ENTMCNC: 32.9 GM/DL — SIGNIFICANT CHANGE UP (ref 32–36)
MCHC RBC-ENTMCNC: 33 GM/DL — SIGNIFICANT CHANGE UP (ref 32–36)
MCHC RBC-ENTMCNC: 33.1 GM/DL — SIGNIFICANT CHANGE UP (ref 32–36)
MCHC RBC-ENTMCNC: 33.1 GM/DL — SIGNIFICANT CHANGE UP (ref 32–36)
MCHC RBC-ENTMCNC: 33.2 GM/DL — SIGNIFICANT CHANGE UP (ref 32–36)
MCHC RBC-ENTMCNC: 33.3 GM/DL — SIGNIFICANT CHANGE UP (ref 32–36)
MCHC RBC-ENTMCNC: 33.4 GM/DL — SIGNIFICANT CHANGE UP (ref 32–36)
MCHC RBC-ENTMCNC: 33.4 PG — SIGNIFICANT CHANGE UP (ref 27–34)
MCHC RBC-ENTMCNC: 33.7 PG — SIGNIFICANT CHANGE UP (ref 27–34)
MCHC RBC-ENTMCNC: 33.7 PG — SIGNIFICANT CHANGE UP (ref 27–34)
MCHC RBC-ENTMCNC: 33.8 PG — SIGNIFICANT CHANGE UP (ref 27–34)
MCHC RBC-ENTMCNC: 33.9 PG — SIGNIFICANT CHANGE UP (ref 27–34)
MCHC RBC-ENTMCNC: 33.9 PG — SIGNIFICANT CHANGE UP (ref 27–34)
MCHC RBC-ENTMCNC: 34.1 PG — HIGH (ref 27–34)
MCHC RBC-ENTMCNC: 34.2 GM/DL — SIGNIFICANT CHANGE UP (ref 32–36)
MCHC RBC-ENTMCNC: 34.2 PG — HIGH (ref 27–34)
MCHC RBC-ENTMCNC: 34.3 PG — HIGH (ref 27–34)
MCHC RBC-ENTMCNC: 34.4 PG — HIGH (ref 27–34)
MCHC RBC-ENTMCNC: 34.5 PG — HIGH (ref 27–34)
MCHC RBC-ENTMCNC: 34.6 PG — HIGH (ref 27–34)
MCHC RBC-ENTMCNC: 35 PG — HIGH (ref 27–34)
MCHC RBC-ENTMCNC: 35.2 PG — HIGH (ref 27–34)
MCV RBC AUTO: 100.6 FL — HIGH (ref 80–100)
MCV RBC AUTO: 101.7 FL — HIGH (ref 80–100)
MCV RBC AUTO: 102.9 FL — HIGH (ref 80–100)
MCV RBC AUTO: 103.2 FL — HIGH (ref 80–100)
MCV RBC AUTO: 103.2 FL — HIGH (ref 80–100)
MCV RBC AUTO: 103.6 FL — HIGH (ref 80–100)
MCV RBC AUTO: 103.7 FL — HIGH (ref 80–100)
MCV RBC AUTO: 104 FL — HIGH (ref 80–100)
MCV RBC AUTO: 104.4 FL — HIGH (ref 80–100)
MCV RBC AUTO: 104.7 FL — HIGH (ref 80–100)
MCV RBC AUTO: 104.9 FL — HIGH (ref 80–100)
MCV RBC AUTO: 105 FL — HIGH (ref 80–100)
MCV RBC AUTO: 105.3 FL — HIGH (ref 80–100)
MCV RBC AUTO: 105.8 FL — HIGH (ref 80–100)
MCV RBC AUTO: 106 FL — HIGH (ref 80–100)
MCV RBC AUTO: 106.5 FL — HIGH (ref 80–100)
MCV RBC AUTO: 108.2 FL — HIGH (ref 80–100)
MCV RBC AUTO: 109.8 FL — HIGH (ref 80–100)
MONOCYTES # BLD AUTO: 0.61 K/UL — SIGNIFICANT CHANGE UP (ref 0–0.9)
MONOCYTES # BLD AUTO: 0.64 K/UL — SIGNIFICANT CHANGE UP (ref 0–0.9)
MONOCYTES # BLD AUTO: 0.72 K/UL — SIGNIFICANT CHANGE UP (ref 0–0.9)
MONOCYTES # BLD AUTO: 0.76 K/UL — SIGNIFICANT CHANGE UP (ref 0–0.9)
MONOCYTES # BLD AUTO: 0.81 K/UL — SIGNIFICANT CHANGE UP (ref 0–0.9)
MONOCYTES # BLD AUTO: 0.89 K/UL — SIGNIFICANT CHANGE UP (ref 0–0.9)
MONOCYTES NFR BLD AUTO: 3.7 % — SIGNIFICANT CHANGE UP (ref 2–14)
MONOCYTES NFR BLD AUTO: 5.5 % — SIGNIFICANT CHANGE UP (ref 2–14)
MONOCYTES NFR BLD AUTO: 6.4 % — SIGNIFICANT CHANGE UP (ref 2–14)
MONOCYTES NFR BLD AUTO: 7.7 % — SIGNIFICANT CHANGE UP (ref 2–14)
MONOCYTES NFR BLD AUTO: 8.4 % — SIGNIFICANT CHANGE UP (ref 2–14)
MONOCYTES NFR BLD AUTO: 9.3 % — SIGNIFICANT CHANGE UP (ref 2–14)
NEUTROPHILS # BLD AUTO: 14.16 K/UL — HIGH (ref 1.8–7.4)
NEUTROPHILS # BLD AUTO: 15.17 K/UL — HIGH (ref 1.8–7.4)
NEUTROPHILS # BLD AUTO: 5.58 K/UL — SIGNIFICANT CHANGE UP (ref 1.8–7.4)
NEUTROPHILS # BLD AUTO: 5.82 K/UL — SIGNIFICANT CHANGE UP (ref 1.8–7.4)
NEUTROPHILS # BLD AUTO: 7.45 K/UL — HIGH (ref 1.8–7.4)
NEUTROPHILS # BLD AUTO: 9 K/UL — HIGH (ref 1.8–7.4)
NEUTROPHILS NFR BLD AUTO: 64.2 % — SIGNIFICANT CHANGE UP (ref 43–77)
NEUTROPHILS NFR BLD AUTO: 75.3 % — SIGNIFICANT CHANGE UP (ref 43–77)
NEUTROPHILS NFR BLD AUTO: 76 % — SIGNIFICANT CHANGE UP (ref 43–77)
NEUTROPHILS NFR BLD AUTO: 79.7 % — HIGH (ref 43–77)
NEUTROPHILS NFR BLD AUTO: 87.7 % — HIGH (ref 43–77)
NEUTROPHILS NFR BLD AUTO: 90.8 % — HIGH (ref 43–77)
NITRITE UR-MCNC: NEGATIVE — SIGNIFICANT CHANGE UP
NON HDL CHOLESTEROL: 57 MG/DL — SIGNIFICANT CHANGE UP
NT-PROBNP SERPL-SCNC: HIGH PG/ML (ref 0–450)
PCO2 BLDA: 44 MMHG — SIGNIFICANT CHANGE UP (ref 35–48)
PCO2 BLDV: 44 MMHG — SIGNIFICANT CHANGE UP (ref 42–55)
PH BLDA: 7.37 — SIGNIFICANT CHANGE UP (ref 7.35–7.45)
PH BLDV: 7.4 — SIGNIFICANT CHANGE UP (ref 7.32–7.43)
PH UR: 5 — SIGNIFICANT CHANGE UP (ref 5–8)
PHOSPHATE SERPL-MCNC: 2.5 MG/DL — SIGNIFICANT CHANGE UP (ref 2.5–4.5)
PHOSPHATE SERPL-MCNC: 2.9 MG/DL — SIGNIFICANT CHANGE UP (ref 2.5–4.5)
PHOSPHATE SERPL-MCNC: 2.9 MG/DL — SIGNIFICANT CHANGE UP (ref 2.5–4.5)
PHOSPHATE SERPL-MCNC: 3.2 MG/DL — SIGNIFICANT CHANGE UP (ref 2.5–4.5)
PHOSPHATE SERPL-MCNC: 3.8 MG/DL — SIGNIFICANT CHANGE UP (ref 2.5–4.5)
PLATELET # BLD AUTO: 125 K/UL — LOW (ref 150–400)
PLATELET # BLD AUTO: 129 K/UL — LOW (ref 150–400)
PLATELET # BLD AUTO: 129 K/UL — LOW (ref 150–400)
PLATELET # BLD AUTO: 130 K/UL — LOW (ref 150–400)
PLATELET # BLD AUTO: 138 K/UL — LOW (ref 150–400)
PLATELET # BLD AUTO: 151 K/UL — SIGNIFICANT CHANGE UP (ref 150–400)
PLATELET # BLD AUTO: 151 K/UL — SIGNIFICANT CHANGE UP (ref 150–400)
PLATELET # BLD AUTO: 152 K/UL — SIGNIFICANT CHANGE UP (ref 150–400)
PLATELET # BLD AUTO: 153 K/UL — SIGNIFICANT CHANGE UP (ref 150–400)
PLATELET # BLD AUTO: 155 K/UL — SIGNIFICANT CHANGE UP (ref 150–400)
PLATELET # BLD AUTO: 155 K/UL — SIGNIFICANT CHANGE UP (ref 150–400)
PLATELET # BLD AUTO: 163 K/UL — SIGNIFICANT CHANGE UP (ref 150–400)
PLATELET # BLD AUTO: 165 K/UL — SIGNIFICANT CHANGE UP (ref 150–400)
PLATELET # BLD AUTO: 169 K/UL — SIGNIFICANT CHANGE UP (ref 150–400)
PLATELET # BLD AUTO: 175 K/UL — SIGNIFICANT CHANGE UP (ref 150–400)
PLATELET # BLD AUTO: 191 K/UL — SIGNIFICANT CHANGE UP (ref 150–400)
PO2 BLDA: 95 MMHG — SIGNIFICANT CHANGE UP (ref 83–108)
PO2 BLDV: 78 MMHG — SIGNIFICANT CHANGE UP
POTASSIUM SERPL-MCNC: 4 MMOL/L — SIGNIFICANT CHANGE UP (ref 3.5–5.3)
POTASSIUM SERPL-MCNC: 4.1 MMOL/L — SIGNIFICANT CHANGE UP (ref 3.5–5.3)
POTASSIUM SERPL-MCNC: 4.2 MMOL/L — SIGNIFICANT CHANGE UP (ref 3.5–5.3)
POTASSIUM SERPL-MCNC: 4.3 MMOL/L — SIGNIFICANT CHANGE UP (ref 3.5–5.3)
POTASSIUM SERPL-MCNC: 4.4 MMOL/L — SIGNIFICANT CHANGE UP (ref 3.5–5.3)
POTASSIUM SERPL-MCNC: 4.5 MMOL/L — SIGNIFICANT CHANGE UP (ref 3.5–5.3)
POTASSIUM SERPL-MCNC: 4.6 MMOL/L — SIGNIFICANT CHANGE UP (ref 3.5–5.3)
POTASSIUM SERPL-MCNC: 4.8 MMOL/L — SIGNIFICANT CHANGE UP (ref 3.5–5.3)
POTASSIUM SERPL-MCNC: 4.8 MMOL/L — SIGNIFICANT CHANGE UP (ref 3.5–5.3)
POTASSIUM SERPL-MCNC: 4.9 MMOL/L — SIGNIFICANT CHANGE UP (ref 3.5–5.3)
POTASSIUM SERPL-MCNC: 5 MMOL/L — SIGNIFICANT CHANGE UP (ref 3.5–5.3)
POTASSIUM SERPL-MCNC: 5 MMOL/L — SIGNIFICANT CHANGE UP (ref 3.5–5.3)
POTASSIUM SERPL-MCNC: 5.1 MMOL/L — SIGNIFICANT CHANGE UP (ref 3.5–5.3)
POTASSIUM SERPL-MCNC: 5.6 MMOL/L — HIGH (ref 3.5–5.3)
POTASSIUM SERPL-SCNC: 4 MMOL/L — SIGNIFICANT CHANGE UP (ref 3.5–5.3)
POTASSIUM SERPL-SCNC: 4.1 MMOL/L — SIGNIFICANT CHANGE UP (ref 3.5–5.3)
POTASSIUM SERPL-SCNC: 4.2 MMOL/L — SIGNIFICANT CHANGE UP (ref 3.5–5.3)
POTASSIUM SERPL-SCNC: 4.3 MMOL/L — SIGNIFICANT CHANGE UP (ref 3.5–5.3)
POTASSIUM SERPL-SCNC: 4.4 MMOL/L — SIGNIFICANT CHANGE UP (ref 3.5–5.3)
POTASSIUM SERPL-SCNC: 4.5 MMOL/L — SIGNIFICANT CHANGE UP (ref 3.5–5.3)
POTASSIUM SERPL-SCNC: 4.6 MMOL/L — SIGNIFICANT CHANGE UP (ref 3.5–5.3)
POTASSIUM SERPL-SCNC: 4.8 MMOL/L — SIGNIFICANT CHANGE UP (ref 3.5–5.3)
POTASSIUM SERPL-SCNC: 4.8 MMOL/L — SIGNIFICANT CHANGE UP (ref 3.5–5.3)
POTASSIUM SERPL-SCNC: 4.9 MMOL/L — SIGNIFICANT CHANGE UP (ref 3.5–5.3)
POTASSIUM SERPL-SCNC: 5 MMOL/L — SIGNIFICANT CHANGE UP (ref 3.5–5.3)
POTASSIUM SERPL-SCNC: 5 MMOL/L — SIGNIFICANT CHANGE UP (ref 3.5–5.3)
POTASSIUM SERPL-SCNC: 5.1 MMOL/L — SIGNIFICANT CHANGE UP (ref 3.5–5.3)
POTASSIUM SERPL-SCNC: 5.6 MMOL/L — HIGH (ref 3.5–5.3)
PROCALCITONIN SERPL-MCNC: 0.68 NG/ML — HIGH (ref 0.02–0.1)
PROT SERPL-MCNC: 6.8 GM/DL — SIGNIFICANT CHANGE UP (ref 6–8.3)
PROT SERPL-MCNC: 7.2 GM/DL — SIGNIFICANT CHANGE UP (ref 6–8.3)
PROT SERPL-MCNC: 7.4 GM/DL — SIGNIFICANT CHANGE UP (ref 6–8.3)
PROT SERPL-MCNC: 7.4 GM/DL — SIGNIFICANT CHANGE UP (ref 6–8.3)
PROT SERPL-MCNC: 8.1 GM/DL — SIGNIFICANT CHANGE UP (ref 6–8.3)
PROT UR-MCNC: 15
PROTHROM AB SERPL-ACNC: 13.1 SEC — SIGNIFICANT CHANGE UP (ref 10.6–13.6)
PROTHROM AB SERPL-ACNC: 13.6 SEC — SIGNIFICANT CHANGE UP (ref 10.6–13.6)
PROTHROM AB SERPL-ACNC: 14.1 SEC — HIGH (ref 10.6–13.6)
PROTHROM AB SERPL-ACNC: 14.3 SEC — HIGH (ref 10.6–13.6)
PROTHROM AB SERPL-ACNC: 17.5 SEC — HIGH (ref 10.6–13.6)
PROTHROM AB SERPL-ACNC: 24 SEC — HIGH (ref 10.6–13.6)
RAPID RVP RESULT: DETECTED
RAPID RVP RESULT: SIGNIFICANT CHANGE UP
RBC # BLD: 2.49 M/UL — LOW (ref 4.2–5.8)
RBC # BLD: 2.56 M/UL — LOW (ref 4.2–5.8)
RBC # BLD: 2.57 M/UL — LOW (ref 4.2–5.8)
RBC # BLD: 2.67 M/UL — LOW (ref 4.2–5.8)
RBC # BLD: 2.7 M/UL — LOW (ref 4.2–5.8)
RBC # BLD: 2.83 M/UL — LOW (ref 4.2–5.8)
RBC # BLD: 2.96 M/UL — LOW (ref 4.2–5.8)
RBC # BLD: 3.08 M/UL — LOW (ref 4.2–5.8)
RBC # BLD: 3.09 M/UL — LOW (ref 4.2–5.8)
RBC # BLD: 3.17 M/UL — LOW (ref 4.2–5.8)
RBC # BLD: 3.18 M/UL — LOW (ref 4.2–5.8)
RBC # BLD: 3.27 M/UL — LOW (ref 4.2–5.8)
RBC # BLD: 3.29 M/UL — LOW (ref 4.2–5.8)
RBC # BLD: 3.39 M/UL — LOW (ref 4.2–5.8)
RBC # BLD: 3.47 M/UL — LOW (ref 4.2–5.8)
RBC # BLD: 3.55 M/UL — LOW (ref 4.2–5.8)
RBC # BLD: 3.62 M/UL — LOW (ref 4.2–5.8)
RBC # BLD: 3.82 M/UL — LOW (ref 4.2–5.8)
RBC # FLD: 12.8 % — SIGNIFICANT CHANGE UP (ref 10.3–14.5)
RBC # FLD: 13 % — SIGNIFICANT CHANGE UP (ref 10.3–14.5)
RBC # FLD: 13.2 % — SIGNIFICANT CHANGE UP (ref 10.3–14.5)
RBC # FLD: 13.3 % — SIGNIFICANT CHANGE UP (ref 10.3–14.5)
RBC # FLD: 13.3 % — SIGNIFICANT CHANGE UP (ref 10.3–14.5)
RBC # FLD: 13.4 % — SIGNIFICANT CHANGE UP (ref 10.3–14.5)
RBC # FLD: 13.4 % — SIGNIFICANT CHANGE UP (ref 10.3–14.5)
RBC # FLD: 13.5 % — SIGNIFICANT CHANGE UP (ref 10.3–14.5)
RBC # FLD: 13.5 % — SIGNIFICANT CHANGE UP (ref 10.3–14.5)
RBC # FLD: 13.6 % — SIGNIFICANT CHANGE UP (ref 10.3–14.5)
RBC # FLD: 14 % — SIGNIFICANT CHANGE UP (ref 10.3–14.5)
RBC # FLD: 14 % — SIGNIFICANT CHANGE UP (ref 10.3–14.5)
RBC # FLD: 14.6 % — HIGH (ref 10.3–14.5)
RBC # FLD: 14.6 % — HIGH (ref 10.3–14.5)
SAO2 % BLDA: 99 % — SIGNIFICANT CHANGE UP
SAO2 % BLDV: 96.7 % — SIGNIFICANT CHANGE UP
SARS-COV-2 IGG+IGM SERPL QL IA: 240 U/ML — HIGH
SARS-COV-2 IGG+IGM SERPL QL IA: >250 U/ML — HIGH
SARS-COV-2 IGG+IGM SERPL QL IA: POSITIVE
SARS-COV-2 RNA SPEC QL NAA+PROBE: DETECTED
SARS-COV-2 RNA SPEC QL NAA+PROBE: SIGNIFICANT CHANGE UP
SODIUM SERPL-SCNC: 135 MMOL/L — SIGNIFICANT CHANGE UP (ref 135–145)
SODIUM SERPL-SCNC: 136 MMOL/L — SIGNIFICANT CHANGE UP (ref 135–145)
SODIUM SERPL-SCNC: 137 MMOL/L — SIGNIFICANT CHANGE UP (ref 135–145)
SODIUM SERPL-SCNC: 138 MMOL/L — SIGNIFICANT CHANGE UP (ref 135–145)
SODIUM SERPL-SCNC: 139 MMOL/L — SIGNIFICANT CHANGE UP (ref 135–145)
SODIUM SERPL-SCNC: 140 MMOL/L — SIGNIFICANT CHANGE UP (ref 135–145)
SODIUM SERPL-SCNC: 141 MMOL/L — SIGNIFICANT CHANGE UP (ref 135–145)
SODIUM SERPL-SCNC: 142 MMOL/L — SIGNIFICANT CHANGE UP (ref 135–145)
SODIUM SERPL-SCNC: 142 MMOL/L — SIGNIFICANT CHANGE UP (ref 135–145)
SODIUM SERPL-SCNC: 143 MMOL/L — SIGNIFICANT CHANGE UP (ref 135–145)
SODIUM SERPL-SCNC: 143 MMOL/L — SIGNIFICANT CHANGE UP (ref 135–145)
SP GR SPEC: 1.01 — SIGNIFICANT CHANGE UP (ref 1.01–1.02)
SPECIMEN SOURCE: SIGNIFICANT CHANGE UP
SPECIMEN SOURCE: SIGNIFICANT CHANGE UP
TRIGL SERPL-MCNC: 107 MG/DL — SIGNIFICANT CHANGE UP
TROPONIN I SERPL-MCNC: 0.06 NG/ML — HIGH (ref 0.01–0.04)
TROPONIN I SERPL-MCNC: 0.08 NG/ML — HIGH (ref 0.01–0.04)
TROPONIN I SERPL-MCNC: 0.11 NG/ML — HIGH (ref 0.01–0.04)
TROPONIN I, HIGH SENSITIVITY RESULT: 149.98 NG/L — HIGH
TROPONIN I, HIGH SENSITIVITY RESULT: 169.78 NG/L — HIGH
TROPONIN I, HIGH SENSITIVITY RESULT: 194.33 NG/L — HIGH
TSH SERPL-MCNC: 0.32 UU/ML — LOW (ref 0.34–4.82)
UROBILINOGEN FLD QL: NEGATIVE — SIGNIFICANT CHANGE UP
WBC # BLD: 10.86 K/UL — HIGH (ref 3.8–10.5)
WBC # BLD: 11.11 K/UL — HIGH (ref 3.8–10.5)
WBC # BLD: 11.28 K/UL — HIGH (ref 3.8–10.5)
WBC # BLD: 11.35 K/UL — HIGH (ref 3.8–10.5)
WBC # BLD: 12.18 K/UL — HIGH (ref 3.8–10.5)
WBC # BLD: 12.43 K/UL — HIGH (ref 3.8–10.5)
WBC # BLD: 13.15 K/UL — HIGH (ref 3.8–10.5)
WBC # BLD: 13.89 K/UL — HIGH (ref 3.8–10.5)
WBC # BLD: 16.14 K/UL — HIGH (ref 3.8–10.5)
WBC # BLD: 16.69 K/UL — HIGH (ref 3.8–10.5)
WBC # BLD: 20.48 K/UL — HIGH (ref 3.8–10.5)
WBC # BLD: 7.64 K/UL — SIGNIFICANT CHANGE UP (ref 3.8–10.5)
WBC # BLD: 7.73 K/UL — SIGNIFICANT CHANGE UP (ref 3.8–10.5)
WBC # BLD: 8.69 K/UL — SIGNIFICANT CHANGE UP (ref 3.8–10.5)
WBC # BLD: 9.07 K/UL — SIGNIFICANT CHANGE UP (ref 3.8–10.5)
WBC # BLD: 9.09 K/UL — SIGNIFICANT CHANGE UP (ref 3.8–10.5)
WBC # BLD: 9.84 K/UL — SIGNIFICANT CHANGE UP (ref 3.8–10.5)
WBC # BLD: 9.89 K/UL — SIGNIFICANT CHANGE UP (ref 3.8–10.5)
WBC # FLD AUTO: 10.86 K/UL — HIGH (ref 3.8–10.5)
WBC # FLD AUTO: 11.11 K/UL — HIGH (ref 3.8–10.5)
WBC # FLD AUTO: 11.28 K/UL — HIGH (ref 3.8–10.5)
WBC # FLD AUTO: 11.35 K/UL — HIGH (ref 3.8–10.5)
WBC # FLD AUTO: 12.18 K/UL — HIGH (ref 3.8–10.5)
WBC # FLD AUTO: 12.43 K/UL — HIGH (ref 3.8–10.5)
WBC # FLD AUTO: 13.15 K/UL — HIGH (ref 3.8–10.5)
WBC # FLD AUTO: 13.89 K/UL — HIGH (ref 3.8–10.5)
WBC # FLD AUTO: 16.14 K/UL — HIGH (ref 3.8–10.5)
WBC # FLD AUTO: 16.69 K/UL — HIGH (ref 3.8–10.5)
WBC # FLD AUTO: 20.48 K/UL — HIGH (ref 3.8–10.5)
WBC # FLD AUTO: 7.64 K/UL — SIGNIFICANT CHANGE UP (ref 3.8–10.5)
WBC # FLD AUTO: 7.73 K/UL — SIGNIFICANT CHANGE UP (ref 3.8–10.5)
WBC # FLD AUTO: 8.69 K/UL — SIGNIFICANT CHANGE UP (ref 3.8–10.5)
WBC # FLD AUTO: 9.07 K/UL — SIGNIFICANT CHANGE UP (ref 3.8–10.5)
WBC # FLD AUTO: 9.09 K/UL — SIGNIFICANT CHANGE UP (ref 3.8–10.5)
WBC # FLD AUTO: 9.84 K/UL — SIGNIFICANT CHANGE UP (ref 3.8–10.5)
WBC # FLD AUTO: 9.89 K/UL — SIGNIFICANT CHANGE UP (ref 3.8–10.5)

## 2021-01-01 PROCEDURE — 93010 ELECTROCARDIOGRAM REPORT: CPT

## 2021-01-01 PROCEDURE — 99232 SBSQ HOSP IP/OBS MODERATE 35: CPT

## 2021-01-01 PROCEDURE — 73501 X-RAY EXAM HIP UNI 1 VIEW: CPT | Mod: RT

## 2021-01-01 PROCEDURE — 27236 TREAT THIGH FRACTURE: CPT | Mod: RT

## 2021-01-01 PROCEDURE — 36415 COLL VENOUS BLD VENIPUNCTURE: CPT

## 2021-01-01 PROCEDURE — 85027 COMPLETE CBC AUTOMATED: CPT

## 2021-01-01 PROCEDURE — 99233 SBSQ HOSP IP/OBS HIGH 50: CPT

## 2021-01-01 PROCEDURE — 86769 SARS-COV-2 COVID-19 ANTIBODY: CPT

## 2021-01-01 PROCEDURE — 99497 ADVNCD CARE PLAN 30 MIN: CPT | Mod: 25

## 2021-01-01 PROCEDURE — 71045 X-RAY EXAM CHEST 1 VIEW: CPT | Mod: 26

## 2021-01-01 PROCEDURE — 71250 CT THORAX DX C-: CPT | Mod: 26,MA

## 2021-01-01 PROCEDURE — 90792 PSYCH DIAG EVAL W/MED SRVCS: CPT

## 2021-01-01 PROCEDURE — 92523 SPEECH SOUND LANG COMPREHEN: CPT | Mod: GN

## 2021-01-01 PROCEDURE — 80053 COMPREHEN METABOLIC PANEL: CPT

## 2021-01-01 PROCEDURE — 85610 PROTHROMBIN TIME: CPT

## 2021-01-01 PROCEDURE — 99223 1ST HOSP IP/OBS HIGH 75: CPT

## 2021-01-01 PROCEDURE — U0005: CPT

## 2021-01-01 PROCEDURE — 93005 ELECTROCARDIOGRAM TRACING: CPT

## 2021-01-01 PROCEDURE — 97530 THERAPEUTIC ACTIVITIES: CPT | Mod: GP

## 2021-01-01 PROCEDURE — C1776: CPT

## 2021-01-01 PROCEDURE — 99222 1ST HOSP IP/OBS MODERATE 55: CPT

## 2021-01-01 PROCEDURE — 82803 BLOOD GASES ANY COMBINATION: CPT

## 2021-01-01 PROCEDURE — 70450 CT HEAD/BRAIN W/O DYE: CPT | Mod: 26,MA

## 2021-01-01 PROCEDURE — 99285 EMERGENCY DEPT VISIT HI MDM: CPT

## 2021-01-01 PROCEDURE — 82962 GLUCOSE BLOOD TEST: CPT

## 2021-01-01 PROCEDURE — 88305 TISSUE EXAM BY PATHOLOGIST: CPT

## 2021-01-01 PROCEDURE — 83036 HEMOGLOBIN GLYCOSYLATED A1C: CPT

## 2021-01-01 PROCEDURE — 83735 ASSAY OF MAGNESIUM: CPT

## 2021-01-01 PROCEDURE — 84443 ASSAY THYROID STIM HORMONE: CPT

## 2021-01-01 PROCEDURE — 85025 COMPLETE CBC W/AUTO DIFF WBC: CPT

## 2021-01-01 PROCEDURE — 80048 BASIC METABOLIC PNL TOTAL CA: CPT

## 2021-01-01 PROCEDURE — 93306 TTE W/DOPPLER COMPLETE: CPT

## 2021-01-01 PROCEDURE — 90662 IIV NO PRSV INCREASED AG IM: CPT

## 2021-01-01 PROCEDURE — 99284 EMERGENCY DEPT VISIT MOD MDM: CPT | Mod: 25

## 2021-01-01 PROCEDURE — 84100 ASSAY OF PHOSPHORUS: CPT

## 2021-01-01 PROCEDURE — 70450 CT HEAD/BRAIN W/O DYE: CPT | Mod: 26,77

## 2021-01-01 PROCEDURE — 97163 PT EVAL HIGH COMPLEX 45 MIN: CPT | Mod: GP

## 2021-01-01 PROCEDURE — 99233 SBSQ HOSP IP/OBS HIGH 50: CPT | Mod: 25

## 2021-01-01 PROCEDURE — 97116 GAIT TRAINING THERAPY: CPT | Mod: GP

## 2021-01-01 PROCEDURE — 99231 SBSQ HOSP IP/OBS SF/LOW 25: CPT

## 2021-01-01 PROCEDURE — 99204 OFFICE O/P NEW MOD 45 MIN: CPT | Mod: 25

## 2021-01-01 PROCEDURE — 80061 LIPID PANEL: CPT

## 2021-01-01 PROCEDURE — 84484 ASSAY OF TROPONIN QUANT: CPT | Mod: 91

## 2021-01-01 PROCEDURE — P9100: CPT

## 2021-01-01 PROCEDURE — 71250 CT THORAX DX C-: CPT

## 2021-01-01 PROCEDURE — 85379 FIBRIN DEGRADATION QUANT: CPT

## 2021-01-01 PROCEDURE — 36600 WITHDRAWAL OF ARTERIAL BLOOD: CPT

## 2021-01-01 PROCEDURE — 71250 CT THORAX DX C-: CPT | Mod: 26

## 2021-01-01 PROCEDURE — 90715 TDAP VACCINE 7 YRS/> IM: CPT

## 2021-01-01 PROCEDURE — 99285 EMERGENCY DEPT VISIT HI MDM: CPT | Mod: CS

## 2021-01-01 PROCEDURE — 70450 CT HEAD/BRAIN W/O DYE: CPT

## 2021-01-01 PROCEDURE — 83615 LACTATE (LD) (LDH) ENZYME: CPT

## 2021-01-01 PROCEDURE — 99498 ADVNCD CARE PLAN ADDL 30 MIN: CPT

## 2021-01-01 PROCEDURE — 99221 1ST HOSP IP/OBS SF/LOW 40: CPT | Mod: 57

## 2021-01-01 PROCEDURE — 71045 X-RAY EXAM CHEST 1 VIEW: CPT

## 2021-01-01 PROCEDURE — 88311 DECALCIFY TISSUE: CPT | Mod: 26

## 2021-01-01 PROCEDURE — 70450 CT HEAD/BRAIN W/O DYE: CPT | Mod: 26

## 2021-01-01 PROCEDURE — U0003: CPT

## 2021-01-01 PROCEDURE — 99291 CRITICAL CARE FIRST HOUR: CPT

## 2021-01-01 PROCEDURE — 73501 X-RAY EXAM HIP UNI 1 VIEW: CPT | Mod: 26,RT

## 2021-01-01 PROCEDURE — P9037: CPT

## 2021-01-01 PROCEDURE — C9399: CPT

## 2021-01-01 PROCEDURE — 99497 ADVNCD CARE PLAN 30 MIN: CPT

## 2021-01-01 PROCEDURE — 84484 ASSAY OF TROPONIN QUANT: CPT

## 2021-01-01 PROCEDURE — 72125 CT NECK SPINE W/O DYE: CPT | Mod: 26,MA

## 2021-01-01 PROCEDURE — 94660 CPAP INITIATION&MGMT: CPT

## 2021-01-01 PROCEDURE — 73521 X-RAY EXAM HIPS BI 2 VIEWS: CPT | Mod: 26

## 2021-01-01 PROCEDURE — 92610 EVALUATE SWALLOWING FUNCTION: CPT | Mod: GN

## 2021-01-01 PROCEDURE — 82306 VITAMIN D 25 HYDROXY: CPT

## 2021-01-01 PROCEDURE — 99221 1ST HOSP IP/OBS SF/LOW 40: CPT

## 2021-01-01 PROCEDURE — 99239 HOSP IP/OBS DSCHRG MGMT >30: CPT

## 2021-01-01 PROCEDURE — 88311 DECALCIFY TISSUE: CPT

## 2021-01-01 PROCEDURE — 99238 HOSP IP/OBS DSCHRG MGMT 30/<: CPT

## 2021-01-01 PROCEDURE — 93306 TTE W/DOPPLER COMPLETE: CPT | Mod: 26

## 2021-01-01 PROCEDURE — 73551 X-RAY EXAM OF FEMUR 1: CPT | Mod: 26,RT

## 2021-01-01 PROCEDURE — 69210 REMOVE IMPACTED EAR WAX UNI: CPT | Mod: LT

## 2021-01-01 PROCEDURE — 88305 TISSUE EXAM BY PATHOLOGIST: CPT | Mod: 26

## 2021-01-01 PROCEDURE — 74176 CT ABD & PELVIS W/O CONTRAST: CPT | Mod: 26,MA

## 2021-01-01 PROCEDURE — 36430 TRANSFUSION BLD/BLD COMPNT: CPT

## 2021-01-01 PROCEDURE — 97164 PT RE-EVAL EST PLAN CARE: CPT | Mod: GP

## 2021-01-01 PROCEDURE — 31575 DIAGNOSTIC LARYNGOSCOPY: CPT

## 2021-01-01 PROCEDURE — 99223 1ST HOSP IP/OBS HIGH 75: CPT | Mod: 25

## 2021-01-01 RX ORDER — SODIUM CHLORIDE 9 MG/ML
1000 INJECTION, SOLUTION INTRAVENOUS
Refills: 0 | Status: DISCONTINUED | OUTPATIENT
Start: 2021-01-01 | End: 2021-01-01

## 2021-01-01 RX ORDER — ACETAMINOPHEN 500 MG
2 TABLET ORAL
Qty: 0 | Refills: 0 | DISCHARGE
Start: 2021-01-01

## 2021-01-01 RX ORDER — ENOXAPARIN SODIUM 100 MG/ML
10 INJECTION SUBCUTANEOUS
Qty: 0 | Refills: 0 | DISCHARGE

## 2021-01-01 RX ORDER — PANTOPRAZOLE SODIUM 20 MG/1
1 TABLET, DELAYED RELEASE ORAL
Qty: 0 | Refills: 0 | DISCHARGE
Start: 2021-01-01

## 2021-01-01 RX ORDER — MULTIVIT WITH MIN/MFOLATE/K2 340-15/3 G
0.5 POWDER (GRAM) ORAL ONCE
Refills: 0 | Status: COMPLETED | OUTPATIENT
Start: 2021-01-01 | End: 2021-01-01

## 2021-01-01 RX ORDER — HYDRALAZINE HCL 50 MG
25 TABLET ORAL EVERY 8 HOURS
Refills: 0 | Status: DISCONTINUED | OUTPATIENT
Start: 2021-01-01 | End: 2021-01-01

## 2021-01-01 RX ORDER — FERROUS SULFATE 325(65) MG
325 TABLET ORAL AT BEDTIME
Refills: 0 | Status: DISCONTINUED | OUTPATIENT
Start: 2021-01-01 | End: 2021-01-01

## 2021-01-01 RX ORDER — DEXAMETHASONE 0.5 MG/5ML
6 ELIXIR ORAL ONCE
Refills: 0 | Status: COMPLETED | OUTPATIENT
Start: 2021-01-01 | End: 2021-01-01

## 2021-01-01 RX ORDER — ACETAMINOPHEN 500 MG
975 TABLET ORAL EVERY 8 HOURS
Refills: 0 | Status: DISCONTINUED | OUTPATIENT
Start: 2021-01-01 | End: 2021-01-01

## 2021-01-01 RX ORDER — CEFEPIME 1 G/1
INJECTION, POWDER, FOR SOLUTION INTRAMUSCULAR; INTRAVENOUS
Refills: 0 | Status: DISCONTINUED | OUTPATIENT
Start: 2021-01-01 | End: 2021-01-01

## 2021-01-01 RX ORDER — DOCUSATE SODIUM 100 MG
1 CAPSULE ORAL
Qty: 0 | Refills: 0 | DISCHARGE

## 2021-01-01 RX ORDER — INSULIN LISPRO 100/ML
VIAL (ML) SUBCUTANEOUS
Refills: 0 | Status: DISCONTINUED | OUTPATIENT
Start: 2021-01-01 | End: 2021-01-01

## 2021-01-01 RX ORDER — RABEPRAZOLE 20 MG/1
1 TABLET, DELAYED RELEASE ORAL
Qty: 0 | Refills: 0 | DISCHARGE

## 2021-01-01 RX ORDER — SENNA PLUS 8.6 MG/1
2 TABLET ORAL
Qty: 0 | Refills: 0 | DISCHARGE
Start: 2021-01-01

## 2021-01-01 RX ORDER — ATORVASTATIN CALCIUM 80 MG/1
2 TABLET, FILM COATED ORAL
Qty: 0 | Refills: 0 | DISCHARGE

## 2021-01-01 RX ORDER — GABAPENTIN 400 MG/1
100 CAPSULE ORAL THREE TIMES A DAY
Refills: 0 | Status: DISCONTINUED | OUTPATIENT
Start: 2021-01-01 | End: 2021-01-01

## 2021-01-01 RX ORDER — HEPARIN SODIUM 5000 [USP'U]/ML
5000 INJECTION INTRAVENOUS; SUBCUTANEOUS EVERY 12 HOURS
Refills: 0 | Status: DISCONTINUED | OUTPATIENT
Start: 2021-01-01 | End: 2021-01-01

## 2021-01-01 RX ORDER — QUETIAPINE FUMARATE 200 MG/1
12.5 TABLET, FILM COATED ORAL EVERY 6 HOURS
Refills: 0 | Status: DISCONTINUED | OUTPATIENT
Start: 2021-01-01 | End: 2021-01-01

## 2021-01-01 RX ORDER — INSULIN LISPRO 100/ML
VIAL (ML) SUBCUTANEOUS AT BEDTIME
Refills: 0 | Status: DISCONTINUED | OUTPATIENT
Start: 2021-01-01 | End: 2021-01-01

## 2021-01-01 RX ORDER — PANTOPRAZOLE SODIUM 20 MG/1
1 TABLET, DELAYED RELEASE ORAL
Qty: 0 | Refills: 0 | DISCHARGE

## 2021-01-01 RX ORDER — ASPIRIN/CALCIUM CARB/MAGNESIUM 324 MG
1 TABLET ORAL
Qty: 0 | Refills: 0 | DISCHARGE

## 2021-01-01 RX ORDER — ATORVASTATIN CALCIUM 80 MG/1
1 TABLET, FILM COATED ORAL
Qty: 0 | Refills: 0 | DISCHARGE
Start: 2021-01-01

## 2021-01-01 RX ORDER — METOPROLOL TARTRATE 50 MG
25 TABLET ORAL DAILY
Refills: 0 | Status: DISCONTINUED | OUTPATIENT
Start: 2021-01-01 | End: 2021-01-01

## 2021-01-01 RX ORDER — POLYETHYLENE GLYCOL 3350 17 G/17G
17 POWDER, FOR SOLUTION ORAL
Qty: 0 | Refills: 0 | DISCHARGE
Start: 2021-01-01

## 2021-01-01 RX ORDER — LEVETIRACETAM 250 MG/1
500 TABLET, FILM COATED ORAL ONCE
Refills: 0 | Status: COMPLETED | OUTPATIENT
Start: 2021-01-01 | End: 2021-01-01

## 2021-01-01 RX ORDER — ACETAMINOPHEN 500 MG
3 TABLET ORAL
Qty: 0 | Refills: 0 | DISCHARGE
Start: 2021-01-01

## 2021-01-01 RX ORDER — DEXTROSE 50 % IN WATER 50 %
12.5 SYRINGE (ML) INTRAVENOUS ONCE
Refills: 0 | Status: DISCONTINUED | OUTPATIENT
Start: 2021-01-01 | End: 2021-01-01

## 2021-01-01 RX ORDER — DEXMEDETOMIDINE HYDROCHLORIDE IN 0.9% SODIUM CHLORIDE 4 UG/ML
0.01 INJECTION INTRAVENOUS
Qty: 400 | Refills: 0 | Status: DISCONTINUED | OUTPATIENT
Start: 2021-01-01 | End: 2021-01-01

## 2021-01-01 RX ORDER — SIMETHICONE 80 MG/1
80 TABLET, CHEWABLE ORAL
Refills: 0 | Status: DISCONTINUED | OUTPATIENT
Start: 2021-01-01 | End: 2021-01-01

## 2021-01-01 RX ORDER — INSULIN GLARGINE 100 [IU]/ML
8 INJECTION, SOLUTION SUBCUTANEOUS AT BEDTIME
Refills: 0 | Status: DISCONTINUED | OUTPATIENT
Start: 2021-01-01 | End: 2021-01-01

## 2021-01-01 RX ORDER — FUROSEMIDE 40 MG
40 TABLET ORAL DAILY
Refills: 0 | Status: DISCONTINUED | OUTPATIENT
Start: 2021-01-01 | End: 2021-01-01

## 2021-01-01 RX ORDER — VENLAFAXINE HCL 75 MG
75 CAPSULE, EXT RELEASE 24 HR ORAL DAILY
Refills: 0 | Status: DISCONTINUED | OUTPATIENT
Start: 2021-01-01 | End: 2021-01-01

## 2021-01-01 RX ORDER — DEXTROSE 50 % IN WATER 50 %
25 SYRINGE (ML) INTRAVENOUS ONCE
Refills: 0 | Status: DISCONTINUED | OUTPATIENT
Start: 2021-01-01 | End: 2021-01-01

## 2021-01-01 RX ORDER — METOPROLOL TARTRATE 50 MG
1 TABLET ORAL
Qty: 0 | Refills: 0 | DISCHARGE
Start: 2021-01-01

## 2021-01-01 RX ORDER — PREGABALIN 225 MG/1
1000 CAPSULE ORAL DAILY
Refills: 0 | Status: DISCONTINUED | OUTPATIENT
Start: 2021-01-01 | End: 2021-01-01

## 2021-01-01 RX ORDER — INSULIN GLARGINE 100 [IU]/ML
15 INJECTION, SOLUTION SUBCUTANEOUS EVERY MORNING
Refills: 0 | Status: DISCONTINUED | OUTPATIENT
Start: 2021-01-01 | End: 2021-01-01

## 2021-01-01 RX ORDER — LEVETIRACETAM 250 MG/1
500 TABLET, FILM COATED ORAL
Refills: 0 | Status: DISCONTINUED | OUTPATIENT
Start: 2021-01-01 | End: 2021-01-01

## 2021-01-01 RX ORDER — ENOXAPARIN SODIUM 100 MG/ML
30 INJECTION SUBCUTANEOUS DAILY
Refills: 0 | Status: DISCONTINUED | OUTPATIENT
Start: 2021-01-01 | End: 2021-01-01

## 2021-01-01 RX ORDER — ISOSORBIDE MONONITRATE 60 MG/1
1 TABLET, EXTENDED RELEASE ORAL
Qty: 0 | Refills: 0 | DISCHARGE
Start: 2021-01-01

## 2021-01-01 RX ORDER — FUROSEMIDE 40 MG
40 TABLET ORAL ONCE
Refills: 0 | Status: COMPLETED | OUTPATIENT
Start: 2021-01-01 | End: 2021-01-01

## 2021-01-01 RX ORDER — DEXMEDETOMIDINE HYDROCHLORIDE IN 0.9% SODIUM CHLORIDE 4 UG/ML
0.01 INJECTION INTRAVENOUS
Qty: 200 | Refills: 0 | Status: DISCONTINUED | OUTPATIENT
Start: 2021-01-01 | End: 2021-01-01

## 2021-01-01 RX ORDER — HEPARIN SODIUM 5000 [USP'U]/ML
5000 INJECTION INTRAVENOUS; SUBCUTANEOUS EVERY 8 HOURS
Refills: 0 | Status: DISCONTINUED | OUTPATIENT
Start: 2021-01-01 | End: 2021-01-01

## 2021-01-01 RX ORDER — GABAPENTIN 400 MG/1
100 CAPSULE ORAL
Refills: 0 | Status: DISCONTINUED | OUTPATIENT
Start: 2021-01-01 | End: 2021-01-01

## 2021-01-01 RX ORDER — INSULIN GLARGINE 100 [IU]/ML
10 INJECTION, SOLUTION SUBCUTANEOUS AT BEDTIME
Refills: 0 | Status: DISCONTINUED | OUTPATIENT
Start: 2021-01-01 | End: 2021-01-01

## 2021-01-01 RX ORDER — TRAMADOL HYDROCHLORIDE 50 MG/1
25 TABLET ORAL ONCE
Refills: 0 | Status: DISCONTINUED | OUTPATIENT
Start: 2021-01-01 | End: 2021-01-01

## 2021-01-01 RX ORDER — MAGNESIUM OXIDE 400 MG ORAL TABLET 241.3 MG
1 TABLET ORAL
Qty: 0 | Refills: 0 | DISCHARGE

## 2021-01-01 RX ORDER — NICARDIPINE HYDROCHLORIDE 30 MG/1
20 CAPSULE, EXTENDED RELEASE ORAL EVERY 8 HOURS
Refills: 0 | Status: DISCONTINUED | OUTPATIENT
Start: 2021-01-01 | End: 2021-01-01

## 2021-01-01 RX ORDER — DEXTROSE 50 % IN WATER 50 %
15 SYRINGE (ML) INTRAVENOUS ONCE
Refills: 0 | Status: DISCONTINUED | OUTPATIENT
Start: 2021-01-01 | End: 2021-01-01

## 2021-01-01 RX ORDER — INSULIN ASPART 100 [IU]/ML
10 INJECTION, SUSPENSION SUBCUTANEOUS
Qty: 0 | Refills: 0 | DISCHARGE

## 2021-01-01 RX ORDER — INSULIN GLARGINE 100 [IU]/ML
8 INJECTION, SOLUTION SUBCUTANEOUS ONCE
Refills: 0 | Status: DISCONTINUED | OUTPATIENT
Start: 2021-01-01 | End: 2021-01-01

## 2021-01-01 RX ORDER — LEVETIRACETAM 250 MG/1
1 TABLET, FILM COATED ORAL
Qty: 8 | Refills: 0
Start: 2021-01-01 | End: 2021-01-01

## 2021-01-01 RX ORDER — ATORVASTATIN CALCIUM 80 MG/1
40 TABLET, FILM COATED ORAL AT BEDTIME
Refills: 0 | Status: DISCONTINUED | OUTPATIENT
Start: 2021-01-01 | End: 2021-01-01

## 2021-01-01 RX ORDER — AMLODIPINE BESYLATE 2.5 MG/1
10 TABLET ORAL DAILY
Refills: 0 | Status: DISCONTINUED | OUTPATIENT
Start: 2021-01-01 | End: 2021-01-01

## 2021-01-01 RX ORDER — INSULIN HUMAN 100 [IU]/ML
5 INJECTION, SOLUTION SUBCUTANEOUS ONCE
Refills: 0 | Status: COMPLETED | OUTPATIENT
Start: 2021-01-01 | End: 2021-01-01

## 2021-01-01 RX ORDER — SENNA PLUS 8.6 MG/1
2 TABLET ORAL AT BEDTIME
Refills: 0 | Status: DISCONTINUED | OUTPATIENT
Start: 2021-01-01 | End: 2021-01-01

## 2021-01-01 RX ORDER — INSULIN GLARGINE 100 [IU]/ML
10 INJECTION, SOLUTION SUBCUTANEOUS
Refills: 0 | Status: DISCONTINUED | OUTPATIENT
Start: 2021-01-01 | End: 2021-01-01

## 2021-01-01 RX ORDER — METOPROLOL TARTRATE 50 MG
12.5 TABLET ORAL
Refills: 0 | Status: DISCONTINUED | OUTPATIENT
Start: 2021-01-01 | End: 2021-01-01

## 2021-01-01 RX ORDER — CEFTRIAXONE 500 MG/1
INJECTION, POWDER, FOR SOLUTION INTRAMUSCULAR; INTRAVENOUS
Refills: 0 | Status: DISCONTINUED | OUTPATIENT
Start: 2021-01-01 | End: 2021-01-01

## 2021-01-01 RX ORDER — ONDANSETRON 8 MG/1
4 TABLET, FILM COATED ORAL ONCE
Refills: 0 | Status: DISCONTINUED | OUTPATIENT
Start: 2021-01-01 | End: 2021-01-01

## 2021-01-01 RX ORDER — DEXAMETHASONE 0.5 MG/5ML
6 ELIXIR ORAL DAILY
Refills: 0 | Status: DISCONTINUED | OUTPATIENT
Start: 2021-01-01 | End: 2021-01-01

## 2021-01-01 RX ORDER — ONDANSETRON 8 MG/1
4 TABLET, FILM COATED ORAL EVERY 6 HOURS
Refills: 0 | Status: DISCONTINUED | OUTPATIENT
Start: 2021-01-01 | End: 2021-01-01

## 2021-01-01 RX ORDER — PANTOPRAZOLE SODIUM 20 MG/1
40 TABLET, DELAYED RELEASE ORAL DAILY
Refills: 0 | Status: DISCONTINUED | OUTPATIENT
Start: 2021-01-01 | End: 2021-01-01

## 2021-01-01 RX ORDER — INFLUENZA VIRUS VACCINE 15; 15; 15; 15 UG/.5ML; UG/.5ML; UG/.5ML; UG/.5ML
0.7 SUSPENSION INTRAMUSCULAR ONCE
Refills: 0 | Status: COMPLETED | OUTPATIENT
Start: 2021-01-01 | End: 2021-01-01

## 2021-01-01 RX ORDER — HYDROMORPHONE HYDROCHLORIDE 2 MG/ML
0.5 INJECTION INTRAMUSCULAR; INTRAVENOUS; SUBCUTANEOUS
Refills: 0 | Status: DISCONTINUED | OUTPATIENT
Start: 2021-01-01 | End: 2021-01-01

## 2021-01-01 RX ORDER — REPAGLINIDE 1 MG/1
1 TABLET ORAL
Qty: 0 | Refills: 0 | DISCHARGE

## 2021-01-01 RX ORDER — MAGNESIUM OXIDE 400 MG ORAL TABLET 241.3 MG
400 TABLET ORAL DAILY
Refills: 0 | Status: DISCONTINUED | OUTPATIENT
Start: 2021-01-01 | End: 2021-01-01

## 2021-01-01 RX ORDER — INSULIN ASPART 100 [IU]/ML
2 INJECTION, SUSPENSION SUBCUTANEOUS
Qty: 0 | Refills: 0 | DISCHARGE

## 2021-01-01 RX ORDER — ISOSORBIDE MONONITRATE 60 MG/1
1 TABLET, EXTENDED RELEASE ORAL
Qty: 0 | Refills: 0 | DISCHARGE

## 2021-01-01 RX ORDER — GABAPENTIN 400 MG/1
1 CAPSULE ORAL
Qty: 0 | Refills: 0 | DISCHARGE

## 2021-01-01 RX ORDER — OXYCODONE HYDROCHLORIDE 5 MG/1
2.5 TABLET ORAL EVERY 4 HOURS
Refills: 0 | Status: DISCONTINUED | OUTPATIENT
Start: 2021-01-01 | End: 2021-01-01

## 2021-01-01 RX ORDER — PANTOPRAZOLE SODIUM 20 MG/1
40 TABLET, DELAYED RELEASE ORAL
Refills: 0 | Status: DISCONTINUED | OUTPATIENT
Start: 2021-01-01 | End: 2021-01-01

## 2021-01-01 RX ORDER — CHOLECALCIFEROL (VITAMIN D3) 125 MCG
1 CAPSULE ORAL
Qty: 0 | Refills: 0 | DISCHARGE

## 2021-01-01 RX ORDER — AZITHROMYCIN 500 MG/1
500 TABLET, FILM COATED ORAL EVERY 24 HOURS
Refills: 0 | Status: DISCONTINUED | OUTPATIENT
Start: 2021-01-01 | End: 2021-01-01

## 2021-01-01 RX ORDER — SENNOSIDES/DOCUSATE SODIUM 8.6MG-50MG
2 TABLET ORAL
Qty: 0 | Refills: 0 | DISCHARGE

## 2021-01-01 RX ORDER — CEFAZOLIN SODIUM 1 G
2000 VIAL (EA) INJECTION EVERY 8 HOURS
Refills: 0 | Status: COMPLETED | OUTPATIENT
Start: 2021-01-01 | End: 2021-01-01

## 2021-01-01 RX ORDER — DEXTROSE 50 % IN WATER 50 %
50 SYRINGE (ML) INTRAVENOUS ONCE
Refills: 0 | Status: COMPLETED | OUTPATIENT
Start: 2021-01-01 | End: 2021-01-01

## 2021-01-01 RX ORDER — ALBUTEROL 90 UG/1
2 AEROSOL, METERED ORAL EVERY 4 HOURS
Refills: 0 | Status: DISCONTINUED | OUTPATIENT
Start: 2021-01-01 | End: 2021-01-01

## 2021-01-01 RX ORDER — CHOLECALCIFEROL (VITAMIN D3) 125 MCG
1000 CAPSULE ORAL DAILY
Refills: 0 | Status: DISCONTINUED | OUTPATIENT
Start: 2021-01-01 | End: 2021-01-01

## 2021-01-01 RX ORDER — CEFTRIAXONE 500 MG/1
1000 INJECTION, POWDER, FOR SOLUTION INTRAMUSCULAR; INTRAVENOUS ONCE
Refills: 0 | Status: COMPLETED | OUTPATIENT
Start: 2021-01-01 | End: 2021-01-01

## 2021-01-01 RX ORDER — PREGABALIN 225 MG/1
1 CAPSULE ORAL
Qty: 0 | Refills: 0 | DISCHARGE

## 2021-01-01 RX ORDER — MAGNESIUM OXIDE 400 MG ORAL TABLET 241.3 MG
400 TABLET ORAL AT BEDTIME
Refills: 0 | Status: DISCONTINUED | OUTPATIENT
Start: 2021-01-01 | End: 2021-01-01

## 2021-01-01 RX ORDER — LEVETIRACETAM 250 MG/1
500 TABLET, FILM COATED ORAL EVERY 12 HOURS
Refills: 0 | Status: DISCONTINUED | OUTPATIENT
Start: 2021-01-01 | End: 2021-01-01

## 2021-01-01 RX ORDER — ACETAMINOPHEN 500 MG
1000 TABLET ORAL ONCE
Refills: 0 | Status: COMPLETED | OUTPATIENT
Start: 2021-01-01 | End: 2021-01-01

## 2021-01-01 RX ORDER — MULTIVITAMIN/IRON/FOLIC ACID 18MG-0.4MG
TABLET ORAL
Refills: 0 | Status: ACTIVE | COMMUNITY

## 2021-01-01 RX ORDER — INSULIN GLARGINE 100 [IU]/ML
8 INJECTION, SOLUTION SUBCUTANEOUS EVERY MORNING
Refills: 0 | Status: DISCONTINUED | OUTPATIENT
Start: 2021-01-01 | End: 2021-01-01

## 2021-01-01 RX ORDER — CEFEPIME 1 G/1
1000 INJECTION, POWDER, FOR SOLUTION INTRAMUSCULAR; INTRAVENOUS DAILY
Refills: 0 | Status: DISCONTINUED | OUTPATIENT
Start: 2021-01-01 | End: 2021-01-01

## 2021-01-01 RX ORDER — PHYTONADIONE (VIT K1) 5 MG
10 TABLET ORAL ONCE
Refills: 0 | Status: COMPLETED | OUTPATIENT
Start: 2021-01-01 | End: 2021-01-01

## 2021-01-01 RX ORDER — METOPROLOL TARTRATE 50 MG/1
50 TABLET, FILM COATED ORAL
Refills: 0 | Status: ACTIVE | COMMUNITY

## 2021-01-01 RX ORDER — HYDRALAZINE HCL 50 MG
25 TABLET ORAL EVERY 6 HOURS
Refills: 0 | Status: DISCONTINUED | OUTPATIENT
Start: 2021-01-01 | End: 2021-01-01

## 2021-01-01 RX ORDER — ACETAMINOPHEN 500 MG
650 TABLET ORAL EVERY 6 HOURS
Refills: 0 | Status: DISCONTINUED | OUTPATIENT
Start: 2021-01-01 | End: 2021-01-01

## 2021-01-01 RX ORDER — PROTHROMBIN COMPLEX CONCENTRATE (HUMAN) 25.5; 16.5; 24; 22; 22; 26 [IU]/ML; [IU]/ML; [IU]/ML; [IU]/ML; [IU]/ML; [IU]/ML
1500 POWDER, FOR SOLUTION INTRAVENOUS ONCE
Refills: 0 | Status: COMPLETED | OUTPATIENT
Start: 2021-01-01 | End: 2021-01-01

## 2021-01-01 RX ORDER — ZINC SULFATE TAB 220 MG (50 MG ZINC EQUIVALENT) 220 (50 ZN) MG
220 TAB ORAL DAILY
Refills: 0 | Status: DISCONTINUED | OUTPATIENT
Start: 2021-01-01 | End: 2021-01-01

## 2021-01-01 RX ORDER — HYDROMORPHONE HYDROCHLORIDE 2 MG/ML
0.25 INJECTION INTRAMUSCULAR; INTRAVENOUS; SUBCUTANEOUS ONCE
Refills: 0 | Status: DISCONTINUED | OUTPATIENT
Start: 2021-01-01 | End: 2021-01-01

## 2021-01-01 RX ORDER — GABAPENTIN 400 MG/1
1 CAPSULE ORAL
Qty: 0 | Refills: 0 | DISCHARGE
Start: 2021-01-01

## 2021-01-01 RX ORDER — GLUCAGON INJECTION, SOLUTION 0.5 MG/.1ML
1 INJECTION, SOLUTION SUBCUTANEOUS ONCE
Refills: 0 | Status: DISCONTINUED | OUTPATIENT
Start: 2021-01-01 | End: 2021-01-01

## 2021-01-01 RX ORDER — FOLIC ACID 0.8 MG
1 TABLET ORAL DAILY
Refills: 0 | Status: DISCONTINUED | OUTPATIENT
Start: 2021-01-01 | End: 2021-01-01

## 2021-01-01 RX ORDER — SODIUM CHLORIDE 9 MG/ML
3 INJECTION INTRAMUSCULAR; INTRAVENOUS; SUBCUTANEOUS EVERY 8 HOURS
Refills: 0 | Status: DISCONTINUED | OUTPATIENT
Start: 2021-01-01 | End: 2021-01-01

## 2021-01-01 RX ORDER — SIMETHICONE 80 MG/1
1 TABLET, CHEWABLE ORAL
Qty: 0 | Refills: 0 | DISCHARGE

## 2021-01-01 RX ORDER — TUBERCULIN PURIFIED PROTEIN DERIVATIVE 5 [IU]/.1ML
0.1 INJECTION, SOLUTION INTRADERMAL
Qty: 0 | Refills: 0 | DISCHARGE

## 2021-01-01 RX ORDER — PROMETHAZINEHYDROCHLORIDE AND PHENYLEPHRINE HYDROCHLORIDE 6.25; 5 MG/5ML; MG/5ML
6.25-5 SYRUP ORAL
Refills: 0 | Status: ACTIVE | COMMUNITY

## 2021-01-01 RX ORDER — GLIMEPIRIDE 1 MG
1 TABLET ORAL
Qty: 0 | Refills: 0 | DISCHARGE

## 2021-01-01 RX ORDER — AZITHROMYCIN 500 MG/1
TABLET, FILM COATED ORAL
Refills: 0 | Status: DISCONTINUED | OUTPATIENT
Start: 2021-01-01 | End: 2021-01-01

## 2021-01-01 RX ORDER — HYDRALAZINE HCL 50 MG
1 TABLET ORAL
Qty: 0 | Refills: 0 | DISCHARGE
Start: 2021-01-01

## 2021-01-01 RX ORDER — SODIUM CHLORIDE 9 MG/ML
1000 INJECTION INTRAMUSCULAR; INTRAVENOUS; SUBCUTANEOUS
Refills: 0 | Status: DISCONTINUED | OUTPATIENT
Start: 2021-01-01 | End: 2021-01-01

## 2021-01-01 RX ORDER — SODIUM CHLORIDE 9 MG/ML
3 INJECTION INTRAMUSCULAR; INTRAVENOUS; SUBCUTANEOUS ONCE
Refills: 0 | Status: COMPLETED | OUTPATIENT
Start: 2021-01-01 | End: 2021-01-01

## 2021-01-01 RX ORDER — WARFARIN 5 MG/1
5 TABLET ORAL
Refills: 0 | Status: ACTIVE | COMMUNITY

## 2021-01-01 RX ORDER — FOLIC ACID 0.8 MG
1 TABLET ORAL
Qty: 30 | Refills: 0
Start: 2021-01-01

## 2021-01-01 RX ORDER — CX-024414 0.2 MG/ML
0.5 INJECTION, SUSPENSION INTRAMUSCULAR
Qty: 0 | Refills: 0 | DISCHARGE

## 2021-01-01 RX ORDER — HYDRALAZINE HCL 50 MG
50 TABLET ORAL EVERY 8 HOURS
Refills: 0 | Status: DISCONTINUED | OUTPATIENT
Start: 2021-01-01 | End: 2021-01-01

## 2021-01-01 RX ORDER — REPAGLINIDE 1 MG/1
2 TABLET ORAL
Qty: 0 | Refills: 0 | DISCHARGE

## 2021-01-01 RX ORDER — POLYETHYLENE GLYCOL 3350 17 G/17G
17 POWDER, FOR SOLUTION ORAL AT BEDTIME
Refills: 0 | Status: DISCONTINUED | OUTPATIENT
Start: 2021-01-01 | End: 2021-01-01

## 2021-01-01 RX ORDER — INSULIN LISPRO 100/ML
VIAL (ML) SUBCUTANEOUS EVERY 6 HOURS
Refills: 0 | Status: DISCONTINUED | OUTPATIENT
Start: 2021-01-01 | End: 2021-01-01

## 2021-01-01 RX ORDER — HEPARIN SODIUM 5000 [USP'U]/ML
5000 INJECTION INTRAVENOUS; SUBCUTANEOUS
Qty: 0 | Refills: 0 | DISCHARGE
Start: 2021-01-01

## 2021-01-01 RX ORDER — ISOSORBIDE MONONITRATE 60 MG/1
1 TABLET, EXTENDED RELEASE ORAL
Qty: 30 | Refills: 0
Start: 2021-01-01

## 2021-01-01 RX ORDER — TETANUS TOXOID, REDUCED DIPHTHERIA TOXOID AND ACELLULAR PERTUSSIS VACCINE, ADSORBED 5; 2.5; 8; 8; 2.5 [IU]/.5ML; [IU]/.5ML; UG/.5ML; UG/.5ML; UG/.5ML
0.5 SUSPENSION INTRAMUSCULAR ONCE
Refills: 0 | Status: COMPLETED | OUTPATIENT
Start: 2021-01-01 | End: 2021-01-01

## 2021-01-01 RX ORDER — ISOSORBIDE MONONITRATE 60 MG/1
30 TABLET, EXTENDED RELEASE ORAL DAILY
Refills: 0 | Status: DISCONTINUED | OUTPATIENT
Start: 2021-01-01 | End: 2021-01-01

## 2021-01-01 RX ORDER — IPRATROPIUM BROMIDE 42 UG/1
0.06 SPRAY NASAL 3 TIMES DAILY
Qty: 1 | Refills: 5 | Status: ACTIVE | COMMUNITY
Start: 2021-01-01 | End: 1900-01-01

## 2021-01-01 RX ORDER — MEPERIDINE HYDROCHLORIDE 50 MG/ML
12.5 INJECTION INTRAMUSCULAR; INTRAVENOUS; SUBCUTANEOUS
Refills: 0 | Status: DISCONTINUED | OUTPATIENT
Start: 2021-01-01 | End: 2021-01-01

## 2021-01-01 RX ORDER — VENLAFAXINE HCL 75 MG
1 CAPSULE, EXT RELEASE 24 HR ORAL
Qty: 0 | Refills: 0 | DISCHARGE
Start: 2021-01-01

## 2021-01-01 RX ORDER — INSULIN GLARGINE 100 [IU]/ML
5 INJECTION, SOLUTION SUBCUTANEOUS ONCE
Refills: 0 | Status: DISCONTINUED | OUTPATIENT
Start: 2021-01-01 | End: 2021-01-01

## 2021-01-01 RX ORDER — GABAPENTIN 400 MG/1
1 CAPSULE ORAL
Qty: 90 | Refills: 0
Start: 2021-01-01 | End: 2021-01-01

## 2021-01-01 RX ORDER — REPAGLINIDE 1 MG/1
TABLET ORAL
Refills: 0 | Status: ACTIVE | COMMUNITY

## 2021-01-01 RX ORDER — ACETAMINOPHEN 500 MG
650 TABLET ORAL EVERY 4 HOURS
Refills: 0 | Status: DISCONTINUED | OUTPATIENT
Start: 2021-01-01 | End: 2021-01-01

## 2021-01-01 RX ORDER — CHOLECALCIFEROL (VITAMIN D3) 125 MCG
4000 CAPSULE ORAL DAILY
Refills: 0 | Status: DISCONTINUED | OUTPATIENT
Start: 2021-01-01 | End: 2021-01-01

## 2021-01-01 RX ORDER — INSULIN GLARGINE 100 [IU]/ML
10 INJECTION, SOLUTION SUBCUTANEOUS EVERY MORNING
Refills: 0 | Status: DISCONTINUED | OUTPATIENT
Start: 2021-01-01 | End: 2021-01-01

## 2021-01-01 RX ORDER — HALOPERIDOL DECANOATE 100 MG/ML
5 INJECTION INTRAMUSCULAR ONCE
Refills: 0 | Status: COMPLETED | OUTPATIENT
Start: 2021-01-01 | End: 2021-01-01

## 2021-01-01 RX ORDER — CHOLECALCIFEROL (VITAMIN D3) 125 MCG
3500 CAPSULE ORAL DAILY
Refills: 0 | Status: DISCONTINUED | OUTPATIENT
Start: 2021-01-01 | End: 2021-01-01

## 2021-01-01 RX ORDER — ATORVASTATIN CALCIUM 80 MG/1
1 TABLET, FILM COATED ORAL
Qty: 0 | Refills: 0 | DISCHARGE

## 2021-01-01 RX ORDER — NICARDIPINE HYDROCHLORIDE 30 MG/1
5 CAPSULE, EXTENDED RELEASE ORAL
Qty: 40 | Refills: 0 | Status: DISCONTINUED | OUTPATIENT
Start: 2021-01-01 | End: 2021-01-01

## 2021-01-01 RX ORDER — AMLODIPINE BESYLATE 2.5 MG/1
1 TABLET ORAL
Qty: 0 | Refills: 0 | DISCHARGE
Start: 2021-01-01

## 2021-01-01 RX ORDER — HYDRALAZINE HCL 50 MG
10 TABLET ORAL ONCE
Refills: 0 | Status: COMPLETED | OUTPATIENT
Start: 2021-01-01 | End: 2021-01-01

## 2021-01-01 RX ORDER — OXYCODONE HYDROCHLORIDE 5 MG/1
5 TABLET ORAL EVERY 4 HOURS
Refills: 0 | Status: DISCONTINUED | OUTPATIENT
Start: 2021-01-01 | End: 2021-01-01

## 2021-01-01 RX ORDER — HYDRALAZINE HCL 50 MG
1 TABLET ORAL
Qty: 90 | Refills: 0
Start: 2021-01-01

## 2021-01-01 RX ORDER — CYCLOBENZAPRINE HYDROCHLORIDE 10 MG/1
5 TABLET, FILM COATED ORAL ONCE
Refills: 0 | Status: COMPLETED | OUTPATIENT
Start: 2021-01-01 | End: 2021-01-01

## 2021-01-01 RX ORDER — VENLAFAXINE HCL 75 MG
1 CAPSULE, EXT RELEASE 24 HR ORAL
Qty: 0 | Refills: 0 | DISCHARGE

## 2021-01-01 RX ORDER — CHOLECALCIFEROL (VITAMIN D3) 125 MCG
5000 CAPSULE ORAL DAILY
Refills: 0 | Status: DISCONTINUED | OUTPATIENT
Start: 2021-01-01 | End: 2021-01-01

## 2021-01-01 RX ORDER — ACETAMINOPHEN 500 MG
2 TABLET ORAL
Qty: 0 | Refills: 0 | DISCHARGE

## 2021-01-01 RX ORDER — ASCORBIC ACID 60 MG
1000 TABLET,CHEWABLE ORAL DAILY
Refills: 0 | Status: DISCONTINUED | OUTPATIENT
Start: 2021-01-01 | End: 2021-01-01

## 2021-01-01 RX ORDER — HALOPERIDOL DECANOATE 100 MG/ML
0.25 INJECTION INTRAMUSCULAR EVERY 6 HOURS
Refills: 0 | Status: DISCONTINUED | OUTPATIENT
Start: 2021-01-01 | End: 2021-01-01

## 2021-01-01 RX ORDER — INSULIN GLARGINE 100 [IU]/ML
6 INJECTION, SOLUTION SUBCUTANEOUS AT BEDTIME
Refills: 0 | Status: DISCONTINUED | OUTPATIENT
Start: 2021-01-01 | End: 2021-01-01

## 2021-01-01 RX ORDER — FERROUS SULFATE 325(65) MG
1 TABLET ORAL
Qty: 0 | Refills: 0 | DISCHARGE

## 2021-01-01 RX ORDER — SODIUM BICARBONATE 1 MEQ/ML
50 SYRINGE (ML) INTRAVENOUS ONCE
Refills: 0 | Status: COMPLETED | OUTPATIENT
Start: 2021-01-01 | End: 2021-01-01

## 2021-01-01 RX ORDER — HALOPERIDOL DECANOATE 100 MG/ML
1 INJECTION INTRAMUSCULAR ONCE
Refills: 0 | Status: DISCONTINUED | OUTPATIENT
Start: 2021-01-01 | End: 2021-01-01

## 2021-01-01 RX ORDER — NALOXONE HYDROCHLORIDE 4 MG/.1ML
0.2 SPRAY NASAL ONCE
Refills: 0 | Status: DISCONTINUED | OUTPATIENT
Start: 2021-01-01 | End: 2021-01-01

## 2021-01-01 RX ORDER — TRAMADOL HYDROCHLORIDE 50 MG/1
50 TABLET ORAL EVERY 6 HOURS
Refills: 0 | Status: DISCONTINUED | OUTPATIENT
Start: 2021-01-01 | End: 2021-01-01

## 2021-01-01 RX ORDER — AMLODIPINE BESYLATE 2.5 MG/1
1 TABLET ORAL
Qty: 30 | Refills: 0
Start: 2021-01-01

## 2021-01-01 RX ORDER — CEFTRIAXONE 500 MG/1
1000 INJECTION, POWDER, FOR SOLUTION INTRAMUSCULAR; INTRAVENOUS EVERY 24 HOURS
Refills: 0 | Status: DISCONTINUED | OUTPATIENT
Start: 2021-01-01 | End: 2021-01-01

## 2021-01-01 RX ORDER — SITAGLIPTIN 50 MG/1
1 TABLET, FILM COATED ORAL
Qty: 0 | Refills: 0 | DISCHARGE

## 2021-01-01 RX ORDER — LINAGLIPTIN 5 MG/1
1 TABLET, FILM COATED ORAL
Qty: 0 | Refills: 0 | DISCHARGE

## 2021-01-01 RX ORDER — AZITHROMYCIN 500 MG/1
500 TABLET, FILM COATED ORAL ONCE
Refills: 0 | Status: COMPLETED | OUTPATIENT
Start: 2021-01-01 | End: 2021-01-01

## 2021-01-01 RX ADMIN — SODIUM CHLORIDE 75 MILLILITER(S): 9 INJECTION INTRAMUSCULAR; INTRAVENOUS; SUBCUTANEOUS at 00:47

## 2021-01-01 RX ADMIN — MAGNESIUM OXIDE 400 MG ORAL TABLET 400 MILLIGRAM(S): 241.3 TABLET ORAL at 21:59

## 2021-01-01 RX ADMIN — Medication 1 TABLET(S): at 12:04

## 2021-01-01 RX ADMIN — Medication 325 MILLIGRAM(S): at 22:09

## 2021-01-01 RX ADMIN — Medication 25 MILLIGRAM(S): at 05:08

## 2021-01-01 RX ADMIN — GABAPENTIN 100 MILLIGRAM(S): 400 CAPSULE ORAL at 13:42

## 2021-01-01 RX ADMIN — Medication 12.5 MILLIGRAM(S): at 16:02

## 2021-01-01 RX ADMIN — Medication 50 MILLIGRAM(S): at 22:13

## 2021-01-01 RX ADMIN — HEPARIN SODIUM 5000 UNIT(S): 5000 INJECTION INTRAVENOUS; SUBCUTANEOUS at 01:01

## 2021-01-01 RX ADMIN — Medication 50 MILLILITER(S): at 00:26

## 2021-01-01 RX ADMIN — GABAPENTIN 100 MILLIGRAM(S): 400 CAPSULE ORAL at 06:15

## 2021-01-01 RX ADMIN — MAGNESIUM OXIDE 400 MG ORAL TABLET 400 MILLIGRAM(S): 241.3 TABLET ORAL at 22:44

## 2021-01-01 RX ADMIN — Medication 1 MILLIGRAM(S): at 09:55

## 2021-01-01 RX ADMIN — SODIUM CHLORIDE 75 MILLILITER(S): 9 INJECTION INTRAMUSCULAR; INTRAVENOUS; SUBCUTANEOUS at 01:38

## 2021-01-01 RX ADMIN — Medication 975 MILLIGRAM(S): at 08:46

## 2021-01-01 RX ADMIN — Medication 1 MILLIGRAM(S): at 09:36

## 2021-01-01 RX ADMIN — SIMETHICONE 80 MILLIGRAM(S): 80 TABLET, CHEWABLE ORAL at 10:44

## 2021-01-01 RX ADMIN — HEPARIN SODIUM 5000 UNIT(S): 5000 INJECTION INTRAVENOUS; SUBCUTANEOUS at 01:47

## 2021-01-01 RX ADMIN — Medication 975 MILLIGRAM(S): at 13:38

## 2021-01-01 RX ADMIN — SODIUM CHLORIDE 75 MILLILITER(S): 9 INJECTION, SOLUTION INTRAVENOUS at 18:39

## 2021-01-01 RX ADMIN — PANTOPRAZOLE SODIUM 40 MILLIGRAM(S): 20 TABLET, DELAYED RELEASE ORAL at 09:59

## 2021-01-01 RX ADMIN — Medication 975 MILLIGRAM(S): at 05:13

## 2021-01-01 RX ADMIN — MAGNESIUM OXIDE 400 MG ORAL TABLET 400 MILLIGRAM(S): 241.3 TABLET ORAL at 10:43

## 2021-01-01 RX ADMIN — GABAPENTIN 100 MILLIGRAM(S): 400 CAPSULE ORAL at 13:17

## 2021-01-01 RX ADMIN — HEPARIN SODIUM 5000 UNIT(S): 5000 INJECTION INTRAVENOUS; SUBCUTANEOUS at 05:53

## 2021-01-01 RX ADMIN — Medication 5: at 23:02

## 2021-01-01 RX ADMIN — LEVETIRACETAM 500 MILLIGRAM(S): 250 TABLET, FILM COATED ORAL at 22:22

## 2021-01-01 RX ADMIN — Medication 1 MILLIGRAM(S): at 09:58

## 2021-01-01 RX ADMIN — ISOSORBIDE MONONITRATE 30 MILLIGRAM(S): 60 TABLET, EXTENDED RELEASE ORAL at 09:41

## 2021-01-01 RX ADMIN — Medication 75 MILLIGRAM(S): at 09:11

## 2021-01-01 RX ADMIN — LEVETIRACETAM 500 MILLIGRAM(S): 250 TABLET, FILM COATED ORAL at 22:44

## 2021-01-01 RX ADMIN — Medication 50 MILLIEQUIVALENT(S): at 23:48

## 2021-01-01 RX ADMIN — ATORVASTATIN CALCIUM 40 MILLIGRAM(S): 80 TABLET, FILM COATED ORAL at 22:13

## 2021-01-01 RX ADMIN — PANTOPRAZOLE SODIUM 40 MILLIGRAM(S): 20 TABLET, DELAYED RELEASE ORAL at 10:22

## 2021-01-01 RX ADMIN — ISOSORBIDE MONONITRATE 30 MILLIGRAM(S): 60 TABLET, EXTENDED RELEASE ORAL at 09:37

## 2021-01-01 RX ADMIN — ISOSORBIDE MONONITRATE 30 MILLIGRAM(S): 60 TABLET, EXTENDED RELEASE ORAL at 09:55

## 2021-01-01 RX ADMIN — Medication 25 MILLIGRAM(S): at 11:13

## 2021-01-01 RX ADMIN — ISOSORBIDE MONONITRATE 30 MILLIGRAM(S): 60 TABLET, EXTENDED RELEASE ORAL at 17:17

## 2021-01-01 RX ADMIN — DEXMEDETOMIDINE HYDROCHLORIDE IN 0.9% SODIUM CHLORIDE 0.2 MICROGRAM(S)/KG/HR: 4 INJECTION INTRAVENOUS at 22:48

## 2021-01-01 RX ADMIN — Medication 10: at 11:41

## 2021-01-01 RX ADMIN — Medication 0: at 22:23

## 2021-01-01 RX ADMIN — ATORVASTATIN CALCIUM 40 MILLIGRAM(S): 80 TABLET, FILM COATED ORAL at 22:10

## 2021-01-01 RX ADMIN — Medication 2: at 21:58

## 2021-01-01 RX ADMIN — Medication 1 MILLIGRAM(S): at 11:12

## 2021-01-01 RX ADMIN — QUETIAPINE FUMARATE 12.5 MILLIGRAM(S): 200 TABLET, FILM COATED ORAL at 22:44

## 2021-01-01 RX ADMIN — Medication 75 MILLIGRAM(S): at 09:46

## 2021-01-01 RX ADMIN — CEFEPIME 1000 MILLIGRAM(S): 1 INJECTION, POWDER, FOR SOLUTION INTRAMUSCULAR; INTRAVENOUS at 10:34

## 2021-01-01 RX ADMIN — NICARDIPINE HYDROCHLORIDE 25 MG/HR: 30 CAPSULE, EXTENDED RELEASE ORAL at 20:08

## 2021-01-01 RX ADMIN — Medication 1 MILLIGRAM(S): at 10:56

## 2021-01-01 RX ADMIN — MAGNESIUM OXIDE 400 MG ORAL TABLET 400 MILLIGRAM(S): 241.3 TABLET ORAL at 22:13

## 2021-01-01 RX ADMIN — LEVETIRACETAM 500 MILLIGRAM(S): 250 TABLET, FILM COATED ORAL at 09:58

## 2021-01-01 RX ADMIN — Medication 4: at 19:03

## 2021-01-01 RX ADMIN — PREGABALIN 1000 MICROGRAM(S): 225 CAPSULE ORAL at 09:58

## 2021-01-01 RX ADMIN — ISOSORBIDE MONONITRATE 30 MILLIGRAM(S): 60 TABLET, EXTENDED RELEASE ORAL at 09:46

## 2021-01-01 RX ADMIN — INSULIN GLARGINE 10 UNIT(S): 100 INJECTION, SOLUTION SUBCUTANEOUS at 09:59

## 2021-01-01 RX ADMIN — HEPARIN SODIUM 5000 UNIT(S): 5000 INJECTION INTRAVENOUS; SUBCUTANEOUS at 05:22

## 2021-01-01 RX ADMIN — PANTOPRAZOLE SODIUM 40 MILLIGRAM(S): 20 TABLET, DELAYED RELEASE ORAL at 09:52

## 2021-01-01 RX ADMIN — Medication 50 MILLIGRAM(S): at 10:09

## 2021-01-01 RX ADMIN — Medication 1 MILLIGRAM(S): at 10:09

## 2021-01-01 RX ADMIN — Medication 75 MILLIGRAM(S): at 10:22

## 2021-01-01 RX ADMIN — Medication 75 MILLIGRAM(S): at 09:24

## 2021-01-01 RX ADMIN — LEVETIRACETAM 500 MILLIGRAM(S): 250 TABLET, FILM COATED ORAL at 21:24

## 2021-01-01 RX ADMIN — Medication 1: at 17:30

## 2021-01-01 RX ADMIN — Medication 1 MILLIGRAM(S): at 12:04

## 2021-01-01 RX ADMIN — TRAMADOL HYDROCHLORIDE 25 MILLIGRAM(S): 50 TABLET ORAL at 13:37

## 2021-01-01 RX ADMIN — ATORVASTATIN CALCIUM 40 MILLIGRAM(S): 80 TABLET, FILM COATED ORAL at 21:17

## 2021-01-01 RX ADMIN — MAGNESIUM OXIDE 400 MG ORAL TABLET 400 MILLIGRAM(S): 241.3 TABLET ORAL at 21:25

## 2021-01-01 RX ADMIN — Medication 325 MILLIGRAM(S): at 21:57

## 2021-01-01 RX ADMIN — Medication 50 MILLIGRAM(S): at 15:10

## 2021-01-01 RX ADMIN — GABAPENTIN 100 MILLIGRAM(S): 400 CAPSULE ORAL at 22:25

## 2021-01-01 RX ADMIN — AMLODIPINE BESYLATE 10 MILLIGRAM(S): 2.5 TABLET ORAL at 09:52

## 2021-01-01 RX ADMIN — Medication 4: at 21:42

## 2021-01-01 RX ADMIN — HYDROMORPHONE HYDROCHLORIDE 0.25 MILLIGRAM(S): 2 INJECTION INTRAMUSCULAR; INTRAVENOUS; SUBCUTANEOUS at 18:45

## 2021-01-01 RX ADMIN — HALOPERIDOL DECANOATE 5 MILLIGRAM(S): 100 INJECTION INTRAMUSCULAR at 15:16

## 2021-01-01 RX ADMIN — GABAPENTIN 100 MILLIGRAM(S): 400 CAPSULE ORAL at 10:44

## 2021-01-01 RX ADMIN — SIMETHICONE 80 MILLIGRAM(S): 80 TABLET, CHEWABLE ORAL at 22:09

## 2021-01-01 RX ADMIN — CEFTRIAXONE 1000 MILLIGRAM(S): 500 INJECTION, POWDER, FOR SOLUTION INTRAMUSCULAR; INTRAVENOUS at 10:45

## 2021-01-01 RX ADMIN — POLYETHYLENE GLYCOL 3350 17 GRAM(S): 17 POWDER, FOR SOLUTION ORAL at 22:21

## 2021-01-01 RX ADMIN — HEPARIN SODIUM 5000 UNIT(S): 5000 INJECTION INTRAVENOUS; SUBCUTANEOUS at 21:57

## 2021-01-01 RX ADMIN — Medication 75 MILLIGRAM(S): at 18:31

## 2021-01-01 RX ADMIN — Medication 12.5 MILLIGRAM(S): at 22:01

## 2021-01-01 RX ADMIN — ZINC SULFATE TAB 220 MG (50 MG ZINC EQUIVALENT) 220 MILLIGRAM(S): 220 (50 ZN) TAB at 10:43

## 2021-01-01 RX ADMIN — Medication 12.5 MILLIGRAM(S): at 22:44

## 2021-01-01 RX ADMIN — GABAPENTIN 100 MILLIGRAM(S): 400 CAPSULE ORAL at 16:01

## 2021-01-01 RX ADMIN — Medication 1000 UNIT(S): at 09:55

## 2021-01-01 RX ADMIN — Medication 6 MILLIGRAM(S): at 10:44

## 2021-01-01 RX ADMIN — Medication 0.5 BOTTLE: at 17:08

## 2021-01-01 RX ADMIN — HEPARIN SODIUM 5000 UNIT(S): 5000 INJECTION INTRAVENOUS; SUBCUTANEOUS at 22:22

## 2021-01-01 RX ADMIN — Medication 1000 UNIT(S): at 10:56

## 2021-01-01 RX ADMIN — SODIUM CHLORIDE 3 MILLILITER(S): 9 INJECTION INTRAMUSCULAR; INTRAVENOUS; SUBCUTANEOUS at 14:10

## 2021-01-01 RX ADMIN — Medication 1000 UNIT(S): at 12:04

## 2021-01-01 RX ADMIN — Medication 50 MILLIGRAM(S): at 05:50

## 2021-01-01 RX ADMIN — ATORVASTATIN CALCIUM 40 MILLIGRAM(S): 80 TABLET, FILM COATED ORAL at 21:08

## 2021-01-01 RX ADMIN — SODIUM CHLORIDE 3 MILLILITER(S): 9 INJECTION INTRAMUSCULAR; INTRAVENOUS; SUBCUTANEOUS at 13:43

## 2021-01-01 RX ADMIN — TETANUS TOXOID, REDUCED DIPHTHERIA TOXOID AND ACELLULAR PERTUSSIS VACCINE, ADSORBED 0.5 MILLILITER(S): 5; 2.5; 8; 8; 2.5 SUSPENSION INTRAMUSCULAR at 21:52

## 2021-01-01 RX ADMIN — Medication 8: at 12:17

## 2021-01-01 RX ADMIN — ATORVASTATIN CALCIUM 40 MILLIGRAM(S): 80 TABLET, FILM COATED ORAL at 22:44

## 2021-01-01 RX ADMIN — Medication 2: at 06:52

## 2021-01-01 RX ADMIN — AMLODIPINE BESYLATE 10 MILLIGRAM(S): 2.5 TABLET ORAL at 10:44

## 2021-01-01 RX ADMIN — CEFTRIAXONE 1000 MILLIGRAM(S): 500 INJECTION, POWDER, FOR SOLUTION INTRAMUSCULAR; INTRAVENOUS at 11:10

## 2021-01-01 RX ADMIN — Medication 50 MILLIGRAM(S): at 15:45

## 2021-01-01 RX ADMIN — PREGABALIN 1000 MICROGRAM(S): 225 CAPSULE ORAL at 10:56

## 2021-01-01 RX ADMIN — Medication 25 MILLIGRAM(S): at 10:22

## 2021-01-01 RX ADMIN — INSULIN GLARGINE 8 UNIT(S): 100 INJECTION, SOLUTION SUBCUTANEOUS at 09:00

## 2021-01-01 RX ADMIN — Medication 50 MILLIGRAM(S): at 05:22

## 2021-01-01 RX ADMIN — MAGNESIUM OXIDE 400 MG ORAL TABLET 400 MILLIGRAM(S): 241.3 TABLET ORAL at 21:08

## 2021-01-01 RX ADMIN — GABAPENTIN 100 MILLIGRAM(S): 400 CAPSULE ORAL at 21:27

## 2021-01-01 RX ADMIN — Medication 50 MILLIGRAM(S): at 17:18

## 2021-01-01 RX ADMIN — SODIUM CHLORIDE 3 MILLILITER(S): 9 INJECTION INTRAMUSCULAR; INTRAVENOUS; SUBCUTANEOUS at 21:33

## 2021-01-01 RX ADMIN — Medication 1 TABLET(S): at 10:56

## 2021-01-01 RX ADMIN — Medication 50 MILLIGRAM(S): at 13:42

## 2021-01-01 RX ADMIN — Medication 75 MILLIGRAM(S): at 09:52

## 2021-01-01 RX ADMIN — HEPARIN SODIUM 5000 UNIT(S): 5000 INJECTION INTRAVENOUS; SUBCUTANEOUS at 14:16

## 2021-01-01 RX ADMIN — Medication 25 MILLIGRAM(S): at 09:46

## 2021-01-01 RX ADMIN — SODIUM CHLORIDE 3 MILLILITER(S): 9 INJECTION INTRAMUSCULAR; INTRAVENOUS; SUBCUTANEOUS at 05:06

## 2021-01-01 RX ADMIN — Medication 4000 UNIT(S): at 11:11

## 2021-01-01 RX ADMIN — Medication 40 MILLIGRAM(S): at 13:37

## 2021-01-01 RX ADMIN — Medication 4: at 12:46

## 2021-01-01 RX ADMIN — INSULIN GLARGINE 10 UNIT(S): 100 INJECTION, SOLUTION SUBCUTANEOUS at 09:02

## 2021-01-01 RX ADMIN — PANTOPRAZOLE SODIUM 40 MILLIGRAM(S): 20 TABLET, DELAYED RELEASE ORAL at 18:34

## 2021-01-01 RX ADMIN — POLYETHYLENE GLYCOL 3350 17 GRAM(S): 17 POWDER, FOR SOLUTION ORAL at 22:01

## 2021-01-01 RX ADMIN — PREGABALIN 1000 MICROGRAM(S): 225 CAPSULE ORAL at 09:36

## 2021-01-01 RX ADMIN — GABAPENTIN 100 MILLIGRAM(S): 400 CAPSULE ORAL at 21:08

## 2021-01-01 RX ADMIN — LEVETIRACETAM 500 MILLIGRAM(S): 250 TABLET, FILM COATED ORAL at 09:11

## 2021-01-01 RX ADMIN — PANTOPRAZOLE SODIUM 40 MILLIGRAM(S): 20 TABLET, DELAYED RELEASE ORAL at 06:46

## 2021-01-01 RX ADMIN — LEVETIRACETAM 500 MILLIGRAM(S): 250 TABLET, FILM COATED ORAL at 22:00

## 2021-01-01 RX ADMIN — Medication 50 MILLIGRAM(S): at 22:10

## 2021-01-01 RX ADMIN — AMLODIPINE BESYLATE 10 MILLIGRAM(S): 2.5 TABLET ORAL at 09:36

## 2021-01-01 RX ADMIN — PANTOPRAZOLE SODIUM 40 MILLIGRAM(S): 20 TABLET, DELAYED RELEASE ORAL at 09:41

## 2021-01-01 RX ADMIN — AMLODIPINE BESYLATE 10 MILLIGRAM(S): 2.5 TABLET ORAL at 11:12

## 2021-01-01 RX ADMIN — Medication 325 MILLIGRAM(S): at 22:23

## 2021-01-01 RX ADMIN — Medication 1 TABLET(S): at 09:58

## 2021-01-01 RX ADMIN — Medication 1000 UNIT(S): at 10:22

## 2021-01-01 RX ADMIN — Medication 975 MILLIGRAM(S): at 21:41

## 2021-01-01 RX ADMIN — PREGABALIN 1000 MICROGRAM(S): 225 CAPSULE ORAL at 10:21

## 2021-01-01 RX ADMIN — LEVETIRACETAM 500 MILLIGRAM(S): 250 TABLET, FILM COATED ORAL at 21:59

## 2021-01-01 RX ADMIN — PANTOPRAZOLE SODIUM 40 MILLIGRAM(S): 20 TABLET, DELAYED RELEASE ORAL at 09:24

## 2021-01-01 RX ADMIN — Medication 25 MILLIGRAM(S): at 10:44

## 2021-01-01 RX ADMIN — LEVETIRACETAM 500 MILLIGRAM(S): 250 TABLET, FILM COATED ORAL at 13:47

## 2021-01-01 RX ADMIN — AZITHROMYCIN 500 MILLIGRAM(S): 500 TABLET, FILM COATED ORAL at 11:10

## 2021-01-01 RX ADMIN — Medication 1 TABLET(S): at 10:22

## 2021-01-01 RX ADMIN — Medication 100 MILLIGRAM(S): at 23:45

## 2021-01-01 RX ADMIN — SODIUM CHLORIDE 3 MILLILITER(S): 9 INJECTION INTRAMUSCULAR; INTRAVENOUS; SUBCUTANEOUS at 06:12

## 2021-01-01 RX ADMIN — QUETIAPINE FUMARATE 12.5 MILLIGRAM(S): 200 TABLET, FILM COATED ORAL at 22:03

## 2021-01-01 RX ADMIN — Medication 100 MILLIGRAM(S): at 11:12

## 2021-01-01 RX ADMIN — Medication 50 MILLIGRAM(S): at 21:17

## 2021-01-01 RX ADMIN — Medication 2: at 08:11

## 2021-01-01 RX ADMIN — Medication 40 MILLIGRAM(S): at 10:44

## 2021-01-01 RX ADMIN — ISOSORBIDE MONONITRATE 30 MILLIGRAM(S): 60 TABLET, EXTENDED RELEASE ORAL at 09:24

## 2021-01-01 RX ADMIN — HEPARIN SODIUM 5000 UNIT(S): 5000 INJECTION INTRAVENOUS; SUBCUTANEOUS at 09:54

## 2021-01-01 RX ADMIN — INSULIN GLARGINE 8 UNIT(S): 100 INJECTION, SOLUTION SUBCUTANEOUS at 16:09

## 2021-01-01 RX ADMIN — INSULIN GLARGINE 10 UNIT(S): 100 INJECTION, SOLUTION SUBCUTANEOUS at 22:43

## 2021-01-01 RX ADMIN — Medication 1000 UNIT(S): at 18:31

## 2021-01-01 RX ADMIN — PREGABALIN 1000 MICROGRAM(S): 225 CAPSULE ORAL at 10:44

## 2021-01-01 RX ADMIN — Medication 400 MILLIGRAM(S): at 09:26

## 2021-01-01 RX ADMIN — Medication 4: at 11:52

## 2021-01-01 RX ADMIN — GABAPENTIN 100 MILLIGRAM(S): 400 CAPSULE ORAL at 05:18

## 2021-01-01 RX ADMIN — GABAPENTIN 100 MILLIGRAM(S): 400 CAPSULE ORAL at 14:54

## 2021-01-01 RX ADMIN — Medication 1 MILLIGRAM(S): at 10:44

## 2021-01-01 RX ADMIN — HEPARIN SODIUM 5000 UNIT(S): 5000 INJECTION INTRAVENOUS; SUBCUTANEOUS at 09:57

## 2021-01-01 RX ADMIN — Medication 2: at 17:45

## 2021-01-01 RX ADMIN — NICARDIPINE HYDROCHLORIDE 25 MG/HR: 30 CAPSULE, EXTENDED RELEASE ORAL at 00:47

## 2021-01-01 RX ADMIN — Medication 4: at 16:09

## 2021-01-01 RX ADMIN — POLYETHYLENE GLYCOL 3350 17 GRAM(S): 17 POWDER, FOR SOLUTION ORAL at 22:53

## 2021-01-01 RX ADMIN — PANTOPRAZOLE SODIUM 40 MILLIGRAM(S): 20 TABLET, DELAYED RELEASE ORAL at 12:45

## 2021-01-01 RX ADMIN — Medication 25 MILLIGRAM(S): at 21:57

## 2021-01-01 RX ADMIN — LEVETIRACETAM 500 MILLIGRAM(S): 250 TABLET, FILM COATED ORAL at 09:24

## 2021-01-01 RX ADMIN — HEPARIN SODIUM 5000 UNIT(S): 5000 INJECTION INTRAVENOUS; SUBCUTANEOUS at 10:10

## 2021-01-01 RX ADMIN — Medication 8: at 18:38

## 2021-01-01 RX ADMIN — Medication 25 MILLIGRAM(S): at 09:38

## 2021-01-01 RX ADMIN — GABAPENTIN 100 MILLIGRAM(S): 400 CAPSULE ORAL at 22:44

## 2021-01-01 RX ADMIN — Medication 12.5 MILLIGRAM(S): at 09:58

## 2021-01-01 RX ADMIN — LEVETIRACETAM 500 MILLIGRAM(S): 250 TABLET, FILM COATED ORAL at 09:54

## 2021-01-01 RX ADMIN — Medication 4: at 12:14

## 2021-01-01 RX ADMIN — GABAPENTIN 100 MILLIGRAM(S): 400 CAPSULE ORAL at 18:31

## 2021-01-01 RX ADMIN — Medication 1 TABLET(S): at 09:46

## 2021-01-01 RX ADMIN — ISOSORBIDE MONONITRATE 30 MILLIGRAM(S): 60 TABLET, EXTENDED RELEASE ORAL at 11:55

## 2021-01-01 RX ADMIN — ATORVASTATIN CALCIUM 40 MILLIGRAM(S): 80 TABLET, FILM COATED ORAL at 22:22

## 2021-01-01 RX ADMIN — Medication 4: at 11:56

## 2021-01-01 RX ADMIN — Medication 75 MILLIGRAM(S): at 11:12

## 2021-01-01 RX ADMIN — Medication 50 MILLIGRAM(S): at 05:12

## 2021-01-01 RX ADMIN — Medication 75 MILLIGRAM(S): at 10:44

## 2021-01-01 RX ADMIN — Medication 50 MILLIGRAM(S): at 22:44

## 2021-01-01 RX ADMIN — GABAPENTIN 100 MILLIGRAM(S): 400 CAPSULE ORAL at 22:13

## 2021-01-01 RX ADMIN — Medication 1: at 09:01

## 2021-01-01 RX ADMIN — Medication 975 MILLIGRAM(S): at 13:19

## 2021-01-01 RX ADMIN — Medication 25 MILLIGRAM(S): at 12:39

## 2021-01-01 RX ADMIN — Medication 12.5 MILLIGRAM(S): at 10:56

## 2021-01-01 RX ADMIN — CYCLOBENZAPRINE HYDROCHLORIDE 5 MILLIGRAM(S): 10 TABLET, FILM COATED ORAL at 06:42

## 2021-01-01 RX ADMIN — PANTOPRAZOLE SODIUM 40 MILLIGRAM(S): 20 TABLET, DELAYED RELEASE ORAL at 10:10

## 2021-01-01 RX ADMIN — INSULIN GLARGINE 15 UNIT(S): 100 INJECTION, SOLUTION SUBCUTANEOUS at 09:26

## 2021-01-01 RX ADMIN — TRAMADOL HYDROCHLORIDE 25 MILLIGRAM(S): 50 TABLET ORAL at 14:30

## 2021-01-01 RX ADMIN — Medication 50 MILLIGRAM(S): at 16:05

## 2021-01-01 RX ADMIN — ISOSORBIDE MONONITRATE 30 MILLIGRAM(S): 60 TABLET, EXTENDED RELEASE ORAL at 09:58

## 2021-01-01 RX ADMIN — Medication 1 TABLET(S): at 18:31

## 2021-01-01 RX ADMIN — Medication 25 MILLIGRAM(S): at 09:52

## 2021-01-01 RX ADMIN — SODIUM CHLORIDE 3 MILLILITER(S): 9 INJECTION INTRAMUSCULAR; INTRAVENOUS; SUBCUTANEOUS at 14:54

## 2021-01-01 RX ADMIN — PREGABALIN 1000 MICROGRAM(S): 225 CAPSULE ORAL at 09:55

## 2021-01-01 RX ADMIN — POLYETHYLENE GLYCOL 3350 17 GRAM(S): 17 POWDER, FOR SOLUTION ORAL at 22:13

## 2021-01-01 RX ADMIN — LEVETIRACETAM 500 MILLIGRAM(S): 250 TABLET, FILM COATED ORAL at 12:04

## 2021-01-01 RX ADMIN — PANTOPRAZOLE SODIUM 40 MILLIGRAM(S): 20 TABLET, DELAYED RELEASE ORAL at 08:35

## 2021-01-01 RX ADMIN — ATORVASTATIN CALCIUM 40 MILLIGRAM(S): 80 TABLET, FILM COATED ORAL at 21:27

## 2021-01-01 RX ADMIN — GABAPENTIN 100 MILLIGRAM(S): 400 CAPSULE ORAL at 05:58

## 2021-01-01 RX ADMIN — ISOSORBIDE MONONITRATE 30 MILLIGRAM(S): 60 TABLET, EXTENDED RELEASE ORAL at 09:52

## 2021-01-01 RX ADMIN — HEPARIN SODIUM 5000 UNIT(S): 5000 INJECTION INTRAVENOUS; SUBCUTANEOUS at 13:11

## 2021-01-01 RX ADMIN — Medication 50 MILLIGRAM(S): at 05:18

## 2021-01-01 RX ADMIN — GABAPENTIN 100 MILLIGRAM(S): 400 CAPSULE ORAL at 05:08

## 2021-01-01 RX ADMIN — Medication 3: at 11:39

## 2021-01-01 RX ADMIN — POLYETHYLENE GLYCOL 3350 17 GRAM(S): 17 POWDER, FOR SOLUTION ORAL at 21:24

## 2021-01-01 RX ADMIN — Medication 5000 UNIT(S): at 09:46

## 2021-01-01 RX ADMIN — ATORVASTATIN CALCIUM 40 MILLIGRAM(S): 80 TABLET, FILM COATED ORAL at 21:57

## 2021-01-01 RX ADMIN — Medication 1000 UNIT(S): at 10:09

## 2021-01-01 RX ADMIN — PREGABALIN 1000 MICROGRAM(S): 225 CAPSULE ORAL at 09:46

## 2021-01-01 RX ADMIN — Medication 3: at 00:25

## 2021-01-01 RX ADMIN — QUETIAPINE FUMARATE 12.5 MILLIGRAM(S): 200 TABLET, FILM COATED ORAL at 22:22

## 2021-01-01 RX ADMIN — AMLODIPINE BESYLATE 10 MILLIGRAM(S): 2.5 TABLET ORAL at 10:22

## 2021-01-01 RX ADMIN — Medication 5000 UNIT(S): at 09:08

## 2021-01-01 RX ADMIN — Medication 325 MILLIGRAM(S): at 22:44

## 2021-01-01 RX ADMIN — PANTOPRAZOLE SODIUM 40 MILLIGRAM(S): 20 TABLET, DELAYED RELEASE ORAL at 09:01

## 2021-01-01 RX ADMIN — Medication 25 MILLIGRAM(S): at 11:55

## 2021-01-01 RX ADMIN — PREGABALIN 1000 MICROGRAM(S): 225 CAPSULE ORAL at 12:04

## 2021-01-01 RX ADMIN — Medication 1000 UNIT(S): at 09:36

## 2021-01-01 RX ADMIN — Medication 50 MILLIGRAM(S): at 13:12

## 2021-01-01 RX ADMIN — HEPARIN SODIUM 5000 UNIT(S): 5000 INJECTION INTRAVENOUS; SUBCUTANEOUS at 16:05

## 2021-01-01 RX ADMIN — Medication 5000 UNIT(S): at 09:25

## 2021-01-01 RX ADMIN — LEVETIRACETAM 500 MILLIGRAM(S): 250 TABLET, FILM COATED ORAL at 10:11

## 2021-01-01 RX ADMIN — Medication 12.5 MILLIGRAM(S): at 22:13

## 2021-01-01 RX ADMIN — Medication 4: at 12:22

## 2021-01-01 RX ADMIN — GABAPENTIN 100 MILLIGRAM(S): 400 CAPSULE ORAL at 06:46

## 2021-01-01 RX ADMIN — Medication 4: at 08:14

## 2021-01-01 RX ADMIN — Medication 25 MILLIGRAM(S): at 09:41

## 2021-01-01 RX ADMIN — HEPARIN SODIUM 5000 UNIT(S): 5000 INJECTION INTRAVENOUS; SUBCUTANEOUS at 22:13

## 2021-01-01 RX ADMIN — GABAPENTIN 100 MILLIGRAM(S): 400 CAPSULE ORAL at 06:01

## 2021-01-01 RX ADMIN — GABAPENTIN 100 MILLIGRAM(S): 400 CAPSULE ORAL at 14:22

## 2021-01-01 RX ADMIN — HEPARIN SODIUM 5000 UNIT(S): 5000 INJECTION INTRAVENOUS; SUBCUTANEOUS at 10:56

## 2021-01-01 RX ADMIN — PREGABALIN 1000 MICROGRAM(S): 225 CAPSULE ORAL at 09:25

## 2021-01-01 RX ADMIN — PREGABALIN 1000 MICROGRAM(S): 225 CAPSULE ORAL at 18:31

## 2021-01-01 RX ADMIN — Medication 1 TABLET(S): at 09:37

## 2021-01-01 RX ADMIN — Medication 1 TABLET(S): at 09:41

## 2021-01-01 RX ADMIN — Medication 1 TABLET(S): at 10:11

## 2021-01-01 RX ADMIN — LEVETIRACETAM 500 MILLIGRAM(S): 250 TABLET, FILM COATED ORAL at 09:37

## 2021-01-01 RX ADMIN — AMLODIPINE BESYLATE 10 MILLIGRAM(S): 2.5 TABLET ORAL at 09:55

## 2021-01-01 RX ADMIN — LEVETIRACETAM 500 MILLIGRAM(S): 250 TABLET, FILM COATED ORAL at 18:31

## 2021-01-01 RX ADMIN — Medication 1 TABLET(S): at 09:24

## 2021-01-01 RX ADMIN — Medication 4: at 12:41

## 2021-01-01 RX ADMIN — CEFEPIME 1000 MILLIGRAM(S): 1 INJECTION, POWDER, FOR SOLUTION INTRAMUSCULAR; INTRAVENOUS at 00:59

## 2021-01-01 RX ADMIN — INFLUENZA VIRUS VACCINE 0.7 MILLILITER(S): 15; 15; 15; 15 SUSPENSION INTRAMUSCULAR at 13:40

## 2021-01-01 RX ADMIN — Medication 6 MILLIGRAM(S): at 10:38

## 2021-01-01 RX ADMIN — Medication 4: at 16:52

## 2021-01-01 RX ADMIN — LEVETIRACETAM 500 MILLIGRAM(S): 250 TABLET, FILM COATED ORAL at 00:01

## 2021-01-01 RX ADMIN — INSULIN GLARGINE 10 UNIT(S): 100 INJECTION, SOLUTION SUBCUTANEOUS at 10:00

## 2021-01-01 RX ADMIN — SODIUM CHLORIDE 3 MILLILITER(S): 9 INJECTION INTRAMUSCULAR; INTRAVENOUS; SUBCUTANEOUS at 23:12

## 2021-01-01 RX ADMIN — Medication 325 MILLIGRAM(S): at 22:13

## 2021-01-01 RX ADMIN — ISOSORBIDE MONONITRATE 30 MILLIGRAM(S): 60 TABLET, EXTENDED RELEASE ORAL at 18:31

## 2021-01-01 RX ADMIN — Medication 1 TABLET(S): at 09:54

## 2021-01-01 RX ADMIN — ISOSORBIDE MONONITRATE 30 MILLIGRAM(S): 60 TABLET, EXTENDED RELEASE ORAL at 10:22

## 2021-01-01 RX ADMIN — Medication 1 TABLET(S): at 10:10

## 2021-01-01 RX ADMIN — Medication 325 MILLIGRAM(S): at 21:27

## 2021-01-01 RX ADMIN — Medication 50 MILLIGRAM(S): at 21:57

## 2021-01-01 RX ADMIN — SODIUM CHLORIDE 3 MILLILITER(S): 9 INJECTION INTRAMUSCULAR; INTRAVENOUS; SUBCUTANEOUS at 04:12

## 2021-01-01 RX ADMIN — SODIUM CHLORIDE 100 MILLILITER(S): 9 INJECTION INTRAMUSCULAR; INTRAVENOUS; SUBCUTANEOUS at 13:19

## 2021-01-01 RX ADMIN — INSULIN GLARGINE 10 UNIT(S): 100 INJECTION, SOLUTION SUBCUTANEOUS at 21:56

## 2021-01-01 RX ADMIN — Medication 1 TABLET(S): at 09:11

## 2021-01-01 RX ADMIN — Medication 75 MILLIGRAM(S): at 09:37

## 2021-01-01 RX ADMIN — SODIUM CHLORIDE 3 MILLILITER(S): 9 INJECTION INTRAMUSCULAR; INTRAVENOUS; SUBCUTANEOUS at 20:01

## 2021-01-01 RX ADMIN — Medication 975 MILLIGRAM(S): at 13:43

## 2021-01-01 RX ADMIN — GABAPENTIN 100 MILLIGRAM(S): 400 CAPSULE ORAL at 05:53

## 2021-01-01 RX ADMIN — Medication 6 MILLIGRAM(S): at 11:11

## 2021-01-01 RX ADMIN — AMLODIPINE BESYLATE 10 MILLIGRAM(S): 2.5 TABLET ORAL at 09:58

## 2021-01-01 RX ADMIN — Medication 6: at 11:36

## 2021-01-01 RX ADMIN — AMLODIPINE BESYLATE 10 MILLIGRAM(S): 2.5 TABLET ORAL at 10:09

## 2021-01-01 RX ADMIN — ISOSORBIDE MONONITRATE 30 MILLIGRAM(S): 60 TABLET, EXTENDED RELEASE ORAL at 09:08

## 2021-01-01 RX ADMIN — Medication 2: at 17:03

## 2021-01-01 RX ADMIN — Medication 1000 MILLIGRAM(S): at 10:44

## 2021-01-01 RX ADMIN — GABAPENTIN 100 MILLIGRAM(S): 400 CAPSULE ORAL at 13:35

## 2021-01-01 RX ADMIN — Medication 10 MILLIGRAM(S): at 13:34

## 2021-01-01 RX ADMIN — PREGABALIN 1000 MICROGRAM(S): 225 CAPSULE ORAL at 11:13

## 2021-01-01 RX ADMIN — GABAPENTIN 100 MILLIGRAM(S): 400 CAPSULE ORAL at 21:57

## 2021-01-01 RX ADMIN — PREGABALIN 1000 MICROGRAM(S): 225 CAPSULE ORAL at 09:09

## 2021-01-01 RX ADMIN — Medication 4000 UNIT(S): at 10:44

## 2021-01-01 RX ADMIN — Medication 40 MILLIGRAM(S): at 22:09

## 2021-01-01 RX ADMIN — SIMETHICONE 80 MILLIGRAM(S): 80 TABLET, CHEWABLE ORAL at 11:14

## 2021-01-01 RX ADMIN — LEVETIRACETAM 400 MILLIGRAM(S): 250 TABLET, FILM COATED ORAL at 22:39

## 2021-01-01 RX ADMIN — GABAPENTIN 100 MILLIGRAM(S): 400 CAPSULE ORAL at 15:04

## 2021-01-01 RX ADMIN — Medication 1 MILLIGRAM(S): at 10:22

## 2021-01-01 RX ADMIN — SODIUM CHLORIDE 50 MILLILITER(S): 9 INJECTION, SOLUTION INTRAVENOUS at 18:54

## 2021-01-01 RX ADMIN — Medication 4: at 17:16

## 2021-01-01 RX ADMIN — Medication 25 MILLIGRAM(S): at 09:25

## 2021-01-01 RX ADMIN — Medication 4: at 07:44

## 2021-01-01 RX ADMIN — PANTOPRAZOLE SODIUM 40 MILLIGRAM(S): 20 TABLET, DELAYED RELEASE ORAL at 05:23

## 2021-01-01 RX ADMIN — GABAPENTIN 100 MILLIGRAM(S): 400 CAPSULE ORAL at 05:09

## 2021-01-01 RX ADMIN — GABAPENTIN 100 MILLIGRAM(S): 400 CAPSULE ORAL at 14:09

## 2021-01-01 RX ADMIN — Medication 25 MILLIGRAM(S): at 23:04

## 2021-01-01 RX ADMIN — ATORVASTATIN CALCIUM 40 MILLIGRAM(S): 80 TABLET, FILM COATED ORAL at 21:24

## 2021-01-01 RX ADMIN — Medication 325 MILLIGRAM(S): at 21:24

## 2021-01-01 RX ADMIN — Medication 2: at 18:41

## 2021-01-01 RX ADMIN — ISOSORBIDE MONONITRATE 30 MILLIGRAM(S): 60 TABLET, EXTENDED RELEASE ORAL at 10:43

## 2021-01-01 RX ADMIN — Medication 75 MILLIGRAM(S): at 09:41

## 2021-01-01 RX ADMIN — INSULIN GLARGINE 6 UNIT(S): 100 INJECTION, SOLUTION SUBCUTANEOUS at 21:57

## 2021-01-01 RX ADMIN — GABAPENTIN 100 MILLIGRAM(S): 400 CAPSULE ORAL at 11:15

## 2021-01-01 RX ADMIN — Medication 50 MILLIGRAM(S): at 06:01

## 2021-01-01 RX ADMIN — INSULIN HUMAN 5 UNIT(S): 100 INJECTION, SOLUTION SUBCUTANEOUS at 00:26

## 2021-01-01 RX ADMIN — Medication 1000 MILLIGRAM(S): at 11:13

## 2021-01-01 RX ADMIN — Medication 1: at 09:25

## 2021-01-01 RX ADMIN — Medication 6 MILLIGRAM(S): at 15:30

## 2021-01-01 RX ADMIN — Medication 50 MILLIGRAM(S): at 13:41

## 2021-01-01 RX ADMIN — Medication 12.5 MILLIGRAM(S): at 09:55

## 2021-01-01 RX ADMIN — GABAPENTIN 100 MILLIGRAM(S): 400 CAPSULE ORAL at 22:01

## 2021-01-01 RX ADMIN — GABAPENTIN 100 MILLIGRAM(S): 400 CAPSULE ORAL at 22:10

## 2021-01-01 RX ADMIN — HEPARIN SODIUM 5000 UNIT(S): 5000 INJECTION INTRAVENOUS; SUBCUTANEOUS at 06:46

## 2021-01-01 RX ADMIN — Medication 1 MILLIGRAM(S): at 18:30

## 2021-01-01 RX ADMIN — AZITHROMYCIN 255 MILLIGRAM(S): 500 TABLET, FILM COATED ORAL at 10:34

## 2021-01-01 RX ADMIN — Medication 102 MILLIGRAM(S): at 20:55

## 2021-01-01 RX ADMIN — HEPARIN SODIUM 5000 UNIT(S): 5000 INJECTION INTRAVENOUS; SUBCUTANEOUS at 01:10

## 2021-01-01 RX ADMIN — Medication 975 MILLIGRAM(S): at 21:17

## 2021-01-01 RX ADMIN — Medication 25 MILLIGRAM(S): at 14:23

## 2021-01-01 RX ADMIN — SENNA PLUS 2 TABLET(S): 8.6 TABLET ORAL at 22:09

## 2021-01-01 RX ADMIN — GABAPENTIN 100 MILLIGRAM(S): 400 CAPSULE ORAL at 15:05

## 2021-01-01 RX ADMIN — INSULIN GLARGINE 10 UNIT(S): 100 INJECTION, SOLUTION SUBCUTANEOUS at 09:25

## 2021-01-01 RX ADMIN — INSULIN GLARGINE 6 UNIT(S): 100 INJECTION, SOLUTION SUBCUTANEOUS at 21:58

## 2021-01-01 RX ADMIN — HEPARIN SODIUM 5000 UNIT(S): 5000 INJECTION INTRAVENOUS; SUBCUTANEOUS at 22:44

## 2021-01-01 RX ADMIN — Medication 50 MILLIGRAM(S): at 13:37

## 2021-01-01 RX ADMIN — AZITHROMYCIN 255 MILLIGRAM(S): 500 TABLET, FILM COATED ORAL at 10:44

## 2021-01-01 RX ADMIN — Medication 1 TABLET(S): at 10:57

## 2021-01-01 RX ADMIN — Medication 25 MILLIGRAM(S): at 18:32

## 2021-01-01 RX ADMIN — PREGABALIN 1000 MICROGRAM(S): 225 CAPSULE ORAL at 10:10

## 2021-01-01 RX ADMIN — SODIUM CHLORIDE 80 MILLILITER(S): 9 INJECTION INTRAMUSCULAR; INTRAVENOUS; SUBCUTANEOUS at 13:37

## 2021-01-01 RX ADMIN — GABAPENTIN 100 MILLIGRAM(S): 400 CAPSULE ORAL at 05:20

## 2021-01-01 RX ADMIN — LEVETIRACETAM 500 MILLIGRAM(S): 250 TABLET, FILM COATED ORAL at 21:08

## 2021-01-01 RX ADMIN — INSULIN GLARGINE 10 UNIT(S): 100 INJECTION, SOLUTION SUBCUTANEOUS at 22:23

## 2021-01-01 RX ADMIN — Medication 1 TABLET(S): at 09:55

## 2021-01-01 RX ADMIN — PANTOPRAZOLE SODIUM 40 MILLIGRAM(S): 20 TABLET, DELAYED RELEASE ORAL at 16:02

## 2021-01-01 RX ADMIN — Medication 40 MILLIGRAM(S): at 17:07

## 2021-01-01 RX ADMIN — MAGNESIUM OXIDE 400 MG ORAL TABLET 400 MILLIGRAM(S): 241.3 TABLET ORAL at 22:22

## 2021-01-01 RX ADMIN — HALOPERIDOL DECANOATE 5 MILLIGRAM(S): 100 INJECTION INTRAMUSCULAR at 21:24

## 2021-01-01 RX ADMIN — Medication 12.5 MILLIGRAM(S): at 21:59

## 2021-01-01 RX ADMIN — Medication 6: at 08:45

## 2021-01-01 RX ADMIN — Medication 1000 UNIT(S): at 09:58

## 2021-01-01 RX ADMIN — Medication 12.5 MILLIGRAM(S): at 12:03

## 2021-01-01 RX ADMIN — Medication 975 MILLIGRAM(S): at 21:44

## 2021-01-01 RX ADMIN — LEVETIRACETAM 500 MILLIGRAM(S): 250 TABLET, FILM COATED ORAL at 21:27

## 2021-01-01 RX ADMIN — Medication 50 MILLIGRAM(S): at 13:19

## 2021-01-01 RX ADMIN — SODIUM CHLORIDE 75 MILLILITER(S): 9 INJECTION INTRAMUSCULAR; INTRAVENOUS; SUBCUTANEOUS at 13:38

## 2021-01-01 RX ADMIN — ISOSORBIDE MONONITRATE 30 MILLIGRAM(S): 60 TABLET, EXTENDED RELEASE ORAL at 11:14

## 2021-01-01 RX ADMIN — GABAPENTIN 100 MILLIGRAM(S): 400 CAPSULE ORAL at 05:55

## 2021-01-01 RX ADMIN — GABAPENTIN 100 MILLIGRAM(S): 400 CAPSULE ORAL at 21:24

## 2021-01-01 RX ADMIN — Medication 975 MILLIGRAM(S): at 05:12

## 2021-01-01 RX ADMIN — PROTHROMBIN COMPLEX CONCENTRATE (HUMAN) 400 INTERNATIONAL UNIT(S): 25.5; 16.5; 24; 22; 22; 26 POWDER, FOR SOLUTION INTRAVENOUS at 20:54

## 2021-01-01 RX ADMIN — AMLODIPINE BESYLATE 10 MILLIGRAM(S): 2.5 TABLET ORAL at 12:04

## 2021-01-01 RX ADMIN — GABAPENTIN 100 MILLIGRAM(S): 400 CAPSULE ORAL at 13:47

## 2021-01-01 RX ADMIN — ISOSORBIDE MONONITRATE 30 MILLIGRAM(S): 60 TABLET, EXTENDED RELEASE ORAL at 12:44

## 2021-01-01 RX ADMIN — MAGNESIUM OXIDE 400 MG ORAL TABLET 400 MILLIGRAM(S): 241.3 TABLET ORAL at 21:27

## 2021-01-01 RX ADMIN — HEPARIN SODIUM 5000 UNIT(S): 5000 INJECTION INTRAVENOUS; SUBCUTANEOUS at 13:40

## 2021-01-01 RX ADMIN — AMLODIPINE BESYLATE 10 MILLIGRAM(S): 2.5 TABLET ORAL at 09:41

## 2021-01-01 RX ADMIN — Medication 100 MILLIGRAM(S): at 06:00

## 2021-01-01 RX ADMIN — ZINC SULFATE TAB 220 MG (50 MG ZINC EQUIVALENT) 220 MILLIGRAM(S): 220 (50 ZN) TAB at 11:14

## 2021-01-01 RX ADMIN — HEPARIN SODIUM 5000 UNIT(S): 5000 INJECTION INTRAVENOUS; SUBCUTANEOUS at 10:49

## 2021-01-01 RX ADMIN — LEVETIRACETAM 500 MILLIGRAM(S): 250 TABLET, FILM COATED ORAL at 21:58

## 2021-01-01 RX ADMIN — HEPARIN SODIUM 5000 UNIT(S): 5000 INJECTION INTRAVENOUS; SUBCUTANEOUS at 22:10

## 2021-01-01 RX ADMIN — GABAPENTIN 100 MILLIGRAM(S): 400 CAPSULE ORAL at 05:22

## 2021-01-01 RX ADMIN — Medication 50 MILLIGRAM(S): at 06:46

## 2021-01-01 RX ADMIN — Medication 25 MILLIGRAM(S): at 09:08

## 2021-01-01 RX ADMIN — Medication 2: at 23:41

## 2021-01-01 RX ADMIN — LEVETIRACETAM 500 MILLIGRAM(S): 250 TABLET, FILM COATED ORAL at 12:45

## 2021-01-01 RX ADMIN — HEPARIN SODIUM 5000 UNIT(S): 5000 INJECTION INTRAVENOUS; SUBCUTANEOUS at 13:38

## 2021-01-01 RX ADMIN — MAGNESIUM OXIDE 400 MG ORAL TABLET 400 MILLIGRAM(S): 241.3 TABLET ORAL at 11:14

## 2021-01-01 RX ADMIN — SENNA PLUS 2 TABLET(S): 8.6 TABLET ORAL at 22:03

## 2021-01-01 RX ADMIN — LEVETIRACETAM 500 MILLIGRAM(S): 250 TABLET, FILM COATED ORAL at 10:22

## 2021-01-01 RX ADMIN — AMLODIPINE BESYLATE 10 MILLIGRAM(S): 2.5 TABLET ORAL at 10:55

## 2021-01-01 RX ADMIN — Medication 8: at 11:35

## 2021-01-01 RX ADMIN — Medication 50 MILLIGRAM(S): at 21:42

## 2021-01-01 RX ADMIN — ATORVASTATIN CALCIUM 40 MILLIGRAM(S): 80 TABLET, FILM COATED ORAL at 21:41

## 2021-01-01 RX ADMIN — NICARDIPINE HYDROCHLORIDE 25 MG/HR: 30 CAPSULE, EXTENDED RELEASE ORAL at 13:44

## 2021-01-01 RX ADMIN — PANTOPRAZOLE SODIUM 40 MILLIGRAM(S): 20 TABLET, DELAYED RELEASE ORAL at 09:37

## 2021-01-01 RX ADMIN — Medication 10 MILLIGRAM(S): at 14:13

## 2021-01-01 RX ADMIN — Medication 10 MILLIGRAM(S): at 10:19

## 2021-01-07 NOTE — ED PROVIDER NOTE - NS_EDPROVIDERDISPOUSERTYPE_ED_A_ED
[Time Spent: ___ minutes] : I have spent [unfilled] minutes of time on the encounter. Scribe Attestation (For Scribes USE Only)... Attending Attestation (For Attendings USE Only).../Scribe Attestation (For Scribes USE Only)...

## 2021-02-05 NOTE — ED PROVIDER NOTE - NSDCPRINTRESULTS_ED_ALL_ED
CRI (chronic renal insufficiency)    DM (diabetes mellitus)    HTN (hypertension)    
Patient requests all Lab and Radiology Results on their Discharge Instructions

## 2021-04-07 PROBLEM — H90.3 SENSORINEURAL HEARING LOSS (SNHL) OF BOTH EARS: Status: ACTIVE | Noted: 2021-01-01

## 2021-04-07 PROBLEM — R49.0 DYSPHONIA: Status: ACTIVE | Noted: 2021-01-01

## 2021-04-07 PROBLEM — R09.82 PND (POST-NASAL DRIP): Status: ACTIVE | Noted: 2021-01-01

## 2021-04-07 PROBLEM — J31.2 CHRONIC PHARYNGITIS: Status: ACTIVE | Noted: 2021-01-01

## 2021-04-07 PROBLEM — Z87.891 FORMER SMOKER: Status: ACTIVE | Noted: 2021-01-01

## 2021-04-07 PROBLEM — Z83.3 FAMILY HISTORY OF DIABETES MELLITUS: Status: ACTIVE | Noted: 2021-01-01

## 2021-04-07 PROBLEM — K21.9 LARYNGOPHARYNGEAL REFLUX: Status: ACTIVE | Noted: 2021-01-01

## 2021-04-07 PROBLEM — H61.22 IMPACTED CERUMEN OF LEFT EAR: Status: ACTIVE | Noted: 2021-01-01

## 2021-04-07 NOTE — PROCEDURE
[de-identified] : Indication for procedure:Unable to examine laryngeal structures with mirror exam\par Scope # 5\par Topical anesthesia with viscous xylocaine 2% is applied to the anterior nares.\par A flexible fiberoptic laryngoscope is than introduced through the nares.\par The nasopharynx is clear without mass or inflammation.\par The posterior pharyngeal wall is unremarkable.\par The tongue base and vallecula are unremarkable.  The hypopharynx is unremarkable and unobstructed.\par The supraglottic larynx is within normal limits.\par Both vocal cords are fully mobile with no nodule, polyp or other lesion present.\par There is no edema or erythema overlying the arytenoid cartilages or the inter-arytenoid space.\par The subglottic space is clear.\par The voice has a normal quality.\par  [Cerumen Impaction] : Cerumen Impaction [FreeTextEntry6] : Large amount cerumen cleared left ear instrumentation and suction.\par Ear canals and tympanic membranes  unremarkable.\par

## 2021-04-07 NOTE — HISTORY OF PRESENT ILLNESS
[de-identified] : co hearing loss au aids\par co noise throat, no dysphagia, no hoarseness\par neg asthma or copd

## 2021-04-07 NOTE — ASSESSMENT
[FreeTextEntry1] : exam larynx and upper trachea wnl\par functional component to noise in thraot\par ?pnd\par trial ipratropium spray

## 2021-04-07 NOTE — REVIEW OF SYSTEMS
[Negative] : Heme/Lymph [Hearing Loss] : hearing loss [Nose Bleeds] : nose bleeds [Eye Pain] : eye pain [Orthopnea] : orthopnea [Patient Intake Form Reviewed] : Patient intake form was reviewed [FreeTextEntry1] : chills itching  watery eyes

## 2021-07-29 NOTE — PHYSICAL THERAPY INITIAL EVALUATION ADULT - DISCHARGE DISPOSITION, PT EVAL
Called Diane Found back. He is in Dr. Dolores Bernal office with Praveen Fairchild- he will call me back. home/home w/ assist

## 2021-08-24 NOTE — ED ADULT NURSE NOTE - NSIMPLEMENTINTERV_GEN_ALL_ED
Implemented All Fall with Harm Risk Interventions:  [place] to call system. Call bell, personal items and telephone within reach. Instruct patient to call for assistance. Room bathroom lighting operational. Non-slip footwear when patient is off stretcher. Physically safe environment: no spills, clutter or unnecessary equipment. Stretcher in lowest position, wheels locked, appropriate side rails in place. Provide visual cue, wrist band, yellow gown, etc. Monitor gait and stability. Monitor for mental status changes and reorient to person, place, and time. Review medications for side effects contributing to fall risk. Reinforce activity limits and safety measures with patient and family. Provide visual clues: red socks.

## 2021-08-24 NOTE — ED ADULT TRIAGE NOTE - NS ED NURSE DIRECT TO ROOM YN
Yes Detail Level: Simple Instructions: This plan will send the code FBSE to the PM system.  DO NOT or CHANGE the price. Price (Do Not Change): 0.00

## 2021-08-24 NOTE — ED PROVIDER NOTE - OBJECTIVE STATEMENT
89 y/o F with a PMHx of Afib, BPH, chronic CHF, GERD, HTN, IDDM, Type 2 MI, UTI, arthropathy of shoulder region, artifical cardiac pacemaker, s/p appendectomy, s/p TAVR presents to the ED c/o CP 89 y/o M with a PMHx of Afib, BPH, chronic CHF, GERD, HTN, IDDM, Type 2 MI, UTI, arthropathy of shoulder region, artifical cardiac pacemaker, s/p appendectomy, s/p TAVR presents to the ED c/o CP 89 y/o M with a PMHx of Afib, BPH, chronic CHF, GERD, HTN, IDDM, Type 2 MI, UTI, arthropathy of shoulder region, artifical cardiac pacemaker, s/p appendectomy, s/p TAVR presents to the ED c/o intermittent CP, acute on chronic since October 2020 after stent placement. Pt reports he currently has no OP cardiologist and was suggested to go to the gym for PT after his surgery in October 2020 but was unable to do so due to the COVID-19 pandemic. Also c/o worsening fatigue and generalized weakness; states he finds it difficult to exert himself. Pt reports he has difficulty sleeping since the 1960s. Denies cough, fever, chills, dysuria. Pt denies feeling depressed; states he feels angry and frustrated that he feels very fatigued. Offered pt the option of  an inpatient rehab facility, but he is very un-agreeable to it as he states he needs to take care of his wife at home.

## 2021-08-24 NOTE — ED ADULT NURSE NOTE - OBJECTIVE STATEMENT
pt presents to ED c/o chest pain since Friday. hx stent placement and MI. pt states he has not followed up with his PCP or cardiologist in months. pain is non-radiating.

## 2021-08-24 NOTE — ED PROVIDER NOTE - NS ED ROS FT
Gen: No fever, normal appetite  Eyes: No eye irritation or discharge  ENT: No ear pain, congestion, sore throat  Resp: No cough or trouble breathing  Cardiovascular: No chest pain or palpitation  Gastroenteric: No nausea/vomiting, diarrhea, constipation  :  No change in urine output; no dysuria  MS: No joint or muscle pain  Skin: No rashes  Neuro: No headache; no abnormal movements  Remainder negative, except as per the HPI Gen: No fever, normal appetite. (+) generalized weakness. (+) fatigue  Eyes: No eye irritation or discharge  ENT: No ear pain, congestion, sore throat  Resp: No cough or trouble breathing  Cardiovascular: (+) chest pain. No palpitation  Gastroenteric: No nausea/vomiting, diarrhea, constipation  :  No change in urine output; no dysuria  MS: No joint or muscle pain  Skin: No rashes  Neuro: No headache; no abnormal movements  Remainder negative, except as per the HPI

## 2021-08-24 NOTE — ED PROVIDER NOTE - CLINICAL SUMMARY MEDICAL DECISION MAKING FREE TEXT BOX
pt w/ generalized weakness and CP for months, pt appears frustrated w/ aging, pt tried to do PT however PT was closed 2/2 covid, pt states he does not want impatient rehab, appears depressed but denies SI/HI, will obtain labs/ekg, cardiacs, UA.

## 2021-08-24 NOTE — ED ADULT TRIAGE NOTE - CHIEF COMPLAINT QUOTE
pt c/o chest pain weakness and fatigue for 3 days. pt has hx  of Mi and stent placement. pt is ahrd of aring

## 2021-08-24 NOTE — ED PROVIDER NOTE - PATIENT PORTAL LINK FT
You can access the FollowMyHealth Patient Portal offered by [facility] by registering at the following website: [email or web address]. By joining [employer]’s FollowMyHealth portal, you will also be able to view your health information using other applications (apps) compatible with our system.

## 2021-09-07 NOTE — H&P ADULT - HISTORY OF PRESENT ILLNESS
Patient is a 89y old  Male who presents with a chief complaint of     HPI:    89 yo male with PMh TVAR (10/20), CAD/stents (10/20), IDDM, HTN, Afib, GERD on Coumadin presented to the ER after sustaining a mechanical fall at home hitting his head on the corner of a wall. Pt Denies any headache, dizziness, n/v, visual disturbances.   head CT shows a small right temporal SAH.   Pt remains neurologically intact   INR 2.15  GCS 15   neurosurgery was called to evaluate pt, Pt was seen and examined in the ER and d/w  Dr. Sam who reviewed the head CT.       PAST MEDICAL & SURGICAL HISTORY:  IDDM (insulin dependent diabetes mellitus)    HTN (hypertension)    Chronic congestive heart failure, unspecified congestive heart failure type    Atrial fibrillation, unspecified type    Gastroesophageal reflux disease, esophagitis presence not specified    Benign prostatic hyperplasia without lower urinary tract symptoms    Syncope and collapse  5/4/2018    Type 2 myocardial infarction  12/17/2017    UTI (urinary tract infection)  7/12/2017    Artificial cardiac pacemaker    History of appendectomy    Arthropathy of shoulder region    S/P TAVR (transcatheter aortic valve replacement)        FAMILY HISTORY:  No pertinent family history in first degree relatives        Social Hx:  Nonsmoker, no drug or alcohol use    MEDICATIONS  (STANDING):  diphtheria/tetanus/pertussis (acellular) Vaccine (ADAcel) 0.5 milliLiter(s) IntraMuscular once       Allergies    Aleve (Vomiting)  codeine (Unknown)  morphine (Nausea)  naproxen (Vomiting)

## 2021-09-07 NOTE — ED ADULT TRIAGE NOTE - BSA (M2)
2.48 Dermal Autograft Text: The defect edges were debeveled with a #15 scalpel blade.  Given the location of the defect, shape of the defect and the proximity to free margins a dermal autograft was deemed most appropriate.  Using a sterile surgical marker, the primary defect shape was transferred to the donor site. The area thus outlined was incised deep to adipose tissue with a #15 scalpel blade.  The harvested graft was then trimmed of adipose and epidermal tissue until only dermis was left.  The skin graft was then placed in the primary defect and oriented appropriately.

## 2021-09-07 NOTE — ED PROVIDER NOTE - OBJECTIVE STATEMENT
89yo male Afib on coumadin, BPH, chronic CHF, GERD, HTN, IDDM, Type 2 MI, UTI, arthropathy of shoulder region, artifical cardiac pacemaker, s/p appendectomy, s/p TAVR p/w head trauma s/p fall in living room today. Denies LOC, weakness, numbness, change in vision, neck pain. Notes mild R upper back pain.

## 2021-09-07 NOTE — ED PROCEDURE NOTE - ATTENDING CONTRIBUTION TO CARE
Dr. Au: I have personally performed a face to face bedside history and physical examination of this patient. I have discussed the history, examination, review of systems, assessment and plan of management with the resident. I have reviewed the electronic medical record and amended it to reflect my history, review of systems, physical exam, assessment and plan.    skip Burton MD:  I was present during key portion of resident's procedure.

## 2021-09-07 NOTE — ED PROVIDER NOTE - EKG #1 DATE/TIME
Pt state his friend can hear all information related to his procedure today as well as view any pictures.   07-Sep-2021 22:10

## 2021-09-07 NOTE — ED PROVIDER NOTE - NEUROLOGICAL, MLM
Addended by: CAT JOSHI on: 6/8/2020 11:39 AM     Modules accepted: Orders     Alert and oriented x3, cranial nerves intact, no focal motor or sensory deficits.

## 2021-09-07 NOTE — ED PROVIDER NOTE - PHYSICAL EXAMINATION
Physical Exam:  Gen: NAD, AOx3, non-toxic appearing  Head: two 2-cm superficial lacs to posterior scalp  HEENT: EOMI, PEERLA, normal conjunctiva, tongue midline, oral mucosa moist  Lung: CTAB, no respiratory distress, no wheezes/rhonchi/rales B/L, speaking in full sentences  CV: RRR, no murmurs, rubs or gallops, distal pulses 2+ b/l  Abd: soft, NT, ND, no guarding, no rigidity, no rebound tenderness, no CVA tenderness   MSK: no visible deformities, ROM normal in UE/LE, no back TTP  Neuro: CN II-XII intact, normal FTN, no nystagmus, 5/5 strength b/l, sensation intact b/l  Skin: Warm, well perfused, no rash, no leg swelling  Psych: normal affect, calm

## 2021-09-07 NOTE — H&P ADULT - NSHPPHYSICALEXAM_GEN_ALL_CORE
Constitutional: awake and alert.  HEENT: PERRLA, EOMI, occiput laceration s/p staples, no step offs no crepitus   Neck: Supple.  Respiratory: Breath sounds are clear bilaterally  Cardiovascular: S1 and S2, irregular rhythm  Musculoskeletal: no joint swelling/tenderness, no abnormal movements  Skin: No rashes    Neurological exam:  HF: awake,  A x O x 3. Appropriately interactive, normal affect. Speech fluent, No Aphasia or paraphasic errors. Naming /repetition intact   CN: CLEVE, EOMI, VFF, facial sensation normal, no NLFD, tongue midline  Motor: No pronator drift, Strength 5/5 in all 4 ext, normal bulk and tone, no tremor, rigidity or bradykinesia.    Coord:  No FNFA, dysmetria, SANAZ intact   Gait/Balance: Cannot test

## 2021-09-07 NOTE — ED PROVIDER NOTE - ATTENDING CONTRIBUTION TO CARE
Dr. Au: I have personally performed a face to face bedside history and physical examination of this patient. I have discussed the history, examination, review of systems, assessment and plan of management with the resident. I have reviewed the electronic medical record and amended it to reflect my history, review of systems, physical exam, assessment and plan.

## 2021-09-07 NOTE — ED PROVIDER NOTE - CARE PLAN
1 Principal Discharge DX:	Subarachnoid hemorrhage  Secondary Diagnosis:	Laceration of scalp   Principal Discharge DX:	Traumatic subarachnoid hemorrhage  Secondary Diagnosis:	Laceration of scalp  Secondary Diagnosis:	Closed head injury

## 2021-09-07 NOTE — H&P ADULT - ASSESSMENT
89 yo male with PMh TVAR (10/20), CAD/stents (10/20), IDDM, HTN, Afib, GERD on Coumadin presented to the ER after sustaining a mechanical fall at home hitting his head on the corner of a wall.  Head CT shows a small right temporal SAH.   neuro intact   - will admit to step down   - neuro checks q2 hrs   - rpt head CT in 6 AM or sooner for any changes in MS   - hold AC  - all above d/w Dr. Sam who formulated the plan   - plan d/w ER attending and pt's daughter

## 2021-09-07 NOTE — ED PROVIDER NOTE - ENMT, MLM
Mouth with slightly dry mucosa, oropharynx clear, 2 lacerations to occipital scalp, negative muse's or raccoon's sign

## 2021-09-07 NOTE — ED PROVIDER NOTE - CHPI ED SYMPTOMS NEG
no blurred vision/no loss of consciousness/no nausea/no syncope/no vomiting/no weakness/no change in level of consciousness

## 2021-09-07 NOTE — ED PROVIDER NOTE - CLINICAL SUMMARY MEDICAL DECISION MAKING FREE TEXT BOX
89yo male on coumadin p/w head trauma, neuro alert called, found to have subarachnoid hemorrhage, non-focal neuro exam. NSX consult, labs, lac repair, will likely need reversed and possible admission. 89yo male on coumadin p/w head trauma, neuro alert called, found to have subarachnoid hemorrhage, non-focal neuro exam. NSX consult, labs, lac repair, may need AC reversal and possible admission - discuss further with NSX svce.

## 2021-09-07 NOTE — ED PROVIDER NOTE - PROGRESS NOTE DETAILS
Amanda SIMON for attending Dr. Au: CT head verbal report trace subarachnoid hemorrhage & R Sylvian fissure. Amanda SIMON for attending Dr. Au: neurosurgery PA aware of ED consult. C MD Roe:  Daughter Rashmi Rodríguez aware of situation & of SAH.  [phone] Amanda SIMON for attending Dr. Au: 89 y/o M w/ a PMHx of Afib on coumadin, BPH, chronic CHF, GERD, HTN, IDDM, Type 2 MI, UTI, arthropathy of shoulder region, artifical cardiac pacemaker, s/p appendectomy, TAVR presents to ED s/p slip and fall earlier today for evaluation of head trauma. Pt hit his head on the floor and is also c/o mild upper back soreness. Denies LOC, neck pain, and HA. ISAIAS Au MD:  Callback for JAMEY PA: admission accepted under Dr. Sam for observation, repeat CT head in AM. Amanda SIMON for attending Dr. Au: 89 y/o M w/ a PMHx of Afib on coumadin, BPH, chronic CHF, GERD, HTN, IDDM, Type 2 MI, UTI, arthropathy of shoulder region, artifical cardiac pacemaker, s/p appendectomy, TAVR presents to ED s/p slip and fall earlier today for evaluation of head trauma. Pt hit his head on the floor and is also c/o mild upper back soreness. Denies LOC, neck pain, and HA.  Pt reports + ambulatory after incident.

## 2021-09-07 NOTE — ED ADULT NURSE REASSESSMENT NOTE - NS ED NURSE REASSESS COMMENT FT1
hsndof report taken from EMILIA pennington. pt. tess in [facility]. Pt resting comfortably with no acute distress noted. Pt updated on their status, Labs sent. the current plan of care, and available results no needs or requests at this time. Will continue to monitor/reassess.

## 2021-09-07 NOTE — ED ADULT TRIAGE NOTE - CHIEF COMPLAINT QUOTE
Pt arrives to ED to ED s/p mechanical slip and fall heard by wife. pt hit head on floor. denies LOC. takes coumadin daily. neuro alert called in triage.

## 2021-09-07 NOTE — H&P ADULT - NSHPLABSRESULTS_GEN_ALL_CORE
13.1   9.89  )-----------( 153      ( 07 Sep 2021 19:44 )             39.3   PT/INR - ( 07 Sep 2021 19:44 )   PT: 24.0 sec;   INR: 2.15 ratio         PTT - ( 07 Sep 2021 19:44 )  PTT:34.8 sec

## 2021-09-07 NOTE — ED ADULT NURSE NOTE - INTERVENTIONS DEFINITIONS
[place] to call system/Call bell, personal items and telephone within reach/Instruct patient to call for assistance/Room bathroom lighting operational/Non-slip footwear when patient is off stretcher

## 2021-09-08 NOTE — PROVIDER CONTACT NOTE (OTHER) - SITUATION
Medical management for a new patient
Spoke to Hatti.  is aware of consult.
Spoke to Alyssa.  is aware of consult.

## 2021-09-08 NOTE — PHYSICAL THERAPY INITIAL EVALUATION ADULT - GENERAL OBSERVATIONS, REHAB EVAL
Pt rec'd supine in bed in NAD with HM, BP Cuff, Pulse Oxym all intact; Pt denied any pain or discomfort

## 2021-09-08 NOTE — PHYSICAL THERAPY INITIAL EVALUATION ADULT - MODALITIES TREATMENT COMMENTS
Pt left OOB in chair with HM, BP cuff, Pulse Oxym all intact; CBIR: Pt instructed to not get up alone; Chair alarm donned; EMILIA Gonsalez made aware

## 2021-09-08 NOTE — PATIENT PROFILE ADULT - SURGICAL SITE DRAIN
Hi there,     I was able to get an extension for patient's gel one injections for the bilateral knee(s) through [place] Medicaid insurance company. This expires on 5/31/2021. If an extension is needed, please send a staff message to [facility] [[id number]].     Thank you   Sharri[Rosemary]     Injection Insurance Authorization Rep   [facility]      no

## 2021-09-09 NOTE — CONSULT NOTE ADULT - ASSESSMENT
SAH from mechanical fall wth hx. of CAF and on AC with Coumadin.  High DEBI vasc score but fall risk significant and given events would not restart AC at this time. Once recovered can assess possibility of Watchman given neurological status.  Would await advisement from neurosurgery when cleared to restart AC and then address short term AC at that juncture but long term will need to discuss with patient / family about options.

## 2021-09-09 NOTE — CONSULT NOTE ADULT - SUBJECTIVE AND OBJECTIVE BOX
89 yo male PMhx TVAR (10/20), CAD/stents (10/20), IDDM, HTN, Afib, GERD on Coumadin presented to the ER after sustaining a mechanical fall at home hitting his head on the corner of a wall. Head CT shows a small right temporal SAH. On presentation, INR 2.15.  Seen by neurosurgery. Treated conservatively.  Cardiology consulted for long term use of AC.  Patient mildly confused.  Currently has no complaints.  HArd of hearing at baseline and hear aid not working.       PAST MEDICAL & SURGICAL HISTORY:  IDDM (insulin dependent diabetes mellitus)  HTN (hypertension)  Chronic congestive heart failure, unspecified congestive heart failure type  Atrial fibrillation, unspecified type  Gastroesophageal reflux disease, esophagitis presence not specified  Benign prostatic hyperplasia without lower urinary tract symptoms  Syncope and collapse  5/4/2018  Type 2 myocardial infarction  12/17/2017  UTI (urinary tract infection)  7/12/2017  Artificial cardiac pacemaker  History of appendectomy  Arthropathy of shoulder region  S/P TAVR (transcatheter aortic valve replacement)    FAMILY HISTORY:  No pertinent family history in first degree relatives    Social Hx:  Nonsmoker, no drug or alcohol use          Allergies  Aleve (Vomiting)  codeine (Unknown)  morphine (Nausea)  naproxen (Vomiting)      HOSPITAL MEDICATIONS:   MEDICATIONS  (STANDING):  atorvastatin 40 milliGRAM(s) Oral at bedtime  cholecalciferol 5000 Unit(s) Oral daily  cyanocobalamin 1000 MICROGram(s) Oral daily  ferrous    sulfate 325 milliGRAM(s) Oral at bedtime  gabapentin 100 milliGRAM(s) Oral three times a day  glucagon  Injectable 1 milliGRAM(s) IntraMuscular once  insulin lispro (ADMELOG) corrective regimen sliding scale   SubCutaneous three times a day before meals  insulin lispro (ADMELOG) corrective regimen sliding scale   SubCutaneous at bedtime  isosorbide   mononitrate ER Tablet (IMDUR) 30 milliGRAM(s) Oral daily  levETIRAcetam 500 milliGRAM(s) Oral two times a day  magnesium oxide 400 milliGRAM(s) Oral at bedtime  metoprolol succinate ER 25 milliGRAM(s) Oral daily  multivitamin 1 Tablet(s) Oral daily  pantoprazole    Tablet 40 milliGRAM(s) Oral before breakfast  sodium chloride 0.9% lock flush 3 milliLiter(s) IV Push every 8 hours  venlafaxine XR. 75 milliGRAM(s) Oral daily    MEDICATIONS  (PRN):  acetaminophen   Tablet .. 650 milliGRAM(s) Oral every 6 hours PRN Temp greater or equal to 38C (100.4F), Mild Pain (1 - 3)  promethazine 25 milliGRAM(s) Oral every 6 hours PRN for nausea  simethicone 80 milliGRAM(s) Chew two times a day PRN Indigestion    REVIEW OF SYSTEMS: 13 systems were reviewed and all negative except for comments above.      Vital Signs Last 24 Hrs  T(C): 36.8 (09 Sep 2021 09:15), Max: 36.9 (08 Sep 2021 23:35)  T(F): 98.3 (09 Sep 2021 09:15), Max: 98.5 (08 Sep 2021 23:35)  HR: 65 (09 Sep 2021 15:00) (60 - 93)  BP: 105/62 (09 Sep 2021 14:49) (105/62 - 173/95)  BP(mean): 69 (09 Sep 2021 14:49) (69 - 126)  RR: 16 (09 Sep 2021 15:00) (11 - 28)  SpO2: 96% (09 Sep 2021 15:00) (68% - 100%)    PHYSICAL EXAM:  Constitutional: NAD, awake, well-developed  HEENT: PERRLA, EOMI,  No oral cyanosis. Oropharynx Clean and Dry.  Neck:  supple,  No JVD,   Respiratory: Breath sounds are clear bilaterally, No wheezing, rales or rhonchi  Cardiovascular: NL S1 and S2, IR, IR, 1/6 JOJO  Gastrointestinal: Bowel Sounds present   Extremities: No peripheral edema. No clubbing or cyanosis.  Skin: No rashes or lessions.                          13.1   9.89  )-----------( 153      ( 07 Sep 2021 19:44 )             39.3   09-07    137  |  106  |  61<H>  ----------------------------<  192<H>  4.8   |  29  |  2.68<H>    Ca    9.1      07 Sep 2021 19:44    TPro  8.1  /  Alb  3.3  /  TBili  0.4  /  DBili  x   /  AST  39<H>  /  ALT  42  /  AlkPhos  157<H>  09-07        EKG:  < from: 12 Lead ECG (09.07.21 @ 22:10) >  Ventricular-paced rhythm  Abnormal ECG        ECHO:  < from: TTE Echo Complete w/o Contrast w/ Doppler (11.04.20 @ 10:54) >  Summary     The left ventricle is normal in size.   Mild concentric left ventricular hypertrophy is present.   Estimated left ventricular ejection fraction is 30-35 %.   The septum is akinetic   The left atrium is mildly dilated.   The right atrium appears mildly dilated.   A device wire is seen in the RV and RA.   Normal appearing right ventricle structure and function.   An aortic bio TAVR prosthetic valve is present.   Mild (1+) mitral regurgitation is present.   Mild mitral annular calcification is present.   Moderate (2+) tricuspid valve regurgitation is present.   Moderate pulmonary hypertension.   No evidence of pericardial effusion.    CT Head No Cont (09.09.21 @ 10:41) >  Mild periventricular white matter ischemia with old tiny infarction in the posterior LEFT frontal lobe.    Near complete resolution of hemorrhage within the RIGHT sylvian fissure. Tiny chronic subdural hematoma is again noted.            * Fall pt on VKA with  near complete resolution of hemorrhage within the RIGHT sylvian fissure  off VKA for now  PT eval  neurosx to resume AC pt at risk for CVA ( has evidence of prev cva)    * HTN  fast response with hydralazine  dc IV hydralazine    * AF chronic systolic HF per TTE EF 35%  not on ace/arb b/o renal failure most likely  look for Cardio's input    * T2DM going from 150 to 300  a1c 8.7  needs to resume AM HUERTAS 10 unit, one dose STAT now AM starting tomorrow    * CKD3 stable  
HPI:  89 yo male PMhx TVAR (10/20), CAD/stents (10/20), IDDM, HTN, Afib, GERD on Coumadin presented to the ER after sustaining a mechanical fall at home hitting his head on the corner of a wall. Head CT shows a small right temporal SAH. On presentation, INR 2.15.  Seen by neurosurgery. Treated conservatively.  Cardiology consulted for long term use of AC.  Patient mildly confused.  Currently has no complaints.  HArd of hearing at baseline and hear aid not working.       PAST MEDICAL & SURGICAL HISTORY:  IDDM (insulin dependent diabetes mellitus)  HTN (hypertension)  Chronic congestive heart failure, unspecified congestive heart failure type  Atrial fibrillation, unspecified type  Gastroesophageal reflux disease, esophagitis presence not specified  Benign prostatic hyperplasia without lower urinary tract symptoms  Syncope and collapse  2018  Type 2 myocardial infarction  2017  UTI (urinary tract infection)  2017  Artificial cardiac pacemaker  History of appendectomy  Arthropathy of shoulder region  S/P TAVR (transcatheter aortic valve replacement)    FAMILY HISTORY:  No pertinent family history in first degree relatives    Social Hx:  Nonsmoker, no drug or alcohol use    Home Medications:  Aspirin Enteric Coated 81 mg oral delayed release tablet: 1 tab(s) orally once a day (07 Sep 2021 22:28)  atorvastatin 40 mg oral tablet: 1 tab(s) orally once a day (at bedtime) (07 Sep 2021 22:28)  B Complex 50 oral tablet, extended release: 1 tab(s) orally once a day at lunch (07 Sep 2021 22:28)  Colace 100 mg oral capsule: 1 cap(s) orally once a day (07 Sep 2021 22:28)  cyanocobalamin 1000 mcg oral tablet: 1 tab(s) orally once a day with dinner (07 Sep 2021 22:28)  ferrous sulfate 325 mg (65 mg elemental iron) oral tablet: 1 tab(s) orally once a day (at bedtime) (07 Sep 2021 22:28)  gabapentin 100 mg oral capsule: 1 cap(s) orally 3 times a day with breakfast, lunch and at bedtime (07 Sep 2021 22:28)  Gas-X 80 mg oral tablet, chewable: 1 tab(s) orally 2 times a day with lunch and dinner (07 Sep 2021 22:28)  isosorbide mononitrate 30 mg oral tablet, extended release: 1 tab(s) orally once a day (in the morning) (07 Sep 2021 22:28)  Lantus Solostar Pen 100 units/mL subcutaneous solution: 10 unit(s) subcutaneously once a day after breakfast (07 Sep 2021 22:28)  Mag-Ox 400 oral tablet: 1 tab(s) orally once a day (at bedtime) (07 Sep 2021 22:28)  Metoprolol Succinate ER 25 mg oral tablet, extended release: 1 tab(s) orally once a day with lunch (07 Sep 2021 22:28)  Nephro-Devi oral tablet: 1 tab(s) orally once a day at breakfast (07 Sep 2021 22:28)  NovoLOG Mix 70/30 subcutaneous suspension: 10 unit(s) subcutaneously once a day (in the morning) (07 Sep 2021 22:28)  NovoLOG Mix 70/30 subcutaneous suspension: 2 unit(s) subcutaneously once a day at dinner ONLY IF BS &gt; 200  (07 Sep 2021 22:28)  pantoprazole 40 mg oral delayed release tablet: 1 tab(s) orally once a day (07 Sep 2021 22:28)  promethazine 25 mg oral tablet: 1 tab(s) orally every 6 hours, As Needed - for nausea (07 Sep 2021 22:28)  RABEprazole 20 mg oral delayed release tablet: 1 tab(s) orally 2 times a day at lunch and before dinner (07 Sep 2021 22:28)  repaglinide 0.5 mg oral tablet: 1 tab(s) orally once a day with dinner (07 Sep 2021 22:28)  repaglinide 0.5 mg oral tablet: 2 tab(s) orally once a day at dinner ONLY IF BS &lt; 200 (07 Sep 2021 22:28)  venlafaxine 75 mg oral capsule, extended release: 1 cap(s) orally once a day at dinner (07 Sep 2021 22:28)  Vitamin D3 5000 intl units (125 mcg) oral tablet: 1 tab(s) orally once a day at dinner (07 Sep 2021 22:28)  warfarin 5 mg oral tablet: 1 tab(s) orally once a day at dinner  (07 Sep 2021 22:28)    Allergies  Aleve (Vomiting)  codeine (Unknown)  morphine (Nausea)  naproxen (Vomiting)      HOSPITAL MEDICATIONS:   MEDICATIONS  (STANDING):  atorvastatin 40 milliGRAM(s) Oral at bedtime  cholecalciferol 5000 Unit(s) Oral daily  cyanocobalamin 1000 MICROGram(s) Oral daily  ferrous    sulfate 325 milliGRAM(s) Oral at bedtime  gabapentin 100 milliGRAM(s) Oral three times a day  glucagon  Injectable 1 milliGRAM(s) IntraMuscular once  insulin lispro (ADMELOG) corrective regimen sliding scale   SubCutaneous three times a day before meals  insulin lispro (ADMELOG) corrective regimen sliding scale   SubCutaneous at bedtime  isosorbide   mononitrate ER Tablet (IMDUR) 30 milliGRAM(s) Oral daily  levETIRAcetam 500 milliGRAM(s) Oral two times a day  magnesium oxide 400 milliGRAM(s) Oral at bedtime  metoprolol succinate ER 25 milliGRAM(s) Oral daily  multivitamin 1 Tablet(s) Oral daily  pantoprazole    Tablet 40 milliGRAM(s) Oral before breakfast  sodium chloride 0.9% lock flush 3 milliLiter(s) IV Push every 8 hours  venlafaxine XR. 75 milliGRAM(s) Oral daily    MEDICATIONS  (PRN):  acetaminophen   Tablet .. 650 milliGRAM(s) Oral every 6 hours PRN Temp greater or equal to 38C (100.4F), Mild Pain (1 - 3)  promethazine 25 milliGRAM(s) Oral every 6 hours PRN for nausea  simethicone 80 milliGRAM(s) Chew two times a day PRN Indigestion    REVIEW OF SYSTEMS: 13 systems were reviewed and all negative except for comments above.    Vital Signs Last 24 Hrs  T(C): 36.8 (09 Sep 2021 09:15), Max: 36.9 (08 Sep 2021 23:35)  T(F): 98.3 (09 Sep 2021 09:15), Max: 98.5 (08 Sep 2021 23:35)  HR: 60 (09 Sep 2021 08:00) (60 - 94)  BP: 152/82 (09 Sep 2021 08:00) (112/94 - 172/89)  BP(mean): 104 (09 Sep 2021 08:00) (74 - 126)  RR: 22 (09 Sep 2021 08:00) (16 - 28)  SpO2: 68% (09 Sep 2021 05:02) (68% - 100%)Daily     Daily Weight in k.5 (09 Sep 2021 03:45)I&O's Summary    08 Sep 2021 07:01  -  09 Sep 2021 07:00  --------------------------------------------------------  IN: 0 mL / OUT: 750 mL / NET: -750 mL        PHYSICAL EXAM:  Constitutional: NAD, awake, well-developed  HEENT: PERRLA, EOMI,  No oral cyanosis. Oropharynx Clean and Dry.  Neck:  supple,  No JVD,   Respiratory: Breath sounds are clear bilaterally, No wheezing, rales or rhonchi  Cardiovascular: NL S1 and S2, IR, IR, 1/6 JOJO  Gastrointestinal: Bowel Sounds present   Extremities: No peripheral edema. No clubbing or cyanosis.  Skin: No rashes or lessions.      LABS: All Labs Reviewed:                        13.1   9.89  )-----------( 153      ( 07 Sep 2021 19:44 )             39.3     07 Sep 2021 19:44    137    |  106    |  61     ----------------------------<  192    4.8     |  29     |  2.68     Ca    9.1        07 Sep 2021 19:44    TPro  8.1    /  Alb  3.3    /  TBili  0.4    /  DBili  x      /  AST  39     /  ALT  42     /  AlkPhos  157    07 Sep 2021 19:44    PT/INR - ( 07 Sep 2021 19:44 )   PT: 24.0 sec;   INR: 2.15 ratio         PTT - ( 07 Sep 2021 19:44 )  PTT:34.8 sec    EKG:  < from: 12 Lead ECG (21 @ 22:10) >  Ventricular-paced rhythm  Abnormal ECG    < end of copied text >    ECHO:  < from: TTE Echo Complete w/o Contrast w/ Doppler (20 @ 10:54) >  Summary     The left ventricle is normal in size.   Mild concentric left ventricular hypertrophy is present.   Estimated left ventricular ejection fraction is 30-35 %.   The septum is akinetic   The left atrium is mildly dilated.   The right atrium appears mildly dilated.   A device wire is seen in the RV and RA.   Normal appearing right ventricle structure and function.   An aortic bio TAVR prosthetic valve is present.   Mild (1+) mitral regurgitation is present.   Mild mitral annular calcification is present.   Moderate (2+) tricuspid valve regurgitation is present.   Moderate pulmonary hypertension.   No evidence of pericardial effusion.    < end of copied text >

## 2021-09-10 NOTE — DISCHARGE NOTE PROVIDER - HOSPITAL COURSE
Patient is a 89 yo male with PMHx TAVR (10/20), CAD/stents (10/20), IDDM, HTN, Afib, GERD on Coumadin presented to the ER after sustaining a mechanical fall at home hitting his head on the corner of a wall. Pt Denies any headache, dizziness, n/v, visual disturbances. Head CT shows a small right temporal SAH.   Pt remains neurologically intact, INR 2.15, GCS 15 Neurosurgery was called to evaluate pt, Pt was seen and examined in the ER and d/w  Dr. Sam who reviewed the head CT.   Admitted to cardiac stepdown, repeat Head CT showed interval increase in hemorrhage. CT head on 9/9 shows near complete resolution of SAH. Seen by hospitalist, started on lantus for high blood sugar. Cardiology following recommending holding AC due to history of falls. Patient is voiding, tolerating diet. Ambulating with PT recommending discharge home with homecare. Patient is stable from neurosurgical and medical standpoint for discharge.

## 2021-09-10 NOTE — PROGRESS NOTE ADULT - SUBJECTIVE AND OBJECTIVE BOX
89 y.o. 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 Head CT shows a small right temporal SAH. On presentation, INR 2.15.  Seen by neurosurgery. Treated conservatively.  Cardiology consulted for long term use of AC.  Patient mildly confused.  Currently has no complaints.  HArd of hearing at baseline and hear aid not working.     INTERVAL HPI: pt is in NAD, ambulated with PT, not in distress  Other ROS reviewed and neg     Vital Signs Last 24 Hrs  T(C): 35.9 (10 Sep 2021 10:35), Max: 36.1 (10 Sep 2021 06:29)  T(F): 96.7 (10 Sep 2021 10:35), Max: 97 (10 Sep 2021 06:29)  HR: 66 (10 Sep 2021 09:00) (61 - 96)  BP: 143/80 (10 Sep 2021 09:00) (94/59 - 167/63)  BP(mean): 95 (10 Sep 2021 09:00) (65 - 127)  RR: 18 (10 Sep 2021 09:00) (11 - 30)  SpO2: 96% (09 Sep 2021 15:00) (83% - 96%)  I&O's Detail    09 Sep 2021 07:01  -  10 Sep 2021 07:00  --------------------------------------------------------  IN:  Total IN: 0 mL    OUT:    Voided (mL): 600 mL  Total OUT: 600 mL    Total NET: -600 mL      10 Sep 2021 07:01  -  10 Sep 2021 14:24  --------------------------------------------------------  IN:    Oral Fluid: 236 mL  Total IN: 236 mL    OUT:  Total OUT: 0 mL    Total NET: 236 mL                        11.7   9.84  )-----------( 129      ( 10 Sep 2021 06:38 )             35.3     10 Sep 2021 06:38    137    |  105    |  54     ----------------------------<  171    4.3     |  26     |  2.56     Ca    9.2        10 Sep 2021 06:38    CAPILLARY BLOOD GLUCOSE    POCT Blood Glucose.: 287 mg/dL (10 Sep 2021 11:30)  POCT Blood Glucose.: 180 mg/dL (10 Sep 2021 09:03)  POCT Blood Glucose.: 200 mg/dL (09 Sep 2021 21:11)  POCT Blood Glucose.: 347 mg/dL (09 Sep 2021 16:07)    MEDICATIONS  (STANDING):  atorvastatin 40 milliGRAM(s) Oral at bedtime  cholecalciferol 5000 Unit(s) Oral daily  cyanocobalamin 1000 MICROGram(s) Oral daily  dextrose 40% Gel 15 Gram(s) Oral once  dextrose 5%. 1000 milliLiter(s) (50 mL/Hr) IV Continuous <Continuous>  dextrose 5%. 1000 milliLiter(s) (100 mL/Hr) IV Continuous <Continuous>  dextrose 50% Injectable 25 Gram(s) IV Push once  dextrose 50% Injectable 12.5 Gram(s) IV Push once  dextrose 50% Injectable 25 Gram(s) IV Push once  ferrous    sulfate 325 milliGRAM(s) Oral at bedtime  gabapentin 100 milliGRAM(s) Oral three times a day  glucagon  Injectable 1 milliGRAM(s) IntraMuscular once  insulin glargine SubCutaneous Injection (LANTUS) - Peds 8 Unit(s) SubCutaneous at bedtime  insulin glargine SubCutaneous Injection (LANTUS) - Peds 10 Unit(s) SubCutaneous before breakfast  insulin lispro (ADMELOG) corrective regimen sliding scale   SubCutaneous three times a day before meals  insulin lispro (ADMELOG) corrective regimen sliding scale   SubCutaneous at bedtime  isosorbide   mononitrate ER Tablet (IMDUR) 30 milliGRAM(s) Oral daily  levETIRAcetam 500 milliGRAM(s) Oral two times a day  magnesium oxide 400 milliGRAM(s) Oral at bedtime  metoprolol succinate ER 25 milliGRAM(s) Oral daily  multivitamin 1 Tablet(s) Oral daily  pantoprazole    Tablet 40 milliGRAM(s) Oral before breakfast  sodium chloride 0.9% lock flush 3 milliLiter(s) IV Push every 8 hours  venlafaxine XR. 75 milliGRAM(s) Oral daily    MEDICATIONS  (PRN):  acetaminophen   Tablet .. 650 milliGRAM(s) Oral every 6 hours PRN Temp greater or equal to 38C (100.4F), Mild Pain (1 - 3)  promethazine 25 milliGRAM(s) Oral every 6 hours PRN for nausea  simethicone 80 milliGRAM(s) Chew two times a day PRN Indigestion    PHYSICAL EXAM:  Constitutional: male in NAD awake, well-developed  HEENT: PERRLA, EOMI,  No oral cyanosis. Oropharynx Clean and Dry.  Neck:  supple,  No JVD,   Respiratory: Breath sounds are clear bilaterally, No wheezing, rales or rhonchi  Cardiovascular: NL S1 and S2, IR, IR, II/VI JOJO  Gastrointestinal: Bowel Sounds present, NT, soft  Extremities: No peripheral edema. No clubbing or cyanosis.  Skin: No rashes or lesions.    EKG:  < from: 12 Lead ECG (09.07.21 @ 22:10) >  Ventricular-paced rhythm  Abnormal ECG        ECHO:  < from: TTE Echo Complete w/o Contrast w/ Doppler (11.04.20 @ 10:54) >  Summary     The left ventricle is normal in size.   Mild concentric left ventricular hypertrophy is present.   Estimated left ventricular ejection fraction is 30-35 %.   The septum is akinetic   The left atrium is mildly dilated.   The right atrium appears mildly dilated.   A device wire is seen in the RV and RA.   Normal appearing right ventricle structure and function.   An aortic bio TAVR prosthetic valve is present.   Mild (1+) mitral regurgitation is present.   Mild mitral annular calcification is present.   Moderate (2+) tricuspid valve regurgitation is present.   Moderate pulmonary hypertension.   No evidence of pericardial effusion.    CT Head No Cont (09.09.21 @ 10:41) >  Mild periventricular white matter ischemia with old tiny infarction in the posterior LEFT frontal lobe.    Near complete resolution of hemorrhage within the RIGHT sylvian fissure. Tiny chronic subdural hematoma is again noted.    All available medical records personally reviewed        
HPI:  89 yo male with PMh TVAR (10/20), CAD/stents (10/20), IDDM, HTN, Afib, GERD on Coumadin presented to the ER after sustaining a mechanical fall at home hitting his head on the corner of a wall. Pt Denies any headache, dizziness, n/v, visual disturbances. Head CT shows a small right temporal SAH.   Pt remains neurologically intact, INR 2.15, GCS 15   neurosurgery was called to evaluate pt, Pt was seen and examined in the ER and d/w  Dr. Sam who reviewed the head CT.       9/8- Patient seen and examined, no acute events overnight. Head CT reviewed with patient. No new complaints.    9/9- Patient seen and examined, no events overnight, pending Hospitalist consult, pt c/o mild headache and nausea, states he saw a GI doctor the day prior to his fall, on exam is is awake, alert, and oriented X3, 5/5 strength in all extremities, repeat CT head shows near complete resolution of SAH.       Vital Signs Last 24 Hrs  T(C): 36.8 (09 Sep 2021 09:15), Max: 36.9 (08 Sep 2021 23:35)  T(F): 98.3 (09 Sep 2021 09:15), Max: 98.5 (08 Sep 2021 23:35)  HR: 63 (09 Sep 2021 11:00) (60 - 94)  BP: 128/73 (09 Sep 2021 11:00) (112/94 - 173/95)  BP(mean): 87 (09 Sep 2021 11:00) (74 - 126)  RR: 28 (09 Sep 2021 10:30) (16 - 28)  SpO2: 68% (09 Sep 2021 05:02) (68% - 100%)    MEDICATIONS  (STANDING):  atorvastatin 40 milliGRAM(s) Oral at bedtime  cholecalciferol 5000 Unit(s) Oral daily  cyanocobalamin 1000 MICROGram(s) Oral daily  ferrous sulfate 325 milliGRAM(s) Oral at bedtime  gabapentin 100 milliGRAM(s) Oral three times a day  glucagon  Injectable 1 milliGRAM(s) IntraMuscular once  insulin lispro (ADMELOG) corrective regimen sliding scale   SubCutaneous three times a day before meals  insulin lispro (ADMELOG) corrective regimen sliding scale   SubCutaneous at bedtime  isosorbide   mononitrate ER Tablet (IMDUR) 30 milliGRAM(s) Oral daily  levETIRAcetam 500 milliGRAM(s) Oral two times a day  magnesium oxide 400 milliGRAM(s) Oral at bedtime  metoprolol succinate ER 25 milliGRAM(s) Oral daily  multivitamin 1 Tablet(s) Oral daily  pantoprazole    Tablet 40 milliGRAM(s) Oral before breakfast  sodium chloride 0.9% lock flush 3 milliLiter(s) IV Push every 8 hours  venlafaxine XR. 75 milliGRAM(s) Oral daily    MEDICATIONS  (PRN):  acetaminophen   Tablet .. 650 milliGRAM(s) Oral every 6 hours PRN Temp greater or equal to 38C (100.4F), Mild Pain (1 - 3)  promethazine 25 milliGRAM(s) Oral every 6 hours PRN for nausea  simethicone 80 milliGRAM(s) Chew two times a day PRN Indigestion    ROS: pertinent positives in HPI, all other ROS were reviewed and are negative     PHYSICAL EXAM:  Constitutional: awake and alert  HEENT: PERRLA, EOMI, [place] (hearing aide batteries are dead)   Neck: Supple  Respiratory: Breath sounds are clear bilaterally  Cardiovascular: S1 and S2, paced rhythm  Gastrointestinal: soft, +pain to palpation LLQ   Extremities:  no edema  Vascular: Carotid Bruit - no  Musculoskeletal: no joint swelling/tenderness, no abnormal movements  Skin: No rashes    Neurological exam:  HF: A x O x 3. Appropriately interactive, normal affect. Speech fluent, No Aphasia or paraphasic errors. Naming /repetition intact   CN: PEARRL, EOMI, VFF, facial sensation normal, no NLFD, tongue midline, palate moves equally, SCM equal bilaterally  Motor: No pronator drift, Strength 5/5 in all 4 ext, normal bulk and tone, no tremor, rigidity or bradykinesia.    Sens: Intact to light touch / PP/ VS/ JS    Reflexes: Symmetric and normal, downgoing toes b/l  Coord:  No FNFA, dysmetria, SANAZ intact   Gait/Balance: Not tested     LABS:                         13.1   9.89  )-----------( 153      ( 07 Sep 2021 19:44 )             39.3     09-07    137  |  106  |  61<H>  ----------------------------<  192<H>  4.8   |  29  |  2.68<H>    Ca    9.1      07 Sep 2021 19:44    TPro  8.1  /  Alb  3.3  /  TBili  0.4  /  DBili  x   /  AST  39<H>  /  ALT  42  /  AlkPhos  157<H>  09-07    LIVER FUNCTIONS - ( 07 Sep 2021 19:44 )  Alb: 3.3 g/dL / Pro: 8.1 gm/dL / ALK PHOS: 157 U/L / ALT: 42 U/L / AST: 39 U/L / GGT: x           RADIOLOGY:  < from: CT Head No Cont (09.09.21 @ 10:41) >  The brain demonstrates mild periventricular white matter ischemia with old tiny infarction in the posterior LEFT frontal lobe. No acute cerebral cortical infarct is seen. Near complete resolution of hemorrhage within the RIGHT sylvian fissure. Tiny chronic subdural hematoma is again noted.  No mass effect is found in the brain. Moderate vascular calcification in the cavernous sinuses and basilar and vertebral arteries.    The ventricles, sulci and basal cisterns appear unremarkable.    The orbits are unremarkable.  The paranasal sinuses are clear.  The nasal cavity appears intact.  The nasopharynx is symmetric.  The central skull base, petrous temporal bones and calvarium remain intact.    IMPRESSION:   Mild periventricular white matter ischemia with old tiny infarction in the posterior LEFT frontal lobe.      Near complete resolution of hemorrhage within the RIGHT sylvian fissure. Tiny chronic subdural hematoma is again noted.      
HPI:  89 yo male with PMh TVAR (10/20), CAD/stents (10/20), IDDM, HTN, Afib, GERD on Coumadin presented to the ER after sustaining a mechanical fall at home hitting his head on the corner of a wall. Pt Denies any headache, dizziness, n/v, visual disturbances.   head CT shows a small right temporal SAH.   Pt remains neurologically intact   INR 2.15  GCS 15   neurosurgery was called to evaluate pt, Pt was seen and examined in the ER and d/w  Dr. Sam who reviewed the head CT.     9/8- Patient seen and examined, no acute events overnight. Head CT reviewed with patient. No new complaints.    Vital Signs Last 24 Hrs  T(C): 36.4 (08 Sep 2021 03:16), Max: 36.4 (07 Sep 2021 19:58)  T(F): 97.5 (08 Sep 2021 03:16), Max: 97.6 (07 Sep 2021 19:58)  HR: 83 (08 Sep 2021 05:00) (60 - 83)  BP: 164/70 (08 Sep 2021 05:00) (129/90 - 164/70)  BP(mean): 97 (08 Sep 2021 05:00) (97 - 99)  RR: 23 (08 Sep 2021 05:00) (15 - 29)  SpO2: 97% (08 Sep 2021 03:17) (84% - 100%)    MEDICATIONS  (STANDING):  atorvastatin 40 milliGRAM(s) Oral at bedtime  cholecalciferol 5000 Unit(s) Oral daily  cyanocobalamin 1000 MICROGram(s) Oral daily  dextrose 40% Gel 15 Gram(s) Oral once  dextrose 5%. 1000 milliLiter(s) (50 mL/Hr) IV Continuous <Continuous>  dextrose 5%. 1000 milliLiter(s) (100 mL/Hr) IV Continuous <Continuous>  dextrose 50% Injectable 25 Gram(s) IV Push once  dextrose 50% Injectable 12.5 Gram(s) IV Push once  dextrose 50% Injectable 25 Gram(s) IV Push once  ferrous    sulfate 325 milliGRAM(s) Oral at bedtime  gabapentin 100 milliGRAM(s) Oral three times a day  glucagon  Injectable 1 milliGRAM(s) IntraMuscular once  insulin lispro (ADMELOG) corrective regimen sliding scale   SubCutaneous three times a day before meals  insulin lispro (ADMELOG) corrective regimen sliding scale   SubCutaneous at bedtime  isosorbide   mononitrate ER Tablet (IMDUR) 30 milliGRAM(s) Oral daily  levETIRAcetam 500 milliGRAM(s) Oral two times a day  magnesium oxide 400 milliGRAM(s) Oral at bedtime  metoprolol succinate ER 25 milliGRAM(s) Oral daily  multivitamin 1 Tablet(s) Oral daily  pantoprazole    Tablet 40 milliGRAM(s) Oral before breakfast  sodium chloride 0.9% lock flush 3 milliLiter(s) IV Push every 8 hours  venlafaxine XR. 75 milliGRAM(s) Oral daily    MEDICATIONS  (PRN):  acetaminophen   Tablet .. 650 milliGRAM(s) Oral every 6 hours PRN Temp greater or equal to 38C (100.4F), Mild Pain (1 - 3)  promethazine 25 milliGRAM(s) Oral every 6 hours PRN for nausea  simethicone 80 milliGRAM(s) Chew two times a day PRN Indigestion      PHYSICAL EXAM:  Constitutional: awake and alert  HEENT: PERRLA, EOMI, occiput laceration + staples, no step offs no crepitus   Neck: Supple  Respiratory: Breath sounds are clear bilaterally  Cardiovascular: S1 and S2, irregular rhythm  Musculoskeletal: no joint swelling/tenderness, no abnormal movements  Skin: No rashes    Neurological exam:  HF: A x O x 3. Appropriately interactive, normal affect. Speech fluent, No Aphasia or paraphasic errors. Naming /repetition intact   CN: CLEVE, EOMI, VFF, facial sensation normal, no NLFD, tongue midline  Motor: No pronator drift, Strength 5/5 in all 4 ext, normal bulk and tone, no tremor, rigidity or bradykinesia.    Coord:  No FNFA, dysmetria, SANAZ intact   Gait/Balance: Cannot test          LABS:                         13.1   9.89  )-----------( 153      ( 07 Sep 2021 19:44 )             39.3     09-07    137  |  106  |  61<H>  ----------------------------<  192<H>  4.8   |  29  |  2.68<H>    Ca    9.1      07 Sep 2021 19:44    TPro  8.1  /  Alb  3.3  /  TBili  0.4  /  DBili  x   /  AST  39<H>  /  ALT  42  /  AlkPhos  157<H>  09-07    LIVER FUNCTIONS - ( 07 Sep 2021 19:44 )  Alb: 3.3 g/dL / Pro: 8.1 gm/dL / ALK PHOS: 157 U/L / ALT: 42 U/L / AST: 39 U/L / GGT: x                 RADIOLOGY:  CT Head No Cont (09.08.21 @ 07:10)  IMPRESSION: Increased small subarachnoid hemorrhage along the right sylvian fissure. No midline shift, hydrocephalus, or effacement of the cisterns.    
Patient is a 89y old  Male who presents with a chief complaint of head trauma on full dose AC (10 Sep 2021 14:23)    89 yo male PMhx TVAR (10/20), CAD/stents (10/20), IDDM, HTN, Afib, GERD on Coumadin presented to the ER after sustaining a mechanical fall at home hitting his head on the corner of a wall. Head CT shows a small right temporal SAH. On presentation, INR 2.15.  Seen by neurosurgery. Treated conservatively.  Cardiology consulted for long term use of AC.  Patient mildly confused.  Currently has no complaints.  HArd of hearing at baseline and hear aid not working.     2021:  SAH from mechanical fall wth hx. of CAF and on AC with Coumadin.  High DEBI vasc score but fall risk significant and given events would not restart AC at this time. Once recovered can assess possibility of Watchman given neurological status.  Would await advisement from neurosurgery when cleared to restart AC and then address short term AC at that juncture but long term will need to discuss with patient / family about options.      9/10  sleeping soundly.   stable    Allergies    Aleve (Vomiting)  codeine (Unknown)  morphine (Nausea)  naproxen (Vomiting)    Intolerances        MEDICATIONS  (STANDING):  atorvastatin 40 milliGRAM(s) Oral at bedtime  cholecalciferol 5000 Unit(s) Oral daily  cyanocobalamin 1000 MICROGram(s) Oral daily  dextrose 40% Gel 15 Gram(s) Oral once  dextrose 5%. 1000 milliLiter(s) (50 mL/Hr) IV Continuous <Continuous>  dextrose 5%. 1000 milliLiter(s) (100 mL/Hr) IV Continuous <Continuous>  dextrose 50% Injectable 25 Gram(s) IV Push once  dextrose 50% Injectable 12.5 Gram(s) IV Push once  dextrose 50% Injectable 25 Gram(s) IV Push once  ferrous    sulfate 325 milliGRAM(s) Oral at bedtime  gabapentin 100 milliGRAM(s) Oral three times a day  glucagon  Injectable 1 milliGRAM(s) IntraMuscular once  insulin glargine SubCutaneous Injection (LANTUS) - Peds 8 Unit(s) SubCutaneous at bedtime  insulin glargine SubCutaneous Injection (LANTUS) - Peds 10 Unit(s) SubCutaneous before breakfast  insulin lispro (ADMELOG) corrective regimen sliding scale   SubCutaneous three times a day before meals  insulin lispro (ADMELOG) corrective regimen sliding scale   SubCutaneous at bedtime  isosorbide   mononitrate ER Tablet (IMDUR) 30 milliGRAM(s) Oral daily  levETIRAcetam 500 milliGRAM(s) Oral two times a day  magnesium oxide 400 milliGRAM(s) Oral at bedtime  metoprolol succinate ER 25 milliGRAM(s) Oral daily  multivitamin 1 Tablet(s) Oral daily  pantoprazole    Tablet 40 milliGRAM(s) Oral before breakfast  sodium chloride 0.9% lock flush 3 milliLiter(s) IV Push every 8 hours  venlafaxine XR. 75 milliGRAM(s) Oral daily    MEDICATIONS  (PRN):  acetaminophen   Tablet .. 650 milliGRAM(s) Oral every 6 hours PRN Temp greater or equal to 38C (100.4F), Mild Pain (1 - 3)  promethazine 25 milliGRAM(s) Oral every 6 hours PRN for nausea  simethicone 80 milliGRAM(s) Chew two times a day PRN Indigestion    REVIEW OF SYSTEMS:    RESPIRATORY: No cough, wheezing, hemoptysis; No shortness of breath  CARDIOVASCULAR: No chest pain or palpitations  All other review of systems is negative unless indicated above      PHYSICAL EXAM:  Daily     Daily Weight in k.1 (10 Sep 2021 05:34)  Vital Signs Last 24 Hrs  T(C): 36.1 (10 Sep 2021 16:05), Max: 36.1 (10 Sep 2021 06:29)  T(F): 96.9 (10 Sep 2021 16:05), Max: 97 (10 Sep 2021 06:29)  HR: 64 (10 Sep 2021 16:00) (61 - 96)  BP: 159/93 (10 Sep 2021 16:00) (110/70 - 167/63)  BP(mean): 104 (10 Sep 2021 16:00) (78 - 127)  RR: 20 (10 Sep 2021 13:00) (18 - 30)  SpO2: --    Constitutional: NAD, awake and alert, well-developed  HEENT: PERR, EOMI, Normal Hearing, MMM  Neck: Soft and supple, No LAD, No JVD  Respiratory: Breath sounds are clear bilaterally, No wheezing, rales or rhonchi  Cardiovascular: S1 and S2, regular rate and rhythm, no Murmurs, gallops or rubs  Gastrointestinal: Bowel Sounds present, soft, nontender, nondistended, no guarding, no rebound  Extremities: No peripheral edema  Vascular: 2+ peripheral pulses  Neurological: A/O x 3, no focal deficits  Musculoskeletal: 5/5 strength b/l upper and lower extremities  Skin: No rashes    LABS: All Labs Reviewed:                        11.7   9.84  )-----------( 129      ( 10 Sep 2021 06:38 )             35.3     09-10    137  |  105  |  54<H>  ----------------------------<  171<H>  4.3   |  26  |  2.56<H>    Ca    9.2      10 Sep 2021 06:38

## 2021-09-10 NOTE — DISCHARGE NOTE NURSING/CASE MANAGEMENT/SOCIAL WORK - NSDCVIVACCINE_GEN_ALL_CORE_FT
Tdap; 07-Sep-2021 21:52; Amberly Santos (RN); [employer]; [id number] (Exp. Date: 28-Jun-2023); IntraMuscular; Deltoid Left.; 0.5 milliLiter(s); VIS (VIS Published: 09-May-2013, VIS Presented: 07-Sep-2021);

## 2021-09-10 NOTE — DISCHARGE NOTE PROVIDER - CARE PROVIDER_API CALL
Prabhakar Sam)  Neurosurgery  [address] [facility], [address]  [place] [address]  Phone: [phone]  Fax: [phone]  Follow Up Time: 2 weeks    Carmine Galaviz)  Cardiovascular Disease  [address] [place] [address]  Phone: [phone]  Fax: [phone]  Follow Up Time: 2 weeks

## 2021-09-10 NOTE — PROGRESS NOTE ADULT - ASSESSMENT
9/9/2021  SAH from mechanical fall wth hx. of CAF and on AC with Coumadin.  High DEBI vasc score but fall risk significant and given events would not restart AC at this time. Once recovered can assess possibility of Watchman given neurological status.  Would await advisement from neurosurgery when cleared to restart AC and then address short term AC at that juncture but long term will need to discuss with patient / family about options.      9/10  anticoagulation is on hold right now.  will be reevaluated as an outpatient and if stable, may consider a watchman at a later time.  
89 yo male with PMh TVAR (10/20), CAD/stents (10/20), IDDM, HTN, Afib, GERD on Coumadin presented to the ER after sustaining a mechanical fall at home hitting his head on the corner of a wall. Initial head CT showed SAH now complete resolution on repeat CT.    Plan:  - No acute neurosurgical intervention  - Advance diet and activity as tolerated   - Hospitalist consult still pending   - Cardiology appreciated- hold off on restarting AC   - Keppra 500mg twice a day x 1 week  - Maintain SBP < 150   - SCDs DVT ppx    Will evaluate plans for discharge once pt is evaluation by PT and hospitalist service     Discussed with Dr. Sam
89 y.o. 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 Head CT shows a small right temporal SAH    * Fall on VKA with now near complete resolution of hemorrhage within the RIGHT sylvian fissure   - repeat CTH in 2 weeks, f/u with NS and if neg consider to resume VKA then    * HTN - imdur, metoprolol    * AF with chronic compensated systolic HF with TTE EF 35% not on ACEI/ARB b/o renal failure   - possible Watchman if pt willbe able to tolerate 6 weeks of VKA    * DMII with HgbA1C 8.7 - HUERTAS + HISS    * CKD st III - stable    Medically stable for DC  DIscussed with NS PA  Time spent 58 min  
89 yo male with PMh TVAR (10/20), CAD/stents (10/20), IDDM, HTN, Afib, GERD on Coumadin presented to the ER after sustaining a mechanical fall at home hitting his head on the corner of a wall.  Head CT shows a small right temporal SAH, increase on Head CT this AM.    Plan:  - No acute neurosurgical intervention  - Head CT tomorrow AM  - Advance diet  - OOB/PT  - Hospitalist consult  - Cardiology consult  - Continue to hold Coumadin  - Keppra 500mg twice a day x 1 week  - SCDs DVT ppx    Discussed with Dr. Sam

## 2021-09-10 NOTE — PROGRESS NOTE ADULT - REASON FOR ADMISSION
head trauma on full dose AC

## 2021-09-10 NOTE — DISCHARGE NOTE PROVIDER - NSDCMRMEDTOKEN_GEN_ALL_CORE_FT
atorvastatin 40 mg oral tablet: 1 tab(s) orally once a day (at bedtime)  B Complex 50 oral tablet, extended release: 1 tab(s) orally once a day at lunch  Colace 100 mg oral capsule: 1 cap(s) orally once a day  cyanocobalamin 1000 mcg oral tablet: 1 tab(s) orally once a day with dinner  ferrous sulfate 325 mg (65 mg elemental iron) oral tablet: 1 tab(s) orally once a day (at bedtime)  gabapentin 100 mg oral capsule: 1 cap(s) orally 3 times a day with breakfast, lunch and at bedtime  Gas-X 80 mg oral tablet, chewable: 1 tab(s) orally 2 times a day with lunch and dinner  isosorbide mononitrate 30 mg oral tablet, extended release: 1 tab(s) orally once a day (in the morning)  Lantus Solostar Pen 100 units/mL subcutaneous solution: 10 unit(s) subcutaneously once a day after breakfast  levETIRAcetam 500 mg oral tablet: 1 tab(s) orally 2 times a day  Continue for 4 more days until 9/14  Mag-Ox 400 oral tablet: 1 tab(s) orally once a day (at bedtime)  Metoprolol Succinate ER 25 mg oral tablet, extended release: 1 tab(s) orally once a day with lunch  Nephro-Devi oral tablet: 1 tab(s) orally once a day at breakfast  NovoLOG Mix 70/30 subcutaneous suspension: 10 unit(s) subcutaneously once a day (in the morning)  NovoLOG Mix 70/30 subcutaneous suspension: 2 unit(s) subcutaneously once a day at dinner ONLY IF BS &gt; 200   pantoprazole 40 mg oral delayed release tablet: 1 tab(s) orally once a day  promethazine 25 mg oral tablet: 1 tab(s) orally every 6 hours, As Needed - for nausea  RABEprazole 20 mg oral delayed release tablet: 1 tab(s) orally 2 times a day at lunch and before dinner  repaglinide 0.5 mg oral tablet: 1 tab(s) orally once a day with dinner  repaglinide 0.5 mg oral tablet: 2 tab(s) orally once a day at dinner ONLY IF BS &lt; 200  venlafaxine 75 mg oral capsule, extended release: 1 cap(s) orally once a day at dinner  Vitamin D3 5000 intl units (125 mcg) oral tablet: 1 tab(s) orally once a day at dinner

## 2021-09-10 NOTE — DISCHARGE NOTE PROVIDER - NSDCCPCAREPLAN_GEN_ALL_CORE_FT
PRINCIPAL DISCHARGE DIAGNOSIS  Diagnosis: Traumatic subarachnoid hemorrhage  Assessment and Plan of Treatment: Follow up with Dr. Sam in 2 weeks with follow up Head CT scan. Continue Keppra which is an anticonvulsant for 4 more days. Please call the office or visist the nesrest ER if you experience severe headaches, nausea/vomiting, lethargy, sudden weakness. Continue activity as tolerated, no heavy lifting or strenuous activity. Use rolling walker when ambulating. Do not drive.      SECONDARY DISCHARGE DIAGNOSES  Diagnosis: Chronic atrial fibrillation  Assessment and Plan of Treatment: Continue to hold your Coumadin and aspirin until cleared by your neurosurgeon.   Follow up with Cardiologist upon discharge.    Diagnosis: Laceration of scalp  Assessment and Plan of Treatment:     Diagnosis: Closed head injury  Assessment and Plan of Treatment:

## 2021-09-10 NOTE — DISCHARGE NOTE PROVIDER - CARE PROVIDERS DIRECT ADDRESSES
,DirectAddress_Unknown,[email or web address]

## 2021-09-10 NOTE — DISCHARGE NOTE NURSING/CASE MANAGEMENT/SOCIAL WORK - NSDCPEFALRISK_GEN_ALL_CORE
For information on Fall & injury Prevention, visit [email or web address]
1 or 2

## 2021-09-10 NOTE — DISCHARGE NOTE NURSING/CASE MANAGEMENT/SOCIAL WORK - PATIENT PORTAL LINK FT
You can access the FollowMyHealth Patient Portal offered by [facility] by registering at the following website: [email or web address]. By joining [facility]’s FollowMyHealth portal, you will also be able to view your health information using other applications (apps) compatible with our system.

## 2021-09-10 NOTE — DISCHARGE NOTE PROVIDER - NSDCHHPTASSISTHOME_GEN_ALL_CORE
[facility]  [address] [place], [place], [address]  [phone]        August 29, 2018    RE: Zo Qureshi                                                                                                                                                       [id number] [facility] [address]            To Whom It May Concern,   Zo Qureshi was seen today and her functional capacity evaluation was reviewed and I agree with the permanent restriction outlined.             Sincerely,    Terri Robles M.D.    
Patient Needs Assistance to Leave Residence...

## 2021-09-10 NOTE — DISCHARGE NOTE PROVIDER - NSDCACTIVITY_GEN_ALL_CORE
Do not drive or operate machinery/Showering allowed/Stairs allowed/Walking - Indoors allowed/No heavy lifting/straining/Walking - Outdoors allowed/Follow Instructions Provided by your Surgical Team

## 2021-09-10 NOTE — DISCHARGE NOTE PROVIDER - PROVIDER TOKENS
PROVIDER:[TOKEN:[[id number]],FOLLOWUP:[2 weeks]],PROVIDER:[TOKEN:[[id number]],FOLLOWUP:[2 weeks]]

## 2021-09-19 NOTE — H&P ADULT - ASSESSMENT
89 yo male with PMh TVAR (10/20), CAD/stents (10/20), IDDM, HTN, Afib, GERD on Coumadin presented to the ER after sustaining a mechanical fall at home hitting his head on the corner of a wall.  Head CT shows Left head of caudate ICH.  neuro intact     - will admit to ICU under neurosurgery for 24 hours, then to medical service.  - sbp control cardene gtt < 150.  - keppra 500 bid  - reversed ASA and Coumadin effects (Kcentra)   - neuro checks q1 hrs   - rpt head CT in [place] or sooner for any changes in MS   - hold AC  - Accu checks   - ISS keep BS < 180  - Echo in AM  - Neurology eval in AM as Hemorrhagic CVA  - all above d/w Dr. Sam who formulated the plan  - Chemical ppx for DVT is prohibited due to recent ICH.  Mechanical dvt ppx is acceptable.  - LE duplex screening   - plan d/w ER attending and pt's daughter    89 yo male with PMh TVAR (10/20), CAD/stents (10/20), IDDM, HTN, Afib, GERD on Coumadin presented to the ER after sustaining a mechanical fall at home hitting his head on the corner of a wall.  Head CT shows Left head of caudate ICH.  neuro intact     - will admit to ICU under neurosurgery for 24 hours, then to medical service.  - Not a TPA candidate due to Heme on CT scan and coumadin use recent, with low score NIHSS.  - sbp control cardene gtt < 150.  - keppra 500 bid  - reversed ASA and Coumadin effects (Kcentra) / Vit K  - neuro checks q1 hrs   - rpt head CT in [place] or sooner for any changes in MS   - hold AC  - Accu checks  AC / HS  - ISS keep BS < 180  - Echo in AM  - TSH / A1C in AM  - Neurology eval in AM as Hemorrhagic CVA  - MRI brain when able  - all above d/w Dr. Sam who formulated the plan  - Chemical ppx for DVT is prohibited due to recent ICH.  Mechanical dvt ppx is acceptable.  - LE duplex screening   - plan d/w ER attending and pt's daughter

## 2021-09-19 NOTE — ED PROVIDER NOTE - PROGRESS NOTE DETAILS
Pt brought in by EMS for confusion x 3 days s/p trauamtic SAH.  Following exam and chart review, code stroke called to eval for acute bleed.  Left sided ICH on my wet read not present before.  According to chart anticoagulation was discontinued last admission.  Starting on cardene drip for goal  systolic.  Discussed with neurosurgery PA, will consult in ED.  Will consult ICU.  Attempted to call pt's son Julio, unable to get ahold of.  Will call pt's home number, and check waiting room. Fransico Tellez D.O. Amanda MORFIN for Dr. Tellez: Spoke with patient's wife Mary and son Dariel, they are unsure of Code status. Will speak with other brother. They currently want pt to be Full Code. Amanda MORFIN for Dr. Tellez: Spoke with ICU, will consult on pt. Patient improving mentation on Cardene drip.  Kcentra, vitamin K, and platelets given.  Admit to ICU under Dr. Sam.  Fransico Tellez D.O.

## 2021-09-19 NOTE — H&P ADULT - NSHPREVIEWOFSYSTEMS_GEN_ALL_CORE
Review of Systems:  · General	negative  · ENMT	negative  · Respiratory and Thorax	negative  · Cardiovascular	negative  · Gastrointestinal	negative  · Genitourinary	negative  · Musculoskeletal	negative  · Neurological	negative

## 2021-09-19 NOTE — ED PROVIDER NOTE - OBJECTIVE STATEMENT
89 yo M hx of atrial fibrillation on Coumadin, CAD, HTN, IDDM, presents via EMS for AMS.  Pt with recent admission for traumatic SAH.  Discharged 9 days ago from hospital.  Per EMS and family patient has been declining over the last several days, altered mental status, not getting out of bed.  On EMS arrival patient altered, and hypertensive to 200s systolic.  Pt unable to provide HPI or ROS. Per family although Coumadin and ASA were discontinued prior admission, these medications were restarted on 9/14.

## 2021-09-19 NOTE — H&P ADULT - NSHPPHYSICALEXAM_GEN_ALL_CORE
Vital Signs Last 24 Hrs  T(C): 37.5 (19 Sep 2021 20:10), Max: 37.5 (19 Sep 2021 20:10)  T(F): 99.5 (19 Sep 2021 20:10), Max: 99.5 (19 Sep 2021 20:10)  HR: 73 (19 Sep 2021 21:08) (71 - 81)  BP: 170/78 (19 Sep 2021 21:08) (144/72 - 212/105)  BP(mean): 102 (19 Sep 2021 21:08) (93 - 105)  RR: 25 (19 Sep 2021 21:08) (22 - 26)  SpO2: 99% (19 Sep 2021 21:08) (98% - 100%) Vital Signs Last 24 Hrs  T(C): 37.5 (19 Sep 2021 20:10), Max: 37.5 (19 Sep 2021 20:10)  T(F): 99.5 (19 Sep 2021 20:10), Max: 99.5 (19 Sep 2021 20:10)  HR: 73 (19 Sep 2021 21:08) (71 - 81)  BP: 170/78 (19 Sep 2021 21:08) (144/72 - 212/105)  BP(mean): 102 (19 Sep 2021 21:08) (93 - 105)  RR: 25 (19 Sep 2021 21:08) (22 - 26)  SpO2: 99% (19 Sep 2021 21:08) (98% - 100%)    Physical Exam: Constitutional: awake and alert.  HEENT: PERRLA, EOMI, occiput laceration s/p staples, no step offs no crepitus   Neck: Supple.  Respiratory: Breath sounds are clear bilaterally  Cardiovascular: S1 and S2, irregular rhythm  Musculoskeletal: no joint swelling/tenderness, no abnormal movements  Skin: No rashes    Neurological exam:  HF: awake,  A x O x 3. Appropriately interactive, normal affect. Speech fluent, No Aphasia or paraphasic errors. Naming /repetition intact   CN: CLEVE, EOMI, VFF, facial sensation normal, no NLFD, tongue midline  Motor: No pronator drift, Strength 5/5 in all 4 ext, normal bulk and tone, no tremor, rigidity or bradykinesia.    Coord:  No FNFA, dysmetria, SANAZ intact   Gait/Balance: Cannot test

## 2021-09-19 NOTE — CONSULT NOTE ADULT - ASSESSMENT
89 y/o male pmhx HTN, IDDM, Afib on coumadin, GERD, CAD s/p stents, recent admission w/ SDH on 9/7 s/p fall. Patient was discharged and AC was restarted on 9/14. Now presenting to ED w/ worsening mental status over past few days. Patient was code stroke.    Impression:  1. Acute ICH   2. Hypertensive emergency   3. Afib on coumadin   4. HTN  5. DM2   89 y/o male pmhx HTN, IDDM, Afib on coumadin, GERD, CAD s/p stents, recent admission w/ SDH on 9/7 s/p fall. Patient was discharged and AC was restarted on 9/14. Now presenting to ED w/ worsening mental status over past few days. Patient was code stroke.    Impression:  1. Acute ICH   2. Hypertensive emergency   3. Afib on coumadin   4. HTN  5. DM2      Plan:  - Q1hr neurochecks. Repeat CT head in 6 hours, will repeat sooner if worsening neuro exam. Neurosurgery following. Will need MRI   - Keppra 500mg BID for seizure ppx  - Protecting his airway, supplemental O2 as needed  - HTN emergency, started on nicardipine Titrating to maintain SBP <160.   - Afib, rate controlled. Hold AC.   - TTE in am   - NPO for now   - Patient on coumadin at home INR was 1.54, but now s/p Kcentra/ 10mg Vitamin K/ 2 units of platelets. Repeat Coags after Kcentra   - SCDs for DVT ppx  - Insulin sliding scale

## 2021-09-19 NOTE — ED ADULT NURSE NOTE - NSIMPLEMENTINTERV_GEN_ALL_ED
Implemented All Fall with Harm Risk Interventions:  [place] to call system. Call bell, personal items and telephone within reach. Instruct patient to call for assistance. Room bathroom lighting operational. Non-slip footwear when patient is off stretcher. Physically safe environment: no spills, clutter or unnecessary equipment. Stretcher in lowest position, wheels locked, appropriate side rails in place. Provide visual cue, wrist band, yellow gown, etc. Monitor gait and stability. Monitor for mental status changes and reorient to person, place, and time. Review medications for side effects contributing to fall risk. Reinforce activity limits and safety measures with patient and family. Provide visual clues: red socks.

## 2021-09-19 NOTE — H&P ADULT - HISTORY OF PRESENT ILLNESS
89 yo male with PMh TVAR (10/20), CAD/stents (10/20), IDDM, HTN, Afib, GERD on Coumadin presented to the ER today with Left head of caudate ICH 1.5cm (non-surgical).  Pt with recent fall and impact to head on Sept 7th 2021.  Pt noted with 3 days of less activity, sleepiness, less participatory - change in MS.  CTH reveals Left head of caudate 2cm ICH, no IVH.  Admitted to Neuro ICU under neurosurgery for 24 hours of care.  Pt remains neurologically intact.  Pt recently restared on 9/14 ASA and coumadin dosing once again. Reversed in ED with KCentra and given 1 pack platelets and Vit K.  Repeat CTH scheduled around [place] tonight as a stability evaluation scan.`     INR 1.54  GCS 15   Code Stroke in ED called: NIHSS is   neurosurgery was called to evaluate pt, Pt was seen and examined in the ER and d/w  Dr. Sam who reviewed the head CT.       PAST MEDICAL & SURGICAL HISTORY:  IDDM (insulin dependent diabetes mellitus)  HTN (hypertension)  Chronic congestive heart failure, unspecified congestive heart failure type  Atrial fibrillation, unspecified type  Gastroesophageal reflux disease, esophagitis presence not specified  Benign prostatic hyperplasia without lower urinary tract symptoms  Syncope and collapse: 5/4/2018  Type 2 myocardial infarction: 12/17/2017  UTI (urinary tract infection): 7/12/2017  Artificial cardiac pacemaker  History of appendectomy  Arthropathy of shoulder region  S/P TAVR (transcatheter aortic valve replacement)        FAMILY HISTORY:  No pertinent family history in first degree relatives    Social Hx:  Nonsmoker, no drug or alcohol use     Allergies  Aleve (Vomiting)  codeine (Unknown)  morphine (Nausea)  naproxen (Vomiting)    Allergies and Intolerances:        Allergies:  	naproxen: Drug, Vomiting  	morphine: Drug, Nausea  	Aleve: Drug, Vomiting  	codeine: Drug, Unknown    Home Medications:   * Patient Currently Takes Medications as of 07-Nov-2020 13:40 documented in Structured Notes  · 	hydrALAZINE 10 mg oral tablet: 1 tab(s) orally 3 times a day  · 	isosorbide dinitrate 20 mg oral tablet: 1 tab(s) orally 3 times a day  · 	Metoprolol Succinate ER 25 mg oral tablet, extended release: 1 tab(s) orally 2 times a day  · 	warfarin 5 mg oral tablet: 1 tab(s) orally once a day  	Check INR on Monday with your cardio   · 	venlafaxine 75 mg oral capsule, extended release: 1 cap(s) orally once a day at lunch  · 	Vitamin D3 5000 intl units (125 mcg) oral tablet: 1 tab(s) orally once a day  · 	B Complex 50 oral tablet, extended release: 1 tab(s) orally once a day  · 	gabapentin 100 mg oral capsule: 1 cap(s) orally 3 times a day with breakfast, lunch and dinner  · 	RABEprazole 20 mg oral delayed release tablet: 1 tab(s) orally 2 times a day at lunch and before dinner  · 	ferrous sulfate 325 mg (65 mg elemental iron) oral tablet: 1 tab(s) orally once a day, As Needed  · 	Lantus Solostar Pen 100 units/mL subcutaneous solution: 10 unit(s) subcutaneously once a day (at bedtime)  · 	atorvastatin 20 mg oral tablet: 2 tab(s) orally once a day  · 	Calos Caps: 1 cap(s) orally once a day  · 	Tradjenta 5 mg oral tablet: 1 tab(s) orally once a day  · 	Gas-X 80 mg oral tablet, chewable: 1 tab(s) orally 3 times a day (with meals)  · 	repaglinide 0.5 mg oral tablet: 1 tab(s) orally once a day before dinner  · 	promethazine 25 mg oral tablet: 1 tab(s) orally every 6 hours, As Needed - for nausea  *        ASA 81mg     89 yo male with PMh TVAR (10/20), CAD/stents (10/20), IDDM, HTN, Afib, GERD on Coumadin presented to the ER today with Left head of caudate ICH 1.5cm (non-surgical).  Pt with recent fall and impact to head on Sept 7th 2021.  Pt noted with 3 days of less activity, sleepiness, less communicative / participatory - change in MS.  CTH reveals Left head of caudate 2cm ICH, no IVH.  Admitted to Neuro ICU under neurosurgery for 24 hours of care.  Pt remains neurologically intact.  Pt recently restared on 9/14 ASA and coumadin dosing once again. Reversed in ED with KCentra and given 1 pack platelets and Vit K.  Repeat CTH scheduled around [place] tonight as a stability evaluation scan.`     INR 1.54  GCS 15   Code Stroke in ED called (Telestroke?)  Lesly notified -   NIHSS per Neurosurgical eval is zero (prior to Neurosurg evaluation - pt not fully communicative per ED staff)    INPUTS:  1A: Level of consciousness —> 0 = Alert; keenly responsive  1B: Ask month and age —> 0 = Both questions right  1C: 'Blink eyes' & 'squeeze hands' —> 0 = Performs both tasks  2: Horizontal extraocular movements —> 0 = Normal  3: Visual fields —> 0 = No visual loss  4: Facial palsy —> 0 = Normal symmetry  5A: Left arm motor drift —> 0 = No drift for 10 seconds  5B: Right arm motor drift —> 0 = No drift for 10 seconds  6A: Left leg motor drift —> 0 = No drift for 5 seconds  6B: Right leg motor drift —> 0 = No drift for 5 seconds  7: Limb Ataxia —> 0 = No ataxia  8: Sensation —> 0 = Normal; no sensory loss  9: Language/aphasia —> 0 = Normal; no aphasia  10: Dysarthria —> 0 = Normal  11: Extinction/inattention —> 0 = No abnormality      Neurosurgery was called to evaluate pt, Pt was seen and examined in the ER and d/w  Dr. Sam who reviewed the head CT.     PAST MEDICAL & SURGICAL HISTORY:  IDDM (insulin dependent diabetes mellitus)  HTN (hypertension)  Chronic congestive heart failure, unspecified congestive heart failure type  Atrial fibrillation, unspecified type  Gastroesophageal reflux disease, esophagitis presence not specified  Benign prostatic hyperplasia without lower urinary tract symptoms  Syncope and collapse: 5/4/2018  Type 2 myocardial infarction: 12/17/2017  UTI (urinary tract infection): 7/12/2017  Artificial cardiac pacemaker  History of appendectomy  Arthropathy of shoulder region  S/P TAVR (transcatheter aortic valve replacement)        FAMILY HISTORY:  No pertinent family history in first degree relatives    Social Hx:  Nonsmoker, no drug or alcohol use     Allergies  Aleve (Vomiting)  codeine (Unknown)  morphine (Nausea)  naproxen (Vomiting)    Allergies and Intolerances:        Allergies:  	naproxen: Drug, Vomiting  	morphine: Drug, Nausea  	Aleve: Drug, Vomiting  	codeine: Drug, Unknown    Home Medications:   * Patient Currently Takes Medications as of 07-Nov-2020 13:40 documented in Structured Notes  · 	hydrALAZINE 10 mg oral tablet: 1 tab(s) orally 3 times a day  · 	isosorbide dinitrate 20 mg oral tablet: 1 tab(s) orally 3 times a day  · 	Metoprolol Succinate ER 25 mg oral tablet, extended release: 1 tab(s) orally 2 times a day  · 	warfarin 5 mg oral tablet: 1 tab(s) orally once a day  	Check INR on Monday with your cardio   · 	venlafaxine 75 mg oral capsule, extended release: 1 cap(s) orally once a day at lunch  · 	Vitamin D3 5000 intl units (125 mcg) oral tablet: 1 tab(s) orally once a day  · 	B Complex 50 oral tablet, extended release: 1 tab(s) orally once a day  · 	gabapentin 100 mg oral capsule: 1 cap(s) orally 3 times a day with breakfast, lunch and dinner  · 	RABEprazole 20 mg oral delayed release tablet: 1 tab(s) orally 2 times a day at lunch and before dinner  · 	ferrous sulfate 325 mg (65 mg elemental iron) oral tablet: 1 tab(s) orally once a day, As Needed  · 	Lantus Solostar Pen 100 units/mL subcutaneous solution: 10 unit(s) subcutaneously once a day (at bedtime)  · 	atorvastatin 20 mg oral tablet: 2 tab(s) orally once a day  · 	Sperry Caps: 1 cap(s) orally once a day  · 	Tradjenta 5 mg oral tablet: 1 tab(s) orally once a day  · 	Gas-X 80 mg oral tablet, chewable: 1 tab(s) orally 3 times a day (with meals)  · 	repaglinide 0.5 mg oral tablet: 1 tab(s) orally once a day before dinner  · 	promethazine 25 mg oral tablet: 1 tab(s) orally every 6 hours, As Needed - for nausea  *        ASA 81mg

## 2021-09-19 NOTE — ED PROVIDER NOTE - CONVERSATION DETAILS
Spoke with Wife Mary and son Dariel. Pt had prior documentation that stated he would want to be DNR.

## 2021-09-19 NOTE — ED PROVIDER NOTE - CLINICAL SUMMARY MEDICAL DECISION MAKING FREE TEXT BOX
Patient with new ICH.  S/p anticoagulation and antiplatelet reversal.  Discussed with family, patient is DNR.  Appreciate neurosurgery and ICU consults.  Admit to ICU under Dr. Sam.

## 2021-09-19 NOTE — H&P ADULT - NSHPLABSRESULTS_GEN_ALL_CORE
Labs:                        12.2   11.28 )-----------( 165      ( 19 Sep 2021 19:39 )             35.7     09-19    137  |  106  |  56<H>  ----------------------------<  347<H>  5.1   |  25  |  2.62<H>    Ca    9.4      19 Sep 2021 19:39    TPro  8.1  /  Alb  3.4  /  TBili  0.7  /  DBili  x   /  AST  31  /  ALT  33  /  AlkPhos  156<H>  09-19    PT/INR - ( 19 Sep 2021 19:39 )   PT: 17.5 sec;   INR: 1.54 ratio    PTT - ( 19 Sep 2021 19:39 )  PTT:22.9 sec    Radiology report:  - CT head:   IMPRESSION:  New 2 cm anterior left gangliocapsular hemorrhage with local mass effect and compression of the frontal and left lateral ventricle. No midline shift.  Findings were discussed with Dr. Tellez at 8:26 PM on 9/19/2021 who indicated that the communication was understood.

## 2021-09-19 NOTE — ED PROVIDER NOTE - PHYSICAL EXAMINATION
Pt altered, oriented X 1 moaning  Moving all extremities, but not following commands  Upgoing toes with bilateral Babinski  Lungs clear  Heart sounds normal  Scab on posterior scalp  No signs of acute trauma on exam.

## 2021-09-19 NOTE — CONSULT NOTE ADULT - SUBJECTIVE AND OBJECTIVE BOX
Patient is a 89y old  Male who presents with a chief complaint of Left Head of Caudate - Non surgical hemorrhage (19 Sep 2021 21:57)      89 y/o male pmhx HTN, IDDM, Afib on coumadin, GERD, CAD s/p stents, recent admission w/ SDH on 9/7 s/p fall. Patient was discharged and AC was restarted on 9/14. Now presenting to ED w/ worsening mental status over past few days. Patient was code stroke. CT head showed 2cm left ICH. He was given Kcentra, Vitamin K and 2 units of platelets in the ER. Found to be hypertensive to 200s on arrival,started on nicardipine infusion. Admitted to ICU under neurosurgery service.     PAST MEDICAL & SURGICAL HISTORY:  IDDM (insulin dependent diabetes mellitus)    HTN (hypertension)    Chronic congestive heart failure, unspecified congestive heart failure type    Atrial fibrillation, unspecified type    Gastroesophageal reflux disease, esophagitis presence not specified    Benign prostatic hyperplasia without lower urinary tract symptoms    Syncope and collapse  5/4/2018    Type 2 myocardial infarction  12/17/2017    UTI (urinary tract infection)  7/12/2017    Artificial cardiac pacemaker    History of appendectomy    Arthropathy of shoulder region    S/P TAVR (transcatheter aortic valve replacement)      Allergies    Aleve (Vomiting)  codeine (Unknown)  morphine (Nausea)  naproxen (Vomiting)    Intolerances      FAMILY HISTORY:  No pertinent family history in first degree relatives        Review of Systems:  Unable to obtain due to clinical condition.     Medications:  isosorbide   mononitrate ER Tablet (IMDUR) 30 milliGRAM(s) Oral daily  metoprolol succinate ER 25 milliGRAM(s) Oral daily  niCARdipine Infusion 5 mG/Hr IV Continuous <Continuous>  gabapentin 100 milliGRAM(s) Oral three times a day  levETIRAcetam 500 milliGRAM(s) Oral every 12 hours  ondansetron Injectable 4 milliGRAM(s) IV Push every 6 hours PRN  promethazine 25 milliGRAM(s) Oral every 6 hours PRN  venlafaxine XR. 75 milliGRAM(s) Oral daily  pantoprazole    Tablet 40 milliGRAM(s) Oral before breakfast  polyethylene glycol 3350 17 Gram(s) Oral at bedtime  senna 2 Tablet(s) Oral at bedtime PRN  atorvastatin 40 milliGRAM(s) Oral at bedtime  dextrose 40% Gel 15 Gram(s) Oral once  dextrose 50% Injectable 25 Gram(s) IV Push once  glucagon  Injectable 1 milliGRAM(s) IntraMuscular once  insulin lispro (ADMELOG) corrective regimen sliding scale   SubCutaneous at bedtime  cholecalciferol 1000 Unit(s) Oral daily  cyanocobalamin 1000 MICROGram(s) Oral daily  dextrose 5%. 1000 milliLiter(s) IV Continuous <Continuous>  ferrous    sulfate 325 milliGRAM(s) Oral at bedtime  folic acid 1 milliGRAM(s) Oral daily  magnesium oxide 400 milliGRAM(s) Oral at bedtime  Nephro-chris 1 Tablet(s) Oral daily  sodium chloride 0.9%. 1000 milliLiter(s) IV Continuous <Continuous>  vitamin B complex with vitamin C 1 Tablet(s) Oral daily                ICU Vital Signs Last 24 Hrs  T(C): 37.5 (19 Sep 2021 20:10), Max: 37.5 (19 Sep 2021 20:10)  T(F): 99.5 (19 Sep 2021 20:10), Max: 99.5 (19 Sep 2021 20:10)  HR: 68 (19 Sep 2021 22:28) (67 - 81)  BP: 138/72 (19 Sep 2021 22:28) (138/72 - 212/105)  BP(mean): 86 (19 Sep 2021 22:28) (86 - 105)  RR: 16 (19 Sep 2021 22:28) (16 - 26)  SpO2: 98% (19 Sep 2021 22:28) (98% - 100%)    Vital Signs Last 24 Hrs  T(C): 37.5 (19 Sep 2021 20:10), Max: 37.5 (19 Sep 2021 20:10)  T(F): 99.5 (19 Sep 2021 20:10), Max: 99.5 (19 Sep 2021 20:10)  HR: 68 (19 Sep 2021 22:28) (67 - 81)  BP: 138/72 (19 Sep 2021 22:28) (138/72 - 212/105)  BP(mean): 86 (19 Sep 2021 22:28) (86 - 105)  RR: 16 (19 Sep 2021 22:28) (16 - 26)  SpO2: 98% (19 Sep 2021 22:28) (98% - 100%)        I&O's Detail        LABS:                        12.2   11.28 )-----------( 165      ( 19 Sep 2021 19:39 )             35.7     09-19    137  |  106  |  56<H>  ----------------------------<  347<H>  5.1   |  25  |  2.62<H>    Ca    9.4      19 Sep 2021 19:39    TPro  8.1  /  Alb  3.4  /  TBili  0.7  /  DBili  x   /  AST  31  /  ALT  33  /  AlkPhos  156<H>  09-19      CARDIAC MARKERS ( 19 Sep 2021 19:39 )  0.061 ng/mL / x     / x     / x     / x          CAPILLARY BLOOD GLUCOSE      POCT Blood Glucose.: 338 mg/dL (19 Sep 2021 22:19)    PT/INR - ( 19 Sep 2021 19:39 )   PT: 17.5 sec;   INR: 1.54 ratio         PTT - ( 19 Sep 2021 19:39 )  PTT:22.9 sec    CULTURES:      Physical Examination:    General: Awake/Alert. No acute distress     HEENT: Pupils equal, reactive to light.  Symmetric.    PULM: Clear to auscultation bilaterally, no significant sputum production    CVS: Irregular rhythm. +S1/S2.    ABD: Soft, nondistended, nontender, normoactive bowel sounds, no masses    EXT: No edema, nontender    SKIN: Warm and well perfused, no rashes noted.    NEURO: Awake/Alert, Moving all extremities. Strength 5/5 in all extremities. No pronator drift.     RADIOLOGY: < from: CT Brain Stroke Protocol (09.19.21 @ 19:58) >    EXAM:  CT BRAIN STROKE PROTOCOL                            PROCEDURE DATE:  09/19/2021          INTERPRETATION:  HISTORY: Stroke code.    COMPARISON: CT head 9/7/2021, 9/8/2021, 9/9/2021    TECHNIQUE: Axial noncontrast CT images from the skull baseto the vertex were obtained and submitted for interpretation. Coronal and sagittal reformatted images were performed. Bone and soft tissue windows were evaluated.    FINDINGS:    New ovoid hyperattenuating region centered in the anterior left gangliocapsular region measuring 2.0 cm in diameter, in keeping with acute parenchymal hematoma. Local mass effect compresses the frontal horn of the left lateral ventricle without midline shift.    There is interval fading of hyperattenuating component of right temporal/sylvian fissure subarachnoid bleed.    Mild chronic microvascular ischemic changes and moderate central volume loss is again noted. Heavy atheromatous calcification along the carotid siphons, vertebral arteries, basilar artery are identified. No hydrocephalus. Basal cisterns are patent.    Visualized paranasal sinuses and mastoid air cells are clear. Calvarium is intact. Hyperostosis frontalis interna.    IMPRESSION:    New 2 cm anterior left gangliocapsular hemorrhage with local mass effect and compression of the frontal and left lateral ventricle. No midline shift.    Findings were discussed with Dr. Tellez at 8:26 PM on 9/19/2021 who indicated that the communication was understood.    .    < end of copied text >      Critical Care  TIME SPENT:  40 min   Evaluating/treating patient, reviewing data/labs/imaging, discussing case with multidisciplinary team, discussing plan/goals of care with patient/family. Non-inclusive of procedure time.

## 2021-09-19 NOTE — ED ADULT TRIAGE NOTE - CHIEF COMPLAINT QUOTE
patient bibems from home for decreased responsiveness. as per ems, patient was just in HH for head bleed, and ever since he has been discharged home he has been declining as per ems. pt hypertensive in ED, verbally responsive, lethargic and warm to the touch. bgm with ems elevated in 300's.

## 2021-09-20 NOTE — PROGRESS NOTE ADULT - ASSESSMENT
89 y/o male pmhx HTN, IDDM, Afib on coumadin, GERD, CAD s/p stents, recent admission w/ SDH on 9/7 s/p fall. Patient was discharged and AC was restarted on 9/14. Now presenting to ED w/ worsening mental status over past few days. Patient was code stroke.    Impression:  1. Acute ICH   2. Hypertensive emergency   3. Afib on coumadin   4. HTN  5. DM2      Plan:  ICU  - Keppra 500mg BID for seizure ppx  - Protecting his airway, supplemental O2 as needed  - try to wean off cardene  - Afib, rate controlled. Hold AC.   - NPO for now.  Swallow eval  - SCDs for DVT ppx  - Insulin sliding scale

## 2021-09-20 NOTE — SWALLOW BEDSIDE ASSESSMENT ADULT - COMMENTS
The patient was admitted to  status post a mechanical fall. Imaging of brain notable for hemorrhages in left Basal Ganglia/Internal Capsule regions. This profile is superimposed upon a history of valvular heart disease s/p TAVR, CAD s/p MI-stent placements, A-Fib s/p PPM, CHF, IDDM, HTN, BPH, GERD, past shoulder surgery and previous appy.

## 2021-09-20 NOTE — PROGRESS NOTE ADULT - ASSESSMENT
A:    89M  HD # 3  FULL CODE    Here for:    1. Traumatic ICH  2. HTN emergency  3. AF on coumadin  4. HTN  5. NIDDM  6. CKD    This patient requires critical care for support of one or more vital organ systems with a high probability of imminent or life threatening deterioration in his/her condition    P:    Neuro checks. HCT stable on interval imaging. Avoid deleriogenic meds. AED keppra, no s/s seizures.  HD monitoring. Cardene drip for HTN emergency, currently at 2.5 mg/hr, goal SBP < 150. Now on diet start cardene PO to wean off drip. Toprol 25mg PO XR qd.  IS, OOB as able.   Started on PO diet.   No s/s acute infection, monitor off abx.   VTE ppx SCD only, PUD ppx PPI.  Statin.  Hgb 11.4, Plt 204; no s/s active bleeding.  s/p 2 pools SDP, kcentra, vitamin K, INR 1.5 on arrival, now 1.2. Hold AC for now, poor candidate given recent falls, now with ICH. Will require conversation with family, risk/benefit eval for further consideration.   f/u AM labs, replete lytes PRN.  No invasive lines.    Dispo: Cont critical care.    TOTAL CRITICAL CARE TIME: 35 minutes  (EXCLUSIVE of any non bundled procedures)    Note: This time spent INCLUDES time spent directly as this patient's bedside with evaluation, review of chart including review of laboratory and imaging studies, interpretation of vital signs and cardiac output measurements, any necessary ventilator management, and time spent discussing plan of care with patient and family, including goals of care discussion.

## 2021-09-20 NOTE — PROGRESS NOTE ADULT - SUBJECTIVE AND OBJECTIVE BOX
HPI:  Patient is a 89 yo male with PMh TVAR (10/20), CAD/stents (10/20), IDDM, HTN, Afib, GERD on Coumadin presented to the ER today with Left head of caudate ICH 1.5cm (non-surgical).  Pt with recent fall and impact to head on Sept 7th 2021.  Pt noted with 3 days of less activity, sleepiness, less communicative / participatory - change in MS.  CTH reveals Left head of caudate 2cm ICH, no IVH.  Admitted to Neuro ICU under neurosurgery for 24 hours of care.  Pt remains neurologically intact.  Pt recently restarted on 9/14 ASA and coumadin dosing once again. Reversed in ED with KCentra and given 1 pack platelets and Vit K.  Repeat CTH scheduled around [place] tonight as a stability evaluation scan.`  INR 1.54  GCS 15   Code Stroke in ED called (Telestroke?)  Lesly notified -   NIHSS per Neurosurgical eval is zero (prior to Neurosurg evaluation - pt not fully communicative per ED staff)    9/20- Patient seen and examined this AM in ICU. Continues on Cardene gtt. Sleepy, opens eyes for exam, follows simple commands. Repeat HCT at midnight prelim appears stable.    Vital Signs Last 24 Hrs  T(C): 36.6 (20 Sep 2021 08:00), Max: 37.5 (19 Sep 2021 20:10)  T(F): 97.9 (20 Sep 2021 08:00), Max: 99.5 (19 Sep 2021 20:10)  HR: 61 (20 Sep 2021 08:00) (61 - 93)  BP: 144/56 (20 Sep 2021 08:00) (123/23 - 212/105)  BP(mean): 78 (20 Sep 2021 08:00) (71 - 114)  RR: 17 (20 Sep 2021 08:00) (16 - 36)  SpO2: 99% (20 Sep 2021 08:00) (95% - 100%)    MEDICATIONS  (STANDING):  atorvastatin 40 milliGRAM(s) Oral at bedtime  cholecalciferol 1000 Unit(s) Oral daily  cyanocobalamin 1000 MICROGram(s) Oral daily  dextrose 40% Gel 15 Gram(s) Oral once  dextrose 5%. 1000 milliLiter(s) (50 mL/Hr) IV Continuous <Continuous>  dextrose 50% Injectable 25 Gram(s) IV Push once  ferrous    sulfate 325 milliGRAM(s) Oral at bedtime  folic acid 1 milliGRAM(s) Oral daily  gabapentin 100 milliGRAM(s) Oral three times a day  glucagon  Injectable 1 milliGRAM(s) IntraMuscular once  insulin glargine Injectable (LANTUS) 10 Unit(s) SubCutaneous every morning  insulin glargine Injectable (LANTUS) 10 Unit(s) SubCutaneous at bedtime  insulin lispro (ADMELOG) corrective regimen sliding scale   SubCutaneous at bedtime  isosorbide   mononitrate ER Tablet (IMDUR) 30 milliGRAM(s) Oral daily  levETIRAcetam 500 milliGRAM(s) Oral every 12 hours  magnesium oxide 400 milliGRAM(s) Oral at bedtime  metoprolol succinate ER 25 milliGRAM(s) Oral daily  Nephro-chris 1 Tablet(s) Oral daily  niCARdipine Infusion 5 mG/Hr (25 mL/Hr) IV Continuous <Continuous>  pantoprazole    Tablet 40 milliGRAM(s) Oral before breakfast  polyethylene glycol 3350 17 Gram(s) Oral at bedtime  sodium chloride 0.9%. 1000 milliLiter(s) (75 mL/Hr) IV Continuous <Continuous>  venlafaxine XR. 75 milliGRAM(s) Oral daily  vitamin B complex with vitamin C 1 Tablet(s) Oral daily    MEDICATIONS  (PRN):  ondansetron Injectable 4 milliGRAM(s) IV Push every 6 hours PRN Nausea and/or Vomiting  promethazine 25 milliGRAM(s) Oral every 6 hours PRN for nausea  senna 2 Tablet(s) Oral at bedtime PRN Constipation      PHYSICAL EXAM:  Constitutional: drowsy, awakens briefly for exam  HEENT: PERRLA, EOMI  Neck: Supple  Respiratory: Breath sounds are clear bilaterally  Cardiovascular: S1 and S2, irregular rhythm  Musculoskeletal: no joint swelling/tenderness, no abnormal movements  Skin: No rashes    Neurological exam:  HF: Drowsy, opens eyes briefly for exam. A&O x1 to self only. Mouths his name, otherwise non verbal  CN: CLEVE, EOMI, blinks to threat b/l, no NLFD  Motor: MONTES antigravity x 4  Coord:  unable to assess  Gait/Balance: Cannot test          LABS:                         12.2   11.28 )-----------( 165      ( 19 Sep 2021 19:39 )             35.7     09-20    140  |  108  |  59<H>  ----------------------------<  278<H>  4.4   |  26  |  2.68<H>    Ca    9.3      20 Sep 2021 06:48  Mg     2.3     09-20    TPro  7.4  /  Alb  3.2<L>  /  TBili  0.8  /  DBili  x   /  AST  30  /  ALT  30  /  AlkPhos  132<H>  09-20    LIVER FUNCTIONS - ( 20 Sep 2021 06:48 )  Alb: 3.2 g/dL / Pro: 7.4 gm/dL / ALK PHOS: 132 U/L / ALT: 30 U/L / AST: 30 U/L / GGT: x                 RADIOLOGY:  CT Head No Cont (09.20.21 @ 00:48)  IMPRESSION:  Diffuse atrophy and chronic ischemic changes with stable appearance of an acute  intraparenchymal hemorrhage in the region of the left basal ganglia and  anterior limb of the internal capsule is described above.  No other new sites  of intracranial hemorrhage are detected.     HPI:  Patient is a 89 yo male with PMh TVAR (10/20), CAD/stents (10/20), IDDM, HTN, Afib, GERD on Coumadin presented to the ER today with Left head of caudate ICH 1.5cm (non-surgical).  Pt with recent fall and impact to head on Sept 7th 2021.  Pt noted with 3 days of less activity, sleepiness, less communicative / participatory - change in MS.  CTH reveals Left head of caudate 2cm ICH, no IVH.  Admitted to Neuro ICU under neurosurgery for 24 hours of care.  Pt remains neurologically intact.  Pt recently restarted on 9/14 ASA and coumadin dosing once again. Reversed in ED with KCentra and given 1 pack platelets and Vit K.  Repeat CTH scheduled around MN tonight as a stability evaluation scan.`  INR 1.54  GCS 15   Code Stroke in ED called (Telestroke?)  Lesly notified -   NIHSS per Neurosurgical eval is zero (prior to Neurosurg evaluation - pt not fully communicative per ED staff)    9/20- Patient seen and examined this AM in ICU. Continues on Cardene gtt. Sleepy, opens eyes for exam, follows simple commands. Repeat HCT at midnight prelim appears stable.    Vital Signs Last 24 Hrs  T(C): 36.6 (20 Sep 2021 08:00), Max: 37.5 (19 Sep 2021 20:10)  T(F): 97.9 (20 Sep 2021 08:00), Max: 99.5 (19 Sep 2021 20:10)  HR: 61 (20 Sep 2021 08:00) (61 - 93)  BP: 144/56 (20 Sep 2021 08:00) (123/23 - 212/105)  BP(mean): 78 (20 Sep 2021 08:00) (71 - 114)  RR: 17 (20 Sep 2021 08:00) (16 - 36)  SpO2: 99% (20 Sep 2021 08:00) (95% - 100%)    MEDICATIONS  (STANDING):  atorvastatin 40 milliGRAM(s) Oral at bedtime  cholecalciferol 1000 Unit(s) Oral daily  cyanocobalamin 1000 MICROGram(s) Oral daily  dextrose 40% Gel 15 Gram(s) Oral once  dextrose 5%. 1000 milliLiter(s) (50 mL/Hr) IV Continuous <Continuous>  dextrose 50% Injectable 25 Gram(s) IV Push once  ferrous    sulfate 325 milliGRAM(s) Oral at bedtime  folic acid 1 milliGRAM(s) Oral daily  gabapentin 100 milliGRAM(s) Oral three times a day  glucagon  Injectable 1 milliGRAM(s) IntraMuscular once  insulin glargine Injectable (LANTUS) 10 Unit(s) SubCutaneous every morning  insulin glargine Injectable (LANTUS) 10 Unit(s) SubCutaneous at bedtime  insulin lispro (ADMELOG) corrective regimen sliding scale   SubCutaneous at bedtime  isosorbide   mononitrate ER Tablet (IMDUR) 30 milliGRAM(s) Oral daily  levETIRAcetam 500 milliGRAM(s) Oral every 12 hours  magnesium oxide 400 milliGRAM(s) Oral at bedtime  metoprolol succinate ER 25 milliGRAM(s) Oral daily  Nephro-chris 1 Tablet(s) Oral daily  niCARdipine Infusion 5 mG/Hr (25 mL/Hr) IV Continuous <Continuous>  pantoprazole    Tablet 40 milliGRAM(s) Oral before breakfast  polyethylene glycol 3350 17 Gram(s) Oral at bedtime  sodium chloride 0.9%. 1000 milliLiter(s) (75 mL/Hr) IV Continuous <Continuous>  venlafaxine XR. 75 milliGRAM(s) Oral daily  vitamin B complex with vitamin C 1 Tablet(s) Oral daily    MEDICATIONS  (PRN):  ondansetron Injectable 4 milliGRAM(s) IV Push every 6 hours PRN Nausea and/or Vomiting  promethazine 25 milliGRAM(s) Oral every 6 hours PRN for nausea  senna 2 Tablet(s) Oral at bedtime PRN Constipation      PHYSICAL EXAM:  Constitutional: drowsy, awakens briefly for exam  HEENT: PERRLA, EOMI. [place] b/l w/o hearing aids  Neck: Supple  Respiratory: Breath sounds are clear bilaterally  Cardiovascular: S1 and S2, irregular rhythm  Musculoskeletal: no joint swelling/tenderness, no abnormal movements  Skin: No rashes    Neurological exam:  HF: Drowsy, opens eyes briefly for exam. A&O x1 to self only. Mouths his name, otherwise non verbal  CN: CLEVE, EOMI, blinks to threat b/l, no NLFD  Motor: MONTES antigravity x 4  Coord:  unable to assess  Gait/Balance: Cannot test          LABS:                         12.2   11.28 )-----------( 165      ( 19 Sep 2021 19:39 )             35.7     09-20    140  |  108  |  59<H>  ----------------------------<  278<H>  4.4   |  26  |  2.68<H>    Ca    9.3      20 Sep 2021 06:48  Mg     2.3     09-20    TPro  7.4  /  Alb  3.2<L>  /  TBili  0.8  /  DBili  x   /  AST  30  /  ALT  30  /  AlkPhos  132<H>  09-20    LIVER FUNCTIONS - ( 20 Sep 2021 06:48 )  Alb: 3.2 g/dL / Pro: 7.4 gm/dL / ALK PHOS: 132 U/L / ALT: 30 U/L / AST: 30 U/L / GGT: x                 RADIOLOGY:  CT Head No Cont (09.20.21 @ 00:48)  IMPRESSION:  Diffuse atrophy and chronic ischemic changes with stable appearance of an acute  intraparenchymal hemorrhage in the region of the left basal ganglia and  anterior limb of the internal capsule is described above.  No other new sites  of intracranial hemorrhage are detected.

## 2021-09-20 NOTE — PROGRESS NOTE ADULT - ASSESSMENT
Patient is a 89 yo male with PMh TVAR (10/20), CAD/stents (10/20), IDDM, HTN, Afib, GERD on Coumadin presented to the ER after sustaining a mechanical fall at home hitting his head on the corner of a wall. Head CT shows Left head of caudate ICH, stable on repeat Head CT. s/p Kcentra, Vit K and plts in ED.    Plan:  - No acute neurosurgical intervention indicated  - Continue ICU care  - SBP < 150 on cardene gtt  - Continue keppra 500 twice a day seizure prophylaxis  - Continue to hold ASA and Coumadin for now  - Cardiology consult  - Lipid Panel  - Accu checks  AC / HS  - ISS keep BS < 180  - Echo in AM  - MRI brain when able  - SCDs DVT ppx  - PPI GI ppx    Discussed with Dr. Sam

## 2021-09-20 NOTE — PHYSICAL THERAPY INITIAL EVALUATION ADULT - MODALITIES TREATMENT COMMENTS
pt left in bed supine post Eval; bed alarm on; all above lines/monitors in place; heels unloaded on pillow; callbell in reach; inez well; no signs of pain

## 2021-09-20 NOTE — SWALLOW BEDSIDE ASSESSMENT ADULT - SWALLOW EVAL: RECOMMENDED DIET
SUGGEST A REGULAR TEXTURE DIET WITH THIN LIQUID CONSISTENCIES AS THE PATIENT TOLERATED THESE FOOD CONSISTENCIES FROM AN OROPHARYNGEAL SWALLOWING STANCE ON CLINICAL EXAM. PT MUST BE IN AN ALERT STATE WHEN FEEDING.

## 2021-09-20 NOTE — PHYSICAL THERAPY INITIAL EVALUATION ADULT - GENERAL OBSERVATIONS, REHAB EVAL
IV; flowtrons; HM; BP cuff; pulse oxym; pt rec'd in bed supine; HR 63; /48; O2 Sat 96%; RR 20; no signs of pain; RN Bob present

## 2021-09-20 NOTE — SWALLOW BEDSIDE ASSESSMENT ADULT - SWALLOW EVAL: CRITERIA FOR SKILLED INTERVENTION MET
DO NOT FEEL THAT ACUTE SPEECH PATHOLOGY FOLLOW UP WOULD CHANGE CLINICAL MANAGEMENT/OUTCOME WHILE IN ACUTE HOSPITAL SETTING. PT'S OROPHARYNGEAL SWALLOWING ABILITIES ARE WITHIN FUNCTIONAL PARAMETERS AND PT'S SPEECH-LANGUAGE ABILITIES ARE FUNCTIONAL. PT IS ABLE TO VERBALIZE HIS NEEDS AND SWALLOW INTEGRITY APPEARS STABLE. GIVEN ABOVE, WILL NOT ACTIVELY FOLLOW. RECONSULT PRN SHOULD STATUS CHANGE AND CONDITION WARRANT.

## 2021-09-20 NOTE — SWALLOW BEDSIDE ASSESSMENT ADULT - SLP GENERAL OBSERVATIONS
The pt was alert & interactive. He was prominently [place] and had hearing aids in both ears. Reduced hearing acuity hindered patient's ability to participate in testing. With that being stated, the pt was able to verbalize during communicative probes. At these times, his motor speech abilities were intact and speech intelligibility was 100%. Moreover, pt produced automatized verbal sequences, repeated phrases and confrontationally named objects without error. Additionally, verbalizations during more elaborative verbal tasks were linguistically intact & contextually appropriate. Pt able to verbalize needs.

## 2021-09-20 NOTE — SWALLOW BEDSIDE ASSESSMENT ADULT - SWALLOW EVAL: DIAGNOSIS
1) The pt was alert & interactive. He was prominently [place] and had hearing aids in both ears. Reduced hearing acuity hindered patient's ability to participate in testing. With that being stated, the pt was able to verbalize during communicative probes. At these times, his motor speech abilities were intact and speech intelligibility was 100%. Moreover, pt produced automatized verbal sequences, repeated phrases and confrontationally named objects without error. Additionally, verbalizations during more elaborative tasks were linguistically intact & contextually appropriate. Pt able to verbalize needs.   2) Pt exhibits functional Oropharyngeal Swallowing abilities for age without behavioral aspiration signs when in an alert state. Odynophagia denied.

## 2021-09-20 NOTE — PROGRESS NOTE ADULT - SUBJECTIVE AND OBJECTIVE BOX
ICU Progress Note    HPI:    S:    Pt seen and examined  HD #  Pt here for    PRESSORS: [ ] YES [ ] NO  WHICH:  DOSE:    Allergies    Aleve (Vomiting)  codeine (Unknown)  morphine (Nausea)  naproxen (Vomiting)    Intolerances        MEDICATIONS  (STANDING):  atorvastatin 40 milliGRAM(s) Oral at bedtime  cholecalciferol 1000 Unit(s) Oral daily  cyanocobalamin 1000 MICROGram(s) Oral daily  dextrose 40% Gel 15 Gram(s) Oral once  dextrose 5%. 1000 milliLiter(s) (50 mL/Hr) IV Continuous <Continuous>  dextrose 50% Injectable 25 Gram(s) IV Push once  ferrous    sulfate 325 milliGRAM(s) Oral at bedtime  folic acid 1 milliGRAM(s) Oral daily  gabapentin 100 milliGRAM(s) Oral three times a day  glucagon  Injectable 1 milliGRAM(s) IntraMuscular once  insulin glargine Injectable (LANTUS) 10 Unit(s) SubCutaneous every morning  insulin glargine Injectable (LANTUS) 10 Unit(s) SubCutaneous at bedtime  insulin lispro (ADMELOG) corrective regimen sliding scale   SubCutaneous every 6 hours  insulin lispro (ADMELOG) corrective regimen sliding scale   SubCutaneous at bedtime  isosorbide   mononitrate ER Tablet (IMDUR) 30 milliGRAM(s) Oral daily  levETIRAcetam 500 milliGRAM(s) Oral every 12 hours  magnesium oxide 400 milliGRAM(s) Oral at bedtime  metoprolol succinate ER 25 milliGRAM(s) Oral daily  Nephro-chris 1 Tablet(s) Oral daily  niCARdipine Infusion 5 mG/Hr (25 mL/Hr) IV Continuous <Continuous>  pantoprazole    Tablet 40 milliGRAM(s) Oral before breakfast  polyethylene glycol 3350 17 Gram(s) Oral at bedtime  sodium chloride 0.9%. 1000 milliLiter(s) (75 mL/Hr) IV Continuous <Continuous>  venlafaxine XR. 75 milliGRAM(s) Oral daily  vitamin B complex with vitamin C 1 Tablet(s) Oral daily    MEDICATIONS  (PRN):  ondansetron Injectable 4 milliGRAM(s) IV Push every 6 hours PRN Nausea and/or Vomiting  promethazine 25 milliGRAM(s) Oral every 6 hours PRN for nausea  senna 2 Tablet(s) Oral at bedtime PRN Constipation      Drug Dosing Weight  Height (cm): 170.2 (19 Sep 2021 19:34)  Weight (kg): 77.1 (19 Sep 2021 19:34)  BMI (kg/m2): 26.6 (19 Sep 2021 19:34)  BSA (m2): 1.89 (19 Sep 2021 19:34)    CENTRAL LINE: [ ] YES [ ] NO  LOCATION:   DATE INSERTED:  REMOVE: [ ] YES [ ] NO  EXPLAIN:    OLIVARES: [ ] YES [ ] NO    DATE INSERTED:  REMOVE: [ ] YES [ ] NO  EXPLAIN:    A-LINE: [ ] YES [ ] NO  LOCATION:   DATE INSERTED:  REMOVE: [ ] YES [ ] NO  EXPLAIN:    PAST MEDICAL & SURGICAL HISTORY:  IDDM (insulin dependent diabetes mellitus)    HTN (hypertension)    Chronic congestive heart failure, unspecified congestive heart failure type    Atrial fibrillation, unspecified type    Gastroesophageal reflux disease, esophagitis presence not specified    Benign prostatic hyperplasia without lower urinary tract symptoms    Syncope and collapse  5/4/2018    Type 2 myocardial infarction  12/17/2017    UTI (urinary tract infection)  7/12/2017    Artificial cardiac pacemaker    History of appendectomy    Arthropathy of shoulder region    S/P TAVR (transcatheter aortic valve replacement)        FAMILY HISTORY:  No pertinent family history in first degree relatives            ROS: See HPI; otherwise, all systems reviewed and negative.    O:    ICU Vital Signs Last 24 Hrs  T(C): 36.7 (20 Sep 2021 23:00), Max: 36.7 (20 Sep 2021 06:00)  T(F): 98 (20 Sep 2021 23:00), Max: 98 (20 Sep 2021 06:00)  HR: 68 (20 Sep 2021 23:00) (61 - 85)  BP: 144/63 (20 Sep 2021 23:00) (110/46 - 166/64)  BP(mean): 84 (20 Sep 2021 23:00) (57 - 114)  ABP: --  ABP(mean): --  RR: 19 (20 Sep 2021 23:00) (16 - 36)  SpO2: 97% (20 Sep 2021 23:00) (91% - 100%)          I&O's Detail    19 Sep 2021 07:01  -  20 Sep 2021 07:00  --------------------------------------------------------  IN:    NiCARdipine: 68 mL    sodium chloride 0.9%: 346 mL  Total IN: 414 mL    OUT:    Voided (mL): 250 mL  Total OUT: 250 mL    Total NET: 164 mL      20 Sep 2021 07:01  -  20 Sep 2021 23:32  --------------------------------------------------------  IN:    NiCARdipine: 114 mL    sodium chloride 0.9%: 900 mL  Total IN: 1014 mL    OUT:    Voided (mL): 350 mL  Total OUT: 350 mL    Total NET: 664 mL              PE:    Constitutional: Healthy M lying in bed in NAD.   Neck: No JVD, trachea midline.   Respiratory: CTA B/L good BS B/L no W/R/R.  Cardiovascular: S1S2+ RRR no M/R/G.  Gastrointestinal: Soft, NTND.  Extremities: No peripheral edema, No cyanosis, clubbing.  Neurological: Awake, conversive, alert, no gross deficits.  Skin: No rashes, warm, moist.    LABS:    CBC Full  -  ( 20 Sep 2021 06:48 )  WBC Count : 11.89 K/uL  RBC Count : 3.31 M/uL  Hemoglobin : 11.4 g/dL  Hematocrit : 34.2 %  Platelet Count - Automated : 204 K/uL  Mean Cell Volume : 103.3 fl  Mean Cell Hemoglobin : 34.4 pg  Mean Cell Hemoglobin Concentration : 33.3 gm/dL  Auto Neutrophil # : x  Auto Lymphocyte # : x  Auto Monocyte # : x  Auto Eosinophil # : x  Auto Basophil # : x  Auto Neutrophil % : x  Auto Lymphocyte % : x  Auto Monocyte % : x  Auto Eosinophil % : x  Auto Basophil % : x    09-20    140  |  108  |  59<H>  ----------------------------<  278<H>  4.4   |  26  |  2.68<H>    Ca    9.3      20 Sep 2021 06:48  Mg     2.3     09-20    TPro  7.4  /  Alb  3.2<L>  /  TBili  0.8  /  DBili  x   /  AST  30  /  ALT  30  /  AlkPhos  132<H>  09-20    PT/INR - ( 20 Sep 2021 06:48 )   PT: 14.1 sec;   INR: 1.22 ratio         PTT - ( 19 Sep 2021 19:39 )  PTT:22.9 sec    CAPILLARY BLOOD GLUCOSE      POCT Blood Glucose.: 399 mg/dL (20 Sep 2021 22:43)  POCT Blood Glucose.: 239 mg/dL (20 Sep 2021 18:25)  POCT Blood Glucose.: 272 mg/dL (20 Sep 2021 13:36)  POCT Blood Glucose.: 366 mg/dL (20 Sep 2021 00:02)    CARDIAC MARKERS ( 19 Sep 2021 19:39 )  0.061 ng/mL / x     / x     / x     / x          LIVER FUNCTIONS - ( 20 Sep 2021 06:48 )  Alb: 3.2 g/dL / Pro: 7.4 gm/dL / ALK PHOS: 132 U/L / ALT: 30 U/L / AST: 30 U/L / GGT: x               A:    89yMale  HD #    Here for:    1.    This patient requires critical care for support of one or more vital organ systems with a high probability of imminent or life threatening deterioration in his/her condition    P:    Neuro: GCS 15. Monitor for delirium.  Continue to optimize pain control. Serial Neurologic assessments.    HEENT: No issues.    CV: Continue hemodynamic monitoring    Pulm: Pulmonary toilet.  Continue incentive spirometer.  Chest PT.  Encourage OOB to chair and ambulation. Nebs. f/u ABG, CXR.    GI/Nutrition: Cont diet, bowel regimen.    /Renal: Monitor UOP. Monitor BMP.  Replete Lytes as needed.    HEME- Chemical and mechanical DVT ppx. f/u CBC. f/u coags as needed.    ID:  Cont abx. f/u Cx's.    Lines/Tubes:     Endo: Maintain euglycemia.    Skin:  Cont skin care, pressure ulcer prevention.    Dispo: Cont critical care.    TOTAL CRITICAL CARE TIME:   (EXCLUSIVE of any non bundled procedures)    Note: This time spent INCLUDES time spent directly as this patient's bedside with evaluation, review of chart including review of laboratory and imaging studies, interpretation of vital signs and cardiac output measurements, any necessary ventilator management, and time spent discussing plan of care with patient and family, including goals of care discussion.     ICU Progress Note    HPI:    S:    Pt seen and examined  HD # 3  FULL CODE  PMHx  HTN, IDDM, Afib on coumadin, GERD, CAD s/p stents  Recent admission w/ SDH on 9/7 s/p fall.  ow presenting to ED w/ worsening mental status over past few days. Patient was code stroke. CT head showed 2cm left ICH. He was given Kcentra, Vitamin K and 2 units of platelets in the ER. Found to be hypertensive to 200s on arrival,started on nicardipine infusion. Admitted to ICU under neurosurgery service    9/21 AM: Remains on cardene drip. HCT stable with interval imaging. Off AC.    ROS: Unable to obtain 2/2 Pt status (confusion)    Allergies    Aleve (Vomiting)  codeine (Unknown)  morphine (Nausea)  naproxen (Vomiting)    Intolerances    MEDICATIONS  (STANDING):    atorvastatin 40 milliGRAM(s) Oral at bedtime  cholecalciferol 1000 Unit(s) Oral daily  cyanocobalamin 1000 MICROGram(s) Oral daily  dextrose 40% Gel 15 Gram(s) Oral once  dextrose 5%. 1000 milliLiter(s) (50 mL/Hr) IV Continuous <Continuous>  dextrose 50% Injectable 25 Gram(s) IV Push once  ferrous    sulfate 325 milliGRAM(s) Oral at bedtime  folic acid 1 milliGRAM(s) Oral daily  gabapentin 100 milliGRAM(s) Oral three times a day  glucagon  Injectable 1 milliGRAM(s) IntraMuscular once  insulin glargine Injectable (LANTUS) 10 Unit(s) SubCutaneous every morning  insulin glargine Injectable (LANTUS) 10 Unit(s) SubCutaneous at bedtime  insulin lispro (ADMELOG) corrective regimen sliding scale   SubCutaneous every 6 hours  insulin lispro (ADMELOG) corrective regimen sliding scale   SubCutaneous at bedtime  isosorbide   mononitrate ER Tablet (IMDUR) 30 milliGRAM(s) Oral daily  levETIRAcetam 500 milliGRAM(s) Oral every 12 hours  magnesium oxide 400 milliGRAM(s) Oral at bedtime  metoprolol succinate ER 25 milliGRAM(s) Oral daily  Nephro-chris 1 Tablet(s) Oral daily  niCARdipine Infusion 5 mG/Hr (25 mL/Hr) IV Continuous <Continuous>  pantoprazole    Tablet 40 milliGRAM(s) Oral before breakfast  polyethylene glycol 3350 17 Gram(s) Oral at bedtime  sodium chloride 0.9%. 1000 milliLiter(s) (75 mL/Hr) IV Continuous <Continuous>  venlafaxine XR. 75 milliGRAM(s) Oral daily  vitamin B complex with vitamin C 1 Tablet(s) Oral daily    MEDICATIONS  (PRN):    ondansetron Injectable 4 milliGRAM(s) IV Push every 6 hours PRN Nausea and/or Vomiting  promethazine 25 milliGRAM(s) Oral every 6 hours PRN for nausea  senna 2 Tablet(s) Oral at bedtime PRN Constipation    Drug Dosing Weight    Height (cm): 170.2 (19 Sep 2021 19:34)  Weight (kg): 77.1 (19 Sep 2021 19:34)  BMI (kg/m2): 26.6 (19 Sep 2021 19:34)  BSA (m2): 1.89 (19 Sep 2021 19:34)    PAST MEDICAL & SURGICAL HISTORY:    IDDM (insulin dependent diabetes mellitus)    HTN (hypertension)    Chronic congestive heart failure, unspecified congestive heart failure type    Atrial fibrillation, unspecified type    Gastroesophageal reflux disease, esophagitis presence not specified    Benign prostatic hyperplasia without lower urinary tract symptoms    Syncope and collapse  5/4/2018    Type 2 myocardial infarction  12/17/2017    UTI (urinary tract infection)  7/12/2017    Artificial cardiac pacemaker    History of appendectomy    Arthropathy of shoulder region    S/P TAVR (transcatheter aortic valve replacement)    FAMILY HISTORY:    No pertinent family history in first degree relatives    ROS: See HPI; otherwise, all systems reviewed and negative.    O:    ICU Vital Signs Last 24 Hrs  T(C): 36.7 (20 Sep 2021 23:00), Max: 36.7 (20 Sep 2021 06:00)  T(F): 98 (20 Sep 2021 23:00), Max: 98 (20 Sep 2021 06:00)  HR: 68 (20 Sep 2021 23:00) (61 - 85)  BP: 144/63 (20 Sep 2021 23:00) (110/46 - 166/64)  BP(mean): 84 (20 Sep 2021 23:00) (57 - 114)  ABP: --  ABP(mean): --  RR: 19 (20 Sep 2021 23:00) (16 - 36)  SpO2: 97% (20 Sep 2021 23:00) (91% - 100%)    I&O's Detail    19 Sep 2021 07:01  -  20 Sep 2021 07:00  --------------------------------------------------------  IN:    NiCARdipine: 68 mL    sodium chloride 0.9%: 346 mL  Total IN: 414 mL    OUT:    Voided (mL): 250 mL  Total OUT: 250 mL    Total NET: 164 mL    20 Sep 2021 07:01  -  20 Sep 2021 23:32  --------------------------------------------------------  IN:    NiCARdipine: 114 mL    sodium chloride 0.9%: 900 mL  Total IN: 1014 mL    OUT:    Voided (mL): 350 mL  Total OUT: 350 mL    Total NET: 664 mL    PE:    Elderly M lying in bed  No JVD trachea midline  S1S2+ irregular  CTA B/L  Abd soft NTND  No leg swelling/edema noted  Awake, confused  Skin pink and well perfused    LABS:    CBC Full  -  ( 20 Sep 2021 06:48 )  WBC Count : 11.89 K/uL  RBC Count : 3.31 M/uL  Hemoglobin : 11.4 g/dL  Hematocrit : 34.2 %  Platelet Count - Automated : 204 K/uL  Mean Cell Volume : 103.3 fl  Mean Cell Hemoglobin : 34.4 pg  Mean Cell Hemoglobin Concentration : 33.3 gm/dL  Auto Neutrophil # : x  Auto Lymphocyte # : x  Auto Monocyte # : x  Auto Eosinophil # : x  Auto Basophil # : x  Auto Neutrophil % : x  Auto Lymphocyte % : x  Auto Monocyte % : x  Auto Eosinophil % : x  Auto Basophil % : x    09-20    140  |  108  |  59<H>  ----------------------------<  278<H>  4.4   |  26  |  2.68<H>    Ca    9.3      20 Sep 2021 06:48  Mg     2.3     09-20    TPro  7.4  /  Alb  3.2<L>  /  TBili  0.8  /  DBili  x   /  AST  30  /  ALT  30  /  AlkPhos  132<H>  09-20    PT/INR - ( 20 Sep 2021 06:48 )   PT: 14.1 sec;   INR: 1.22 ratio         PTT - ( 19 Sep 2021 19:39 )  PTT:22.9 sec    CAPILLARY BLOOD GLUCOSE      POCT Blood Glucose.: 399 mg/dL (20 Sep 2021 22:43)  POCT Blood Glucose.: 239 mg/dL (20 Sep 2021 18:25)  POCT Blood Glucose.: 272 mg/dL (20 Sep 2021 13:36)  POCT Blood Glucose.: 366 mg/dL (20 Sep 2021 00:02)    CARDIAC MARKERS ( 19 Sep 2021 19:39 )  0.061 ng/mL / x     / x     / x     / x          LIVER FUNCTIONS - ( 20 Sep 2021 06:48 )  Alb: 3.2 g/dL / Pro: 7.4 gm/dL / ALK PHOS: 132 U/L / ALT: 30 U/L / AST: 30 U/L / GGT: x

## 2021-09-21 NOTE — PROGRESS NOTE ADULT - ASSESSMENT
Patient is a 89 yo male with PMh TVAR (10/20), CAD/stents (10/20), IDDM, HTN, Afib, GERD on Coumadin presented to the ER after sustaining a mechanical fall at home hitting his head on the corner of a wall. Head CT shows Left head of caudate ICH, stable on repeat Head CT. s/p Kcentra, Vit K and plts in ED.    Plan:  - No acute neurosurgical intervention indicated  - Advance diet  - SBP < 150 now off cardene on Po BP meds  - Continue keppra 500 twice a day seizure prophylaxis  - Continue to hold ASA and Coumadin for now  - Cardiology consult appreciated  - ISS keep BS < 180  - PT/OOB to chair  - SCDs DVT ppx  - PPI GI ppx  - Transfer to medicine upon transfer out of ICU    Discussed with Dr. Sam

## 2021-09-21 NOTE — PHYSICAL THERAPY INITIAL EVALUATION ADULT - PERTINENT HX OF CURRENT PROBLEM, REHAB EVAL
recent fall / head trauma; decreased activity / altered mental status
recent fall / head trauma; decreased activity / altered mental status

## 2021-09-21 NOTE — PROGRESS NOTE ADULT - SUBJECTIVE AND OBJECTIVE BOX
ICU Progress Note    HPI:    S:    Pt seen and examined  HD # 4  FULL CODE  PMHx  HTN, IDDM, Afib on coumadin, GERD, CAD s/p stents  Recent admission w/ SDH on 9/7 s/p fall.  ow presenting to ED w/ worsening mental status over past few days. Patient was code stroke. CT head showed 2cm left ICH. He was given Kcentra, Vitamin K and 2 units of platelets in the ER. Found to be hypertensive to 200s on arrival,started on nicardipine infusion. Admitted to ICU under neurosurgery service    9/21 PM: Remains on cardene drip. HCT stable with interval imaging. Off AC.  9/22 PM: Off cardene drip. More agitated this evening.     ROS: Unable to obtain 2/2 Pt status (confusion)    Allergies    Aleve (Vomiting)  codeine (Unknown)  morphine (Nausea)  naproxen (Vomiting)    Intolerances    MEDICATIONS  (STANDING):    atorvastatin 40 milliGRAM(s) Oral at bedtime  cholecalciferol 1000 Unit(s) Oral daily  cyanocobalamin 1000 MICROGram(s) Oral daily  dextrose 40% Gel 15 Gram(s) Oral once  dextrose 5%. 1000 milliLiter(s) (50 mL/Hr) IV Continuous <Continuous>  dextrose 50% Injectable 25 Gram(s) IV Push once  ferrous    sulfate 325 milliGRAM(s) Oral at bedtime  folic acid 1 milliGRAM(s) Oral daily  gabapentin 100 milliGRAM(s) Oral three times a day  glucagon  Injectable 1 milliGRAM(s) IntraMuscular once  insulin glargine Injectable (LANTUS) 10 Unit(s) SubCutaneous every morning  insulin glargine Injectable (LANTUS) 10 Unit(s) SubCutaneous at bedtime  insulin lispro (ADMELOG) corrective regimen sliding scale   SubCutaneous every 6 hours  insulin lispro (ADMELOG) corrective regimen sliding scale   SubCutaneous at bedtime  isosorbide   mononitrate ER Tablet (IMDUR) 30 milliGRAM(s) Oral daily  levETIRAcetam 500 milliGRAM(s) Oral every 12 hours  magnesium oxide 400 milliGRAM(s) Oral at bedtime  metoprolol succinate ER 25 milliGRAM(s) Oral daily  Nephro-chris 1 Tablet(s) Oral daily  niCARdipine Infusion 5 mG/Hr (25 mL/Hr) IV Continuous <Continuous>  pantoprazole    Tablet 40 milliGRAM(s) Oral before breakfast  polyethylene glycol 3350 17 Gram(s) Oral at bedtime  sodium chloride 0.9%. 1000 milliLiter(s) (75 mL/Hr) IV Continuous <Continuous>  venlafaxine XR. 75 milliGRAM(s) Oral daily  vitamin B complex with vitamin C 1 Tablet(s) Oral daily    MEDICATIONS  (PRN):    ondansetron Injectable 4 milliGRAM(s) IV Push every 6 hours PRN Nausea and/or Vomiting  promethazine 25 milliGRAM(s) Oral every 6 hours PRN for nausea  senna 2 Tablet(s) Oral at bedtime PRN Constipation    Drug Dosing Weight    Height (cm): 170.2 (19 Sep 2021 19:34)  Weight (kg): 77.1 (19 Sep 2021 19:34)  BMI (kg/m2): 26.6 (19 Sep 2021 19:34)  BSA (m2): 1.89 (19 Sep 2021 19:34)    PAST MEDICAL & SURGICAL HISTORY:    IDDM (insulin dependent diabetes mellitus)    HTN (hypertension)    Chronic congestive heart failure, unspecified congestive heart failure type    Atrial fibrillation, unspecified type    Gastroesophageal reflux disease, esophagitis presence not specified    Benign prostatic hyperplasia without lower urinary tract symptoms    Syncope and collapse  5/4/2018    Type 2 myocardial infarction  12/17/2017    UTI (urinary tract infection)  7/12/2017    Artificial cardiac pacemaker    History of appendectomy    Arthropathy of shoulder region    S/P TAVR (transcatheter aortic valve replacement)    FAMILY HISTORY:    No pertinent family history in first degree relatives    ROS: See HPI; otherwise, all systems reviewed and negative.    O:    ICU Vital Signs Last 24 Hrs  T(C): 36.7 (20 Sep 2021 23:00), Max: 36.7 (20 Sep 2021 06:00)  T(F): 98 (20 Sep 2021 23:00), Max: 98 (20 Sep 2021 06:00)  HR: 68 (20 Sep 2021 23:00) (61 - 85)  BP: 144/63 (20 Sep 2021 23:00) (110/46 - 166/64)  BP(mean): 84 (20 Sep 2021 23:00) (57 - 114)  ABP: --  ABP(mean): --  RR: 19 (20 Sep 2021 23:00) (16 - 36)  SpO2: 97% (20 Sep 2021 23:00) (91% - 100%)    I&O's Detail    19 Sep 2021 07:01  -  20 Sep 2021 07:00  --------------------------------------------------------  IN:    NiCARdipine: 68 mL    sodium chloride 0.9%: 346 mL  Total IN: 414 mL    OUT:    Voided (mL): 250 mL  Total OUT: 250 mL    Total NET: 164 mL    20 Sep 2021 07:01  -  20 Sep 2021 23:32  --------------------------------------------------------  IN:    NiCARdipine: 114 mL    sodium chloride 0.9%: 900 mL  Total IN: 1014 mL    OUT:    Voided (mL): 350 mL  Total OUT: 350 mL    Total NET: 664 mL    PE:    Elderly M lying in bed  No JVD trachea midline  S1S2+ irregular  CTA B/L  Abd soft NTND  No leg swelling/edema noted  Awake, confused, agitated  Skin pink and well perfused    LABS:    CBC Full  -  ( 20 Sep 2021 06:48 )  WBC Count : 11.89 K/uL  RBC Count : 3.31 M/uL  Hemoglobin : 11.4 g/dL  Hematocrit : 34.2 %  Platelet Count - Automated : 204 K/uL  Mean Cell Volume : 103.3 fl  Mean Cell Hemoglobin : 34.4 pg  Mean Cell Hemoglobin Concentration : 33.3 gm/dL  Auto Neutrophil # : x  Auto Lymphocyte # : x  Auto Monocyte # : x  Auto Eosinophil # : x  Auto Basophil # : x  Auto Neutrophil % : x  Auto Lymphocyte % : x  Auto Monocyte % : x  Auto Eosinophil % : x  Auto Basophil % : x    09-20    140  |  108  |  59<H>  ----------------------------<  278<H>  4.4   |  26  |  2.68<H>    Ca    9.3      20 Sep 2021 06:48  Mg     2.3     09-20    TPro  7.4  /  Alb  3.2<L>  /  TBili  0.8  /  DBili  x   /  AST  30  /  ALT  30  /  AlkPhos  132<H>  09-20    PT/INR - ( 20 Sep 2021 06:48 )   PT: 14.1 sec;   INR: 1.22 ratio         PTT - ( 19 Sep 2021 19:39 )  PTT:22.9 sec    CAPILLARY BLOOD GLUCOSE      POCT Blood Glucose.: 399 mg/dL (20 Sep 2021 22:43)  POCT Blood Glucose.: 239 mg/dL (20 Sep 2021 18:25)  POCT Blood Glucose.: 272 mg/dL (20 Sep 2021 13:36)  POCT Blood Glucose.: 366 mg/dL (20 Sep 2021 00:02)    CARDIAC MARKERS ( 19 Sep 2021 19:39 )  0.061 ng/mL / x     / x     / x     / x          LIVER FUNCTIONS - ( 20 Sep 2021 06:48 )  Alb: 3.2 g/dL / Pro: 7.4 gm/dL / ALK PHOS: 132 U/L / ALT: 30 U/L / AST: 30 U/L / GGT: x

## 2021-09-21 NOTE — DISCHARGE NOTE NURSING/CASE MANAGEMENT/SOCIAL WORK - [id number]_GEN_[id number]_CORE_FT
Tdap; 07-Sep-2021 21:52; Amberly Santos (RN); [employer]; [id number] (Exp. Date: 28-Jun-2023); IntraMuscular; Deltoid Left.; 0.5 milliLiter(s); VIS (VIS Published: 09-May-2013, VIS Presented: 07-Sep-2021);

## 2021-09-21 NOTE — DISCHARGE NOTE NURSING/CASE MANAGEMENT/SOCIAL WORK - PATIENT PORTAL LINK FT
You can access the FollowMyHealth Patient Portal offered by [facility] by registering at the following website: [email or web address]. By joining [facility]’s FollowMyHealth portal, you will also be able to view your health information using other applications (apps) compatible with our system.

## 2021-09-21 NOTE — PROGRESS NOTE ADULT - SUBJECTIVE AND OBJECTIVE BOX
89 y/o male pmhx HTN, IDDM, Afib on coumadin, GERD, CAD s/p stents, recent admission w/ SDH on 9/7 s/p fall. Patient was discharged and AC was restarted on 9/14. Now presenting to ED w/ worsening mental status over past few days. Patient was code stroke. CT head showed 2cm left ICH. He was given Kcentra, Vitamin K and 2 units of platelets in the ER. Found to be hypertensive to 200s on arrival, started on nicardipine infusion.     9/20: Patient seen in bed.  He is AAO x 1, moving all extremities and extremely hypophonic, and on a Cardene drip  9/21: Much more awake and alert today, OOB, tolerating PO, however he remains on Cardene       PAST MEDICAL & SURGICAL HISTORY:  IDDM (insulin dependent diabetes mellitus)    HTN (hypertension)    Chronic congestive heart failure, unspecified congestive heart failure type    Atrial fibrillation, unspecified type    Gastroesophageal reflux disease, esophagitis presence not specified    Benign prostatic hyperplasia without lower urinary tract symptoms    Syncope and collapse  5/4/2018    Type 2 myocardial infarction  12/17/2017    UTI (urinary tract infection)  7/12/2017    Artificial cardiac pacemaker    History of appendectomy    Arthropathy of shoulder region    S/P TAVR (transcatheter aortic valve replacement)        FAMILY HISTORY:  No pertinent family history in first degree relatives        Social Hx:    Allergies    Aleve (Vomiting)  codeine (Unknown)  morphine (Nausea)  naproxen (Vomiting)    Intolerances            ICU Vital Signs Last 24 Hrs  T(C): 36.6 (21 Sep 2021 10:00), Max: 37 (21 Sep 2021 05:00)  T(F): 97.8 (21 Sep 2021 10:00), Max: 98.6 (21 Sep 2021 05:00)  HR: 67 (21 Sep 2021 10:00) (60 - 85)  BP: 139/102 (21 Sep 2021 10:00) (104/40 - 157/65)  BP(mean): 107 (21 Sep 2021 10:00) (54 - 107)  ABP: --  ABP(mean): --  RR: 17 (21 Sep 2021 10:00) (14 - 29)  SpO2: 98% (21 Sep 2021 10:00) (91% - 100%)          I&O's Summary    20 Sep 2021 07:01  -  21 Sep 2021 07:00  --------------------------------------------------------  IN: 2616 mL / OUT: 350 mL / NET: 2266 mL                              11.0   12.18 )-----------( 191      ( 21 Sep 2021 06:09 )             33.4       09-21    143  |  113<H>  |  57<H>  ----------------------------<  152<H>  4.1   |  25  |  2.56<H>    Ca    9.2      21 Sep 2021 06:09  Phos  2.9     09-21  Mg     2.4     09-21    TPro  7.4  /  Alb  3.2<L>  /  TBili  0.8  /  DBili  x   /  AST  30  /  ALT  30  /  AlkPhos  132<H>  09-20      CARDIAC MARKERS ( 19 Sep 2021 19:39 )  0.061 ng/mL / x     / x     / x     / x                    MEDICATIONS  (STANDING):  amLODIPine   Tablet 10 milliGRAM(s) Oral daily  atorvastatin 40 milliGRAM(s) Oral at bedtime  cholecalciferol 1000 Unit(s) Oral daily  cyanocobalamin 1000 MICROGram(s) Oral daily  dextrose 40% Gel 15 Gram(s) Oral once  dextrose 5%. 1000 milliLiter(s) (50 mL/Hr) IV Continuous <Continuous>  dextrose 50% Injectable 25 Gram(s) IV Push once  ferrous    sulfate 325 milliGRAM(s) Oral at bedtime  folic acid 1 milliGRAM(s) Oral daily  gabapentin 100 milliGRAM(s) Oral three times a day  glucagon  Injectable 1 milliGRAM(s) IntraMuscular once  insulin glargine Injectable (LANTUS) 15 Unit(s) SubCutaneous every morning  insulin glargine Injectable (LANTUS) 10 Unit(s) SubCutaneous at bedtime  insulin lispro (ADMELOG) corrective regimen sliding scale   SubCutaneous three times a day with meals  insulin lispro (ADMELOG) corrective regimen sliding scale.   SubCutaneous at bedtime  isosorbide   mononitrate ER Tablet (IMDUR) 30 milliGRAM(s) Oral daily  levETIRAcetam 500 milliGRAM(s) Oral every 12 hours  magnesium oxide 400 milliGRAM(s) Oral at bedtime  metoprolol succinate ER 25 milliGRAM(s) Oral daily  Nephro-chris 1 Tablet(s) Oral daily  niCARdipine Infusion 5 mG/Hr (25 mL/Hr) IV Continuous <Continuous>  pantoprazole    Tablet 40 milliGRAM(s) Oral before breakfast  polyethylene glycol 3350 17 Gram(s) Oral at bedtime  venlafaxine XR. 75 milliGRAM(s) Oral daily  vitamin B complex with vitamin C 1 Tablet(s) Oral daily    MEDICATIONS  (PRN):  ondansetron Injectable 4 milliGRAM(s) IV Push every 6 hours PRN Nausea and/or Vomiting  promethazine 25 milliGRAM(s) Oral every 6 hours PRN for nausea  senna 2 Tablet(s) Oral at bedtime PRN Constipation      DVT Prophylaxis:    Advanced Directives:  Discussed with:    Visit Information: 30 min    ** Time is exclusive of billed procedures and/or teaching and/or routine family updates.

## 2021-09-21 NOTE — PHYSICAL THERAPY INITIAL EVALUATION ADULT - CRITERIA FOR SKILLED THERAPEUTIC INTERVENTIONS
will attempt completion of Eval @ later date when appropriate; pt currently lethargic; unable to participate; further course of PT intervention pending
impairments found

## 2021-09-21 NOTE — PROGRESS NOTE ADULT - ASSESSMENT
89 y/o male pmhx HTN, IDDM, Afib on coumadin, GERD, CAD s/p stents, recent admission w/ SDH on 9/7 s/p fall. Patient was discharged and AC was restarted on 9/14. Now presenting to ED w/ worsening mental status over past few days. Patient was code stroke.    Impression:  1. Acute ICH   2. Hypertensive emergency   3. Afib on coumadin   4. HTN  5. DM2      Plan:  ICU  - Keppra 500mg BID for seizure ppx  - try to wean off cardene.  Norvasc added   - Afib, rate controlled. Hold AC. D/W Cardio and he should remained off AC  - Po Diet  - SCDs for DVT ppx  - Insulin sliding scale

## 2021-09-21 NOTE — CONSULT NOTE ADULT - ASSESSMENT
recurring to intracranial bleeding without trauma in the setting of prior intracranial bleeding with trauma.  Patient on anticoagulation at time of 2nd bleeding with re-initiation after evaluation by Neurology.  Cardiac-wise recent percutaneous intervention in October of 2020.   would not re-initiate Coumadin despite chronic atrial fibrillation and elevated Andrei Vasc score in  this patient.  The risk of recurrence bleeding of 3rd time is  high given there was no precipitating event to cause this.  INR also was subtherapeutic at the time of the bleed.  He has at least 1 year post his stenting procedure and therefore the urgency of starting aspirin is also low though it should be done at some point.  Currently stable from a cardiac standpoint.  Continue to monitor.        Discussed with patient, staff, and ICU MD Dr. Kilpatrick.

## 2021-09-21 NOTE — PHYSICAL THERAPY INITIAL EVALUATION ADULT - GENERAL OBSERVATIONS, REHAB EVAL
IV; flowtrons; HM; BP cuff; pulse oxym; pt rec'd in bed supine; HR 63; /48; O2 Sat 96%; RR 20; no signs of pain; RN Bob present IV; flowtrons; HM; BP cuff; pulse oxym; pt rec'd in bed supine. Pt alert but [place]. B hearing aides in place. No c/o pain. Tolerated well and would benefit from further skilled PT for functional mobility training.

## 2021-09-21 NOTE — PHYSICAL THERAPY INITIAL EVALUATION ADULT - FOLLOWS COMMANDS/ANSWERS QUESTIONS, REHAB EVAL
unable to follow commands/unable to answer questions
unable to follow commands/unable to answer questions

## 2021-09-21 NOTE — PHYSICAL THERAPY INITIAL EVALUATION ADULT - DIAGNOSIS, PT EVAL
L basal Ganglia / Internal Capsule ICH; HTN Emergency
L basal Ganglia / Internal Capsule ICH; HTN Emergency

## 2021-09-21 NOTE — PHYSICAL THERAPY INITIAL EVALUATION ADULT - MANUAL MUSCLE TESTING RESULTS, REHAB EVAL
unable to accurately assess strength due to inability to follow commands; non-purposeful LE movements noted L > R
3+/5 throughout

## 2021-09-21 NOTE — PHYSICAL THERAPY INITIAL EVALUATION ADULT - ORIENTATION, REHAB EVAL
not oriented to person, place, time or situation Able to state where he was, , address and hx: nsg states he becomes more confused at end of day/oriented to person, place, time and situation

## 2021-09-21 NOTE — PROGRESS NOTE ADULT - ASSESSMENT
A:    89M  HD # 4  FULL CODE    Here for:    1. Traumatic ICH  2. HTN emergency  3. AF on coumadin  4. HTN  5. NIDDM  6. CKD    This patient requires critical care for support of one or more vital organ systems with a high probability of imminent or life threatening deterioration in his/her condition    P:    Neuro checks. HCT stable on interval imaging. Avoid deleriogenic meds. AED keppra, no s/s seizures. PRN medication for agitation, will defer to NSGY.  HD monitoring. Cardene drip for HTN emergency, weaned off. Goal SBP < 150. Norvasc PO. Toprol 25mg PO XR qd.  IS, OOB as able.   Started on PO diet.   No s/s acute infection, monitor off abx.   VTE ppx SCD only, PUD ppx PPI.  Statin.  Hgb 11.0, Plt 191; no s/s active bleeding.  s/p 2 pools SDP, kcentra, vitamin K, INR 1.5 on arrival, now 1.2. Hold AC for now, poor candidate given recent falls, now with ICH. Will require conversation with family, risk/benefit eval for further consideration.   f/u AM labs, replete lytes PRN.  No invasive lines.    Dispo: Cont care.

## 2021-09-21 NOTE — CONSULT NOTE ADULT - SUBJECTIVE AND OBJECTIVE BOX
HPI:  89 yo male with PMh TVAR (10/20), CAD/stents (10/20), IDDM, HTN, Afib, GERD on Coumadin presented with 3 days of less activity, sleepiness, less communicative / participatory - change in MS.  CTH reveals Left head of caudate 2cm ICH, no IVH.  Patient was seen in follow up after he was hospitalized prior to this with a intracranial bleed and subarachnoid have ridge from a fall where he hit his head while on aspirin and Coumadin.  He was seen in follow-up without any recurrence of seizure but was slightly confused from the medication and his wife complained at that time that this was a new issues since his hospitalization.  He denied any symptoms of chest discomfort or shortness of breath and he was referred to neurology for further evaluation for re-initiation of his medication.   Neurology revealed that there was no recurrence of  bleeding and that was okay to restart aspirin and Coumadin and this was done on the 17th and he had an INR checked at that time it was 1.07.   After presentation to the emergency department he  was treated with KCentra and given 1 pack platelets and Vit K.   On presentation his INR 1.54.  He currently is awake in the ICU though confused and the nurses commented that he sun downs at night frequently.   Denies chest pain, palpitations, SOB, orthopnea, PND, claudication or LE edema.    PAST MEDICAL & SURGICAL HISTORY:  IDDM (insulin dependent diabetes mellitus)  HTN (hypertension)  Chronic congestive heart failure, unspecified congestive heart failure type  Atrial fibrillation, unspecified type  Gastroesophageal reflux disease, esophagitis presence not specified  Benign prostatic hyperplasia without lower urinary tract symptoms  Syncope and collapse: 5/4/2018  Type 2 myocardial infarction: 12/17/2017  UTI (urinary tract infection): 7/12/2017  Artificial cardiac pacemaker  History of appendectomy  Arthropathy of shoulder region  S/P TAVR (transcatheter aortic valve replacement)    FAMILY HISTORY:  No pertinent family history in first degree relatives  Brother Penile cancer    Social Hx:  Nonsmoker, no drug or alcohol use     Allergies  Aleve (Vomiting)  codeine (Unknown)  morphine (Nausea)  naproxen (Vomiting)    Allergies and Intolerances:        Allergies:  	naproxen: Drug, Vomiting  	morphine: Drug, Nausea  	Aleve: Drug, Vomiting  	codeine: Drug, Unknown    Home Medications:   * Patient Currently Takes Medications as of 07-Nov-2020 13:40 documented in Structured Notes  · 	hydrALAZINE 10 mg oral tablet: 1 tab(s) orally 3 times a day  · 	isosorbide dinitrate 20 mg oral tablet: 1 tab(s) orally 3 times a day  · 	Metoprolol Succinate ER 25 mg oral tablet, extended release: 1 tab(s) orally 2 times a day  · 	warfarin 5 mg oral tablet: 1 tab(s) orally once a day  	Check INR on Monday with your cardio   · 	venlafaxine 75 mg oral capsule, extended release: 1 cap(s) orally once a day at lunch  · 	Vitamin D3 5000 intl units (125 mcg) oral tablet: 1 tab(s) orally once a day  · 	B Complex 50 oral tablet, extended release: 1 tab(s) orally once a day  · 	gabapentin 100 mg oral capsule: 1 cap(s) orally 3 times a day with breakfast, lunch and dinner  · 	RABEprazole 20 mg oral delayed release tablet: 1 tab(s) orally 2 times a day at lunch and before dinner  · 	ferrous sulfate 325 mg (65 mg elemental iron) oral tablet: 1 tab(s) orally once a day, As Needed  · 	Lantus Solostar Pen 100 units/mL subcutaneous solution: 10 unit(s) subcutaneously once a day (at bedtime)  · 	atorvastatin 20 mg oral tablet: 2 tab(s) orally once a day  · 	[place] Caps: 1 cap(s) orally once a day  · 	Tradjenta 5 mg oral tablet: 1 tab(s) orally once a day  · 	Gas-X 80 mg oral tablet, chewable: 1 tab(s) orally 3 times a day (with meals)  · 	repaglinide 0.5 mg oral tablet: 1 tab(s) orally once a day before dinner  · 	promethazine 25 mg oral tablet: 1 tab(s) orally every 6 hours, As Needed - for nausea  *        ASA 81mg      HOSPITAL MEDICATIONS:   MEDICATIONS  (STANDING):  amLODIPine   Tablet 10 milliGRAM(s) Oral daily  atorvastatin 40 milliGRAM(s) Oral at bedtime  cholecalciferol 1000 Unit(s) Oral daily  cyanocobalamin 1000 MICROGram(s) Oral daily  dextrose 40% Gel 15 Gram(s) Oral once  dextrose 5%. 1000 milliLiter(s) (50 mL/Hr) IV Continuous <Continuous>  dextrose 50% Injectable 25 Gram(s) IV Push once  ferrous    sulfate 325 milliGRAM(s) Oral at bedtime  folic acid 1 milliGRAM(s) Oral daily  gabapentin 100 milliGRAM(s) Oral three times a day  glucagon  Injectable 1 milliGRAM(s) IntraMuscular once  insulin glargine Injectable (LANTUS) 15 Unit(s) SubCutaneous every morning  insulin glargine Injectable (LANTUS) 10 Unit(s) SubCutaneous at bedtime  insulin lispro (ADMELOG) corrective regimen sliding scale   SubCutaneous every 6 hours  isosorbide   mononitrate ER Tablet (IMDUR) 30 milliGRAM(s) Oral daily  levETIRAcetam 500 milliGRAM(s) Oral every 12 hours  magnesium oxide 400 milliGRAM(s) Oral at bedtime  metoprolol succinate ER 25 milliGRAM(s) Oral daily  Nephro-chris 1 Tablet(s) Oral daily  niCARdipine Infusion 5 mG/Hr (25 mL/Hr) IV Continuous <Continuous>  pantoprazole    Tablet 40 milliGRAM(s) Oral before breakfast  polyethylene glycol 3350 17 Gram(s) Oral at bedtime  venlafaxine XR. 75 milliGRAM(s) Oral daily  vitamin B complex with vitamin C 1 Tablet(s) Oral daily    MEDICATIONS  (PRN):  ondansetron Injectable 4 milliGRAM(s) IV Push every 6 hours PRN Nausea and/or Vomiting  promethazine 25 milliGRAM(s) Oral every 6 hours PRN for nausea  senna 2 Tablet(s) Oral at bedtime PRN Constipation      REVIEW OF SYSTEMS:   Unreliable in obtaining.  systems reviewed but not accurate.    Vital Signs Last 24 Hrs  T(C): 37 (21 Sep 2021 05:00), Max: 37 (21 Sep 2021 05:00)  T(F): 98.6 (21 Sep 2021 05:00), Max: 98.6 (21 Sep 2021 05:00)  HR: 65 (21 Sep 2021 07:00) (60 - 85)  BP: 130/77 (21 Sep 2021 07:00) (104/40 - 157/58)  BP(mean): 84 (21 Sep 2021 07:00) (54 - 103)  RR: 14 (21 Sep 2021 07:00) (14 - 29)  SpO2: 99% (21 Sep 2021 07:00) (91% - 100%)Daily     Daily I&O's Summary    20 Sep 2021 07:01  -  21 Sep 2021 07:00  --------------------------------------------------------  IN: 2616 mL / OUT: 350 mL / NET: 2266 mL        PHYSICAL EXAM:  Constitutional: NAD, awake, well-developed  HEENT: PERRLA, EOMI,  No oral cyanosis. Oropharynx Clean and Dry.  Neck:  supple,  No JVD, No Thyroid enlargement. No Carotid Bruits bilaterally.  Respiratory: Breath sounds are clear bilaterally, No wheezing, rales or rhonchi  Cardiovascular: NL S1 and S2, IR, IR, 1/6 JOJO ro RUSB, No s3, No s4,   Gastrointestinal: Bowel Sounds present  Extremities: No peripheral edema. No clubbing or cyanosis.  Vascular: 1+ peripheral pulses in LE   Musculoskeletal: no calf tenderness  Skin: No rashes.       LABS: All Labs Reviewed:                        11.0   12.18 )-----------( 191      ( 21 Sep 2021 06:09 )             33.4                         11.4   11.89 )-----------( 204      ( 20 Sep 2021 06:48 )             34.2                         12.2   11.28 )-----------( 165      ( 19 Sep 2021 19:39 )             35.7     21 Sep 2021 06:09    143    |  113    |  57     ----------------------------<  152    4.1     |  25     |  2.56   20 Sep 2021 06:48    140    |  108    |  59     ----------------------------<  278    4.4     |  26     |  2.68   19 Sep 2021 19:39    137    |  106    |  56     ----------------------------<  347    5.1     |  25     |  2.62     Ca    9.2        21 Sep 2021 06:09  Ca    9.3        20 Sep 2021 06:48  Ca    9.4        19 Sep 2021 19:39  Phos  2.9       21 Sep 2021 06:09  Mg     2.4       21 Sep 2021 06:09  Mg     2.3       20 Sep 2021 06:48    TPro  7.4    /  Alb  3.2    /  TBili  0.8    /  DBili  x      /  AST  30     /  ALT  30     /  AlkPhos  132    20 Sep 2021 06:48  TPro  8.1    /  Alb  3.4    /  TBili  0.7    /  DBili  x      /  AST  31     /  ALT  33     /  AlkPhos  156    19 Sep 2021 19:39    PT/INR - ( 20 Sep 2021 06:48 )   PT: 14.1 sec;   INR: 1.22 ratio         PTT - ( 19 Sep 2021 19:39 )  PTT:22.9 sec  CARDIAC MARKERS ( 19 Sep 2021 19:39 )  0.061 ng/mL / x     / x     / x     / x        09-20 @ 06:48  TSH: 0.32      RADIOLOGY:    < from: CT Brain Stroke Protocol (09.19.21 @ 19:58) >  IMPRESSION:    New 2 cm anterior left gangliocapsular hemorrhage with local mass effect and compression of the frontal and left lateral ventricle. No midline shift.    Findings were discussed with Dr. Tellez at 8:26 PM on 9/19/2021 who indicated that the communication was understood.    < end of copied text >  EKG:    < from: 12 Lead ECG (09.19.21 @ 22:07) >  Atrial fibrillation with frequent ventricular-paced complexesand with premature ventricular or aberrantly conducted complexes  Anterior infarct , age undetermined  ST & T wave abnormality, consider lateral ischemia  Prolonged QT  Abnormal ECG  When compared with ECG of 07-SEP-2021 22:10,  Vent. rate has decreased BY  10 BPM    < end of copied text >  ECHO:  < from: TTE Echo Complete w/o Contrast w/ Doppler (09.20.21 @ 11:47) >   Summary     The left ventricle is normal in size.   Mild concentric left ventricular hypertrophy is present.   Mildly reduced LV function.   Estimated left ventricular ejection fraction is 45-50%.   The left atrium is mildly dilated.   The right atrium appears mildly dilated.   The right ventricle is normal in size, wall thickness, wall motion, and   contractility.   A device wire is seen in the RV and RA.   Moderate pulmonary hypertension is present.     Well seated TAVR prosthetic valve in the aortic position.   Peak trans-prosthetic gradient is within normal limitations for this type   of prosthesis.   Fibrocalcific changes noted to the mitral valve leaflets with preserved   leaflet excursion.   Mild to moderate mitral regurgitation is present.   Moderate to severe (3+) tricuspid valve regurgitation is present.     No evidence of pericardial effusion.   The IVC is dilated with decreased respiratory variation.      < end of copied text >

## 2021-09-21 NOTE — PROGRESS NOTE ADULT - SUBJECTIVE AND OBJECTIVE BOX
HPI:  Patient is a 89 yo male with PMh TAVR (10/20), CAD/stents (10/20), IDDM, HTN, Afib, GERD on Coumadin presented to the ER today with Left head of caudate ICH 1.5cm (non-surgical).  Pt with recent fall and impact to head on Sept 7th 2021.  Pt noted with 3 days of less activity, sleepiness, less communicative / participatory - change in MS.  CTH reveals Left head of caudate 2cm ICH, no IVH.  Admitted to Neuro ICU under neurosurgery for 24 hours of care.  Pt remains neurologically intact.  Pt recently restarted on 9/14 ASA and coumadin dosing once again. Reversed in ED with KCentra and given 1 pack platelets and Vit K.  Repeat CTH scheduled around MN tonight as a stability evaluation scan.`  INR 1.54  GCS 15   Code Stroke in ED called (Telestroke?)  Lesly notified -   NIHSS per Neurosurgical eval is zero (prior to Neurosurg evaluation - pt not fully communicative per ED staff)    9/20- Patient seen and examined this AM in ICU. Continues on Cardene gtt. Sleepy, opens eyes for exam, follows simple commands. Repeat HCT at midnight prelim appears stable.    9/21- Patient seen and examine, more awake/alert today, Cardene gtt stopped this AM. Patient sitting up in bed, states he feels cold. No other complaints.    Vital Signs Last 24 Hrs  T(C): 36.6 (21 Sep 2021 10:00), Max: 37 (21 Sep 2021 05:00)  T(F): 97.8 (21 Sep 2021 10:00), Max: 98.6 (21 Sep 2021 05:00)  HR: 60 (21 Sep 2021 11:00) (60 - 85)  BP: 147/57 (21 Sep 2021 11:00) (104/40 - 157/65)  BP(mean): 81 (21 Sep 2021 11:00) (54 - 107)  RR: 18 (21 Sep 2021 11:00) (14 - 29)  SpO2: 98% (21 Sep 2021 11:00) (91% - 100%)    MEDICATIONS  (STANDING):  amLODIPine   Tablet 10 milliGRAM(s) Oral daily  atorvastatin 40 milliGRAM(s) Oral at bedtime  cholecalciferol 1000 Unit(s) Oral daily  cyanocobalamin 1000 MICROGram(s) Oral daily  dextrose 40% Gel 15 Gram(s) Oral once  dextrose 5%. 1000 milliLiter(s) (50 mL/Hr) IV Continuous <Continuous>  dextrose 50% Injectable 25 Gram(s) IV Push once  ferrous    sulfate 325 milliGRAM(s) Oral at bedtime  folic acid 1 milliGRAM(s) Oral daily  gabapentin 100 milliGRAM(s) Oral three times a day  glucagon  Injectable 1 milliGRAM(s) IntraMuscular once  insulin glargine Injectable (LANTUS) 15 Unit(s) SubCutaneous every morning  insulin glargine Injectable (LANTUS) 10 Unit(s) SubCutaneous at bedtime  insulin lispro (ADMELOG) corrective regimen sliding scale   SubCutaneous three times a day with meals  insulin lispro (ADMELOG) corrective regimen sliding scale.   SubCutaneous at bedtime  isosorbide   mononitrate ER Tablet (IMDUR) 30 milliGRAM(s) Oral daily  levETIRAcetam 500 milliGRAM(s) Oral every 12 hours  magnesium oxide 400 milliGRAM(s) Oral at bedtime  metoprolol succinate ER 25 milliGRAM(s) Oral daily  Nephro-chris 1 Tablet(s) Oral daily  niCARdipine Infusion 5 mG/Hr (25 mL/Hr) IV Continuous <Continuous>  pantoprazole    Tablet 40 milliGRAM(s) Oral before breakfast  polyethylene glycol 3350 17 Gram(s) Oral at bedtime  venlafaxine XR. 75 milliGRAM(s) Oral daily  vitamin B complex with vitamin C 1 Tablet(s) Oral daily    MEDICATIONS  (PRN):  ondansetron Injectable 4 milliGRAM(s) IV Push every 6 hours PRN Nausea and/or Vomiting  promethazine 25 milliGRAM(s) Oral every 6 hours PRN for nausea  senna 2 Tablet(s) Oral at bedtime PRN Constipation      PHYSICAL EXAM:  Constitutional: awake/ alert  HEENT: PERRLA, EOMI. [place] b/l w/o hearing aids  Neck: Supple  Respiratory: Breath sounds are clear bilaterally  Cardiovascular: S1 and S2, irregular rhythm  Musculoskeletal: no joint swelling/tenderness, no abnormal movements  Skin: No rashes    Neurological exam:  HF: A&Ox2 to person, place, unsure of year. [place] b/l, no aphasia or paraphasic erros  CN: CLEVE, EOMI, VFF, no NLFD, tongue midline  Motor: Strength 5/5 b/l UE, moving b/l LE antigravity  Coord:  unable to assess  Gait/Balance: Cannot test          LABS:                         11.0   12.18 )-----------( 191      ( 21 Sep 2021 06:09 )             33.4     09-21    143  |  113<H>  |  57<H>  ----------------------------<  152<H>  4.1   |  25  |  2.56<H>    Ca    9.2      21 Sep 2021 06:09  Phos  2.9     09-21  Mg     2.4     09-21    TPro  7.4  /  Alb  3.2<L>  /  TBili  0.8  /  DBili  x   /  AST  30  /  ALT  30  /  AlkPhos  132<H>  09-20    LIVER FUNCTIONS - ( 20 Sep 2021 06:48 )  Alb: 3.2 g/dL / Pro: 7.4 gm/dL / ALK PHOS: 132 U/L / ALT: 30 U/L / AST: 30 U/L / GGT: x           09-20 Chol 101 LDL -- HDL 40<L> Trig 111

## 2021-09-21 NOTE — PHYSICAL THERAPY INITIAL EVALUATION ADULT - ADDITIONAL COMMENTS
pt unable to provide Hx Lives at homewith wife. Wife and son takes care of IADL's (cooking and laundry/food shopping)No AD.

## 2021-09-22 NOTE — PROGRESS NOTE ADULT - SUBJECTIVE AND OBJECTIVE BOX
HPI:  Patient is a 89 yo male with PMHx TAVR (10/20), CAD/stents (10/20), IDDM, HTN, Afib, GERD on Coumadin presented to the ER today with Left head of caudate ICH 1.5cm (non-surgical).  Pt with recent fall and impact to head on Sept 7th 2021.  Pt noted with 3 days of less activity, sleepiness, less communicative / participatory - change in MS.  CTH reveals Left head of caudate 2cm ICH, no IVH.  Admitted to Neuro ICU under neurosurgery for 24 hours of care.  Pt remains neurologically intact.  Pt recently restarted on 9/14 ASA and coumadin dosing once again. Reversed in ED with KCentra and given 1 pack platelets and Vit K.  Repeat CTH scheduled around [place] tonight as a stability evaluation scan.`  INR 1.54  GCS 15   Code Stroke in ED called (Telestroke?)  Lesly notified -   NIHSS per Neurosurgical eval is zero (prior to Neurosurg evaluation - pt not fully communicative per ED staff)    9/20- Patient seen and examined this AM in ICU. Continues on Cardene gtt. Sleepy, opens eyes for exam, follows simple commands. Repeat HCT at midnight prelim appears stable.    9/21- Patient seen and examine, more awake/alert today, Cardene gtt stopped this AM. Patient sitting up in bed, states he feels cold. No other complaints.    9/22- Patient seen and examined, restless overnight, attempted to get OOB. Given Haldol without improvement. Was started on Precedex, now stopped. Patient awake/alert, confused, + wrist restraints.    Vital Signs Last 24 Hrs  T(C): 36.1 (22 Sep 2021 08:00), Max: 37.7 (22 Sep 2021 04:00)  T(F): 96.9 (22 Sep 2021 08:00), Max: 99.9 (22 Sep 2021 04:00)  HR: 68 (22 Sep 2021 10:00) (60 - 69)  BP: 151/68 (22 Sep 2021 10:00) (97/69 - 158/103)  BP(mean): 89 (22 Sep 2021 10:00) (58 - 115)  RR: 27 (22 Sep 2021 10:00) (12 - 28)  SpO2: 96% (22 Sep 2021 10:00) (79% - 99%)    MEDICATIONS  (STANDING):  amLODIPine   Tablet 10 milliGRAM(s) Oral daily  atorvastatin 40 milliGRAM(s) Oral at bedtime  cholecalciferol 1000 Unit(s) Oral daily  cyanocobalamin 1000 MICROGram(s) Oral daily  dextrose 40% Gel 15 Gram(s) Oral once  dextrose 5%. 1000 milliLiter(s) (50 mL/Hr) IV Continuous <Continuous>  dextrose 50% Injectable 25 Gram(s) IV Push once  ferrous    sulfate 325 milliGRAM(s) Oral at bedtime  folic acid 1 milliGRAM(s) Oral daily  gabapentin 100 milliGRAM(s) Oral three times a day  glucagon  Injectable 1 milliGRAM(s) IntraMuscular once  insulin glargine Injectable (LANTUS) 15 Unit(s) SubCutaneous every morning  insulin glargine Injectable (LANTUS) 10 Unit(s) SubCutaneous at bedtime  insulin lispro (ADMELOG) corrective regimen sliding scale   SubCutaneous three times a day with meals  insulin lispro (ADMELOG) corrective regimen sliding scale.   SubCutaneous at bedtime  isosorbide   mononitrate ER Tablet (IMDUR) 30 milliGRAM(s) Oral daily  levETIRAcetam 500 milliGRAM(s) Oral every 12 hours  magnesium oxide 400 milliGRAM(s) Oral at bedtime  metoprolol succinate ER 25 milliGRAM(s) Oral daily  Nephro-chris 1 Tablet(s) Oral daily  niCARdipine Infusion 5 mG/Hr (25 mL/Hr) IV Continuous <Continuous>  pantoprazole    Tablet 40 milliGRAM(s) Oral before breakfast  polyethylene glycol 3350 17 Gram(s) Oral at bedtime  venlafaxine XR. 75 milliGRAM(s) Oral daily  vitamin B complex with vitamin C 1 Tablet(s) Oral daily    MEDICATIONS  (PRN):  ondansetron Injectable 4 milliGRAM(s) IV Push every 6 hours PRN Nausea and/or Vomiting  promethazine 25 milliGRAM(s) Oral every 6 hours PRN for nausea  senna 2 Tablet(s) Oral at bedtime PRN Constipation      PHYSICAL EXAM:  Constitutional: awake, restless, + b/l wrist restraints intact  HEENT: PERRLA, EOMI. [place] b/l + hearing aids  Neck: Supple  Respiratory: Breath sounds are clear bilaterally  Cardiovascular: S1 and S2, irregular rhythm  Musculoskeletal: no joint swelling/tenderness, no abnormal movements  Skin: No rashes    Neurological exam:  HF: Awake/alert, confused, groaning not able to respond if in pain, + wrist restraints, [place] b/l  CN: CLEVE, EOMI, no NLFD, tongue midline  Motor: MONTES antigravity equal strength  Coord:  unable to assess  Gait/Balance: Cannot test          LABS:                         10.4   10.86 )-----------( 169      ( 22 Sep 2021 05:21 )             32.8     09-22    143  |  110<H>  |  52<H>  ----------------------------<  112<H>  4.2   |  25  |  2.38<H>    Ca    9.3      22 Sep 2021 05:21  Phos  2.5     09-22  Mg     2.3     09-22 09-21 Chol 101 LDL -- HDL 44 Trig 107, 09-20 Chol 101 LDL -- HDL 40<L> Trig 111   HPI:  Patient is a 89 yo male with PMHx TAVR (10/20), CAD/stents (10/20), IDDM, HTN, Afib, GERD on Coumadin presented to the ER today with Left head of caudate ICH 1.5cm (non-surgical).  Pt with recent fall and impact to head on Sept 7th 2021.  Pt noted with 3 days of less activity, sleepiness, less communicative / participatory - change in MS.  CTH reveals Left head of caudate 2cm ICH, no IVH.  Admitted to Neuro ICU under neurosurgery for 24 hours of care.  Pt remains neurologically intact.  Pt recently restarted on 9/14 ASA and coumadin dosing once again. Reversed in ED with KCentra and given 1 pack platelets and Vit K.  Repeat CTH scheduled around [place] tonight as a stability evaluation scan.`  INR 1.54  GCS 15   Code Stroke in ED called (Telestroke?)  Lesly notified -   NIHSS per Neurosurgical eval is zero (prior to Neurosurg evaluation - pt not fully communicative per ED staff)    9/20- Patient seen and examined this AM in ICU. Continues on Cardene gtt. Sleepy, opens eyes for exam, follows simple commands. Repeat HCT at midnight prelim appears stable.    9/21- Patient seen and examine, more awake/alert today, Cardene gtt stopped this AM. Patient sitting up in bed, states he feels cold. No other complaints.    9/22- Patient seen and examined, restless overnight, attempted to get OOB. Given Haldol without improvement. Was started on Precedex, now stopped. Patient awake/alert, confused, + wrist restraints.    Vital Signs Last 24 Hrs  T(C): 36.1 (22 Sep 2021 08:00), Max: 37.7 (22 Sep 2021 04:00)  T(F): 96.9 (22 Sep 2021 08:00), Max: 99.9 (22 Sep 2021 04:00)  HR: 68 (22 Sep 2021 10:00) (60 - 69)  BP: 151/68 (22 Sep 2021 10:00) (97/69 - 158/103)  BP(mean): 89 (22 Sep 2021 10:00) (58 - 115)  RR: 27 (22 Sep 2021 10:00) (12 - 28)  SpO2: 96% (22 Sep 2021 10:00) (79% - 99%)    MEDICATIONS  (STANDING):  amLODIPine   Tablet 10 milliGRAM(s) Oral daily  atorvastatin 40 milliGRAM(s) Oral at bedtime  cholecalciferol 1000 Unit(s) Oral daily  cyanocobalamin 1000 MICROGram(s) Oral daily  dextrose 40% Gel 15 Gram(s) Oral once  dextrose 5%. 1000 milliLiter(s) (50 mL/Hr) IV Continuous <Continuous>  dextrose 50% Injectable 25 Gram(s) IV Push once  ferrous    sulfate 325 milliGRAM(s) Oral at bedtime  folic acid 1 milliGRAM(s) Oral daily  gabapentin 100 milliGRAM(s) Oral three times a day  glucagon  Injectable 1 milliGRAM(s) IntraMuscular once  insulin glargine Injectable (LANTUS) 15 Unit(s) SubCutaneous every morning  insulin glargine Injectable (LANTUS) 10 Unit(s) SubCutaneous at bedtime  insulin lispro (ADMELOG) corrective regimen sliding scale   SubCutaneous three times a day with meals  insulin lispro (ADMELOG) corrective regimen sliding scale.   SubCutaneous at bedtime  isosorbide   mononitrate ER Tablet (IMDUR) 30 milliGRAM(s) Oral daily  levETIRAcetam 500 milliGRAM(s) Oral every 12 hours  magnesium oxide 400 milliGRAM(s) Oral at bedtime  metoprolol succinate ER 25 milliGRAM(s) Oral daily  Nephro-chris 1 Tablet(s) Oral daily  niCARdipine Infusion 5 mG/Hr (25 mL/Hr) IV Continuous <Continuous>  pantoprazole    Tablet 40 milliGRAM(s) Oral before breakfast  polyethylene glycol 3350 17 Gram(s) Oral at bedtime  venlafaxine XR. 75 milliGRAM(s) Oral daily  vitamin B complex with vitamin C 1 Tablet(s) Oral daily    MEDICATIONS  (PRN):  ondansetron Injectable 4 milliGRAM(s) IV Push every 6 hours PRN Nausea and/or Vomiting  promethazine 25 milliGRAM(s) Oral every 6 hours PRN for nausea  senna 2 Tablet(s) Oral at bedtime PRN Constipation      PHYSICAL EXAM:  Constitutional: awake, restless, + b/l wrist restraints intact  HEENT: PERRLA, EOMI. [place] b/l + hearing aids  Neck: Supple  Respiratory: Breath sounds are clear bilaterally  Cardiovascular: S1 and S2, irregular rhythm  Musculoskeletal: no joint swelling/tenderness, no abnormal movements  Skin: No rashes    Neurological exam:  HF: Awake/alert, confused, not following simple commands + wrist restraints, [place] b/l R>L  CN: CLEVE, EOMI, no NLFD, tongue midline  Motor: MONTES spontaneously antigravity equal strength  Coord:  unable to assess  Gait/Balance: Cannot test          LABS:                         10.4   10.86 )-----------( 169      ( 22 Sep 2021 05:21 )             32.8     09-22    143  |  110<H>  |  52<H>  ----------------------------<  112<H>  4.2   |  25  |  2.38<H>    Ca    9.3      22 Sep 2021 05:21  Phos  2.5     09-22  Mg     2.3     09-22 09-21 Chol 101 LDL -- HDL 44 Trig 107, 09-20 Chol 101 LDL -- HDL 40<L> Trig 111

## 2021-09-22 NOTE — PROGRESS NOTE ADULT - SUBJECTIVE AND OBJECTIVE BOX
HPI:  89 yo male with PMh TVAR (10/20), CAD/stents (10/20), IDDM, HTN, Afib, GERD on Coumadin presented with 3 days of less activity, sleepiness, less communicative / participatory - change in MS.  CTH reveals Left head of caudate 2cm ICH, no IVH.  Patient was seen in follow up after he was hospitalized prior to this with a intracranial bleed and subarachnoid have ridge from a fall where he hit his head while on aspirin and Coumadin.  He was seen in follow-up without any recurrence of seizure but was slightly confused from the medication and his wife complained at that time that this was a new issues since his hospitalization.  He denied any symptoms of chest discomfort or shortness of breath and he was referred to neurology for further evaluation for re-initiation of his medication.   Neurology revealed that there was no recurrence of  bleeding and that was okay to restart aspirin and Coumadin and this was done on the 17th and he had an INR checked at that time it was 1.07.   After presentation to the emergency department he  was treated with KCentra and given 1 pack platelets and Vit K.   On presentation his INR 1.54.  He currently is awake in the ICU though confused and the nurses commented that he sun downs at night frequently.   Denies chest pain, palpitations, SOB, orthopnea, PND, claudication or LE edema.    9/22/2021: no new major issues.  Overall seems improved.      PAST MEDICAL & SURGICAL HISTORY:  IDDM (insulin dependent diabetes mellitus)  HTN (hypertension)  Chronic congestive heart failure, unspecified congestive heart failure type  Atrial fibrillation, unspecified type  Gastroesophageal reflux disease, esophagitis presence not specified  Benign prostatic hyperplasia without lower urinary tract symptoms  Syncope and collapse: 5/4/2018  Type 2 myocardial infarction: 12/17/2017  UTI (urinary tract infection): 7/12/2017  Artificial cardiac pacemaker  History of appendectomy  Arthropathy of shoulder region  S/P TAVR (transcatheter aortic valve replacement)    FAMILY HISTORY:  No pertinent family history in first degree relatives  Brother Penile cancer    Social Hx:  Nonsmoker, no drug or alcohol use     Allergies  Aleve (Vomiting)  codeine (Unknown)  morphine (Nausea)  naproxen (Vomiting)    Allergies and Intolerances:        Allergies:  	naproxen: Drug, Vomiting  	morphine: Drug, Nausea  	Aleve: Drug, Vomiting  	codeine: Drug, Unknown    Home Medications:   * Patient Currently Takes Medications as of 07-Nov-2020 13:40 documented in Structured Notes  · 	hydrALAZINE 10 mg oral tablet: 1 tab(s) orally 3 times a day  · 	isosorbide dinitrate 20 mg oral tablet: 1 tab(s) orally 3 times a day  · 	Metoprolol Succinate ER 25 mg oral tablet, extended release: 1 tab(s) orally 2 times a day  · 	warfarin 5 mg oral tablet: 1 tab(s) orally once a day  	Check INR on Monday with your cardio   · 	venlafaxine 75 mg oral capsule, extended release: 1 cap(s) orally once a day at lunch  · 	Vitamin D3 5000 intl units (125 mcg) oral tablet: 1 tab(s) orally once a day  · 	B Complex 50 oral tablet, extended release: 1 tab(s) orally once a day  · 	gabapentin 100 mg oral capsule: 1 cap(s) orally 3 times a day with breakfast, lunch and dinner  · 	RABEprazole 20 mg oral delayed release tablet: 1 tab(s) orally 2 times a day at lunch and before dinner  · 	ferrous sulfate 325 mg (65 mg elemental iron) oral tablet: 1 tab(s) orally once a day, As Needed  · 	Lantus Solostar Pen 100 units/mL subcutaneous solution: 10 unit(s) subcutaneously once a day (at bedtime)  · 	atorvastatin 20 mg oral tablet: 2 tab(s) orally once a day  · 	Calos Caps: 1 cap(s) orally once a day  · 	Tradjenta 5 mg oral tablet: 1 tab(s) orally once a day  · 	Gas-X 80 mg oral tablet, chewable: 1 tab(s) orally 3 times a day (with meals)  · 	repaglinide 0.5 mg oral tablet: 1 tab(s) orally once a day before dinner  · 	promethazine 25 mg oral tablet: 1 tab(s) orally every 6 hours, As Needed - for nausea  *        ASA 81mg      MEDICATIONS  (STANDING):  amLODIPine   Tablet 10 milliGRAM(s) Oral daily  atorvastatin 40 milliGRAM(s) Oral at bedtime  cholecalciferol 1000 Unit(s) Oral daily  cyanocobalamin 1000 MICROGram(s) Oral daily  dextrose 40% Gel 15 Gram(s) Oral once  dextrose 5%. 1000 milliLiter(s) (50 mL/Hr) IV Continuous <Continuous>  dextrose 50% Injectable 25 Gram(s) IV Push once  ferrous    sulfate 325 milliGRAM(s) Oral at bedtime  folic acid 1 milliGRAM(s) Oral daily  gabapentin 100 milliGRAM(s) Oral three times a day  glucagon  Injectable 1 milliGRAM(s) IntraMuscular once  insulin glargine Injectable (LANTUS) 15 Unit(s) SubCutaneous every morning  insulin glargine Injectable (LANTUS) 10 Unit(s) SubCutaneous at bedtime  insulin lispro (ADMELOG) corrective regimen sliding scale   SubCutaneous three times a day with meals  insulin lispro (ADMELOG) corrective regimen sliding scale.   SubCutaneous at bedtime  isosorbide   mononitrate ER Tablet (IMDUR) 30 milliGRAM(s) Oral daily  levETIRAcetam 500 milliGRAM(s) Oral every 12 hours  magnesium oxide 400 milliGRAM(s) Oral at bedtime  metoprolol succinate ER 25 milliGRAM(s) Oral daily  Nephro-chris 1 Tablet(s) Oral daily  niCARdipine Infusion 5 mG/Hr (25 mL/Hr) IV Continuous <Continuous>  pantoprazole    Tablet 40 milliGRAM(s) Oral before breakfast  polyethylene glycol 3350 17 Gram(s) Oral at bedtime  venlafaxine XR. 75 milliGRAM(s) Oral daily  vitamin B complex with vitamin C 1 Tablet(s) Oral daily    MEDICATIONS  (PRN):  ondansetron Injectable 4 milliGRAM(s) IV Push every 6 hours PRN Nausea and/or Vomiting  promethazine 25 milliGRAM(s) Oral every 6 hours PRN for nausea  senna 2 Tablet(s) Oral at bedtime PRN Constipation      Vital Signs Last 24 Hrs  T(C): 36.1 (22 Sep 2021 08:00), Max: 37.7 (22 Sep 2021 04:00)  T(F): 96.9 (22 Sep 2021 08:00), Max: 99.9 (22 Sep 2021 04:00)  HR: 63 (22 Sep 2021 08:00) (60 - 69)  BP: 154/81 (22 Sep 2021 08:00) (97/69 - 158/103)  BP(mean): 100 (22 Sep 2021 08:00) (58 - 115)  RR: 20 (22 Sep 2021 08:00) (12 - 28)  SpO2: 97% (22 Sep 2021 08:00) (79% - 98%)    I&O's Detail    21 Sep 2021 07:01  -  22 Sep 2021 07:00  --------------------------------------------------------  IN:    Dexmedetomidine: 72 mL    Oral Fluid: 200 mL  Total IN: 272 mL    OUT:    Voided (mL): 200 mL  Total OUT: 200 mL    Total NET: 72 mL          Daily     Daily     PHYSICAL EXAM:  Constitutional: NAD, awake, well-developed  HEENT: PERRLA, EOMI,  No oral cyanosis. Oropharynx Clean and Dry.  Neck:  supple,  No JVD, No Thyroid enlargement. No Carotid Bruits bilaterally.  Respiratory: Breath sounds are clear bilaterally, No wheezing, rales or rhonchi  Cardiovascular: NL S1 and S2, IR, IR, 1/6 JOJO ro RUSB, No s3, No s4,   Gastrointestinal: Bowel Sounds present  Extremities: No peripheral edema. No clubbing or cyanosis.  Vascular: 1+ peripheral pulses in LE   Musculoskeletal: no calf tenderness  Skin: No rashes.       LABS: All Labs Reviewed:                        10.4   10.86 )-----------( 169      ( 22 Sep 2021 05:21 )             32.8     09-22    143  |  110<H>  |  52<H>  ----------------------------<  112<H>  4.2   |  25  |  2.38<H>    Ca    9.3      22 Sep 2021 05:21  Phos  2.5     09-22  Mg     2.3     09-22 09-21 Chol 101 LDL -- HDL 44 Trig 107, 09-20 Chol 101 LDL -- HDL 40<L> Trig 111      RADIOLOGY:    < from: CT Brain Stroke Protocol (09.19.21 @ 19:58) >  IMPRESSION:    New 2 cm anterior left gangliocapsular hemorrhage with local mass effect and compression of the frontal and left lateral ventricle. No midline shift.    Findings were discussed with Dr. Tellez at 8:26 PM on 9/19/2021 who indicated that the communication was understood.    < end of copied text >  EKG:    < from: 12 Lead ECG (09.19.21 @ 22:07) >  Atrial fibrillation with frequent ventricular-paced complexesand with premature ventricular or aberrantly conducted complexes  Anterior infarct , age undetermined  ST & T wave abnormality, consider lateral ischemia  Prolonged QT  Abnormal ECG  When compared with ECG of 07-SEP-2021 22:10,  Vent. rate has decreased BY  10 BPM    < end of copied text >  ECHO:  < from: TTE Echo Complete w/o Contrast w/ Doppler (09.20.21 @ 11:47) >   Summary     The left ventricle is normal in size.   Mild concentric left ventricular hypertrophy is present.   Mildly reduced LV function.   Estimated left ventricular ejection fraction is 45-50%.   The left atrium is mildly dilated.   The right atrium appears mildly dilated.   The right ventricle is normal in size, wall thickness, wall motion, and   contractility.   A device wire is seen in the RV and RA.   Moderate pulmonary hypertension is present.     Well seated TAVR prosthetic valve in the aortic position.   Peak trans-prosthetic gradient is within normal limitations for this type   of prosthesis.   Fibrocalcific changes noted to the mitral valve leaflets with preserved   leaflet excursion.   Mild to moderate mitral regurgitation is present.   Moderate to severe (3+) tricuspid valve regurgitation is present.     No evidence of pericardial effusion.   The IVC is dilated with decreased respiratory variation.      < end of copied text >

## 2021-09-22 NOTE — CONSULT NOTE ADULT - ASSESSMENT
89M w/PMH HTN, T2DM, Afib on coumadin, GERD, CAD s/p stents, recent admission w/ [facility] on 9/7 s/p fall, d/c'd stable, AC restarted 9/14, presented w/ AMS, found w/ new ICH, and hypertensive, admitted to ICU for NSG eval and started on cardene gtt for bp control. Required precedex for agitation d/t sundowning. Now transferred to med/surg as pt's CT head repeat stable and bp improved off gtt.       #HTN, essential  - pt on amlodipine 10mg, metoprolol 25mg QD  - recent [id number]  - monitor bp closely, adjust meds as tolerated- pt's HR 60's, will not increase dose of bb  - will cover w/ prn hydralazine for next 24hr to determine pt's long term needs    #T2DM  - SSI, monitor FS, c/w lantus 15 u  - HA1c 9.5  - carb controlled diet    #CKD4- appears to be at baseline  - monitor and avoid nephrotoxins    #afib- off AC d/t ICH x2  - recommend Palliative consult   -cardio f/u     #dementia, sundowning  - rec cs psych to evaluate   - was recently on precedex for agitation   - supportive care  - fall/aspiration precautions    #ICH- stable  - further per NSG f/u    #PPX  - for DVT - SCDs   89M w/PMH HTN, T2DM, Afib on coumadin, GERD, CAD s/p stents, HFrEF (40%), CKD4, recent admission w/ SDH on 9/7 s/p fall, d/c'd stable, AC restarted 9/14, presented w/ AMS, found w/ new ICH, and hypertensive, admitted to ICU for NSG eval and started on cardene gtt for bp control. Required precedex for agitation d/t sundowning. Now transferred to med/surg as pt's CT head repeat stable and bp improved off gtt.       #HTN, essential  - pt on amlodipine 10mg, metoprolol 25mg QD  - recent [id number]  - monitor bp closely, adjust meds as tolerated- pt's HR 60's, will not increase dose of bb  - will cover w/ prn hydralazine for next 24hr to determine pt's long term needs    #T2DM  - SSI, monitor FS, c/w lantus 15 u  - HA1c 9.5  - carb controlled diet    #CKD4- appears to be at baseline  - monitor and avoid nephrotoxins    #afib- off AC d/t ICH x2  - recommend Palliative consult   -cardio f/u     #dementia, sundowning  - rec cs psych to evaluate   - was recently on precedex for agitation   - supportive care  - fall/aspiration precautions    #ICH- stable  - further per NSG f/u    #PPX  - for DVT - SCDs

## 2021-09-22 NOTE — BEHAVIORAL HEALTH ASSESSMENT NOTE - SUMMARY
Pt is a 89 yowm with hx of depression followed by Dr Ash in community. Per wife at the bedside pt started being demented "just recently, after he fell". PT has no hx of SA or psych hospitalization. he is [place] but able to understand and answer the questions.  He denied being depressed, he is able to  sleep. Appetite is  satisfying. Denies SI, does not want to die, No HI,  denies AH, and VH.   med hx is significant of HTN, T2DM, Afib on coumadin, GERD, CAD s/p stents, HFrEF (40%), CKD4,  on 9/7 s/p fall.  CT head showed new 2cm left ICH.  PT knows he is in hospital  but he is "42 years old" and when asked about dates he keep repeating "forty two".  There is no need for psych hospitalization.   Would hold  venlafaxin until sensorium clears: HTN s/o ICB  For agitation Seroquel 12.5 mg po q6 H prn for agitation and I if opt can not take it oraly   Haldol 0.25 mg IM q6 h PRN severe agitation.   behavioral modifications

## 2021-09-22 NOTE — PROGRESS NOTE ADULT - ASSESSMENT
Patient is a 89 yo male with PMh TAVR (10/20), CAD/stents (10/20), IDDM, HTN, Afib, GERD on Coumadin presented to the ER today with AMS for past few days. Known to our service from admission on 9/7 found to have right traumatic SAH, resolved and discharged home. Patient was seen on follow up- cleared to resume ASA and coumadin on 9/14. Head CT revealed Left head of caudate ICH, stable on repeat Head CT. s/p Kcentra, Vit K and plts in ED.       Plan:  - No acute neurosurgical intervention indicated  - SBP < 150 now off cardene  - Ok for transfer to floor  - Episode sundowning overnight + wrist restraints  - Fall precautions  - Continue keppra 500 twice a day seizure prophylaxis  - Cardiology consult appreciated  - Would continue to hold Coumadin and ASA until follow up with Dr. Sam in 1-2 weeks with follow up HCT  - ISS keep BS < 180  - PT/OOB to chair  - SCDs DVT ppx  - PPI GI ppx  - Transfer to medicine upon transfer out of ICU discussed with admitting hospitalist    Discussed with Dr. 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 Known to our service from admission on 9/7 found to have right traumatic SAH, resolved and discharged home. Patient was seen on follow up- cleared to resume ASA and coumadin on 9/14. Head CT revealed Left head of caudate ICH, stable on repeat Head CT. s/p Kcentra, Vit K and plts in ED.       Plan:  - No acute neurosurgical intervention indicated  - SBP < 150 now off cardene  - Ok for transfer to floor  - Episode sundowning overnight + wrist restraints  - Fall precautions  - Continue keppra 500 twice a day seizure prophylaxis  - Cardiology consult appreciated  - Would continue to hold Coumadin and ASA until follow up with Dr. Sam in 1-2 weeks with follow up HCT  - ISS keep BS < 180  - PT/OOB to chair  - Start SQH, SCDs DVT ppx  - PPI GI ppx  - Transfer to medicine upon transfer out of ICU discussed with admitting hospitalist    Discussed with Dr. 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 Known to our service from admission on 9/7 found to have right traumatic SAH, resolved and discharged home. Patient was seen on follow up- cleared to resume ASA and Coumadin on 9/14. Head CT revealed Left head of caudate ICH, stable on repeat Head CT. s/p Kcentra, Vit K and plts in ED.       Plan:  - No acute neurosurgical intervention indicated  - SBP < 150 now off cardene  - Ok for transfer to floor  - Episode sundowning overnight + wrist restraints  - Fall precautions  - Continue keppra 500 twice a day seizure prophylaxis  - Cardiology consult appreciated  - Would continue to hold Coumadin and ASA until follow up with Dr. Sam in 1-2 weeks with follow up HCT  - ISS keep BS < 180  - PT/OOB to chair  - Start SQH, SCDs DVT ppx  - PPI GI ppx  - Transfer to medicine upon transfer out of ICU discussed with admitting hospitalist    Discussed with Dr. Sam

## 2021-09-22 NOTE — PROGRESS NOTE ADULT - SUBJECTIVE AND OBJECTIVE BOX
89 y/o male pmhx HTN, IDDM, Afib on coumadin, GERD, CAD s/p stents, recent admission w/ SDH on 9/7 s/p fall. Patient was discharged and AC was restarted on 9/14. Now presenting to ED w/ worsening mental status over past few days. Patient was code stroke. CT head showed 2cm left ICH. He was given Kcentra, Vitamin K and 2 units of platelets in the ER. Found to be hypertensive to 200s on arrival, started on nicardipine infusion.     9/20: Patient seen in bed.  He is AAO x 1, moving all extremities and extremely hypophonic, and on a Cardene drip  9/21: Much more awake and alert today, OOB, tolerating PO, however he remains on Cardene   9/22: continues with periods of confusion, was on precedex over night but has been DC.            PAST MEDICAL & SURGICAL HISTORY:  IDDM (insulin dependent diabetes mellitus)    HTN (hypertension)    Chronic congestive heart failure, unspecified congestive heart failure type    Atrial fibrillation, unspecified type    Gastroesophageal reflux disease, esophagitis presence not specified    Benign prostatic hyperplasia without lower urinary tract symptoms    Syncope and collapse  5/4/2018    Type 2 myocardial infarction  12/17/2017    UTI (urinary tract infection)  7/12/2017    Artificial cardiac pacemaker    History of appendectomy    Arthropathy of shoulder region    S/P TAVR (transcatheter aortic valve replacement)        FAMILY HISTORY:  No pertinent family history in first degree relatives        Social Hx:    Allergies    Aleve (Vomiting)  codeine (Unknown)  morphine (Nausea)  naproxen (Vomiting)    Intolerances            ICU Vital Signs Last 24 Hrs  T(C): 36.1 (22 Sep 2021 08:00), Max: 37.7 (22 Sep 2021 04:00)  T(F): 96.9 (22 Sep 2021 08:00), Max: 99.9 (22 Sep 2021 04:00)  HR: 68 (22 Sep 2021 10:00) (60 - 69)  BP: 151/68 (22 Sep 2021 10:00) (97/69 - 158/103)  BP(mean): 89 (22 Sep 2021 10:00) (58 - 115)  ABP: --  ABP(mean): --  RR: 27 (22 Sep 2021 10:00) (12 - 28)  SpO2: 96% (22 Sep 2021 10:00) (79% - 99%)          I&O's Summary    21 Sep 2021 07:01  -  22 Sep 2021 07:00  --------------------------------------------------------  IN: 272 mL / OUT: 200 mL / NET: 72 mL                              10.4   10.86 )-----------( 169      ( 22 Sep 2021 05:21 )             32.8       09-22    143  |  110<H>  |  52<H>  ----------------------------<  112<H>  4.2   |  25  |  2.38<H>    Ca    9.3      22 Sep 2021 05:21  Phos  2.5     09-22  Mg     2.3     09-22                      MEDICATIONS  (STANDING):  amLODIPine   Tablet 10 milliGRAM(s) Oral daily  atorvastatin 40 milliGRAM(s) Oral at bedtime  cholecalciferol 1000 Unit(s) Oral daily  cyanocobalamin 1000 MICROGram(s) Oral daily  dextrose 40% Gel 15 Gram(s) Oral once  dextrose 5%. 1000 milliLiter(s) (50 mL/Hr) IV Continuous <Continuous>  dextrose 50% Injectable 25 Gram(s) IV Push once  ferrous    sulfate 325 milliGRAM(s) Oral at bedtime  folic acid 1 milliGRAM(s) Oral daily  gabapentin 100 milliGRAM(s) Oral three times a day  glucagon  Injectable 1 milliGRAM(s) IntraMuscular once  insulin glargine Injectable (LANTUS) 15 Unit(s) SubCutaneous every morning  insulin glargine Injectable (LANTUS) 10 Unit(s) SubCutaneous at bedtime  insulin lispro (ADMELOG) corrective regimen sliding scale   SubCutaneous three times a day with meals  insulin lispro (ADMELOG) corrective regimen sliding scale.   SubCutaneous at bedtime  isosorbide   mononitrate ER Tablet (IMDUR) 30 milliGRAM(s) Oral daily  levETIRAcetam 500 milliGRAM(s) Oral every 12 hours  magnesium oxide 400 milliGRAM(s) Oral at bedtime  metoprolol succinate ER 25 milliGRAM(s) Oral daily  Nephro-chris 1 Tablet(s) Oral daily  niCARdipine Infusion 5 mG/Hr (25 mL/Hr) IV Continuous <Continuous>  pantoprazole    Tablet 40 milliGRAM(s) Oral before breakfast  polyethylene glycol 3350 17 Gram(s) Oral at bedtime  venlafaxine XR. 75 milliGRAM(s) Oral daily  vitamin B complex with vitamin C 1 Tablet(s) Oral daily    MEDICATIONS  (PRN):  ondansetron Injectable 4 milliGRAM(s) IV Push every 6 hours PRN Nausea and/or Vomiting  promethazine 25 milliGRAM(s) Oral every 6 hours PRN for nausea  senna 2 Tablet(s) Oral at bedtime PRN Constipation      DVT Prophylaxis:    Advanced Directives:  Discussed with:    Visit Information:    ** Time is exclusive of billed procedures and/or teaching and/or routine family updates.

## 2021-09-22 NOTE — BEHAVIORAL HEALTH ASSESSMENT NOTE - HPI (INCLUDE ILLNESS QUALITY, SEVERITY, DURATION, TIMING, CONTEXT, MODIFYING FACTORS, ASSOCIATED SIGNS AND SYMPTOMS)
Pt is a 89 yowm with hx of depression followed by Dr Ash in community. Per wife at the bedside pt started being demented "just recently, after he fell". PT has no hx of SA or psych hospitalization. he is [place] but able to understand and answer the questions.  He denied being depressed, he is able to  sleep. Appetite is  satisfying. Denies SI, does not want to die, No HI,  denies AH, and VH.   med hx is significant of HTN, T2DM, Afib on coumadin, GERD, CAD s/p stents, HFrEF (40%), CKD4,  on 9/7 s/p fall.  CT head showed new 2cm left ICH.  PT knows he is in hospital  but he is "42 years old" and when asked about dates he keep repeating "[age]".

## 2021-09-22 NOTE — CONSULT NOTE ADULT - SUBJECTIVE AND OBJECTIVE BOX
HPI:       PAST MEDICAL & SURGICAL HISTORY:  IDDM (insulin dependent diabetes mellitus)  HTN (hypertension)  Chronic congestive heart failure, unspecified congestive heart failure type  Atrial fibrillation, unspecified type  Gastroesophageal reflux disease, esophagitis presence not specified  Benign prostatic hyperplasia without lower urinary tract symptoms  Syncope and collapse 5/4/2018  Type 2 myocardial infarction 12/17/2017  UTI (urinary tract infection)   Artificial cardiac pacemaker    History of appendectomy  Arthropathy of shoulder region  S/P TAVR (transcatheter aortic valve replacement)    Review of Systems:   unable to complete d/t pt's condition     Allergies  Aleve (Vomiting)  codeine (Unknown)  morphine (Nausea)  naproxen (Vomiting)    Social History:     FAMILY HISTORY:  unable to discern from this elderly gentleman        Home Medications:  atorvastatin 40 mg oral tablet: 1 tab(s) orally once a day (at bedtime) (09 Sep 2021 15:36)  B Complex 50 oral tablet, extended release: 1 tab(s) orally once a day at lunch (09 Sep 2021 15:36)  Colace 100 mg oral capsule: 1 cap(s) orally once a day (09 Sep 2021 15:36)  cyanocobalamin 1000 mcg oral tablet: 1 tab(s) orally once a day with dinner (09 Sep 2021 15:36)  ferrous sulfate 325 mg (65 mg elemental iron) oral tablet: 1 tab(s) orally once a day (at bedtime) (09 Sep 2021 15:36)  gabapentin 100 mg oral capsule: 1 cap(s) orally 3 times a day with breakfast, lunch and at bedtime (09 Sep 2021 15:36)  Gas-X 80 mg oral tablet, chewable: 1 tab(s) orally 2 times a day with lunch and dinner (09 Sep 2021 15:36)  isosorbide mononitrate 30 mg oral tablet, extended release: 1 tab(s) orally once a day (in the morning) (09 Sep 2021 15:36)  Lantus Solostar Pen 100 units/mL subcutaneous solution: 10 unit(s) subcutaneously once a day after breakfast (09 Sep 2021 15:36)  Mag-Ox 400 oral tablet: 1 tab(s) orally once a day (at bedtime) (09 Sep 2021 15:36)  Metoprolol Succinate ER 25 mg oral tablet, extended release: 1 tab(s) orally once a day with lunch (09 Sep 2021 15:36)  Nephro-Chris oral tablet: 1 tab(s) orally once a day at breakfast (09 Sep 2021 15:36)  NovoLOG Mix 70/30 subcutaneous suspension: 10 unit(s) subcutaneously once a day (in the morning) (09 Sep 2021 15:36)  NovoLOG Mix 70/30 subcutaneous suspension: 2 unit(s) subcutaneously once a day at dinner ONLY IF BS &gt; 200  (09 Sep 2021 15:36)  pantoprazole 40 mg oral delayed release tablet: 1 tab(s) orally once a day (09 Sep 2021 15:36)  promethazine 25 mg oral tablet: 1 tab(s) orally every 6 hours, As Needed - for nausea (09 Sep 2021 15:36)  RABEprazole 20 mg oral delayed release tablet: 1 tab(s) orally 2 times a day at lunch and before dinner (09 Sep 2021 15:36)  repaglinide 0.5 mg oral tablet: 1 tab(s) orally once a day with dinner (09 Sep 2021 15:36)  repaglinide 0.5 mg oral tablet: 2 tab(s) orally once a day at dinner ONLY IF BS &lt; 200 (09 Sep 2021 15:36)  venlafaxine 75 mg oral capsule, extended release: 1 cap(s) orally once a day at dinner (09 Sep 2021 15:36)  Vitamin D3 5000 intl units (125 mcg) oral tablet: 1 tab(s) orally once a day at dinner (09 Sep 2021 15:36)      MEDICATIONS  (STANDING):  amLODIPine   Tablet 10 milliGRAM(s) Oral daily  atorvastatin 40 milliGRAM(s) Oral at bedtime  cholecalciferol 1000 Unit(s) Oral daily  cyanocobalamin 1000 MICROGram(s) Oral daily  dextrose 40% Gel 15 Gram(s) Oral once  dextrose 5%. 1000 milliLiter(s) (50 mL/Hr) IV Continuous <Continuous>  dextrose 50% Injectable 25 Gram(s) IV Push once  ferrous    sulfate 325 milliGRAM(s) Oral at bedtime  folic acid 1 milliGRAM(s) Oral daily  gabapentin 100 milliGRAM(s) Oral three times a day  glucagon  Injectable 1 milliGRAM(s) IntraMuscular once  insulin glargine Injectable (LANTUS) 15 Unit(s) SubCutaneous every morning  insulin glargine Injectable (LANTUS) 10 Unit(s) SubCutaneous at bedtime  insulin lispro (ADMELOG) corrective regimen sliding scale   SubCutaneous three times a day with meals  insulin lispro (ADMELOG) corrective regimen sliding scale.   SubCutaneous at bedtime  isosorbide   mononitrate ER Tablet (IMDUR) 30 milliGRAM(s) Oral daily  levETIRAcetam  Solution 500 milliGRAM(s) Oral two times a day  magnesium oxide 400 milliGRAM(s) Oral at bedtime  metoprolol succinate ER 25 milliGRAM(s) Oral daily  Nephro-chris 1 Tablet(s) Oral daily  niCARdipine Infusion 5 mG/Hr (25 mL/Hr) IV Continuous <Continuous>  pantoprazole    Tablet 40 milliGRAM(s) Oral before breakfast  polyethylene glycol 3350 17 Gram(s) Oral at bedtime  venlafaxine XR. 75 milliGRAM(s) Oral daily  vitamin B complex with vitamin C 1 Tablet(s) Oral daily    MEDICATIONS  (PRN):  ondansetron Injectable 4 milliGRAM(s) IV Push every 6 hours PRN Nausea and/or Vomiting  promethazine 25 milliGRAM(s) Oral every 6 hours PRN for nausea  senna 2 Tablet(s) Oral at bedtime PRN Constipation      CAPILLARY BLOOD GLUCOSE  POCT Blood Glucose.: 85 mg/dL (22 Sep 2021 10:26)  POCT Blood Glucose.: 239 mg/dL (21 Sep 2021 21:53)  POCT Blood Glucose.: 128 mg/dL (21 Sep 2021 17:37)    I&O's Summary    21 Sep 2021 07:01  -  22 Sep 2021 07:00  --------------------------------------------------------  IN: 272 mL / OUT: 200 mL / NET: 72 mL        PHYSICAL EXAM:  Vital Signs Last 24 Hrs  T(C): 36.5 (22 Sep 2021 11:23), Max: 37.7 (22 Sep 2021 04:00)  T(F): 97.7 (22 Sep 2021 11:23), Max: 99.9 (22 Sep 2021 04:00)  HR: 73 (22 Sep 2021 11:23) (60 - 73)  BP: 165/57 (22 Sep 2021 11:23) (97/69 - 165/57)  BP(mean): 89 (22 Sep 2021 10:00) (58 - 115)  RR: 16 (22 Sep 2021 11:23) (12 - 28)  SpO2: 96% (22 Sep 2021 11:23) (79% - 99%)  GENERAL: NAD, well-developed  HEAD:  Atraumatic, Normocephalic  EYES: EOMI, PERRLA, conjunctiva and sclera clear  ENT: normal hearing, no nasal discharge, throat clear, dentition normal  NECK: Supple, No JVD, no LAD, no thyromegaly   CHEST/LUNG: Clear to auscultation bilaterally; No wheeze, respirations unlabored  HEART: Regular rate and rhythm; No murmurs, rubs, or gallops  ABDOMEN: Soft, Nontender, Nondistended; Bowel sounds present, no HSM  EXTREMITIES:  2+ Peripheral Pulses, No clubbing, cyanosis, or edema  PSYCH: AAOx3, normal behavior  NEUROLOGY: non-focal, sensory and cn 2-12 intact, speech/language intact  SKIN: No visible rashes or lesions    LABS:                        10.4   10.86 )-----------( 169      ( 22 Sep 2021 05:21 )             32.8     09-22    143  |  110<H>  |  52<H>  ----------------------------<  112<H>  4.2   |  25  |  2.38<H>    Ca    9.3      22 Sep 2021 05:21  Phos  2.5     09-22  Mg     2.3     09-22        RADIOLOGY & ADDITIONAL TESTS:    Imaging Personally Reviewed:    EKG Personally Reviewed:    Consultant(s) Notes Reviewed:    cardio  Care Discussed with Consultants/Other Providers:  n/a HPI:   89M w/PMH HTN, T2DM, Afib on coumadin, GERD, CAD s/p stents, recent admission w/ SDH on 9/7 s/p fall. Patient was discharged and AC was restarted on 9/14 as per NSG after he was determined to be stable to resume. He presented on this admission w/ worsening mental status over several days. Patient was code stroke. CT head showed new 2cm left ICH. He was given Kcentra, Vitamin K and 2 units of platelets in the ER. INR was subtherapeutic, also found to be hypertensive to 200s on arrival, started on nicardipine infusion. Per Intensivist note:   9/20: Patient seen in bed.  He is AAO x 1, moving all extremities and extremely hypophonic, and on a Cardene drip  9/21: Much more awake and alert today, OOB, tolerating PO, however he remains on Cardene   9/22: continues with periods of confusion, was on precedex over night but has been DC.      Pt determined to be stable to transfer to med/surg, bp stable and controlled. Cardio and NSG notes reviewed. Pt has episodes of sundowning.   We have been consulted to manage acute and chronic medical issues that include HTN, T2DM.       PAST MEDICAL & SURGICAL HISTORY:  IDDM (insulin dependent diabetes mellitus)  HTN (hypertension)  Chronic congestive heart failure, unspecified congestive heart failure type  Atrial fibrillation, unspecified type  Gastroesophageal reflux disease, esophagitis presence not specified  Benign prostatic hyperplasia without lower urinary tract symptoms  Syncope and collapse 5/4/2018  Type 2 myocardial infarction 12/17/2017  UTI (urinary tract infection)   Artificial cardiac pacemaker    History of appendectomy  Arthropathy of shoulder region  S/P TAVR (transcatheter aortic valve replacement)    Review of Systems:   unable to complete d/t pt's condition     Allergies  Aleve (Vomiting)  codeine (Unknown)  morphine (Nausea)  naproxen (Vomiting)    Social History:     FAMILY HISTORY:  unable to discern from this elderly gentleman    Home Medications:  atorvastatin 40 mg oral tablet: 1 tab(s) orally once a day (at bedtime) (09 Sep 2021 15:36)  B Complex 50 oral tablet, extended release: 1 tab(s) orally once a day at lunch (09 Sep 2021 15:36)  Colace 100 mg oral capsule: 1 cap(s) orally once a day (09 Sep 2021 15:36)  cyanocobalamin 1000 mcg oral tablet: 1 tab(s) orally once a day with dinner (09 Sep 2021 15:36)  ferrous sulfate 325 mg (65 mg elemental iron) oral tablet: 1 tab(s) orally once a day (at bedtime) (09 Sep 2021 15:36)  gabapentin 100 mg oral capsule: 1 cap(s) orally 3 times a day with breakfast, lunch and at bedtime (09 Sep 2021 15:36)  Gas-X 80 mg oral tablet, chewable: 1 tab(s) orally 2 times a day with lunch and dinner (09 Sep 2021 15:36)  isosorbide mononitrate 30 mg oral tablet, extended release: 1 tab(s) orally once a day (in the morning) (09 Sep 2021 15:36)  Lantus Solostar Pen 100 units/mL subcutaneous solution: 10 unit(s) subcutaneously once a day after breakfast (09 Sep 2021 15:36)  Mag-Ox 400 oral tablet: 1 tab(s) orally once a day (at bedtime) (09 Sep 2021 15:36)  Metoprolol Succinate ER 25 mg oral tablet, extended release: 1 tab(s) orally once a day with lunch (09 Sep 2021 15:36)  Nephro-Chris oral tablet: 1 tab(s) orally once a day at breakfast (09 Sep 2021 15:36)  NovoLOG Mix 70/30 subcutaneous suspension: 10 unit(s) subcutaneously once a day (in the morning) (09 Sep 2021 15:36)  NovoLOG Mix 70/30 subcutaneous suspension: 2 unit(s) subcutaneously once a day at dinner ONLY IF BS &gt; 200  (09 Sep 2021 15:36)  pantoprazole 40 mg oral delayed release tablet: 1 tab(s) orally once a day (09 Sep 2021 15:36)  promethazine 25 mg oral tablet: 1 tab(s) orally every 6 hours, As Needed - for nausea (09 Sep 2021 15:36)  RABEprazole 20 mg oral delayed release tablet: 1 tab(s) orally 2 times a day at lunch and before dinner (09 Sep 2021 15:36)  repaglinide 0.5 mg oral tablet: 1 tab(s) orally once a day with dinner (09 Sep 2021 15:36)  repaglinide 0.5 mg oral tablet: 2 tab(s) orally once a day at dinner ONLY IF BS &lt; 200 (09 Sep 2021 15:36)  venlafaxine 75 mg oral capsule, extended release: 1 cap(s) orally once a day at dinner (09 Sep 2021 15:36)  Vitamin D3 5000 intl units (125 mcg) oral tablet: 1 tab(s) orally once a day at dinner (09 Sep 2021 15:36)      MEDICATIONS  (STANDING):  amLODIPine   Tablet 10 milliGRAM(s) Oral daily  atorvastatin 40 milliGRAM(s) Oral at bedtime  cholecalciferol 1000 Unit(s) Oral daily  cyanocobalamin 1000 MICROGram(s) Oral daily  dextrose 40% Gel 15 Gram(s) Oral once  dextrose 5%. 1000 milliLiter(s) (50 mL/Hr) IV Continuous <Continuous>  dextrose 50% Injectable 25 Gram(s) IV Push once  ferrous    sulfate 325 milliGRAM(s) Oral at bedtime  folic acid 1 milliGRAM(s) Oral daily  gabapentin 100 milliGRAM(s) Oral three times a day  glucagon  Injectable 1 milliGRAM(s) IntraMuscular once  insulin glargine Injectable (LANTUS) 15 Unit(s) SubCutaneous every morning  insulin glargine Injectable (LANTUS) 10 Unit(s) SubCutaneous at bedtime  insulin lispro (ADMELOG) corrective regimen sliding scale   SubCutaneous three times a day with meals  insulin lispro (ADMELOG) corrective regimen sliding scale.   SubCutaneous at bedtime  isosorbide   mononitrate ER Tablet (IMDUR) 30 milliGRAM(s) Oral daily  levETIRAcetam  Solution 500 milliGRAM(s) Oral two times a day  magnesium oxide 400 milliGRAM(s) Oral at bedtime  metoprolol succinate ER 25 milliGRAM(s) Oral daily  Nephro-chris 1 Tablet(s) Oral daily  niCARdipine Infusion 5 mG/Hr (25 mL/Hr) IV Continuous <Continuous>  pantoprazole    Tablet 40 milliGRAM(s) Oral before breakfast  polyethylene glycol 3350 17 Gram(s) Oral at bedtime  venlafaxine XR. 75 milliGRAM(s) Oral daily  vitamin B complex with vitamin C 1 Tablet(s) Oral daily    MEDICATIONS  (PRN):  ondansetron Injectable 4 milliGRAM(s) IV Push every 6 hours PRN Nausea and/or Vomiting  promethazine 25 milliGRAM(s) Oral every 6 hours PRN for nausea  senna 2 Tablet(s) Oral at bedtime PRN Constipation      CAPILLARY BLOOD GLUCOSE  POCT Blood Glucose.: 85 mg/dL (22 Sep 2021 10:26)  POCT Blood Glucose.: 239 mg/dL (21 Sep 2021 21:53)  POCT Blood Glucose.: 128 mg/dL (21 Sep 2021 17:37)    I&O's Summary    21 Sep 2021 07:01  -  22 Sep 2021 07:00  --------------------------------------------------------  IN: 272 mL / OUT: 200 mL / NET: 72 mL        PHYSICAL EXAM:  Vital Signs Last 24 Hrs  T(C): 36.5 (22 Sep 2021 11:23), Max: 37.7 (22 Sep 2021 04:00)  T(F): 97.7 (22 Sep 2021 11:23), Max: 99.9 (22 Sep 2021 04:00)  HR: 73 (22 Sep 2021 11:23) (60 - 73)  BP: 165/57 (22 Sep 2021 11:23) (97/69 - 165/57)  BP(mean): 89 (22 Sep 2021 10:00) (58 - 115)  RR: 16 (22 Sep 2021 11:23) (12 - 28)  SpO2: 96% (22 Sep 2021 11:23) (79% - 99%)  GENERAL: NAD, well-developed  HEAD:  Atraumatic, Normocephalic  EYES: EOMI, PERRLA, conjunctiva and sclera clear  ENT: normal hearing, no nasal discharge, throat clear, dentition normal  NECK: Supple, No JVD, no LAD, no thyromegaly   CHEST/LUNG: Clear to auscultation bilaterally; No wheeze, respirations unlabored  HEART: Regular rate and rhythm; +JOJO  ABDOMEN: Soft, Nontender, Nondistended; Bowel sounds present, no HSM  EXTREMITIES:  2+ Peripheral Pulses, No clubbing, cyanosis, or edema  PSYCH: AAOx3, normal behavior  NEUROLOGY: non-focal, sensory and cn 2-12 intact, speech/language intact  SKIN: No visible rashes or lesions    LABS:                        10.4   10.86 )-----------( 169      ( 22 Sep 2021 05:21 )             32.8     09-22    143  |  110<H>  |  52<H>  ----------------------------<  112<H>  4.2   |  25  |  2.38<H>    Ca    9.3      22 Sep 2021 05:21  Phos  2.5     09-22  Mg     2.3     09-22        RADIOLOGY & ADDITIONAL TESTS:    Imaging Personally Reviewed:  CT head 9/20-   IMPRESSION:  The intraparenchymal hemorrhage in the left basal ganglia is stable in size. No new hemorrhage has developed.    EKG Personally Reviewed:    Consultant(s) Notes Reviewed:    cardio  Care Discussed with Consultants/Other Providers:  n/a HPI:   89M w/PMH HTN, T2DM, Afib on coumadin, GERD, CAD s/p stents, recent admission w/ SDH on 9/7 s/p fall. Patient was discharged and AC was restarted on 9/14 as per NSG after he was determined to be stable to resume. He presented on this admission w/ worsening mental status over several days. Patient was code stroke. CT head showed new 2cm left ICH. He was given Kcentra, Vitamin K and 2 units of platelets in the ER. INR was subtherapeutic, also found to be hypertensive to 200s on arrival, started on nicardipine infusion. Per Intensivist note:   9/20: Patient seen in bed.  He is AAO x 1, moving all extremities and extremely hypophonic, and on a Cardene drip  9/21: Much more awake and alert today, OOB, tolerating PO, however he remains on Cardene   9/22: continues with periods of confusion, was on precedex over night but has been DC.      Pt determined to be stable to transfer to med/surg, bp stable and controlled. Cardio and NSG notes reviewed. Pt has episodes of sundowning.   We have been consulted to manage acute and chronic medical issues that include HTN, T2DM.       PAST MEDICAL & SURGICAL HISTORY:  IDDM (insulin dependent diabetes mellitus)  HTN (hypertension)  Chronic congestive heart failure, unspecified congestive heart failure type  Atrial fibrillation, unspecified type  Gastroesophageal reflux disease, esophagitis presence not specified  Benign prostatic hyperplasia without lower urinary tract symptoms  Syncope and collapse 5/4/2018  Type 2 myocardial infarction 12/17/2017  UTI (urinary tract infection)   Artificial cardiac pacemaker    History of appendectomy  Arthropathy of shoulder region  S/P TAVR (transcatheter aortic valve replacement)    Review of Systems:   unable to complete d/t pt's condition     Allergies  Aleve (Vomiting)  codeine (Unknown)  morphine (Nausea)  naproxen (Vomiting)    Social History:   cannot determine    FAMILY HISTORY:  unable to discern from this elderly gentleman    Home Medications:  atorvastatin 40 mg oral tablet: 1 tab(s) orally once a day (at bedtime) (09 Sep 2021 15:36)  B Complex 50 oral tablet, extended release: 1 tab(s) orally once a day at lunch (09 Sep 2021 15:36)  Colace 100 mg oral capsule: 1 cap(s) orally once a day (09 Sep 2021 15:36)  cyanocobalamin 1000 mcg oral tablet: 1 tab(s) orally once a day with dinner (09 Sep 2021 15:36)  ferrous sulfate 325 mg (65 mg elemental iron) oral tablet: 1 tab(s) orally once a day (at bedtime) (09 Sep 2021 15:36)  gabapentin 100 mg oral capsule: 1 cap(s) orally 3 times a day with breakfast, lunch and at bedtime (09 Sep 2021 15:36)  Gas-X 80 mg oral tablet, chewable: 1 tab(s) orally 2 times a day with lunch and dinner (09 Sep 2021 15:36)  isosorbide mononitrate 30 mg oral tablet, extended release: 1 tab(s) orally once a day (in the morning) (09 Sep 2021 15:36)  Lantus Solostar Pen 100 units/mL subcutaneous solution: 10 unit(s) subcutaneously once a day after breakfast (09 Sep 2021 15:36)  Mag-Ox 400 oral tablet: 1 tab(s) orally once a day (at bedtime) (09 Sep 2021 15:36)  Metoprolol Succinate ER 25 mg oral tablet, extended release: 1 tab(s) orally once a day with lunch (09 Sep 2021 15:36)  Nephro-Chris oral tablet: 1 tab(s) orally once a day at breakfast (09 Sep 2021 15:36)  NovoLOG Mix 70/30 subcutaneous suspension: 10 unit(s) subcutaneously once a day (in the morning) (09 Sep 2021 15:36)  NovoLOG Mix 70/30 subcutaneous suspension: 2 unit(s) subcutaneously once a day at dinner ONLY IF BS &gt; 200  (09 Sep 2021 15:36)  pantoprazole 40 mg oral delayed release tablet: 1 tab(s) orally once a day (09 Sep 2021 15:36)  promethazine 25 mg oral tablet: 1 tab(s) orally every 6 hours, As Needed - for nausea (09 Sep 2021 15:36)  RABEprazole 20 mg oral delayed release tablet: 1 tab(s) orally 2 times a day at lunch and before dinner (09 Sep 2021 15:36)  repaglinide 0.5 mg oral tablet: 1 tab(s) orally once a day with dinner (09 Sep 2021 15:36)  repaglinide 0.5 mg oral tablet: 2 tab(s) orally once a day at dinner ONLY IF BS &lt; 200 (09 Sep 2021 15:36)  venlafaxine 75 mg oral capsule, extended release: 1 cap(s) orally once a day at dinner (09 Sep 2021 15:36)  Vitamin D3 5000 intl units (125 mcg) oral tablet: 1 tab(s) orally once a day at dinner (09 Sep 2021 15:36)      MEDICATIONS  (STANDING):  amLODIPine   Tablet 10 milliGRAM(s) Oral daily  atorvastatin 40 milliGRAM(s) Oral at bedtime  cholecalciferol 1000 Unit(s) Oral daily  cyanocobalamin 1000 MICROGram(s) Oral daily  dextrose 40% Gel 15 Gram(s) Oral once  dextrose 5%. 1000 milliLiter(s) (50 mL/Hr) IV Continuous <Continuous>  dextrose 50% Injectable 25 Gram(s) IV Push once  ferrous    sulfate 325 milliGRAM(s) Oral at bedtime  folic acid 1 milliGRAM(s) Oral daily  gabapentin 100 milliGRAM(s) Oral three times a day  glucagon  Injectable 1 milliGRAM(s) IntraMuscular once  insulin glargine Injectable (LANTUS) 15 Unit(s) SubCutaneous every morning  insulin glargine Injectable (LANTUS) 10 Unit(s) SubCutaneous at bedtime  insulin lispro (ADMELOG) corrective regimen sliding scale   SubCutaneous three times a day with meals  insulin lispro (ADMELOG) corrective regimen sliding scale.   SubCutaneous at bedtime  isosorbide   mononitrate ER Tablet (IMDUR) 30 milliGRAM(s) Oral daily  levETIRAcetam  Solution 500 milliGRAM(s) Oral two times a day  magnesium oxide 400 milliGRAM(s) Oral at bedtime  metoprolol succinate ER 25 milliGRAM(s) Oral daily  Nephro-chris 1 Tablet(s) Oral daily  niCARdipine Infusion 5 mG/Hr (25 mL/Hr) IV Continuous <Continuous>  pantoprazole    Tablet 40 milliGRAM(s) Oral before breakfast  polyethylene glycol 3350 17 Gram(s) Oral at bedtime  venlafaxine XR. 75 milliGRAM(s) Oral daily  vitamin B complex with vitamin C 1 Tablet(s) Oral daily    MEDICATIONS  (PRN):  ondansetron Injectable 4 milliGRAM(s) IV Push every 6 hours PRN Nausea and/or Vomiting  promethazine 25 milliGRAM(s) Oral every 6 hours PRN for nausea  senna 2 Tablet(s) Oral at bedtime PRN Constipation      CAPILLARY BLOOD GLUCOSE  POCT Blood Glucose.: 85 mg/dL (22 Sep 2021 10:26)  POCT Blood Glucose.: 239 mg/dL (21 Sep 2021 21:53)  POCT Blood Glucose.: 128 mg/dL (21 Sep 2021 17:37)    I&O's Summary    21 Sep 2021 07:01  -  22 Sep 2021 07:00  --------------------------------------------------------  IN: 272 mL / OUT: 200 mL / NET: 72 mL        PHYSICAL EXAM:  Vital Signs Last 24 Hrs  T(C): 36.5 (22 Sep 2021 11:23), Max: 37.7 (22 Sep 2021 04:00)  T(F): 97.7 (22 Sep 2021 11:23), Max: 99.9 (22 Sep 2021 04:00)  HR: 73 (22 Sep 2021 11:23) (60 - 73)  BP: 165/57 (22 Sep 2021 11:23) (97/69 - 165/57)  BP(mean): 89 (22 Sep 2021 10:00) (58 - 115)  RR: 16 (22 Sep 2021 11:23) (12 - 28)  SpO2: 96% (22 Sep 2021 11:23) (79% - 99%)  GENERAL: NAD, well-developed  HEAD:  Atraumatic, Normocephalic  EYES: EOMI, PERRLA, conjunctiva and sclera clear  ENT: normal hearing, no nasal discharge, throat clear, dentition normal  NECK: Supple, No JVD, no LAD, no thyromegaly   CHEST/LUNG: Clear to auscultation bilaterally; No wheeze, respirations unlabored  HEART: Regular rate and rhythm; +JOJO  ABDOMEN: Soft, Nontender, Nondistended; Bowel sounds present, no HSM  EXTREMITIES:  2+ Peripheral Pulses, No clubbing, cyanosis, or edema  PSYCH: AAOx3, normal behavior  NEUROLOGY: non-focal, sensory and cn 2-12 intact, speech/language intact  SKIN: No visible rashes or lesions    LABS:                        10.4   10.86 )-----------( 169      ( 22 Sep 2021 05:21 )             32.8     09-22    143  |  110<H>  |  52<H>  ----------------------------<  112<H>  4.2   |  25  |  2.38<H>    Ca    9.3      22 Sep 2021 05:21  Phos  2.5     09-22  Mg     2.3     09-22        RADIOLOGY & ADDITIONAL TESTS:    Imaging Personally Reviewed:  CT head 9/20-   IMPRESSION: The intraparenchymal hemorrhage in the left basal ganglia is stable in size. No new hemorrhage has developed.    EKG Personally Reviewed:  n/a  Consultant(s) Notes Reviewed:    cardio  Care Discussed with Consultants/Other Providers:  n/a HPI:   89M w/PMH HTN, T2DM, Afib on coumadin, GERD, CAD s/p stents, HFrEF (40%), CKD4, recent admission w/ SDH on 9/7 s/p fall. Patient was discharged and AC was restarted on 9/14 as per NSG after he was determined to be stable to resume. He presented on this admission w/ worsening mental status over several days. Patient was code stroke. CT head showed new 2cm left ICH. He was given Kcentra, Vitamin K and 2 units of platelets in the ER. INR was subtherapeutic, also found to be hypertensive to 200s on arrival, started on nicardipine infusion. Per Intensivist note:   9/20: Patient seen in bed.  He is AAO x 1, moving all extremities and extremely hypophonic, and on a Cardene drip  9/21: Much more awake and alert today, OOB, tolerating PO, however he remains on Cardene   9/22: continues with periods of confusion, was on precedex over night but has been DC.      Pt determined to be stable to transfer to med/surg, bp stable and controlled. Cardio and NSG notes reviewed. Pt has episodes of sundowning.   We have been consulted to manage acute and chronic medical issues that include HTN, T2DM.       PAST MEDICAL & SURGICAL HISTORY:  IDDM (insulin dependent diabetes mellitus)  HTN (hypertension)  Chronic systolic congestive heart failure,   CKD 4  Atrial fibrillation, unspecified type  Gastroesophageal reflux disease, esophagitis presence not specified  Benign prostatic hyperplasia without lower urinary tract symptoms  Syncope and collapse 5/4/2018  Type 2 myocardial infarction 12/17/2017  UTI (urinary tract infection)   Artificial cardiac pacemaker    History of appendectomy  Arthropathy of shoulder region  S/P TAVR (transcatheter aortic valve replacement)    Review of Systems:   unable to complete d/t pt's condition     Allergies  Aleve (Vomiting)  codeine (Unknown)  morphine (Nausea)  naproxen (Vomiting)    Social History:   cannot determine    FAMILY HISTORY:  unable to discern from this elderly gentleman    Home Medications:  atorvastatin 40 mg oral tablet: 1 tab(s) orally once a day (at bedtime) (09 Sep 2021 15:36)  B Complex 50 oral tablet, extended release: 1 tab(s) orally once a day at lunch (09 Sep 2021 15:36)  Colace 100 mg oral capsule: 1 cap(s) orally once a day (09 Sep 2021 15:36)  cyanocobalamin 1000 mcg oral tablet: 1 tab(s) orally once a day with dinner (09 Sep 2021 15:36)  ferrous sulfate 325 mg (65 mg elemental iron) oral tablet: 1 tab(s) orally once a day (at bedtime) (09 Sep 2021 15:36)  gabapentin 100 mg oral capsule: 1 cap(s) orally 3 times a day with breakfast, lunch and at bedtime (09 Sep 2021 15:36)  Gas-X 80 mg oral tablet, chewable: 1 tab(s) orally 2 times a day with lunch and dinner (09 Sep 2021 15:36)  isosorbide mononitrate 30 mg oral tablet, extended release: 1 tab(s) orally once a day (in the morning) (09 Sep 2021 15:36)  Lantus Solostar Pen 100 units/mL subcutaneous solution: 10 unit(s) subcutaneously once a day after breakfast (09 Sep 2021 15:36)  Mag-Ox 400 oral tablet: 1 tab(s) orally once a day (at bedtime) (09 Sep 2021 15:36)  Metoprolol Succinate ER 25 mg oral tablet, extended release: 1 tab(s) orally once a day with lunch (09 Sep 2021 15:36)  Nephro-Chris oral tablet: 1 tab(s) orally once a day at breakfast (09 Sep 2021 15:36)  NovoLOG Mix 70/30 subcutaneous suspension: 10 unit(s) subcutaneously once a day (in the morning) (09 Sep 2021 15:36)  NovoLOG Mix 70/30 subcutaneous suspension: 2 unit(s) subcutaneously once a day at dinner ONLY IF BS &gt; 200  (09 Sep 2021 15:36)  pantoprazole 40 mg oral delayed release tablet: 1 tab(s) orally once a day (09 Sep 2021 15:36)  promethazine 25 mg oral tablet: 1 tab(s) orally every 6 hours, As Needed - for nausea (09 Sep 2021 15:36)  RABEprazole 20 mg oral delayed release tablet: 1 tab(s) orally 2 times a day at lunch and before dinner (09 Sep 2021 15:36)  repaglinide 0.5 mg oral tablet: 1 tab(s) orally once a day with dinner (09 Sep 2021 15:36)  repaglinide 0.5 mg oral tablet: 2 tab(s) orally once a day at dinner ONLY IF BS &lt; 200 (09 Sep 2021 15:36)  venlafaxine 75 mg oral capsule, extended release: 1 cap(s) orally once a day at dinner (09 Sep 2021 15:36)  Vitamin D3 5000 intl units (125 mcg) oral tablet: 1 tab(s) orally once a day at dinner (09 Sep 2021 15:36)      MEDICATIONS  (STANDING):  amLODIPine   Tablet 10 milliGRAM(s) Oral daily  atorvastatin 40 milliGRAM(s) Oral at bedtime  cholecalciferol 1000 Unit(s) Oral daily  cyanocobalamin 1000 MICROGram(s) Oral daily  dextrose 40% Gel 15 Gram(s) Oral once  dextrose 5%. 1000 milliLiter(s) (50 mL/Hr) IV Continuous <Continuous>  dextrose 50% Injectable 25 Gram(s) IV Push once  ferrous    sulfate 325 milliGRAM(s) Oral at bedtime  folic acid 1 milliGRAM(s) Oral daily  gabapentin 100 milliGRAM(s) Oral three times a day  glucagon  Injectable 1 milliGRAM(s) IntraMuscular once  insulin glargine Injectable (LANTUS) 15 Unit(s) SubCutaneous every morning  insulin glargine Injectable (LANTUS) 10 Unit(s) SubCutaneous at bedtime  insulin lispro (ADMELOG) corrective regimen sliding scale   SubCutaneous three times a day with meals  insulin lispro (ADMELOG) corrective regimen sliding scale.   SubCutaneous at bedtime  isosorbide   mononitrate ER Tablet (IMDUR) 30 milliGRAM(s) Oral daily  levETIRAcetam  Solution 500 milliGRAM(s) Oral two times a day  magnesium oxide 400 milliGRAM(s) Oral at bedtime  metoprolol succinate ER 25 milliGRAM(s) Oral daily  Nephro-chris 1 Tablet(s) Oral daily  niCARdipine Infusion 5 mG/Hr (25 mL/Hr) IV Continuous <Continuous>  pantoprazole    Tablet 40 milliGRAM(s) Oral before breakfast  polyethylene glycol 3350 17 Gram(s) Oral at bedtime  venlafaxine XR. 75 milliGRAM(s) Oral daily  vitamin B complex with vitamin C 1 Tablet(s) Oral daily    MEDICATIONS  (PRN):  ondansetron Injectable 4 milliGRAM(s) IV Push every 6 hours PRN Nausea and/or Vomiting  promethazine 25 milliGRAM(s) Oral every 6 hours PRN for nausea  senna 2 Tablet(s) Oral at bedtime PRN Constipation      CAPILLARY BLOOD GLUCOSE  POCT Blood Glucose.: 85 mg/dL (22 Sep 2021 10:26)  POCT Blood Glucose.: 239 mg/dL (21 Sep 2021 21:53)  POCT Blood Glucose.: 128 mg/dL (21 Sep 2021 17:37)    I&O's Summary    21 Sep 2021 07:01  -  22 Sep 2021 07:00  --------------------------------------------------------  IN: 272 mL / OUT: 200 mL / NET: 72 mL        PHYSICAL EXAM:  Vital Signs Last 24 Hrs  T(C): 36.5 (22 Sep 2021 11:23), Max: 37.7 (22 Sep 2021 04:00)  T(F): 97.7 (22 Sep 2021 11:23), Max: 99.9 (22 Sep 2021 04:00)  HR: 73 (22 Sep 2021 11:23) (60 - 73)  BP: 165/57 (22 Sep 2021 11:23) (97/69 - 165/57)  BP(mean): 89 (22 Sep 2021 10:00) (58 - 115)  RR: 16 (22 Sep 2021 11:23) (12 - 28)  SpO2: 96% (22 Sep 2021 11:23) (79% - 99%)  GENERAL: NAD, well-developed  HEAD:  Atraumatic, Normocephalic  EYES: EOMI, PERRLA, conjunctiva and sclera clear  ENT: normal hearing, no nasal discharge, throat clear, dentition normal  NECK: Supple, No JVD, no LAD, no thyromegaly   CHEST/LUNG: Clear to auscultation bilaterally; No wheeze, respirations unlabored  HEART: Regular rate and rhythm; +JOJO  ABDOMEN: Soft, Nontender, Nondistended; Bowel sounds present, no HSM  EXTREMITIES:  2+ Peripheral Pulses, No clubbing, cyanosis, or edema  PSYCH: AAOx3, normal behavior  NEUROLOGY: non-focal, sensory and cn 2-12 intact, speech/language intact  SKIN: No visible rashes or lesions    LABS:                        10.4   10.86 )-----------( 169      ( 22 Sep 2021 05:21 )             32.8     09-22    143  |  110<H>  |  52<H>  ----------------------------<  112<H>  4.2   |  25  |  2.38<H>    Ca    9.3      22 Sep 2021 05:21  Phos  2.5     09-22  Mg     2.3     09-22        RADIOLOGY & ADDITIONAL TESTS:    Imaging Personally Reviewed:  CT head 9/20-   IMPRESSION: The intraparenchymal hemorrhage in the left basal ganglia is stable in size. No new hemorrhage has developed.    EKG Personally Reviewed:  n/a  Consultant(s) Notes Reviewed:    cardio  Care Discussed with Consultants/Other Providers:  n/a

## 2021-09-22 NOTE — PROGRESS NOTE ADULT - ASSESSMENT
9/21/2021: Recurring to intracranial bleeding without trauma in the setting of prior intracranial bleeding with trauma.  Patient on anticoagulation at time of 2nd bleeding with re-initiation after evaluation by Neurology.  Cardiac-wise recent percutaneous intervention in October of 2020.   would not re-initiate Coumadin despite chronic atrial fibrillation and elevated Andrei Vasc score in  this patient.  The risk of recurrence bleeding of 3rd time is  high given there was no precipitating event to cause this.  INR also was subtherapeutic at the time of the bleed.  He has at least 1 year post his stenting procedure and therefore the urgency of starting aspirin is also low though it should be done at some point.  Currently stable from a cardiac standpoint.  Continue to monitor.      9/22/2021:  OFF AC currently.  Would restart aspirin (81 mg) QD when able as per neurosurgery.  Cardiac wise stable and BP control improving.        Discussed with patient, staff, and ICU MD Dr. Kilpatrick.

## 2021-09-22 NOTE — BEHAVIORAL HEALTH ASSESSMENT NOTE - NSBHCHARTREVIEWLAB_PSY_A_CORE FT
10.4   10.86 )-----------( 169      ( 22 Sep 2021 05:21 )             32.8   09-22    143  |  110<H>  |  52<H>  ----------------------------<  112<H>  4.2   |  25  |  2.38<H>    Ca    9.3      22 Sep 2021 05:21  Phos  2.5     09-22  Mg     2.3     09-22

## 2021-09-22 NOTE — BEHAVIORAL HEALTH ASSESSMENT NOTE - NSBHCHARTREVIEWVS_PSY_A_CORE FT
Vital Signs Last 24 Hrs  T(C): 36.8 (22 Sep 2021 15:35), Max: 37.7 (22 Sep 2021 04:00)  T(F): 98.3 (22 Sep 2021 15:35), Max: 99.9 (22 Sep 2021 04:00)  HR: 94 (22 Sep 2021 15:35) (60 - 94)  BP: 133/85 (22 Sep 2021 15:35) (97/69 - 171/78)  BP(mean): 89 (22 Sep 2021 10:00) (58 - 115)  RR: 19 (22 Sep 2021 15:35) (12 - 28)  SpO2: 100% (22 Sep 2021 15:35) (92% - 100%)

## 2021-09-22 NOTE — PROGRESS NOTE ADULT - RS GEN PE MLT RESP DETAILS PC
breath sounds equal/no rales/no rhonchi/no wheezes

## 2021-09-22 NOTE — BEHAVIORAL HEALTH ASSESSMENT NOTE - RISK ASSESSMENT
Low Acute Suicide Risk Low acute risk: NO hx of SA or hospitalizations, does not want to die  Increased long term risk: depression, anxiety, medical problems.   [id number] factors: supportive family.

## 2021-09-23 NOTE — PROGRESS NOTE ADULT - SUBJECTIVE AND OBJECTIVE BOX
HPI:  Patient is a 89 yo male with PMHx TAVR (10/20), CAD/stents (10/20), IDDM, HTN, Afib, GERD on Coumadin presented to the ER today with Left head of caudate ICH 1.5cm (non-surgical).  Pt with recent fall and impact to head on Sept 7th 2021.  Pt noted with 3 days of less activity, sleepiness, less communicative / participatory - change in MS.  CTH reveals Left head of caudate 2cm ICH, no IVH.  Admitted to Neuro ICU under neurosurgery for 24 hours of care.  Pt remains neurologically intact.  Pt recently restarted on 9/14 ASA and coumadin dosing once again. Reversed in ED with KCentra and given 1 pack platelets and Vit K.  Repeat CTH scheduled around MN tonight as a stability evaluation scan.`  INR 1.54  GCS 15   Code Stroke in ED called (Telestroke?)  Lesly notified -   NIHSS per Neurosurgical eval is zero (prior to Neurosurg evaluation - pt not fully communicative per ED staff)    9/20- Patient seen and examined this AM in ICU. Continues on Cardene gtt. Sleepy, opens eyes for exam, follows simple commands. Repeat HCT at midnight prelim appears stable.    9/21- Patient seen and examine, more awake/alert today, Cardene gtt stopped this AM. Patient sitting up in bed, states he feels cold. No other complaints.    9/22- Patient seen and examined, restless overnight, attempted to get OOB. Given Haldol without improvement. Was started on Precedex, now stopped. Patient awake/alert, confused, + wrist restraints.    9/23- Patient seen and examined, episodes of restlessness and agitation overnight. Seen by psych yesterday started on seroquel and haldol as needed. Patient awake/alert, offers no complaints.    Vital Signs Last 24 Hrs  T(C): 36.1 (23 Sep 2021 08:13), Max: 36.8 (22 Sep 2021 15:35)  T(F): 97 (23 Sep 2021 08:13), Max: 98.3 (22 Sep 2021 15:35)  HR: 61 (23 Sep 2021 08:13) (61 - 94)  BP: 144/57 (23 Sep 2021 08:13) (133/85 - 171/78)  BP(mean): --  RR: 19 (23 Sep 2021 08:13) (16 - 20)  SpO2: 94% (23 Sep 2021 08:13) (94% - 100%)    MEDICATIONS  (STANDING):  amLODIPine   Tablet 10 milliGRAM(s) Oral daily  atorvastatin 40 milliGRAM(s) Oral at bedtime  cholecalciferol 1000 Unit(s) Oral daily  cyanocobalamin 1000 MICROGram(s) Oral daily  dextrose 40% Gel 15 Gram(s) Oral once  dextrose 5%. 1000 milliLiter(s) (50 mL/Hr) IV Continuous <Continuous>  dextrose 50% Injectable 25 Gram(s) IV Push once  ferrous    sulfate 325 milliGRAM(s) Oral at bedtime  folic acid 1 milliGRAM(s) Oral daily  gabapentin 100 milliGRAM(s) Oral three times a day  glucagon  Injectable 1 milliGRAM(s) IntraMuscular once  heparin   Injectable 5000 Unit(s) SubCutaneous every 12 hours  insulin glargine Injectable (LANTUS) 15 Unit(s) SubCutaneous every morning  insulin glargine Injectable (LANTUS) 10 Unit(s) SubCutaneous at bedtime  insulin lispro (ADMELOG) corrective regimen sliding scale   SubCutaneous three times a day with meals  insulin lispro (ADMELOG) corrective regimen sliding scale.   SubCutaneous at bedtime  isosorbide   mononitrate ER Tablet (IMDUR) 30 milliGRAM(s) Oral daily  levETIRAcetam  Solution 500 milliGRAM(s) Oral two times a day  magnesium oxide 400 milliGRAM(s) Oral at bedtime  metoprolol succinate ER 25 milliGRAM(s) Oral daily  Nephro-chris 1 Tablet(s) Oral daily  pantoprazole    Tablet 40 milliGRAM(s) Oral before breakfast  polyethylene glycol 3350 17 Gram(s) Oral at bedtime  vitamin B complex with vitamin C 1 Tablet(s) Oral daily    MEDICATIONS  (PRN):  haloperidol    Injectable 0.25 milliGRAM(s) IntraMuscular every 6 hours PRN Severe Agitation  hydrALAZINE 25 milliGRAM(s) Oral every 6 hours PRN Systolic blood pressure >150  ondansetron Injectable 4 milliGRAM(s) IV Push every 6 hours PRN Nausea and/or Vomiting  promethazine 25 milliGRAM(s) Oral every 6 hours PRN for nausea  QUEtiapine 12.5 milliGRAM(s) Oral every 6 hours PRN agitation  senna 2 Tablet(s) Oral at bedtime PRN Constipation      PHYSICAL EXAM:  Constitutional: awake/alert, frail appearing. Very [place] hears better on left, + b/l wrist restraints intact  HEENT: PERRLA, EOMI. [place] b/l + hearing aids  Neck: Supple  Respiratory: Breath sounds are clear bilaterally  Cardiovascular: S1 and S2, irregular rhythm  Musculoskeletal: no joint swelling/tenderness, no abnormal movements  Skin: No rashes    Neurological exam:  HF: A&Ox 2 to person, place, not time, follows simple commands + wrist restraints, [place] b/l  CN: CLEVE, EOMI, no NLFD, tongue midline  Motor: MONTES antigravity equal strength to command  Coord:  unable to assess  Gait/Balance: Cannot test        LABS:                         10.6   11.11 )-----------( 155      ( 23 Sep 2021 10:08 )             31.9     09-22    143  |  110<H>  |  52<H>  ----------------------------<  112<H>  4.2   |  25  |  2.38<H>    Ca    9.3      22 Sep 2021 05:21  Phos  2.5     09-22  Mg     2.3     09-22 09-21 Chol 101 LDL -- HDL 44 Trig 107, 09-20 Chol 101 LDL -- HDL 40<L> Trig 111

## 2021-09-23 NOTE — PROGRESS NOTE BEHAVIORAL HEALTH - NSBHCHARTREVIEWINVESTIGATE_PSY_A_CORE FT
entricular Rate 70 BPM    Atrial Rate 71 BPM    QRS Duration 84 ms    Q-T Interval 468 ms    QTC Calculation(Bazett) 505 ms    R Axis -24 degrees    T Axis 139 degrees    Diagnosis Line Atrial fibrillation with frequent ventricular-paced complexes and with premature ventricular or aberrantly conducted complexes  Anterior infarct , age undetermined  ST & T wave abnormality, consider lateral ischemia  Prolonged QT  Abnormal ECG

## 2021-09-23 NOTE — PROGRESS NOTE ADULT - ASSESSMENT
89M w/PMH HTN, T2DM, Afib on coumadin, GERD, CAD s/p stents, HFrEF (40%), CKD4, recent admission w/ SDH on 9/7 s/p fall, d/c'd stable, AC restarted 9/14, presented w/ AMS, found w/ new ICH, and hypertensive, admitted to ICU for NSG eval and started on cardene gtt for bp control. Required precedex for agitation d/t sundowning. Now transferred to med/surg as pt's CT head repeat stable and bp improved off gtt.       #HTN, essential  - pt on amlodipine 10mg, metoprolol 25mg QD  - recent cdc876-261's  - monitor bp closely, adjust meds as tolerated- pt's HR 60's, will not increase dose of bb  - will cover w/ prn hydralazine for next 24hr to determine pt's long term needs    #T2DM  - SSI, monitor FS, c/w lantus 15 u  - HA1c 9.5  - carb controlled diet    #CKD4- appears to be at baseline  - monitor and avoid nephrotoxins    #afib- off AC d/t ICH x2  - recommend Palliative consult   -cardio f/u     #dementia, sundowning  - rec cs psych to evaluate   - was recently on precedex for agitation   - supportive care  - fall/aspiration precautions    #ICH- stable  - further per NSG f/u    #PPX  - for DVT - SCDs   89M w/PMH HTN, T2DM, Afib on coumadin, GERD, CAD s/p stents, HFrEF (40%), CKD4, recent admission w/ SDH on 9/7 s/p fall, d/c'd stable, AC restarted 9/14, presented w/ AMS, found w/ new ICH, and hypertensive, admitted to ICU for NSG eval and started on cardene gtt for bp control. Required precedex for agitation d/t sundowning. Now transferred to med/surg as pt's CT head repeat stable and bp improved off gtt.       #HTN, essential  - pt on amlodipine 10mg, metoprolol 25mg QD  - recent mth501-135's  - monitor bp closely, adjust meds as tolerated- pt's HR 60's, will not increase dose of bb  - will cover w/ prn hydralazine for next 24hr to determine pt's long term needs  9/23 - place on hydralazine q6h ATC, change BB to q12 dosing, and stop imdur   keep SBP less than 150    #T2DM  - SSI, monitor FS, c/w lantus 15 u  - HA1c 9.5  - carb controlled diet  9/23 - due to low FS and poor appetite, AM lantus dced and PM lantus halved    #CKD4- appears to be at baseline  - monitor and avoid nephrotoxins    #afib- off AC d/t ICH x2  - recommend Palliative consult   -cardio f/u   9/23 - Pt with HFrEF   cont BB; no ACEi/ARB at this time due to Cr    #dementia, sundowning  - rec cs psych to evaluate   - was recently on precedex for agitation   - supportive care  - fall/aspiration precautions    #ICH- stable  - further per NSG f/u    #PPX  - for DVT - SCDs    Ty for consult  pls call with q

## 2021-09-23 NOTE — PROGRESS NOTE ADULT - SUBJECTIVE AND OBJECTIVE BOX
89M w/PMH HTN, T2DM, Afib on coumadin, GERD, CAD s/p stents, HFrEF (40%), CKD4, recent admission w/ SDH on 9/7 s/p fall. Patient was discharged and AC was restarted on 9/14 as per NSG after he was determined to be stable to resume. He presented on this admission w/ worsening mental status over several days. Patient was code stroke. CT head showed new 2cm left ICH. He was given Kcentra, Vitamin K and 2 units of platelets in the ER. INR was subtherapeutic, also found to be hypertensive to 200s on arrival, started on nicardipine infusion. Per Intensivist note:   9/20: Patient seen in bed.  He is AAO x 1, moving all extremities and extremely hypophonic, and on a Cardene drip  9/21: Much more awake and alert today, OOB, tolerating PO, however he remains on Cardene   9/22: continues with periods of confusion, was on precedex over night but has been DC.      Pt determined to be stable to transfer to med/surg, bp stable and controlled. Cardio and NSG notes reviewed. Pt has episodes of sundowning.   We have been consulted to manage acute and chronic medical issues that include HTN, T2DM.  89M w/PMH HTN, T2DM, Afib on coumadin, GERD, CAD s/p stents, HFrEF (40%), CKD4, recent admission w/ SDH on 9/7 s/p fall. Patient was discharged and AC was restarted on 9/14 as per NSG after he was determined to be stable to resume. He presented on this admission w/ worsening mental status over several days. Patient was code stroke. CT head showed new 2cm left ICH. He was given Kcentra, Vitamin K and 2 units of platelets in the ER. INR was subtherapeutic, also found to be hypertensive to 200s on arrival, started on nicardipine infusion. Per Intensivist note:   9/20: Patient seen in bed.  He is AAO x 1, moving all extremities and extremely hypophonic, and on a Cardene drip  9/21: Much more awake and alert today, OOB, tolerating PO, however he remains on Cardene   9/22: continues with periods of confusion, was on precedex over night but has been DC.    Pt determined to be stable to transfer to med/surg, bp stable and controlled. Cardio and NSG notes reviewed. Pt has episodes of sundowning.   We have been consulted to manage acute and chronic medical issues that include HTN, T2DM.     9/23 - pt seen with RN at bedside, arousable, able to state his name, if faintly, looks around, follows simple commands - opens his mouth, sticks out his tongue - midline, squeezes hand, raises his legs. later in the day became agitated and went back on restraints. eating very little and FS low this morning    PHYSICAL EXAM:  Vital Signs Last 24 Hrs  T(C): 36.6 (23 Sep 2021 20:36), Max: 36.7 (23 Sep 2021 15:43)  T(F): 97.9 (23 Sep 2021 20:36), Max: 98 (23 Sep 2021 15:43)  HR: 60 (23 Sep 2021 20:36) (60 - 79)  BP: 156/55 (23 Sep 2021 20:36) (137/72 - 156/55)  RR: 19 (23 Sep 2021 20:36) (18 - 20)  SpO2: 95% (23 Sep 2021 20:36) (94% - 100%)  GENERAL: NAD, able to lie flat in bed  HEAD:  Atraumatic, Normocephalic  EYES: EOMI, PERRLA, normal sclera  ENT: Moist mucous membranes  NECK: Supple, No JVD, no nuchal rigidity  CHEST/LUNG: Clear to auscultation bilaterally; No rales, rhonchi, wheezing, or rubs. Unlabored respirations  HEART: Regular rate and rhythm; No murmurs, rubs, or gallops  ABDOMEN: Bowel sounds present; Soft, Nontender, Nondistended. No hepatomegaly  EXTREMITIES:  no pitting bilaterally  NERVOUS SYSTEM:  Alert & Oriented X1, speech faint but not slurred,    looks around, follows simple commands - opens his mouth, sticks out his tongue - midline, squeezes hand, raises his legs  MSK: FROM all 4 extremities, full and equal strength  SKIN: No rashes or lesions    LABS: All Labs Reviewed:                        10.6   11.11 )-----------( 155      ( 23 Sep 2021 10:08 )             31.9     09-23    141  |  109<H>  |  46<H>  ----------------------------<  98  4.0   |  25  |  2.35<H>    Ca    8.9      23 Sep 2021 10:08  Phos  2.9     09-23  Mg     2.6     09-23    < from: CT Head No Cont (09.20.21 @ 00:48) >  The intraparenchymal hemorrhage in the left basal ganglia is stable in size. No new hemorrhage has developed.    < end of copied text >        amLODIPine   Tablet 10 milliGRAM(s) Oral daily  atorvastatin 40 milliGRAM(s) Oral at bedtime  cholecalciferol 1000 Unit(s) Oral daily  cyanocobalamin 1000 MICROGram(s) Oral daily  dextrose 40% Gel 15 Gram(s) Oral once  dextrose 5%. 1000 milliLiter(s) IV Continuous <Continuous>  dextrose 50% Injectable 25 Gram(s) IV Push once  ferrous    sulfate 325 milliGRAM(s) Oral at bedtime  folic acid 1 milliGRAM(s) Oral daily  gabapentin 100 milliGRAM(s) Oral three times a day  glucagon  Injectable 1 milliGRAM(s) IntraMuscular once  haloperidol    Injectable 0.25 milliGRAM(s) IntraMuscular every 6 hours PRN  heparin   Injectable 5000 Unit(s) SubCutaneous every 12 hours  hydrALAZINE 25 milliGRAM(s) Oral every 8 hours  insulin glargine Injectable (LANTUS) 6 Unit(s) SubCutaneous at bedtime  insulin lispro (ADMELOG) corrective regimen sliding scale   SubCutaneous three times a day with meals  insulin lispro (ADMELOG) corrective regimen sliding scale.   SubCutaneous at bedtime  levETIRAcetam  Solution 500 milliGRAM(s) Oral two times a day  magnesium oxide 400 milliGRAM(s) Oral at bedtime  metoprolol tartrate 12.5 milliGRAM(s) Oral two times a day  Nephro-chris 1 Tablet(s) Oral daily  ondansetron Injectable 4 milliGRAM(s) IV Push every 6 hours PRN  pantoprazole   Suspension 40 milliGRAM(s) Oral daily  polyethylene glycol 3350 17 Gram(s) Oral at bedtime  promethazine 25 milliGRAM(s) Oral every 6 hours PRN  QUEtiapine 12.5 milliGRAM(s) Oral every 6 hours PRN  senna 2 Tablet(s) Oral at bedtime PRN  vitamin B complex with vitamin C 1 Tablet(s) Oral daily

## 2021-09-23 NOTE — PROGRESS NOTE ADULT - ASSESSMENT
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 Known to our service from admission on 9/7 found to have right traumatic SAH, resolved and discharged home. Patient was seen on follow up- cleared to resume ASA and Coumadin on 9/14. Head CT revealed Left head of caudate ICH, stable on repeat Head CT. s/p Kcentra, Vit K and plts in ED.       Plan:  - No acute neurosurgical intervention indicated  - SBP < 150  - Hydralazine added BP mgmt per medical team appreciated  - Episodes of confusion and agitation, sundowning  + wrist restraints  - Fall precautions  - Continue keppra 500 twice a day seizure prophylaxis x 1 week  - Cardiology consult appreciated  - Would continue to hold Coumadin and ASA until follow up with Dr. Sam in 1-2 weeks with follow up HCT  - ISS keep BS < 180  - PT/OOB to chair  - SQH, SCDs DVT ppx  - PPI GI ppx  - Dispo planning will need rehab placement    Discussed with Dr. 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 Known to our service from admission on 9/7 found to have right traumatic SAH, resolved and discharged home. Patient was seen on follow up- cleared to resume ASA and Coumadin on 9/14. Head CT revealed Left head of caudate ICH, stable on repeat Head CT. s/p Kcentra, Vit K and plts in ED.     # Non-Traumatic ICH    Plan:  - No acute neurosurgical intervention indicated  - SBP < 150  - Hydralazine added BP mgmt per medical team appreciated  - Episodes of confusion and agitation, sundowning  + wrist restraints  - Fall precautions  - Continue keppra 500 twice a day seizure prophylaxis x 1 week  - Cardiology consult appreciated  - Would continue to hold Coumadin and ASA until follow up with Dr. Sam in 1-2 weeks with follow up HCT  - ISS keep BS < 180  - PT/OOB to chair  - SQH, SCDs DVT ppx  - PPI GI ppx  - Dispo planning will need rehab placement    Discussed with Dr. Sam

## 2021-09-23 NOTE — CONSULT NOTE ADULT - SUBJECTIVE AND OBJECTIVE BOX
HPI: Mr. Rodríguez is a 89y old Male with hx of HTN, IDDM, Afib on coumadin, GERD, CAD s/p stents, Dementia, recent admission w/ SDH on 9/7 s/p fall. Patient was discharged and AC was restarted on 9/14. Now admitted on 9/19 to ED w/ worsening mental status over past few days. Patient was code stroke. CT head showed 2cm left ICH in setting of supratherapeutic INR; s/p Kcentra, Vitamin K and 2 units of platelets in the ER. Also found to be hypertensive to 200s on arrival, s/p nicardipine infusion. Palliative Care consulted to assist with establishing GOC.    Met Mr. Rodríguez this am with nursing students at bedside. Pt was in wrist restraints and obtunded. Moving slightly to touch, but not opening eyes, verbally responding, or following commands. Unable to gather history. Haldol 5mg IM was given yesterday and 12.5 mg Seroquel po given overnight.     PAIN: ( )Yes   (x )No- no nonverbal signs of pain    DYSPNEA: ( ) Yes  (x ) No- no nonverbal signs of dyspnea    PAST MEDICAL & SURGICAL HISTORY:  IDDM (insulin dependent diabetes mellitus)  HTN (hypertension)  Chronic congestive heart failure, unspecified congestive heart failure type  Atrial fibrillation, unspecified type  Gastroesophageal reflux disease, esophagitis presence not specified  Benign prostatic hyperplasia without lower urinary tract symptoms  Syncope and collapse 5/4/2018  Type 2 myocardial infarction 12/17/2017  UTI (urinary tract infection) 7/12/2017  Artificial cardiac pacemaker  History of appendectomy  Arthropathy of shoulder region  S/P TAVR (transcatheter aortic valve replacement)      SOCIAL HX: Lives with wife    Hx opiate tolerance ( )YES  (x )NO    Baseline ADLs  (Prior to Admission)- need more information from family  ( ) Independent   ( )Dependent    FAMILY HISTORY:  Unable to gather 2/2 to ams        Review of Systems:  Unable to gather 2/2 to ams    PHYSICAL EXAM:    Vital Signs Last 24 Hrs  T(C): 36.1 (23 Sep 2021 08:13), Max: 36.8 (22 Sep 2021 15:35)  T(F): 97 (23 Sep 2021 08:13), Max: 98.3 (22 Sep 2021 15:35)  HR: 61 (23 Sep 2021 08:13) (61 - 94)  BP: 144/57 (23 Sep 2021 08:13) (133/85 - 171/78)  BP(mean): 89 (22 Sep 2021 10:00) (89 - 89)  RR: 19 (23 Sep 2021 08:13) (14 - 20)  SpO2: 94% (23 Sep 2021 08:13) (94% - 100%)      PPSV2: 30  %  FAST: need information from family    General: Elderly male lying in bed, restrained, eyes closed, moves to touch, but not verbally responsive  Mental Status: unable to gather  HEENT: mmm, + temporal wasting  Lungs: clear  Cardiac: + s1 s2 rrr  GI: soft nt nd +bs, diaper in place  : diaper in place incontinent  MSK/skin: moves all extremities, muscle and fat wasting in limbs  Neuro: limited by lethargy, moves to touch in all extremities      LABS:                        10.4   10.86 )-----------( 169      ( 22 Sep 2021 05:21 )             32.8     09-22    143  |  110<H>  |  52<H>  ----------------------------<  112<H>  4.2   |  25  |  2.38<H>    Ca    9.3      22 Sep 2021 05:21  Phos  2.5     09-22  Mg     2.3     09-22        Albumin: Albumin, Serum: 3.2 g/dL (09-20 @ 06:48)      Allergies    Aleve (Vomiting)  codeine (Unknown)  morphine (Nausea)  naproxen (Vomiting)    Intolerances      MEDICATIONS  (STANDING):  amLODIPine   Tablet 10 milliGRAM(s) Oral daily  atorvastatin 40 milliGRAM(s) Oral at bedtime  cholecalciferol 1000 Unit(s) Oral daily  cyanocobalamin 1000 MICROGram(s) Oral daily  dextrose 40% Gel 15 Gram(s) Oral once  dextrose 5%. 1000 milliLiter(s) (50 mL/Hr) IV Continuous <Continuous>  dextrose 50% Injectable 25 Gram(s) IV Push once  ferrous    sulfate 325 milliGRAM(s) Oral at bedtime  folic acid 1 milliGRAM(s) Oral daily  gabapentin 100 milliGRAM(s) Oral three times a day  glucagon  Injectable 1 milliGRAM(s) IntraMuscular once  heparin   Injectable 5000 Unit(s) SubCutaneous every 12 hours  insulin glargine Injectable (LANTUS) 15 Unit(s) SubCutaneous every morning  insulin glargine Injectable (LANTUS) 10 Unit(s) SubCutaneous at bedtime  insulin lispro (ADMELOG) corrective regimen sliding scale   SubCutaneous three times a day with meals  insulin lispro (ADMELOG) corrective regimen sliding scale.   SubCutaneous at bedtime  isosorbide   mononitrate ER Tablet (IMDUR) 30 milliGRAM(s) Oral daily  levETIRAcetam  Solution 500 milliGRAM(s) Oral two times a day  magnesium oxide 400 milliGRAM(s) Oral at bedtime  metoprolol succinate ER 25 milliGRAM(s) Oral daily  Nephro-chris 1 Tablet(s) Oral daily  pantoprazole    Tablet 40 milliGRAM(s) Oral before breakfast  polyethylene glycol 3350 17 Gram(s) Oral at bedtime  vitamin B complex with vitamin C 1 Tablet(s) Oral daily    MEDICATIONS  (PRN):  haloperidol    Injectable 0.25 milliGRAM(s) IntraMuscular every 6 hours PRN Severe Agitation  hydrALAZINE 25 milliGRAM(s) Oral every 6 hours PRN Systolic blood pressure >150  ondansetron Injectable 4 milliGRAM(s) IV Push every 6 hours PRN Nausea and/or Vomiting  promethazine 25 milliGRAM(s) Oral every 6 hours PRN for nausea  QUEtiapine 12.5 milliGRAM(s) Oral every 6 hours PRN agitation  senna 2 Tablet(s) Oral at bedtime PRN Constipation      RADIOLOGY/ADDITIONAL STUDIES:    EXAM:  CT BRAIN                        PROCEDURE DATE:  09/20/2021      IMPRESSION:  The intraparenchymal hemorrhage in the left basal ganglia is stable in size. No new hemorrhage has developed.    Noel HIRSCH MD, the attending neuroradiologist reviewed the study and agree with the above preliminary report provided by [facility]ANNA[facility].    HARRY DOBBINS MD; Attending Radiologist  This document has been electronically signed. Sep 20 2021  1:56PM    EXAM:  XR CHEST PORTABLE URGENT 1V                        PROCEDURE DATE:  09/19/2021    Impression: No active pulmonary disease. Cardiomegaly.    FLORECITA GONZALEZ MD; Attending Radiologist  This document has been electronically signed. Sep 22 2021  2:59PM    EXAM:  CT BRAIN STROKE PROTOCOL                        PROCEDURE DATE:  09/19/2021      IMPRESSION:    New 2 cm anterior left gangliocapsular hemorrhage with local mass effect and compression of the frontal and left lateral ventricle. No midline shift.    Findings were discussed with Dr. Tellez at 8:26 PM on 9/19/2021 who indicated that the communication was understood.    MONICA LUCAS; Attending Radiologist  This document has been electronically signed. Sep 19 2021  8:46PM     EXAM:  ECHO TTE WO CON COMP W DOPP    PROCEDURE DATE:  09/20/2021         Summary     The left ventricle is normal in size.   Mild concentric left ventricular hypertrophy is present.   Mildly reduced LV function.   Estimated left ventricular ejection fraction is 45-50%.   The left atrium is mildly dilated.   The right atrium appears mildly dilated.   The right ventricle is normal in size, wall thickness, wall motion, and   contractility.   A device wire is seen in the RV and RA.   Moderate pulmonary hypertension is present.     Well seated TAVR prosthetic valve in the aortic position.   Peak trans-prosthetic gradient is within normal limitations for this type   of prosthesis.   Fibrocalcific changes noted to the mitral valve leaflets with preserved   leaflet excursion.   Mild to moderate mitral regurgitation is present.   Moderate to severe (3+) tricuspid valve regurgitation is present.     No evidence of pericardial effusion.   The IVC is dilated with decreased respiratory variation.     Signature     ----------------------------------------------------------------   Electronically signed by Dakota Bañuelos DO(Interpreting   physician) on 09/20/2021 04:06 PM   ----------------------------------------------------------------     HPI: Mr. Rodríguez is a 89y old Male with hx of HTN, IDDM, Afib on coumadin, GERD, CAD s/p stents, Dementia, recent admission w/ SDH on 9/7 s/p fall. Patient was discharged and AC was restarted on 9/14. Now admitted on 9/19 to ED w/ worsening mental status over past few days. Patient was code stroke. CT head showed 2cm left ICH in setting of supratherapeutic INR; s/p Kcentra, Vitamin K and 2 units of platelets in the ER. Also found to be hypertensive to 200s on arrival, s/p nicardipine infusion. Palliative Care consulted to assist with establishing GOC.    Met Mr. Rodríguez this am with nursing students at bedside. Pt was in wrist restraints and obtunded. Moving slightly to touch, but not opening eyes, verbally responding, or following commands. Unable to gather history. Haldol 5mg IM was given yesterday and 12.5 mg Seroquel po given overnight.     Called son Julio who will bring his mother in tomorrow for GOC meeting at 3 pm.     PAIN: ( )Yes   (x )No- no nonverbal signs of pain    DYSPNEA: ( ) Yes  (x ) No- no nonverbal signs of dyspnea    PAST MEDICAL & SURGICAL HISTORY:  IDDM (insulin dependent diabetes mellitus)  HTN (hypertension)  Chronic congestive heart failure, unspecified congestive heart failure type  Atrial fibrillation, unspecified type  Gastroesophageal reflux disease, esophagitis presence not specified  Benign prostatic hyperplasia without lower urinary tract symptoms  Syncope and collapse 5/4/2018  Type 2 myocardial infarction 12/17/2017  UTI (urinary tract infection) 7/12/2017  Artificial cardiac pacemaker  History of appendectomy  Arthropathy of shoulder region  S/P TAVR (transcatheter aortic valve replacement)      SOCIAL HX: Lives with wife    Hx opiate tolerance ( )YES  (x )NO    Baseline ADLs  (Prior to Admission)- need more information from family  ( ) Independent   ( )Dependent    FAMILY HISTORY:  Unable to gather 2/2 to ams      Review of Systems:  Unable to gather 2/2 to ams    PHYSICAL EXAM:    Vital Signs Last 24 Hrs  T(C): 36.1 (23 Sep 2021 08:13), Max: 36.8 (22 Sep 2021 15:35)  T(F): 97 (23 Sep 2021 08:13), Max: 98.3 (22 Sep 2021 15:35)  HR: 61 (23 Sep 2021 08:13) (61 - 94)  BP: 144/57 (23 Sep 2021 08:13) (133/85 - 171/78)  BP(mean): 89 (22 Sep 2021 10:00) (89 - 89)  RR: 19 (23 Sep 2021 08:13) (14 - 20)  SpO2: 94% (23 Sep 2021 08:13) (94% - 100%)      PPSV2: 30  %  FAST: need information from family    General: Elderly male lying in bed, restrained, eyes closed, moves to touch, but not verbally responsive  Mental Status: unable to gather  HEENT: mmm, + temporal wasting  Lungs: clear  Cardiac: + s1 s2 rrr  GI: soft nt nd +bs, diaper in place  : diaper in place incontinent  MSK/skin: moves all extremities, muscle and fat wasting in limbs  Neuro: limited by lethargy, moves to touch in all extremities      LABS:                        10.4   10.86 )-----------( 169      ( 22 Sep 2021 05:21 )             32.8     09-22    143  |  110<H>  |  52<H>  ----------------------------<  112<H>  4.2   |  25  |  2.38<H>    Ca    9.3      22 Sep 2021 05:21  Phos  2.5     09-22  Mg     2.3     09-22        Albumin: Albumin, Serum: 3.2 g/dL (09-20 @ 06:48)      Allergies    Aleve (Vomiting)  codeine (Unknown)  morphine (Nausea)  naproxen (Vomiting)    Intolerances      MEDICATIONS  (STANDING):  amLODIPine   Tablet 10 milliGRAM(s) Oral daily  atorvastatin 40 milliGRAM(s) Oral at bedtime  cholecalciferol 1000 Unit(s) Oral daily  cyanocobalamin 1000 MICROGram(s) Oral daily  dextrose 40% Gel 15 Gram(s) Oral once  dextrose 5%. 1000 milliLiter(s) (50 mL/Hr) IV Continuous <Continuous>  dextrose 50% Injectable 25 Gram(s) IV Push once  ferrous    sulfate 325 milliGRAM(s) Oral at bedtime  folic acid 1 milliGRAM(s) Oral daily  gabapentin 100 milliGRAM(s) Oral three times a day  glucagon  Injectable 1 milliGRAM(s) IntraMuscular once  heparin   Injectable 5000 Unit(s) SubCutaneous every 12 hours  insulin glargine Injectable (LANTUS) 15 Unit(s) SubCutaneous every morning  insulin glargine Injectable (LANTUS) 10 Unit(s) SubCutaneous at bedtime  insulin lispro (ADMELOG) corrective regimen sliding scale   SubCutaneous three times a day with meals  insulin lispro (ADMELOG) corrective regimen sliding scale.   SubCutaneous at bedtime  isosorbide   mononitrate ER Tablet (IMDUR) 30 milliGRAM(s) Oral daily  levETIRAcetam  Solution 500 milliGRAM(s) Oral two times a day  magnesium oxide 400 milliGRAM(s) Oral at bedtime  metoprolol succinate ER 25 milliGRAM(s) Oral daily  Nephro-chris 1 Tablet(s) Oral daily  pantoprazole    Tablet 40 milliGRAM(s) Oral before breakfast  polyethylene glycol 3350 17 Gram(s) Oral at bedtime  vitamin B complex with vitamin C 1 Tablet(s) Oral daily    MEDICATIONS  (PRN):  haloperidol    Injectable 0.25 milliGRAM(s) IntraMuscular every 6 hours PRN Severe Agitation  hydrALAZINE 25 milliGRAM(s) Oral every 6 hours PRN Systolic blood pressure >150  ondansetron Injectable 4 milliGRAM(s) IV Push every 6 hours PRN Nausea and/or Vomiting  promethazine 25 milliGRAM(s) Oral every 6 hours PRN for nausea  QUEtiapine 12.5 milliGRAM(s) Oral every 6 hours PRN agitation  senna 2 Tablet(s) Oral at bedtime PRN Constipation      RADIOLOGY/ADDITIONAL STUDIES:    EXAM:  CT BRAIN                        PROCEDURE DATE:  09/20/2021      IMPRESSION:  The intraparenchymal hemorrhage in the left basal ganglia is stable in size. No new hemorrhage has developed.    Noel HIRSCH MD, the attending neuroradiologist reviewed the study and agree with the above preliminary report provided by [facility].    HARRY DOBBINS MD; Attending Radiologist  This document has been electronically signed. Sep 20 2021  1:56PM    EXAM:  XR CHEST PORTABLE URGENT 1V                        PROCEDURE DATE:  09/19/2021    Impression: No active pulmonary disease. Cardiomegaly.    FLORECITA GONZALEZ MD; Attending Radiologist  This document has been electronically signed. Sep 22 2021  2:59PM    EXAM:  CT BRAIN STROKE PROTOCOL                        PROCEDURE DATE:  09/19/2021      IMPRESSION:    New 2 cm anterior left gangliocapsular hemorrhage with local mass effect and compression of the frontal and left lateral ventricle. No midline shift.    Findings were discussed with Dr. Tellez at 8:26 PM on 9/19/2021 who indicated that the communication was understood.    MONICA LUCAS; Attending Radiologist  This document has been electronically signed. Sep 19 2021  8:46PM    EXAM:  ECHO TTE WO CON COMP W DOPP    PROCEDURE DATE:  09/20/2021       Summary     The left ventricle is normal in size.   Mild concentric left ventricular hypertrophy is present.   Mildly reduced LV function.   Estimated left ventricular ejection fraction is 45-50%.   The left atrium is mildly dilated.   The right atrium appears mildly dilated.   The right ventricle is normal in size, wall thickness, wall motion, and   contractility.   A device wire is seen in the RV and RA.   Moderate pulmonary hypertension is present.     Well seated TAVR prosthetic valve in the aortic position.   Peak trans-prosthetic gradient is within normal limitations for this type   of prosthesis.   Fibrocalcific changes noted to the mitral valve leaflets with preserved   leaflet excursion.   Mild to moderate mitral regurgitation is present.   Moderate to severe (3+) tricuspid valve regurgitation is present.     No evidence of pericardial effusion.   The IVC is dilated with decreased respiratory variation.     Signature     ----------------------------------------------------------------   Electronically signed by Dakota Bañuelos DO(Interpreting   physician) on 09/20/2021 04:06 PM   ----------------------------------------------------------------

## 2021-09-23 NOTE — CONSULT NOTE ADULT - ASSESSMENT
89y old Male with hx of HTN, IDDM, Afib on coumadin, GERD, CAD s/p stents, Dementia, recent admission w/ SDH on 9/7 s/p fall. Patient was discharged and AC was restarted on 9/14. Now admitted on 9/19 to ED w/ worsening mental status over past few days. Patient was code stroke. CT head showed 2cm left ICH in setting of supratherapeutic INR; s/p Kcentra, Vitamin K and 2 units of platelets in the ER. Also found to be hypertensive to 200s on arrival, s/p nicardipine infusion. Palliative Care consulted to assist with establishing GOC.    1) AMS: ICH+ Dementia + Delirium  - multiple etiologies including underlying dementia, with likely hyperactive delirium (sundowning)  - new ICH no doubt also contributing  - imaging appreciated  - neurosurgical notes appreciated- stable bleed, no acute need for surgical interventions, c/w Keppra, hold coumadin, f/u in 1-2 weeks  - psych consulted to help with sundowning- no SI, no HI, seroquel 12.5 mg q6h prn/ haldol 0.25 IM q6h prn (if unable to swallow) recommended  - psych documenting vascular dementia vs. mixed  - reorient as able  - fall and aspiration precautions  - lethargic today-->got 5 mg IM haldol yesterday and 12.5 mg po seroquel overnight    2) afib  - supratherapeutic INR on admission  - s/p reversal  - cardio notes appreciated- suggesting that coumadin is not restarted due to inc risk of recurrent bleed, but ASA ok to start when ok with neuro    3) Debility  - PPS<40%  - care coordination note appreciated- pt lives with wife  - PT note appreciated- walked with RW 10 ft, home with home PT recommended  - speech and swallow eval appreciated- no overt aspiration, dec hearing acuity, reg texture diet    4) Prognosis  - poor  - in setting of advanced age, recurrent ICH (one traumatic, now spontaneous), Dementia, PPS<40%, multiple admission, pt at inc risk for further complications. Would likely be hospice eligible when/if family ready    5) GOC/Advanced Directives  - pt does not have capacity for decision making  - HCP on file naming wife Carli Rodríguez [phone]; son Julio Rodríguez also serving as surrogate [phone]  - [facility] on chart 9/19: DNR, no limits??, DNI, send to hospital, no feeding tube, trial of IVF, use abx  - GOC meeting    Thank you for including us in Mr. Rodríguez's care. Will continue to follow with you.    Sanju Brunson MD  Palliative Care Attending 89y old Male with hx of HTN, IDDM, Afib on coumadin, GERD, CAD s/p stents, Dementia, recent admission w/ SDH on 9/7 s/p fall. Patient was discharged and AC was restarted on 9/14. Now admitted on 9/19 to ED w/ worsening mental status over past few days. Patient was code stroke. CT head showed 2cm left ICH in setting of supratherapeutic INR; s/p Kcentra, Vitamin K and 2 units of platelets in the ER. Also found to be hypertensive to 200s on arrival, s/p nicardipine infusion. Palliative Care consulted to assist with establishing GOC.    1) AMS: ICH+ Dementia + Delirium  - multiple etiologies including underlying dementia, with likely hyperactive delirium (sundowning)  - new ICH no doubt also contributing  - imaging appreciated  - neurosurgical notes appreciated- stable bleed, no acute need for surgical interventions, c/w Keppra, hold coumadin, f/u in 1-2 weeks  - psych consulted to help with sundowning- no SI, no HI, seroquel 12.5 mg q6h prn/ haldol 0.25 IM q6h prn (if unable to swallow) recommended  - psych documenting vascular dementia vs. mixed  - reorient as able  - fall and aspiration precautions  - lethargic today-->got 5 mg IM haldol yesterday and 12.5 mg po seroquel overnight  - son shares that he normally sleeps during the day and is up at night and takes a while to wake up. Shared psych's plan    2) afib  - supratherapeutic INR on admission  - s/p reversal  - cardio notes appreciated- suggesting that coumadin is not restarted due to inc risk of recurrent bleed, but ASA ok to start when ok with neuro    3) Debility  - PPS<40%  - care coordination note appreciated- pt lives with wife  - PT note appreciated- walked with RW 10 ft, home with home PT recommended  - speech and swallow eval appreciated- no overt aspiration, dec hearing acuity, reg texture diet    4) Prognosis  - poor  - in setting of advanced age, recurrent ICH (one traumatic, now spontaneous), Dementia, PPS<40%, multiple admission, pt at inc risk for further complications. Would likely be hospice eligible when/if family ready    5) GOC/Advanced Directives  - pt does not have capacity for decision making  - HCP on file naming wife Carli Rodríguez [phone]; son Julio Rodríguez also serving as surrogate [phone]  - [facility] on chart 9/19: DNR, no limits??, DNI, send to hospital, no feeding tube, trial of IVF, use abx  - [facility] meeting scheduled with son and wife for tomorrow at 3pm    Thank you for including us in Mr. Rodríguez's care. Will continue to follow with you.    Sanju Brunson MD  Palliative Care Attending

## 2021-09-24 NOTE — PROGRESS NOTE ADULT - ASSESSMENT
89M w/PMH HTN, T2DM, Afib on coumadin, GERD, CAD s/p stents, HFrEF (40%), CKD4, recent admission w/ SDH on 9/7 s/p fall, d/c'd stable, AC restarted 9/14, presented w/ AMS, found w/ new ICH, and hypertensive, admitted to ICU for NSG eval and started on cardene gtt for bp control. Required precedex for agitation d/t sundowning. Now transferred to med/surg as pt's CT head repeat stable and bp improved off gtt.       #HTN, essential  - pt on amlodipine 10mg, metoprolol 25mg QD  - recent [id number]  - monitor bp closely, adjust meds as tolerated- pt's HR 60's, will not increase dose of bb  - will cover w/ prn hydralazine for next 24hr to determine pt's long term needs  9/23 - place on hydralazine q6h ATC, change BB to q12 dosing, and stop imdur   keep SBP less than 150    #T2DM  - SSI, monitor FS, c/w lantus 15 u  - HA1c 9.5  - carb controlled diet  9/23 - due to low FS and poor appetite, AM lantus dced and PM lantus halved    #CKD4- appears to be at baseline  - monitor and avoid nephrotoxins    #afib- off AC d/t ICH x2  - recommend Palliative consult   -cardio f/u   9/23 - Pt with HFrEF   cont BB; no ACEi/ARB at this time due to Cr    #dementia, sundowning  - rec cs psych to evaluate   - was recently on precedex for agitation   - supportive care  - fall/aspiration precautions    #ICH- stable  - further per NSG f/u    #PPX  - for DVT - SCDs    Ty for consult  pls call with q   89M w/PMH HTN, T2DM, Afib on coumadin, GERD, CAD s/p stents, HFrEF (40%), CKD4, recent admission w/ SDH on 9/7 s/p fall, d/c'd stable, AC restarted 9/14, presented w/ AMS, found w/ new ICH, and hypertensive, admitted to ICU for NSG eval and started on cardene gtt for bp control. Required precedex for agitation d/t sundowning. Now transferred to med/surg as pt's CT head repeat stable and bp improved off gtt.       #HTN, essential  - pt on amlodipine 10mg, metoprolol 25mg QD  - recent [id number]'s  - monitor bp closely, adjust meds as tolerated- pt's HR 60's, will not increase dose of bb  - will cover w/ prn hydralazine for next 24hr to determine pt's long term needs  9/23 - place on hydralazine q6h ATC, change BB to q12 dosing, and stop imdur   keep SBP less than 150  9/24 - BP uncontrolled - patient able to swallow his pills today, discussed with his RN  hydralazine increased to 50q8h and resumed imdur    #T2DM  - SSI, monitor FS, c/w lantus 15 u  - HA1c 9.5  - carb controlled diet  9/23 - due to low FS and poor appetite, AM lantus dced and PM lantus halved  9/24 - cont to monitor FS and adjust lantus dosing as appropriate     #CKD4- appears to be at baseline  - monitor and avoid nephrotoxins    #afib- off AC d/t ICH x2  - recommend Palliative consult   -cardio f/u   9/23 - Pt with HFrEF   cont BB; no ACEi/ARB at this time due to Cr    #dementia, sundowning  - rec cs psych to evaluate   - was recently on precedex for agitation   - supportive care  - fall/aspiration precautions    #ICH- stable  - further per NSG f/u    #PPX  - for DVT - SCDs    discussed with NSx  Ty foc consult, pls call with q

## 2021-09-24 NOTE — PROGRESS NOTE ADULT - ASSESSMENT
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 Known to our service from admission on 9/7 found to have right traumatic SAH, resolved and discharged home. Patient was seen on follow up- cleared to resume ASA and Coumadin on 9/14. Head CT revealed Left head of caudate ICH, stable on repeat Head CT. s/p Kcentra, Vit K and plts in ED.     # Non-Traumatic ICH    Plan:  - No acute neurosurgical intervention indicated  - SBP < 150  - BP meds managed by Hospitalist   - Episodes of confusion and agitation, sundowning  + wrist restraints  - Fall precautions  - Continue Keppra 500 twice a day seizure prophylaxis x 1 week  - Would continue to hold Coumadin and ASA until follow up with Dr. Sam in 1-2 weeks with follow up HCT  - ISS keep BS < 180  - PT/OOB to chair  - SQH, SCDs DVT ppx  - PPI GI ppx  - Pt seen by Palliative care team and a goals of care meeting is scheduled for 3pm today, Home vs rehab vs long term placement     Discussed with Dr. Sam

## 2021-09-24 NOTE — GOALS OF CARE CONVERSATION - ADVANCED CARE PLANNING - CONVERSATION DETAILS
Met with Mr. Rodríguez's wife and his two sons to discuss pt's medical issues and family's wishes for his care. They explained that the pt lives at home with his wife, and visited often by his 2 sons who help with his care. The pt was cognitively sound up until fall (often refuses to use walker) prompting last admission and fall led to ICH. Since then pt has been forgetful with signs of dementia (now confirmed by psych). Pt needs assistance with all ADLs beside dressing, but even then he needs help at times. They note that at baseline, prior to cognitive decline, they pt has always been a late sleeper, and since dementia set in, he really does not get going until well after noon some days. Thus, he is often up all night. They know pt has declined, with possibility of further issues, and were thus eager for support in making the best plan moving forward.     Took time to assess their understanding of medical issues and how they contribute to pt's prognosis. They note speaking to neurosurgical PA the day after admission, but not having updates since then. They were aware of new ICH being present. We went on to review all issues fron head-to-toe- including all imaging studies, labs, and subspecialists involved. We discussed pt's ICH, how this was stable on follow up CTH, teams decision to stop coumadin, but c/w ASA, that there was no need for surgery, and that pt could follow up outpt for neurosurgical follow up. Discussed caroline's help with delirium, their documentation of dementia (vascular vs. mixed), and their current treatment regimen to help with agitation and stabilizing sleeping. Noted that high BP was also an issue here and due to the possibility of this contributing to reason for bleed, hospitalist also on board helping to manage this alongside cardio. They also asked about pt's diabetes and I agreed to ask Dr. Saunders to give them a call about pt's regimen and their concerns for administering meds at home (which pt did for himself up until very recently). They recalled pt having CKD and we discussed how Cr was elevated on admission, but returned to pt's baseline. They understand that dementia is a chronic and progressive illness, with the expectation of further cognitive and functional decline, which of course is exacerbated by acute issues with pt's other underlying chronic illnesses. Lastly, discussed assessment of function via speech and swallow eval and PT- the latter of which recommending home with PT. they were happy to know that the pt is improved, wakeful, and cooperative with few periods of agitation. However, they were prepared to make a plan for best-case scenario and for it pt continued to decline.     Thus, we went on to confirm advanced directives and to review dispo options. They confirmed MOLST decisions for DNR and DNI. We then went on to discuss how choosing a home care plan really depends on their focus of care. Explained the difference between all levels of home care including home palliative care (recommended) and home hospice. They were interested in knowing about the latter so this was discussed in detail. Ultimately though they are not yet ready for hospice and they know that this is perfectly fine (also not a guarantee that pt would fit criteria just yet anyway). They instead opted for home palliative plan, agreeing that getting aid agency list from  would be helpful- as they know the pt will need additional help at home soon.     Ultimately, the family has full understanding of all the pt's medical issues, how he has improved, and of potential for further complications. They would like home palliative plan, home health aid list (SW made aware), neuro PA to speak with them for update and clarification on dc timing (PA aware, will stop by room tonight), to hear from Dr. Hill cc: diabetes med questions. They expect that team will continue to optimize all aspects of care and dc when medically stable. Neurosurgical PA, Dr. Hill, and floor SW updated on above.

## 2021-09-24 NOTE — PROGRESS NOTE ADULT - SUBJECTIVE AND OBJECTIVE BOX
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  Pt with recent fall and impact to head on Sept 7th 2021.  Pt noted with 3 days of less activity, sleepiness, less communicative / participatory - change in MS.  CTH reveals Left head of caudate 2cm ICH, no IVH.  Admitted to Neuro ICU under neurosurgery for 24 hours of care.  Pt remains neurologically intact.  Pt recently restarted on 9/14 ASA and coumadin dosing once again. Reversed in ED with KCentra and given 1 pack platelets and Vit K.  Repeat CTH scheduled around MN tonight as a stability evaluation scan.`  INR 1.54  GCS 15   Code Stroke in ED called (Telestroke?)  [place] notified -   NIHSS per Neurosurgical eval is zero (prior to Neurosurg evaluation - pt not fully communicative per ED staff)    9/20- Patient seen and examined this AM in ICU. Continues on Cardene gtt. Sleepy, opens eyes for exam, follows simple commands. Repeat HCT at midnight prelim appears stable.    9/21- Patient seen and examine, more awake/alert today, Cardene gtt stopped this AM. Patient sitting up in bed, states he feels cold. No other complaints.    9/22- Patient seen and examined, restless overnight, attempted to get OOB. Given Haldol without improvement. Was started on Precedex, now stopped. Patient awake/alert, confused, + wrist restraints.    9/23- Patient seen and examined, episodes of restlessness and agitation overnight. Seen by psych yesterday started on seroquel and haldol as needed. Patient awake/alert, offers no complaints.    9-24- Pt awake and alert, no complains, states he wants to go home, in good spirits, does not seem restless or agitated, although he did require wrist restraints over night for patient safety. Goals of Care meeting scheduled for 3pm today.     Vital Signs Last 24 Hrs  T(C): 36.8 (24 Sep 2021 08:24), Max: 36.8 (24 Sep 2021 08:24)  T(F): 98.3 (24 Sep 2021 08:24), Max: 98.3 (24 Sep 2021 08:24)  HR: 66 (24 Sep 2021 08:24) (60 - 68)  BP: 187/68 (24 Sep 2021 08:24) (137/72 - 187/68)  RR: 18 (24 Sep 2021 08:24) (18 - 20)  SpO2: 97% (24 Sep 2021 08:24) (95% - 100%)    MEDICATIONS  (STANDING):  amLODIPine   Tablet 10 milliGRAM(s) Oral daily  atorvastatin 40 milliGRAM(s) Oral at bedtime  cholecalciferol 1000 Unit(s) Oral daily  cyanocobalamin 1000 MICROGram(s) Oral daily  ferrous    sulfate 325 milliGRAM(s) Oral at bedtime  folic acid 1 milliGRAM(s) Oral daily  gabapentin 100 milliGRAM(s) Oral three times a day  glucagon  Injectable 1 milliGRAM(s) IntraMuscular once  heparin   Injectable 5000 Unit(s) SubCutaneous every 12 hours  hydrALAZINE 50 milliGRAM(s) Oral every 8 hours  insulin glargine Injectable (LANTUS) 6 Unit(s) SubCutaneous at bedtime  insulin lispro (ADMELOG) corrective regimen sliding scale   SubCutaneous three times a day with meals  insulin lispro (ADMELOG) corrective regimen sliding scale.   SubCutaneous at bedtime  levETIRAcetam  Solution 500 milliGRAM(s) Oral two times a day  magnesium oxide 400 milliGRAM(s) Oral at bedtime  metoprolol tartrate 12.5 milliGRAM(s) Oral two times a day  Nephro-chris 1 Tablet(s) Oral daily  pantoprazole   Suspension 40 milliGRAM(s) Oral daily  polyethylene glycol 3350 17 Gram(s) Oral at bedtime  vitamin B complex with vitamin C 1 Tablet(s) Oral daily    MEDICATIONS  (PRN):  haloperidol    Injectable 0.25 milliGRAM(s) IntraMuscular every 6 hours PRN Severe Agitation  ondansetron Injectable 4 milliGRAM(s) IV Push every 6 hours PRN Nausea and/or Vomiting  promethazine 25 milliGRAM(s) Oral every 6 hours PRN for nausea  QUEtiapine 12.5 milliGRAM(s) Oral every 6 hours PRN agitation  senna 2 Tablet(s) Oral at bedtime PRN Constipation    ROS: pertinent positives in HPI, all other ROS were reviewed and are negative     PHYSICAL EXAM:  Constitutional: awake/alert, frail appearing. Very [place] hears better on left, + b/l wrist restraints intact  HEENT: PERRLA, EOMI. [place] b/l + hearing aids  Neck: Supple  Respiratory: Breath sounds are clear bilaterally  Cardiovascular: S1 and S2, irregular rhythm  Musculoskeletal: no joint swelling/tenderness, no abnormal movements  Skin: No rashes    Neurological exam:  HF: A&Ox 2 to person, place, not time, follows simple commands + wrist restraints, [place] b/l  CN: PEARRL, EOMI, no NLFD, tongue midline  Motor: MONTES antigravity equal strength to command  Coord:  unable to assess  Gait/Balance: Cannot test      LABS:                         11.6   11.35 )-----------( 152      ( 24 Sep 2021 05:33 )             35.0     09-24    140  |  109<H>  |  46<H>  ----------------------------<  107<H>  4.2   |  26  |  2.29<H>    Ca    9.4      24 Sep 2021 05:33  Phos  2.9     09-23  Mg     2.8     09-24 09-21 Chol 101 LDL -- HDL 44 Trig 107, 09-20 Chol 101 LDL -- HDL 40<L> Trig 111    RADIOLOGY:  < from: CT Head No Cont (09.20.21 @ 00:48) >  Brain: There are scattered white matter hypodensities which are nonspecific but most commonly represent chronic microvascular ischemic changes.   There is prominence of the ventricles, sulci and cisterns of the brain which may be age related. There is also cerebellar atrophy. The previously seen intraparenchymal hemorrhage in the junction of the anterior left lentiform nucleus and anterior gangliocapsular region is stable in size. No new intracranial hemorrhages are detected. Cerebral ventricles: Dilatation of the 3rd and lateral ventricles is commensurate with the degree of cerebral atrophy and there is no midline shift or hydrocephalus.  Paranasal sinuses: Paranasal sinuses are clear throughout and their bony margins are intact at the levels imaged.  Mastoid air cells: Normally pneumatized and clear bilaterally.  Bones/joints: Bony calvarium and skull base are intact and no acute fractures  are detected.  Soft tissues: Unremarkable.    IMPRESSION:  The intraparenchymal hemorrhage in the left basal ganglia is stable in size. No new hemorrhage has developed.

## 2021-09-24 NOTE — PROGRESS NOTE ADULT - ASSESSMENT
89y old Male with hx of HTN, IDDM, Afib on coumadin, GERD, CAD s/p stents, Dementia, recent admission w/ SDH on 9/7 s/p fall. Patient was discharged and AC was restarted on 9/14. Now admitted on 9/19 to ED w/ worsening mental status over past few days. Patient was code stroke. CT head showed 2cm left ICH in setting of supratherapeutic INR; s/p Kcentra, Vitamin K and 2 units of platelets in the ER. Also found to be hypertensive to 200s on arrival, s/p nicardipine infusion. Palliative Care consulted to assist with establishing GOC.    1) AMS: ICH+ Dementia + Delirium  - improved today, calm and following commands  - multiple etiologies including underlying dementia, with likely hyperactive delirium (sundowning)  - new ICH no doubt also contributing  - imaging appreciated  - neurosurgical notes appreciated- stable bleed, no acute need for surgical interventions, c/w Keppra, hold coumadin, f/u in 1-2 weeks  - psych consulted to help with sundowning- no SI, no HI, seroquel 12.5 mg q6h prn/ haldol 0.25 IM q6h prn (if unable to swallow) recommended  - psych documenting vascular dementia vs. mixed  - reorient as able  - fall and aspiration precautions  - son shares that he normally sleeps during the day and is up at night and takes a while to wake up. Shared psych's plan    2) afib  - supratherapeutic INR on admission  - s/p reversal  - cardio notes appreciated- suggesting that coumadin is not restarted due to inc risk of recurrent bleed, but ASA ok to start when ok with neuro    3) Debility  - PPS<40%  - care coordination note appreciated- pt lives with wife  - PT note appreciated- walked with RW 10 ft, home with home PT recommended  - speech and swallow eval appreciated- no overt aspiration, dec hearing acuity, reg texture diet    4) Prognosis  - poor  - in setting of advanced age, recurrent ICH (one traumatic, now spontaneous), Dementia, PPS<40%, multiple admission, pt at inc risk for further complications. Would likely be hospice eligible when/if family ready    5) GOC/Advanced Directives  - pt does not have capacity for decision making  - HCP on file naming wife Carli Rodríguez [phone]; son Julio Rodríguez also serving as surrogate [phone]  - [facility] on chart 9/19: DNR, no limits??, DNI, send to hospital, no feeding tube, trial of IVF, use abx  - [facility] meeting scheduled with son and wife for today at 3pm    Thank you for including us in Mr. Rodríguez's care. Will continue to follow with you.    Sanju Brunson MD  Palliative Care Attending

## 2021-09-24 NOTE — GOALS OF CARE CONVERSATION - ADVANCED CARE PLANNING - TREATMENT GUIDELINE COMMENT
c/w current interventions and limits that were already in place and dc when deemed medically stable by primary team.     ** Spent 65 minutes reviewing advanced care planning, including advanced directives, MOLST, and all dispo options

## 2021-09-24 NOTE — PROGRESS NOTE ADULT - SUBJECTIVE AND OBJECTIVE BOX
89M w/PMH HTN, T2DM, Afib on coumadin, GERD, CAD s/p stents, HFrEF (40%), CKD4, recent admission w/ SDH on 9/7 s/p fall. Patient was discharged and AC was restarted on 9/14 as per NSG after he was determined to be stable to resume. He presented on this admission w/ worsening mental status over several days. Patient was code stroke. CT head showed new 2cm left ICH. He was given Kcentra, Vitamin K and 2 units of platelets in the ER. INR was subtherapeutic, also found to be hypertensive to 200s on arrival, started on nicardipine infusion. Per Intensivist note:   9/20: Patient seen in bed.  He is AAO x 1, moving all extremities and extremely hypophonic, and on a Cardene drip  9/21: Much more awake and alert today, OOB, tolerating PO, however he remains on Cardene   9/22: continues with periods of confusion, was on precedex over night but has been DC.    Pt determined to be stable to transfer to med/surg, bp stable and controlled. Cardio and NSG notes reviewed. Pt has episodes of sundowning.   We have been consulted to manage acute and chronic medical issues that include HTN, T2DM.     9/23 - pt seen with RN at bedside, arousable, able to state his name, if faintly, looks around, follows simple commands - opens his mouth, sticks out his tongue - midline, squeezes hand, raises his legs. later in the day became agitated and went back on restraints. eating very little and FS low this morning    PHYSICAL EXAM:  Vital Signs Last 24 Hrs  T(C): 36.6 (23 Sep 2021 20:36), Max: 36.7 (23 Sep 2021 15:43)  T(F): 97.9 (23 Sep 2021 20:36), Max: 98 (23 Sep 2021 15:43)  HR: 60 (23 Sep 2021 20:36) (60 - 79)  BP: 156/55 (23 Sep 2021 20:36) (137/72 - 156/55)  RR: 19 (23 Sep 2021 20:36) (18 - 20)  SpO2: 95% (23 Sep 2021 20:36) (94% - 100%)  GENERAL: NAD, able to lie flat in bed  HEAD:  Atraumatic, Normocephalic  EYES: EOMI, PERRLA, normal sclera  ENT: Moist mucous membranes  NECK: Supple, No JVD, no nuchal rigidity  CHEST/LUNG: Clear to auscultation bilaterally; No rales, rhonchi, wheezing, or rubs. Unlabored respirations  HEART: Regular rate and rhythm; No murmurs, rubs, or gallops  ABDOMEN: Bowel sounds present; Soft, Nontender, Nondistended. No hepatomegaly  EXTREMITIES:  no pitting bilaterally  NERVOUS SYSTEM:  Alert & Oriented X1, speech faint but not slurred,    looks around, follows simple commands - opens his mouth, sticks out his tongue - midline, squeezes hand, raises his legs  MSK: FROM all 4 extremities, full and equal strength  SKIN: No rashes or lesions    LABS: All Labs Reviewed:                        10.6   11.11 )-----------( 155      ( 23 Sep 2021 10:08 )             31.9     09-23    141  |  109<H>  |  46<H>  ----------------------------<  98  4.0   |  25  |  2.35<H>    Ca    8.9      23 Sep 2021 10:08  Phos  2.9     09-23  Mg     2.6     09-23    < from: CT Head No Cont (09.20.21 @ 00:48) >  The intraparenchymal hemorrhage in the left basal ganglia is stable in size. No new hemorrhage has developed.    < end of copied text >        amLODIPine   Tablet 10 milliGRAM(s) Oral daily  atorvastatin 40 milliGRAM(s) Oral at bedtime  cholecalciferol 1000 Unit(s) Oral daily  cyanocobalamin 1000 MICROGram(s) Oral daily  dextrose 40% Gel 15 Gram(s) Oral once  dextrose 5%. 1000 milliLiter(s) IV Continuous <Continuous>  dextrose 50% Injectable 25 Gram(s) IV Push once  ferrous    sulfate 325 milliGRAM(s) Oral at bedtime  folic acid 1 milliGRAM(s) Oral daily  gabapentin 100 milliGRAM(s) Oral three times a day  glucagon  Injectable 1 milliGRAM(s) IntraMuscular once  haloperidol    Injectable 0.25 milliGRAM(s) IntraMuscular every 6 hours PRN  heparin   Injectable 5000 Unit(s) SubCutaneous every 12 hours  hydrALAZINE 25 milliGRAM(s) Oral every 8 hours  insulin glargine Injectable (LANTUS) 6 Unit(s) SubCutaneous at bedtime  insulin lispro (ADMELOG) corrective regimen sliding scale   SubCutaneous three times a day with meals  insulin lispro (ADMELOG) corrective regimen sliding scale.   SubCutaneous at bedtime  levETIRAcetam  Solution 500 milliGRAM(s) Oral two times a day  magnesium oxide 400 milliGRAM(s) Oral at bedtime  metoprolol tartrate 12.5 milliGRAM(s) Oral two times a day  Nephro-chris 1 Tablet(s) Oral daily  ondansetron Injectable 4 milliGRAM(s) IV Push every 6 hours PRN  pantoprazole   Suspension 40 milliGRAM(s) Oral daily  polyethylene glycol 3350 17 Gram(s) Oral at bedtime  promethazine 25 milliGRAM(s) Oral every 6 hours PRN  QUEtiapine 12.5 milliGRAM(s) Oral every 6 hours PRN  senna 2 Tablet(s) Oral at bedtime PRN  vitamin B complex with vitamin C 1 Tablet(s) Oral daily       89M w/PMH HTN, T2DM, Afib on coumadin, GERD, CAD s/p stents, HFrEF (40%), CKD4, recent admission w/ SDH on 9/7 s/p fall. Patient was discharged and AC was restarted on 9/14 as per NSG after he was determined to be stable to resume. He presented on this admission w/ worsening mental status over several days. Patient was code stroke. CT head showed new 2cm left ICH. He was given Kcentra, Vitamin K and 2 units of platelets in the ER. INR was subtherapeutic, also found to be hypertensive to 200s on arrival, started on nicardipine infusion. Per Intensivist note:   9/20: Patient seen in bed.  He is AAO x 1, moving all extremities and extremely hypophonic, and on a Cardene drip  9/21: Much more awake and alert today, OOB, tolerating PO, however he remains on Cardene   9/22: continues with periods of confusion, was on precedex over night but has been DC.    Pt determined to be stable to transfer to med/surg, bp stable and controlled. Cardio and NSG notes reviewed. Pt has episodes of sundowning.   We have been consulted to manage acute and chronic medical issues that include HTN, T2DM.     9/23 - pt seen with RN at bedside, arousable, able to state his name, if faintly, looks around, follows simple commands - opens his mouth, sticks out his tongue - midline, squeezes hand, raises his legs. later in the day became agitated and went back on restraints. eating very little and FS low this morning  9/24 - knows name, able to name his kids, stephen cp, confused, limited ROS, agitated and in restraints, moving all four extremities     PHYSICAL EXAM:  Vital Signs Last 24 Hrs  T(F): 98.9 (24 Sep 2021 16:05), Max: 98.9 (24 Sep 2021 16:05)  HR: 65 (24 Sep 2021 16:05) (60 - 66)  BP: 169/52 (24 Sep 2021 16:05) (156/55 - 187/68)  RR: 18 (24 Sep 2021 16:05) (18 - 19)  SpO2: 97% (24 Sep 2021 16:05) (95% - 100%)  GENERAL: NAD, able to lie flat in bed  HEAD:  Atraumatic, Normocephalic  EYES: EOMI, PERRLA, normal sclera  ENT: Moist mucous membranes  NECK: Supple, No JVD, no nuchal rigidity  CHEST/LUNG: Clear to auscultation bilaterally; No rales, rhonchi, wheezing, or rubs. Unlabored respirations  HEART: Regular rate and rhythm; No murmurs, rubs, or gallops  ABDOMEN: Bowel sounds present; Soft, Nontender, Nondistended. No hepatomegaly  EXTREMITIES:  no pitting bilaterally  NERVOUS SYSTEM:  Alert & Oriented X1, speech faint but not slurred,    looks around, follows simple commands - opens his mouth, sticks out his tongue - midline, squeezes hand, raises his legs  MSK: FROM all 4 extremities, full and equal strength  SKIN: No rashes or lesions        < from: CT Head No Cont (09.20.21 @ 00:48) >  The intraparenchymal hemorrhage in the left basal ganglia is stable in size. No new hemorrhage has developed.    LABS: All Labs Reviewed:                        11.6   11.35 )-----------( 152      ( 24 Sep 2021 05:33 )             35.0     09-24    140  |  109<H>  |  46<H>  ----------------------------<  107<H>  4.2   |  26  |  2.29<H>    Ca    9.4      24 Sep 2021 05:33  Phos  2.9     09-23  Mg     2.8     09-24      amLODIPine   Tablet 10 milliGRAM(s) Oral daily  atorvastatin 40 milliGRAM(s) Oral at bedtime  cholecalciferol 1000 Unit(s) Oral daily  cyanocobalamin 1000 MICROGram(s) Oral daily  dextrose 40% Gel 15 Gram(s) Oral once  dextrose 5%. 1000 milliLiter(s) IV Continuous <Continuous>  dextrose 50% Injectable 25 Gram(s) IV Push once  ferrous    sulfate 325 milliGRAM(s) Oral at bedtime  folic acid 1 milliGRAM(s) Oral daily  gabapentin 100 milliGRAM(s) Oral three times a day  glucagon  Injectable 1 milliGRAM(s) IntraMuscular once  haloperidol    Injectable 0.25 milliGRAM(s) IntraMuscular every 6 hours PRN  heparin   Injectable 5000 Unit(s) SubCutaneous every 12 hours  hydrALAZINE 50 milliGRAM(s) Oral every 8 hours  insulin glargine Injectable (LANTUS) 6 Unit(s) SubCutaneous at bedtime  insulin lispro (ADMELOG) corrective regimen sliding scale   SubCutaneous three times a day with meals  insulin lispro (ADMELOG) corrective regimen sliding scale.   SubCutaneous at bedtime  isosorbide   mononitrate ER Tablet (IMDUR) 30 milliGRAM(s) Oral daily  levETIRAcetam  Solution 500 milliGRAM(s) Oral two times a day  magnesium oxide 400 milliGRAM(s) Oral at bedtime  metoprolol tartrate 12.5 milliGRAM(s) Oral two times a day  Nephro-chris 1 Tablet(s) Oral daily  ondansetron Injectable 4 milliGRAM(s) IV Push every 6 hours PRN  pantoprazole   Suspension 40 milliGRAM(s) Oral daily  polyethylene glycol 3350 17 Gram(s) Oral at bedtime  promethazine 25 milliGRAM(s) Oral every 6 hours PRN  QUEtiapine 12.5 milliGRAM(s) Oral every 6 hours PRN  senna 2 Tablet(s) Oral at bedtime PRN  vitamin B complex with vitamin C 1 Tablet(s) Oral daily

## 2021-09-24 NOTE — PROGRESS NOTE ADULT - SUBJECTIVE AND OBJECTIVE BOX
HPI: Pt seen and examined this am in follow up for sx and GOC, 1:1 present. Pt more awake today, able to greet  and deny any sx of discomfort. He notes desire to go home, allows exam, and follows all commands. He remains confused though and unable to recall events leading up to or during current hospitalizations. 1:1 noting some restlessness at times, but pt able to work with PT, walk to bathroom, and ate breakfast without issues.     Plan for GOC meeting with wife and sons later today at 3pm. See GOc note that will follow.      PAIN: denies    DYSPNEA: denies      ROS:  remainder limited by confusion        PHYSICAL EXAM:    Vital Signs Last 24 Hrs  T(C): 36.8 (24 Sep 2021 08:24), Max: 36.8 (24 Sep 2021 08:24)  T(F): 98.3 (24 Sep 2021 08:24), Max: 98.3 (24 Sep 2021 08:24)  HR: 66 (24 Sep 2021 08:24) (60 - 68)  BP: 187/68 (24 Sep 2021 08:24) (137/72 - 187/68)  RR: 18 (24 Sep 2021 08:24) (18 - 20)  SpO2: 97% (24 Sep 2021 08:24) (95% - 100%)    PPSV2: 30  %    General: Elderly male lying in bed, sleeping but easily awakened by voice, answers some questions and follows all commands, calm  Mental Status: AOx1 (self)  HEENT: mmm, + temporal wasting  Lungs: clear  Cardiac: + s1 s2 rrr  GI: soft nt nd +bs, diaper in place  : diaper in place incontinent  MSK/skin: moves all extremities, muscle and fat wasting in limbs  Neuro: no focal deficits, compliant with exxam    LABS:                        11.6   11.35 )-----------( 152      ( 24 Sep 2021 05:33 )             35.0     09-24    140  |  109<H>  |  46<H>  ----------------------------<  107<H>  4.2   |  26  |  2.29<H>    Ca    9.4      24 Sep 2021 05:33  Phos  2.9     09-23  Mg     2.8     09-24        Albumin: Albumin, Serum: 3.2 g/dL (09-20 @ 06:48)      Allergies    Aleve (Vomiting)  codeine (Unknown)  morphine (Nausea)  naproxen (Vomiting)    Intolerances      MEDICATIONS  (STANDING):  amLODIPine   Tablet 10 milliGRAM(s) Oral daily  atorvastatin 40 milliGRAM(s) Oral at bedtime  cholecalciferol 1000 Unit(s) Oral daily  cyanocobalamin 1000 MICROGram(s) Oral daily  dextrose 40% Gel 15 Gram(s) Oral once  dextrose 5%. 1000 milliLiter(s) (50 mL/Hr) IV Continuous <Continuous>  dextrose 50% Injectable 25 Gram(s) IV Push once  ferrous    sulfate 325 milliGRAM(s) Oral at bedtime  folic acid 1 milliGRAM(s) Oral daily  gabapentin 100 milliGRAM(s) Oral three times a day  glucagon  Injectable 1 milliGRAM(s) IntraMuscular once  heparin   Injectable 5000 Unit(s) SubCutaneous every 12 hours  hydrALAZINE 50 milliGRAM(s) Oral every 8 hours  insulin glargine Injectable (LANTUS) 6 Unit(s) SubCutaneous at bedtime  insulin lispro (ADMELOG) corrective regimen sliding scale   SubCutaneous three times a day with meals  insulin lispro (ADMELOG) corrective regimen sliding scale.   SubCutaneous at bedtime  levETIRAcetam  Solution 500 milliGRAM(s) Oral two times a day  magnesium oxide 400 milliGRAM(s) Oral at bedtime  metoprolol tartrate 12.5 milliGRAM(s) Oral two times a day  Nephro-chris 1 Tablet(s) Oral daily  pantoprazole   Suspension 40 milliGRAM(s) Oral daily  polyethylene glycol 3350 17 Gram(s) Oral at bedtime  vitamin B complex with vitamin C 1 Tablet(s) Oral daily    MEDICATIONS  (PRN):  haloperidol    Injectable 0.25 milliGRAM(s) IntraMuscular every 6 hours PRN Severe Agitation  ondansetron Injectable 4 milliGRAM(s) IV Push every 6 hours PRN Nausea and/or Vomiting  promethazine 25 milliGRAM(s) Oral every 6 hours PRN for nausea  QUEtiapine 12.5 milliGRAM(s) Oral every 6 hours PRN agitation  senna 2 Tablet(s) Oral at bedtime PRN Constipation      RADIOLOGY:

## 2021-09-25 NOTE — PROGRESS NOTE ADULT - SUBJECTIVE AND OBJECTIVE BOX
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  Pt with recent fall and impact to head on Sept 7th 2021.  Pt noted with 3 days of less activity, sleepiness, less communicative / participatory - change in MS.  CTH reveals Left head of caudate 2cm ICH, no IVH.  Admitted to Neuro ICU under neurosurgery for 24 hours of care.  Pt remains neurologically intact.  Pt recently restarted on 9/14 ASA and coumadin dosing once again. Reversed in ED with KCentra and given 1 pack platelets and Vit K.  Repeat CTH scheduled around MN tonight as a stability evaluation scan.`  INR 1.54  GCS 15   Code Stroke in ED called (Telestroke?)  Lesly notified -   NIHSS per Neurosurgical eval is zero (prior to Neurosurg evaluation - pt not fully communicative per ED staff)    9/20- Patient seen and examined this AM in ICU. Continues on Cardene gtt. Sleepy, opens eyes for exam, follows simple commands. Repeat HCT at midnight prelim appears stable.    9/21- Patient seen and examine, more awake/alert today, Cardene gtt stopped this AM. Patient sitting up in bed, states he feels cold. No other complaints.    9/22- Patient seen and examined, restless overnight, attempted to get OOB. Given Haldol without improvement. Was started on Precedex, now stopped. Patient awake/alert, confused, + wrist restraints.    9/23- Patient seen and examined, episodes of restlessness and agitation overnight. Seen by psych yesterday started on seroquel and haldol as needed. Patient awake/alert, offers no complaints.    9-24- Pt awake and alert, no complains, states he wants to go home, in good spirits, does not seem restless or agitated, although he did require wrist restraints over night for patient safety. Goals of Care meeting scheduled for 3pm today.     9-25 - Patient was seen and examined at bedside attempting to get up overnight. Otherwise no acute complaints at this time      Vital Signs Last 24 Hrs  T(C): 36.7 (25 Sep 2021 05:16), Max: 37.2 (24 Sep 2021 16:05)  T(F): 98 (25 Sep 2021 05:16), Max: 98.9 (24 Sep 2021 16:05)  HR: 72 (25 Sep 2021 05:16) (65 - 88)  BP: 124/62 (25 Sep 2021 05:16) (103/84 - 169/52)  BP(mean): --  RR: 18 (25 Sep 2021 05:16) (18 - 20)  SpO2: 97% (25 Sep 2021 05:16) (90% - 100%)    MEDICATIONS  (STANDING):  amLODIPine   Tablet 10 milliGRAM(s) Oral daily  atorvastatin 40 milliGRAM(s) Oral at bedtime  cholecalciferol 1000 Unit(s) Oral daily  cyanocobalamin 1000 MICROGram(s) Oral daily  dextrose 40% Gel 15 Gram(s) Oral once  dextrose 5%. 1000 milliLiter(s) (50 mL/Hr) IV Continuous <Continuous>  dextrose 50% Injectable 25 Gram(s) IV Push once  ferrous    sulfate 325 milliGRAM(s) Oral at bedtime  folic acid 1 milliGRAM(s) Oral daily  gabapentin 100 milliGRAM(s) Oral three times a day  glucagon  Injectable 1 milliGRAM(s) IntraMuscular once  heparin   Injectable 5000 Unit(s) SubCutaneous every 12 hours  hydrALAZINE 50 milliGRAM(s) Oral every 8 hours  insulin glargine Injectable (LANTUS) 6 Unit(s) SubCutaneous at bedtime  insulin lispro (ADMELOG) corrective regimen sliding scale   SubCutaneous three times a day with meals  insulin lispro (ADMELOG) corrective regimen sliding scale.   SubCutaneous at bedtime  isosorbide   mononitrate ER Tablet (IMDUR) 30 milliGRAM(s) Oral daily  levETIRAcetam  Solution 500 milliGRAM(s) Oral two times a day  magnesium oxide 400 milliGRAM(s) Oral at bedtime  metoprolol tartrate 12.5 milliGRAM(s) Oral two times a day  Nephro-chris 1 Tablet(s) Oral daily  pantoprazole   Suspension 40 milliGRAM(s) Oral daily  polyethylene glycol 3350 17 Gram(s) Oral at bedtime  vitamin B complex with vitamin C 1 Tablet(s) Oral daily    MEDICATIONS  (PRN):  haloperidol    Injectable 0.25 milliGRAM(s) IntraMuscular every 6 hours PRN Severe Agitation  ondansetron Injectable 4 milliGRAM(s) IV Push every 6 hours PRN Nausea and/or Vomiting  promethazine 25 milliGRAM(s) Oral every 6 hours PRN for nausea  QUEtiapine 12.5 milliGRAM(s) Oral every 6 hours PRN agitation  senna 2 Tablet(s) Oral at bedtime PRN Constipation      ROS: pertinent positives in HPI, all other ROS were reviewed and are negative     PHYSICAL EXAM:      Constitutional: awake/alert, frail appearing. Very [place] hears better on left, + b/l wrist restraints intact  HEENT: PERRLA, EOMI. [place] b/l + hearing aids  Neck: Supple  Respiratory: Breath sounds are clear bilaterally  Cardiovascular: S1 and S2, irregular rhythm  Musculoskeletal: no joint swelling/tenderness, no abnormal movements  Skin: No rashes    Neurological exam:  HF: A&Ox 2 to person, place, not time, follows simple commands + wrist restraints, [place] b/l  CN: PEARRL, EOMI, no NLFD, tongue midline  Motor: MONTES antigravity equal strength to command  Coord:  unable to assess  Gait/Balance: Cannot test            LABS:                         11.6   11.35 )-----------( 152      ( 24 Sep 2021 05:33 )             35.0     09-24    140  |  109<H>  |  46<H>  ----------------------------<  107<H>  4.2   |  26  |  2.29<H>    Ca    9.4      24 Sep 2021 05:33  Phos  2.9     09-23  Mg     2.8     09-24        09-21 Chol 101 LDL -- HDL 44 Trig 107, 09-20 Chol 101 LDL -- HDL 40<L> Trig 111      RADIOLOGY:

## 2021-09-25 NOTE — PROGRESS NOTE ADULT - SUBJECTIVE AND OBJECTIVE BOX
89M w/PMH HTN, T2DM, Afib on coumadin, GERD, CAD s/p stents, HFrEF (40%), CKD4, recent admission w/ SDH on 9/7 s/p fall. Patient was discharged and AC was restarted on 9/14 as per NSG after he was determined to be stable to resume. He presented on this admission w/ worsening mental status over several days. Patient was code stroke. CT head showed new 2cm left ICH. He was given Kcentra, Vitamin K and 2 units of platelets in the ER. INR was subtherapeutic, also found to be hypertensive to 200s on arrival, started on nicardipine infusion. Per Intensivist note:   9/20: Patient seen in bed.  He is AAO x 1, moving all extremities and extremely hypophonic, and on a Cardene drip  9/21: Much more awake and alert today, OOB, tolerating PO, however he remains on Cardene   9/22: continues with periods of confusion, was on precedex over night but has been DC.    Pt determined to be stable to transfer to med/surg, bp stable and controlled. Cardio and NSG notes reviewed. Pt has episodes of sundowning.   We have been consulted to manage acute and chronic medical issues that include HTN, T2DM.     9/23 - pt seen with RN at bedside, arousable, able to state his name, if faintly, looks around, follows simple commands - opens his mouth, sticks out his tongue - midline, squeezes hand, raises his legs. later in the day became agitated and went back on restraints. eating very little and FS low this morning  9/24 - knows name, able to name his kids, stephen cp, confused, limited ROS, agitated and in restraints, moving all four extremities     PHYSICAL EXAM:  Vital Signs Last 24 Hrs  T(F): 98.9 (24 Sep 2021 16:05), Max: 98.9 (24 Sep 2021 16:05)  HR: 65 (24 Sep 2021 16:05) (60 - 66)  BP: 169/52 (24 Sep 2021 16:05) (156/55 - 187/68)  RR: 18 (24 Sep 2021 16:05) (18 - 19)  SpO2: 97% (24 Sep 2021 16:05) (95% - 100%)  GENERAL: NAD, able to lie flat in bed  HEAD:  Atraumatic, Normocephalic  EYES: EOMI, PERRLA, normal sclera  ENT: Moist mucous membranes  NECK: Supple, No JVD, no nuchal rigidity  CHEST/LUNG: Clear to auscultation bilaterally; No rales, rhonchi, wheezing, or rubs. Unlabored respirations  HEART: Regular rate and rhythm; No murmurs, rubs, or gallops  ABDOMEN: Bowel sounds present; Soft, Nontender, Nondistended. No hepatomegaly  EXTREMITIES:  no pitting bilaterally  NERVOUS SYSTEM:  Alert & Oriented X1, speech faint but not slurred,    looks around, follows simple commands - opens his mouth, sticks out his tongue - midline, squeezes hand, raises his legs  MSK: FROM all 4 extremities, full and equal strength  SKIN: No rashes or lesions        < from: CT Head No Cont (09.20.21 @ 00:48) >  The intraparenchymal hemorrhage in the left basal ganglia is stable in size. No new hemorrhage has developed.    LABS: All Labs Reviewed:                        11.6   11.35 )-----------( 152      ( 24 Sep 2021 05:33 )             35.0     09-24    140  |  109<H>  |  46<H>  ----------------------------<  107<H>  4.2   |  26  |  2.29<H>    Ca    9.4      24 Sep 2021 05:33  Phos  2.9     09-23  Mg     2.8     09-24      amLODIPine   Tablet 10 milliGRAM(s) Oral daily  atorvastatin 40 milliGRAM(s) Oral at bedtime  cholecalciferol 1000 Unit(s) Oral daily  cyanocobalamin 1000 MICROGram(s) Oral daily  dextrose 40% Gel 15 Gram(s) Oral once  dextrose 5%. 1000 milliLiter(s) IV Continuous <Continuous>  dextrose 50% Injectable 25 Gram(s) IV Push once  ferrous    sulfate 325 milliGRAM(s) Oral at bedtime  folic acid 1 milliGRAM(s) Oral daily  gabapentin 100 milliGRAM(s) Oral three times a day  glucagon  Injectable 1 milliGRAM(s) IntraMuscular once  haloperidol    Injectable 0.25 milliGRAM(s) IntraMuscular every 6 hours PRN  heparin   Injectable 5000 Unit(s) SubCutaneous every 12 hours  hydrALAZINE 50 milliGRAM(s) Oral every 8 hours  insulin glargine Injectable (LANTUS) 6 Unit(s) SubCutaneous at bedtime  insulin lispro (ADMELOG) corrective regimen sliding scale   SubCutaneous three times a day with meals  insulin lispro (ADMELOG) corrective regimen sliding scale.   SubCutaneous at bedtime  isosorbide   mononitrate ER Tablet (IMDUR) 30 milliGRAM(s) Oral daily  levETIRAcetam  Solution 500 milliGRAM(s) Oral two times a day  magnesium oxide 400 milliGRAM(s) Oral at bedtime  metoprolol tartrate 12.5 milliGRAM(s) Oral two times a day  Nephro-chris 1 Tablet(s) Oral daily  ondansetron Injectable 4 milliGRAM(s) IV Push every 6 hours PRN  pantoprazole   Suspension 40 milliGRAM(s) Oral daily  polyethylene glycol 3350 17 Gram(s) Oral at bedtime  promethazine 25 milliGRAM(s) Oral every 6 hours PRN  QUEtiapine 12.5 milliGRAM(s) Oral every 6 hours PRN  senna 2 Tablet(s) Oral at bedtime PRN  vitamin B complex with vitamin C 1 Tablet(s) Oral daily           89M w/PMH HTN, T2DM, Afib on coumadin, GERD, CAD s/p stents, HFrEF (40%), CKD4, recent admission w/ SDH on 9/7 s/p fall. Patient was discharged and AC was restarted on 9/14 as per NSG after he was determined to be stable to resume. He presented on this admission w/ worsening mental status over several days. Patient was code stroke. CT head showed new 2cm left ICH. He was given Kcentra, Vitamin K and 2 units of platelets in the ER. INR was subtherapeutic, also found to be hypertensive to 200s on arrival, started on nicardipine infusion. Per Intensivist note:   9/20: Patient seen in bed.  He is AAO x 1, moving all extremities and extremely hypophonic, and on a Cardene drip  9/21: Much more awake and alert today, OOB, tolerating PO, however he remains on Cardene   9/22: continues with periods of confusion, was on precedex over night but has been DC.    Pt determined to be stable to transfer to med/surg, bp stable and controlled. Cardio and NSG notes reviewed. Pt has episodes of sundowning.   We have been consulted to manage acute and chronic medical issues that include HTN, T2DM.     9/23 - pt seen with RN at bedside, arousable, able to state his name, if faintly, looks around, follows simple commands - opens his mouth, sticks out his tongue - midline, squeezes hand, raises his legs. later in the day became agitated and went back on restraints. eating very little and FS low this morning  9/24 - knows name, able to name his kids, stephen cp, confused, limited ROS, agitated and in restraints, moving all four extremities   9/25 - pt got seroquel last night, still a little sleepy this morning; not in any distress, arousable and able to state his name     PHYSICAL EXAM:  Vital Signs Last 24 Hrs  T(F): 98 (25 Sep 2021 05:16), Max: 98.9 (24 Sep 2021 16:05)  HR: 72 (25 Sep 2021 05:16) (65 - 88)  BP: 124/62 (25 Sep 2021 05:16) (103/84 - 169/52)  RR: 18 (25 Sep 2021 05:16) (18 - 20)  SpO2: 97% (25 Sep 2021 05:16) (90% - 100%)  GENERAL: NAD, sleepy, arousable   HEAD:  Atraumatic, Normocephalic  EYES: EOMI, PERRLA, normal sclera  ENT: Moist mucous membranes  NECK: Supple, No JVD, no nuchal rigidity  CHEST/LUNG: Clear to auscultation bilaterally; No rales, rhonchi, wheezing, or rubs. Unlabored respirations  HEART: Regular rate and rhythm; No murmurs, rubs, or gallops  ABDOMEN: Bowel sounds present; Soft, Nontender, Nondistended. No hepatomegaly  EXTREMITIES:  no pitting bilaterally  NERVOUS SYSTEM:  Sleep, arousable, oriented X1, moving all extremities   MSK: FROM all 4 extremities, full and equal strength  SKIN: No rashes or lesions    < from: CT Head No Cont (09.20.21 @ 00:48) >  The intraparenchymal hemorrhage in the left basal ganglia is stable in size. No new hemorrhage has developed.  LABS: All Labs Reviewed:                        11.1   13.15 )-----------( 163      ( 25 Sep 2021 06:41 )             33.2   140  |  108  |  52<H>  ----------------------------<  90  4.4   |  27  |  2.70<H>    < from: CT Chest No Cont (09.25.21 @ 10:39) >  1.  Small right pleural effusion.  2.  Bilateral lower lobe linear atelectasis.   MICRA device in place. A single-lead of a pacemaker is in place. The lead has been abandoned. The battery pack has been removed. Status post TAVR.          amLODIPine   Tablet 10 milliGRAM(s) Oral daily  atorvastatin 40 milliGRAM(s) Oral at bedtime  cholecalciferol 1000 Unit(s) Oral daily  cyanocobalamin 1000 MICROGram(s) Oral daily  dextrose 40% Gel 15 Gram(s) Oral once  dextrose 5%. 1000 milliLiter(s) IV Continuous <Continuous>  dextrose 50% Injectable 25 Gram(s) IV Push once  ferrous    sulfate 325 milliGRAM(s) Oral at bedtime  folic acid 1 milliGRAM(s) Oral daily  gabapentin 100 milliGRAM(s) Oral three times a day  glucagon  Injectable 1 milliGRAM(s) IntraMuscular once  haloperidol    Injectable 0.25 milliGRAM(s) IntraMuscular every 6 hours PRN  heparin   Injectable 5000 Unit(s) SubCutaneous every 12 hours  hydrALAZINE 50 milliGRAM(s) Oral every 8 hours  insulin glargine Injectable (LANTUS) 10 Unit(s) SubCutaneous every morning  insulin lispro (ADMELOG) corrective regimen sliding scale   SubCutaneous three times a day with meals  insulin lispro (ADMELOG) corrective regimen sliding scale.   SubCutaneous at bedtime  isosorbide   mononitrate ER Tablet (IMDUR) 30 milliGRAM(s) Oral daily  levETIRAcetam  Solution 500 milliGRAM(s) Oral two times a day  magnesium oxide 400 milliGRAM(s) Oral at bedtime  metoprolol tartrate 12.5 milliGRAM(s) Oral two times a day  Nephro-chris 1 Tablet(s) Oral daily  ondansetron Injectable 4 milliGRAM(s) IV Push every 6 hours PRN  pantoprazole   Suspension 40 milliGRAM(s) Oral daily  polyethylene glycol 3350 17 Gram(s) Oral at bedtime  promethazine 25 milliGRAM(s) Oral every 6 hours PRN  QUEtiapine 12.5 milliGRAM(s) Oral every 6 hours PRN  senna 2 Tablet(s) Oral at bedtime PRN  vitamin B complex with vitamin C 1 Tablet(s) Oral daily

## 2021-09-25 NOTE — PROGRESS NOTE ADULT - ASSESSMENT
Patient is a 89 yo male with PMh TAVR (10/20), CAD/stents (10/20), IDDM, HTN, Afib, GERD on Coumadin presented to the ER today with AMS for past few days. Known to our service from admission on 9/7 found to have right traumatic SAH, resolved and discharged home. Patient was seen on follow up- cleared to resume ASA and Coumadin on 9/14. Head CT revealed Left head of caudate ICH, stable on repeat Head CT. s/p Kcentra, Vit K and plts in ED.     # Non-Traumatic ICH    Plan:  - No acute neurosurgical intervention indicated  - SBP < 150  - BP meds managed by Hospitalist   - Episodes of confusion and agitation, sundowning  + wrist restraints  - Fall precautions  - Continue Keppra 500 twice a day seizure prophylaxis x 1 week  - Would continue to hold Coumadin and ASA until follow up with Dr. Sam in 1-2 weeks with follow up HCT  - ISS keep BS < 180  - PT/OOB to chair  - SQH, SCDs DVT ppx  - PPI GI ppx  - Palliative discussion plans for d/c home.   - Plan for d/c home once medical issues are optimized    Will with Dr. Sam   Patient is a 89 yo male with PMh TAVR (10/20), CAD/stents (10/20), IDDM, HTN, Afib, GERD on Coumadin presented to the ER today with AMS for past few days. Known to our service from admission on 9/7 found to have right traumatic SAH, resolved and discharged home. Patient was seen on follow up- cleared to resume ASA and Coumadin on 9/14. Head CT revealed Left head of caudate ICH, stable on repeat Head CT. s/p Kcentra, Vit K and plts in ED.     # Non-Traumatic ICH    Plan:  - No acute neurosurgical intervention indicated  - SBP < 150  - BP meds managed by Hospitalist   - Episodes of confusion and agitation, sundowning  + wrist restraints  - Fall precautions  - Continue Keppra 500 twice a day seizure prophylaxis x 1 week  - Would continue to hold Coumadin and ASA until follow up with Dr. Sam in 1-2 weeks with follow up HCT  - ISS keep BS < 180  - PT/OOB to chair  - SQH, SCDs DVT ppx  - PPI GI ppx  - Palliative discussion plans for d/c home.   - Plan for d/c home once medical issues are optimized    Will discuss with Dr. Sam      Addendum:  Case was discussed with Dr. Desir,  Patient was noted to have low O2 saturation overnight requiring NC, CT lung was done showed atelectasis and trace pleural effusions  Recommend OOB and IS  Glucose with better glycemic control today.   Plan for possible d/c home tomorrow.     Case was discussed with SW, will reach out to family for possible d/c tomorrow.

## 2021-09-25 NOTE — PROGRESS NOTE ADULT - ASSESSMENT
9/21/2021: Recurring to intracranial bleeding without trauma in the setting of prior intracranial bleeding with trauma.  Patient on anticoagulation at time of 2nd bleeding with re-initiation after evaluation by Neurology.  Cardiac-wise recent percutaneous intervention in October of 2020.   would not re-initiate Coumadin despite chronic atrial fibrillation and elevated Andrei Vasc score in  this patient.  The risk of recurrence bleeding of 3rd time is  high given there was no precipitating event to cause this.  INR also was subtherapeutic at the time of the bleed.  He has at least 1 year post his stenting procedure and therefore the urgency of starting aspirin is also low though it should be done at some point.  Currently stable from a cardiac standpoint.  Continue to monitor.      9/22/2021:  OFF AC currently.  Would restart aspirin (81 mg) QD when able as per neurosurgery.  Cardiac wise stable and BP control improving.      9/24/2021:  Continue of AC.  BP controlled.  Cardiac wise stable.      Discussed with patient and staff.   9/21/2021: Recurring to intracranial bleeding without trauma in the setting of prior intracranial bleeding with trauma.  Patient on anticoagulation at time of 2nd bleeding with re-initiation after evaluation by Neurology.  Cardiac-wise recent percutaneous intervention in October of 2020.   would not re-initiate Coumadin despite chronic atrial fibrillation and elevated Andrei Vasc score in  this patient.  The risk of recurrence bleeding of 3rd time is  high given there was no precipitating event to cause this.  INR also was subtherapeutic at the time of the bleed.  He has at least 1 year post his stenting procedure and therefore the urgency of starting aspirin is also low though it should be done at some point.  Currently stable from a cardiac standpoint.  Continue to monitor.      9/22/2021:  OFF AC currently.  Would restart aspirin (81 mg) QD when able as per neurosurgery.  Cardiac wise stable and BP control improving.      9/25/2021:  Continue of AC.  BP controlled.  Cardiac wise stable.      Discussed with patient and staff.

## 2021-09-25 NOTE — PROGRESS NOTE ADULT - SUBJECTIVE AND OBJECTIVE BOX
HPI:  89 yo male with PMh TVAR (10/20), CAD/stents (10/20), IDDM, HTN, Afib, GERD on Coumadin presented with 3 days of less activity, sleepiness, less communicative / participatory - change in MS.  CTH reveals Left head of caudate 2cm ICH, no IVH.  Patient was seen in follow up after he was hospitalized prior to this with a intracranial bleed and subarachnoid have ridge from a fall where he hit his head while on aspirin and Coumadin.  He was seen in follow-up without any recurrence of seizure but was slightly confused from the medication and his wife complained at that time that this was a new issues since his hospitalization.  He denied any symptoms of chest discomfort or shortness of breath and he was referred to neurology for further evaluation for re-initiation of his medication.   Neurology revealed that there was no recurrence of  bleeding and that was okay to restart aspirin and Coumadin and this was done on the 17th and he had an INR checked at that time it was 1.07.   After presentation to the emergency department he  was treated with KCentra and given 1 pack platelets and Vit K.   On presentation his INR 1.54.  He currently is awake in the ICU though confused and the nurses commented that he sun downs at night frequently.   Denies chest pain, palpitations, SOB, orthopnea, PND, claudication or LE edema.    9/22/2021: no new major issues.  Overall seems improved.      9/25/2021: Confused but awake.  BP much better.  No chest pain or SOB.      PAST MEDICAL & SURGICAL HISTORY:  IDDM (insulin dependent diabetes mellitus)  HTN (hypertension)  Chronic congestive heart failure, unspecified congestive heart failure type  Atrial fibrillation, unspecified type  Gastroesophageal reflux disease, esophagitis presence not specified  Benign prostatic hyperplasia without lower urinary tract symptoms  Syncope and collapse: 5/4/2018  Type 2 myocardial infarction: 12/17/2017  UTI (urinary tract infection): 7/12/2017  Artificial cardiac pacemaker  History of appendectomy  Arthropathy of shoulder region  S/P TAVR (transcatheter aortic valve replacement)    FAMILY HISTORY:  No pertinent family history in first degree relatives  Brother Penile cancer    Social Hx:  Nonsmoker, no drug or alcohol use     Allergies  Aleve (Vomiting)  codeine (Unknown)  morphine (Nausea)  naproxen (Vomiting)    Allergies and Intolerances:   Allergies:  naproxen: Drug, Vomiting  morphine: Drug, Nausea  Aleve: Drug, Vomiting  codeine: Drug, Unknown        MEDICATIONS  (STANDING):  amLODIPine   Tablet 10 milliGRAM(s) Oral daily  atorvastatin 40 milliGRAM(s) Oral at bedtime  cholecalciferol 1000 Unit(s) Oral daily  cyanocobalamin 1000 MICROGram(s) Oral daily  dextrose 40% Gel 15 Gram(s) Oral once  dextrose 5%. 1000 milliLiter(s) (50 mL/Hr) IV Continuous <Continuous>  dextrose 50% Injectable 25 Gram(s) IV Push once  ferrous    sulfate 325 milliGRAM(s) Oral at bedtime  folic acid 1 milliGRAM(s) Oral daily  gabapentin 100 milliGRAM(s) Oral three times a day  glucagon  Injectable 1 milliGRAM(s) IntraMuscular once  heparin   Injectable 5000 Unit(s) SubCutaneous every 12 hours  hydrALAZINE 50 milliGRAM(s) Oral every 8 hours  insulin glargine Injectable (LANTUS) 10 Unit(s) SubCutaneous every morning  insulin lispro (ADMELOG) corrective regimen sliding scale   SubCutaneous three times a day with meals  insulin lispro (ADMELOG) corrective regimen sliding scale.   SubCutaneous at bedtime  isosorbide   mononitrate ER Tablet (IMDUR) 30 milliGRAM(s) Oral daily  levETIRAcetam  Solution 500 milliGRAM(s) Oral two times a day  magnesium oxide 400 milliGRAM(s) Oral at bedtime  metoprolol tartrate 12.5 milliGRAM(s) Oral two times a day  Nephro-chris 1 Tablet(s) Oral daily  pantoprazole   Suspension 40 milliGRAM(s) Oral daily  polyethylene glycol 3350 17 Gram(s) Oral at bedtime  vitamin B complex with vitamin C 1 Tablet(s) Oral daily    MEDICATIONS  (PRN):  haloperidol    Injectable 0.25 milliGRAM(s) IntraMuscular every 6 hours PRN Severe Agitation  ondansetron Injectable 4 milliGRAM(s) IV Push every 6 hours PRN Nausea and/or Vomiting  promethazine 25 milliGRAM(s) Oral every 6 hours PRN for nausea  QUEtiapine 12.5 milliGRAM(s) Oral every 6 hours PRN agitation  senna 2 Tablet(s) Oral at bedtime PRN Constipation      Vital Signs Last 24 Hrs  T(C): 36.7 (25 Sep 2021 05:16), Max: 37.2 (24 Sep 2021 16:05)  T(F): 98 (25 Sep 2021 05:16), Max: 98.9 (24 Sep 2021 16:05)  HR: 72 (25 Sep 2021 05:16) (65 - 88)  BP: 124/62 (25 Sep 2021 05:16) (103/84 - 169/52)  BP(mean): --  RR: 18 (25 Sep 2021 05:16) (18 - 20)  SpO2: 97% (25 Sep 2021 05:16) (90% - 100%)    I&O's Detail      Daily     Daily       PHYSICAL EXAM:  Constitutional: NAD, awake, well-developed  HEENT: PERRLA, EOMI,  No oral cyanosis. Oropharynx Clean and Dry.  Neck:  supple,  No JVD, No Thyroid enlargement. No Carotid Bruits bilaterally.  Respiratory: Breath sounds are clear bilaterally, No wheezing, rales or rhonchi  Cardiovascular: NL S1 and S2, IR, IR, 1/6 JOJO ro RUSB, No s3, No s4,   Gastrointestinal: Bowel Sounds present  Extremities: No peripheral edema. No clubbing or cyanosis.  Vascular: 1+ peripheral pulses in LE   Musculoskeletal: no calf tenderness  Skin: No rashes.       LABS: All Labs Reviewed:                                   11.1   13.15 )-----------( 163      ( 25 Sep 2021 06:41 )             33.2     09-25    140  |  108  |  52<H>  ----------------------------<  90  4.4   |  27  |  2.70<H>    Ca    9.5      25 Sep 2021 06:41  Phos  2.9     09-23  Mg     2.7     09-25 09-21 Chol 101 LDL -- HDL 44 Trig 107, 09-20 Chol 101 LDL -- HDL 40<L> Trig 111    RADIOLOGY:    < from: CT Brain Stroke Protocol (09.19.21 @ 19:58) >  IMPRESSION:    New 2 cm anterior left gangliocapsular hemorrhage with local mass effect and compression of the frontal and left lateral ventricle. No midline shift.    Findings were discussed with Dr. Tellez at 8:26 PM on 9/19/2021 who indicated that the communication was understood.    < end of copied text >  EKG:    < from: 12 Lead ECG (09.19.21 @ 22:07) >  Atrial fibrillation with frequent ventricular-paced complexesand with premature ventricular or aberrantly conducted complexes  Anterior infarct , age undetermined  ST & T wave abnormality, consider lateral ischemia  Prolonged QT  Abnormal ECG  When compared with ECG of 07-SEP-2021 22:10,  Vent. rate has decreased BY  10 BPM    < end of copied text >  ECHO:  < from: TTE Echo Complete w/o Contrast w/ Doppler (09.20.21 @ 11:47) >   Summary     The left ventricle is normal in size.   Mild concentric left ventricular hypertrophy is present.   Mildly reduced LV function.   Estimated left ventricular ejection fraction is 45-50%.   The left atrium is mildly dilated.   The right atrium appears mildly dilated.   The right ventricle is normal in size, wall thickness, wall motion, and   contractility.   A device wire is seen in the RV and RA.   Moderate pulmonary hypertension is present.     Well seated TAVR prosthetic valve in the aortic position.   Peak trans-prosthetic gradient is within normal limitations for this type   of prosthesis.   Fibrocalcific changes noted to the mitral valve leaflets with preserved   leaflet excursion.   Mild to moderate mitral regurgitation is present.   Moderate to severe (3+) tricuspid valve regurgitation is present.     No evidence of pericardial effusion.   The IVC is dilated with decreased respiratory variation.      < end of copied text >

## 2021-09-25 NOTE — PROGRESS NOTE ADULT - ASSESSMENT
89M w/PMH HTN, T2DM, Afib on coumadin, GERD, CAD s/p stents, HFrEF (40%), CKD4, recent admission w/ [facility] on 9/7 s/p fall, d/c'd stable, AC restarted 9/14, presented w/ AMS, found w/ new ICH, and hypertensive, admitted to ICU for NSG eval and started on cardene gtt for bp control. Required precedex for agitation d/t sundowning. Now transferred to med/surg as pt's CT head repeat stable and bp improved off gtt.       #HTN, essential  - pt on amlodipine 10mg, metoprolol 25mg QD  - recent [id number]'s  - monitor bp closely, adjust meds as tolerated- pt's HR 60's, will not increase dose of bb  - will cover w/ prn hydralazine for next 24hr to determine pt's long term needs  9/23 - place on hydralazine q6h ATC, change BB to q12 dosing, and stop imdur   keep SBP less than 150  9/24 - BP uncontrolled - patient able to swallow his pills today, discussed with his RN  hydralazine increased to 50q8h and resumed imdur    #T2DM  - SSI, monitor FS, c/w lantus 15 u  - HA1c 9.5  - carb controlled diet  9/23 - due to low FS and poor appetite, AM lantus dced and PM lantus halved  9/24 - cont to monitor FS and adjust lantus dosing as appropriate     #CKD4- appears to be at baseline  - monitor and avoid nephrotoxins    #afib- off AC d/t ICH x2  - recommend Palliative consult   -cardio f/u   9/23 - Pt with HFrEF   cont BB; no ACEi/ARB at this time due to Cr    #dementia, sundowning  - rec cs psych to evaluate   - was recently on precedex for agitation   - supportive care  - fall/aspiration precautions    #ICH- stable  - further per NSG f/u    #PPX  - for DVT - SCDs    discussed with NSx  Ty foc consult, pls call with q   89M w/PMH HTN, T2DM, Afib on coumadin, GERD, CAD s/p stents, HFrEF (40%), CKD4, recent admission w/ [facility] on 9/7 s/p fall, d/c'd stable, AC restarted 9/14, presented w/ AMS, found w/ new ICH, and hypertensive, admitted to ICU for NSG eval and started on cardene gtt for bp control. Required precedex for agitation d/t sundowning. Now transferred to med/surg as pt's CT head repeat stable and bp improved off gtt.       #HTN, essential  - pt on amlodipine 10mg, metoprolol 25mg QD  - recent [id number]  - monitor bp closely, adjust meds as tolerated- pt's HR 60's, will not increase dose of bb  - will cover w/ prn hydralazine for next 24hr to determine pt's long term needs  9/23 - place on hydralazine q6h ATC, change BB to q12 dosing, and stop imdur   keep SBP less than 150  9/24 - BP uncontrolled - patient able to swallow his pills today, discussed with his RN  hydralazine increased to 50q8h and resumed imdur  9/25 - BP well controlled, continue current meds     #T2DM  - SSI, monitor FS, c/w lantus 15 u  - HA1c 9.5  - carb controlled diet  9/23 - due to low FS and poor appetite, AM lantus dced and PM lantus halved  9/24 - cont to monitor FS and adjust lantus dosing as appropriate   9/25 - resume home dose lantus 10U QAM     #CKD4- appears to be at baseline  - monitor and avoid nephrotoxins    #afib- off AC d/t ICH x2  - recommend Palliative consult   -cardio f/u   9/23 - Pt with HFrEF   cont BB; no ACEi/ARB at this time due to Cr    #dementia, sundowning  - rec cs psych to evaluate   - was recently on precedex for agitation   - supportive care  - fall/aspiration precautions    #ICH- stable  - further per NSG f/u    9/25 - HYPOXIA NOTED   CT CHEST done, pt already on VTE PX with heparin sc, no tachy (low dose of BB), elevated CR, was sedated with seroquel, wanted to r/o aspiration pneumonia/pneumonitis.  CT chest shows atelectasis, trace pleural effusion  rec OOB and IS    #PPX  - for DVT - SCDs    discussed with NSx, dc planning - CT chest unremarkable   Ty for consult, pls call with q   89M w/PMH HTN, T2DM, Afib on coumadin, GERD, CAD s/p stents, HFrEF (40%), CKD4, recent admission w/ [facility] on 9/7 s/p fall, d/c'd stable, AC restarted 9/14, presented w/ AMS, found w/ new ICH, and hypertensive, admitted to ICU for NSG eval and started on cardene gtt for bp control. Required precedex for agitation d/t sundowning. Now transferred to med/surg as pt's CT head repeat stable and bp improved off gtt.       #HTN, essential  - pt on amlodipine 10mg, metoprolol 25mg QD  - recent [id number]'s  - monitor bp closely, adjust meds as tolerated- pt's HR 60's, will not increase dose of bb  - will cover w/ prn hydralazine for next 24hr to determine pt's long term needs  9/23 - place on hydralazine q6h ATC, change BB to q12 dosing, and stop imdur   keep SBP less than 150  9/24 - BP uncontrolled - patient able to swallow his pills today, discussed with his RN  hydralazine increased to 50q8h and resumed imdur  9/25 - BP well controlled, continue current meds     #T2DM  - SSI, monitor FS, c/w lantus 15 u  - HA1c 9.5  - carb controlled diet  9/23 - due to low FS and poor appetite, AM lantus dced and PM lantus halved  9/24 - cont to monitor FS and adjust lantus dosing as appropriate   9/25 - resume home dose lantus 10U QAM     #CKD4- appears to be at baseline  - monitor and avoid nephrotoxins    #afib- off AC d/t ICH x2  - recommend Palliative consult   -cardio f/u   9/23 - Pt with HFrEF   cont BB; no ACEi/ARB at this time due to Cr    #dementia, sundowning  - rec cs psych to evaluate   - was recently on precedex for agitation   - supportive care  - fall/aspiration precautions    #ICH- stable  - further per NSG f/u    9/25 - HYPOXIA and mild rise in WBC NOTED   CT CHEST done, pt already on VTE PX with heparin sc, no tachy (low dose of BB), elevated CR, was sedated with seroquel, wanted to r/o aspiration pneumonia/pneumonitis.  CT chest shows atelectasis, trace pleural effusion  rec OOB and IS    #PPX  - for DVT - SCDs    discussed with NSx, sahil planning - CT chest unremarkable   Ty for consult, pls call with q

## 2021-09-26 NOTE — DISCHARGE NOTE PROVIDER - NSDCHHPTASSISTHOME_GEN_ALL_CORE
Airway  Urgency: elective    Date/Time: 3/28/2020 12:07 PM  Airway not difficult    General Information and Staff    Patient location during procedure: OR  Anesthesiologist: Tiago Dunaway MD  CRNA: Vickie Vega CRNA    Indications and Patient Condition  Indications for airway management: airway protection    Preoxygenated: yes  MILS not maintained throughout  Mask difficulty assessment: 0 - not attempted    Final Airway Details  Final airway type: supraglottic airway      Successful airway: unique  Size 5    Number of attempts at approach: 1  Assessment: lips, teeth, and gum same as pre-op and atraumatic intubation    Additional Comments  LMA inserted with ease, assist to SV             Patient Needs Assistance to Leave Residence...

## 2021-09-26 NOTE — CONSULT NOTE ADULT - SUBJECTIVE AND OBJECTIVE BOX
89 HTN, T2DM, Afib on coumadin, GERD, CAD s/p stents, HFrEF (40%), CKD4, recent admission w/ [facility] on 9/7 s/p fall. Patient was discharged and AC was restarted on 9/14 as per NSG after he was determined to be stable to resume. He presented on this admission w/ worsening mental status over several days, renal evaluation of CKD      PAST MEDICAL & SURGICAL HISTORY:  IDDM (insulin dependent diabetes mellitus)    HTN (hypertension)    Chronic congestive heart failure, unspecified congestive heart failure type    Atrial fibrillation, unspecified type    Gastroesophageal reflux disease, esophagitis presence not specified    Benign prostatic hyperplasia without lower urinary tract symptoms    Syncope and collapse  5/4/2018    Type 2 myocardial infarction  12/17/2017    UTI (urinary tract infection)  7/12/2017    Artificial cardiac pacemaker    History of appendectomy    Arthropathy of shoulder region    S/P TAVR (transcatheter aortic valve replacement)        MEDICATIONS  (STANDING):  amLODIPine   Tablet 10 milliGRAM(s) Oral daily  atorvastatin 40 milliGRAM(s) Oral at bedtime  cholecalciferol 1000 Unit(s) Oral daily  cyanocobalamin 1000 MICROGram(s) Oral daily  dextrose 40% Gel 15 Gram(s) Oral once  dextrose 5%. 1000 milliLiter(s) (50 mL/Hr) IV Continuous <Continuous>  dextrose 50% Injectable 25 Gram(s) IV Push once  ferrous    sulfate 325 milliGRAM(s) Oral at bedtime  folic acid 1 milliGRAM(s) Oral daily  gabapentin 100 milliGRAM(s) Oral three times a day  glucagon  Injectable 1 milliGRAM(s) IntraMuscular once  heparin   Injectable 5000 Unit(s) SubCutaneous every 12 hours  hydrALAZINE 50 milliGRAM(s) Oral every 8 hours  insulin glargine Injectable (LANTUS) 8 Unit(s) SubCutaneous every morning  insulin lispro (ADMELOG) corrective regimen sliding scale   SubCutaneous three times a day with meals  insulin lispro (ADMELOG) corrective regimen sliding scale.   SubCutaneous at bedtime  isosorbide   mononitrate ER Tablet (IMDUR) 30 milliGRAM(s) Oral daily  levETIRAcetam  Solution 500 milliGRAM(s) Oral two times a day  magnesium oxide 400 milliGRAM(s) Oral at bedtime  metoprolol tartrate 12.5 milliGRAM(s) Oral two times a day  Nephro-chris 1 Tablet(s) Oral daily  pantoprazole   Suspension 40 milliGRAM(s) Oral daily  polyethylene glycol 3350 17 Gram(s) Oral at bedtime  sodium chloride 0.45%. 1000 milliLiter(s) (50 mL/Hr) IV Continuous <Continuous>  vitamin B complex with vitamin C 1 Tablet(s) Oral daily    MEDICATIONS  (PRN):  haloperidol    Injectable 0.25 milliGRAM(s) IntraMuscular every 6 hours PRN Severe Agitation  ondansetron Injectable 4 milliGRAM(s) IV Push every 6 hours PRN Nausea and/or Vomiting  promethazine 25 milliGRAM(s) Oral every 6 hours PRN for nausea  QUEtiapine 12.5 milliGRAM(s) Oral every 6 hours PRN agitation  senna 2 Tablet(s) Oral at bedtime PRN Constipation      Allergies    Aleve (Vomiting)  codeine (Unknown)  morphine (Nausea)  naproxen (Vomiting)    Intolerances        SOCIAL HISTORY:    FAMILY HISTORY:  No pertinent family history in first degree relatives        REVIEW OF SYSTEMS:    CONSTITUTIONAL: No weakness, fevers or chills  EYES/ENT: No visual changes;  No vertigo or throat pain   NECK: No pain or stiffness  RESPIRATORY: No cough, wheezing, hemoptysis; No shortness of breath  CARDIOVASCULAR: No chest pain or palpitations  GASTROINTESTINAL: No abdominal or epigastric pain. No nausea, vomiting, or hematemesis; No diarrhea or constipation. No melena or hematochezia.  GENITOURINARY: No dysuria, frequency or hematuria  NEUROLOGICAL: No numbness or weakness  SKIN: No itching, burning, rashes, or lesions   All other review of systems is negative unless indicated above.      T(C): , Max: 36.7 (09-26-21 @ 04:42)  T(F): , Max: 98 (09-26-21 @ 04:42)  HR: 60 (09-26-21 @ 13:37)  BP: 125/52 (09-26-21 @ 13:37)  BP(mean): --  RR: 18 (09-26-21 @ 13:37)  SpO2: 100% (09-26-21 @ 13:37)  Wt(kg): --        PHYSICAL EXAM:    Constitutional: NAD, frail  HEENT: PERRLA, EOMI,  MMM  Neck: No LAD, No JVD  Respiratory: CTAB  Cardiovascular: S1 and S2, RRR  Gastrointestinal: BS+, soft, NT/ND  Extremities: chr  peripheral edema  Neurological: Very [place], alert  : No [place]  Skin: No rashes  Access: Not applicable        LABS:                        11.1   9.07  )-----------( 151      ( 26 Sep 2021 08:18 )             34.6     26 Sep 2021 08:18    142    |  109    |  50     ----------------------------<  57     4.2     |  25     |  2.77   25 Sep 2021 06:41    140    |  108    |  52     ----------------------------<  90     4.4     |  27     |  2.70   24 Sep 2021 05:33    140    |  109    |  46     ----------------------------<  107    4.2     |  26     |  2.29   23 Sep 2021 10:08    141    |  109    |  46     ----------------------------<  98     4.0     |  25     |  2.35     Ca    9.3        26 Sep 2021 08:18  Ca    9.5        25 Sep 2021 06:41  Ca    9.4        24 Sep 2021 05:33  Ca    8.9        23 Sep 2021 10:08  Phos  2.9       23 Sep 2021 10:08  Mg     2.9       26 Sep 2021 08:18  Mg     2.7       25 Sep 2021 06:41  Mg     2.8       24 Sep 2021 05:33  Mg     2.6       23 Sep 2021 10:08            Urine Studies:          RADIOLOGY & ADDITIONAL STUDIES:

## 2021-09-26 NOTE — DISCHARGE NOTE PROVIDER - NSDCMRMEDTOKEN_GEN_ALL_CORE_FT
atorvastatin 40 mg oral tablet: 1 tab(s) orally once a day (at bedtime)  B Complex 50 oral tablet, extended release: 1 tab(s) orally once a day at lunch  Colace 100 mg oral capsule: 1 cap(s) orally once a day  cyanocobalamin 1000 mcg oral tablet: 1 tab(s) orally once a day with dinner  ferrous sulfate 325 mg (65 mg elemental iron) oral tablet: 1 tab(s) orally once a day (at bedtime)  gabapentin 100 mg oral capsule: 1 cap(s) orally 3 times a day with breakfast, lunch and at bedtime  Gas-X 80 mg oral tablet, chewable: 1 tab(s) orally 2 times a day with lunch and dinner  isosorbide mononitrate 30 mg oral tablet, extended release: 1 tab(s) orally once a day  Lantus Solostar Pen 100 units/mL subcutaneous solution: 10 unit(s) subcutaneously once a day after breakfast  levETIRAcetam 500 mg oral tablet: 1 tab(s) orally 2 times a day  Continue for 4 more days until 9/14  Mag-Ox 400 oral tablet: 1 tab(s) orally once a day (at bedtime)  Metoprolol Succinate ER 25 mg oral tablet, extended release: 1 tab(s) orally once a day with lunch  Nephro-Devi oral tablet: 1 tab(s) orally once a day at breakfast  NovoLOG Mix 70/30 subcutaneous suspension: 10 unit(s) subcutaneously once a day (in the morning)  NovoLOG Mix 70/30 subcutaneous suspension: 2 unit(s) subcutaneously once a day at dinner ONLY IF BS &gt; 200   pantoprazole 40 mg oral delayed release tablet: 1 tab(s) orally once a day  promethazine 25 mg oral tablet: 1 tab(s) orally every 6 hours, As Needed - for nausea  RABEprazole 20 mg oral delayed release tablet: 1 tab(s) orally 2 times a day at lunch and before dinner  repaglinide 0.5 mg oral tablet: 1 tab(s) orally once a day with dinner  repaglinide 0.5 mg oral tablet: 2 tab(s) orally once a day at dinner ONLY IF BS &lt; 200  venlafaxine 75 mg oral capsule, extended release: 1 cap(s) orally once a day at dinner  Vitamin D3 5000 intl units (125 mcg) oral tablet: 1 tab(s) orally once a day at dinner   amLODIPine 10 mg oral tablet: 1 tab(s) orally once a day  atorvastatin 40 mg oral tablet: 1 tab(s) orally once a day (at bedtime)  B Complex 50 oral tablet, extended release: 1 tab(s) orally once a day at lunch  Colace 100 mg oral capsule: 1 cap(s) orally once a day  cyanocobalamin 1000 mcg oral tablet: 1 tab(s) orally once a day with dinner  ferrous sulfate 325 mg (65 mg elemental iron) oral tablet: 1 tab(s) orally once a day (at bedtime)  folic acid 1 mg oral tablet: 1 tab(s) orally once a day  gabapentin 100 mg oral capsule: 1 cap(s) orally 3 times a day  Gas-X 80 mg oral tablet, chewable: 1 tab(s) orally 2 times a day with lunch and dinner  hydrALAZINE 50 mg oral tablet: 1 tab(s) orally every 8 hours  isosorbide mononitrate 30 mg oral tablet, extended release: 1 tab(s) orally once a day  Lantus Solostar Pen 100 units/mL subcutaneous solution: 10 unit(s) subcutaneously once a day after breakfast  Mag-Ox 400 oral tablet: 1 tab(s) orally once a day (at bedtime)  Metoprolol Succinate ER 25 mg oral tablet, extended release: 1 tab(s) orally once a day with lunch  MiraLax oral powder for reconstitution: 17 gram(s) orally once a day (at bedtime)  Nephro-Devi oral tablet: 1 tab(s) orally once a day at breakfast  pantoprazole 40 mg oral delayed release tablet: 1 tab(s) orally once a day  promethazine 25 mg oral tablet: 1 tab(s) orally every 6 hours, As Needed - for nausea  repaglinide 0.5 mg oral tablet: 1 tab(s) orally once a day with dinner  repaglinide 0.5 mg oral tablet: 2 tab(s) orally once a day at dinner ONLY IF BS &lt; 200  venlafaxine 75 mg oral capsule, extended release: 1 cap(s) orally once a day at dinner  Vitamin D3 5000 intl units (125 mcg) oral tablet: 1 tab(s) orally once a day at dinner

## 2021-09-26 NOTE — DISCHARGE NOTE PROVIDER - NSDCCPCAREPLAN_GEN_ALL_CORE_FT
PRINCIPAL DISCHARGE DIAGNOSIS  Diagnosis: Intracerebral hemorrhage  Assessment and Plan of Treatment: Advance diet and activity as tolerted   Patient will remain off coumadin going forward  Follow up with Dr. Sam in 1-2 weeks for a repeat CT of the head      SECONDARY DISCHARGE DIAGNOSES  Diagnosis: Dementia with behavioral disturbance, unspecified dementia type  Assessment and Plan of Treatment: continue with Effexor, follow up with primary care physician    Diagnosis: HTN (hypertension)  Assessment and Plan of Treatment: Amlodipine and Hydralazine added as blood medications  Continue all other home blood pressure medications  Follow up with primary care physician    Diagnosis: DM (diabetes mellitus)  Assessment and Plan of Treatment: Lantus changed to 10units daily   continue diabetic diet   follow up with primary care physician    Diagnosis: CAD (coronary artery disease)  Assessment and Plan of Treatment: continue to hold coumadin   aspirin daily   follow up with primary care physician and cardiologist    Diagnosis: Chronic atrial fibrillation  Assessment and Plan of Treatment: aspirin daily   follow up with primary care physician and cardiologist    Diagnosis: S/P TAVR (transcatheter aortic valve replacement)  Assessment and Plan of Treatment: aspirin daily   follow up with primary care physician and cardiologist

## 2021-09-26 NOTE — CONSULT NOTE ADULT - REASON FOR ADMISSION
Left Head of Caudate - Non surgical hemorrhage

## 2021-09-26 NOTE — DISCHARGE NOTE PROVIDER - CARE PROVIDERS DIRECT ADDRESSES
,DirectAddress_Unknown,DirectAddress_Unknown,[email or web address]

## 2021-09-26 NOTE — PROGRESS NOTE ADULT - ASSESSMENT
89M w/PMH HTN, T2DM, Afib on coumadin, GERD, CAD s/p stents, HFrEF (40%), CKD4, recent admission w/ SDH on 9/7 s/p fall, d/c'd stable, AC restarted 9/14, presented w/ AMS, found w/ new ICH, and hypertensive, admitted to ICU for NSG eval and started on cardene gtt for bp control. Required precedex for agitation d/t sundowning. Now transferred to med/surg as pt's CT head repeat stable and bp improved off gtt.       #HTN, essential  - pt on amlodipine 10mg, metoprolol 25mg QD  - recent [id number]'s  - monitor bp closely, adjust meds as tolerated- pt's HR 60's, will not increase dose of bb  - will cover w/ prn hydralazine for next 24hr to determine pt's long term needs  9/23 - place on hydralazine q6h ATC, change BB to q12 dosing, and stop imdur   keep SBP less than 150  9/24 - BP uncontrolled - patient able to swallow his pills today, discussed with his RN  hydralazine increased to 50q8h and resumed imdur  9/25 - BP well controlled, continue current meds     #T2DM  - SSI, monitor FS, c/w lantus 15 u  - HA1c 9.5  - carb controlled diet  9/23 - due to low FS and poor appetite, AM lantus dced and PM lantus halved  9/24 - cont to monitor FS and adjust lantus dosing as appropriate   9/25 - resume home dose lantus 10U QAM     #CKD4- appears to be at baseline  - monitor and avoid nephrotoxins    #afib- off AC d/t ICH x2  - recommend Palliative consult   -cardio f/u   9/23 - Pt with HFrEF   cont BB; no ACEi/ARB at this time due to Cr    #dementia, sundowning  - rec cs psych to evaluate   - was recently on precedex for agitation   - supportive care  - fall/aspiration precautions    #ICH- stable  - further per NSG f/u    9/25 - HYPOXIA and mild rise in WBC NOTED   CT CHEST done, pt already on VTE PX with heparin sc, no tachy (low dose of BB), elevated CR, was sedated with seroquel, wanted to r/o aspiration pneumonia/pneumonitis.  CT chest shows atelectasis, trace pleural effusion  rec OOB and IS    #PPX  - for DVT - SCDs    discussed with NSx, sahil planning - CT chest unremarkable   Ty for consult, pls call with q   89M w/PMH HTN, T2DM, Afib on coumadin, GERD, CAD s/p stents, HFrEF (40%), CKD4, recent admission w/ SDH on 9/7 s/p fall, d/c'd stable, AC restarted 9/14, presented w/ AMS, found w/ new ICH, and hypertensive, admitted to ICU for NSG eval and started on cardene gtt for bp control. Required precedex for agitation d/t sundowning. Now transferred to med/surg as pt's CT head repeat stable and bp improved off gtt.       #HTN, essential  - pt on amlodipine 10mg, metoprolol 25mg QD  - recent [id number]'s  - monitor bp closely, adjust meds as tolerated- pt's HR 60's, will not increase dose of bb  - will cover w/ prn hydralazine for next 24hr to determine pt's long term needs  9/23 - place on hydralazine q6h ATC, change BB to q12 dosing, and stop imdur   keep SBP less than 150  9/24 - BP uncontrolled - patient able to swallow his pills today, discussed with his RN  hydralazine increased to 50q8h and resumed imdur  9/25 - BP well controlled, continue current meds   9/26 - as above cont current meds     #T2DM  - SSI, monitor FS, c/w lantus 15 u  - HA1c 9.5  - carb controlled diet  9/23 - due to low FS and poor appetite, AM lantus dced and PM lantus halved  9/24 - cont to monitor FS and adjust lantus dosing as appropriate   9/25 - resume home dose lantus 10U QAM   9/26 - resume home dose lantus on dc     #CKD4- appears to be at baseline  - monitor and avoid nephrotoxins  9/26 - Dr Galloway to see patient today, follow renal as OP     #afib- off AC d/t ICH x2  - recommend Palliative consult   -cardio f/u   9/23 - Pt with HFrEF   cont BB; no ACEi/ARB at this time due to Cr    #dementia, sundowning  - rec cs psych to evaluate   - was recently on precedex for agitation   - supportive care  - fall/aspiration precautions  9/26 - per behaviroal ok to cont velafaxine home med ; no depakote on dc per them   #ICH- stable  - further per NSG f/u    9/25 - HYPOXIA and mild rise in WBC NOTED   CT CHEST done, pt already on VTE PX with heparin sc, no tachy (low dose of BB), elevated CR, was sedated with seroquel, wanted to r/o aspiration pneumonia/pneumonitis.  CT chest shows atelectasis, trace pleural effusion  rec OOB and IS    #PPX  - for DVT - SCDs    discussed with RN Renal and NSx, dc planning  Ty for consult, pls call with q

## 2021-09-26 NOTE — PROVIDER CONTACT NOTE (OTHER) - SITUATION
Emailed Lesly regarding a stroke admit to ICU
called md service spoke with Dana
hx igor, TAVR on Coumadin, aspirin spont ICH

## 2021-09-26 NOTE — DISCHARGE NOTE PROVIDER - CARE PROVIDER_API CALL
Jostin Baptiste)  Medicine  [address] [place] [address]  Phone: [phone]  Fax: [phone]  Follow Up Time: 2 weeks    Prabhakar Sam)  Neurosurgery  [address] [place] [address], [address] [place] [address]  Phone: [phone]  Fax: [phone]  Follow Up Time: 2 weeks    Yg Galloway  INTERNAL MEDICINE  [address] [place] [address]  Phone: [phone]  Fax: [phone]  Follow Up Time: 2 weeks

## 2021-09-26 NOTE — PROGRESS NOTE ADULT - SUBJECTIVE AND OBJECTIVE BOX
89M w/PMH HTN, T2DM, Afib on coumadin, GERD, CAD s/p stents, HFrEF (40%), CKD4, recent admission w/ SDH on 9/7 s/p fall. Patient was discharged and AC was restarted on 9/14 as per NSG after he was determined to be stable to resume. He presented on this admission w/ worsening mental status over several days. Patient was code stroke. CT head showed new 2cm left ICH. He was given Kcentra, Vitamin K and 2 units of platelets in the ER. INR was subtherapeutic, also found to be hypertensive to 200s on arrival, started on nicardipine infusion. Per Intensivist note:   9/20: Patient seen in bed.  He is AAO x 1, moving all extremities and extremely hypophonic, and on a Cardene drip  9/21: Much more awake and alert today, OOB, tolerating PO, however he remains on Cardene   9/22: continues with periods of confusion, was on precedex over night but has been DC.    Pt determined to be stable to transfer to med/surg, bp stable and controlled. Cardio and NSG notes reviewed. Pt has episodes of sundowning.   We have been consulted to manage acute and chronic medical issues that include HTN, T2DM.     9/23 - pt seen with RN at bedside, arousable, able to state his name, if faintly, looks around, follows simple commands - opens his mouth, sticks out his tongue - midline, squeezes hand, raises his legs. later in the day became agitated and went back on restraints. eating very little and FS low this morning  9/24 - knows name, able to name his kids, stephen cp, confused, limited ROS, agitated and in restraints, moving all four extremities   9/25 - pt got seroquel last night, still a little sleepy this morning; not in any distress, arousable and able to state his name     PHYSICAL EXAM:  Vital Signs Last 24 Hrs  T(F): 98 (25 Sep 2021 05:16), Max: 98.9 (24 Sep 2021 16:05)  HR: 72 (25 Sep 2021 05:16) (65 - 88)  BP: 124/62 (25 Sep 2021 05:16) (103/84 - 169/52)  RR: 18 (25 Sep 2021 05:16) (18 - 20)  SpO2: 97% (25 Sep 2021 05:16) (90% - 100%)  GENERAL: NAD, sleepy, arousable   HEAD:  Atraumatic, Normocephalic  EYES: EOMI, PERRLA, normal sclera  ENT: Moist mucous membranes  NECK: Supple, No JVD, no nuchal rigidity  CHEST/LUNG: Clear to auscultation bilaterally; No rales, rhonchi, wheezing, or rubs. Unlabored respirations  HEART: Regular rate and rhythm; No murmurs, rubs, or gallops  ABDOMEN: Bowel sounds present; Soft, Nontender, Nondistended. No hepatomegaly  EXTREMITIES:  no pitting bilaterally  NERVOUS SYSTEM:  Sleep, arousable, oriented X1, moving all extremities   MSK: FROM all 4 extremities, full and equal strength  SKIN: No rashes or lesions    < from: CT Head No Cont (09.20.21 @ 00:48) >  The intraparenchymal hemorrhage in the left basal ganglia is stable in size. No new hemorrhage has developed.  LABS: All Labs Reviewed:                        11.1   13.15 )-----------( 163      ( 25 Sep 2021 06:41 )             33.2   140  |  108  |  52<H>  ----------------------------<  90  4.4   |  27  |  2.70<H>    < from: CT Chest No Cont (09.25.21 @ 10:39) >  1.  Small right pleural effusion.  2.  Bilateral lower lobe linear atelectasis.   MICRA device in place. A single-lead of a pacemaker is in place. The lead has been abandoned. The battery pack has been removed. Status post TAVR.          amLODIPine   Tablet 10 milliGRAM(s) Oral daily  atorvastatin 40 milliGRAM(s) Oral at bedtime  cholecalciferol 1000 Unit(s) Oral daily  cyanocobalamin 1000 MICROGram(s) Oral daily  dextrose 40% Gel 15 Gram(s) Oral once  dextrose 5%. 1000 milliLiter(s) IV Continuous <Continuous>  dextrose 50% Injectable 25 Gram(s) IV Push once  ferrous    sulfate 325 milliGRAM(s) Oral at bedtime  folic acid 1 milliGRAM(s) Oral daily  gabapentin 100 milliGRAM(s) Oral three times a day  glucagon  Injectable 1 milliGRAM(s) IntraMuscular once  haloperidol    Injectable 0.25 milliGRAM(s) IntraMuscular every 6 hours PRN  heparin   Injectable 5000 Unit(s) SubCutaneous every 12 hours  hydrALAZINE 50 milliGRAM(s) Oral every 8 hours  insulin glargine Injectable (LANTUS) 10 Unit(s) SubCutaneous every morning  insulin lispro (ADMELOG) corrective regimen sliding scale   SubCutaneous three times a day with meals  insulin lispro (ADMELOG) corrective regimen sliding scale.   SubCutaneous at bedtime  isosorbide   mononitrate ER Tablet (IMDUR) 30 milliGRAM(s) Oral daily  levETIRAcetam  Solution 500 milliGRAM(s) Oral two times a day  magnesium oxide 400 milliGRAM(s) Oral at bedtime  metoprolol tartrate 12.5 milliGRAM(s) Oral two times a day  Nephro-chris 1 Tablet(s) Oral daily  ondansetron Injectable 4 milliGRAM(s) IV Push every 6 hours PRN  pantoprazole   Suspension 40 milliGRAM(s) Oral daily  polyethylene glycol 3350 17 Gram(s) Oral at bedtime  promethazine 25 milliGRAM(s) Oral every 6 hours PRN  QUEtiapine 12.5 milliGRAM(s) Oral every 6 hours PRN  senna 2 Tablet(s) Oral at bedtime PRN  vitamin B complex with vitamin C 1 Tablet(s) Oral daily                       89M w/PMH HTN, T2DM, Afib on coumadin, GERD, CAD s/p stents, HFrEF (40%), CKD4, recent admission w/ SDH on 9/7 s/p fall. Patient was discharged and AC was restarted on 9/14 as per NSG after he was determined to be stable to resume. He presented on this admission w/ worsening mental status over several days. Patient was code stroke. CT head showed new 2cm left ICH. He was given Kcentra, Vitamin K and 2 units of platelets in the ER. INR was subtherapeutic, also found to be hypertensive to 200s on arrival, started on nicardipine infusion. Per Intensivist note:   9/20: Patient seen in bed.  He is AAO x 1, moving all extremities and extremely hypophonic, and on a Cardene drip  9/21: Much more awake and alert today, OOB, tolerating PO, however he remains on Cardene   9/22: continues with periods of confusion, was on precedex over night but has been DC.    Pt determined to be stable to transfer to med/surg, bp stable and controlled. Cardio and NSG notes reviewed. Pt has episodes of sundowning.   We have been consulted to manage acute and chronic medical issues that include HTN, T2DM.     9/23 - pt seen with RN at bedside, arousable, able to state his name, if faintly, looks around, follows simple commands - opens his mouth, sticks out his tongue - midline, squeezes hand, raises his legs. later in the day became agitated and went back on restraints. eating very little and FS low this morning  9/24 - knows name, able to name his kids, stephen cp, confused, limited ROS, agitated and in restraints, moving all four extremities   9/25 - pt got seroquel last night, still a little sleepy this morning; not in any distress, arousable and able to state his name   9/26 - doing well this morning, awake, [place] but able to read questions and answer appropriately, denies and pain or complaints - states he had milk and cheerios for breakfast    PHYSICAL EXAM:  Vital Signs Last 24 Hrs  T(F): 97.8 (26 Sep 2021 07:47), Max: 98 (26 Sep 2021 04:42)  HR: 63 (26 Sep 2021 07:47) (60 - 74)  BP: 154/53 (26 Sep 2021 07:47) (130/48 - 154/53)  RR: 17 (26 Sep 2021 07:47) (16 - 18)  SpO2: 100% (26 Sep 2021 07:47) (98% - 100%)  GENERAL: NAD, sleepy, arousable   HEAD:  Atraumatic, Normocephalic  EYES: EOMI, PERRLA, normal sclera  ENT: Moist mucous membranes  NECK: Supple, No JVD, no nuchal rigidity  CHEST/LUNG: Clear to auscultation bilaterally; No rales, rhonchi, wheezing, or rubs. Unlabored respirations  HEART: Regular rate and rhythm; No murmurs, rubs, or gallops  ABDOMEN: Bowel sounds present; Soft, Nontender, Nondistended. No hepatomegaly  EXTREMITIES:  no pitting bilaterally  NERVOUS SYSTEM:  Sleep, arousable, oriented X1, moving all extremities   MSK: FROM all 4 extremities, full and equal strength  SKIN: No rashes or lesions    < from: CT Head No Cont (09.20.21 @ 00:48) >  The intraparenchymal hemorrhage in the left basal ganglia is stable in size. No new hemorrhage has developed.    < from: CT Chest No Cont (09.25.21 @ 10:39) >  1.  Small right pleural effusion.  2.  Bilateral lower lobe linear atelectasis.   MICRA device in place. A single-lead of a pacemaker is in place. The lead has been abandoned. The battery pack has been removed. Status post TAVR.    LABS: All Labs Reviewed:                        11.1   9.07  )-----------( 151      ( 26 Sep 2021 08:18 )             34.6     142  |  109<H>  |  50<H>  ----------------------------<  57<L>  4.2   |  25  |  2.77<H>    MEDS:   amLODIPine   Tablet 10 milliGRAM(s) Oral daily  atorvastatin 40 milliGRAM(s) Oral at bedtime  cholecalciferol 1000 Unit(s) Oral daily  cyanocobalamin 1000 MICROGram(s) Oral daily  dextrose 40% Gel 15 Gram(s) Oral once  dextrose 5%. 1000 milliLiter(s) IV Continuous <Continuous>  dextrose 50% Injectable 25 Gram(s) IV Push once  ferrous    sulfate 325 milliGRAM(s) Oral at bedtime  folic acid 1 milliGRAM(s) Oral daily  gabapentin 100 milliGRAM(s) Oral three times a day  glucagon  Injectable 1 milliGRAM(s) IntraMuscular once  haloperidol    Injectable 0.25 milliGRAM(s) IntraMuscular every 6 hours PRN  heparin   Injectable 5000 Unit(s) SubCutaneous every 12 hours  hydrALAZINE 50 milliGRAM(s) Oral every 8 hours  insulin glargine Injectable (LANTUS) 8 Unit(s) SubCutaneous every morning  insulin lispro (ADMELOG) corrective regimen sliding scale   SubCutaneous three times a day with meals  insulin lispro (ADMELOG) corrective regimen sliding scale.   SubCutaneous at bedtime  isosorbide   mononitrate ER Tablet (IMDUR) 30 milliGRAM(s) Oral daily  levETIRAcetam  Solution 500 milliGRAM(s) Oral two times a day  magnesium oxide 400 milliGRAM(s) Oral at bedtime  metoprolol tartrate 12.5 milliGRAM(s) Oral two times a day  Nephro-chris 1 Tablet(s) Oral daily  ondansetron Injectable 4 milliGRAM(s) IV Push every 6 hours PRN  pantoprazole   Suspension 40 milliGRAM(s) Oral daily  polyethylene glycol 3350 17 Gram(s) Oral at bedtime  promethazine 25 milliGRAM(s) Oral every 6 hours PRN  QUEtiapine 12.5 milliGRAM(s) Oral every 6 hours PRN  senna 2 Tablet(s) Oral at bedtime PRN  sodium chloride 0.45%. 1000 milliLiter(s) IV Continuous <Continuous>  vitamin B complex with vitamin C 1 Tablet(s) Oral daily

## 2021-09-26 NOTE — DISCHARGE NOTE PROVIDER - HOSPITAL COURSE
Patient is a 89 yo male with PMHx TAVR (10/20), CAD/stents (10/20), IDDM, HTN, Afib, GERD on Coumadin presented to the ER today with Left head of caudate ICH 1.5cm (non-surgical).  Pt with recent fall and impact to head on Sept 7th 2021.  Pt noted with 3 days of less activity, sleepiness, less communicative / participatory - change in MS.  CTH reveals Left head of caudate 2cm ICH, no IVH.  Admitted to Neuro ICU under neurosurgery for 24 hours of care.  Pt remains neurologically intact.  Pt recently restarted on 9/14 ASA and coumadin dosing once again. Reversed in ED with KCentra and given 1 pack platelets and Vit K.  [facility]  INR 1.54, GCS 15, Code Stroke in ED called (Telestroke?)  Lesly notified -   NIHSS per Neurosurgical eval is zero (prior to Neurosurg evaluation - pt not fully communicative per ED staff)    9/20- Patient seen and examined this AM in ICU. Continues on Cardene gtt. Sleepy, opens eyes for exam, follows simple commands.   Repeat HCT at midnight prelim appears stable. Patient seen by physical therapy.     9/21- Patient seen and examine, more awake/alert today, Cardene gtt stopped this AM. Patient sitting up in bed, states he feels cold. No other complaints.  Transferred out of the ICU, Cardiology was consulted.    9/22- Patient seen and examined, restless overnight, attempted to get OOB. Given Haldol without improvement. Was started on Precedex, now stopped. Patient awake/alert, confused, + wrist restraints. Hospitalist service was consulted for BP and Blood glucose control     9/23- Patient seen and examined, episodes of restlessness and agitation overnight.   Seen by psych yesterday started on Seroquel and haldol as needed. Patient awake/alert, offers no complaints.    9-24- Pt awake and alert, no complains, states he wants to go home, in good spirits, does not seem restless or agitated, although he did require wrist restraints over night for patient safety. Palliative care met with family to discuss goals of care, family would like to take the patient home and avoid rehab or hospice placement.    9-25 - Neurosurgically doing well, Hospitalist following for BP and glucose control, meds adjusted, Behavioral Health consulted for assistance with agitation     9/26- Pt did well overnight, more lucid today, BP and blood sugar well controlled, Renal consulted for elevated BUN/Cre, will follow up out patient, ready for discharge home.     Vital Signs Last 24 Hrs  T(C): 36.6 (26 Sep 2021 07:47), Max: 36.7 (26 Sep 2021 04:42)  T(F): 97.8 (26 Sep 2021 07:47), Max: 98 (26 Sep 2021 04:42)  HR: 63 (26 Sep 2021 07:47) (60 - 74)  BP: 154/53 (26 Sep 2021 07:47) (130/48 - 154/53)  RR: 17 (26 Sep 2021 07:47) (16 - 18)  SpO2: 100% (26 Sep 2021 07:47) (98% - 100%)    < from: CT Head No Cont (09.20.21 @ 00:48) >  FINDINGS:  Brain: There are scattered white matter hypodensities which are nonspecific but most commonly represent chronic microvascular ischemic changes.   There is prominence of the ventricles, sulci and cisterns of the brain which may be age related. There is also cerebellar atrophy. The previously seen intraparenchymal hemorrhage in the junction of the anterior left lentiform nucleus and anterior gangliocapsular region is stable in size. No new intracranial hemorrhages are detected. Cerebral ventricles: Dilatation of the 3rd and lateral ventricles is commensurate with the degree of cerebral atrophy and there is no midline shift or hydrocephalus.  Paranasal sinuses: Paranasal sinuses are clear throughout and their bony margins are intact at the levels imaged.  Mastoid air cells: Normally pneumatized and clear bilaterally.  Bones/joints: Bony calvarium and skull base are intact and no acute fractures  are detected.  Soft tissues: Unremarkable.    IMPRESSION:  The intraparenchymal hemorrhage in the left basal ganglia is stable in size.   No new hemorrhage has developed.

## 2021-09-26 NOTE — PROGRESS NOTE BEHAVIORAL HEALTH - OTHER
Neutral - Patient sedated and drowsy but rousable Patient sedated - not able to assess in bed. not observed Patient sedated - not able to assess mood

## 2021-09-26 NOTE — PROGRESS NOTE ADULT - SUBJECTIVE AND OBJECTIVE BOX
Neurosurgery PA update:  Pt was scheduled to go home today but the family does not have services in place to ensure a safe discharge.  Will touch base with social work and discharge planning in the AM.   This was discussed with the patient as well as his wife and son at bedside.

## 2021-09-26 NOTE — CONSULT NOTE ADULT - ASSESSMENT
89 HTN, T2DM, Afib on coumadin, GERD, CAD s/p stents, HFrEF (40%), CKD4, recent admission w/ SDH on 9/7 s/p fall. Patient was discharged and AC was restarted on 9/14 as per NSG after he was determined to be stable to resume. He presented on this admission w/ worsening mental status over several days, renal evaluation of CKD IV    CKD IV  -Renal function near admit/recent dc values  -outpatient follow up with discharge  -If repaglinide was initiated as ouptiatient and appropriately titrated up. no issue with maintenance if a chronic med (as per micromedex)  -Keep slight negative with past volume issues  -Optimize intake  -Renal imaging if further rise in function    d/c with RN staff, Dr Desir

## 2021-09-26 NOTE — DISCHARGE NOTE PROVIDER - PROVIDER TOKENS
PROVIDER:[TOKEN:[[id number]],FOLLOWUP:[2 weeks]],PROVIDER:[TOKEN:[[id number]],FOLLOWUP:[2 weeks]],PROVIDER:[TOKEN:[[id number]],FOLLOWUP:[2 weeks]]

## 2021-09-27 NOTE — PROGRESS NOTE ADULT - ASSESSMENT
89M w/PMH HTN, T2DM, Afib on coumadin, GERD, CAD s/p stents, HFrEF (40%), CKD4, recent admission w/ SDH on 9/7 s/p fall, d/c'd stable, AC restarted 9/14, presented w/ AMS, found w/ new ICH, and hypertensive, admitted to ICU for NSG eval and started on cardene gtt for bp control. Required precedex for agitation d/t sundowning. Now transferred to med/surg as pt's CT head repeat stable and bp improved off gtt.       #HTN, essential  - pt on amlodipine 10mg, metoprolol 25mg QD  - recent [id number]'s  - monitor bp closely, adjust meds as tolerated- pt's HR 60's, will not increase dose of bb  - will cover w/ prn hydralazine for next 24hr to determine pt's long term needs  9/23 - place on hydralazine q6h ATC, change BB to q12 dosing, and stop imdur   keep SBP less than 150  9/24 - BP uncontrolled - patient able to swallow his pills today, discussed with his RN  hydralazine increased to 50q8h and resumed imdur  9/25 - BP well controlled, continue current meds   9/26 - as above cont current meds     #T2DM  - SSI, monitor FS, c/w lantus 15 u  - HA1c 9.5  - carb controlled diet  9/23 - due to low FS and poor appetite, AM lantus dced and PM lantus halved  9/24 - cont to monitor FS and adjust lantus dosing as appropriate   9/25 - resume home dose lantus 10U QAM   9/26 - resume home dose lantus on dc     #CKD4- appears to be at baseline  - monitor and avoid nephrotoxins  9/26 - Dr Galloway to see patient today, follow renal as OP   9/27 - Cr stable     #afib- off AC d/t ICH x2  - recommend Palliative consult   -cardio f/u   9/23 - Pt with HFrEF   cont BB; no ACEi/ARB at this time due to Cr    #dementia, sundowning  - rec cs psych to evaluate   - was recently on precedex for agitation   - supportive care  - fall/aspiration precautions  9/26 - per behaviroal ok to cont velafaxine home med ; no depakote on dc per them   #ICH- stable  - further per NSG f/u    9/25 - HYPOXIA and mild rise in WBC NOTED   CT CHEST done, pt already on VTE PX with heparin sc, no tachy (low dose of BB), elevated CR, was sedated with seroquel, wanted to r/o aspiration pneumonia/pneumonitis.  CT chest shows atelectasis, trace pleural effusion  rec OOB and IS  9/27 - pulm stable     #PPX  - for DVT - SCDs    discussed with RN NMx and SW  dc planning per primary team   Ty for consult, pls call with q

## 2021-09-27 NOTE — PROGRESS NOTE BEHAVIORAL HEALTH - NSBHCHARTREVIEWVS_PSY_A_CORE FT
Vital Signs Last 24 Hrs  T(C): 36.4 (27 Sep 2021 08:42), Max: 36.7 (26 Sep 2021 20:43)  T(F): 97.5 (27 Sep 2021 08:42), Max: 98.1 (26 Sep 2021 20:43)  HR: 94 (27 Sep 2021 08:42) (91 - 95)  BP: 117/56 (27 Sep 2021 08:42) (117/56 - 149/59)  BP(mean): --  RR: 18 (27 Sep 2021 08:42) (16 - 18)  SpO2: 98% (27 Sep 2021 08:42) (96% - 98%)
Vital Signs Last 24 Hrs  T(C): 36.7 (23 Sep 2021 15:43), Max: 36.7 (23 Sep 2021 15:43)  T(F): 98 (23 Sep 2021 15:43), Max: 98 (23 Sep 2021 15:43)  HR: 60 (23 Sep 2021 15:43) (60 - 79)  BP: 149/90 (23 Sep 2021 15:43) (144/57 - 152/66)  BP(mean): --  RR: 19 (23 Sep 2021 15:43) (18 - 20)  SpO2: 98% (23 Sep 2021 15:43) (94% - 100%)
Vital Signs Last 24 Hrs  T(F): 98 (25 Sep 2021 05:16), Max: 98.9 (24 Sep 2021 16:05)  HR: 72 (25 Sep 2021 05:16) (65 - 88)  BP: 124/62 (25 Sep 2021 05:16) (103/84 - 169/52)  RR: 18 (25 Sep 2021 05:16) (18 - 20)  SpO2: 97% (25 Sep 2021 05:16) (90% - 100%)

## 2021-09-27 NOTE — PROGRESS NOTE ADULT - SUBJECTIVE AND OBJECTIVE BOX
89M w/PMH HTN, T2DM, Afib on coumadin, GERD, CAD s/p stents, HFrEF (40%), CKD4, recent admission w/ SDH on 9/7 s/p fall. Patient was discharged and AC was restarted on 9/14 as per NSG after he was determined to be stable to resume. He presented on this admission w/ worsening mental status over several days. Patient was code stroke. CT head showed new 2cm left ICH. He was given Kcentra, Vitamin K and 2 units of platelets in the ER. INR was subtherapeutic, also found to be hypertensive to 200s on arrival, started on nicardipine infusion. Per Intensivist note:   9/20: Patient seen in bed.  He is AAO x 1, moving all extremities and extremely hypophonic, and on a Cardene drip  9/21: Much more awake and alert today, OOB, tolerating PO, however he remains on Cardene   9/22: continues with periods of confusion, was on precedex over night but has been DC.    Pt determined to be stable to transfer to med/surg, bp stable and controlled. Cardio and NSG notes reviewed. Pt has episodes of sundowning.   We have been consulted to manage acute and chronic medical issues that include HTN, T2DM.     9/23 - pt seen with RN at bedside, arousable, able to state his name, if faintly, looks around, follows simple commands - opens his mouth, sticks out his tongue - midline, squeezes hand, raises his legs. later in the day became agitated and went back on restraints. eating very little and FS low this morning  9/24 - knows name, able to name his kids, stephen cp, confused, limited ROS, agitated and in restraints, moving all four extremities   9/25 - pt got seroquel last night, still a little sleepy this morning; not in any distress, arousable and able to state his name   9/26 - doing well this morning, awake, [place] but able to read questions and answer appropriately, denies and pain or complaints - states he had milk and cheerios for breakfast  9/27 - seen in AM - sleepy, arousable, wanted to go back to sleep. no pain anywhere    PHYSICAL EXAM:  Vital Signs Last 24 Hrs  T(F): 97.5 (27 Sep 2021 08:42), Max: 98.1 (26 Sep 2021 20:43)  HR: 94 (27 Sep 2021 08:42) (91 - 95)  BP: 117/56 (27 Sep 2021 08:42) (117/56 - 149/59)  RR: 18 (27 Sep 2021 08:42) (16 - 18)  SpO2: 98% (27 Sep 2021 08:42) (96% - 98%)  GENERAL: NAD, sleepy, arousable   HEAD:  Atraumatic, Normocephalic  EYES: EOMI, PERRLA, normal sclera  ENT: Moist mucous membranes  NECK: Supple, No JVD, no nuchal rigidity  CHEST/LUNG: Clear to auscultation bilaterally; No rales, rhonchi, wheezing, or rubs. Unlabored respirations  HEART: Regular rate and rhythm; No murmurs, rubs, or gallops  ABDOMEN: Bowel sounds present; Soft, Nontender, Nondistended. No hepatomegaly  EXTREMITIES:  no pitting bilaterally  NERVOUS SYSTEM:  Sleeping, arousable, oriented X1, moving all extremities   MSK: FROM all 4 extremities, full and equal strength  SKIN: No rashes or lesions    < from: CT Head No Cont (09.20.21 @ 00:48) >  The intraparenchymal hemorrhage in the left basal ganglia is stable in size. No new hemorrhage has developed.    < from: CT Chest No Cont (09.25.21 @ 10:39) >  1.  Small right pleural effusion.  2.  Bilateral lower lobe linear atelectasis.   MICRA device in place. A single-lead of a pacemaker is in place. The lead has been abandoned. The battery pack has been removed. Status post TAVR.    LABS: All Labs Reviewed:                        11.1   9.09  )-----------( 138      ( 27 Sep 2021 07:01 )             34.2     09-27    140  |  108  |  46<H>  ----------------------------<  174<H>  4.2   |  27  |  2.52<H>    Ca    9.3      27 Sep 2021 07:01  Mg     2.9     09-26      amLODIPine   Tablet 10 milliGRAM(s) Oral daily  atorvastatin 40 milliGRAM(s) Oral at bedtime  cholecalciferol 1000 Unit(s) Oral daily  cyanocobalamin 1000 MICROGram(s) Oral daily  dextrose 40% Gel 15 Gram(s) Oral once  dextrose 5%. 1000 milliLiter(s) IV Continuous <Continuous>  dextrose 50% Injectable 25 Gram(s) IV Push once  ferrous    sulfate 325 milliGRAM(s) Oral at bedtime  folic acid 1 milliGRAM(s) Oral daily  gabapentin 100 milliGRAM(s) Oral three times a day  glucagon  Injectable 1 milliGRAM(s) IntraMuscular once  heparin   Injectable 5000 Unit(s) SubCutaneous every 12 hours  hydrALAZINE 50 milliGRAM(s) Oral every 8 hours  insulin glargine Injectable (LANTUS) 8 Unit(s) SubCutaneous every morning  insulin lispro (ADMELOG) corrective regimen sliding scale   SubCutaneous three times a day with meals  insulin lispro (ADMELOG) corrective regimen sliding scale.   SubCutaneous at bedtime  isosorbide   mononitrate ER Tablet (IMDUR) 30 milliGRAM(s) Oral daily  levETIRAcetam  Solution 500 milliGRAM(s) Oral two times a day  magnesium oxide 400 milliGRAM(s) Oral at bedtime  metoprolol tartrate 12.5 milliGRAM(s) Oral two times a day  Nephro-chris 1 Tablet(s) Oral daily  ondansetron Injectable 4 milliGRAM(s) IV Push every 6 hours PRN  pantoprazole   Suspension 40 milliGRAM(s) Oral daily  polyethylene glycol 3350 17 Gram(s) Oral at bedtime  promethazine 25 milliGRAM(s) Oral every 6 hours PRN  senna 2 Tablet(s) Oral at bedtime PRN  sodium chloride 0.45%. 1000 milliLiter(s) IV Continuous <Continuous>  vitamin B complex with vitamin C 1 Tablet(s) Oral daily

## 2021-09-27 NOTE — PROGRESS NOTE ADULT - PROVIDER SPECIALTY LIST ADULT
Hospitalist
Hospitalist
Neurosurgery
Neurosurgery
Palliative Care
Critical Care
Neurosurgery
Cardiology
Critical Care
Hospitalist
Hospitalist
Neurosurgery
Neurosurgery
Palliative Care
Cardiology
Hospitalist
Neurosurgery
Neurosurgery
Critical Care

## 2021-09-27 NOTE — CHART NOTE - NSCHARTNOTEFT_GEN_A_CORE
Saw patient this morning for examination, stable, oriented, [place], moving all extremities, working with PT ambulating 200+ft.  Spoke with patient at length regarding Insulin administration at home, as this was a concern for the Son Dariel.  Patient able to clearly and precisely communicate and demonstrate how he checks BG 4x a day in his "book" and administers insulin accordingly.  Spoke with son Dariel at length regarding need for close follow-up with PMD regarding insulin concerns if he feels his father is forgetful.  CM/SW spoke with family regarding resources for home.  Patient cleared for discharge and close follow-up provided and recommended.

## 2021-09-27 NOTE — PROGRESS NOTE BEHAVIORAL HEALTH - NSBHCONSULTMEDS_PSY_A_CORE FT
All Antipsychotics being withheld for QTc prolongation
All Antipsychotics being withheld for QTc prolongation

## 2021-09-27 NOTE — PROGRESS NOTE BEHAVIORAL HEALTH - NSBHCHARTREVIEWLAB_PSY_A_CORE FT
11.1   9.09  )-----------( 138      ( 27 Sep 2021 07:01 )             34.2   09-27    140  |  108  |  46<H>  ----------------------------<  174<H>  4.2   |  27  |  2.52<H>    Ca    9.3      27 Sep 2021 07:01  Mg     2.9     09-26
10.6   11.11 )-----------( 155      ( 23 Sep 2021 10:08 )             31.9       09-23    141  |  109<H>  |  46<H>  ----------------------------<  98  4.0   |  25  |  2.35<H>    Ca    8.9      23 Sep 2021 10:08  Phos  2.9     09-23  Mg     2.6     09-23
11.1   13.15 )-----------( 163      ( 25 Sep 2021 06:41 )             33.2   140  |  108  |  52<H>  ----------------------------<  90  4.4   |  27  |  2.70<H>

## 2021-09-27 NOTE — PROGRESS NOTE ADULT - SUBJECTIVE AND OBJECTIVE BOX
HPI: Pt seen and examined this am in follow up for sx. Pt restless in bed, but easily calmed. Lethargic, but able to deny discomfort. No other issues beside occasional restlessness as per 1:1.     Spoke to son Gabe who is awaiting call from  and Case management to arrange home plan as he did not hear from them over the weekend. Reassured him that we spoke with those teams and Dr. Saunders this am (he spoke with her last night), and that dispo team will call him and help them move forward with dc planning. Wished them all the best.    PAIN: denies    DYSPNEA: denies      ROS:  Otherwise limited by confusion      PHYSICAL EXAM:    Vital Signs Last 24 Hrs  T(C): 36.4 (27 Sep 2021 08:42), Max: 36.8 (26 Sep 2021 15:16)  T(F): 97.5 (27 Sep 2021 08:42), Max: 98.2 (26 Sep 2021 15:16)  HR: 94 (27 Sep 2021 08:42) (60 - 95)  BP: 117/56 (27 Sep 2021 08:42) (117/56 - 149/59)  BP(mean): --  RR: 18 (27 Sep 2021 08:42) (16 - 18)  SpO2: 98% (27 Sep 2021 08:42) (96% - 100%)  Daily     Daily     PPSV2: 30  %    General: Elderly male sitting up in bed, restless initially but calms and follows commands  Mental Status: AOx1 (self)  HEENT: mmm, + temporal wasting  Lungs: clear  Cardiac: + s1 s2 rrr  GI: soft nt nd +bs, diaper in place  : diaper in place incontinent  MSK/skin: moves all extremities, muscle and fat wasting in limbs  Neuro: no focal deficits, compliant with exam    LABS:                        11.1   9.09  )-----------( 138      ( 27 Sep 2021 07:01 )             34.2     09-27    140  |  108  |  46<H>  ----------------------------<  174<H>  4.2   |  27  |  2.52<H>    Ca    9.3      27 Sep 2021 07:01  Mg     2.9     09-26        Albumin:     Allergies    Aleve (Vomiting)  codeine (Unknown)  morphine (Nausea)  naproxen (Vomiting)    Intolerances      MEDICATIONS  (STANDING):  amLODIPine   Tablet 10 milliGRAM(s) Oral daily  atorvastatin 40 milliGRAM(s) Oral at bedtime  cholecalciferol 1000 Unit(s) Oral daily  cyanocobalamin 1000 MICROGram(s) Oral daily  dextrose 40% Gel 15 Gram(s) Oral once  dextrose 5%. 1000 milliLiter(s) (50 mL/Hr) IV Continuous <Continuous>  dextrose 50% Injectable 25 Gram(s) IV Push once  ferrous    sulfate 325 milliGRAM(s) Oral at bedtime  folic acid 1 milliGRAM(s) Oral daily  gabapentin 100 milliGRAM(s) Oral three times a day  glucagon  Injectable 1 milliGRAM(s) IntraMuscular once  heparin   Injectable 5000 Unit(s) SubCutaneous every 12 hours  hydrALAZINE 50 milliGRAM(s) Oral every 8 hours  insulin glargine Injectable (LANTUS) 8 Unit(s) SubCutaneous every morning  insulin lispro (ADMELOG) corrective regimen sliding scale   SubCutaneous three times a day with meals  insulin lispro (ADMELOG) corrective regimen sliding scale.   SubCutaneous at bedtime  isosorbide   mononitrate ER Tablet (IMDUR) 30 milliGRAM(s) Oral daily  levETIRAcetam  Solution 500 milliGRAM(s) Oral two times a day  magnesium oxide 400 milliGRAM(s) Oral at bedtime  metoprolol tartrate 12.5 milliGRAM(s) Oral two times a day  Nephro-chris 1 Tablet(s) Oral daily  pantoprazole   Suspension 40 milliGRAM(s) Oral daily  polyethylene glycol 3350 17 Gram(s) Oral at bedtime  sodium chloride 0.45%. 1000 milliLiter(s) (50 mL/Hr) IV Continuous <Continuous>  vitamin B complex with vitamin C 1 Tablet(s) Oral daily    MEDICATIONS  (PRN):  haloperidol    Injectable 0.25 milliGRAM(s) IntraMuscular every 6 hours PRN Severe Agitation  ondansetron Injectable 4 milliGRAM(s) IV Push every 6 hours PRN Nausea and/or Vomiting  promethazine 25 milliGRAM(s) Oral every 6 hours PRN for nausea  QUEtiapine 12.5 milliGRAM(s) Oral every 6 hours PRN agitation  senna 2 Tablet(s) Oral at bedtime PRN Constipation

## 2021-09-27 NOTE — PROGRESS NOTE BEHAVIORAL HEALTH - SUMMARY
DISPLAY PLAN FREE TEXT
Pt is a 89 yowm with hx of depression followed by Dr Ash in community. Per wife at the bedside pt started being demented "just recently, after he fell". PT has no hx of SA or psych hospitalization. he is [place] but able to understand and answer the questions.  He denied being depressed, he is able to  sleep. Appetite is  satisfying. Denies SI, does not want to die, No HI,  denies AH, and VH.   med hx is significant of HTN, T2DM, Afib on coumadin, GERD, CAD s/p stents, HFrEF (40%), CKD4,  on 9/7 s/p fall.  CT head showed new 2cm left ICH.  PT knows he is in hospital  but he is "42 years old" and when asked about dates he keep repeating "forty two".  There is no need for psych hospitalization.   Would hold  venlafaxin until sensorium clears  There is a QrC prolongation of 505 on 9/19  D/C and avoid use of antipsychotics.   If pt gest agitated would add Depakote to regiment.
9/23/2021: Pt is a 89 yowm with hx of depression followed by Dr Ash in community. Per wife at the bedside pt started being demented "just recently, after he fell". PT has no hx of SA or psych hospitalization. he is [place] but able to understand and answer the questions.  He denied being depressed, he is able to  sleep. Appetite is  satisfying. Denies SI, does not want to die, No HI,  denies AH, and VH.   med hx is significant of HTN, T2DM, Afib on coumadin, GERD, CAD s/p stents, HFrEF (40%), CKD4,  on 9/7 s/p fall.  CT head showed new 2cm left ICH.    There is no need for psych hospitalization.   Would hold  venlafaxin until sensorium clears  There is a QtC prolongation of 505 on 9/19  D/C and avoid use of antipsychotics.     PT will f/u with private psychiatric Dr Condon after d/c.
9/23/2021: Pt is a 89 yowm with hx of depression followed by Dr Ash in community. Per wife at the bedside pt started being demented "just recently, after he fell". PT has no hx of SA or psych hospitalization. he is [place] but able to understand and answer the questions.  He denied being depressed, he is able to  sleep. Appetite is  satisfying. Denies SI, does not want to die, No HI,  denies AH, and VH.   med hx is significant of HTN, T2DM, Afib on coumadin, GERD, CAD s/p stents, HFrEF (40%), CKD4,  on 9/7 s/p fall.  CT head showed new 2cm left ICH.  PT knows he is in hospital  but he is "42 years old" and when asked about dates he keep repeating "forty two".  There is no need for psych hospitalization.   Would hold  venlafaxin until sensorium clears  There is a QtC prolongation of 505 on 9/19  D/C and avoid use of antipsychotics.   If pt get agitated would add Depakote to regiment.     9/25/2021: Chart reviewed and pt seen and evaluated. Patient very lethargic, rousable to tactile stimuli by blinking. EMILIA Britton reports patient had Seroquel last night due to agitation. No EKG report available to evaluate Qtc. Full assessment coud not be done. C/L to follow up.

## 2021-09-27 NOTE — PROGRESS NOTE ADULT - REASON FOR ADMISSION
Left Head of Caudate - Non surgical hemorrhage

## 2021-09-27 NOTE — PROGRESS NOTE BEHAVIORAL HEALTH - RISK ASSESSMENT
Low acute risk: NO hx of SA or hospitalizations, does not want to die  Increased long term risk: depression, anxiety, medical problems.
Low acute risk: NO hx of SA or hospitalizations,   Increased long term risk: depression, anxiety, medical problems.
Low acute risk: NO hx of SA or hospitalizations,   Increased long term risk: depression, anxiety, medical problems.

## 2021-09-27 NOTE — PROGRESS NOTE BEHAVIORAL HEALTH - NSBHFUPINTERVALCCFT_PSY_A_CORE
EMILIA Britton reports that pt was agitated overnight and had to be medicated with Seroquel, despite recommendation to withhold.
I am better
How am I gong to get better here?

## 2021-09-27 NOTE — PROGRESS NOTE ADULT - ASSESSMENT
89y old Male with hx of HTN, IDDM, Afib on coumadin, GERD, CAD s/p stents, Dementia, recent admission w/ SDH on 9/7 s/p fall. Patient was discharged and AC was restarted on 9/14. Now admitted on 9/19 to ED w/ worsening mental status over past few days. Patient was code stroke. CT head showed 2cm left ICH in setting of supratherapeutic INR; s/p Kcentra, Vitamin K and 2 units of platelets in the ER. Also found to be hypertensive to 200s on arrival, s/p nicardipine infusion. Palliative Care consulted to assist with establishing GOC.    1) AMS: ICH+ Dementia + Delirium  - improved today, calm and following commands  - multiple etiologies including underlying dementia, with likely hyperactive delirium (sundowning)  - new ICH no doubt also contributing  - imaging appreciated  - neurosurgical notes appreciated- stable bleed, no acute need for surgical interventions, c/w Keppra, hold coumadin, f/u in 1-2 weeks  - psych consulted to help with sundowning- no SI, no HI, seroquel 12.5 mg q6h prn/ haldol 0.25 IM q6h prn (if unable to swallow) recommended  - psych documenting vascular dementia vs. mixed  - reorient as able  - fall and aspiration precautions  - son shares that he normally sleeps during the day and is up at night and takes a while to wake up. Shared psych's plan    2) afib  - supratherapeutic INR on admission  - s/p reversal  - cardio notes appreciated- suggesting that coumadin is not restarted due to inc risk of recurrent bleed, but ASA ok to start when ok with neuro    3) Debility  - PPS<40%  - care coordination note appreciated- pt lives with wife  - PT note appreciated- walked with RW 10 ft, home with home PT recommended  - speech and swallow eval appreciated- no overt aspiration, dec hearing acuity, reg texture diet    4) Prognosis  - poor  - in setting of advanced age, recurrent ICH (one traumatic, now spontaneous), Dementia, PPS<40%, multiple admission, pt at inc risk for further complications. Would likely be hospice eligible when/if family ready    5) GOC/Advanced Directives  - pt does not have capacity for decision making  - HCP on file naming wife Carli Rodríguez [phone]; son Julio Rodríguez also serving as surrogate [phone]--> both deferring to el Bernal for calls [phone]  - JOHN on chart 9/19: DNR, no limits??, DNI, send to hospital, no feeding tube, trial of IVF, use abx  - GOC meeting held 9/24- plan for home pall, family would like HHA list, floor SW/case management to follow up    *Given sx managed, GOC clear, and dispo underway will sign off as d/w el Bernal*    Thank you for including us in Mr. Rodríguez's care.     Sanju Brunson MD  Palliative Care Attending

## 2021-09-27 NOTE — PROGRESS NOTE BEHAVIORAL HEALTH - NSBHFUPINTERVALHXFT_PSY_A_CORE
PT is alert, his wife at the bedside. both happy that he is being d/dylan. PT reminds confused, resolving delirium,. He has chr sleep problems tried melatonin before.   Pt is followed by Dr Condon in community. NO agitation, no suicidal or homicidal behavior.
Chart reviewed and pt seen and evaluated. Patient very lethargic, rousable to stimuli by blinking. RN Reba reports patient had Seroquel last night due to agitation. No EKG report to evaluate Qtc. Further assessment coud not be done.
Pt is in bed, his wife at the bedside. He is more alert ,still  irritable , bur responds to redirection. No suicidal or aggressive behavior. PT cooperates with care.   Wife at the bedside thinking   that pt is better.

## 2021-10-02 NOTE — CONSULT NOTE ADULT - ASSESSMENT
89 year old male s/p right CONSTANTINO s/p femoral fracture this evening. Pmhx significant for recent left ICH 9/2021, atrial fibrillation on Coumadin, CAD, HTN, IDDM, Type 2 myocardial infarction, GERD, benign prostatic hyperplasia, chronic congestive heart failure, UTI, syncope and collapse for trip and fall.     patient was initially DNR/DNI- rescinded for OR  Repeat CT head ordered and reviewed- as for stroke concerns- patient is not a tpa candidate 2/2 recent surgery and recent ICH in 9/2021- no new ICH appreciated.   as for T1/2 VB fracture- at this time does not need brace- if patient stable may get MRI c/t spine to r/o acute vs subacute- if subacute and no pain does not need brace. If acute and painful need TLSO   no neurosurgical intervention at this time.   consider neurology consult and EEG.   discussed with Dr. Yancey.   Discussed with ortho team and ICU team- can have neuro check q2h from our perspective.   recommend goals of care conversation with family by primary team.

## 2021-10-02 NOTE — PROGRESS NOTE ADULT - ASSESSMENT
A/P  Pt cleared from trauma surgical standpoint for any indicated orthopedic intervention as required

## 2021-10-02 NOTE — DISCHARGE NOTE PROVIDER - NSDCMRMEDTOKEN_GEN_ALL_CORE_FT
amLODIPine 10 mg oral tablet: 1 tab(s) orally once a day  atorvastatin 40 mg oral tablet: 1 tab(s) orally once a day (at bedtime)  B Complex 50 oral tablet, extended release: 1 tab(s) orally once a day at lunch  Colace 100 mg oral capsule: 1 cap(s) orally once a day  cyanocobalamin 1000 mcg oral tablet: 1 tab(s) orally once a day with dinner  ferrous sulfate 325 mg (65 mg elemental iron) oral tablet: 1 tab(s) orally once a day (at bedtime)  folic acid 1 mg oral tablet: 1 tab(s) orally once a day  gabapentin 100 mg oral capsule: 1 cap(s) orally 2 times a day  ***at lunch and at bedtime***  Gas-X 80 mg oral tablet, chewable: 1 tab(s) orally 2 times a day with lunch and dinner  glimepiride 1 mg oral tablet: 1 tab(s) orally once a day (in the morning)  hydrALAZINE 50 mg oral tablet: 1 tab(s) orally every 8 hours  isosorbide mononitrate 30 mg oral tablet, extended release: 1 tab(s) orally once a day (in the morning)  Mag-Ox 400 oral tablet: 1 tab(s) orally once a day (at bedtime)  Metoprolol Succinate ER 25 mg oral tablet, extended release: 1 tab(s) orally once a day with lunch  Moderna COVID-19 Vaccine  mcg/0.5 mL intramuscular suspension: 0.5 milliliter(s) intramuscular once  ***2nd dose taken 04/01/2021***  Nephro-Devi oral tablet: 1 tab(s) orally once a day at breakfast  pantoprazole 40 mg oral delayed release tablet: 1 tab(s) orally once a day  repaglinide 0.5 mg oral tablet: 1 tab(s) orally once a day with dinner  repaglinide 0.5 mg oral tablet: 2 tab(s) orally once a day, As Needed  venlafaxine 75 mg oral capsule, extended release: 1 cap(s) orally once a day at dinner  Vitamin D3 5000 intl units (125 mcg) oral tablet: 1 tab(s) orally once a day at dinner   acetaminophen 325 mg oral tablet: 3 tab(s) orally every 8 hours  amLODIPine 10 mg oral tablet: 1 tab(s) orally once a day  atorvastatin 40 mg oral tablet: 1 tab(s) orally once a day (at bedtime)  Colace 100 mg oral capsule: 1 cap(s) orally once a day  cyanocobalamin 1000 mcg oral tablet: 1 tab(s) orally once a day with dinner  ferrous sulfate 325 mg (65 mg elemental iron) oral tablet: 1 tab(s) orally once a day (at bedtime)  folic acid 1 mg oral tablet: 1 tab(s) orally once a day  gabapentin 100 mg oral capsule: 1 cap(s) orally 2 times a day  ***at lunch and at bedtime***  Gas-X 80 mg oral tablet, chewable: 1 tab(s) orally 2 times a day with lunch and dinner  glimepiride 1 mg oral tablet: 1 tab(s) orally once a day (in the morning)  heparin: 5000 unit(s) subcutaneous every 12 hours  hydrALAZINE 50 mg oral tablet: 1 tab(s) orally every 8 hours  isosorbide mononitrate 30 mg oral tablet, extended release: 1 tab(s) orally once a day  Mag-Ox 400 oral tablet: 1 tab(s) orally once a day (at bedtime)  metoprolol succinate 25 mg oral tablet, extended release: 1 tab(s) orally once a day  pantoprazole 40 mg oral delayed release tablet: 1 tab(s) orally once a day  senna oral tablet: 2 tab(s) orally once a day (at bedtime)  venlafaxine 75 mg oral capsule, extended release: 1 cap(s) orally once a day  Vitamin D3 5000 intl units (125 mcg) oral tablet: 1 tab(s) orally once a day at dinner

## 2021-10-02 NOTE — ED PROVIDER NOTE - CLINICAL SUMMARY MEDICAL DECISION MAKING FREE TEXT BOX
89 y/o M presents to ED s/p trip and fall with head injury. Trauma alert activated.  Plan: head ct, CT spine, concern for hip fracture, will obtain x-rays, pain controls.

## 2021-10-02 NOTE — H&P ADULT - NSHPLABSRESULTS_GEN_ALL_CORE
Labs:                       10.2   16.14 )-----------( 138      ( 02 Oct 2021 08:49 )             30.9     CBC Full  -  ( 02 Oct 2021 08:49 )  WBC Count : 16.14 K/uL  RBC Count : 2.96 M/uL  Hemoglobin : 10.2 g/dL  Hematocrit : 30.9 %  Platelet Count - Automated : 138 K/uL  Mean Cell Volume : 104.4 fl  Mean Cell Hemoglobin : 34.5 pg  Mean Cell Hemoglobin Concentration : 33.0 gm/dL  Auto Neutrophil # : 14.16 K/uL  Auto Lymphocyte # : 0.87 K/uL  Auto Monocyte # : 0.89 K/uL  Auto Eosinophil # : 0.06 K/uL  Auto Basophil # : 0.05 K/uL  Auto Neutrophil % : 87.7 %  Auto Lymphocyte % : 5.4 %  Auto Monocyte % : 5.5 %  Auto Eosinophil % : 0.4 %  Auto Basophil % : 0.3 %  137  |  103  |  59<H>  ----------------------------<  253<H>  5.0   |  26  |  2.85<H>  Ca    9.8      02 Oct 2021 08:49  TPro  8.1  /  Alb  3.3  /  TBili  0.9  /  DBili  x   /  AST  39<H>  /  ALT  61  /  AlkPhos  149<H>  10-02  LIVER FUNCTIONS - ( 02 Oct 2021 08:49 )  Alb: 3.3 g/dL / Pro: 8.1 gm/dL / ALK PHOS: 149 U/L / ALT: 61 U/L / AST: 39 U/L / GGT: x         PT/INR - ( 02 Oct 2021 08:49 )   PT: 13.1 sec;   INR: 1.13 ratio    PTT - ( 02 Oct 2021 08:49 )  PTT:30.7 sec    Radiology:  Pending CT Chest/Abd/pelvis    EXAM:  CT CERVICAL SPINE                        EXAM:  CT BRAIN                        PROCEDURE DATE:  10/02/2021    INTERPRETATION:  CLINICAL STATEMENT: Trauma..  TECHNIQUE: CT of the head and cervical spine were performed without IV contrast. 3-D/MIP images obtained  COMPARISON: CT head 9/20/2021. CT cervical spine 9/7/2021  IMPRESSION:  No acute intracranial hemorrhage  Acute/subacute T1 and T2 vertebral body fractures as described above. MRI exam recommended

## 2021-10-02 NOTE — PROVIDER CONTACT NOTE (EICU) - ASSESSMENT
Problem List  1. Postop, slow to wake up, likely secondary to sedation and pain meds  2. s/p fall s/p fem neck fracture s/p hemiarthroplasty  3. s/p fall with T1/2 fracture

## 2021-10-02 NOTE — CONSULT NOTE ADULT - ASSESSMENT
#s/p mechanical fall  #Acute T1-T2 fractures  Ddx: r/o right IT fracture  -admitted under trauma service  -bed rest  -pain control  -awaiting CT pelvis  -ortho/spine consults  -pt npo for possible surgical intervention      #Afib  -Pt had SDH on 9/7 however was cleared to resume ASA by NSG per medicine's note on previous d/c on 9/27 - last ASA taken on 10/1  -Cardiology discontinued A/C in 9/2021  -inr 1.2  -CHADS VASC 5  -pt has had several falls (last one resulted in ICH)  -awaiting EKG for preop stratification  -pt uses assist device for walking, given age and comorbidities, would be on mod to high risk for any procedure requiring general anesthesia and may proceed with caution if surgery is needed expeditiously    #Stable HFrEF with h/o AS s/p tavr and ppm  -EF on 9/21 : 45%  -well seated tavr  -mild MR  -severe TR  -monitor fluid status if patient planned for OR  -no e/o fluid overload on CXR and no obvious infiltrates      #Leukocytosis  -awaiting CT chest and UA-> possibly reactive  -pt with no symptoms with exception of right hip pain      #IDDM  -Serum glucose 243  -agree with ISS  -IVF in ED  -FS q6H while npo  -Endocrine as o/p stopped HUERTAS and started on repaglinide. No HUERTAS for now      #CKD IV  -baseline 2.7-2.8  -presently at baseline  -continue to monitor        Son trice [phone] - HCP-> family wishes for DNR only, would like trial of intubation. I explained physiology to family and they insist on DNR only.          #s/p mechanical fall  #Acute T1-T2 fractures  Ddx: r/o right IT fracture  -admitted under trauma service  -bed rest  -pain control  -awaiting CT pelvis  -ortho/spine consults  -pt npo for possible surgical intervention      #Afib  -Pt had SDH on 9/7 however was cleared to resume ASA by NSG per medicine's note on previous d/c on 9/27 - last ASA taken on 10/1  -Cardiology discontinued A/C in 9/2021  -inr 1.2  -CHADS VASC 5  -pt has had several falls (last one resulted in ICH)  -awaiting EKG for preop stratification  -pt uses assist device for walking, given age and comorbidities, would be on mod to high risk for any procedure requiring general anesthesia and may proceed with caution if surgery is needed expeditiously    #Stable HFrEF with h/o AS s/p tavr and ppm  -EF on 9/21 : 45%  -well seated tavr  -mild MR  -severe TR  -monitor fluid status if patient planned for OR  -no e/o fluid overload on CXR and no obvious infiltrates      #Leukocytosis  -awaiting CT chest and UA-> possibly reactive  -pt with no symptoms with exception of right hip pain      #IDDM  -Serum glucose 243  -agree with ISS  -IVF in ED  -FS q6H while npo  -Endocrine as o/p stopped HUERTAS and started on repaglinide. No HUERTAS for now      #CKD IV  -baseline 2.7-2.8  -presently at baseline  -continue to monitor        Son trice [phone] - HCP-> family wishes for DNR only, would like trial of intubation. I explained physiology to family and they insist on DNR only.       Consult time: 80 min  GOC time:18 min       #s/p mechanical fall  #Acute T1-T2 fractures  Ddx: r/o right IT fracture  -admitted under trauma service  -bed rest  -pain control  -awaiting CT pelvis  -ortho/spine consults  -pt npo for possible surgical intervention      #Afib  -Pt had SDH on 9/7 however was cleared to resume ASA by NSG per medicine's note on previous d/c on 9/27 - last ASA taken on 10/1  -Cardiology discontinued A/C in 9/2021  -inr 1.2  -CHADS VASC 5  -pt has had several falls (last one resulted in ICH)  -awaiting EKG for preop stratification  -pt uses assist device for walking, given age and comorbidities, would be on mod to high risk for any procedure requiring general anesthesia and may proceed with caution if surgery is needed expeditiously    #Stable HFrEF with h/o AS s/p tavr and ppm  -EF on 9/21 : 45%  -well seated tavr  -mild MR  -severe TR  -monitor fluid status if patient planned for OR  -no e/o fluid overload on CXR and no obvious infiltrates      #Leukocytosis  -awaiting CT chest and UA-> possibly reactive  -pt with no symptoms with exception of right hip pain      #IDDM  -Serum glucose 243  -agree with ISS  -IVF in ED  -FS q6H while npo  -Endocrine as o/p stopped HUERTAS and started on repaglinide. No HUERTAS for now      #CKD IV  -baseline 2.7-2.8  -presently at baseline  -continue to monitor        Son trice [phone] - HCP-> family wishes for DNR only, would like trial of intubation. I explained physiology to family and they insist on DNR only.       Consult time: 80 min  GOC time:18 min    D/W ortho and surgical teams

## 2021-10-02 NOTE — DISCHARGE NOTE PROVIDER - HOSPITAL COURSE
Orthopedic Summary  H&P:  Pt is a 89y Male      Now s/p RIGHT Hip Hemiarthroplasty for fracture. Pt is afebrile with stable vital signs. Pain is controlled. Exam reveals intact EHL FHL TA GS, +DP. Dressing is clean and dry with a new bandage on.    Hospital Course:  Patient presented to [facility] ED after a fall, found to have a hip fracture, and admitted to the Medical Service. Pt was  medically/cardiac cleared prior to surgery. Prophylactic antibiotics were started before the procedure and continued for 24 hours. They were admitted after surgery to the orthopedic floor.  There were no orthopedic complications during the hospital stay. All home medications were continued.    Routine consults were obtained from the Anticoagulation Team for DVT/PE prophylaxis, from Physical Therapy, and followed by Medicine for Co-management. Patient was placed on  anticoagulation.  Pertinent home medications were continued.  Daily labs were followed.      On POD 0 there were no major issues. Pt received PT daily and was Discharged once cleared per Medicine.  The orthopedic Attending is aware and agrees. See addendum to DC summary per medical team below for any additional info or if any changes.

## 2021-10-02 NOTE — H&P ADULT - HISTORY OF PRESENT ILLNESS
Trauma Consult, 10/2/21 1036am, attending bedside 1040am    Pt s.p mechanical trip/fall 10/2/21  89 y/o M with PMHx of atrial fibrillation on Coumadin, CAD, HTN, IDDM, Type 2 myocardial infarction, GERD, benign prostatic hyperplasia, chronic congestive heart failure, UTI, syncope and collapse for trip and fall. Patient states he was walking with a walker when he slipped and fell to the right side. Patient did hit his head, no LOC. He does not currently take blood thinner. Patient c/o right hip pain and is unable to walk on right side    Pt seen and examined at bedside. Pt is awake, alert, comfortable, cooperative, orineted, pt in no acute distress. Pt denied c/o fever, chills, chest pain, SOB, abd pain, N/V/D,hemoptysis, hematemesis, hematuria, hematochexia, headache, diplopia, vertigo, dizzyness.

## 2021-10-02 NOTE — H&P ADULT - ASSESSMENT
A/P:  Right hip fracture  T1 and T2 fractures  Spine surgery consult  Ortho consult  Hospitalist consult for medical management  Multiple medical comorbidities including IDDM, s/p TVAR, HTN, Chronic congestive heart failure, Atrial fibrillation, GERD, BPH, MI, Syncope and collapse, Artificial cardiac pacemaker  Nephrology consult  Fall risk precautions  NPO, IV hydration  GI/DVT prophylaxis  Pain control  F/U labs, radiologic studies  Pt will be monitored for signs of evolution/resolution of pathology and surgical intervention as required and warranted  Pt aware of and agrees with all of the above

## 2021-10-02 NOTE — ED ADULT NURSE NOTE - NSIMPLEMENTINTERV_GEN_ALL_ED
Implemented All Fall with Harm Risk Interventions:  [place] to call system. Call bell, personal items and telephone within reach. Instruct patient to call for assistance. Room bathroom lighting operational. Non-slip footwear when patient is off stretcher. Physically safe environment: no spills, clutter or unnecessary equipment. Stretcher in lowest position, wheels locked, appropriate side rails in place. Provide visual cue, wrist band, yellow gown, etc. Monitor gait and stability. Monitor for mental status changes and reorient to person, place, and time. Review medications for side effects contributing to fall risk. Reinforce activity limits and safety measures with patient and family. Provide visual clues: red socks.

## 2021-10-02 NOTE — PROVIDER CONTACT NOTE (EICU) - BACKGROUND
89M with a history of A fib on DOAC, CAD/MI with pacemaker, HTN, CKD, CHF s/p fall at ome found to have T1/2 fracture, femoral neck fracture s/p hemiarthroplasty. Postop, able to be extubated, but slow to wake up, non focal deficit.  K 5.6, Cr 3.03

## 2021-10-02 NOTE — DISCHARGE NOTE PROVIDER - NSDCFUADDINST_GEN_ALL_CORE_FT
Discharge Instructions for Right Hip Hemiarthroplasty    1. PAIN CONTROL: See Med Rec.  2. ACTIVITY: Weight Bearing as Tolerated with assistance and rolling walker. Posterior Hip Precautions. Abduction pillow while in bed or chair for 6 weeks.  3. PT: daily, Posterior Hip Precautions.  4. DVT/PE PROPHYLAXIS: Continue DVT/PE Prophylaxis. See Med Rec for Duration and dose.  5. BANDAGE: Change dressing to a new bandage POD7. May change sooner if dressing saturated or falling off. DO NOT REMOVE BANDAGE TO CHECK WOUND ON INTAKE.  6. STAPLES: RN Remove Staples POD14.  7. SHOWER: Okay to shower. Do not soak, submerge or let shower stream beat on dressing/wound.  8. FOLLOW UP: Follow-up with Orthopedic Surgeon Dr. Jones in 10-14 Days. Call Office For Appointment.   Discharge Instructions for Right Hip Hemiarthroplasty    1. PAIN CONTROL: See Med Rec.  2. ACTIVITY: Weight Bearing as Tolerated with assistance and rolling walker. Posterior Hip Precautions. Abduction pillow while in bed or chair for 6 weeks.  3. PT: daily, Posterior Hip Precautions.  4. DVT/PE PROPHYLAXIS: Continue DVT/PE Prophylaxis. See Med Rec for Duration and dose.  5. BANDAGE: Change dressing to a new Silver bandage (Mepilex, Silverlon) POD7 (10/9). May change sooner if dressing saturated or falling off. DO NOT REMOVE BANDAGE TO CHECK WOUND ON INTAKE.  6. STAPLES: RN Remove Staples POD14 (10/16)  7. SHOWER: Okay to shower. Do not soak, submerge or let shower stream beat on dressing/wound.  8. FOLLOW UP: Follow-up with Orthopedic Surgeon Dr. Jones in 10-14 Days. Call Office For Appointment.   Take all appropriate fall risk precautions    Please seek immediate medical attention for chest pain, shortness of breath, any adverse changes to health    Please follow up with nephrology, orthopedics, neurosurgery, and your PMD as indicated    Discharge Instructions for Right Hip Hemiarthroplasty    1. PAIN CONTROL: See Med Rec.  2. ACTIVITY: Weight Bearing as Tolerated with assistance and rolling walker. Posterior Hip Precautions. Abduction pillow while in bed or chair for 6 weeks.  3. PT: daily, Posterior Hip Precautions.  4. DVT/PE PROPHYLAXIS: Continue DVT/PE Prophylaxis. See Med Rec for Duration and dose.  5. BANDAGE: Change dressing to a new Silver bandage (Mepilex, Silverlon) POD7 (10/9). May change sooner if dressing saturated or falling off. DO NOT REMOVE BANDAGE TO CHECK WOUND ON INTAKE.  6. STAPLES: RN Remove Staples POD14 (10/16)  7. SHOWER: Okay to shower. Do not soak, submerge or let shower stream beat on dressing/wound.  8. FOLLOW UP: Follow-up with Orthopedic Surgeon Dr. Jones in 10-14 Days. Call Office For Appointment.

## 2021-10-02 NOTE — DISCHARGE NOTE PROVIDER - CARE PROVIDERS DIRECT ADDRESSES
[email or web address]

## 2021-10-02 NOTE — ED PROVIDER NOTE - MUSCULOSKELETAL, MLM
Spine appears normal, range of motion is not limited, no muscle or joint tenderness. Full range of motion of foot. Right lower extremity rotated and shorten

## 2021-10-02 NOTE — ED PROVIDER NOTE - PROGRESS NOTE DETAILS
Amanda Mina for attending Dr. Green: Spoke to trauma, will come see patient and recommending hospitalist consult. Spoke to hospitalist, will consult patient. Also paged ortho and spine to come see patient. Amanda Mina for attending Dr. Lang: Spoke to trauma, will come see patient and recommending hospitalist consult. Spoke to hospitalist, will consult patient. Also paged ortho and spine to come see patient. Precious LUCAS: spoke with [facility], q8 neuro checks. will admit to trauma, med/surg

## 2021-10-02 NOTE — CONSULT NOTE ADULT - SUBJECTIVE AND OBJECTIVE BOX
89 yo M with pmh afib on asa, cad, dementia,  AS s/p TAVR 10/20, h/o PCI 10/20, h/o fall s/p SDH on 21, systolic chf, s/p ppm, IDDM, HTN, MI  presents s/p slip and fall. Was walking with walker when states he suddenly tripped and fell. no syncope. Complains of right hip pain.  No head strike or LOC. Pt on ASA for afib (coumadin held d/t high risk of falls by cardiology). INR 1.2.   Denies chest pain or sob. no HA. No blurry vision or tinitus. No N/V/D.    No fevers or chills. WBC 16. CXR negative. Awaiting UA    Son [phone]  Last admit patient was DNR only    PAST MEDICAL/SURGICAL/FAMILY/SOCIAL HISTORY:    Past Medical, Past Surgical, and Family History:  PAST MEDICAL HISTORY:  Atrial fibrillation, unspecified type     Benign prostatic hyperplasia without lower urinary tract symptoms     Chronic congestive heart failure, unspecified congestive heart failure type     Gastroesophageal reflux disease, esophagitis presence not specified     HTN (hypertension)     IDDM (insulin dependent diabetes mellitus)     Syncope and collapse 2018    Type 2 myocardial infarction 2017    UTI (urinary tract infection) 2017.     PAST SURGICAL HISTORY:  Arthropathy of shoulder region     Artificial cardiac pacemaker     History of appendectomy     S/P TAVR (transcatheter aortic valve replacement).     FAMILY HISTORY:  No pertinent family history in first degree relatives.     Tobacco Usage:  · Tobacco Usage	 non smoker,   social: neg x 3            REVIEW OF SYSTEMS:    CONSTITUTIONAL: No weakness, fevers or chills  EYES/ENT: No visual changes;  No vertigo or throat pain   NECK: No pain or stiffness  RESPIRATORY: No cough, wheezing, hemoptysis; No shortness of breath  CARDIOVASCULAR: No chest pain or palpitations  GASTROINTESTINAL: No abdominal or epigastric pain. No nausea, vomiting, or hematemesis; No diarrhea or constipation. No melena or hematochezia.  GENITOURINARY: No dysuria, frequency or hematuria  NEUROLOGICAL: No numbness or weakness  SKIN: No itching, burning, rashes, or lesions   All other review of systems is negative unless indicated above      ICU Vital Signs Last 24 Hrs  T(C): 36.6 (02 Oct 2021 08:37), Max: 36.6 (02 Oct 2021 08:37)  T(F): 97.8 (02 Oct 2021 08:37), Max: 97.8 (02 Oct 2021 08:37)  HR: 71 (02 Oct 2021 08:37) (71 - 71)  BP: 164/70 (02 Oct 2021 08:37) (164/70 - 164/70)  BP(mean): --  ABP: --  ABP(mean): --  RR: 18 (02 Oct 2021 08:37) (18 - 18)  SpO2: 90% (02 Oct 2021 08:37) (90% - 90%)      PHYSICAL EXAM:    Constitutional: NAD, awake and alert, well-developed  HEENT: PERR, EOMI, Normal Hearing, MMM  Neck: Soft and supple, No LAD, No JVD  Respiratory: Breath sounds are clear bilaterally, No wheezing, rales or rhonchi  Cardiovascular: S1 and S2, regular rate and rhythm, no Murmurs, gallops or rubs  Gastrointestinal: Bowel Sounds present, soft, nontender, nondistended, no guarding, no rebound  Extremities: No peripheral edema  Vascular: 2+ peripheral pulses  Neurological: A/O x 3, no focal deficits  Musculoskeletal: 5/5 strength b/l upper and lower extremities. R hip pain, TTP  Skin: No rashes            LABS: All Labs Reviewed:                        10.2   16.14 )-----------( 138      ( 02 Oct 2021 08:49 )             30.9     10-    137  |  103  |  59<H>  ----------------------------<  253<H>  5.0   |  26  |  2.85<H>    Ca    9.8      02 Oct 2021 08:49    TPro  8.1  /  Alb  3.3  /  TBili  0.9  /  DBili  x   /  AST  39<H>  /  ALT  61  /  AlkPhos  149<H>  10-02    PT/INR - ( 02 Oct 2021 08:49 )   PT: 13.1 sec;   INR: 1.13 ratio         PTT - ( 02 Oct 2021 08:49 )  PTT:30.7 sec          Blood Culture:         < from: CT Head No Cont (10.02.21 @ 09:36) >  IMPRESSION:  No acute intracranial hemorrhage    Acute/subacute T1 and T2 vertebral body fractures as described above. MRI exam recommended    --- End of Report ---    < end of copied text >  Right hip xray: wet read-> possible right IT fracture-> CT hip pending  EKG: pending, not done      < from: TTE Echo Complete w/o Contrast w/ Doppler (21 @ 11:47) >   Impression     Summary     The left ventricle is normal in size.   Mild concentric left ventricular hypertrophy is present.   Mildly reduced LV function.   Estimated left ventricular ejection fraction is 45-50%.   The left atrium is mildly dilated.   The right atrium appears mildly dilated.   The right ventricle is normal in size, wall thickness, wall motion, and   contractility.   A device wire is seen in the RV and RA.   Moderate pulmonary hypertension is present.     Well seated TAVR prosthetic valve in the aortic position.   Peak trans-prosthetic gradient is within normal limitations for this type   of prosthesis.   Fibrocalcific changes noted to the mitral valve leaflets with preserved   leaflet excursion.   Mild to moderate mitral regurgitation is present.   Moderate to severe (3+) tricuspid valve regurgitation is present.     No evidence of pericardial effusion.   The IVC is dilated with decreased respiratory variation.    < end of copied text >       89 yo M with pmh afib on asa, cad, dementia,  AS s/p TAVR 10/20, h/o PCI 10/20, h/o fall s/p SDH on 21, systolic chf, s/p ppm, IDDM, HTN, MI  presents s/p slip and fall. Was walking with walker when states he suddenly tripped and fell. no syncope. Complains of right hip pain.  No head strike or LOC. Pt on ASA for afib (coumadin held d/t high risk of falls by cardiology). INR 1.2.   Denies chest pain or sob. no HA. No blurry vision or tinitus. No N/V/D.    No fevers or chills. WBC 16. CXR negative. Awaiting UA  All of history obtained from son as patient very [place] and confused 2/2 to dementia; clinically patient has gotten worse since SDH in september.     Son [phone]  Last admit patient was DNR only    PAST MEDICAL/SURGICAL/FAMILY/SOCIAL HISTORY:    Past Medical, Past Surgical, and Family History:  PAST MEDICAL HISTORY:  Atrial fibrillation, unspecified type     Benign prostatic hyperplasia without lower urinary tract symptoms     Chronic congestive heart failure, unspecified congestive heart failure type     Gastroesophageal reflux disease, esophagitis presence not specified     HTN (hypertension)     IDDM (insulin dependent diabetes mellitus)     Syncope and collapse 2018    Type 2 myocardial infarction 2017    UTI (urinary tract infection) 2017.     PAST SURGICAL HISTORY:  Arthropathy of shoulder region     Artificial cardiac pacemaker     History of appendectomy     S/P TAVR (transcatheter aortic valve replacement).     FAMILY HISTORY:  No pertinent family history in first degree relatives.     Tobacco Usage:  · Tobacco Usage	 non smoker,   social: neg x 3            REVIEW OF SYSTEMS:    CONSTITUTIONAL: No weakness, fevers or chills  EYES/ENT: No visual changes;  No vertigo or throat pain   NECK: No pain or stiffness  RESPIRATORY: No cough, wheezing, hemoptysis; No shortness of breath  CARDIOVASCULAR: No chest pain or palpitations  GASTROINTESTINAL: No abdominal or epigastric pain. No nausea, vomiting, or hematemesis; No diarrhea or constipation. No melena or hematochezia.  GENITOURINARY: No dysuria, frequency or hematuria  NEUROLOGICAL: No numbness or weakness  SKIN: No itching, burning, rashes, or lesions   All other review of systems is negative unless indicated above      ICU Vital Signs Last 24 Hrs  T(C): 36.6 (02 Oct 2021 08:37), Max: 36.6 (02 Oct 2021 08:37)  T(F): 97.8 (02 Oct 2021 08:37), Max: 97.8 (02 Oct 2021 08:37)  HR: 71 (02 Oct 2021 08:37) (71 - 71)  BP: 164/70 (02 Oct 2021 08:37) (164/70 - 164/70)  BP(mean): --  ABP: --  ABP(mean): --  RR: 18 (02 Oct 2021 08:37) (18 - 18)  SpO2: 90% (02 Oct 2021 08:37) (90% - 90%)      PHYSICAL EXAM:    Constitutional: NAD, awake and alert, well-developed  HEENT: PERR, EOMI, Normal Hearing, MMM  Neck: Soft and supple, No LAD, No JVD  Respiratory: Breath sounds are clear bilaterally, No wheezing, rales or rhonchi  Cardiovascular: S1 and S2, regular rate and rhythm, no Murmurs, gallops or rubs  Gastrointestinal: Bowel Sounds present, soft, nontender, nondistended, no guarding, no rebound  Extremities: No peripheral edema  Vascular: 2+ peripheral pulses  Neurological: A/O x 3, no focal deficits  Musculoskeletal: 5/5 strength b/l upper and lower extremities. R hip pain, TTP  Skin: No rashes            LABS: All Labs Reviewed:                        10.2   16.14 )-----------( 138      ( 02 Oct 2021 08:49 )             30.9     10-    137  |  103  |  59<H>  ----------------------------<  253<H>  5.0   |  26  |  2.85<H>    Ca    9.8      02 Oct 2021 08:49    TPro  8.1  /  Alb  3.3  /  TBili  0.9  /  DBili  x   /  AST  39<H>  /  ALT  61  /  AlkPhos  149<H>  10-02    PT/INR - ( 02 Oct 2021 08:49 )   PT: 13.1 sec;   INR: 1.13 ratio         PTT - ( 02 Oct 2021 08:49 )  PTT:30.7 sec          Blood Culture:         < from: CT Head No Cont (10.02.21 @ 09:36) >  IMPRESSION:  No acute intracranial hemorrhage    Acute/subacute T1 and T2 vertebral body fractures as described above. MRI exam recommended    --- End of Report ---    < end of copied text >  Right hip xray: wet read-> possible right IT fracture-> CT hip pending  EKG: pending, not done      < from: TTE Echo Complete w/o Contrast w/ Doppler (21 @ 11:47) >   Impression     Summary     The left ventricle is normal in size.   Mild concentric left ventricular hypertrophy is present.   Mildly reduced LV function.   Estimated left ventricular ejection fraction is 45-50%.   The left atrium is mildly dilated.   The right atrium appears mildly dilated.   The right ventricle is normal in size, wall thickness, wall motion, and   contractility.   A device wire is seen in the RV and RA.   Moderate pulmonary hypertension is present.     Well seated TAVR prosthetic valve in the aortic position.   Peak trans-prosthetic gradient is within normal limitations for this type   of prosthesis.   Fibrocalcific changes noted to the mitral valve leaflets with preserved   leaflet excursion.   Mild to moderate mitral regurgitation is present.   Moderate to severe (3+) tricuspid valve regurgitation is present.     No evidence of pericardial effusion.   The IVC is dilated with decreased respiratory variation.    < end of copied text >

## 2021-10-02 NOTE — PROGRESS NOTE ADULT - SUBJECTIVE AND OBJECTIVE BOX
< from: CT Abdomen and Pelvis No Cont (10.02.21 @ 11:35) >  EXAM:  CT ABDOMEN AND PELVIS                          EXAM:  CT CHEST                            PROCEDURE DATE:  10/02/2021          INTERPRETATION:  CLINICAL INFORMATION: Trauma with generalized abdominal pain    COMPARISON: Chest CT 9/25/2021, CT abdomen pelvis 4/21/2018    CONTRAST/COMPLICATIONS:  IV Contrast: NONE  Oral Contrast: NONE  Complications: None reported at time of study completion    PROCEDURE:  CT of the Chest, Abdomen and Pelvis was performed.  Sagittal and coronal reformats were performed.    FINDINGS:  CHEST:  LUNGS AND LARGE AIRWAYS: Improving mild patchy left lower lobe airspace disease.  PLEURA: Trace bilateral effusions. No pneumothorax.  VESSELS: Aortic atherosclerosis without aneurysm.  HEART: Right-sided abandonedpacer leads. Micra pacemaker is present. Aortic valve replacement. Moderate cardiomegaly. No pericardial effusion. Coronary artery calcifications are present.  MEDIASTINUM AND CHANI: Mild adenopathy.  CHEST WALL AND LOWER NECK: No hematoma.    ABDOMENAND PELVIS:  LIVER: Normal.  BILE DUCTS: Nondilated.  GALLBLADDER: Prior cholecystectomy.  SPLEEN: Normal.  PANCREAS: Normal.  ADRENALS: Normal.  KIDNEYS/URETERS: No hydronephrosis or urinary tract calculi.    BLADDER: Normal.  REPRODUCTIVE ORGANS: Enlarged prostate.    BOWEL: No bowel-related abnormality.  PERITONEUM: No free air, ascites, or hemoperitoneum.  VESSELS: Aortoiliac atherosclerosis without aneurysm.  RETROPERITONEUM/LYMPH NODES: No adenopathy or hematoma.  ABDOMINAL WALL: No hematoma or contusion.  BONES: Displaced and angulated right subcapital femoral neck fracture. No displaced rib fracture. No thoracolumbar spine compression fracture or traumatic malalignment.    IMPRESSION:  *  Displaced and angulated right subcapital femoral neck fracture.  *  Improving mild patchy left lower lobe airspace disease.      --- End of Report ---            YUAN MENESES MD; Attending Radiologist  This document has been electronically signed. Oct  2 2021 12:13PM    < end of copied text >

## 2021-10-02 NOTE — ED ADULT TRIAGE NOTE - CHIEF COMPLAINT QUOTE
Pt BIBA s/p fall from standing height, unknown LOC, A & o x 3. pt c/o right hip pain. As per EMS pt at baseline is a member of service with their department. Pt denies chest pain or shortness of breath Pt BIBA s/p fall from standing height, unknown LOC, A & o x 3. pt c/o right hip pain, mid upper back pain. As per EMS pt at baseline is a member of service with their department. Pt denies chest pain or shortness of breath Pt BIBA s/p fall from standing height, unknown LOC, A & o x 3. pt c/o right hip pain, mid upper back pain. As per EMS pt at baseline is a member of service with their department. Pt denies chest pain or shortness of breath. TA called

## 2021-10-02 NOTE — PROGRESS NOTE ADULT - SUBJECTIVE AND OBJECTIVE BOX
Informed by ortho PA and PACU staff that pt post operatively is slow to wake up/mentation. Pt moving all extremities though not to command at present. CT head performed  Monitor neurochecks  Neurosurgery aware  Critical care staff aware  Will monitor

## 2021-10-02 NOTE — CONSULT NOTE ADULT - SUBJECTIVE AND OBJECTIVE BOX
Patient is a 89y old  Male who presents with a chief complaint of vertebral fractures, hip fracture (02 Oct 2021 13:21) Known to neurosurgery from 9/2021 who presented as a stroke code and ended up with left ICH. PMHx TAVR (10/20), CAD/stents (10/20), IDDM, HTN, Afib, GERD on Coumadin presented to the ER with Left head of caudate ICH 1.5cm (non-surgical).  Pt with recent fall and impact to head on Sept 7th 2021.  Pt noted with 3 days of less activity, sleepiness, less communicative / participatory - change in MS.  CTH then revealed Left head of caudate 2cm ICH, no IVH.  Pt recently restarted on 9/14 ASA and coumadin dosing once again. Reversed in ED with [facility] and given 1 pack platelets and Vit K at that time.   Patient presently admitted with hip fracture s/p Right hip hemiarthroplasty with difficulty awakening post anesthesia.     HPI:  Trauma Consult, 10/2/21 1036am, attending bedside 1040am    Pt s.p mechanical trip/fall 10/2/21  89 y/o M with PMHx of atrial fibrillation on Coumadin, CAD, HTN, IDDM, Type 2 myocardial infarction, GERD, benign prostatic hyperplasia, chronic congestive heart failure, UTI, syncope and collapse for trip and fall. Patient states he was walking with a walker when he slipped and fell to the right side. Patient did hit his head, no LOC. He does not currently take blood thinner. Patient c/o right hip pain and is unable to walk on right side    Pt seen and examined at bedside pre operatively was awake, alert, comfortable, cooperative, oriented, pt in no acute distress. Pt denied c/o fever, chills, chest pain, SOB, abd pain, N/V/D,hemoptysis, hematemesis, hematuria, hematochexia, headache, diplopia, vertigo, dizzyness.  (02 Oct 2021 11:12)    Post operatively slow to awaken, not following commands, small subtle myoclonic jerking. PERRL, toes up and w/d to pain.     PAST MEDICAL & SURGICAL HISTORY:  IDDM (insulin dependent diabetes mellitus)    HTN (hypertension)    Chronic congestive heart failure, unspecified congestive heart failure type    Atrial fibrillation, unspecified type    Gastroesophageal reflux disease, esophagitis presence not specified    Benign prostatic hyperplasia without lower urinary tract symptoms    Syncope and collapse  5/4/2018    Type 2 myocardial infarction  12/17/2017    UTI (urinary tract infection)  7/12/2017    Artificial cardiac pacemaker    History of appendectomy    Arthropathy of shoulder region    S/P TAVR (transcatheter aortic valve replacement)        FAMILY HISTORY:        Social Hx:  Nonsmoker, no drug or alcohol use    MEDICATIONS  (STANDING):  acetaminophen   Tablet .. 975 milliGRAM(s) Oral every 8 hours  amLODIPine   Tablet 10 milliGRAM(s) Oral daily  atorvastatin 40 milliGRAM(s) Oral at bedtime  ceFAZolin   IVPB 2000 milliGRAM(s) IV Intermittent every 8 hours  cholecalciferol 3500 Unit(s) Oral daily  cyanocobalamin 1000 MICROGram(s) Oral daily  dextrose 40% Gel 15 Gram(s) Oral once  dextrose 5%. 1000 milliLiter(s) (50 mL/Hr) IV Continuous <Continuous>  dextrose 5%. 1000 milliLiter(s) (100 mL/Hr) IV Continuous <Continuous>  dextrose 50% Injectable 25 Gram(s) IV Push once  dextrose 50% Injectable 12.5 Gram(s) IV Push once  dextrose 50% Injectable 25 Gram(s) IV Push once  ferrous    sulfate 325 milliGRAM(s) Oral at bedtime  folic acid 1 milliGRAM(s) Oral daily  gabapentin 100 milliGRAM(s) Oral three times a day  glucagon  Injectable 1 milliGRAM(s) IntraMuscular once  hydrALAZINE 50 milliGRAM(s) Oral every 8 hours  insulin lispro (ADMELOG) corrective regimen sliding scale   SubCutaneous three times a day before meals  insulin lispro (ADMELOG) corrective regimen sliding scale   SubCutaneous at bedtime  isosorbide   mononitrate ER Tablet (IMDUR) 30 milliGRAM(s) Oral daily  lactated ringers. 1000 milliLiter(s) (75 mL/Hr) IV Continuous <Continuous>  lactated ringers. 1000 milliLiter(s) (75 mL/Hr) IV Continuous <Continuous>  metoprolol succinate ER 25 milliGRAM(s) Oral daily  pantoprazole    Tablet 40 milliGRAM(s) Oral daily  senna 2 Tablet(s) Oral at bedtime  sodium chloride 0.9%. 1000 milliLiter(s) (100 mL/Hr) IV Continuous <Continuous>  venlafaxine XR. 75 milliGRAM(s) Oral daily       Allergies    Aleve (Vomiting)  codeine (Unknown)  morphine (Nausea)  naproxen (Vomiting)    Intolerances        ROS: Pertinent positives in HPI, all other ROS were reviewed and are negative.      Vital Signs Last 24 Hrs  T(C): 36.2 (02 Oct 2021 20:45), Max: 36.6 (02 Oct 2021 08:37)  T(F): 97.1 (02 Oct 2021 20:45), Max: 97.8 (02 Oct 2021 08:37)  HR: 62 (02 Oct 2021 21:30) (60 - 71)  BP: 130/65 (02 Oct 2021 21:30) (130/65 - 170/74)  BP(mean): --  RR: 17 (02 Oct 2021 21:30) (16 - 20)  SpO2: 100% (02 Oct 2021 21:30) (90% - 100%)      Neurological exam:  [id number]  Obtunded, PERRL, +gag overcomes doll's reflex. non verbal not following commands, grimaces and w/d to pain. occasional myoclonic jerking noted. toes up b/l       Labs:                        9.7    20.48 )-----------( 138      ( 02 Oct 2021 18:02 )             29.8     10-02    135  |  103  |  61<H>  ----------------------------<  296<H>  5.6<H>   |  24  |  3.03<H>    Ca    9.1      02 Oct 2021 18:02    TPro  8.1  /  Alb  3.3  /  TBili  0.9  /  DBili  x   /  AST  39<H>  /  ALT  61  /  AlkPhos  149<H>  10-02 09-21 Chol 101 LDL -- HDL 44 Trig 107, 09-20 Chol 101 LDL -- HDL 40<L> Trig 111  PT/INR - ( 02 Oct 2021 08:49 )   PT: 13.1 sec;   INR: 1.13 ratio         PTT - ( 02 Oct 2021 08:49 )  PTT:30.7 sec    RADIOLOGY:  10/2/2021- ct head reviewed official report pending- no new ICH.   < from: CT Cervical Spine No Cont (10.02.21 @ 09:37) >  Acute/subacute T1 and T2 vertebral body fractures as described above. MRI exam recommended      < end of copied text >

## 2021-10-02 NOTE — ED PROVIDER NOTE - SKIN, MLM
Skin normal color for race, warm, dry and intact. No evidence of rash. Healing abrasion on back of head.

## 2021-10-02 NOTE — PROGRESS NOTE ADULT - SUBJECTIVE AND OBJECTIVE BOX
Orthopedics Post-op Check:    This is a 89y/o Male s/p Right Hip Hemiarthroplasty POD 0.    Patient was seen approximately 4 hours after end of surgery. Patient was still unable to wake up from anesthesia. Patient was unarousable, not following commands. Patient would not open eyes. Patient responded to painful stimuli but would not localize to area well. Patient was also having jerking movements, seemingly sporadic. Patient had extended toes when not moving.     PE:  PEERL  Positive babinski  O2 Sating well with nonrebreather, no Tachypnea or deep breating    GCS: 6    Dressing clean, dry and intact.  SCDs Foot pumps in place.  Calves are soft.  Moving all toes and ankle, +EHL/FHL/TA/GS, sporadic movement, not following commands  Unable to assess sensation   +DP pulses     Vital Signs Last 24 Hrs  T(C): 36.2 (10-02-21 @ 20:45), Max: 36.6 (10-02-21 @ 08:37)  T(F): 97.1 (10-02-21 @ 20:45), Max: 97.8 (10-02-21 @ 08:37)  HR: 62 (10-02-21 @ 21:30) (60 - 71)  BP: 130/65 (10-02-21 @ 21:30) (130/65 - 170/74)  BP(mean): --  RR: 17 (10-02-21 @ 21:30) (16 - 20)  SpO2: 100% (10-02-21 @ 21:30) (90% - 100%)                        9.7    20.48 )-----------( 138      ( 02 Oct 2021 18:02 )             29.8     02 Oct 2021 18:02    135    |  103    |  61     ----------------------------<  296    5.6     |  24     |  3.03     Ca    9.1        02 Oct 2021 18:02    TPro  8.1    /  Alb  3.3    /  TBili  0.9    /  DBili  x      /  AST  39     /  ALT  61     /  AlkPhos  149    02 Oct 2021 08:49    PT/INR - ( 02 Oct 2021 08:49 )   PT: 13.1 sec;   INR: 1.13 ratio         PTT - ( 02 Oct 2021 08:49 )  PTT:30.7 sec      A/P:  POD 0 Right Hip Hemiarthroplasty    -Patient continues to be unresponsive 4 hours after end of surgery/anesthesia  -Patient having clinical picture which while unusual, could be normal for him after anesthesia, but also concerning for intracranial pathology or seizure activity  -Ordered STAT CT Brain, FU Results and read  -Patient leaving PACU for step down unit, but due to capacity issues, being transferred to ICU  -Prophylactic antibiotics

## 2021-10-02 NOTE — PHARMACOTHERAPY INTERVENTION NOTE - COMMENTS
Medication history complete, confirmed with patient's spouse at [phone] because patient was unable to help, confirmed with Hung.
21

## 2021-10-02 NOTE — CONSULT NOTE ADULT - SUBJECTIVE AND OBJECTIVE BOX
89y Male presents with right femoral neck fracture. Patient demented at baseline, hx obtained from wife guillermo and son kayden. Reports ambualtion w/ walker at baseline. NPO since last night. Afib on ASA (last taken 10/1) and coumadin (no longer taking due to fall in september) Denies numbness/tingling in the affected extremity. Denies head strike/LOC/other orthopedic injuries at this time.     PAST MEDICAL & SURGICAL HISTORY:  IDDM (insulin dependent diabetes mellitus)    HTN (hypertension)    Chronic congestive heart failure, unspecified congestive heart failure type    Atrial fibrillation, unspecified type    Gastroesophageal reflux disease, esophagitis presence not specified    Benign prostatic hyperplasia without lower urinary tract symptoms    Syncope and collapse  5/4/2018    Type 2 myocardial infarction  12/17/2017    UTI (urinary tract infection)  7/12/2017    Artificial cardiac pacemaker    History of appendectomy    Arthropathy of shoulder region    S/P TAVR (transcatheter aortic valve replacement)      Home Medications:  atorvastatin 40 mg oral tablet: 1 tab(s) orally once a day (at bedtime) (02 Oct 2021 12:16)  B Complex 50 oral tablet, extended release: 1 tab(s) orally once a day at lunch (02 Oct 2021 12:16)  Colace 100 mg oral capsule: 1 cap(s) orally once a day (02 Oct 2021 12:16)  cyanocobalamin 1000 mcg oral tablet: 1 tab(s) orally once a day with dinner (02 Oct 2021 12:16)  ferrous sulfate 325 mg (65 mg elemental iron) oral tablet: 1 tab(s) orally once a day (at bedtime) (02 Oct 2021 12:16)  gabapentin 100 mg oral capsule: 1 cap(s) orally 2 times a day  ***at lunch and at bedtime*** (02 Oct 2021 12:16)  Gas-X 80 mg oral tablet, chewable: 1 tab(s) orally 2 times a day with lunch and dinner (02 Oct 2021 12:16)  glimepiride 1 mg oral tablet: 1 tab(s) orally once a day (in the morning) (02 Oct 2021 12:16)  isosorbide mononitrate 30 mg oral tablet, extended release: 1 tab(s) orally once a day (in the morning) (02 Oct 2021 12:16)  Mag-Ox 400 oral tablet: 1 tab(s) orally once a day (at bedtime) (02 Oct 2021 12:16)  Metoprolol Succinate ER 25 mg oral tablet, extended release: 1 tab(s) orally once a day with lunch (02 Oct 2021 12:16)  Moderna COVID-19 Vaccine  mcg/0.5 mL intramuscular suspension: 0.5 milliliter(s) intramuscular once  ***2nd dose taken 04/01/2021*** (02 Oct 2021 12:16)  Nephro-Devi oral tablet: 1 tab(s) orally once a day at breakfast (02 Oct 2021 12:16)  pantoprazole 40 mg oral delayed release tablet: 1 tab(s) orally once a day (02 Oct 2021 12:16)  repaglinide 0.5 mg oral tablet: 1 tab(s) orally once a day with dinner (02 Oct 2021 12:16)  repaglinide 0.5 mg oral tablet: 2 tab(s) orally once a day, As Needed (02 Oct 2021 12:16)  venlafaxine 75 mg oral capsule, extended release: 1 cap(s) orally once a day at dinner (02 Oct 2021 12:16)  Vitamin D3 5000 intl units (125 mcg) oral tablet: 1 tab(s) orally once a day at dinner (02 Oct 2021 12:16)    Allergies    Aleve (Vomiting)  codeine (Unknown)  morphine (Nausea)  naproxen (Vomiting)    Intolerances                              10.2   16.14 )-----------( 138      ( 02 Oct 2021 08:49 )             30.9     10-02    137  |  103  |  59<H>  ----------------------------<  253<H>  5.0   |  26  |  2.85<H>    Ca    9.8      02 Oct 2021 08:49    TPro  8.1  /  Alb  3.3  /  TBili  0.9  /  DBili  x   /  AST  39<H>  /  ALT  61  /  AlkPhos  149<H>  10-02    PT/INR - ( 02 Oct 2021 08:49 )   PT: 13.1 sec;   INR: 1.13 ratio         PTT - ( 02 Oct 2021 08:49 )  PTT:30.7 sec        Vital Signs Last 24 Hrs  T(C): 36.6 (02 Oct 2021 08:37), Max: 36.6 (02 Oct 2021 08:37)  T(F): 97.8 (02 Oct 2021 08:37), Max: 97.8 (02 Oct 2021 08:37)  HR: 71 (02 Oct 2021 08:37) (71 - 71)  BP: 164/70 (02 Oct 2021 08:37) (164/70 - 164/70)  BP(mean): --  RR: 18 (02 Oct 2021 08:37) (18 - 18)  SpO2: 90% (02 Oct 2021 08:37) (90% - 90%)    PHYSICAL EXAM  General: NAD, demented, awake and alert does not respond well to commands  RLE:  skin intact, short/rotated  TTP about hip  NTTP about knee/ankle  active ankle/hip ROM but does not follow command  +sensation L2-S1  +dorsiflexion/plantarflexion of ankle/hallux  +dorsalis pedis pulse  Soft compartments, - calf tenderness      Secondary Exam: Benign, Skin intact, NTTP along axial spine, SILT throughout, motor grossly intact throughout, no other orthopedic injuries at this time, compartments soft and compressible        IMAGING:  XR : right femoral neck fracture  CT : T1/2 VCF minimal, subacute    Assessment/Plan:  89y Male with right femoral neck fracture     -Will require operative fixation with hemiarthroplasty  -NPO except meds  -Appreciate medical clearance  -D/w NSx - VCFs appear subacute, OK with patient going to OR without brace, appreciate consult please document when available  -D/w Dr. Jacobsen anesthesia regarding recent echo - patient OK to go to OR  -D/w Dr. Osborne - please document clearance when available, mgmt appreciated.   -Pain control as needed  -hold chem DVT ppx pre op  -NWB RLE  -Will discuss with attending, and advise if plan changes

## 2021-10-02 NOTE — DISCHARGE NOTE PROVIDER - CARE PROVIDER_API CALL
Dakota Jones (DO)  Orthopaedic Surgery  [address] [place] [address]  Phone: [phone]  Fax: [phone]  Follow Up Time:    Dakota Jones (DO)  Orthopaedic Surgery  [address] [place] [address]  Phone: [phone]  Fax: [phone]  Follow Up Time:     Kendall Yancey; PhD)  Neurosurgery  [address] [facility], [address] [place] [address]  Phone: [phone]  Fax: [phone]  Follow Up Time: 1 week    Primary medical Doctor,   Phone: (   )    -  Fax: (   )    -  Follow Up Time: 1-3 days    Yg Galloway  INTERNAL MEDICINE  [address] [place] [address]  Phone: [phone]  Fax: [phone]  Follow Up Time: 1 week

## 2021-10-02 NOTE — DISCHARGE NOTE PROVIDER - PROVIDER TOKENS
PROVIDER:[TOKEN:[[id number]]] PROVIDER:[TOKEN:[[id number]]],PROVIDER:[TOKEN:[[id number]],FOLLOWUP:[1 week]],FREE:[LAST:[Primary medical Doctor],PHONE:[(   )    -],FAX:[(   )    -],FOLLOWUP:[1-3 days]],PROVIDER:[TOKEN:[[id number]],FOLLOWUP:[1 week]]

## 2021-10-02 NOTE — DISCHARGE NOTE PROVIDER - NSDCACTIVITY_GEN_ALL_CORE
Stairs allowed/Walking - Indoors allowed/Walking - Outdoors allowed Stairs allowed/Walking - Indoors allowed/No heavy lifting/straining/Walking - Outdoors allowed Do not drive or operate machinery/Showering allowed/Do not make important decisions/Stairs allowed/Walking - Indoors allowed/No heavy lifting/straining/Walking - Outdoors allowed/Follow Instructions Provided by your Surgical Team

## 2021-10-02 NOTE — DISCHARGE NOTE PROVIDER - NSDCCPCAREPLAN_GEN_ALL_CORE_FT
PRINCIPAL DISCHARGE DIAGNOSIS  Diagnosis: Hip fracture, right  Assessment and Plan of Treatment:       SECONDARY DISCHARGE DIAGNOSES  Diagnosis: Thoracic spine fracture  Assessment and Plan of Treatment:

## 2021-10-02 NOTE — CONSULT NOTE ADULT - ATTENDING COMMENTS
Pt seen and examined at bedside. I spoke to son HCP and reviewed the relevant imaging. All RBA reviewed with the son. Agree to move forward with surgical treatment. High risk.

## 2021-10-02 NOTE — ED PROVIDER NOTE - OBJECTIVE STATEMENT
89 y/o M with PMHx of atrial fibrillation on Coumadin, CAD, HTN, IDDM, Type 2 myocardial infarction, GERD, benign prostatic hyperplasia, chronic congestive heart failure, UTI, syncope and collapse for trip and fall. Patient states he was walking with a walker when he slipped and fell to the right side. Patient did hit his head, no LOC. He does take blood thinner. Patient c/o right hip pain and is unable to walk on right side.

## 2021-10-02 NOTE — CONSULT NOTE ADULT - SUBJECTIVE AND OBJECTIVE BOX
Patient is a 89y old  Male who presents with a chief complaint of vertebral fractures, hip fracture (02 Oct 2021 22:11)    HPI:  Trauma Consult, 10/2/21 1036am, attending bedside 1040am    Pt s.p mechanical trip/fall 10/2/21  89 y/o M with PMHx of atrial fibrillation on Coumadin, CAD, HTN, IDDM, Type 2 myocardial infarction, GERD, benign prostatic hyperplasia, chronic congestive heart failure, UTI, syncope and collapse for trip and fall. fPatient found to have right femoral neck fracture. Underwent OR for right hemiarthroplasty. Consulted as patient noted to be lethargy post-op. At bedside patient grimaces to stimulation, not following commands.      Allergies: Aleve  codeine  morphine  naproxen    PAST MEDICAL & SURGICAL HISTORY:  IDDM (insulin dependent diabetes mellitus)    HTN (hypertension)    Chronic congestive heart failure, unspecified congestive heart failure type    Atrial fibrillation, unspecified type    Gastroesophageal reflux disease, esophagitis presence not specified    Benign prostatic hyperplasia without lower urinary tract symptoms    Syncope and collapse  5/4/2018    Type 2 myocardial infarction  12/17/2017    UTI (urinary tract infection)  7/12/2017    Artificial cardiac pacemaker    History of appendectomy    Arthropathy of shoulder region    S/P TAVR (transcatheter aortic valve replacement)      FAMILY HISTORY:    SOCIAL HISTORY:    Home Medications:    Review of Systems:  Unable to participat rafael DOS SANTOS    T(F): 96.8 (10-02-21 @ 22:17), Max: 97.8 (10-02-21 @ 08:37)  HR: 64 (10-02-21 @ 22:17) (60 - 71)  BP: 120/90 (10-02-21 @ 22:17) (120/90 - 170/74)  RR: 15 (10-02-21 @ 22:17) (15 - 20)  SpO2: 97% (10-02-21 @ 22:17)  Wt(kg): --    CAPILLARY BLOOD GLUCOSE      POCT Blood Glucose.: 329 mg/dL (02 Oct 2021 18:08)    I&O's Summary    02 Oct 2021 07:01  -  02 Oct 2021 23:07  --------------------------------------------------------  IN: 750 mL / OUT: 0 mL / NET: 750 mL        Physical Exam:     Gen: ill appearing  Neuro: lethargic, grimaces to stimulation, not following command  CVS: +S1S2  Resp: CTA  Abd: soft, NT, ND  Ext: warm, dry, no edema  Skin: well perfused    Meds:  ceFAZolin   IVPB 2000 milliGRAM(s) IV Intermittent every 8 hours    amLODIPine   Tablet 10 milliGRAM(s) Oral daily  hydrALAZINE 50 milliGRAM(s) Oral every 8 hours  isosorbide   mononitrate ER Tablet (IMDUR) 30 milliGRAM(s) Oral daily  metoprolol succinate ER 25 milliGRAM(s) Oral daily     atorvastatin 40 milliGRAM(s) Oral at bedtime  dextrose 40% Gel 15 Gram(s) Oral once  dextrose 50% Injectable 25 Gram(s) IV Push once  dextrose 50% Injectable 12.5 Gram(s) IV Push once  dextrose 50% Injectable 25 Gram(s) IV Push once  dextrose 50% Injectable 50 milliLiter(s) IV Push once  glucagon  Injectable 1 milliGRAM(s) IntraMuscular once  insulin lispro (ADMELOG) corrective regimen sliding scale   SubCutaneous three times a day before meals  insulin lispro (ADMELOG) corrective regimen sliding scale   SubCutaneous at bedtime  insulin regular  human recombinant. 5 Unit(s) IV Push once        acetaminophen   Tablet .. 975 milliGRAM(s) Oral every 8 hours  gabapentin 100 milliGRAM(s) Oral three times a day  ondansetron Injectable 4 milliGRAM(s) IV Push every 6 hours PRN  oxyCODONE    IR 2.5 milliGRAM(s) Oral every 4 hours PRN  oxyCODONE    IR 5 milliGRAM(s) Oral every 4 hours PRN  traMADol 50 milliGRAM(s) Oral every 6 hours PRN  venlafaxine XR. 75 milliGRAM(s) Oral daily           pantoprazole    Tablet 40 milliGRAM(s) Oral daily  senna 2 Tablet(s) Oral at bedtime        cholecalciferol 3500 Unit(s) Oral daily  cyanocobalamin 1000 MICROGram(s) Oral daily  dextrose 5%. 1000 milliLiter(s) IV Continuous <Continuous>  dextrose 5%. 1000 milliLiter(s) IV Continuous <Continuous>  ferrous    sulfate 325 milliGRAM(s) Oral at bedtime  folic acid 1 milliGRAM(s) Oral daily  sodium bicarbonate  Injectable 50 milliEquivalent(s) IV Push once  sodium chloride 0.9%. 1000 milliLiter(s) IV Continuous <Continuous>                                    9.7    20.48 )-----------( 138      ( 02 Oct 2021 18:02 )             29.8       10-02    135  |  103  |  61<H>  ----------------------------<  296<H>  5.6<H>   |  24  |  3.03<H>    Ca    9.1      02 Oct 2021 18:02    TPro  8.1  /  Alb  3.3  /  TBili  0.9  /  DBili  x   /  AST  39<H>  /  ALT  61  /  AlkPhos  149<H>  10-02          PT/INR - ( 02 Oct 2021 08:49 )   PT: 13.1 sec;   INR: 1.13 ratio         PTT - ( 02 Oct 2021 08:49 )  PTT:30.7 sec            Radiology:     EXAM:  CT BRAIN                            PROCEDURE DATE:  10/02/2021          INTERPRETATION:  CLINICAL INDICATION: 89-year-old, change in mental status, postoperatively unarousable, not waking up,    Technique: Noncontrast CT of the head was performed.    Multiple contiguous axial images were acquired from the skull base to the vertex without the administration of intravenous contrast. Coronal and sagittal reformations were made.    COMPARISON: Head CT, 10/2/2021, at 9:34 AM, head CT, 9/20 6/20/2021, and prior    FINDINGS:  Motion limited study, unchanged moderate  enlargement of ventricles and sulci consistent with volume loss. Redemonstration resolving lentiform nuclei intraparenchymal hemorrhage with rim of increased attenuation (5:27) unchanged from prior. There is redemonstration of nonspecific white matter decreased attenuation, likely sequela of microvascular disease with multiple remote and age indeterminate lacunar infarcts, MRI is more sensitive for new ischemia. Redemonstration atherosclerotic calcification carotid siphons, vertebral, and basilar artery. Study is limited by technique with artificial linear increased and decreased attenuation (image: 65), no new extra-axial collection or midline shift, basal cisterns remain patent and unchanged.    Intact calvarium, with bony hyperostosis and talus interna, orbits, paraspinal sinuses and mastoids remain unchanged and unremarkable.    IMPRESSION:    Motion and technique limit study, with linear band of increased and decreased attenuation through the brain parenchyma,. Unchanged volume loss, resolving left lentiform intraparenchymal hemorrhage with rim of increased attenuation, atherosclerotic calcification, microvascular disease, with age indeterminate infarcts no new large extra-axial hemorrhage or shift. If symptoms persist consider follow-up head CT or MR if no contraindications.    --- End of Report ---            LATESHA MARSHALL MD; Attending Radiologist  This document has been electronically signed. Oct2 2021 10:38PM      -Metabolic encephalopathy  -Hyperkalemia  -CKD    Assessment/Plan:  89 yo M presented to ED tonight s/p fall found to have right femoral neck fx. POD#0 s/p right hemiarthroplasty now with lethargy post-op    -CTH without acute changes. Neurosx following. Q2 neuro checks  -Discontinue all pain/sedating medications  -Neuro consult in AM if persists  -Check ABG  -Ortho following s/p Right hemirarhtroplasty  -Bicarb/Insulin/Dextrose for Hyperkalemia. Monitor electrolytes    Patient currently stable and protectin airway. To be monitored in SICU  Patient DNR prior to OR. Primary team to discuss goals of care    Discussed w/ Dr. Osborne, Surgery, Neurosx, and E-ICU attending.

## 2021-10-02 NOTE — H&P ADULT - NSHPPHYSICALEXAM_GEN_ALL_CORE
GCS of 15  Airway is patent  Breathing is symmetric and unlabored  Neuro: CNII-XII grossly intact  Psych: normal affect  HEENT: Normocephalic, atraumatic, CONOR, EOM wnl, no otorrhea or hemotympanum b/l, no epistaxis or d/c b/l nares, no craniofacial bony pathology or tenderness b/l  Neck: Soft and supple, nontender to passive/active ROM exam. No crepitus, no ecchymosis, no hematoma, to exam, no JVD, no tracheal deviation  Cspine/thoracolumbrosacral spine: no gross bony pathology or tenderness to exam  Cardiovascular: S1S2 Present  Chest: no gross rib pathology or tenderness to exam. No sternal pathology or tenderness to exam. No crepitus, no ecchymosis, no hematoma. No penetrating thorcoabdominal trauma  Respiratory: Rate is 18; Respiratory Effort normal; no wheezes, rales or rhonchi to exam  ABD: bowel sounds (+), soft, nontender, non distended, no rebound, no guarding, no rigidity, no skin changes to exam. No pelvic instability to exam, no skin changes  Genitourinary: No scrotal/perineal/perirectal hematoma/ecchymosis/tenderness to exam  External genitalia: normal, no blood at urethral meatus  Musculoskeletal: + right hip pain from known fracture pathology. Pt has palpable b/l radial, femoral, dorsalis pedis pulses. All digits are warm and well perfused. No gross long bone pathology or tenderness to exam. Pt demonstrates grossly intact sensoromotor function to limited exam. Pt has good capillary refill to digits, no gross calf edema or tenderness to exam.  Skin: no lesions or rashes to exam  ICU Vital Signs Last 24 Hrs  T(C): 36.6 (02 Oct 2021 08:37), Max: 36.6 (02 Oct 2021 08:37)  T(F): 97.8 (02 Oct 2021 08:37), Max: 97.8 (02 Oct 2021 08:37)  HR: 71 (02 Oct 2021 08:37) (71 - 71)  BP: 164/70 (02 Oct 2021 08:37) (164/70 - 164/70)  BP(mean): --  ABP: --  ABP(mean): --  RR: 18 (02 Oct 2021 08:37) (18 - 18)  SpO2: 90% (02 Oct 2021 08:37) (90% - 90%)

## 2021-10-02 NOTE — CHART NOTE - NSCHARTNOTEFT_GEN_A_CORE
films reviewed with Dr Yancey    No acute neurosurgical intervention     No contraindication to OR for repair of hip fracture    No collar needed, support head and neck and try to maintain neutral alignment    Will need MRI C spine postop to further evaluate fractures    Discussed with Dr Yancey

## 2021-10-02 NOTE — ED ADULT NURSE NOTE - CHIEF COMPLAINT QUOTE
Pt BIBA s/p fall from standing height, unknown LOC, A & o x 3. pt c/o right hip pain. As per EMS pt at baseline is a member of service with their department. Pt denies chest pain or shortness of breath

## 2021-10-03 NOTE — PHYSICAL THERAPY INITIAL EVALUATION ADULT - IMPAIRMENTS FOUND, PT EVAL
1:1 Continuous Observation Continues.  Security Jaime SYKES at bedside.   Yelling behaviors present at this time.  Suicide Safety Precautions and Interventions in place.      Limited mobility/muscle strength/ROM

## 2021-10-03 NOTE — CONSULT NOTE ADULT - CONSULT REASON
vertebral fracture. hx of fall
isaak    PCP: Dr Shannan Galloway  Nephro: Dr Galloway  Cardio: Dr Galaviz
AMS
right FN fx
s/p fall, risk stratification and anticoagulation managment

## 2021-10-03 NOTE — PROGRESS NOTE ADULT - SUBJECTIVE AND OBJECTIVE BOX
CC:Patient is a 89y old  Male who presents with a chief complaint of vertebral fractures, hip fracture (03 Oct 2021 12:17)      Subjective:  Pt seen and examined at bedside. Pt is awake alert, oriented, comfortable, cooperative this am, pt in no acute distress. Pt denied c/o fever, chills, chest pain, SOB, abd pain, N/V/D, hemoptysis, hematemesis, hematuria, hematochexia, headache, diplopia, vertigo, dizzyness.     ROS:  otherwise as abovementioned ROS    Vital Signs Last 24 Hrs  T(C): 36.5 (03 Oct 2021 07:58), Max: 36.5 (03 Oct 2021 07:58)  T(F): 97.7 (03 Oct 2021 07:58), Max: 97.7 (03 Oct 2021 07:58)  HR: 60 (03 Oct 2021 12:00) (60 - 66)  BP: 135/52 (03 Oct 2021 12:00) (91/60 - 170/74)  BP(mean): 73 (03 Oct 2021 12:00) (64 - 100)  RR: 15 (03 Oct 2021 12:00) (12 - 29)  SpO2: 97% (03 Oct 2021 12:00) (94% - 100%)    Labs:    ABG - ( 02 Oct 2021 23:24 )  pH, Arterial: 7.37  pH, Blood: x     /  pCO2: 44    /  pO2: 95    / HCO3: 25    / Base Excess: -0.2  /  SaO2: 99                                          9.2    16.69 )-----------( 125      ( 03 Oct 2021 07:15 )             28.0     CBC Full  -  ( 03 Oct 2021 07:15 )  WBC Count : 16.69 K/uL  RBC Count : 2.67 M/uL  Hemoglobin : 9.2 g/dL  Hematocrit : 28.0 %  Platelet Count - Automated : 125 K/uL  Mean Cell Volume : 104.9 fl  Mean Cell Hemoglobin : 34.5 pg  Mean Cell Hemoglobin Concentration : 32.9 gm/dL  Auto Neutrophil # : 15.17 K/uL  Auto Lymphocyte # : 0.81 K/uL  Auto Monocyte # : 0.61 K/uL  Auto Eosinophil # : 0.01 K/uL  Auto Basophil # : 0.02 K/uL  Auto Neutrophil % : 90.8 %  Auto Lymphocyte % : 4.9 %  Auto Monocyte % : 3.7 %  Auto Eosinophil % : 0.1 %  Auto Basophil % : 0.1 %    10-03    138  |  105  |  66<H>  ----------------------------<  218<H>  4.9   |  24  |  2.98<H>    Ca    8.8      03 Oct 2021 07:15  Phos  3.6     10-03  Mg     2.2     10-03    TPro  8.1  /  Alb  3.3  /  TBili  0.9  /  DBili  x   /  AST  39<H>  /  ALT  61  /  AlkPhos  149<H>  10-02    LIVER FUNCTIONS - ( 02 Oct 2021 08:49 )  Alb: 3.3 g/dL / Pro: 8.1 gm/dL / ALK PHOS: 149 U/L / ALT: 61 U/L / AST: 39 U/L / GGT: x           PT/INR - ( 03 Oct 2021 11:44 )   PT: 13.6 sec;   INR: 1.18 ratio         PTT - ( 02 Oct 2021 08:49 )  PTT:30.7 sec      Meds:  acetaminophen   Tablet .. 975 milliGRAM(s) Oral every 8 hours  amLODIPine   Tablet 10 milliGRAM(s) Oral daily  atorvastatin 40 milliGRAM(s) Oral at bedtime  dextrose 40% Gel 15 Gram(s) Oral once  dextrose 5%. 1000 milliLiter(s) IV Continuous <Continuous>  dextrose 5%. 1000 milliLiter(s) IV Continuous <Continuous>  dextrose 50% Injectable 25 Gram(s) IV Push once  dextrose 50% Injectable 12.5 Gram(s) IV Push once  dextrose 50% Injectable 25 Gram(s) IV Push once  enoxaparin Injectable 30 milliGRAM(s) SubCutaneous daily  glucagon  Injectable 1 milliGRAM(s) IntraMuscular once  hydrALAZINE 50 milliGRAM(s) Oral every 8 hours  insulin lispro (ADMELOG) corrective regimen sliding scale   SubCutaneous three times a day before meals  insulin lispro (ADMELOG) corrective regimen sliding scale   SubCutaneous at bedtime  isosorbide   mononitrate ER Tablet (IMDUR) 30 milliGRAM(s) Oral daily  metoprolol succinate ER 25 milliGRAM(s) Oral daily  ondansetron Injectable 4 milliGRAM(s) IV Push every 6 hours PRN  pantoprazole    Tablet 40 milliGRAM(s) Oral daily  senna 2 Tablet(s) Oral at bedtime  sodium chloride 0.9%. 1000 milliLiter(s) IV Continuous <Continuous>  venlafaxine XR. 75 milliGRAM(s) Oral daily      Radiology:  < from: CT Head No Cont (10.02.21 @ 22:04) >  EXAM:  CT BRAIN                            PROCEDURE DATE:  10/02/2021          INTERPRETATION:  CLINICAL INDICATION: 89-year-old, change in mental status, postoperatively unarousable, not waking up,    Technique: Noncontrast CT of the head was performed.    Multiple contiguous axial images were acquired from the skull base to the vertex without the administration of intravenous contrast. Coronal and sagittal reformations were made.    COMPARISON: Head CT, 10/2/2021, at 9:34 AM, head CT, 9/20 6/20/2021, and prior    FINDINGS:  Motion limited study, unchanged moderate  enlargement of ventricles and sulci consistent with volume loss. Redemonstration resolving lentiform nuclei intraparenchymal hemorrhage with rim of increased attenuation (5:27) unchanged from prior. There is redemonstration of nonspecific white matter decreased attenuation, likely sequela of microvascular disease with multiple remote and age indeterminate lacunar infarcts, MRI is more sensitive for new ischemia. Redemonstration atherosclerotic calcification carotid siphons, vertebral, and basilar artery. Study is limited by technique with artificial linear increased and decreased attenuation (image: 65), no new extra-axial collection or midline shift, basal cisterns remain patent and unchanged.    Intact calvarium, with bony hyperostosis and talus interna, orbits, paraspinal sinuses and mastoids remain unchanged and unremarkable.    IMPRESSION:    Motion and technique limit study, with linear band of increased and decreased attenuation through the brain parenchyma,. Unchanged volume loss, resolving left lentiform intraparenchymal hemorrhage with rim of increased attenuation, atherosclerotic calcification, microvascular disease, with age indeterminate infarcts no new large extra-axial hemorrhage or shift. If symptoms persist consider follow-up head CT or MR if no contraindications.    --- End of Report ---            LAETSHA MARSHALL MD; Attending Radiologist  This document has been electronically signed. Oct2 2021 10:38PM    < end of copied text >      Physical exam:  GCS of 15  Pt in no acute distress  Airway is patent  Breathing is symmetric and unlabored  Neuro: CN II-XII grossly intact  Psych: normal affect  HEENT: normocephalic, CONOR, EOM wnl, no gross craniofacial bony pathology to exam  Neck: No tracheal deviation, no JVD, no crepitus, no ecchymosis, no hematoma  Chest: No gross rib or sternal pathology or tenderness to exam, no crepitus, no ecchymosis, no hematoma  Resp: CTAB  CVS: S1S2(+)  ABD: bowel sounds (+), soft, nontender, non distended, no rebound, no guarding, no rigidity, no pelvic instability to exam  EXT: s/p right hip repair, no gross calf tenderness or edema to exam b/l, pt has good capillary refill in all digits. Sensoromotor function grossly intact to limited exam, on VTE prophylaxis  Skin: no adverse skin changes to exam

## 2021-10-03 NOTE — PHYSICAL THERAPY INITIAL EVALUATION ADULT - LEVEL OF INDEPENDENCE: SUPINE/SIT, REHAB EVAL
Did not attempt OOB at this time secondary to lethargy and pt having difficulty following commands verbal and tactile Did not attempt OOB at this time secondary to lethargy and pt having difficulty following commands verbal and tactile. Also did not attempt OOB secondary to awaiting neurosurgery opinion about brace.

## 2021-10-03 NOTE — PROGRESS NOTE ADULT - ASSESSMENT
A/P:  Right hip fracture  T1 and T2 fractures  Spine surgery on consult  Ortho on consult, s/p right hip repair 10/2/21  AMS, altered mentation post operatively 10/2/21 likely secondary to anesthesia effects, resolved clinically   Hospitalist on consult for medical management  ICU on consult  Multiple medical comorbidities including IDDM, s/p TVAR, HTN, Chronic congestive heart failure, Atrial fibrillation, GERD, BPH, MI, Syncope and collapse, Artificial cardiac pacemaker  Nephrology consult  Fall risk precautions  GI/DVT prophylaxis  Pain control  F/U labs  Cont current care and meds  Pt will be monitored for signs of evolution/resolution of pathology and surgical intervention as required and warranted  Pt aware of and agrees with all of the above

## 2021-10-03 NOTE — PROGRESS NOTE ADULT - SUBJECTIVE AND OBJECTIVE BOX
HPI: 89 y/o M with PMHx of atrial fibrillation off AC due to recent hemorrhagic stroke , CAD, HTN, IDDM, Type 2 myocardial infarction, GERD, benign prostatic hyperplasia, chronic congestive heart failure, UTI, syncope and collapse for trip and fall. Patient states he was walking with a walker when he slipped and fell to the right side. Patient did hit his head, no LOC. He does not currently take blood thinner. Patient c/o right hip pain and is unable to walk on right side  Pt seen and examined at bedside pre operatively was awake, alert, comfortable, cooperative, oriented, pt in no acute distress. Pt denied c/o fever, chills, chest pain, SOB, abd pain, N/V/D,hemoptysis, hematemesis, hematuria, hematochexia, headache, diplopia, vertigo, dizzyness.  (02 Oct 2021 11:12)  Post operatively slow to awaken, not following commands, small subtle myoclonic jerking. PERRL, toes up and w/d to pain.    10/3- Pt seen and examined in the, CICU, POD #1 s/p RIGHT hip hemiarthroplasty, opened eyes to voice, attempted to say hello, squeezed right hand to command, unable to assess if patient is in pain although he seems to be resting comfortably, as per nursing no events overnight.     Vital Signs Last 24 Hrs  T(C): 36.5 (03 Oct 2021 07:58), Max: 36.5 (03 Oct 2021 07:58)  T(F): 97.7 (03 Oct 2021 07:58), Max: 97.7 (03 Oct 2021 07:58)  HR: 60 (03 Oct 2021 10:00) (60 - 66)  BP: 140/53 (03 Oct 2021 10:00) (91/60 - 170/74)  BP(mean): 74 (03 Oct 2021 10:00) (64 - 100)  RR: 17 (03 Oct 2021 10:00) (13 - 29)  SpO2: 95% (03 Oct 2021 10:00) (94% - 100%)    MEDICATIONS  (STANDING):  acetaminophen   Tablet .. 975 milliGRAM(s) Oral every 8 hours  amLODIPine   Tablet 10 milliGRAM(s) Oral daily  atorvastatin 40 milliGRAM(s) Oral at bedtime  enoxaparin Injectable 30 milliGRAM(s) SubCutaneous daily  glucagon  Injectable 1 milliGRAM(s) IntraMuscular once  hydrALAZINE 50 milliGRAM(s) Oral every 8 hours  insulin lispro (ADMELOG) corrective regimen sliding scale   SubCutaneous three times a day before meals  insulin lispro (ADMELOG) corrective regimen sliding scale   SubCutaneous at bedtime  isosorbide   mononitrate ER Tablet (IMDUR) 30 milliGRAM(s) Oral daily  metoprolol succinate ER 25 milliGRAM(s) Oral daily  pantoprazole    Tablet 40 milliGRAM(s) Oral daily  senna 2 Tablet(s) Oral at bedtime  sodium chloride 0.9%. 1000 milliLiter(s) (100 mL/Hr) IV Continuous <Continuous>  venlafaxine XR. 75 milliGRAM(s) Oral daily    MEDICATIONS  (PRN):  ondansetron Injectable 4 milliGRAM(s) IV Push every 6 hours PRN Nausea    ROS: unable to assess     PHYSICAL EXAM:  Constitutional: opens eyes to voice, followed simple command  HEENT: PERRLA, EOMI   Neck: Supple  Respiratory: Breath sounds are clear bilaterally  Cardiovascular: S1 and S2, irregular rhythm  Gastrointestinal: soft, nontender  Extremities:  no edema  Vascular: Carotid Bruit - no  Musculoskeletal: + small myoclonic jerking   Skin: No rashes    Neurological exam:  HF: opens eyes to voice, tacks across the room, followed simple command   CN: PEARRL, EOMI, VFF, facial sensation normal  Motor: moving b/l upper ext, minimal movement right leg due to pain, withdraws on left   Sens: withdraws to pain   Reflexes: Symmetric and normal, toes upgoing b/l   Coord:  unable to assess   Gait/Balance: unable to assess     LABS:                         9.2    16.69 )-----------( 125      ( 03 Oct 2021 07:15 )             28.0     10-03    138  |  105  |  66<H>  ----------------------------<  218<H>  4.9   |  24  |  2.98<H>    Ca    8.8      03 Oct 2021 07:15  Phos  3.6     10-03  Mg     2.2     10-03    TPro  8.1  /  Alb  3.3  /  TBili  0.9  /  DBili  x   /  AST  39<H>  /  ALT  61  /  AlkPhos  149<H>  10-02    LIVER FUNCTIONS - ( 02 Oct 2021 08:49 )  Alb: 3.3 g/dL / Pro: 8.1 gm/dL / ALK PHOS: 149 U/L / ALT: 61 U/L / AST: 39 U/L / GGT: x           09-21 Chol 101 LDL -- HDL 44 Trig 107, 09-20 Chol 101 LDL -- HDL 40<L> Trig 111    RADIOLOGY:  < from: CT Cervical Spine No Cont (10.02.21 @ 09:37) >  IMPRESSION:  No acute intracranial hemorrhage  Acute/subacute T1 and T2 vertebral body fractures as described above.   MRI exam recommended

## 2021-10-03 NOTE — CONSULT NOTE ADULT - REASON FOR ADMISSION
vertebral fractures, hip fracture

## 2021-10-03 NOTE — CONSULT NOTE ADULT - SUBJECTIVE AND OBJECTIVE BOX
HPI:  Trauma Consult, 10/2/21 1036am, attending bedside 1040am    Pt s.p mechanical trip/fall 10/2/21  89 y/o M with PMHx of atrial fibrillation on Coumadin (off since [facility] ),[id number] Score: 6,  CAD, HTN, IDDM, Type 2 myocardial infarction, GERD, benign prostatic hyperplasia, chronic congestive heart failure, UTI, syncope and collapse for trip and fall. Patient states he was walking with a walker when he slipped and fell to the right side. Patient did hit his head, no LOC. He does not currently take blood thinner. Patient c/o right hip pain and is unable to walk on right side    Pt seen and examined at bedside. Pt is awake, alert, comfortable, cooperative, orineted, pt in no acute distress. Pt denied c/o fever, chills, chest pain, SOB, abd pain, N/V/D,hemoptysis, hematemesis, hematuria, hematochexia, headache, diplopia, vertigo, dizzyness.  (02 Oct 2021 11:12)      Patient is a 89y old  Male who presents with a chief complaint of vertebral fractures, hip fracture (03 Oct 2021 13:29) PT S/P RIGHT HIP marc on 10-2-2021.  After surg pt became confused and he was transferred to CICU.       Consulted by Dr. Dakota Jones  for VTE prophylaxis, risk stratification, and anticoagulation management.    PAST MEDICAL & SURGICAL HISTORY:  IDDM (insulin dependent diabetes mellitus)    HTN (hypertension)    Chronic congestive heart failure, unspecified congestive heart failure type    Atrial fibrillation, unspecified type OFF AC SINCE [facility]     Gastroesophageal reflux disease, esophagitis presence not specified    Benign prostatic hyperplasia without lower urinary tract symptoms    Syncope and collapse  2018    Type 2 myocardial infarction  2017    UTI (urinary tract infection)  2017    Artificial cardiac pacemaker    History of appendectomy    Arthropathy of shoulder region    S/P TAVR (transcatheter aortic valve replacement)        FAMILY HISTORY:      Interval Note:  10-3-2021: Pt seen at bedside in CICU.  Pt alert but only knows his name and his wife is Mary. I called pt's son and he instructedme to call his mother who cave me their cardiologist Dr Galaviz.      CAPRINI SCORE  AGE RELATED RISK FACTORS                                                       MOBILITY RELATED FACTORS  [ ] Age 41-60 years                                            (1 Point)                  [ ] Bed rest                                                        (1 Point)  [ ] Age: 61-74 years                                           (2 Points)                [ ] Plaster cast                                                   (2 Points)  [x ] Age= 75 years                                              (3 Points)                 [ ] Bed bound for more than 72 hours                   (2 Points)    DISEASE RELATED RISK FACTORS                                               GENDER SPECIFIC FACTORS  [ ] Edema in the lower extremities                       (1 Point)           [ ] Pregnancy                                                            (1 Point)  [ ] Varicose veins                                               (1 Point)                  [ ] Post-partum < 6 weeks                                      (1 Point)             [x ] BMI > 25 Kg/m2                                            (1 Point)                  [ ] Hormonal therapy or oral contraception       (1 Point)                 [ ] Sepsis (in the previous month)                        (1 Point)             [ ] History of pregnancy complications                (1Point)  [ ] Pneumonia or serious lung disease                                             [ ] Unexplained or recurrent  (=/>3), premature                                 (In the previous month)                               (1 Point)                birth with toxemia or growth-restricted infant (1 Point)  [ ] Abnormal pulmonary function test            (1 Point)                                   SURGERY RELATED RISK FACTORS  [ ] Acute myocardial infarction                       (1 Point)                  [ ]  Section                                         (1 Point)  [ ] Congestive heart failure (in the previous month) (1 Point)   [ ] Minor surgery   lasting <45 minutes       (1 Point)   [ ] Inflammatory bowel disease                             (1 Point)          [ ] Arthroscopic surgery                                  (2 Points)  [ ] Central venous access                                    (2 Points)            [ ] General surgery lasting >45 minutes      (2 Points)       [ ] Stroke (in the previous month)                  (5 Points)            [ ] Elective major lower extremity arthroplasty (5 Points)                                   [  ] Malignancy (present or past include skin melanoma                                          but exclude  basal skin cell)    (2 points)                                      TRAUMA RELATED RISK FACTORS                HEMATOLOGY RELATED FACTORS                                  [x ] Fracture of the hip, pelvis, or leg                       (5 Points)  [ ] Prior episodes of VTE                                     (3 Points)          [ ] Acute spinal cord injury (in the previous month)  (5 Points)  [ ] Positive family history for VTE                         (3 Points)       [ ] Paralysis (less than 1 month)                          (5 Points)  [ ] Prothrombin 08995 A                                      (3 Points)         [ ] Multiple Trauma (within 1month)                 (5Points)                                                                                                                                                                [ ] Factor V Leiden                                          (3 Points)                                OTHER RISK FACTORS                          [ ] Lupus anticoagulants                                     (3 Points)                       [ ] BMI > 40                          (1 Point)                                                         [ ] Anticardiolipin antibodies                                (3 Points)                   [ ] Smoking                              (1Point)                                                [ ] High homocysteine in the blood                      (3 Points)                [  ] Diabetes requiring insulin (1point)                         [ ] Other congenital or acquired thrombophilia       (3 Points)          [  ] Chemotherapy                   (1 Point)  [ ] Heparin induced thrombocytopenia                  (3 Points)             [  ] Blood Transfusion                (1 point)                                                                                                             Total Score [ 9 ]                                                                                                                                                                                                                                                                                                                                                                                                                                           [id number] Score: 6    IMPROVE Bleeding Risk Score: 7    Falls Risk:   High (  )  Mod (  )  Low (  )  crcl: 16.0        cr: 2.98          BMI  25           EBL: 200  ml  Time procedure    : starts: 15:50             :ends: 1700              Denies any personal or familial history of clotting or bleeding disorders.    Allergies    Aleve (Vomiting)  codeine (Unknown)  morphine (Nausea)  naproxen (Vomiting)    Intolerances        REVIEW OF SYSTEMS    (  )Fever	     (  )Constipation	(  )SOB				(  )Headache	(  )Dysuria  (  )Chills	     (  )Melena	(  )Dyspnea present on exertion	                    (  )Dizziness                    (  )Polyuria  (  )Nausea	     (  )Hematochezia	(  )Cough			                    (  )Syncope   	(  )Hematuria  (  )Vomiting    (  )Chest Pain	(  )Wheezing			(  )Weakness  (  )Diarrhea     (  )Palpitations	(  )Anorexia			(  )Myalgia        Unable to obtain review of systems due to: confused    Vital Signs Last 24 Hrs  T(C): 36.5 (03 Oct 2021 07:58), Max: 36.5 (03 Oct 2021 07:58)  T(F): 97.7 (03 Oct 2021 07:58), Max: 97.7 (03 Oct 2021 07:58)  HR: 60 (03 Oct 2021 12:00) (60 - 66)  BP: 135/52 (03 Oct 2021 12:00) (91/60 - 170/74)  BP(mean): 73 (03 Oct 2021 12:00) (64 - 100)  RR: 15 (03 Oct 2021 12:00) (12 - 29)  SpO2: 97% (03 Oct 2021 12:00) (94% - 100%)    PHYSICAL EXAM:    Constitutional: Appears Well    Neurological: A& O x 1 person    Skin: Warm    Respiratory and Thorax: normal effort; Breath sounds: normal; No rales/wheezing/rhonchi  	  Cardiovascular: S1, S2, regular, NMBR	    Gastrointestinal: BS + x 4Q, nontender	    Genitourinary:  Bladder nondistended, nontender    Musculoskeletal:   General Right:   no muscle/joint tenderness,   normal tone, no joint swelling,   ROM: limited	    General Left:   no muscle/joint tenderness,   normal tone, no joint swelling,   ROM: limited    Hip:  Right: Dressing CDI;    Lower extrems:   Right: no calf tenderness              negative patric's sign               + pedal pulses    Left:   no calf tenderness              negative patric's sign               + pedal pulses                          9.2    16.69 )-----------( 125      ( 03 Oct 2021 07:15 )             28.0       10-03    138  |  105  |  66<H>  ----------------------------<  218<H>  4.9   |  24  |  2.98<H>    Ca    8.8      03 Oct 2021 07:15  Phos  3.6     10-  Mg     2.2     10    TPro  8.1  /  Alb  3.3  /  TBili  0.9  /  DBili  x   /  AST  39<H>  /  ALT  61  /  AlkPhos  149<H>  10-02      PT/INR - ( 03 Oct 2021 11:44 )   PT: 13.6 sec;   INR: 1.18 ratio         PTT - ( 02 Oct 2021 08:49 )  PTT:30.7 sec	    < from: Xray Hip w/ Pelvis 2 Views, Bilateral (10.02.21 @ 10:18) >  IMPRESSION: Right hip fracture. No acute chest finding.    < end of copied text >  			  < from: CT Head No Cont (21 @ 17:55) >  MPRESSION:    Cervical spine CT: No acute fractures or dislocations.    Multilevel cervical spondylosis.    Head CT: Trace amount of acute subarachnoid hemorrhage layering posteriorly along the right sylvian fissure.    Mild amount of right-sided high posterior parietal scalp soft tissue swelling.      < end of copied text >  < from: CT Head No Cont (10.02.21 @ 09:36) >  IMPRESSION:  No acute intracranial hemorrhage    Acute/subacute T1 and T2 vertebral body fractures as described above. MRI exam recommended    < end of copied text >    MEDICATIONS  (STANDING):  acetaminophen   Tablet .. 975 milliGRAM(s) Oral every 8 hours  amLODIPine   Tablet 10 milliGRAM(s) Oral daily  atorvastatin 40 milliGRAM(s) Oral at bedtime  dextrose 40% Gel 15 Gram(s) Oral once  dextrose 5%. 1000 milliLiter(s) IV Continuous <Continuous>  dextrose 5%. 1000 milliLiter(s) IV Continuous <Continuous>  dextrose 50% Injectable 25 Gram(s) IV Push once  dextrose 50% Injectable 12.5 Gram(s) IV Push once  dextrose 50% Injectable 25 Gram(s) IV Push once  enoxaparin Injectable 30 milliGRAM(s) SubCutaneous daily  glucagon  Injectable 1 milliGRAM(s) IntraMuscular once  hydrALAZINE 50 milliGRAM(s) Oral every 8 hours  insulin lispro (ADMELOG) corrective regimen sliding scale   SubCutaneous three times a day before meals  insulin lispro (ADMELOG) corrective regimen sliding scale   SubCutaneous at bedtime  isosorbide   mononitrate ER Tablet (IMDUR) 30 milliGRAM(s) Oral daily  metoprolol succinate ER 25 milliGRAM(s) Oral daily  pantoprazole    Tablet 40 milliGRAM(s) Oral daily  senna 2 Tablet(s) Oral at bedtime  sodium chloride 0.9%. 1000 milliLiter(s) IV Continuous <Continuous>  venlafaxine XR. 75 milliGRAM(s) Oral daily          DVT Prophylaxis:  LMWH                   ( x )  Heparin SQ           (  )  Coumadin             (  )  Xarelto                  (  )  Eliquis                   (  )  Venodynes           ( x )  Ambulation          ( x )  UFH                       (  )  Contraindicated  (  )  EC Aspirin             (  )         HPI:  Trauma Consult, 10/2/21 1036am, attending bedside 1040am    Pt s.p mechanical trip/fall 10/2/21  89 y/o M with PMHx of atrial fibrillation on Coumadin (off since [facility] ),[id number] Score: 6,  CAD, HTN, IDDM, Type 2 myocardial infarction, GERD, benign prostatic hyperplasia, chronic congestive heart failure, UTI, syncope and collapse for trip and fall. Patient states he was walking with a walker when he slipped and fell to the right side. Patient did hit his head, no LOC. He does not currently take blood thinner. Patient c/o right hip pain and is unable to walk on right side    Pt seen and examined at bedside. Pt is awake, alert, comfortable, cooperative, orineted, pt in no acute distress. Pt denied c/o fever, chills, chest pain, SOB, abd pain, N/V/D,hemoptysis, hematemesis, hematuria, hematochexia, headache, diplopia, vertigo, dizzyness.  (02 Oct 2021 11:12)      Patient is a 89y old  Male who presents with a chief complaint of vertebral fractures, hip fracture (03 Oct 2021 13:29) PT S/P RIGHT HIP marc on 10-2-2021.  After surg pt became confused and he was transferred to CICU.       Consulted by Dr. Dakota Jones  for VTE prophylaxis, risk stratification, and anticoagulation management.    PAST MEDICAL & SURGICAL HISTORY:  IDDM (insulin dependent diabetes mellitus)    HTN (hypertension)    Chronic congestive heart failure, unspecified congestive heart failure type    Atrial fibrillation, unspecified type OFF AC SINCE [facility]     Gastroesophageal reflux disease, esophagitis presence not specified    Benign prostatic hyperplasia without lower urinary tract symptoms    Syncope and collapse  2018    Type 2 myocardial infarction  2017    UTI (urinary tract infection)  2017    Artificial cardiac pacemaker    History of appendectomy    Arthropathy of shoulder region    S/P TAVR (transcatheter aortic valve replacement)        FAMILY HISTORY:      Interval Note:  10-3-2021: Pt seen at bedside in CICU.  Pt alert but only knows his name and his wife is Mary. I called pt's son and he instructedme to call his mother who cave me their cardiologist Dr Galaviz.      CAPRINI SCORE  AGE RELATED RISK FACTORS                                                       MOBILITY RELATED FACTORS  [ ] Age 41-60 years                                            (1 Point)                  [ ] Bed rest                                                        (1 Point)  [ ] Age: 61-74 years                                           (2 Points)                [ ] Plaster cast                                                   (2 Points)  [x ] Age= 75 years                                              (3 Points)                 [ ] Bed bound for more than 72 hours                   (2 Points)    DISEASE RELATED RISK FACTORS                                               GENDER SPECIFIC FACTORS  [ ] Edema in the lower extremities                       (1 Point)           [ ] Pregnancy                                                            (1 Point)  [ ] Varicose veins                                               (1 Point)                  [ ] Post-partum < 6 weeks                                      (1 Point)             [x ] BMI > 25 Kg/m2                                            (1 Point)                  [ ] Hormonal therapy or oral contraception       (1 Point)                 [ ] Sepsis (in the previous month)                        (1 Point)             [ ] History of pregnancy complications                (1Point)  [ ] Pneumonia or serious lung disease                                             [ ] Unexplained or recurrent  (=/>3), premature                                 (In the previous month)                               (1 Point)                birth with toxemia or growth-restricted infant (1 Point)  [ ] Abnormal pulmonary function test            (1 Point)                                   SURGERY RELATED RISK FACTORS  [ ] Acute myocardial infarction                       (1 Point)                  [ ]  Section                                         (1 Point)  [ ] Congestive heart failure (in the previous month) (1 Point)   [ ] Minor surgery   lasting <45 minutes       (1 Point)   [ ] Inflammatory bowel disease                             (1 Point)          [ ] Arthroscopic surgery                                  (2 Points)  [ ] Central venous access                                    (2 Points)            [ ] General surgery lasting >45 minutes      (2 Points)       [ ] Stroke (in the previous month)                  (5 Points)            [ ] Elective major lower extremity arthroplasty (5 Points)                                   [  ] Malignancy (present or past include skin melanoma                                          but exclude  basal skin cell)    (2 points)                                      TRAUMA RELATED RISK FACTORS                HEMATOLOGY RELATED FACTORS                                  [x ] Fracture of the hip, pelvis, or leg                       (5 Points)  [ ] Prior episodes of VTE                                     (3 Points)          [ ] Acute spinal cord injury (in the previous month)  (5 Points)  [ ] Positive family history for VTE                         (3 Points)       [ ] Paralysis (less than 1 month)                          (5 Points)  [ ] Prothrombin 22455 A                                      (3 Points)         [ ] Multiple Trauma (within 1month)                 (5Points)                                                                                                                                                                [ ] Factor V Leiden                                          (3 Points)                                OTHER RISK FACTORS                          [ ] Lupus anticoagulants                                     (3 Points)                       [ ] BMI > 40                          (1 Point)                                                         [ ] Anticardiolipin antibodies                                (3 Points)                   [ ] Smoking                              (1Point)                                                [ ] High homocysteine in the blood                      (3 Points)                [  ] Diabetes requiring insulin (1point)                         [ ] Other congenital or acquired thrombophilia       (3 Points)          [  ] Chemotherapy                   (1 Point)  [ ] Heparin induced thrombocytopenia                  (3 Points)             [  ] Blood Transfusion                (1 point)                                                                                                             Total Score [ 9 ]                                                                                                                                                                                                                                                                                                                                                                                                                                           [id number] Score: 6    IMPROVE Bleeding Risk Score: 11    Falls Risk:   High (  )  Mod (  )  Low (  )  crcl: 16.0        cr: 2.98          BMI  25           EBL: 200  ml  Time procedure    : starts: 15:50             :ends: 1700              Denies any personal or familial history of clotting or bleeding disorders.    Allergies    Aleve (Vomiting)  codeine (Unknown)  morphine (Nausea)  naproxen (Vomiting)    Intolerances        REVIEW OF SYSTEMS    (  )Fever	     (  )Constipation	(  )SOB				(  )Headache	(  )Dysuria  (  )Chills	     (  )Melena	(  )Dyspnea present on exertion	                    (  )Dizziness                    (  )Polyuria  (  )Nausea	     (  )Hematochezia	(  )Cough			                    (  )Syncope   	(  )Hematuria  (  )Vomiting    (  )Chest Pain	(  )Wheezing			(  )Weakness  (  )Diarrhea     (  )Palpitations	(  )Anorexia			(  )Myalgia        Unable to obtain review of systems due to: confused    Vital Signs Last 24 Hrs  T(C): 36.5 (03 Oct 2021 07:58), Max: 36.5 (03 Oct 2021 07:58)  T(F): 97.7 (03 Oct 2021 07:58), Max: 97.7 (03 Oct 2021 07:58)  HR: 60 (03 Oct 2021 12:00) (60 - 66)  BP: 135/52 (03 Oct 2021 12:00) (91/60 - 170/74)  BP(mean): 73 (03 Oct 2021 12:00) (64 - 100)  RR: 15 (03 Oct 2021 12:00) (12 - 29)  SpO2: 97% (03 Oct 2021 12:00) (94% - 100%)    PHYSICAL EXAM:    Constitutional: Appears Well    Neurological: A& O x 1 person    Skin: Warm    Respiratory and Thorax: normal effort; Breath sounds: normal; No rales/wheezing/rhonchi  	  Cardiovascular: S1, S2, regular, NMBR	    Gastrointestinal: BS + x 4Q, nontender	    Genitourinary:  Bladder nondistended, nontender    Musculoskeletal:   General Right:   no muscle/joint tenderness,   normal tone, no joint swelling,   ROM: limited	    General Left:   no muscle/joint tenderness,   normal tone, no joint swelling,   ROM: limited    Hip:  Right: Dressing CDI;    Lower extrems:   Right: no calf tenderness              negative patric's sign               + pedal pulses    Left:   no calf tenderness              negative patric's sign               + pedal pulses                          9.2    16.69 )-----------( 125      ( 03 Oct 2021 07:15 )             28.0       10-03    138  |  105  |  66<H>  ----------------------------<  218<H>  4.9   |  24  |  2.98<H>    Ca    8.8      03 Oct 2021 07:15  Phos  3.6     10-  Mg     2.2     10    TPro  8.1  /  Alb  3.3  /  TBili  0.9  /  DBili  x   /  AST  39<H>  /  ALT  61  /  AlkPhos  149<H>  10-02      PT/INR - ( 03 Oct 2021 11:44 )   PT: 13.6 sec;   INR: 1.18 ratio         PTT - ( 02 Oct 2021 08:49 )  PTT:30.7 sec	    < from: Xray Hip w/ Pelvis 2 Views, Bilateral (10.02.21 @ 10:18) >  IMPRESSION: Right hip fracture. No acute chest finding.    < end of copied text >  			  < from: CT Head No Cont (21 @ 17:55) >  MPRESSION:    Cervical spine CT: No acute fractures or dislocations.    Multilevel cervical spondylosis.    Head CT: Trace amount of acute subarachnoid hemorrhage layering posteriorly along the right sylvian fissure.    Mild amount of right-sided high posterior parietal scalp soft tissue swelling.      < end of copied text >  < from: CT Head No Cont (10.02.21 @ 09:36) >  IMPRESSION:  No acute intracranial hemorrhage    Acute/subacute T1 and T2 vertebral body fractures as described above. MRI exam recommended    < end of copied text >    MEDICATIONS  (STANDING):  acetaminophen   Tablet .. 975 milliGRAM(s) Oral every 8 hours  amLODIPine   Tablet 10 milliGRAM(s) Oral daily  atorvastatin 40 milliGRAM(s) Oral at bedtime  dextrose 40% Gel 15 Gram(s) Oral once  dextrose 5%. 1000 milliLiter(s) IV Continuous <Continuous>  dextrose 5%. 1000 milliLiter(s) IV Continuous <Continuous>  dextrose 50% Injectable 25 Gram(s) IV Push once  dextrose 50% Injectable 12.5 Gram(s) IV Push once  dextrose 50% Injectable 25 Gram(s) IV Push once  enoxaparin Injectable 30 milliGRAM(s) SubCutaneous daily  glucagon  Injectable 1 milliGRAM(s) IntraMuscular once  hydrALAZINE 50 milliGRAM(s) Oral every 8 hours  insulin lispro (ADMELOG) corrective regimen sliding scale   SubCutaneous three times a day before meals  insulin lispro (ADMELOG) corrective regimen sliding scale   SubCutaneous at bedtime  isosorbide   mononitrate ER Tablet (IMDUR) 30 milliGRAM(s) Oral daily  metoprolol succinate ER 25 milliGRAM(s) Oral daily  pantoprazole    Tablet 40 milliGRAM(s) Oral daily  senna 2 Tablet(s) Oral at bedtime  sodium chloride 0.9%. 1000 milliLiter(s) IV Continuous <Continuous>  venlafaxine XR. 75 milliGRAM(s) Oral daily          DVT Prophylaxis:  LMWH                   ( x )  Heparin SQ           (  )  Coumadin             (  )  Xarelto                  (  )  Eliquis                   (  )  Venodynes           ( x )  Ambulation          ( x )  UFH                       (  )  Contraindicated  (  )  EC Aspirin             (  )

## 2021-10-03 NOTE — PHYSICAL THERAPY INITIAL EVALUATION ADULT - PERTINENT HX OF CURRENT PROBLEM, REHAB EVAL
Pt admitted to  following syncope and collapse. Pt with + head strike. Pt with difficulty arousing from anesthesia following sx and CT head was ordered. Right femur x-ray: right hip fx. CT head prior to sx: neg. CT head following sx: resolving left lentiform intraparenchymal hemorrhage with age indeterminate infarcts. CT c-spine: acute/subacute T1 and T2 fx's. H/H- 9.2/28.0. COVID 19: neg.

## 2021-10-03 NOTE — PROGRESS NOTE ADULT - SUBJECTIVE AND OBJECTIVE BOX
Pt was seen and examined today bedside , No acute issues were reported from nursing staff . Pt is not fully awake but he is arousable on verbal stimulation.    ICU Vital Signs Last 24 Hrs  T(C): 36.5 (03 Oct 2021 07:58), Max: 36.5 (03 Oct 2021 07:58)  T(F): 97.7 (03 Oct 2021 07:58), Max: 97.7 (03 Oct 2021 07:58)  HR: 60 (03 Oct 2021 10:00) (60 - 66)  BP: 140/53 (03 Oct 2021 10:00) (91/60 - 170/74)  BP(mean): 74 (03 Oct 2021 10:00) (64 - 100)  ABP: --  ABP(mean): --  RR: 17 (03 Oct 2021 10:00) (13 - 29)  SpO2: 95% (03 Oct 2021 10:00) (94% - 100%)       Exam:  NAD AAOx3  Dressing clean and dry  +EHL FHL TA GS  SILT toes 1-5  +DP  Calf Soft NT                            9.2    16.69 )-----------( 125      ( 03 Oct 2021 07:15 )             28.0   10-03    138  |  105  |  66<H>  ----------------------------<  218<H>  4.9   |  24  |  2.98<H>    Ca    8.8      03 Oct 2021 07:15  Phos  3.6     10-03  Mg     2.2     10-03    TPro  8.1  /  Alb  3.3  /  TBili  0.9  /  DBili  x   /  AST  39<H>  /  ALT  61  /  AlkPhos  149<H>  10-02    PT/INR - ( 03 Oct 2021 11:44 )   PT: 13.6 sec;   INR: 1.18 ratio         PTT - ( 02 Oct 2021 08:49 )  PTT:30.7 sec

## 2021-10-03 NOTE — PHYSICAL THERAPY INITIAL EVALUATION ADULT - PASSIVE RANGE OF MOTION EXAMINATION, REHAB EVAL
Bilateral shoulder flex ~ 120 degrees. Right LE unable to fully assess, DF neutral. Left DF neutral. Pt having difficulty following commands to assess ROM verbal and tactile./Left UE Passive ROM was WFL (within functional limits)/Right UE Passive ROM was WFL (within functional limits)/Left LE Passive ROM was WFL (within functional limits)

## 2021-10-03 NOTE — PHYSICAL THERAPY INITIAL EVALUATION ADULT - ADDITIONAL COMMENTS
Lives at home with wife. Wife and son takes care of IADL's (cooking and laundry/food shopping)No AD. Info obtained from eval 9/2021

## 2021-10-03 NOTE — PROGRESS NOTE ADULT - SUBJECTIVE AND OBJECTIVE BOX
POD 1 Total Knee Arthroplasty  Pt Seen and Examined. doing much better than post op last night, now very responsive, following commands, Pain is controlled. Feeling well overall. No nausea or vomiting. Remains on venti mask but satting 100%    Vital Signs Last 24 Hrs  T(C): 36.1 (10-03-21 @ 04:11), Max: 36.6 (10-02-21 @ 08:37)  T(F): 97 (10-03-21 @ 04:11), Max: 97.8 (10-02-21 @ 08:37)  HR: 60 (10-03-21 @ 06:00) (60 - 71)  BP: 144/50 (10-03-21 @ 06:00) (91/60 - 170/74)  BP(mean): 73 (10-03-21 @ 06:00) (64 - 100)  RR: 24 (10-03-21 @ 06:00) (15 - 29)  SpO2: 97% (10-03-21 @ 06:00) (90% - 100%)                        9.7    20.48 )-----------( 138      ( 02 Oct 2021 18:02 )             29.8     02 Oct 2021 18:02    135    |  103    |  61     ----------------------------<  296    5.6     |  24     |  3.03     Ca    9.1        02 Oct 2021 18:02    TPro  8.1    /  Alb  3.3    /  TBili  0.9    /  DBili  x      /  AST  39     /  ALT  61     /  AlkPhos  149    02 Oct 2021 08:49    PT/INR - ( 02 Oct 2021 08:49 )   PT: 13.1 sec;   INR: 1.13 ratio         PTT - ( 02 Oct 2021 08:49 )  PTT:30.7 sec    Exam:  NAD AAOx3  Dressing clean and dry  +EHL FHL TA GS  SILT toes 1-5  +DP  Calf Soft NT    A/P:   89yMale s/p R Nishant arthroplasty    - Pain control  - Post hip precaution  -abduction brace while in bed  - DVT/PE ppx  - WBAT/PT/OOB  - Med Mgmt  - FU Labs  - D/C Planning

## 2021-10-03 NOTE — CONSULT NOTE ADULT - ASSESSMENT
This is a 89 y/o M with PMHx of atrial fibrillation on Coumadin (off since SAH 9-2021),[id number] Score: 6,  CAD, HTN, IDDM, Type 2 myocardial infarction, GERD, benign prostatic hyperplasia, chronic congestive heart failure, UTI, syncope and collapse for trip and frequent falls. Pt has both high thrombosis and high bleed risk.    10-3-2021: I spoke with Dr. Galaviz, Betty Oswald PA neuro surgery, and orthopedic resident and they are all in agreement that it is ok to place pt on prophylactic ac for now.     plan:  :Heparin 5,000 units sq q12h  will consider changing to lovenox if pt's cr cl improves.  crcl noted 16, cr 2.98  :daily cbc/bmp  :LE Venodynes  : increase mobility as per ortho  :Thanks for consult will f/u

## 2021-10-03 NOTE — PROGRESS NOTE ADULT - SUBJECTIVE AND OBJECTIVE BOX
89 yo M with pmh afib on asa, cad, dementia,  AS s/p TAVR 10/20, h/o PCI 10/20, h/o fall s/p SDH on 9/4/21, systolic chf, s/p ppm, IDDM, HTN, MI 12/17 presents s/p slip and fall. Was walking with walker when states he suddenly tripped and fell. no syncope. Complains of right hip pain.  No head strike or LOC. Pt on ASA for AF (coumadin held d/t high risk of falls by cardiology). s/p right hemiarthroplasty with confusion and lethargy postop; seems to be improved has marked hypoacusia; recalled for med f/up  Awake, alert no c/o    Vital Signs Last 24 Hrs  T(C): 36.5 (03 Oct 2021 07:58), Max: 36.5 (03 Oct 2021 07:58)  T(F): 97.7 (03 Oct 2021 07:58), Max: 97.7 (03 Oct 2021 07:58)  HR: 60 (03 Oct 2021 12:00) (60 - 66)  BP: 135/52 (03 Oct 2021 12:00) (91/60 - 170/74)  BP(mean): 73 (03 Oct 2021 12:00) (64 - 100)  RR: 15 (03 Oct 2021 12:00) (12 - 29)  SpO2: 97% (03 Oct 2021 12:00) (94% - 100%)    Constitutional: NAD, awake and alert, well-developed  HEENT: PERR, EOMI, Normal Hearing, MMM  Neck: Soft and supple, No LAD, No JVD  Respiratory: Breath sounds are clear bilaterally, No wheezing, rales or rhonchi  Cardiovascular: S1 and S2, regular rate and rhythm, no Murmurs, gallops or rubs  Gastrointestinal: Bowel Sounds present, soft, nontender, nondistended, no guarding, no rebound  Extremities: No peripheral edema  Vascular: 2+ peripheral pulses  Neurological: A/O x 3, no focal deficits  Musculoskeletal: gen weakness, no deficits but v weak, RLE strength not tested  R dressing  Skin: No rashes                          9.2    16.69 )-----------( 125      ( 03 Oct 2021 07:15 )             28.0   10-03    138  |  105  |  66<H>  ----------------------------<  218<H>  4.9   |  24  |  2.98<H>    Ca    8.8      03 Oct 2021 07:15  Phos  3.6     10-03  Mg     2.2     10-03    TPro  8.1  /  Alb  3.3  /  TBili  0.9  /  DBili  x   /  AST  39<H>  /  ALT  61  /  AlkPhos  149<H>  10-02       CT Head No Cont (10.02.21 @ 22:04) >  Motion and technique limit study, with linear band of increased and decreased attenuation through the brain parenchyma,. Unchanged volume loss, resolving left lentiform intraparenchymal hemorrhage with rim of increased attenuation, atherosclerotic calcification, microvascular disease, with age indeterminate infarcts no new large extra-axial hemorrhage or shift. If symptoms persist consider follow-up head CT or MR if no contraindications.      MEDICATIONS  (STANDING):  acetaminophen   Tablet .. 975 milliGRAM(s) Oral every 8 hours  amLODIPine   Tablet 10 milliGRAM(s) Oral daily  atorvastatin 40 milliGRAM(s) Oral at bedtime  glucagon  Injectable 1 milliGRAM(s) IntraMuscular once  hydrALAZINE 50 milliGRAM(s) Oral every 8 hours  insulin lispro (ADMELOG) corrective regimen sliding scale   SubCutaneous three times a day before meals  insulin lispro (ADMELOG) corrective regimen sliding scale   SubCutaneous at bedtime  isosorbide   mononitrate ER Tablet (IMDUR) 30 milliGRAM(s) Oral daily  metoprolol succinate ER 25 milliGRAM(s) Oral daily  pantoprazole    Tablet 40 milliGRAM(s) Oral daily  senna 2 Tablet(s) Oral at bedtime  sodium chloride 0.9%. 1000 milliLiter(s) (100 mL/Hr) IV Continuous <Continuous>  venlafaxine XR. 75 milliGRAM(s) Oral daily      CT Cervical Spine No Cont (10.02.21 @ 09:37) >  Acute/subacute T1 and T2 vertebral body fractures as described above. MRI exam recommended              #s/p mechanical fall  #Acute T1-T2 fractures  s/p right hemiarthroplasty  POD#1  avoid narcotics  postop encephalopathy improved      #Afib  -Pt had SDH   -CHADS VASC 5  -pt has had several falls (last one resulted in ICH); he is on no antiplatelets to d/w surgery  also needs VTE px       #Stable HFrEF with h/o AS s/p tavr and ppm  -EF on 9/21 : 45%  -well seated tavr  -mild MR  -severe TR        #Leukocytosis  -pt with no symptoms with exception of right hip pain; no urine sent for cx      #IDDM  -Endocrine as o/p stopped HUERTAS and started on repaglinide. No HUERTAS for now      #CKD IV  -baseline 2.7-2.8  -presently at baseline

## 2021-10-03 NOTE — PROGRESS NOTE ADULT - ASSESSMENT
89 yo M s/p right marc arthroplasty POD 1    Plan:   - Monitor vitals  -  f/u ortho recommendations.   - Pain control   - Continue ICU management .  - Trauma surgery will follow along.       Plan was discussed with Dr. Osborne

## 2021-10-03 NOTE — PHYSICAL THERAPY INITIAL EVALUATION ADULT - PRECAUTIONS/LIMITATIONS, REHAB EVAL
Tele/cardiac precautions/fall precautions/oxygen therapy device and L/min Tele, NC 2 L/min/cardiac precautions/fall precautions/oxygen therapy device and L/min

## 2021-10-03 NOTE — PHYSICAL THERAPY INITIAL EVALUATION ADULT - GENERAL OBSERVATIONS, REHAB EVAL
Pt found supine sleeping with O2 NC, SD/CCU monitors, abd pillow, and bed alarm activated. Pt having difficulty keeping his eyes open and following commands verbal and tactile. As per RN pt is [place]. PAINAD- 0/10. Pt found supine sleeping with [place], O2 NC, SD/CCU monitors, abd pillow, and bed alarm activated. Pt having difficulty keeping his eyes open and following commands verbal and tactile. As per RN pt is [place]. PAINAD- 0/10.

## 2021-10-03 NOTE — PHYSICAL THERAPY INITIAL EVALUATION ADULT - DIAGNOSIS, PT EVAL
syncope and collapse, + head strike. Right hip fx with sx as above, T1 and T2 fx's. Difficulty arousing from anesthesia following sx, CT head results as above.

## 2021-10-03 NOTE — PROGRESS NOTE ADULT - ASSESSMENT
89 year old male s/p right CONSTANTINO s/p femoral fracture this evening. PMH significant for recent left ICH 9/2021, atrial fibrillation off coumadin secondary to ICH, CAD, HTN, IDDM, Type 2 myocardial infarction, GERD, benign prostatic hyperplasia, chronic congestive heart failure, UTI, syncope and collapse for trip and fall.     T1/2 VB fracture- at this time does not need brace- if patient stable may get MRI c/t spine to r/o acute vs subacute- if subacute and no pain does not need brace.   If acute and painful need brace   left basal ganglia hemorrhage resolving   DVT PPX

## 2021-10-04 NOTE — PROGRESS NOTE ADULT - SUBJECTIVE AND OBJECTIVE BOX
POD 2  Pt Seen and Examined. doing much better than post op last night, now very responsive, following commands, Pain is controlled. Feeling well overall. No nausea or vomiting. Remains on venti mask but satting 100%    Vital Signs Last 24 Hrs  T(C): 36.9 (04 Oct 2021 05:05), Max: 37 (04 Oct 2021 00:34)  T(F): 98.4 (04 Oct 2021 05:05), Max: 98.6 (04 Oct 2021 00:34)  HR: 60 (04 Oct 2021 05:05) (60 - 61)  BP: 152/61 (04 Oct 2021 05:05) (123/69 - 163/62)  BP(mean): 73 (03 Oct 2021 12:00) (73 - 87)  RR: 15 (04 Oct 2021 05:05) (12 - 18)  SpO2: 100% (04 Oct 2021 05:05) (93% - 100%)      Exam:  NAD AAOx3  Dressing clean and dry  +EHL FHL TA GS  SILT toes 1-5  +DP  Calf Soft NT    A/P:   89yMale s/p R Nishant arthroplasty POD 2    - Pain control  - Post hip precaution  - abduction brace while in bed  - DVT/PE ppx  - WBAT/PT/OOB  - Med Mgmt  - Nsx recs appreciated   - FU Labs  - D/C Planning

## 2021-10-04 NOTE — PROGRESS NOTE ADULT - ASSESSMENT
This is a 89 y/o M with PMHx of atrial fibrillation on Coumadin (off since SAH 9-2021),[id number] Score: 6,  CAD, HTN, IDDM, Type 2 myocardial infarction, GERD, benign prostatic hyperplasia, chronic congestive heart failure, UTI, syncope and collapse for trip and frequent falls. Pt has both high thrombosis and high bleed risk.    10-3-2021: I spoke with Dr. Galaviz, Betty Oswald PA neuro surgery, and orthopedic resident and they are all in agreement that it is ok to place pt on prophylactic ac for now.     plan:  :Heparin 5,000 units sq q12h  will consider changing to lovenox if pt's cr cl improves.  crcl noted 16, cr 2.98yesterday now crcl 15.2 cr 3.13  :daily cbc/bmp  :LE Venodynes  : increase mobility as per ortho  : will f/u  :dispo rehab

## 2021-10-04 NOTE — PROGRESS NOTE ADULT - ASSESSMENT
89 yo M s/p right marc arthroplasty POD 2.    Plan:   - Monitor vitals  -  f/u ortho recommendations.   - Pain control   - Continue ICU management .  - Trauma surgery will follow along.      89 yo M s/p right marc arthroplasty POD 2.    Plan:   - Monitor vitals  -  f/u ortho recommendations.   - Pain control   - Trauma surgery will follow along.   Placement  PT

## 2021-10-04 NOTE — PROGRESS NOTE ADULT - ASSESSMENT
89 year old male s/p right CONSTANTINO s/p femoral fracture this evening. PMH significant for recent left ICH 9/2021, atrial fibrillation off coumadin secondary to ICH, CAD, HTN, IDDM, Type 2 myocardial infarction, GERD, benign prostatic hyperplasia, chronic congestive heart failure, UTI, syncope and collapse for trip and fall.     T1/2 VB fracture- was seen on prior imaging, pt denies any neck pain   Unable to héctor MRI due to PPM   left basal ganglia hemorrhage resolving   DVT PPX as per primary team   No acute surgical intervention at this time  Pt can follow up as an out patient for both the cerebral hemorrhage and the T1/2 fracture   Discussed with Dr. Yancey    Will sign off for now, please re-consult as needed

## 2021-10-04 NOTE — PROGRESS NOTE ADULT - SUBJECTIVE AND OBJECTIVE BOX
HPI:  89 y/o M with PMHx of atrial fibrillation off AC due to recent hemorrhagic stroke , CAD, HTN, IDDM, Type 2 myocardial infarction, GERD, benign prostatic hyperplasia, chronic congestive heart failure, UTI, syncope and collapse for trip and fall. Patient states he was walking with a walker when he slipped and fell to the right side. Patient did hit his head, no LOC. He does not currently take blood thinner. Patient c/o right hip pain and is unable to walk on right side  Pt seen and examined at bedside pre operatively was awake, alert, comfortable, cooperative, oriented, pt in no acute distress. Pt denied c/o fever, chills, chest pain, SOB, abd pain, N/V/D,hemoptysis, hematemesis, hematuria, hematochexia, headache, diplopia, vertigo, dizzyness.  (02 Oct 2021 11:12)  Post operatively slow to awaken, not following commands, small subtle myoclonic jerking. PERRL, toes up and w/d to pain.    10/3- Pt seen and examined in the, CICU, POD #1 s/p RIGHT hip hemiarthroplasty, opened eyes to voice, attempted to say hello, squeezed right hand to command, unable to assess if patient is in pain although he seems to be resting comfortably, as per nursing no events overnight.     10/4- Pt seen and examined on 2N, much more awake and alert, +confusion/agitation , very restless in the bed, c/o constipation, denies neck pain, moving all extremities antigravity     Vital Signs Last 24 Hrs  T(C): 36.4 (04 Oct 2021 09:34), Max: 37 (04 Oct 2021 00:34)  T(F): 97.5 (04 Oct 2021 09:34), Max: 98.6 (04 Oct 2021 00:34)  HR: 64 (04 Oct 2021 09:34) (60 - 64)  BP: 115/61 (04 Oct 2021 09:34) (115/61 - 152/61)  RR: 17 (04 Oct 2021 09:34) (15 - 17)  SpO2: 96% (04 Oct 2021 09:34) (93% - 100%)    MEDICATIONS  (STANDING):  acetaminophen   Tablet .. 975 milliGRAM(s) Oral every 8 hours  amLODIPine   Tablet 10 milliGRAM(s) Oral daily  atorvastatin 40 milliGRAM(s) Oral at bedtime  glucagon  Injectable 1 milliGRAM(s) IntraMuscular once  heparin   Injectable 5000 Unit(s) SubCutaneous every 12 hours  hydrALAZINE 50 milliGRAM(s) Oral every 8 hours  insulin lispro (ADMELOG) corrective regimen sliding scale   SubCutaneous three times a day before meals  insulin lispro (ADMELOG) corrective regimen sliding scale   SubCutaneous at bedtime  isosorbide   mononitrate ER Tablet (IMDUR) 30 milliGRAM(s) Oral daily  metoprolol succinate ER 25 milliGRAM(s) Oral daily  pantoprazole    Tablet 40 milliGRAM(s) Oral daily  senna 2 Tablet(s) Oral at bedtime  sodium chloride 0.9%. 1000 milliLiter(s) (80 mL/Hr) IV Continuous <Continuous>  venlafaxine XR. 75 milliGRAM(s) Oral daily    MEDICATIONS  (PRN):  ondansetron Injectable 4 milliGRAM(s) IV Push every 6 hours PRN Nausea      ROS: unable to assess due to confusion/agitation     PHYSICAL EXAM:  Constitutional: awake, alert, +confusion, follows commands   HEENT: PERRLA, EOMI   Neck: Supple, no pain on palpation   Respiratory: Breath sounds are clear bilaterally  Cardiovascular: S1 and S2, irregular rhythm  Gastrointestinal: soft, nontender  Extremities:  no edema  Vascular: Carotid Bruit - no  Musculoskeletal: no myoclonic jerking, moving all extremities   Skin: dressing from right hip arthroplasty c/d/i     Neurological exam:  HF: awake, alert, oriented 1-2, +confusion/agitation, follows simple commands   CN: PEARRL, EOMI, VFF, facial sensation normal  Motor: 5/5 poser b/l upper ext, moving b/l lower ext in the bed, full exam difficult due to confusion   Sens: withdraws to pain   Reflexes: Symmetric and normal, toes upgoing b/l   Coord:  unable to assess   Gait/Balance: unable to assess     LABS:                         9.0    13.89 )-----------( 129      ( 04 Oct 2021 06:54 )             28.1     10-04    142  |  109<H>  |  65<H>  ----------------------------<  229<H>  4.8   |  26  |  3.13<H>    Ca    8.8      04 Oct 2021 06:54  Phos  3.2     10-04  Mg     2.4     10-04    09-21 Chol 101 LDL -- HDL 44 Trig 107, 09-20 Chol 101 LDL -- HDL 40<L> Trig 111      RADIOLOGY:  < from: CT Cervical Spine No Cont (10.02.21 @ 09:37) >  IMPRESSION:  No acute intracranial hemorrhage  Acute/subacute T1 and T2 vertebral body fractures as described above.   MRI exam recommended

## 2021-10-04 NOTE — PROGRESS NOTE ADULT - SUBJECTIVE AND OBJECTIVE BOX
HPI:  Trauma Consult, 10/2/21 1036am, attending bedside 1040am    Pt s.p mechanical trip/fall 10/2/21  89 y/o M with PMHx of atrial fibrillation on Coumadin (off since SAH ),[id number] Score: 6,  CAD, HTN, IDDM, Type 2 myocardial infarction, GERD, benign prostatic hyperplasia, chronic congestive heart failure, UTI, syncope and collapse for trip and fall. Patient states he was walking with a walker when he slipped and fell to the right side. Patient did hit his head, no LOC. He does not currently take blood thinner. Patient c/o right hip pain and is unable to walk on right side    Pt seen and examined at bedside. Pt is awake, alert, comfortable, cooperative, orineted, pt in no acute distress. Pt denied c/o fever, chills, chest pain, SOB, abd pain, N/V/D,hemoptysis, hematemesis, hematuria, hematochexia, headache, diplopia, vertigo, dizzyness.  (02 Oct 2021 11:12)      Patient is a 89y old  Male who presents with a chief complaint of vertebral fractures, hip fracture (03 Oct 2021 13:29) PT S/P RIGHT HIP marc on 10-2-2021.  After surg pt became confused and he was transferred to CICU.       Consulted by Dr. Dakota Jones  for VTE prophylaxis, risk stratification, and anticoagulation management.    PAST MEDICAL & SURGICAL HISTORY:  IDDM (insulin dependent diabetes mellitus)    HTN (hypertension)    Chronic congestive heart failure, unspecified congestive heart failure type    Atrial fibrillation, unspecified type OFF AC SIN[facility]     Gastroesophageal reflux disease, esophagitis presence not specified    Benign prostatic hyperplasia without lower urinary tract symptoms    Syncope and collapse  2018    Type 2 myocardial infarction  2017    UTI (urinary tract infection)  2017    Artificial cardiac pacemaker    History of appendectomy    Arthropathy of shoulder region    S/P TAVR (transcatheter aortic valve replacement)        FAMILY HISTORY:      Interval Note:  10-3-2021: Pt seen at bedside in CICU.  Pt alert but only knows his name and his wife is Mary. I called pt's son and he instructed me to call his mother who gave me their cardiologist Dr Galaviz.   10-4-2021: Pt seen at bedside on [address] agitated concerned about not being able to move his bowels. Pt only know his name.  Removing covers off and moving in bed on his side.      CAPRINI SCORE  AGE RELATED RISK FACTORS                                                       MOBILITY RELATED FACTORS  [ ] Age 41-60 years                                            (1 Point)                  [ ] Bed rest                                                        (1 Point)  [ ] Age: 61-74 years                                           (2 Points)                [ ] Plaster cast                                                   (2 Points)  [x ] Age= 75 years                                              (3 Points)                 [ ] Bed bound for more than 72 hours                   (2 Points)    DISEASE RELATED RISK FACTORS                                               GENDER SPECIFIC FACTORS  [ ] Edema in the lower extremities                       (1 Point)           [ ] Pregnancy                                                            (1 Point)  [ ] Varicose veins                                               (1 Point)                  [ ] Post-partum < 6 weeks                                      (1 Point)             [x ] BMI > 25 Kg/m2                                            (1 Point)                  [ ] Hormonal therapy or oral contraception       (1 Point)                 [ ] Sepsis (in the previous month)                        (1 Point)             [ ] History of pregnancy complications                (1Point)  [ ] Pneumonia or serious lung disease                                             [ ] Unexplained or recurrent  (=/>3), premature                                 (In the previous month)                               (1 Point)                birth with toxemia or growth-restricted infant (1 Point)  [ ] Abnormal pulmonary function test            (1 Point)                                   SURGERY RELATED RISK FACTORS  [ ] Acute myocardial infarction                       (1 Point)                  [ ]  Section                                         (1 Point)  [ ] Congestive heart failure (in the previous month) (1 Point)   [ ] Minor surgery   lasting <45 minutes       (1 Point)   [ ] Inflammatory bowel disease                             (1 Point)          [ ] Arthroscopic surgery                                  (2 Points)  [ ] Central venous access                                    (2 Points)            [ ] General surgery lasting >45 minutes      (2 Points)       [ ] Stroke (in the previous month)                  (5 Points)            [ ] Elective major lower extremity arthroplasty (5 Points)                                   [  ] Malignancy (present or past include skin melanoma                                          but exclude  basal skin cell)    (2 points)                                      TRAUMA RELATED RISK FACTORS                HEMATOLOGY RELATED FACTORS                                  [x ] Fracture of the hip, pelvis, or leg                       (5 Points)  [ ] Prior episodes of VTE                                     (3 Points)          [ ] Acute spinal cord injury (in the previous month)  (5 Points)  [ ] Positive family history for VTE                         (3 Points)       [ ] Paralysis (less than 1 month)                          (5 Points)  [ ] Prothrombin 12186 A                                      (3 Points)         [ ] Multiple Trauma (within 1month)                 (5Points)                                                                                                                                                                [ ] Factor V Leiden                                          (3 Points)                                OTHER RISK FACTORS                          [ ] Lupus anticoagulants                                     (3 Points)                       [ ] BMI > 40                          (1 Point)                                                         [ ] Anticardiolipin antibodies                                (3 Points)                   [ ] Smoking                              (1Point)                                                [ ] High homocysteine in the blood                      (3 Points)                [  ] Diabetes requiring insulin (1point)                         [ ] Other congenital or acquired thrombophilia       (3 Points)          [  ] Chemotherapy                   (1 Point)  [ ] Heparin induced thrombocytopenia                  (3 Points)             [  ] Blood Transfusion                (1 point)                                                                                                             Total Score [ 9 ]                                                                                                                                                                                                                                                                                                                                                                                                                                           [id number] Score: 6    IMPROVE Bleeding Risk Score: 11    Falls Risk:   High (  )  Mod (  )  Low (  )  crcl: 16.0        cr: 2.98          BMI  25           EBL: 200  ml  Time procedure    : starts: 15:50             :ends: 1700              Denies any personal or familial history of clotting or bleeding disorders.    Allergies    Aleve (Vomiting)  codeine (Unknown)  morphine (Nausea)  naproxen (Vomiting)    Intolerances        REVIEW OF SYSTEMS    (  )Fever	     (  )Constipation	(  )SOB				(  )Headache	(  )Dysuria  (  )Chills	     (  )Melena	(  )Dyspnea present on exertion	                    (  )Dizziness                    (  )Polyuria  (  )Nausea	     (  )Hematochezia	(  )Cough			                    (  )Syncope   	(  )Hematuria  (  )Vomiting    (  )Chest Pain	(  )Wheezing			(  )Weakness   (x) agitated  (  )Diarrhea     (  )Palpitations	(  )Anorexia			(  )Myalgia        Unable to obtain review of systems due to: confused    Vital Signs Last 24 Hrs  T(C): 36.4 (10-04-21 @ 09:34), Max: 37 (10-04-21 @ 00:34)  T(F): 97.5 (10-04-21 @ 09:34), Max: 98.6 (10-04-21 @ 00:34)  HR: 64 (10-04-21 @ 09:34) (60 - 64)  BP: 115/61 (10-04-21 @ 09:34) (115/61 - 152/61)  BP(mean): --  RR: 17 (10-04-21 @ 09:34) (15 - 17)  SpO2: 96% (10-04-21 @ 09:34) (93% - 100%)  PHYSICAL EXAM:    Constitutional: Appears Well    Neurological: A& O x 1 person    Skin: Warm    Respiratory and Thorax: normal effort; Breath sounds: normal; No rales/wheezing/rhonchi  	  Cardiovascular: S1, S2, regular, NMBR	    Gastrointestinal: BS + x 4Q, nontender	    Genitourinary:  Bladder nondistended, nontender    Musculoskeletal:   General Right:   no muscle/joint tenderness,   normal tone, no joint swelling,   ROM: limited	    General Left:   no muscle/joint tenderness,   normal tone, no joint swelling,   ROM: limited    Hip:  Right: Dressing CDI    Lower extrems:   Right: no calf tenderness              negative patric's sign               + pedal pulses    Left:   no calf tenderness              negative patric's sign               + pedal pulses                                   9.0    13.89 )-----------( 129      ( 04 Oct 2021 06:54 )             28.1       10-04    142  |  109<H>  |  65<H>  ----------------------------<  229<H>  4.8   |  26  |  3.13<H>    Ca    8.8      04 Oct 2021 06:54  Phos  3.2     10-04  Mg     2.4     10-04                     9.2    16.69 )-----------( 125      ( 03 Oct 2021 07:15 )             28.0       10-03    138  |  105  |  66<H>  ----------------------------<  218<H>  4.9   |  24  |  2.98<H>    Ca    8.8      03 Oct 2021 07:15  Phos  3.6     10-03  Mg     2.2     10-03    TPro  8.1  /  Alb  3.3  /  TBili  0.9  /  DBili  x   /  AST  39<H>  /  ALT  61  /  AlkPhos  149<H>  10-02      PT/INR - ( 03 Oct 2021 11:44 )   PT: 13.6 sec;   INR: 1.18 ratio         PTT - ( 02 Oct 2021 08:49 )  PTT:30.7 sec	    < from: Xray Hip w/ Pelvis 2 Views, Bilateral (10.02.21 @ 10:18) >  IMPRESSION: Right hip fracture. No acute chest finding.    < end of copied text >  			  < from: CT Head No Cont (21 @ 17:55) >  MPRESSION:    Cervical spine CT: No acute fractures or dislocations.    Multilevel cervical spondylosis.    Head CT: Trace amount of acute subarachnoid hemorrhage layering posteriorly along the right sylvian fissure.    Mild amount of right-sided high posterior parietal scalp soft tissue swelling.      < end of copied text >  < from: CT Head No Cont (10.02.21 @ 09:36) >  IMPRESSION:  No acute intracranial hemorrhage    Acute/subacute T1 and T2 vertebral body fractures as described above. MRI exam recommended    < end of copied text >    MEDICATIONS  (STANDING):  acetaminophen   Tablet .. 975 milliGRAM(s) Oral every 8 hours  amLODIPine   Tablet 10 milliGRAM(s) Oral daily  atorvastatin 40 milliGRAM(s) Oral at bedtime  dextrose 40% Gel 15 Gram(s) Oral once  dextrose 5%. 1000 milliLiter(s) IV Continuous <Continuous>  dextrose 5%. 1000 milliLiter(s) IV Continuous <Continuous>  dextrose 50% Injectable 25 Gram(s) IV Push once  dextrose 50% Injectable 12.5 Gram(s) IV Push once  dextrose 50% Injectable 25 Gram(s) IV Push once  glucagon  Injectable 1 milliGRAM(s) IntraMuscular once  heparin   Injectable 5000 Unit(s) SubCutaneous every 12 hours  hydrALAZINE 50 milliGRAM(s) Oral every 8 hours  insulin lispro (ADMELOG) corrective regimen sliding scale   SubCutaneous three times a day before meals  insulin lispro (ADMELOG) corrective regimen sliding scale   SubCutaneous at bedtime  isosorbide   mononitrate ER Tablet (IMDUR) 30 milliGRAM(s) Oral daily  metoprolol succinate ER 25 milliGRAM(s) Oral daily  pantoprazole    Tablet 40 milliGRAM(s) Oral daily  senna 2 Tablet(s) Oral at bedtime  sodium chloride 0.9%. 1000 milliLiter(s) IV Continuous <Continuous>  venlafaxine XR. 75 milliGRAM(s) Oral daily          DVT Prophylaxis:  LMWH                   (  )  Heparin SQ           ( x )  Coumadin             (  )  Xarelto                  (  )  Eliquis                   (  )  Venodynes           ( x )  Ambulation          ( x )  UFH                       (  )  Contraindicated  (  )  EC Aspirin             (  )

## 2021-10-04 NOTE — PROGRESS NOTE ADULT - SUBJECTIVE AND OBJECTIVE BOX
SURGERY DAILY PROGRESS NOTE:     Subjective:  Patient seen and examined at bedside during am rounds. AVSS. Denies any fevers, chills, n/v/d, chest pain or shortness of breath    Objective:    MEDICATIONS  (STANDING):  acetaminophen   Tablet .. 975 milliGRAM(s) Oral every 8 hours  amLODIPine   Tablet 10 milliGRAM(s) Oral daily  atorvastatin 40 milliGRAM(s) Oral at bedtime  dextrose 40% Gel 15 Gram(s) Oral once  dextrose 5%. 1000 milliLiter(s) (50 mL/Hr) IV Continuous <Continuous>  dextrose 5%. 1000 milliLiter(s) (100 mL/Hr) IV Continuous <Continuous>  dextrose 50% Injectable 25 Gram(s) IV Push once  dextrose 50% Injectable 12.5 Gram(s) IV Push once  dextrose 50% Injectable 25 Gram(s) IV Push once  glucagon  Injectable 1 milliGRAM(s) IntraMuscular once  heparin   Injectable 5000 Unit(s) SubCutaneous every 12 hours  hydrALAZINE 50 milliGRAM(s) Oral every 8 hours  insulin lispro (ADMELOG) corrective regimen sliding scale   SubCutaneous three times a day before meals  insulin lispro (ADMELOG) corrective regimen sliding scale   SubCutaneous at bedtime  isosorbide   mononitrate ER Tablet (IMDUR) 30 milliGRAM(s) Oral daily  metoprolol succinate ER 25 milliGRAM(s) Oral daily  pantoprazole    Tablet 40 milliGRAM(s) Oral daily  senna 2 Tablet(s) Oral at bedtime  sodium chloride 0.9%. 1000 milliLiter(s) (80 mL/Hr) IV Continuous <Continuous>  venlafaxine XR. 75 milliGRAM(s) Oral daily    MEDICATIONS  (PRN):  ondansetron Injectable 4 milliGRAM(s) IV Push every 6 hours PRN Nausea      Vital Signs Last 24 Hrs  T(C): 36.9 (04 Oct 2021 05:05), Max: 37 (04 Oct 2021 00:34)  T(F): 98.4 (04 Oct 2021 05:05), Max: 98.6 (04 Oct 2021 00:34)  HR: 60 (04 Oct 2021 05:05) (60 - 61)  BP: 152/61 (04 Oct 2021 05:05) (123/69 - 163/62)  BP(mean): 73 (03 Oct 2021 12:00) (73 - 87)  RR: 15 (04 Oct 2021 05:05) (12 - 18)  SpO2: 100% (04 Oct 2021 05:05) (93% - 100%)      PHYSICAL EXAM   NAD AAOx3  Dressing clean and dry  +EHL FHL TA GS  SILT toes 1-5  +DP  Calf Soft NT      I&O's Detail    02 Oct 2021 07:01  -  03 Oct 2021 07:00  --------------------------------------------------------  IN:    Other (mL): 750 mL  Total IN: 750 mL    OUT:    Incontinent per Condom Catheter (mL): 200 mL    Intermittent Catheterization - Urethral (mL): 525 mL  Total OUT: 725 mL    Total NET: 25 mL          Daily     Daily     LABS:                        9.2    16.69 )-----------( 125      ( 03 Oct 2021 07:15 )             28.0     10-03    138  |  105  |  66<H>  ----------------------------<  218<H>  4.9   |  24  |  2.98<H>    Ca    8.8      03 Oct 2021 07:15  Phos  3.6     10-03  Mg     2.2     10-03    TPro  8.1  /  Alb  3.3  /  TBili  0.9  /  DBili  x   /  AST  39<H>  /  ALT  61  /  AlkPhos  149<H>  10-02    PT/INR - ( 03 Oct 2021 11:44 )   PT: 13.6 sec;   INR: 1.18 ratio         PTT - ( 02 Oct 2021 08:49 )  PTT:30.7 sec

## 2021-10-04 NOTE — PROGRESS NOTE ADULT - SUBJECTIVE AND OBJECTIVE BOX
89 yo M with pmh afib on asa, cad, dementia,  AS s/p TAVR 10/20, h/o PCI 10/20, h/o fall s/p SDH on 9/4/21, systolic chf, s/p ppm, IDDM, HTN, MI 12/17 presents s/p slip and fall. Was walking with walker when states he suddenly tripped and fell. no syncope. Complains of right hip pain.  No head strike or LOC. Pt on ASA for AF (coumadin held d/t high risk of falls by cardiology). s/p right hemiarthroplasty with confusion and lethargy postop;       10/3: seen at bedside, confused, yelling out at times, agitated      Vital Signs Last 24 Hrs  T(C): 36.4 (10-04-21 @ 09:34), Max: 37 (10-04-21 @ 00:34)  T(F): 97.5 (10-04-21 @ 09:34), Max: 98.6 (10-04-21 @ 00:34)  HR: 64 (10-04-21 @ 09:34) (60 - 64)  BP: 115/61 (10-04-21 @ 09:34) (115/61 - 152/61)  BP(mean): --  RR: 17 (10-04-21 @ 09:34) (15 - 17)  SpO2: 96% (10-04-21 @ 09:34) (93% - 100%)      PHYSICAL EXAM:    GENERAL: Comfortable, no acute distress   HEAD:  Normocephalic, atraumatic  EYES: EOMI, PERRLA  HEENT: Moist mucous membranes  NECK: Supple, No JVD  NERVOUS SYSTEM:  Alert & Oriented X1, Motor Strength 5/5 B/L upper and lower extremities  CHEST/LUNG: Clear to auscultation bilaterally  HEART: Regular rate and rhythm  ABDOMEN: Soft, non tender, Nondistended, Bowel sounds present  GENITOURINARY: Voiding, no palpable bladder  EXTREMITIES:   No clubbing, cyanosis, or edema  MUSCULOSKELETAL- right hip dsg c/d/i  SKIN-no rash                            9.0    13.89 )-----------( 129      ( 04 Oct 2021 06:54 )             28.1       10-04    142  |  109<H>  |  65<H>  ----------------------------<  229<H>  4.8   |  26  |  3.13<H>    Ca    8.8      04 Oct 2021 06:54  Phos  3.2     10-04  Mg     2.4     10-04              CT Head No Cont (10.02.21 @ 22:04) >  Motion and technique limit study, with linear band of increased and decreased attenuation through the brain parenchyma,. Unchanged volume loss, resolving left lentiform intraparenchymal hemorrhage with rim of increased attenuation, atherosclerotic calcification, microvascular disease, with age indeterminate infarcts no new large extra-axial hemorrhage or shift. If symptoms persist consider follow-up head CT or MR if no contraindications.    MEDICATIONS  (STANDING):  acetaminophen   Tablet .. 975 milliGRAM(s) Oral every 8 hours  amLODIPine   Tablet 10 milliGRAM(s) Oral daily  atorvastatin 40 milliGRAM(s) Oral at bedtime  dextrose 40% Gel 15 Gram(s) Oral once  dextrose 5%. 1000 milliLiter(s) (50 mL/Hr) IV Continuous <Continuous>  dextrose 5%. 1000 milliLiter(s) (100 mL/Hr) IV Continuous <Continuous>  dextrose 50% Injectable 25 Gram(s) IV Push once  dextrose 50% Injectable 12.5 Gram(s) IV Push once  dextrose 50% Injectable 25 Gram(s) IV Push once  glucagon  Injectable 1 milliGRAM(s) IntraMuscular once  heparin   Injectable 5000 Unit(s) SubCutaneous every 12 hours  hydrALAZINE 50 milliGRAM(s) Oral every 8 hours  insulin lispro (ADMELOG) corrective regimen sliding scale   SubCutaneous three times a day before meals  insulin lispro (ADMELOG) corrective regimen sliding scale   SubCutaneous at bedtime  isosorbide   mononitrate ER Tablet (IMDUR) 30 milliGRAM(s) Oral daily  metoprolol succinate ER 25 milliGRAM(s) Oral daily  pantoprazole    Tablet 40 milliGRAM(s) Oral daily  senna 2 Tablet(s) Oral at bedtime  sodium chloride 0.9%. 1000 milliLiter(s) (80 mL/Hr) IV Continuous <Continuous>  venlafaxine XR. 75 milliGRAM(s) Oral daily    MEDICATIONS  (PRN):  ondansetron Injectable 4 milliGRAM(s) IV Push every 6 hours PRN Nausea      CT Cervical Spine No Cont (10.02.21 @ 09:37) >  Acute/subacute T1 and T2 vertebral body fractures as described above. MRI exam recommended              #s/p mechanical fall  #Acute T1-T2 fractures  s/p right hemiarthroplasty  POD#2  avoid narcotics, on tylenol for pain  postop encephalopathy       #Afib  -Pt had SDH   -CHADS VASC 5  -pt has had several falls (last one resulted in ICH); he is on no antiplatelets to d/w surgery  also needs VTE px       #Stable HFrEF with h/o AS s/p tavr and ppm  -EF on 9/21 : 45%  -well seated tavr  -mild MR  -severe TR    #Leukocytosis  -most likely reactive    resolving      #IDDM  -Endocrine as o/p stopped HUERTAS and started on repaglinide.    No HUERTAS for now      #CKD IV  -baseline 2.7-2.8  -presently at baseline       dispo: most likely rehab               89 yo M with pmh afib on asa, cad, dementia,  AS s/p TAVR 10/20, h/o PCI 10/20, h/o fall s/p SDH on 9/4/21, systolic chf, s/p ppm, IDDM, HTN, MI 12/17 presents s/p slip and fall. Was walking with walker when states he suddenly tripped and fell. no syncope. Complains of right hip pain.  No head strike or LOC. Pt on ASA for AF (coumadin held d/t high risk of falls by cardiology). s/p right hemiarthroplasty with confusion and lethargy postop;       10/3: seen at bedside, confused, yelling out at times, agitated      Vital Signs Last 24 Hrs  T(C): 36.4 (10-04-21 @ 09:34), Max: 37 (10-04-21 @ 00:34)  T(F): 97.5 (10-04-21 @ 09:34), Max: 98.6 (10-04-21 @ 00:34)  HR: 64 (10-04-21 @ 09:34) (60 - 64)  BP: 115/61 (10-04-21 @ 09:34) (115/61 - 152/61)  BP(mean): --  RR: 17 (10-04-21 @ 09:34) (15 - 17)  SpO2: 96% (10-04-21 @ 09:34) (93% - 100%)      PHYSICAL EXAM:    GENERAL: Comfortable, no acute distress   HEAD:  Normocephalic, atraumatic  EYES: EOMI, PERRLA  HEENT: Moist mucous membranes  NECK: Supple, No JVD  NERVOUS SYSTEM:  Alert & Oriented X1, Motor Strength 5/5 B/L upper and lower extremities  CHEST/LUNG: Clear to auscultation bilaterally  HEART: Regular rate and rhythm  ABDOMEN: Soft, non tender, Nondistended, Bowel sounds present  GENITOURINARY: Voiding, no palpable bladder  EXTREMITIES:   No clubbing, cyanosis, or edema  MUSCULOSKELETAL- right hip dsg c/d/i  SKIN-no rash                            9.0    13.89 )-----------( 129      ( 04 Oct 2021 06:54 )             28.1       10-04    142  |  109<H>  |  65<H>  ----------------------------<  229<H>  4.8   |  26  |  3.13<H>    Ca    8.8      04 Oct 2021 06:54  Phos  3.2     10-04  Mg     2.4     10-04              CT Head No Cont (10.02.21 @ 22:04) >  Motion and technique limit study, with linear band of increased and decreased attenuation through the brain parenchyma,. Unchanged volume loss, resolving left lentiform intraparenchymal hemorrhage with rim of increased attenuation, atherosclerotic calcification, microvascular disease, with age indeterminate infarcts no new large extra-axial hemorrhage or shift. If symptoms persist consider follow-up head CT or MR if no contraindications.    MEDICATIONS  (STANDING):  acetaminophen   Tablet .. 975 milliGRAM(s) Oral every 8 hours  amLODIPine   Tablet 10 milliGRAM(s) Oral daily  atorvastatin 40 milliGRAM(s) Oral at bedtime  dextrose 40% Gel 15 Gram(s) Oral once  dextrose 5%. 1000 milliLiter(s) (50 mL/Hr) IV Continuous <Continuous>  dextrose 5%. 1000 milliLiter(s) (100 mL/Hr) IV Continuous <Continuous>  dextrose 50% Injectable 25 Gram(s) IV Push once  dextrose 50% Injectable 12.5 Gram(s) IV Push once  dextrose 50% Injectable 25 Gram(s) IV Push once  glucagon  Injectable 1 milliGRAM(s) IntraMuscular once  heparin   Injectable 5000 Unit(s) SubCutaneous every 12 hours  hydrALAZINE 50 milliGRAM(s) Oral every 8 hours  insulin lispro (ADMELOG) corrective regimen sliding scale   SubCutaneous three times a day before meals  insulin lispro (ADMELOG) corrective regimen sliding scale   SubCutaneous at bedtime  isosorbide   mononitrate ER Tablet (IMDUR) 30 milliGRAM(s) Oral daily  metoprolol succinate ER 25 milliGRAM(s) Oral daily  pantoprazole    Tablet 40 milliGRAM(s) Oral daily  senna 2 Tablet(s) Oral at bedtime  sodium chloride 0.9%. 1000 milliLiter(s) (80 mL/Hr) IV Continuous <Continuous>  venlafaxine XR. 75 milliGRAM(s) Oral daily    MEDICATIONS  (PRN):  ondansetron Injectable 4 milliGRAM(s) IV Push every 6 hours PRN Nausea      CT Cervical Spine No Cont (10.02.21 @ 09:37) >  Acute/subacute T1 and T2 vertebral body fractures as described above. MRI exam recommended              #s/p mechanical fall  #Acute T1-T2 fractures  s/p right hemiarthroplasty  POD#2  avoid narcotics, on tylenol for pain  postop encephalopathy       #Afib  -Pt had SDH   -CHADS VASC 5  -pt has had several falls (last one resulted in ICH); he is on no antiplatelets to d/w surgery  also needs VTE px     # constipation   dulcolax supp x 1: ineffective    fleets enema    #Stable HFrEF with h/o AS s/p tavr and ppm  -EF on 9/21 : 45%  -well seated tavr  -mild MR  -severe TR    #Leukocytosis  -most likely reactive    resolving      #IDDM  -Endocrine as o/p stopped HUERTAS and started on repaglinide.    No HUERTAS for now      #CKD IV  -baseline 2.7-2.8  -presently at baseline       dispo: most likely rehab

## 2021-10-04 NOTE — CDI QUERY NOTE - NSCDIOTHERTXTBX_GEN_ALL_CORE_HH
This patient was admitted with a non-traumatic ICH.    Documentation reflects:    Consult Note Adult-Critical Care Physician Assistant/Attending 9- @ 22:41   CT head showed 2cm left ICH. He was given Kcentra, Vitamin K and 2 units of platelets in the ER. Found to be hypertensive to 200s on arrival,started on nicardipine infusion. Admitted to ICU under neurosurgery service.       ..recent admission w/ SDH on 9/7 s/p fall. Patient was discharged and AC was restarted on 9/14..    Can the recent ICH and the use of anticoagulants be further clarified?  A) Current ICH likely related to anticoagulants.  B) Current ICH likely unrelated to anticoagulants.  C) Other, please specify:  D) Unable to determine.
This patient was admitted s/p fall with an acute ICH:    HP Adult 9-19-21 @ 21:57   presented to the ER after sustaining a mechanical fall at home hitting his head on the corner of a wall.  Head CT shows Left head of caudate ICH.    Progress Note Adult-Critical Care Attending 9-22-21 @ 10:16  ecent admission w/ SDH on 9/7 s/p fall. Patient was discharged and AC was restarted on 9/14. Now presenting to ED w/ worsening mental status over past few days. Patient was code stroke.  Impression:  1. Acute ICH     Can the type of ICH be specified?  A) Traumatic ICH.  B) Non-traumatic ICH  C) Other type of ICH, please specify:  D) Unable to determine.

## 2021-10-05 NOTE — DISCHARGE NOTE NURSING/CASE MANAGEMENT/SOCIAL WORK - NSDCVIVACCINE_GEN_ALL_CORE_FT
influenza, high-dose, quadrivalent; 05-Oct-2021 13:40; Tuyet Bello (RN); [employer]; [id number] (Exp. Date: 30-Jun-2022); IntraMuscular; Deltoid Right.; 0.7 milliLiter(s); VIS (VIS Published: 15-Aug-2019, VIS Presented: 05-Oct-2021);   Tdap; 07-Sep-2021 21:52; Amberly Santos (RN); [employer]; [id number] (Exp. Date: 28-Jun-2023); IntraMuscular; Deltoid Left.; 0.5 milliLiter(s); VIS (VIS Published: 09-May-2013, VIS Presented: 07-Sep-2021);

## 2021-10-05 NOTE — PROGRESS NOTE ADULT - ASSESSMENT
Assessment:  89y Male s/p RIGHT Hip Hemiarthroplasty POD #3    -Pain control  -VTE ppx per AC team  -WBAT/OOB with assistance as needed  -Posterior hip precautions  -Abduction pillow in place at all times while in bed or chair  -PT  -Ice as needed  -Encourage incentive spirometry  -Medical management per primary team  -Nsx recs appreciated   -DC planning: Ortho stable for DC  -Will discuss with attending and will advise if plan changes.

## 2021-10-05 NOTE — PROGRESS NOTE ADULT - ASSESSMENT
This is a 89 y/o M with PMHx of atrial fibrillation on Coumadin (off since SAH 9-2021),[id number] Score: 6,  CAD, HTN, IDDM, Type 2 myocardial infarction, GERD, benign prostatic hyperplasia, chronic congestive heart failure, UTI, syncope and collapse for trip and frequent falls. Pt has both high thrombosis and high bleed risk.    10-3-2021: I spoke with Dr. Galaviz, Betty Oswald PA neuro surgery, and orthopedic resident and they are all in agreement that it is ok to place pt on prophylactic ac for now.     Plan:  ::Heparin 5,000 units sq q12h  will consider changing to Lovenox if pt's cr improves- today 3.15 from 3.13  ::Daily cbc/bmp  ::LE Venodynes  ::Increase mobility as per ortho    Will continue to follow.  ::Dispo rehab

## 2021-10-05 NOTE — DISCHARGE NOTE NURSING/CASE MANAGEMENT/SOCIAL WORK - PATIENT PORTAL LINK FT
You can access the FollowMyHealth Patient Portal offered by [facility] by registering at the following website: [email or web address]. By joining [employer]’s FollowMyHealth portal, you will also be able to view your health information using other applications (apps) compatible with our system.

## 2021-10-05 NOTE — PROGRESS NOTE ADULT - REASON FOR ADMISSION
vertebral fractures, hip fracture

## 2021-10-05 NOTE — PROGRESS NOTE ADULT - SUBJECTIVE AND OBJECTIVE BOX
CC:Patient is a 89y old  Male who presents with a chief complaint of vertebral fractures, hip fracture (05 Oct 2021 14:12)      Subjective:  Pt seen and examined at bedside with chaperone/family. Pt is awake alert, drowsy but arousable, comfortable, cooperative, pt in no acute distress. Pain well tolerated with meds    ROS:  otherwise as abovementioned ROS    Vital Signs Last 24 Hrs  T(C): 36.2 (05 Oct 2021 13:28), Max: 36.5 (05 Oct 2021 00:00)  T(F): 97.2 (05 Oct 2021 13:28), Max: 97.7 (05 Oct 2021 00:00)  HR: 78 (05 Oct 2021 13:28) (57 - 78)  BP: 126/60 (05 Oct 2021 13:28) (117/49 - 151/65)  BP(mean): 73 (05 Oct 2021 08:55) (73 - 73)  RR: 16 (05 Oct 2021 13:28) (16 - 17)  SpO2: 98% (05 Oct 2021 13:28) (93% - 100%)    Labs:                                8.7    12.43 )-----------( 130      ( 05 Oct 2021 07:42 )             27.8     CBC Full  -  ( 05 Oct 2021 07:42 )  WBC Count : 12.43 K/uL  RBC Count : 2.57 M/uL  Hemoglobin : 8.7 g/dL  Hematocrit : 27.8 %  Platelet Count - Automated : 130 K/uL  Mean Cell Volume : 108.2 fl  Mean Cell Hemoglobin : 33.9 pg  Mean Cell Hemoglobin Concentration : 31.3 gm/dL  Auto Neutrophil # : x  Auto Lymphocyte # : x  Auto Monocyte # : x  Auto Eosinophil # : x  Auto Basophil # : x  Auto Neutrophil % : x  Auto Lymphocyte % : x  Auto Monocyte % : x  Auto Eosinophil % : x  Auto Basophil % : x    10-05    137  |  107  |  68<H>  ----------------------------<  275<H>  5.0   |  20<L>  |  3.15<H>    Ca    8.8      05 Oct 2021 07:42  Phos  3.8     10-05  Mg     2.4     10-05    TPro  6.8  /  Alb  2.5<L>  /  TBili  0.9  /  DBili  x   /  AST  64<H>  /  ALT  18  /  AlkPhos  150<H>  10-05    LIVER FUNCTIONS - ( 05 Oct 2021 07:42 )  Alb: 2.5 g/dL / Pro: 6.8 gm/dL / ALK PHOS: 150 U/L / ALT: 18 U/L / AST: 64 U/L / GGT: x                 Meds:  acetaminophen   Tablet .. 975 milliGRAM(s) Oral every 8 hours  amLODIPine   Tablet 10 milliGRAM(s) Oral daily  atorvastatin 40 milliGRAM(s) Oral at bedtime  dextrose 40% Gel 15 Gram(s) Oral once  dextrose 5%. 1000 milliLiter(s) IV Continuous <Continuous>  dextrose 5%. 1000 milliLiter(s) IV Continuous <Continuous>  dextrose 50% Injectable 25 Gram(s) IV Push once  dextrose 50% Injectable 12.5 Gram(s) IV Push once  dextrose 50% Injectable 25 Gram(s) IV Push once  glucagon  Injectable 1 milliGRAM(s) IntraMuscular once  heparin   Injectable 5000 Unit(s) SubCutaneous every 12 hours  hydrALAZINE 50 milliGRAM(s) Oral every 8 hours  insulin lispro (ADMELOG) corrective regimen sliding scale   SubCutaneous three times a day before meals  insulin lispro (ADMELOG) corrective regimen sliding scale   SubCutaneous at bedtime  isosorbide   mononitrate ER Tablet (IMDUR) 30 milliGRAM(s) Oral daily  metoprolol succinate ER 25 milliGRAM(s) Oral daily  ondansetron Injectable 4 milliGRAM(s) IV Push every 6 hours PRN  pantoprazole    Tablet 40 milliGRAM(s) Oral daily  senna 2 Tablet(s) Oral at bedtime  venlafaxine XR. 75 milliGRAM(s) Oral daily      Radiology:      Physical exam:  Pt in no acute distress  Airway is patent  Breathing is symmetric and unlabored  Neuro: CN II-XII grossly intact  Psych: normal affect  HEENT: normocephalic, CONOR, EOM wnl, no gross craniofacial bony pathology to exam  Neck: No tracheal deviation, no JVD, no crepitus, no ecchymosis, no hematoma  Chest: No gross rib or sternal pathology or tenderness to exam, no crepitus, no ecchymosis, no hematoma  Resp: CTAB  CVS: S1S2(+)  ABD: bowel sounds (+), soft, nontender, non distended, no rebound, no guarding, no rigidity, no pelvic instability to exam  EXT: s/p right hip repair, no gross calf tenderness or edema to exam b/l, pt has good capillary refill in all digits. Sensoromotor function grossly intact to limited exam, on VTE prophylaxis  Skin: no adverse skin changes to exam

## 2021-10-05 NOTE — PROGRESS NOTE ADULT - SUBJECTIVE AND OBJECTIVE BOX
HPI:  Trauma Consult, 10/2/21 1036am, attending bedside 1040am    Pt s.p mechanical trip/fall 10/2/21  89 y/o M with PMHx of atrial fibrillation on Coumadin (off since SAH ),[id number] Score: 6,  CAD, HTN, IDDM, Type 2 myocardial infarction, GERD, benign prostatic hyperplasia, chronic congestive heart failure, UTI, syncope and collapse for trip and fall. Patient states he was walking with a walker when he slipped and fell to the right side. Patient did hit his head, no LOC. He does not currently take blood thinner. Patient c/o right hip pain and is unable to walk on right side    Pt seen and examined at bedside. Pt is awake, alert, comfortable, cooperative, orineted, pt in no acute distress. Pt denied c/o fever, chills, chest pain, SOB, abd pain, N/V/D,hemoptysis, hematemesis, hematuria, hematochexia, headache, diplopia, vertigo, dizzyness.  (02 Oct 2021 11:12)      Patient is a 89y old  Male who presents with a chief complaint of vertebral fractures, hip fracture (03 Oct 2021 13:29) PT S/P RIGHT HIP marc on 10-2-2021.  After surg pt became confused and he was transferred to CICU.       Consulted by Dr. Dakota Jones  for VTE prophylaxis, risk stratification, and anticoagulation management.    PAST MEDICAL & SURGICAL HISTORY:  IDDM (insulin dependent diabetes mellitus)    HTN (hypertension)    Chronic congestive heart failure, unspecified congestive heart failure type    Atrial fibrillation, unspecified type OFF AC [facility]     Gastroesophageal reflux disease, esophagitis presence not specified    Benign prostatic hyperplasia without lower urinary tract symptoms    Syncope and collapse  2018    Type 2 myocardial infarction  2017    UTI (urinary tract infection)  2017    Artificial cardiac pacemaker    History of appendectomy    Arthropathy of shoulder region    S/P TAVR (transcatheter aortic valve replacement)        FAMILY HISTORY:      Interval Note:  10-3-2021: Pt seen at bedside in CICU.  Pt alert but only knows his name and his wife is Mary. I called pt's son and he instructed me to call his mother who gave me their cardiologist Dr Galaviz.   10-4-2021: Pt seen at bedside on [facility] agitated concerned about not being able to move his bowels. Pt only know his name.  Removing covers off and moving in bed on his side.   10/5/21: Patient seen at bedside on [facility]. He is arousable but resting. Hip with staple line intact CYNDY, no dressing, no drainage.     CAPRINI SCORE  AGE RELATED RISK FACTORS                                                       MOBILITY RELATED FACTORS  [ ] Age 41-60 years                                            (1 Point)                  [ ] Bed rest                                                        (1 Point)  [ ] Age: 61-74 years                                           (2 Points)                [ ] Plaster cast                                                   (2 Points)  [x ] Age= 75 years                                              (3 Points)                 [ ] Bed bound for more than 72 hours                   (2 Points)    DISEASE RELATED RISK FACTORS                                               GENDER SPECIFIC FACTORS  [ ] Edema in the lower extremities                       (1 Point)           [ ] Pregnancy                                                            (1 Point)  [ ] Varicose veins                                               (1 Point)                  [ ] Post-partum < 6 weeks                                      (1 Point)             [x ] BMI > 25 Kg/m2                                            (1 Point)                  [ ] Hormonal therapy or oral contraception       (1 Point)                 [ ] Sepsis (in the previous month)                        (1 Point)             [ ] History of pregnancy complications                (1Point)  [ ] Pneumonia or serious lung disease                                             [ ] Unexplained or recurrent  (=/>3), premature                                 (In the previous month)                               (1 Point)                birth with toxemia or growth-restricted infant (1 Point)  [ ] Abnormal pulmonary function test            (1 Point)                                   SURGERY RELATED RISK FACTORS  [ ] Acute myocardial infarction                       (1 Point)                  [ ]  Section                                         (1 Point)  [ ] Congestive heart failure (in the previous month) (1 Point)   [ ] Minor surgery   lasting <45 minutes       (1 Point)   [ ] Inflammatory bowel disease                             (1 Point)          [ ] Arthroscopic surgery                                  (2 Points)  [ ] Central venous access                                    (2 Points)            [ ] General surgery lasting >45 minutes      (2 Points)       [ ] Stroke (in the previous month)                  (5 Points)            [ ] Elective major lower extremity arthroplasty (5 Points)                                   [  ] Malignancy (present or past include skin melanoma                                          but exclude  basal skin cell)    (2 points)                                      TRAUMA RELATED RISK FACTORS                HEMATOLOGY RELATED FACTORS                                  [x ] Fracture of the hip, pelvis, or leg                       (5 Points)  [ ] Prior episodes of VTE                                     (3 Points)          [ ] Acute spinal cord injury (in the previous month)  (5 Points)  [ ] Positive family history for VTE                         (3 Points)       [ ] Paralysis (less than 1 month)                          (5 Points)  [ ] Prothrombin 35367 A                                      (3 Points)         [ ] Multiple Trauma (within 1month)                 (5Points)                                                                                                                                                                [ ] Factor V Leiden                                          (3 Points)                                OTHER RISK FACTORS                          [ ] Lupus anticoagulants                                     (3 Points)                       [ ] BMI > 40                          (1 Point)                                                         [ ] Anticardiolipin antibodies                                (3 Points)                   [ ] Smoking                              (1Point)                                                [ ] High homocysteine in the blood                      (3 Points)                [  ] Diabetes requiring insulin (1point)                         [ ] Other congenital or acquired thrombophilia       (3 Points)          [  ] Chemotherapy                   (1 Point)  [ ] Heparin induced thrombocytopenia                  (3 Points)             [  ] Blood Transfusion                (1 point)                                                                                                             Total Score [ 9 ]                                                                                                                                                                                                                                                                                                                                                                                                                                           [id number] Score: 6    IMPROVE Bleeding Risk Score: 11    Falls Risk:   High (  )  Mod (  )  Low (  )  crcl: 16.0        cr: 2.98          BMI  25           EBL: 200  ml  Time procedure    : starts: 15:50             :ends: 1700              Denies any personal or familial history of clotting or bleeding disorders.    Allergies    Aleve (Vomiting)  codeine (Unknown)  morphine (Nausea)  naproxen (Vomiting)    Intolerances        REVIEW OF SYSTEMS    (  )Fever	     (  )Constipation	(  )SOB				(  )Headache	(  )Dysuria  (  )Chills	     (  )Melena	(  )Dyspnea present on exertion	                    (  )Dizziness                    (  )Polyuria  (  )Nausea	     (  )Hematochezia	(  )Cough			                    (  )Syncope   	(  )Hematuria  (  )Vomiting    (  )Chest Pain	(  )Wheezing			(  )Weakness   (x) agitated  (  )Diarrhea     (  )Palpitations	(  )Anorexia			(  )Myalgia        Unable to obtain review of systems due to: confused    Vital Signs Last 24 Hrs  T(C): 36.3 (10-05-21 @ 08:55), Max: 36.5 (10-05-21 @ 00:00)  T(F): 97.4 (10-05-21 @ 08:55), Max: 97.7 (10-05-21 @ 00:00)  HR: 59 (10-05-21 @ 08:55) (57 - 61)  BP: 145/57 (10-05-21 @ 08:55) (117/49 - 151/65)  BP(mean): 73 (10-05-21 @ 08:55) (73 - 73)  RR: 16 (10-05-21 @ 08:55) (16 - 17)  SpO2: 98% (10-05-21 @ 08:55) (93% - 100%)    PHYSICAL EXAM:    Constitutional: Appears Well    Neurological: A& O x 1 person    Skin: Warm    Respiratory and Thorax: normal effort; Breath sounds: normal; No rales/wheezing/rhonchi  	  Cardiovascular: S1, S2, regular, NMBR	    Gastrointestinal: BS + x 4Q, nontender	    Genitourinary:  Bladder nondistended, nontender    Musculoskeletal:   General Right:   no muscle/joint tenderness,   normal tone, no joint swelling,   ROM: limited	    General Left:   no muscle/joint tenderness,   normal tone, no joint swelling,   ROM: limited    Hip:  Right: Dressing CDI    Lower extrems:   Right: no calf tenderness              negative patric's sign               + pedal pulses    Left:   no calf tenderness              negative patric's sign               + pedal pulses                          8.7    12.43 )-----------( 130      ( 05 Oct 2021 07:42 )             27.8       10-    137  |  107  |  68<H>  ----------------------------<  275<H>  5.0   |  20<L>  |  3.15<H>    Ca    8.8      05 Oct 2021 07:42  Phos  3.8     10-05  Mg     2.4     10-05    TPro  6.8  /  Alb  2.5<L>  /  TBili  0.9  /  DBili  x   /  AST  64<H>  /  ALT  18  /  AlkPhos  150<H>  10-05      PT/INR - ( 03 Oct 2021 11:44 )   PT: 13.6 sec;   INR: 1.18 ratio                                          9.0    13.89 )-----------( 129      ( 04 Oct 2021 06:54 )             28.1       10-04    142  |  109<H>  |  65<H>  ----------------------------<  229<H>  4.8   |  26  |  3.13<H>    Ca    8.8      04 Oct 2021 06:54  Phos  3.2     10-04  Mg     2.4     10-04                     9.2    16.69 )-----------( 125      ( 03 Oct 2021 07:15 )             28.0       10-03    138  |  105  |  66<H>  ----------------------------<  218<H>  4.9   |  24  |  2.98<H>    Ca    8.8      03 Oct 2021 07:15  Phos  3.6     10  Mg     2.2     10-03    TPro  8.1  /  Alb  3.3  /  TBili  0.9  /  DBili  x   /  AST  39<H>  /  ALT  61  /  AlkPhos  149<H>  10      PT/INR - ( 03 Oct 2021 11:44 )   PT: 13.6 sec;   INR: 1.18 ratio         PTT - ( 02 Oct 2021 08:49 )  PTT:30.7 sec	    < from: Xray Hip w/ Pelvis 2 Views, Bilateral (10.02.21 @ 10:18) >  IMPRESSION: Right hip fracture. No acute chest finding.    < end of copied text >  			  < from: CT Head No Cont (21 @ 17:55) >  MPRESSION:    Cervical spine CT: No acute fractures or dislocations.    Multilevel cervical spondylosis.    Head CT: Trace amount of acute subarachnoid hemorrhage layering posteriorly along the right sylvian fissure.    Mild amount of right-sided high posterior parietal scalp soft tissue swelling.      < end of copied text >  < from: CT Head No Cont (10.02.21 @ 09:36) >  IMPRESSION:  No acute intracranial hemorrhage    Acute/subacute T1 and T2 vertebral body fractures as described above. MRI exam recommended    < end of copied text >    MEDICATIONS  (STANDING):  acetaminophen   Tablet .. 975 milliGRAM(s) Oral every 8 hours  amLODIPine   Tablet 10 milliGRAM(s) Oral daily  atorvastatin 40 milliGRAM(s) Oral at bedtime  dextrose 40% Gel 15 Gram(s) Oral once  dextrose 5%. 1000 milliLiter(s) IV Continuous <Continuous>  dextrose 5%. 1000 milliLiter(s) IV Continuous <Continuous>  dextrose 50% Injectable 25 Gram(s) IV Push once  dextrose 50% Injectable 12.5 Gram(s) IV Push once  dextrose 50% Injectable 25 Gram(s) IV Push once  glucagon  Injectable 1 milliGRAM(s) IntraMuscular once  heparin   Injectable 5000 Unit(s) SubCutaneous every 12 hours  hydrALAZINE 50 milliGRAM(s) Oral every 8 hours  insulin lispro (ADMELOG) corrective regimen sliding scale   SubCutaneous three times a day before meals  insulin lispro (ADMELOG) corrective regimen sliding scale   SubCutaneous at bedtime  isosorbide   mononitrate ER Tablet (IMDUR) 30 milliGRAM(s) Oral daily  metoprolol succinate ER 25 milliGRAM(s) Oral daily  pantoprazole    Tablet 40 milliGRAM(s) Oral daily  senna 2 Tablet(s) Oral at bedtime  sodium chloride 0.9%. 1000 milliLiter(s) IV Continuous <Continuous>  venlafaxine XR. 75 milliGRAM(s) Oral daily          DVT Prophylaxis:  LMWH                   (  )  Heparin SQ           ( x )  Coumadin             (  )  Xarelto                  (  )  Eliquis                   (  )  Venodynes           ( x )  Ambulation          ( x )  UFH                       (  )  Contraindicated  (  )  EC Aspirin             (  )

## 2021-10-05 NOTE — PROGRESS NOTE ADULT - SUBJECTIVE AND OBJECTIVE BOX
Patient seen and examined at bedside, resting comfortably. Pain well controlled. Denies headache, lightheadedness, dizziness, chest pain, dyspnea, n/v, numbness/tingling. No complaints at this time.       LABS:                        9.0    13.89 )-----------( 129      ( 04 Oct 2021 06:54 )             28.1     10-04    142  |  109<H>  |  65<H>  ----------------------------<  229<H>  4.8   |  26  |  3.13<H>    Ca    8.8      04 Oct 2021 06:54  Phos  3.2     10-04  Mg     2.4     10-04      PT/INR - ( 03 Oct 2021 11:44 )   PT: 13.6 sec;   INR: 1.18 ratio                 Vitals  T(C): 36.5 (10-05-21 @ 00:00), Max: 36.5 (10-05-21 @ 00:00)  HR: 57 (10-05-21 @ 00:00) (57 - 64)  BP: 117/49 (10-05-21 @ 00:00) (115/61 - 151/65)  RR: 16 (10-05-21 @ 00:00) (16 - 17)  SpO2: 93% (10-05-21 @ 00:00) (93% - 100%)      PHYSICAL EXAM  General: NAD, Awake and Alert    RIGHT Lower Extremity:  Dressing c/d/i  SCDs in place  L2-S1 SILT  +TA/EHL/FHL/GSC  + DP pulses  Compartments soft and compressible  Calves nontender

## 2021-10-05 NOTE — PROGRESS NOTE ADULT - ASSESSMENT
A/P:  Right hip fracture  T1 and T2 fractures  Spine surgery on consult, no intervention, outpt f/u  Ortho on consult, s/p right hip repair 10/2/21  AMS, altered mentation post operatively 10/2/21 likely secondary to anesthesia effects, resolved clinically   Hospitalist on consult for medical management, cleared for d/c  Multiple medical comorbidities including IDDM, s/p TVAR, HTN, Chronic congestive heart failure, Atrial fibrillation, GERD, BPH, MI, Syncope and collapse, Artificial cardiac pacemaker  Nephrology follow as outpt  Fall risk precautions  GI/DVT prophylaxis  Pain control  Pt stable/cleared for d/c to rehab

## 2021-10-05 NOTE — PROGRESS NOTE ADULT - PROVIDER SPECIALTY LIST ADULT
Hospitalist
Trauma Surgery
Hospitalist
Hospitalist
Orthopedics
Orthopedics
Trauma Surgery
Anticoag Management
Anticoag Management
Neurosurgery
Orthopedics
Orthopedics
Trauma Surgery
Neurosurgery

## 2021-10-05 NOTE — PROGRESS NOTE ADULT - SUBJECTIVE AND OBJECTIVE BOX
89 yo M with pmh afib on asa, cad, dementia,  AS s/p TAVR 10/20, h/o PCI 10/20, h/o fall s/p SDH on 9/4/21, systolic chf, s/p ppm, IDDM, HTN, MI 12/17 presents s/p slip and fall. Was walking with walker when states he suddenly tripped and fell. no syncope. Complains of right hip pain.  No head strike or LOC. Pt on ASA for AF (coumadin held d/t high risk of falls by cardiology). s/p right hemiarthroplasty with confusion and lethargy postop;     10/5/21- up and did few steps with PT. +BM yesterday. More comfortable today    Vital Signs Last 24 Hrs  T(C): 36.2 (05 Oct 2021 13:28), Max: 36.5 (05 Oct 2021 00:00)  T(F): 97.2 (05 Oct 2021 13:28), Max: 97.7 (05 Oct 2021 00:00)  HR: 78 (05 Oct 2021 13:28) (57 - 78)  BP: 126/60 (05 Oct 2021 13:28) (117/49 - 151/65)  BP(mean): 73 (05 Oct 2021 08:55) (73 - 73)  RR: 16 (05 Oct 2021 13:28) (16 - 17)  SpO2: 98% (05 Oct 2021 13:28) (93% - 100%)    PHYSICAL EXAM:  GENERAL: Comfortable, no acute distress   HEAD:  Normocephalic, atraumatic  EYES: EOMI, PERRLA  HEENT: Moist mucous membranes  NECK: Supple, No JVD  NERVOUS SYSTEM:  Alert & Oriented X1, Motor Strength 5/5 B/L upper and lower extremities  CHEST/LUNG: occasional base crackles+  HEART: Regular rate and rhythm  ABDOMEN: Soft, non tender, Nondistended, Bowel sounds present  GENITOURINARY: Voiding, no palpable bladder  EXTREMITIES:   No clubbing, cyanosis, or edema  MUSCULOSKELETAL- right hip dsg c/d/i  SKIN-no rash    LABS:                        8.7    12.43 )-----------( 130      ( 05 Oct 2021 07:42 )             27.8     05 Oct 2021 07:42    137    |  107    |  68     ----------------------------<  275    5.0     |  20     |  3.15     Ca    8.8        05 Oct 2021 07:42  Phos  3.8       05 Oct 2021 07:42  Mg     2.4       05 Oct 2021 07:42    TPro  6.8    /  Alb  2.5    /  TBili  0.9    /  DBili  x      /  AST  64     /  ALT  18     /  AlkPhos  150    05 Oct 2021 07:42    LIVER FUNCTIONS - ( 05 Oct 2021 07:42 )  Alb: 2.5 g/dL / Pro: 6.8 gm/dL / ALK PHOS: 150 U/L / ALT: 18 U/L / AST: 64 U/L / GGT: x           CAPILLARY BLOOD GLUCOSE  POCT Blood Glucose.: 246 mg/dL (05 Oct 2021 11:34)  POCT Blood Glucose.: 274 mg/dL (05 Oct 2021 07:58)  POCT Blood Glucose.: 220 mg/dL (04 Oct 2021 21:16)  POCT Blood Glucose.: 133 mg/dL (04 Oct 2021 16:45)     CT Head No Cont (10.02.21 @ 22:04) >  Motion and technique limit study, with linear band of increased and decreased attenuation through the brain parenchyma,. Unchanged volume loss, resolving left lentiform intraparenchymal hemorrhage with rim of increased attenuation, atherosclerotic calcification, microvascular disease, with age indeterminate infarcts no new large extra-axial hemorrhage or shift. If symptoms persist consider follow-up head CT or MR if no contraindications.    MEDICATIONS  (STANDING):  acetaminophen   Tablet .. 975 milliGRAM(s) Oral every 8 hours  amLODIPine   Tablet 10 milliGRAM(s) Oral daily  atorvastatin 40 milliGRAM(s) Oral at bedtime  dextrose 40% Gel 15 Gram(s) Oral once  dextrose 5%. 1000 milliLiter(s) (50 mL/Hr) IV Continuous <Continuous>  dextrose 5%. 1000 milliLiter(s) (100 mL/Hr) IV Continuous <Continuous>  dextrose 50% Injectable 25 Gram(s) IV Push once  dextrose 50% Injectable 12.5 Gram(s) IV Push once  dextrose 50% Injectable 25 Gram(s) IV Push once  glucagon  Injectable 1 milliGRAM(s) IntraMuscular once  heparin   Injectable 5000 Unit(s) SubCutaneous every 12 hours  hydrALAZINE 50 milliGRAM(s) Oral every 8 hours  insulin lispro (ADMELOG) corrective regimen sliding scale   SubCutaneous three times a day before meals  insulin lispro (ADMELOG) corrective regimen sliding scale   SubCutaneous at bedtime  isosorbide   mononitrate ER Tablet (IMDUR) 30 milliGRAM(s) Oral daily  metoprolol succinate ER 25 milliGRAM(s) Oral daily  pantoprazole    Tablet 40 milliGRAM(s) Oral daily  senna 2 Tablet(s) Oral at bedtime  sodium chloride 0.9%. 1000 milliLiter(s) (80 mL/Hr) IV Continuous <Continuous>  venlafaxine XR. 75 milliGRAM(s) Oral daily    MEDICATIONS  (PRN):  ondansetron Injectable 4 milliGRAM(s) IV Push every 6 hours PRN Nausea        #s/p mechanical fall  #Acute T1-T2 fractures  #RT hip fracture s/p right hemiarthroplasty  #Anemia acute blood loss from fractures/postop on chronic disease  Ortho following  PT as tolerated  HH dropped, but stable, no need for transfusion today  Incentive spirometry  Bowel regimen- had BM+  AC by Heparin SQ    #Afib  -Pt had SDH   -CHADS VASC 5  -pt has had several falls (last one resulted in ICH); he is on no antiplatelets to d/w surgery  -heparin SQ    #constipation  Resolved. Had BM    #Acute on chronic HFrEF with h/o AS s/p tavr and ppm  -EF on 9/21 : 45%  -well seated tavr  -mild MR  -severe TR  -will give one dose of IV lasix today    #Leukocytosis  -most likely reactive  resolving    #IDDM  -Endocrine as o/p stopped HUERTAS and started on repaglinide.    No HUERTAS for now    #CKD IV  -baseline 2.7-2.8  -presently at baseline     #Dispo- stable for discharge to rehab.

## 2021-10-25 NOTE — ED PROVIDER NOTE - ATTENDING CONTRIBUTION TO CARE
I, Da Au MD, personally saw the patient with the PA, and completed the key components of the history and physical exam. I then discussed the management plan with the PA.

## 2021-10-25 NOTE — ED CLERICAL - NS ED CLERK NOTE PRE-ARRIVAL INFORMATION; ADDITIONAL PRE-ARRIVAL INFORMATION
This patient is enrolled in the readmission reduction program and has active care navigation. This patient can be followed up by the care navigation team within 24 hours. To arrange close follow-up or to obtain additional clinical information about this patient, please call the contact number above. Please call the hospitalist as needed to collaborate on further medical management ([phone])

## 2021-10-25 NOTE — ED PROVIDER NOTE - ENMT, MLM
Oropharynx clear. Airway patent, Nasal mucosa clear. Mouth with moderately dry mucosa. Throat has no vesicles, no oropharyngeal exudates and uvula is midline.

## 2021-10-25 NOTE — ED ADULT NURSE REASSESSMENT NOTE - NS ED NURSE REASSESS COMMENT FT1
Spoke with patients son Dariel. Pt is vaccinated for Covid -19. Pt received Moderna vaccine second dose was 04/01/2021.

## 2021-10-25 NOTE — ED ADULT NURSE NOTE - CHIEF COMPLAINT QUOTE
pt p/w c/o SOB and hypoxemia from [facility] +COVID.  Unvaccinated as per EMS.

## 2021-10-25 NOTE — ED PROVIDER NOTE - PROGRESS NOTE DETAILS
89 y/o M presents with hypoxia. Pt is a poor historian, sent in from [facility] for hypoxia. Per pts son he was swabbed with covid pcr which was negative and the rapid was +. No  other complaints at this time. -Vin Palencia PA-C Discussed with Dr. Galloway, agrees with risk vs benefit, if cta is indicated, discuss with family, and will monitor in hospital.   Will start with non con CT to assess for covid pna vs chf, in unremarkable will cta  -Vin Palencia PA-C Received signout from Dr. Au.  Pending EKG, and admit for multifocal pneumonia, concern for possible covid according to prior provider. Fransico Tellez D.O. Pt with +rapid covid at [facility], suspect multifocal pna 2/2 viral etiology, will hold abx   Case discussed with Dr. Berry who will admit. -Vin Palencia PA-C

## 2021-10-25 NOTE — ED ADULT NURSE NOTE - OBJECTIVE STATEMENT
Pt brought in by ambulance from [facility] for shortness of breath. Pt A&Ox1-2. Pt [place]. Pt with possible exposure to COVID and has been on isolation at [facility] since 10/20/2021. As per EMS, pt + COVID, unvaccinated. Pt tachypneic, O2 sat on 3 L 91%. O2 sat, as per [facility] paperwork 88%.

## 2021-10-25 NOTE — ED PROVIDER NOTE - CARE PLAN
1 Principal Discharge DX:	Acute respiratory failure with hypoxia   Principal Discharge DX:	Acute respiratory failure with hypoxia  Secondary Diagnosis:	Multifocal pneumonia  Secondary Diagnosis:	Chronic kidney disease, unspecified CKD stage  Secondary Diagnosis:	Acute CHF

## 2021-10-25 NOTE — ED ADULT TRIAGE NOTE - CHIEF COMPLAINT QUOTE
pt p/w c/o SOB and hypoxemia from [facility] +COVID.  Unvaccinated as per EMS.

## 2021-10-25 NOTE — PHARMACOTHERAPY INTERVENTION NOTE - COMMENTS
Medication history complete. Reviewed medication list provided by [facility] and check list with Dr. First.

## 2021-10-25 NOTE — H&P ADULT - HISTORY OF PRESENT ILLNESS
Pt is a pleasant  89 y/o male with a PMHx of Afib, BPH, chronic CHF, GERD, HTN, IDDM, MI, major depressive disorder, pacemaker, syncope and collapse, freq falls, SAH in 9/2021 off AC, UTI, h/o TAVR who  presents to [place]  [facility].  He was discharged a few weeks ago after admission with a fall.   Pt has SOB and hypoxia , with an outpt   COVID +.     Not vaccinated for COVID. Pt is a poor historian.       Pt is a pleasant  89 y/o male with a PMHx of Afib, BPH, chronic CHF, GERD, HTN, IDDM, MI, major depressive disorder, pacemaker, syncope and collapse, freq falls, SAH in 9/2021 off AC, UTI, h/o TAVR, vasc dementia who  presents to [place]  ED via Ambulance  from [facility].  He was discharged a few weeks ago after admission with a fall.   Pt has SOB and hypoxia to 88%  an outpt PCR was  COVID + from oct 21.   He denies pain,  pt is confused at baseline.      Pt was vaccinated for COVID with his 2nd Moderna dose in April. Pt is a poor historian.    Fam Hx: unavailable due to pt's dementia

## 2021-10-25 NOTE — ED ADULT NURSE REASSESSMENT NOTE - NS ED NURSE REASSESS COMMENT FT1
Dariel Rodríguez  [phone] Cell  [phone] Home  [phone] work    Patients son. Please call with any an all updates.

## 2021-10-25 NOTE — H&P ADULT - ASSESSMENT
Pt is admitted w/  Hypoxia  COVID positive  Pneumonia  CKD III  Afib, TAVR not on AC due to SAH, freq falls  - admit to medicine  - s/p lasix  - Decadron   - O2 support  - ID consult  - DVT proph: heparin Sub Q  - Adv Directives Full Code   Pt is admitted w/  Hypoxia  COVID positive  Pneumonia  CKD III  Afib, TAVR not on AC due to SAH, freq falls  CHF exac  - admit to medicine  - s/p lasix 40 mg with diuresis of > 1400 ml in the ED  - Decadron   - No remdesivir due to severe CKD  - proning and incent spirometry  - O2 support  - ID and nephrology consult  - DVT proph: heparin Sub Q  - Adv Directives DNR

## 2021-10-25 NOTE — ED PROVIDER NOTE - CLINICAL SUMMARY MEDICAL DECISION MAKING FREE TEXT BOX
89 y/o male with a PMHx of Afib, BPH, chronic CHF, GERD, HTN, IDDM, MI, major depressive disorder, pacemaker, syncope and collapse, SAH, UTI, h/o TAVR presents to the ED BIBA from [facility] regarding increasing SOB and hypoxia. In facility, rapid COVID +, PCR allegedly negative. Pt is poor historian and does not understand why he is here. Pt ill appearing. Plan: EKG, chest XR, CT chest, labs including RVP/COVID, troponin, BMP, blood cultures, lactate, O2, Decadron IV, COVID precautions pending results, consult renal. Pt requires admission.

## 2021-10-25 NOTE — ED PROVIDER NOTE - CONSTITUTIONAL, MLM
normal... Elderly white male, acute upon chronically ill appearing, awake, alert, confused, in mild respiratory distress, no sentence shortening.

## 2021-10-25 NOTE — H&P ADULT - NSHPPHYSICALEXAM_GEN_ALL_CORE
ICU Vital Signs Last 24 Hrs  T(C): 36.8 (25 Oct 2021 15:16), Max: 36.8 (25 Oct 2021 15:16)  T(F): 98.2 (25 Oct 2021 15:16), Max: 98.2 (25 Oct 2021 15:16)  HR: 60 (25 Oct 2021 15:16) (60 - 71)  BP: 137/51 (25 Oct 2021 15:16) (137/51 - 151/58)  BP(mean): 72 (25 Oct 2021 15:16) (72 - 79)    RR: 24 (25 Oct 2021 15:16) (24 - 27)  SpO2: 96% (25 Oct 2021 15:16) (95% - 96%)

## 2021-10-26 NOTE — PATIENT PROFILE ADULT - HARM RISK FACTORS
Placed call to [facility] as no call has been received from the Rep concerning getting this pt's tumor tested (pt has no ins. And is undocumented).  Call  stated that she would have a  call RN Navigator about this issue.  
yes

## 2021-10-26 NOTE — CONSULT NOTE ADULT - SUBJECTIVE AND OBJECTIVE BOX
89 y/o male with a PMHx of Afib, BPH, chronic CHF, GERD, HTN, IDDM, MI, major depressive disorder, pacemaker, CKD IV most recent baseline near 3.0, recent syncope and collapse, to [place]  [facility].  He was discharged a few weeks ago after admission with a fall.  Noted here with SOB and hypoxia to 88%  an outpt PCR was  COVID + from oct 21 and here RPV + as well. Patient currently in isolation. Given lasix IV in the ed.      PAST MEDICAL & SURGICAL HISTORY:  IDDM (insulin dependent diabetes mellitus)    HTN (hypertension)    Chronic congestive heart failure, unspecified congestive heart failure type    Atrial fibrillation, unspecified type    Gastroesophageal reflux disease, esophagitis presence not specified    Benign prostatic hyperplasia without lower urinary tract symptoms    Syncope and collapse  2018    Type 2 myocardial infarction  2017    UTI (urinary tract infection)  2017    Artificial cardiac pacemaker    History of appendectomy    Arthropathy of shoulder region    S/P TAVR (transcatheter aortic valve replacement)        MEDICATIONS  (STANDING):  amLODIPine   Tablet 10 milliGRAM(s) Oral daily  ascorbic acid 1000 milliGRAM(s) Oral daily  atorvastatin 40 milliGRAM(s) Oral at bedtime  azithromycin  IVPB      cefTRIAXone Injectable.      cholecalciferol 4000 Unit(s) Oral daily  cyanocobalamin 1000 MICROGram(s) Oral daily  dexAMETHasone     Tablet 6 milliGRAM(s) Oral daily  ferrous    sulfate 325 milliGRAM(s) Oral at bedtime  folic acid 1 milliGRAM(s) Oral daily  gabapentin 100 milliGRAM(s) Oral two times a day  heparin   Injectable 5000 Unit(s) SubCutaneous every 8 hours  hydrALAZINE 50 milliGRAM(s) Oral every 8 hours  isosorbide   mononitrate ER Tablet (IMDUR) 30 milliGRAM(s) Oral daily  magnesium oxide 400 milliGRAM(s) Oral daily  metoprolol succinate ER 25 milliGRAM(s) Oral daily  pantoprazole    Tablet 40 milliGRAM(s) Oral before breakfast  senna 2 Tablet(s) Oral at bedtime  simethicone 80 milliGRAM(s) Chew two times a day  venlafaxine XR. 75 milliGRAM(s) Oral daily  zinc sulfate 220 milliGRAM(s) Oral daily    MEDICATIONS  (PRN):  acetaminophen     Tablet .. 650 milliGRAM(s) Oral every 4 hours PRN Temp greater or equal to 38.5C (101.3F)  ALBUTerol    90 MICROgram(s) HFA Inhaler 2 Puff(s) Inhalation every 4 hours PRN Shortness of Breath and/or Wheezing  benzonatate 100 milliGRAM(s) Oral three times a day PRN Cough      Allergies    Aleve (Vomiting)  Aprodine (Vomiting)  codeine (Unknown)  morphine (Nausea)  naproxen (Vomiting)    Intolerances        SOCIAL HISTORY:  no etoh/cigg    FAMILY HISTORY:  no renal disease    REVIEW OF SYSTEMS:    CONSTITUTIONAL:+ weakness, fevers or chills  EYES/ENT: No visual changes;  No vertigo or throat pain   NECK: No pain or stiffness  RESPIRATORY: + cough, wheezing, but no hemoptysis; + shortness of breath        T(C): , Max: 37.4 (10-25-21 @ 18:21)  T(F): , Max: 99.3 (10-25-21 @ 18:21)  HR: 84 (10-26-21 @ 11:10)  BP: 146/64 (10-26-21 @ 11:10)  BP(mean): 79 (10-26-21 @ 00:47)  RR: 20 (10-26-21 @ 11:10)  SpO2: 94% (10-26-21 @ 11:10)  Wt(kg): --    10-25 @ 07:01  -  10-26 @ 07:00  --------------------------------------------------------  IN: 0 mL / OUT: 2400 mL / NET: -2400 mL      Height (cm): 170.2 (10-25 @ 13:13)  Weight (kg): 79.4 (10-25 @ 13:13)  BMI (kg/m2): 27.4 (10-25 @ 13:13)  BSA (m2): 1.91 (10-25 @ 13:13)    PHYSICAL EXAM:    Constitutional: frail, chronically ill appearingMMM  Neck: No LAD, No JVD  Respiratory: scatt ronchi  Cardiovascular: S1 and S2  alert, very [place]  :  [place]  Skin: No rashes  Access: Not applicable        LABS:                        9.2    7.73  )-----------( 175      ( 26 Oct 2021 09:07 )             28.0     26 Oct 2021 09:07    137    |  103    |  62     ----------------------------<  284    4.5     |  25     |  3.22   25 Oct 2021 13:36    136    |  104    |  49     ----------------------------<  159    4.4     |  27     |  3.16     Ca    9.5        26 Oct 2021 09:07  Ca    9.2        25 Oct 2021 13:36    TPro  7.2    /  Alb  2.3    /  TBili  0.4    /  DBili  x      /  AST  48     /  ALT  23     /  AlkPhos  144    25 Oct 2021 13:36          Urine Studies:  Urinalysis Basic - ( 25 Oct 2021 15:36 )    Color: Yellow / Appearance: Clear / S.015 / pH: x  Gluc: x / Ketone: Negative  / Bili: Negative / Urobili: Negative   Blood: x / Protein: 15 / Nitrite: Negative   Leuk Esterase: Negative / RBC: 3-5 /HPF / WBC 3-5   Sq Epi: x / Non Sq Epi: Occasional / Bacteria: Occasional            RADIOLOGY & ADDITIONAL STUDIES:

## 2021-10-26 NOTE — CONSULT NOTE ADULT - ASSESSMENT
89 y/o male with a PMHx of Afib, BPH, chronic CHF, GERD, HTN, IDDM, MI, major depressive disorder, pacemaker, CKD IV most recent baseline near 3.0, recent syncope and collapse, to [place]  ED  here with SOB and hypoxia to 88%  an outpt PCR was  COVID + from oct 21 and here RPV + .    CKD IV  -Renal function baseline 2.5-3, subtle rise  -Keep even for now, would hold any further diuretics. Suspect rise today due to diuretic yesterday, covid also can play a role  -NSAID avoidance  -Optimize intake  -If intake not at goal, add IVF    Covid/RPV  -Iso, medical follow up  -Not a candidate for remdesivir  -Would not diurese any further    D/c with RN staff bedside  thanks, will follow

## 2021-10-26 NOTE — PROGRESS NOTE ADULT - SUBJECTIVE AND OBJECTIVE BOX
Cheif complaints and Diagnosis:    Subjective:       REVIEW OF SYSTEMS:    CONSTITUTIONAL: No weakness, fevers or chills  EYES/ENT: No visual changes;  No vertigo or throat pain   NECK: No pain or stiffness  RESPIRATORY: No cough, wheezing, hemoptysis; No shortness of breath  CARDIOVASCULAR: No chest pain or palpitations  GASTROINTESTINAL: No abdominal or epigastric pain. No nausea, vomiting, or hematemesis; No diarrhea or constipation. No melena or hematochezia.  GENITOURINARY: No dysuria, frequency or hematuria  NEUROLOGICAL: No numbness or weakness  SKIN: No itching, burning, rashes, or lesions   All other review of systems is negative unless indicated above      Vital Signs Last 24 Hrs  T(C): 36.1 (26 Oct 2021 06:45), Max: 37.4 (25 Oct 2021 18:21)  T(F): 97 (26 Oct 2021 06:45), Max: 99.3 (25 Oct 2021 18:21)  HR: 80 (26 Oct 2021 06:45) (60 - 84)  BP: 135/57 (26 Oct 2021 06:45) (121/104 - 169/63)  BP(mean): 79 (26 Oct 2021 00:47) (66 - 112)  RR: 20 (26 Oct 2021 06:45) (20 - 31)  SpO2: 100% (26 Oct 2021 06:45) (92% - 100%)    HEENT:   pupils equal and reactive, EOMI, no oropharyngeal lesions, erythema, exudates, oral thrush    NECK:   supple, no carotid bruits, no palpable lymph nodes, no thyromegaly    CV:  +S1, +S2, regular, no murmurs or rubs    RESP:   lungs clear to auscultation bilaterally, no wheezing, rales, rhonchi, good air entry bilaterally    BREAST:  not examined    GI:  abdomen soft, non-tender, non-distended, normal BS, no bruits, no abdominal masses, no palpable masses    RECTAL:  not examined    :  not examined    MSK:   normal muscle tone, no atrophy, no rigidity, no contractions    EXT:   no clubbing, no cyanosis, no edema, no calf pain, swelling or erythema    VASCULAR:  pulses equal and symmetric in the upper and lower extremities    NEURO:  AAOX3, no focal neurological deficits, follows all commands, able to move extremities spontaneously    SKIN:  no ulcers, lesions or rashes    MEDICATIONS  (STANDING):  amLODIPine   Tablet 10 milliGRAM(s) Oral daily  ascorbic acid 1000 milliGRAM(s) Oral daily  atorvastatin 40 milliGRAM(s) Oral at bedtime  cholecalciferol 4000 Unit(s) Oral daily  cyanocobalamin 1000 MICROGram(s) Oral daily  dexAMETHasone     Tablet 6 milliGRAM(s) Oral daily  ferrous    sulfate 325 milliGRAM(s) Oral at bedtime  folic acid 1 milliGRAM(s) Oral daily  gabapentin 100 milliGRAM(s) Oral two times a day  heparin   Injectable 5000 Unit(s) SubCutaneous every 8 hours  hydrALAZINE 50 milliGRAM(s) Oral every 8 hours  isosorbide   mononitrate ER Tablet (IMDUR) 30 milliGRAM(s) Oral daily  magnesium oxide 400 milliGRAM(s) Oral daily  metoprolol succinate ER 25 milliGRAM(s) Oral daily  pantoprazole    Tablet 40 milliGRAM(s) Oral before breakfast  senna 2 Tablet(s) Oral at bedtime  simethicone 80 milliGRAM(s) Chew two times a day  venlafaxine XR. 75 milliGRAM(s) Oral daily  zinc sulfate 220 milliGRAM(s) Oral daily    MEDICATIONS  (PRN):  acetaminophen     Tablet .. 650 milliGRAM(s) Oral every 4 hours PRN Temp greater or equal to 38.5C (101.3F)  ALBUTerol    90 MICROgram(s) HFA Inhaler 2 Puff(s) Inhalation every 4 hours PRN Shortness of Breath and/or Wheezing  benzonatate 100 milliGRAM(s) Oral three times a day PRN Cough      Urinalysis Basic - ( 25 Oct 2021 15:36 )    Color: Yellow / Appearance: Clear / S.015 / pH: x  Gluc: x / Ketone: Negative  / Bili: Negative / Urobili: Negative   Blood: x / Protein: 15 / Nitrite: Negative   Leuk Esterase: Negative / RBC: 3-5 /HPF / WBC 3-5   Sq Epi: x / Non Sq Epi: Occasional / Bacteria: Occasional    25 Oct 2021 13:36    136    |  104    |  49     ----------------------------<  159    4.4     |  27     |  3.16     Ca    9.2        25 Oct 2021 13:36    TPro  7.2    /  Alb  2.3    /  TBili  0.4    /  DBili  x      /  AST  48     /  ALT  23     /  AlkPhos  144    25 Oct 2021 13:36  LIVER FUNCTIONS - ( 25 Oct 2021 13:36 )  Alb: 2.3 g/dL / Pro: 7.2 gm/dL / ALK PHOS: 144 U/L / ALT: 23 U/L / AST: 48 U/L / GGT: x         PT/INR - ( 25 Oct 2021 13:36 )   PT: 14.3 sec;   INR: 1.23 ratio         CBC Full  -  ( 25 Oct 2021 13:36 )  WBC Count : 7.64 K/uL  Hemoglobin : 8.6 g/dL  Hematocrit : 26.4 %  Platelet Count - Automated : 155 K/uL  Mean Cell Volume : 106.0 fl  Mean Cell Hemoglobin : 34.5 pg  Mean Cell Hemoglobin Concentration : 32.6 gm/dL  Auto Neutrophil # : 5.82 K/uL  Auto Lymphocyte # : 0.96 K/uL  Auto Monocyte # : 0.64 K/uL  Auto Eosinophil # : 0.15 K/uL  Auto Basophil # : 0.02 K/uL  Auto Neutrophil % : 76.0 %  Auto Lymphocyte % : 12.6 %  Auto Monocyte % : 8.4 %  Auto Eosinophil % : 2.0 %  Auto Basophil % : 0.3 %        Blood, Urine: Small (10- @ 15:36)      PT/INR - ( 25 Oct 2021 13:36 )   PT: 14.3 sec;   INR: 1.23 ratio                 Assessment and Plan:          Pt is a pleasant  89 y/o male with a PMHx of Afib, BPH, chronic CHF, GERD, HTN, IDDM, MI, major depressive disorder, pacemaker, syncope and collapse, freq falls, SAH in 2021 off AC, UTI, h/o TAVR, vasc dementia who  presents to [place]  ED via Ambulance  from [facility].  He was discharged a few weeks ago after admission with a fall.   Pt has SOB and hypoxia to 88%  an outpt PCR was  COVID + from oct 21.   He denies pain,  pt is confused at baseline.      Pt was vaccinated for COVID with his 2nd Moderna dose in April. Pt is a poor historian.        1-Acute hypoxic respiratory failure secondary to covid 19  -      2-Afib, TAVR not on AC due to SAH, freq falls      3-CHF exac  - s/p lasix 40 mg with diuresis of > 1400 ml in the ED      4-CKD III  -c/w monitoring    - DVT proph: heparin Sub Q  - Adv Directives DNR           Cheif complaints and Diagnosis: Acute CHF exacerbation/ respiratory failure/ COVID 19    Subjective: patient very hard of hearing; does not understand me says " can you sell my stocks? " ; no distress, appears comfortable      REVIEW OF SYSTEMS:    CONSTITUTIONAL: No weakness, fevers or chills  EYES/ENT: No visual changes;  No vertigo or throat pain   NECK: No pain or stiffness  RESPIRATORY: No cough, wheezing, hemoptysis; No shortness of breath  CARDIOVASCULAR: No chest pain or palpitations  GASTROINTESTINAL: No abdominal or epigastric pain. No nausea, vomiting, or hematemesis; No diarrhea or constipation. No melena or hematochezia.  GENITOURINARY: No dysuria, frequency or hematuria  NEUROLOGICAL: No numbness or weakness  SKIN: No itching, burning, rashes, or lesions   All other review of systems is negative unless indicated above      Vital Signs Last 24 Hrs  T(C): 36.1 (26 Oct 2021 06:45), Max: 37.4 (25 Oct 2021 18:21)  T(F): 97 (26 Oct 2021 06:45), Max: 99.3 (25 Oct 2021 18:21)  HR: 80 (26 Oct 2021 06:45) (60 - 84)  BP: 135/57 (26 Oct 2021 06:45) (121/104 - 169/63)  BP(mean): 79 (26 Oct 2021 00:47) (66 - 112)  RR: 20 (26 Oct 2021 06:45) (20 - 31)  SpO2: 100% (26 Oct 2021 06:45) (92% - 100%)    HEENT:   pupils equal and reactive, EOMI, no oropharyngeal lesions, erythema, exudates, oral thrush    NECK:   supple, no carotid bruits, no palpable lymph nodes, no thyromegaly    CV:  +S1, +S2, regular, no murmurs or rubs    RESP:   lungs clear to auscultation bilaterally, no wheezing, rales, rhonchi, good air entry bilaterally    BREAST:  not examined    GI:  abdomen soft, non-tender, non-distended, normal BS, no bruits, no abdominal masses, no palpable masses    RECTAL:  not examined    :  not examined    MSK:   normal muscle tone, no atrophy, no rigidity, no contractions    EXT:   no clubbing, no cyanosis, no edema, no calf pain, swelling or erythema    VASCULAR:  pulses equal and symmetric in the upper and lower extremities    NEURO:  axox2, no focal neurological deficits, follows all commands, able to move extremities spontaneously    SKIN:  no ulcers, lesions or rashes    MEDICATIONS  (STANDING):  amLODIPine   Tablet 10 milliGRAM(s) Oral daily  ascorbic acid 1000 milliGRAM(s) Oral daily  atorvastatin 40 milliGRAM(s) Oral at bedtime  cholecalciferol 4000 Unit(s) Oral daily  cyanocobalamin 1000 MICROGram(s) Oral daily  dexAMETHasone     Tablet 6 milliGRAM(s) Oral daily  ferrous    sulfate 325 milliGRAM(s) Oral at bedtime  folic acid 1 milliGRAM(s) Oral daily  gabapentin 100 milliGRAM(s) Oral two times a day  heparin   Injectable 5000 Unit(s) SubCutaneous every 8 hours  hydrALAZINE 50 milliGRAM(s) Oral every 8 hours  isosorbide   mononitrate ER Tablet (IMDUR) 30 milliGRAM(s) Oral daily  magnesium oxide 400 milliGRAM(s) Oral daily  metoprolol succinate ER 25 milliGRAM(s) Oral daily  pantoprazole    Tablet 40 milliGRAM(s) Oral before breakfast  senna 2 Tablet(s) Oral at bedtime  simethicone 80 milliGRAM(s) Chew two times a day  venlafaxine XR. 75 milliGRAM(s) Oral daily  zinc sulfate 220 milliGRAM(s) Oral daily    MEDICATIONS  (PRN):  acetaminophen     Tablet .. 650 milliGRAM(s) Oral every 4 hours PRN Temp greater or equal to 38.5C (101.3F)  ALBUTerol    90 MICROgram(s) HFA Inhaler 2 Puff(s) Inhalation every 4 hours PRN Shortness of Breath and/or Wheezing  benzonatate 100 milliGRAM(s) Oral three times a day PRN Cough      Urinalysis Basic - ( 25 Oct 2021 15:36 )    Color: Yellow / Appearance: Clear / S.015 / pH: x  Gluc: x / Ketone: Negative  / Bili: Negative / Urobili: Negative   Blood: x / Protein: 15 / Nitrite: Negative   Leuk Esterase: Negative / RBC: 3-5 /HPF / WBC 3-5   Sq Epi: x / Non Sq Epi: Occasional / Bacteria: Occasional    25 Oct 2021 13:36    136    |  104    |  49     ----------------------------<  159    4.4     |  27     |  3.16     Ca    9.2        25 Oct 2021 13:36    TPro  7.2    /  Alb  2.3    /  TBili  0.4    /  DBili  x      /  AST  48     /  ALT  23     /  AlkPhos  144    25 Oct 2021 13:36  LIVER FUNCTIONS - ( 25 Oct 2021 13:36 )  Alb: 2.3 g/dL / Pro: 7.2 gm/dL / ALK PHOS: 144 U/L / ALT: 23 U/L / AST: 48 U/L / GGT: x         PT/INR - ( 25 Oct 2021 13:36 )   PT: 14.3 sec;   INR: 1.23 ratio         CBC Full  -  ( 25 Oct 2021 13:36 )  WBC Count : 7.64 K/uL  Hemoglobin : 8.6 g/dL  Hematocrit : 26.4 %  Platelet Count - Automated : 155 K/uL  Mean Cell Volume : 106.0 fl  Mean Cell Hemoglobin : 34.5 pg  Mean Cell Hemoglobin Concentration : 32.6 gm/dL  Auto Neutrophil # : 5.82 K/uL  Auto Lymphocyte # : 0.96 K/uL  Auto Monocyte # : 0.64 K/uL  Auto Eosinophil # : 0.15 K/uL  Auto Basophil # : 0.02 K/uL  Auto Neutrophil % : 76.0 %  Auto Lymphocyte % : 12.6 %  Auto Monocyte % : 8.4 %  Auto Eosinophil % : 2.0 %  Auto Basophil % : 0.3 %        Blood, Urine: Small (10-25 @ 15:36)      PT/INR - ( 25 Oct 2021 13:36 )   PT: 14.3 sec;   INR: 1.23 ratio                 Assessment and Plan:          Pt is a pleasant  89 y/o male with a PMHx of Afib, BPH, chronic CHF, GERD, HTN, IDDM, MI, major depressive disorder, pacemaker, syncope and collapse, freq falls, SAH in 2021 off AC, UTI, h/o TAVR, vasc dementia who  presents to [place]  ED [facility] Amb[facility].  He was discharged a few weeks ago after admission with a fall.   Pt has SOB and hypoxia to 88%  an outpt PCR was  COVID + from oct 21.   He denies pain,  pt is confused at baseline.      Pt was vaccinated for COVID with his 2nd Moderna dose in April. Pt is a poor historian.        1-Acute hypoxic respiratory failure secondary to covid 19, rosetta Gram negative Pneumonia,  and Diastolic/Systolic CHF EF 45%  -s/p IV lasix in ED, over one L urine output; now comfortable. no distress  -currently requring supplemental o2 5L-6L  -on Decadron for covid 19; not candidate for Remdesiivr  -Started IV abx ceftriaxone/azithromycin for imagining which looks suspicous for bacterial PNA  -c/w oral lasix 40mg daily; diuresed nicely, no further need for IV lasix.   -of note: dimer  3K++, Trop 0.169,       2-Afib, TAVR not on AC due to SAH, freq falls  -rate control with metoprolol    4-CKD III  -c/w monitoring  -baseline around 2.7    - DVT proph: heparin Sub Q  - Adv Directives DNR           Cheif complaints and Diagnosis: Acute CHF exacerbation/ respiratory failure/ COVID 19    Subjective: patient very hard of hearing; does not understand me says " can you sell my stocks? " ; no distress, appears comfortable      REVIEW OF SYSTEMS:    CONSTITUTIONAL: No weakness, fevers or chills  EYES/ENT: No visual changes;  No vertigo or throat pain   NECK: No pain or stiffness  RESPIRATORY: No cough, wheezing, hemoptysis; No shortness of breath  CARDIOVASCULAR: No chest pain or palpitations  GASTROINTESTINAL: No abdominal or epigastric pain. No nausea, vomiting, or hematemesis; No diarrhea or constipation. No melena or hematochezia.  GENITOURINARY: No dysuria, frequency or hematuria  NEUROLOGICAL: No numbness or weakness  SKIN: No itching, burning, rashes, or lesions   All other review of systems is negative unless indicated above      Vital Signs Last 24 Hrs  T(C): 36.1 (26 Oct 2021 06:45), Max: 37.4 (25 Oct 2021 18:21)  T(F): 97 (26 Oct 2021 06:45), Max: 99.3 (25 Oct 2021 18:21)  HR: 80 (26 Oct 2021 06:45) (60 - 84)  BP: 135/57 (26 Oct 2021 06:45) (121/104 - 169/63)  BP(mean): 79 (26 Oct 2021 00:47) (66 - 112)  RR: 20 (26 Oct 2021 06:45) (20 - 31)  SpO2: 100% (26 Oct 2021 06:45) (92% - 100%)    HEENT:   pupils equal and reactive, EOMI, no oropharyngeal lesions, erythema, exudates, oral thrush    NECK:   supple, no carotid bruits, no palpable lymph nodes, no thyromegaly    CV:  +S1, +S2, regular, no murmurs or rubs    RESP:   lungs clear to auscultation bilaterally, no wheezing, rales, rhonchi, good air entry bilaterally    BREAST:  not examined    GI:  abdomen soft, non-tender, non-distended, normal BS, no bruits, no abdominal masses, no palpable masses    RECTAL:  not examined    :  not examined    MSK:   normal muscle tone, no atrophy, no rigidity, no contractions    EXT:   no clubbing, no cyanosis, no edema, no calf pain, swelling or erythema    VASCULAR:  pulses equal and symmetric in the upper and lower extremities    NEURO:  axox2, no focal neurological deficits, follows all commands, able to move extremities spontaneously    SKIN:  no ulcers, lesions or rashes    MEDICATIONS  (STANDING):  amLODIPine   Tablet 10 milliGRAM(s) Oral daily  ascorbic acid 1000 milliGRAM(s) Oral daily  atorvastatin 40 milliGRAM(s) Oral at bedtime  cholecalciferol 4000 Unit(s) Oral daily  cyanocobalamin 1000 MICROGram(s) Oral daily  dexAMETHasone     Tablet 6 milliGRAM(s) Oral daily  ferrous    sulfate 325 milliGRAM(s) Oral at bedtime  folic acid 1 milliGRAM(s) Oral daily  gabapentin 100 milliGRAM(s) Oral two times a day  heparin   Injectable 5000 Unit(s) SubCutaneous every 8 hours  hydrALAZINE 50 milliGRAM(s) Oral every 8 hours  isosorbide   mononitrate ER Tablet (IMDUR) 30 milliGRAM(s) Oral daily  magnesium oxide 400 milliGRAM(s) Oral daily  metoprolol succinate ER 25 milliGRAM(s) Oral daily  pantoprazole    Tablet 40 milliGRAM(s) Oral before breakfast  senna 2 Tablet(s) Oral at bedtime  simethicone 80 milliGRAM(s) Chew two times a day  venlafaxine XR. 75 milliGRAM(s) Oral daily  zinc sulfate 220 milliGRAM(s) Oral daily    MEDICATIONS  (PRN):  acetaminophen     Tablet .. 650 milliGRAM(s) Oral every 4 hours PRN Temp greater or equal to 38.5C (101.3F)  ALBUTerol    90 MICROgram(s) HFA Inhaler 2 Puff(s) Inhalation every 4 hours PRN Shortness of Breath and/or Wheezing  benzonatate 100 milliGRAM(s) Oral three times a day PRN Cough      Urinalysis Basic - ( 25 Oct 2021 15:36 )    Color: Yellow / Appearance: Clear / S.015 / pH: x  Gluc: x / Ketone: Negative  / Bili: Negative / Urobili: Negative   Blood: x / Protein: 15 / Nitrite: Negative   Leuk Esterase: Negative / RBC: 3-5 /HPF / WBC 3-5   Sq Epi: x / Non Sq Epi: Occasional / Bacteria: Occasional    25 Oct 2021 13:36    136    |  104    |  49     ----------------------------<  159    4.4     |  27     |  3.16     Ca    9.2        25 Oct 2021 13:36    TPro  7.2    /  Alb  2.3    /  TBili  0.4    /  DBili  x      /  AST  48     /  ALT  23     /  AlkPhos  144    25 Oct 2021 13:36  LIVER FUNCTIONS - ( 25 Oct 2021 13:36 )  Alb: 2.3 g/dL / Pro: 7.2 gm/dL / ALK PHOS: 144 U/L / ALT: 23 U/L / AST: 48 U/L / GGT: x         PT/INR - ( 25 Oct 2021 13:36 )   PT: 14.3 sec;   INR: 1.23 ratio         CBC Full  -  ( 25 Oct 2021 13:36 )  WBC Count : 7.64 K/uL  Hemoglobin : 8.6 g/dL  Hematocrit : 26.4 %  Platelet Count - Automated : 155 K/uL  Mean Cell Volume : 106.0 fl  Mean Cell Hemoglobin : 34.5 pg  Mean Cell Hemoglobin Concentration : 32.6 gm/dL  Auto Neutrophil # : 5.82 K/uL  Auto Lymphocyte # : 0.96 K/uL  Auto Monocyte # : 0.64 K/uL  Auto Eosinophil # : 0.15 K/uL  Auto Basophil # : 0.02 K/uL  Auto Neutrophil % : 76.0 %  Auto Lymphocyte % : 12.6 %  Auto Monocyte % : 8.4 %  Auto Eosinophil % : 2.0 %  Auto Basophil % : 0.3 %        Blood, Urine: Small (10-25 @ 15:36)      PT/INR - ( 25 Oct 2021 13:36 )   PT: 14.3 sec;   INR: 1.23 ratio                 Assessment and Plan:          Pt is a pleasant  89 y/o male with a PMHx of Afib, BPH, chronic CHF, GERD, HTN, IDDM, MI, major depressive disorder, pacemaker, syncope and collapse, freq falls, SAH in 2021 off AC, UTI, h/o TAVR, vasc dementia who  presents to [place]  ED [facility] Ambulance  [facility].  He was discharged a few weeks ago after admission with a fall.   Pt has SOB and hypoxia to 88%  an outpt PCR was  COVID + from oct 21.   He denies pain,  pt is confused at baseline.      Pt was vaccinated for COVID with his 2nd Moderna dose in April. Pt is a poor historian.        1-Acute hypoxic respiratory failure secondary to covid 19, blairley Gram negative Pneumonia,  and Diastolic/Systolic CHF EF 45%  -s/p IV lasix in ED, over one L urine output; now comfortable. no distress  -currently requring supplemental o2 5L-6L  -on Decadron for covid 19; not candidate for Remdesiivr  -Started IV abx ceftriaxone/azithromycin for imagining which looks suspicous for bacterial PNA  -c/w oral lasix 40mg daily; diuresed nicely, no further need for IV lasix.   -of note: dimer  3K++, Trop 0.169,       2-Afib, TAVR not on AC due to SAH, freq falls  -rate control with metoprolol    4-CKD III  -c/w monitoring  -baseline around 2.7    - DVT proph: heparin Sub Q  - Adv Directives DNR      **updated wife Carli 10/26 @ [phone]

## 2021-10-27 NOTE — CHART NOTE - NSCHARTNOTEFT_GEN_A_CORE
Received call from RN re:  pt cc of       Pt seen and examined at bedside     T(C): 37.1 (10-27-21 @ 15:45), Max: 37.1 (10-27-21 @ 15:45)  HR: 64 (10-27-21 @ 22:08) (64 - 68)  BP: 137/49 (10-27-21 @ 22:08) (137/49 - 151/68)  RR: 26 (10-27-21 @ 22:08) (18 - 26)  SpO2: 84% (10-27-21 @ 22:08) (84% - 97%)      Physical exam  Constitutional: well-developed, lying in bed not responding to commands or answer appropriately  Respiratory: patient using accessory muscles to breath, mild wheezing in left upper lobe, no rales or rhonchi  Cardiovascular: S1 and S2, regular rate and rhythm, no Murmurs, gallops or rubs  Gastrointestinal: Bowel Sounds present, soft, nontender, nondistended, no guarding, no rebound  Extremities: No peripheral edema  Neuro: A&Ox0       Assessment  patient admitted for COVID currently on high flow on highest possible setting and non rebreather and continues to desaturate to 84 the lowest, patient to have GOC discussion and Palliative evaluation tomorrow  Plan  -start patient on bipap  -will continue to monitor  -will notify HCP   - Discussed case w/RN who is aware and will contact MD w/further concerns should they arise Received call from RN re:   desaturation to 80s while on high flow with max setting      Pt seen and examined at bedside     T(C): 37.1 (10-27-21 @ 15:45), Max: 37.1 (10-27-21 @ 15:45)  HR: 64 (10-27-21 @ 22:08) (64 - 68)  BP: 137/49 (10-27-21 @ 22:08) (137/49 - 151/68)  RR: 26 (10-27-21 @ 22:08) (18 - 26)  SpO2: 84% (10-27-21 @ 22:08) (84% - 97%)      Physical exam  Constitutional: well-developed, lying in bed not responding to commands or answer appropriately  Respiratory: patient using accessory muscles to breath, mild wheezing in left upper lobe, no rales or rhonchi  Cardiovascular: S1 and S2, regular rate and rhythm, no Murmurs, gallops or rubs  Gastrointestinal: Bowel Sounds present, soft, nontender, nondistended, no guarding, no rebound  Extremities: No peripheral edema  Neuro: A&Ox0       Assessment  patient admitted for COVID currently on high flow on highest possible setting and non rebreather and continues to desaturate to 84 the lowest, patient to have GOC discussion and Palliative evaluation tomorrow  Plan  -start patient on bipap  -will continue to monitor  -will notify HCP   - Discussed case w/RN who is aware and will contact MD w/further concerns should they arise

## 2021-10-27 NOTE — SWALLOW BEDSIDE ASSESSMENT ADULT - PHARYNGEAL PHASE
Mild latency in onset of pharyngeal swallow, but once initiated, audible and observable laryngeal lift.   very late sporadic cough after thin liquid, llikely more related to mild residual than overt aspiration.

## 2021-10-27 NOTE — PROGRESS NOTE ADULT - ASSESSMENT
89 y/o male with a PMHx of Afib, BPH, chronic CHF, GERD, HTN, IDDM, MI, major depressive disorder, pacemaker, CKD IV most recent baseline near 3.0, recent syncope and collapse, to [place]  ED  here with SOB and hypoxia to 88%  an outpt PCR was  COVID + from oct 21 and here RPV + .    CKD IV  -Renal function baseline 2.5-3, subtle rise with diuresis and covid  -IV diuretic in ER and Po diuretic resumed could be playing a role. Would hold diuretic if function rises further. He has multi reason for sob/hypoxia besides volume, covid also can play a role in RICHY but less likely with diuresis  -NSAID avoidance  -Optimize intake  -If intake not at goal, add IVF after diuretic held    Covid/RPV  -Iso, medical follow up  -Not a candidate for remdesivir    HTN  -0ral meds, parameters    D/c with RN staff

## 2021-10-27 NOTE — SWALLOW BEDSIDE ASSESSMENT ADULT - ASR SWALLOW DENTITION
not visualized as pt did not follow commands: appears to have sufficient dentition to masticate soft foodstuff.

## 2021-10-27 NOTE — SWALLOW BEDSIDE ASSESSMENT ADULT - SWALLOW EVAL: RECOMMENDED DIET
Dysphagia 1 with thin liquids:  meds crushed in puree: feed only when pt is able to participate actively.  Small sips and use facilitation techniques.

## 2021-10-27 NOTE — SWALLOW BEDSIDE ASSESSMENT ADULT - SLP GENERAL OBSERVATIONS
pt lying in bed towards his left side ; knees drawn up and head in flexion to left side: slowly and incompletely roousable but able to participate in eating routines, mumbling and vocalizing as if in discomfort, but no verbalization.: Nsg had just put NRB on patient.. Nsg present during evaluation.

## 2021-10-27 NOTE — PROGRESS NOTE ADULT - ASSESSMENT
Pt is a pleasant  89 y/o male with a PMHx of Afib, BPH, chronic CHF, GERD, HTN, IDDM, MI, major depressive disorder, pacemaker, syncope and collapse, freq falls, SAH in 9/2021 off AC, UTI, h/o TAVR, vasc dementia who  presents to [place]  ED via Ambulance  from [facility].  He was discharged a few weeks ago after admission with a fall.   Pt has SOB and hypoxia to 88%  an outpt PCR was  COVID + from oct 21.   He denies pain,  pt is confused at baseline.      Pt was vaccinated for COVID with his 2nd Moderna dose in April. Pt is a poor historian.        1-Acute hypoxic respiratory failure secondary to covid 19, likley Gram negative Pneumonia,  and Diastolic/Systolic CHF EF 45%  -s/p IV lasix in ED, over one L urine output; now comfortable. no distress  -currently requring supplemental o2 5L-6L  -on Decadron for covid 19; not candidate for Remdesiivr  -Started IV abx ceftriaxone/azithromycin for imagining which looks suspicous for bacterial PNA  -c/w oral lasix 40mg daily; diuresed nicely, no further need for IV lasix.   -of note: dimer  3K++, Trop 0.169,       2-Afib, TAVR not on AC due to SAH, freq falls  -rate control with metoprolol    4-CKD III  -c/w monitoring  -baseline around 2.7    - DVT proph: heparin Sub Q  - Adv Directives DNR      **updated wife Carli 10/26 @ [phone]  89 y/o male with a PMHx of Afib, not on a/c now,  BPH, chronic CHF, GERD, HTN, IDDM, CAD,  major depressive disorder, pacemaker,  freq falls, SAH in 9/2021 UTI, h/o TAVR, Severe TR admitted for:         1-Acute hypoxic respiratory failure secondary to covid 19 and suspected secondary  Pneumonia,  and acute on chronic Diastolic/Systolic CHF EF 45%. Pulm HTN   -s/p IV lasix in ED with good UO,  continued on PO lasix 40mg QD, pt has no PO intake, will hold further on diuretic  -on Decadron for covid 19, change to IV   - Remdesivir contraindicated 2/2 CKD  - On IV abx suspected GNR  bacterial PNA, change Ceftriaxone to Cefepime, c/w Azithromycin   -  Pulse ox low 90s on 100% NRB, but desats to low 70s when pulls off. Trial of HFNC for better complains  -C/w wrist restrains   - continuous pulse ox  - trend inflammatory markers   - Pulm eval       2 Elevated troponin. H/o CAD, MI   Likely demand ischemia in settings of hypoxia   C/w Toprol, Nitrates   Conservative management     3. CKD  IV   Baseline CR around  2.7- 3, monitor closely  Hold lasix   Monitor UO, has Sotelo   Labs in am       4. Afib,  Severe AS, s/p TAVR  -  not on AC due to SAH, freq falls   -rate control with Toprol     5. Systolic-diastolic CHF, with mild exacerbation   S/p dose of IV lasix, on Po now, will hold due to no PO intake   C/w Toprol, Isosorbide and Hydralazine with parameters (no ARBs due to CKD)         5. Recent admission for HIP FX S/p hemiarthroplasty  C/w DVT PPX         5. ACP: DNR/DNI.     Called Pts family, spoke to Pts son Dariel, updated on pts unstable condition and poor prognosis. Son agrees with trial of dose of morphine for comfort and better compliance  of O2. Agrees for Palliative eval and further discussion of GOC.   Dariel Rodríguez cell , if not available work phone

## 2021-10-27 NOTE — SWALLOW BEDSIDE ASSESSMENT ADULT - COMMENTS
Pt is 89 y/o male with a PMHx of Afib, BPH, chronic CHF, GERD, HTN, IDDM, MI, major depressive disorder, pacemaker, syncope and collapse, freq falls, SAH in 9/2021 off AC, UTI, h/o TAVR, vasc dementia who  presents to [place]  ED via Ambulance  from [facility].  He was discharged a few weeks ago after admission with a fall.   Pt has SOB and hypoxia to 88%  an outpt PCR was  COVID + from oct 21.   He denies pain,  pt is confused at baseline.    Pt was vaccinated for COVID with his 2nd Moderna dose in April. Pt is a poor historian.   pt was seen by service on his previous admission at which time he was alert and communicative, and placed on REGULAR consistency diet with thin liquids.

## 2021-10-27 NOTE — PROGRESS NOTE ADULT - SUBJECTIVE AND OBJECTIVE BOX
CC: Hypoxia  COVID positive (26 Oct 2021 11:48)    HPI:   89 y/o male with a PMHx of Afib, BPH, chronic CHF, GERD, HTN, IDDM, MI, major depressive disorder, pacemaker, syncope and collapse, freq falls, SAH in 2021 off AC, UTI, h/o TAVR, vasc dementia who  presents to [place]  ED via Ambulance  from [facility].  He was discharged a few weeks ago after admission with a fall.   Pt has SOB and hypoxia to 88%  an outpt PCR was  COVID + from oct 21.   He denies pain,  pt is confused at baseline.    )    INTERVAL HPI/OVERNIGHT EVENTS:    Vital Signs Last 24 Hrs  T(C): 36.3 (26 Oct 2021 22:04), Max: 36.6 (26 Oct 2021 11:10)  T(F): 97.4 (26 Oct 2021 22:04), Max: 97.8 (26 Oct 2021 11:10)  HR: 67 (27 Oct 2021 05:21) (62 - 84)  BP: 149/58 (27 Oct 2021 05:21) (101/53 - 150/57)-  RR: 19 (26 Oct 2021 22:04) (19 - 20)  SpO2: 94% (26 Oct 2021 22:04) (94% - 95%)        REVIEW OF SYSTEMS:  All other review of systems is negative unless indicated above.      PHYSICAL EXAM:  General: Well developed; well nourished; in no acute distress  Eyes: PERRLA, EOMI; conjunctiva and sclera clear  Head: Normocephalic; atraumatic  ENMT: No nasal discharge; airway clear  Neck: Supple; non tender; no masses  Respiratory: No wheezes, rales or rhonchi  Cardiovascular: Regular rate and rhythm. S1 and S2 Normal; No murmurs, gallops or rubs  Gastrointestinal: Soft non-tender non-distended; Normal bowel sounds  Genitourinary: No  suprapubic  tenderness  Extremities: Normal range of motion, No clubbing, cyanosis or edema  Vascular: Peripheral pulses palpable 2+ bilaterally  Neurological: Alert and oriented x4  Skin: Warm and dry. No acute rash  Lymph Nodes: No acute cervical adenopathy  Musculoskeletal: Normal muscle tone, without deformities  Psychiatric: Cooperative and appropriate        LABS:                         9.2    7.73  )-----------( 175      ( 26 Oct 2021 09:07 )             28.0     26 Oct 2021 09:07    137    |  103    |  62     ----------------------------<  284    4.5     |  25     |  3.22     Ca    9.5        26 Oct 2021 09:07    TPro  7.2    /  Alb  2.3    /  TBili  0.4    /  DBili  x      /  AST  48     /  ALT  23     /  AlkPhos  144    25 Oct 2021 13:36    PT/INR - ( 25 Oct 2021 13:36 )   PT: 14.3 sec;   INR: 1.23 ratio           CAPILLARY BLOOD GLUCOSE        LIVER FUNCTIONS - ( 25 Oct 2021 13:36 )  Alb: 2.3 g/dL / Pro: 7.2 gm/dL / ALK PHOS: 144 U/L / ALT: 23 U/L / AST: 48 U/L / GGT: x           Urinalysis Basic - ( 25 Oct 2021 15:36 )    Color: Yellow / Appearance: Clear / S.015 / pH: x  Gluc: x / Ketone: Negative  / Bili: Negative / Urobili: Negative   Blood: x / Protein: 15 / Nitrite: Negative   Leuk Esterase: Negative / RBC: 3-5 /HPF / WBC 3-5   Sq Epi: x / Non Sq Epi: Occasional / Bacteria: Occasional        MEDICATIONS  (STANDING):  amLODIPine   Tablet 10 milliGRAM(s) Oral daily  ascorbic acid 1000 milliGRAM(s) Oral daily  atorvastatin 40 milliGRAM(s) Oral at bedtime  azithromycin  IVPB      azithromycin  IVPB 500 milliGRAM(s) IV Intermittent every 24 hours  cefTRIAXone Injectable. 1000 milliGRAM(s) IV Push every 24 hours  cefTRIAXone Injectable.      cholecalciferol 4000 Unit(s) Oral daily  cyanocobalamin 1000 MICROGram(s) Oral daily  dexAMETHasone     Tablet 6 milliGRAM(s) Oral daily  ferrous    sulfate 325 milliGRAM(s) Oral at bedtime  folic acid 1 milliGRAM(s) Oral daily  furosemide    Tablet 40 milliGRAM(s) Oral daily  gabapentin 100 milliGRAM(s) Oral two times a day  heparin   Injectable 5000 Unit(s) SubCutaneous every 8 hours  hydrALAZINE 50 milliGRAM(s) Oral every 8 hours  isosorbide   mononitrate ER Tablet (IMDUR) 30 milliGRAM(s) Oral daily  magnesium oxide 400 milliGRAM(s) Oral daily  metoprolol succinate ER 25 milliGRAM(s) Oral daily  pantoprazole    Tablet 40 milliGRAM(s) Oral before breakfast  senna 2 Tablet(s) Oral at bedtime  simethicone 80 milliGRAM(s) Chew two times a day  venlafaxine XR. 75 milliGRAM(s) Oral daily  zinc sulfate 220 milliGRAM(s) Oral daily    MEDICATIONS  (PRN):  acetaminophen     Tablet .. 650 milliGRAM(s) Oral every 4 hours PRN Temp greater or equal to 38.5C (101.3F)  ALBUTerol    90 MICROgram(s) HFA Inhaler 2 Puff(s) Inhalation every 4 hours PRN Shortness of Breath and/or Wheezing  benzonatate 100 milliGRAM(s) Oral three times a day PRN Cough      RADIOLOGY & ADDITIONAL TESTS: CC: Hypoxia  COVID positive    HPI:   89 y/o male with a PMHx of Afib, BPH, chronic CHF, GERD, HTN, IDDM, MI, major depressive disorder, pacemaker, syncope and collapse, freq falls, SAH in 2021 off AC, UTI, h/o TAVR, vasc dementia who  presents to [place]  ED via Ambulance  from [facility].  He was discharged a few weeks ago after admission with a fall.   Pt has SOB and hypoxia to 88%  an outpt PCR was  COVID + from oct 21.       INTERVAL HPI/ OVERNIGHT EVENTS: chart reviewed, Pt was seen and examined,  lethargic  but arousible, trying to answer questions but would not make sense. As per RN started on wrist restrains overnight due to restlessness and pulling off O2, desats. Did take meds ion am, but no PO intake     Vital Signs Last 24 Hrs  T(C): 36.3 (26 Oct 2021 22:04), Max: 36.6 (26 Oct 2021 11:10)  T(F): 97.4 (26 Oct 2021 22:04), Max: 97.8 (26 Oct 2021 11:10)  HR: 67 (27 Oct 2021 05:21) (62 - 84)  BP: 149/58 (27 Oct 2021 05:21) (101/53 - 150/57)-  RR: 19 (26 Oct 2021 22:04) (19 - 20)  SpO2: 94% (26 Oct 2021 22:04) (94% - 95%)        REVIEW OF SYSTEMS:  All other review of systems is negative unless indicated above.      PHYSICAL EXAM:  General: Well developed; mildly dyspneic on NRB mask   Eyes: PERRLA, conjunctiva and sclera clear  Head: Normocephalic; atraumatic  ENMT: No nasal discharge; airway clear  Neck: Supple; non tender; no masses  Respiratory: Diminished BS b/l, lower lobes crackles   Cardiovascular: Irregular rate and rhythm. S1 and S2 Normal;  Gastrointestinal: Soft non-tender non-distended; Normal bowel sounds  Genitourinary: Sotelo  in place No edema   Neurological: Lethargic, confused.   Musculoskeletal: Normal muscle tone, without deformities          LABS:                         9.2    7.73  )-----------( 175      ( 26 Oct 2021 09:07 )             28.0     26 Oct 2021 09:07    137    |  103    |  62     ----------------------------<  284    4.5     |  25     |  3.22     Ca    9.5        26 Oct 2021 09:07    TPro  7.2    /  Alb  2.3    /  TBili  0.4    /  DBili  x      /  AST  48     /  ALT  23     /  AlkPhos  144    25 Oct 2021 13:36  PT/INR - ( 25 Oct 2021 13:36 )   PT: 14.3 sec;   INR: 1.23 ratio        LIVER FUNCTIONS - ( 25 Oct 2021 13:36 )  Alb: 2.3 g/dL / Pro: 7.2 gm/dL / ALK PHOS: 144 U/L / ALT: 23 U/L / AST: 48 U/L / GGT: x           Urinalysis Basic - ( 25 Oct 2021 15:36 )  Color: Yellow / Appearance: Clear / S.015 / pH: x  Gluc: x / Ketone: Negative  / Bili: Negative / Urobili: Negative   Blood: x / Protein: 15 / Nitrite: Negative   Leuk Esterase: Negative / RBC: 3-5 /HPF / WBC 3-5   Sq Epi: x / Non Sq Epi: Occasional / Bacteria: Occasional      MEDICATIONS  (STANDING):  amLODIPine   Tablet 10 milliGRAM(s) Oral daily  atorvastatin 40 milliGRAM(s) Oral at bedtime  azithromycin  IVPB      azithromycin  IVPB 500 milliGRAM(s) IV Intermittent every 24 hours  cefepime   IVPB      dexAMETHasone  Injectable 6 milliGRAM(s) IV Push daily  gabapentin 100 milliGRAM(s) Oral two times a day  guaiFENesin  milliGRAM(s) Oral every 12 hours  heparin   Injectable 5000 Unit(s) SubCutaneous every 8 hours  hydrALAZINE 50 milliGRAM(s) Oral every 8 hours  HYDROmorphone  Injectable 0.25 milliGRAM(s) IV Push once  isosorbide   mononitrate ER Tablet (IMDUR) 30 milliGRAM(s) Oral daily  metoprolol succinate ER 25 milliGRAM(s) Oral daily  pantoprazole    Tablet 40 milliGRAM(s) Oral before breakfast  senna 2 Tablet(s) Oral at bedtime  simethicone 80 milliGRAM(s) Chew two times a day  venlafaxine XR. 75 milliGRAM(s) Oral daily  zinc sulfate 220 milliGRAM(s) Oral daily    MEDICATIONS  (PRN):  acetaminophen     Tablet .. 650 milliGRAM(s) Oral every 4 hours PRN Temp greater or equal to 38.5C (101.3F)  ALBUTerol    90 MICROgram(s) HFA Inhaler 2 Puff(s) Inhalation every 4 hours PRN Shortness of Breath and/or Wheezing  benzonatate 100 milliGRAM(s) Oral three times a day PRN Cough      RADIOLOGY & ADDITIONAL TESTS:    EXAM:  CT CHEST                        PROCEDURE DATE:  10/25/2021      FINDINGS:    LUNGS AND AIRWAYS: Patent central airways.  Multifocal segmental and subsegmental consolidative changes with additional patchy airspace disease and groundglass opacities throughout the bilateral upper and lower lobes consistent with multifocal pneumonia worsened as compared to prior exam. A component of pulmonary edema cannot entirely be excluded.  PLEURA: Trace to small bilateral pleural effusions.  MEDIASTINUM AND CHANI: Up to 1 cm in short axis measurement mediastinal and hilar lymph nodes.  VESSELS: Atherosclerotic changes of the aorta and coronary vasculature. No aortic aneurysm.  HEART: Heart is enlarged. Prior aortic valve replacement. No pericardial effusion. Right-sided PICC is in place.  CHEST WALL AND LOWER NECK: Within normal limits.  VISUALIZED UPPER ABDOMEN: Prior cholecystectomy. Mild elevation the right hemidiaphragm.  BONES: Degenerativechanges.    IMPRESSION:  Extensive bilateral multifocal pneumonia. A component of pulmonary edema with pleural effusions cannot be excluded.

## 2021-10-27 NOTE — PROGRESS NOTE ADULT - SUBJECTIVE AND OBJECTIVE BOX
Patient is a 89y Male who is on VM now. Lasix po was resumed      MEDICATIONS  (STANDING):  amLODIPine   Tablet 10 milliGRAM(s) Oral daily  ascorbic acid 1000 milliGRAM(s) Oral daily  atorvastatin 40 milliGRAM(s) Oral at bedtime  azithromycin  IVPB      azithromycin  IVPB 500 milliGRAM(s) IV Intermittent every 24 hours  cefTRIAXone Injectable. 1000 milliGRAM(s) IV Push every 24 hours  cefTRIAXone Injectable.      cholecalciferol 4000 Unit(s) Oral daily  cyanocobalamin 1000 MICROGram(s) Oral daily  dexAMETHasone     Tablet 6 milliGRAM(s) Oral daily  ferrous    sulfate 325 milliGRAM(s) Oral at bedtime  folic acid 1 milliGRAM(s) Oral daily  furosemide    Tablet 40 milliGRAM(s) Oral daily  gabapentin 100 milliGRAM(s) Oral two times a day  heparin   Injectable 5000 Unit(s) SubCutaneous every 8 hours  hydrALAZINE 50 milliGRAM(s) Oral every 8 hours  isosorbide   mononitrate ER Tablet (IMDUR) 30 milliGRAM(s) Oral daily  magnesium oxide 400 milliGRAM(s) Oral daily  metoprolol succinate ER 25 milliGRAM(s) Oral daily  pantoprazole    Tablet 40 milliGRAM(s) Oral before breakfast  senna 2 Tablet(s) Oral at bedtime  simethicone 80 milliGRAM(s) Chew two times a day  venlafaxine XR. 75 milliGRAM(s) Oral daily  zinc sulfate 220 milliGRAM(s) Oral daily    MEDICATIONS  (PRN):  acetaminophen     Tablet .. 650 milliGRAM(s) Oral every 4 hours PRN Temp greater or equal to 38.5C (101.3F)  ALBUTerol    90 MICROgram(s) HFA Inhaler 2 Puff(s) Inhalation every 4 hours PRN Shortness of Breath and/or Wheezing  benzonatate 100 milliGRAM(s) Oral three times a day PRN Cough        T(C): , Max: 36.6 (10-26-21 @ 11:10)  T(F): , Max: 97.8 (10-26-21 @ 11:10)  HR: 67 (10-27-21 @ 05:21)  BP: 149/58 (10-27-21 @ 05:21)  BP(mean): --  RR: 19 (10-26-21 @ 22:04)  SpO2: 94% (10-26-21 @ 22:04)  Wt(kg): --    10-26 @ 07:01  -  10-27 @ 07:00  --------------------------------------------------------  IN: 0 mL / OUT: 1000 mL / NET: -1000 mL          PHYSICAL EXAM: Covid pre    Constitutional: NAD, frail. VM  Cardiovascular: S1 and S2  Extremities: No peripheral edema  Neurological: [place], arousable      LABS:                        9.2    7.73  )-----------( 175      ( 26 Oct 2021 09:07 )             28.0     26 Oct 2021 09:07    137    |  103    |  62     ----------------------------<  284    4.5     |  25     |  3.22   25 Oct 2021 13:36    136    |  104    |  49     ----------------------------<  159    4.4     |  27     |  3.16     Ca    9.5        26 Oct 2021 09:07  Ca    9.2        25 Oct 2021 13:36    TPro  7.2    /  Alb  2.3    /  TBili  0.4    /  DBili  x      /  AST  48     /  ALT  23     /  AlkPhos  144    25 Oct 2021 13:36          Urine Studies:  Urinalysis Basic - ( 25 Oct 2021 15:36 )    Color: Yellow / Appearance: Clear / S.015 / pH: x  Gluc: x / Ketone: Negative  / Bili: Negative / Urobili: Negative   Blood: x / Protein: 15 / Nitrite: Negative   Leuk Esterase: Negative / RBC: 3-5 /HPF / WBC 3-5   Sq Epi: x / Non Sq Epi: Occasional / Bacteria: Occasional            RADIOLOGY & ADDITIONAL STUDIES:

## 2021-10-27 NOTE — SWALLOW BEDSIDE ASSESSMENT ADULT - SWALLOW EVAL: RECOMMENDED FEEDING/EATING TECHNIQUES
allow for swallow between intakes/alternate food with liquid/check mouth frequently for oral residue/pocketing/crush medication (when feasible)/maintain upright posture during/after eating for 30 mins/no straws/position upright (90 degrees)/small sips/bites

## 2021-10-27 NOTE — SWALLOW BEDSIDE ASSESSMENT ADULT - ORAL PREPARATORY PHASE
Pt did not fully wake, but opened eyes briefly and was oral responsive to presentation of spoon and cup at lip.   Slow oral opening and reduced apperture, but as pt became more aware of spoon and cup at lip and on tongue he was able to respond.  Slow oral closure buthelped by feeder pressing spoon up against the upper lip. Able then to strip spoon, mild labial trace but in general oral containment.

## 2021-10-28 NOTE — CONSULT NOTE ADULT - SUBJECTIVE AND OBJECTIVE BOX
HPI: Pt is a 89y old Male with hx of  89 y/o male with a PMHx of Afib, BPH, chronic CHF, GERD, HTN, IDDM, MI, major depressive disorder, pacemaker, syncope and collapse, freq falls, SAH in 9/2021 off AC, UTI, h/o TAVR, vasc dementia who  presents to [place]  [facility] Amb[facility].  He was discharged a few weeks ago after admission with a fall.   Pt has SOB and hypoxia to 88%  an outpt PCR was  COVID + from oct 21.     10/28/2021 Pt was seen and examined with no family at bedside, overnight patient desat was placed on BiPAP, patient very lethargic but opens eyes and following simple commands. Will reach out to family to schedule GOC meeting.        PAIN: ( )Yes   (x )No  DYSPNEA: (x ) Yes  ( ) No  Level: severe    PAST MEDICAL & SURGICAL HISTORY:  IDDM (insulin dependent diabetes mellitus)  HTN (hypertension)  Chronic congestive heart failure, unspecified congestive heart failure type  Atrial fibrillation, unspecified type  Gastroesophageal reflux disease, esophagitis presence not specified  Benign prostatic hyperplasia without lower urinary tract symptoms  Syncope and collapse  5/4/2018  Type 2 myocardial infarction  12/17/2017  UTI (urinary tract infection)  7/12/2017  Artificial cardiac pacemaker  History of appendectomy  Arthropathy of shoulder region  S/P TAVR (transcatheter aortic valve replacement)    SOCIAL HX:    Hx opiate tolerance ( )YES  ( )NO    Baseline ADLs  (Prior to Admission)  ( ) Independent   ( )Dependent    FAMILY HISTORY: unable to obtain from patient     Review of Systems  Unable to obtain/Limited due to: lethargy     PHYSICAL EXAM:    Vital Signs Last 24 Hrs  T(C): 36.7 (28 Oct 2021 09:00), Max: 37.3 (27 Oct 2021 23:20)  T(F): 98 (28 Oct 2021 09:00), Max: 99.1 (27 Oct 2021 23:20)  HR: 63 (28 Oct 2021 09:00) (61 - 68)  BP: 142/45 (28 Oct 2021 09:00) (121/53 - 151/68)  RR: 24 (28 Oct 2021 09:00) (18 - 26)  SpO2: 90% (28 Oct 2021 09:00) (84% - 100%)    PPSV2: 10-20  %    General:   Mental Status:  HEENT:  Lungs:  Cardiac:  GI:  :  Ext:  Neuro:    Albumin: Albumin, Serum: 2.3 g/dL (10-25 @ 13:36)    Allergies    Aleve (Vomiting)  Aprodine (Vomiting)  codeine (Unknown)  morphine (Nausea)  naproxen (Vomiting)    Intolerances    MEDICATIONS  (STANDING):  amLODIPine   Tablet 10 milliGRAM(s) Oral daily  atorvastatin 40 milliGRAM(s) Oral at bedtime  azithromycin  IVPB      azithromycin  IVPB 500 milliGRAM(s) IV Intermittent every 24 hours  cefepime  Injectable. 1000 milliGRAM(s) IV Push daily  dexAMETHasone  Injectable 6 milliGRAM(s) IV Push daily  gabapentin 100 milliGRAM(s) Oral two times a day  guaiFENesin  milliGRAM(s) Oral every 12 hours  heparin   Injectable 5000 Unit(s) SubCutaneous every 8 hours  hydrALAZINE 50 milliGRAM(s) Oral every 8 hours  isosorbide   mononitrate ER Tablet (IMDUR) 30 milliGRAM(s) Oral daily  metoprolol succinate ER 25 milliGRAM(s) Oral daily  pantoprazole    Tablet 40 milliGRAM(s) Oral before breakfast  senna 2 Tablet(s) Oral at bedtime  simethicone 80 milliGRAM(s) Chew two times a day  venlafaxine XR. 75 milliGRAM(s) Oral daily  zinc sulfate 220 milliGRAM(s) Oral daily    MEDICATIONS  (PRN):  acetaminophen     Tablet .. 650 milliGRAM(s) Oral every 4 hours PRN Temp greater or equal to 38.5C (101.3F)  ALBUTerol    90 MICROgram(s) HFA Inhaler 2 Puff(s) Inhalation every 4 hours PRN Shortness of Breath and/or Wheezing  benzonatate 100 milliGRAM(s) Oral three times a day PRN Cough      RADIOLOGY/ADDITIONAL STUDIES:    EXAM:  CT CHEST                        PROCEDURE DATE:  10/25/2021    INTERPRETATION:  CLINICAL INFORMATION: Hypoxia  COMPARISON: 10/2/2021  CONTRAST/COMPLICATIONS:  IV Contrast: NONE  Oral Contrast: NONE  Complications: None reportedat time of study completion  PROCEDURE:  CT of the Chest was performed.  Sagittal and coronal reformats were performed.  FINDINGS:  LUNGS AND AIRWAYS: Patent central airways.  Multifocal segmental and subsegmental consolidative changes with additional patchy airspace disease and groundglass opacities throughout the bilateral upper and lower lobes consistent with multifocal pneumonia worsened as compared to prior exam. A component of pulmonary edema cannot entirely be excluded.  PLEURA: Trace to small bilateral pleural effusions.  MEDIASTINUM AND CHANI: Up to 1 cm in short axis measurement mediastinal and hilar lymph nodes.  VESSELS: Atherosclerotic changes of the aorta and coronary vasculature. No aortic aneurysm.  HEART: Heart is enlarged. Prior aortic valve replacement. No pericardial effusion. Right-sided PICC is in place.  CHEST WALL AND LOWER NECK: Within normal limits.  VISUALIZED UPPER ABDOMEN: Prior cholecystectomy. Mild elevation the right hemidiaphragm.  BONES: Degenerative changes.    IMPRESSION:  Extensive bilateral multifocal pneumonia. A component of pulmonary edema with pleural effusions cannot be excluded.      EXAM:  XR CHEST PORTABLE IMMED 1V                        PROCEDURE DATE:  10/25/2021    INTERPRETATION:  AP erect chest on October 25, 2021 at 2:28 PM. Patient is short of breath with cough and fever.  Heart enlargement, Micra pacemaker, and old right pacer wire again noted. Above findings are similar to September 23, 2020. Present film also shows a TAVR aortic valve.  Present film also shows significant infiltrates concentrated in the bilateral midlung fields.    IMPRESSION: Significant bilateral infiltrates concentrated in the midlung fields. Other findings as above.               HPI: Pt is a 89y old Male with hx of  89 y/o male with a PMHx of Afib, BPH, chronic CHF, GERD, HTN, IDDM, MI, major depressive disorder, pacemaker, syncope and collapse, freq falls, SAH in 9/2021 off AC, UTI, h/o TAVR, vasc dementia who  presents to [place]  [facility]  He was discharged a few weeks ago after admission with a fall.   Pt has SOB and hypoxia to 88%  an outpt PCR was  COVID + from oct 21.     10/28/2021 Pt was seen and examined with no family at bedside, overnight patient desat was placed on BiPAP, patient lethargic, only responds to painful stimuli. Will reach out to family to schedule GOC meeting.        PAIN: ( )Yes   (x )No  DYSPNEA: (x ) Yes  ( ) No  Level: severe    PAST MEDICAL & SURGICAL HISTORY:  IDDM (insulin dependent diabetes mellitus)  HTN (hypertension)  Chronic congestive heart failure, unspecified congestive heart failure type  Atrial fibrillation, unspecified type  Gastroesophageal reflux disease, esophagitis presence not specified  Benign prostatic hyperplasia without lower urinary tract symptoms  Syncope and collapse  5/4/2018  Type 2 myocardial infarction  12/17/2017  UTI (urinary tract infection)  7/12/2017  Artificial cardiac pacemaker  History of appendectomy  Arthropathy of shoulder region  S/P TAVR (transcatheter aortic valve replacement)    SOCIAL HX:    Hx opiate tolerance ( )YES  ( )NO    Baseline ADLs  (Prior to Admission)  ( ) Independent   ( )Dependent    FAMILY HISTORY: unable to obtain from patient     Review of Systems  Unable to obtain/Limited due to: lethargy     PHYSICAL EXAM:    Vital Signs Last 24 Hrs  T(C): 36.7 (28 Oct 2021 09:00), Max: 37.3 (27 Oct 2021 23:20)  T(F): 98 (28 Oct 2021 09:00), Max: 99.1 (27 Oct 2021 23:20)  HR: 63 (28 Oct 2021 09:00) (61 - 68)  BP: 142/45 (28 Oct 2021 09:00) (121/53 - 151/68)  RR: 24 (28 Oct 2021 09:00) (18 - 26)  SpO2: 90% (28 Oct 2021 09:00) (84% - 100%)    PPSV2: 10-20  %    General:   Mental Status:  HEENT:  Lungs:  Cardiac:  GI:  :  Ext:  Neuro:    Albumin: Albumin, Serum: 2.3 g/dL (10-25 @ 13:36)    Allergies    Aleve (Vomiting)  Aprodine (Vomiting)  codeine (Unknown)  morphine (Nausea)  naproxen (Vomiting)    Intolerances    MEDICATIONS  (STANDING):  amLODIPine   Tablet 10 milliGRAM(s) Oral daily  atorvastatin 40 milliGRAM(s) Oral at bedtime  azithromycin  IVPB      azithromycin  IVPB 500 milliGRAM(s) IV Intermittent every 24 hours  cefepime  Injectable. 1000 milliGRAM(s) IV Push daily  dexAMETHasone  Injectable 6 milliGRAM(s) IV Push daily  gabapentin 100 milliGRAM(s) Oral two times a day  guaiFENesin  milliGRAM(s) Oral every 12 hours  heparin   Injectable 5000 Unit(s) SubCutaneous every 8 hours  hydrALAZINE 50 milliGRAM(s) Oral every 8 hours  isosorbide   mononitrate ER Tablet (IMDUR) 30 milliGRAM(s) Oral daily  metoprolol succinate ER 25 milliGRAM(s) Oral daily  pantoprazole    Tablet 40 milliGRAM(s) Oral before breakfast  senna 2 Tablet(s) Oral at bedtime  simethicone 80 milliGRAM(s) Chew two times a day  venlafaxine XR. 75 milliGRAM(s) Oral daily  zinc sulfate 220 milliGRAM(s) Oral daily    MEDICATIONS  (PRN):  acetaminophen     Tablet .. 650 milliGRAM(s) Oral every 4 hours PRN Temp greater or equal to 38.5C (101.3F)  ALBUTerol    90 MICROgram(s) HFA Inhaler 2 Puff(s) Inhalation every 4 hours PRN Shortness of Breath and/or Wheezing  benzonatate 100 milliGRAM(s) Oral three times a day PRN Cough      RADIOLOGY/ADDITIONAL STUDIES:    EXAM:  CT CHEST                        PROCEDURE DATE:  10/25/2021    INTERPRETATION:  CLINICAL INFORMATION: Hypoxia  COMPARISON: 10/2/2021  CONTRAST/COMPLICATIONS:  IV Contrast: NONE  Oral Contrast: NONE  Complications: None reportedat time of study completion  PROCEDURE:  CT of the Chest was performed.  Sagittal and coronal reformats were performed.  FINDINGS:  LUNGS AND AIRWAYS: Patent central airways.  Multifocal segmental and subsegmental consolidative changes with additional patchy airspace disease and groundglass opacities throughout the bilateral upper and lower lobes consistent with multifocal pneumonia worsened as compared to prior exam. A component of pulmonary edema cannot entirely be excluded.  PLEURA: Trace to small bilateral pleural effusions.  MEDIASTINUM AND CHANI: Up to 1 cm in short axis measurement mediastinal and hilar lymph nodes.  VESSELS: Atherosclerotic changes of the aorta and coronary vasculature. No aortic aneurysm.  HEART: Heart is enlarged. Prior aortic valve replacement. No pericardial effusion. Right-sided PICC is in place.  CHEST WALL AND LOWER NECK: Within normal limits.  VISUALIZED UPPER ABDOMEN: Prior cholecystectomy. Mild elevation the right hemidiaphragm.  BONES: Degenerative changes.    IMPRESSION:  Extensive bilateral multifocal pneumonia. A component of pulmonary edema with pleural effusions cannot be excluded.      EXAM:  XR CHEST PORTABLE IMMED 1V                        PROCEDURE DATE:  10/25/2021    INTERPRETATION:  AP erect chest on October 25, 2021 at 2:28 PM. Patient is short of breath with cough and fever.  Heart enlargement, Micra pacemaker, and old right pacer wire again noted. Above findings are similar to September 23, 2020. Present film also shows a TAVR aortic valve.  Present film also shows significant infiltrates concentrated in the bilateral midlung fields.    IMPRESSION: Significant bilateral infiltrates concentrated in the midlung fields. Other findings as above.

## 2021-10-28 NOTE — PROVIDER CONTACT NOTE (OTHER) - ASSESSMENT
Pt in respiratory distress. Currently on high flow 50L 100%Fio2. O2 sat range from 82%-84%.
Revaluate meds that are oral for patient

## 2021-10-28 NOTE — CONSULT NOTE ADULT - ASSESSMENT
89 year-old man, with hypoxic resp. failure secondary to COVID pneumonia.     Problem List:  1. Hypoxic resp failure  2. ARDS  3. COVID pneumonia  4. Dementia/Delerium    Recommendations:  --The patient is on decadron.  --Unable to get remdesivir to [facility]  --On supportive care with BIPAP  --DNR, being seen by palliative for goals of care.

## 2021-10-28 NOTE — PROVIDER CONTACT NOTE (OTHER) - SITUATION
Pt is now on Bi Pap and has been unable to take anything by mouth
spoke with Rabia
CKD    left message with ans service for dr to see pt in the morning
patient  at 11:28. No breathing present. No heart beat present.
Pt currently on high flow 50L 100% FIo2. O2 saturation mid 80's. Respiratory staff with patient and states maxed on high flow parameters and will benefit from Bipap.

## 2021-10-28 NOTE — PROVIDER CONTACT NOTE (OTHER) - REASON
Expiration
Consult
Pt unable to take medications orally
Consult
Pt O2 sat mid 80's and maxed on high flow parameters

## 2021-10-28 NOTE — DISCHARGE NOTE FOR THE EXPIRED PATIENT - OTHER SIGNIFICANT FINDINGS
SARS-CoV-2: Detected (25 Oct 2021 15:36)  COVID-19 PCR: NotDetec (02 Oct 2021 08:49)  SARS-CoV-2: NotDetec (19 Sep 2021 19:39)  COVID-19 PCR: NotDetec (07 Sep 2021 19:44)  COVID-19 PCR: NotDetec (24 Aug 2021 11:36)    CAPILLARY BLOOD GLUCOSE    Culture - Blood (collected 25 Oct 2021 13:36)  Source: .Blood None  Preliminary Report (26 Oct 2021 19:02):    No growth to date.  Troponin I, High Sensitivity Result: 149.98: D-Dimer Assay, Quantitative: 3867 ng/mL DDU (10.25.21 @ 21:34) A1C with Estimated Average Glucose (10.26.21 @ 09:07)   A1C with Estimated Average Glucose Result: 9.0Lactate Dehydrogenase, Serum: 356 U/L (10.26.21 @ 09:07) Procalcitonin, Serum (10.25.21 @ 13:36)   Procalcitonin, Serum: 0.68:< from: CT Chest No Cont (10.25.21 @ 15:46) >    FINDINGS:    LUNGS AND AIRWAYS: Patent central airways.  Multifocal segmental and subsegmental consolidative changes with additional patchy airspace disease and groundglass opacities throughout the bilateral upper and lower lobes consistent with multifocal pneumonia worsened as compared to prior exam. A component of pulmonary edema cannot entirely be excluded.  PLEURA: Trace to small bilateral pleural effusions.  MEDIASTINUM AND CHANI: Up to 1 cm in short axis measurement mediastinal and hilar lymph nodes.  VESSELS: Atherosclerotic changes of the aorta and coronary vasculature. No aortic aneurysm.  HEART: Heart is enlarged. Prior aortic valve replacement. No pericardial effusion. Right-sided PICC is in place.  CHEST WALL AND LOWER NECK: Within normal limits.  VISUALIZED UPPER ABDOMEN: Prior cholecystectomy. Mild elevation the right hemidiaphragm.  BONES: Degenerativechanges.    IMPRESSION:  Extensive bilateral multifocal pneumonia. A component of pulmonary edema with pleural effusions cannot be excluded.    < end of copied text >  < from: Xray Chest 1 View-PORTABLE IMMEDIATE (10.25.21 @ 14:42) >  IMPRESSION: Significant bilateral infiltrates concentrated in the midlung fields. Other findings as above.    < end of copied text >  < from: 12 Lead ECG (10.25.21 @ 16:24) >    Diagnosis Line Ventricular-paced rhythm  Abnormal ECG  When compared with ECG of 02-OCT-2021 08:52,  Electronic ventricular pacemaker has replaced Atrial fibrillation    < end of copied text >

## 2021-10-28 NOTE — CONSULT NOTE ADULT - TREATMENT GUIDELINE COMMENT
Due to the patient’s health status and restrictions on visitation during the Public Health Emergency, the Advance Care Planning service was performed via telephone with patient’s son kayden

## 2021-10-28 NOTE — CONSULT NOTE ADULT - ASSESSMENT
Pt is a 89y old Male with hx of  89 y/o male with a PMHx of Afib, BPH, chronic CHF, GERD, HTN, IDDM, MI, major depressive disorder, pacemaker, syncope and collapse, freq falls, SAH in 9/2021 off AC, UTI, h/o TAVR, vasc dementia who  presents to [place]  ED via Ambulance  from [facility].    1)  Pt is a 89y old Male with hx of  89 y/o male with a PMHx of Afib, BPH, chronic CHF, GERD, HTN, IDDM, MI, major depressive disorder, pacemaker, syncope and collapse, freq falls, SAH in 9/2021 off AC, UTI, h/o TAVR, vasc dementia who  presents to [place]  ED via Ambulance  from [facility].    1) Acute hypoxic respiratory failure   - Covid 19 and suspected secondary  Pneumonia  - acute on chronic Diastolic/Systolic CHF EF 45%  - Pulm HTN   - c/w Decadron    - Remdesivir contraindicated 2/2 CKD  - c/w Cefepime, c/w Azithromycin   - pat desated overnight with high flow and placed on BiPAP at this time family is in agreement with placing back on NRB and removing restraints     - continuous pulse ox    2) Elevated troponin  - H/o CAD, MI, Systolic-diastolic CHF,  Afib,  Severe AS, s/p TAVR  - Likely demand ischemia in settings of hypoxia   - C/w Toprol, Nitrates   - Conservative management   - not on AC due to SAH, freq falls     3) CKD  IV   - Baseline CR around  2.7- 3, monitor closely  - Hold Lasix   - Labs in am     4) advance care planning   - Spoke with patients son Dariel, who has been making decisions Patient wife Carli is listed as HCP- confirmed with Carli that Dariel to make medical decisions going forward and then they all speak as a family  - Goals of care held over the phone today with patients son Dariel, at this time will remove BiPAP and restraints and give dose of ativan, family aware that patients may be approaching the end of life  - Dariel Rodríguez cell , if not available work phone   - Patients duane Boyce [phone]

## 2021-10-28 NOTE — CONSULT NOTE ADULT - SUBJECTIVE AND OBJECTIVE BOX
HISTORY OF PRESENT ILLNESS:   89 year-old man, with a history of  with Afib, BPH, chronic CHF, GERD, HTN, IDDM, MI, major depressive disorder, pacemaker, syncope and collapse, freq falls, SAH in 9/2021 off AC, UTI, h/o TAVR, vasc dementia who  presents to [place]  ED via Ambulance  from [facility].  He was discharged a few weeks ago after admission with a fall.   Pt has SOB and hypoxia to 88%  an outpt PCR was  COVID + from oct 21.     Today during my exam. He is unable to answer questions or give a history. He is on BIPAP.     ROS unable to obtain    PMH: as above  Social Hx: Unable to obtain duet to dementia  Fam Hx: unavailable due to pt's dementia        PAST MEDICAL & PAST SURGICAL HISTORY:  ·	IDDM (insulin dependent diabetes mellitus)  ·	HTN (hypertension)  ·	Chronic congestive heart failure, unspecified congestive heart failure type  ·	Atrial fibrillation, unspecified type  ·	Gastroesophageal reflux disease, esophagitis presence not specified  ·	Benign prostatic hyperplasia without lower urinary tract symptoms  ·	Syncope and collapse  ·	5/4/2018  ·	Type 2 myocardial infarction  ·	12/17/2017  ·	UTI (urinary tract infection)  ·	7/12/2017  ·	Artifcial cardiac pacemaker  ·	History of appendectomy  ·	Arthropathy of shoulder region  ·	S/P TAVR (transcatheter aortic valve replacement)    MEDICATIONS  (STANDING):  amLODIPine   Tablet 10 milliGRAM(s) Oral daily  atorvastatin 40 milliGRAM(s) Oral at bedtime  azithromycin  IVPB      azithromycin  IVPB 500 milliGRAM(s) IV Intermittent every 24 hours  cefepime  Injectable. 1000 milliGRAM(s) IV Push daily  dexAMETHasone  Injectable 6 milliGRAM(s) IV Push daily  gabapentin 100 milliGRAM(s) Oral two times a day  guaiFENesin  milliGRAM(s) Oral every 12 hours  heparin   Injectable 5000 Unit(s) SubCutaneous every 8 hours  hydrALAZINE 50 milliGRAM(s) Oral every 8 hours  isosorbide   mononitrate ER Tablet (IMDUR) 30 milliGRAM(s) Oral daily  metoprolol succinate ER 25 milliGRAM(s) Oral daily  pantoprazole    Tablet 40 milliGRAM(s) Oral before breakfast  senna 2 Tablet(s) Oral at bedtime  simethicone 80 milliGRAM(s) Chew two times a day  venlafaxine XR. 75 milliGRAM(s) Oral daily  zinc sulfate 220 milliGRAM(s) Oral daily    MEDICATIONS  (PRN):  acetaminophen     Tablet .. 650 milliGRAM(s) Oral every 4 hours PRN Temp greater or equal to 38.5C (101.3F)  ALBUTerol    90 MICROgram(s) HFA Inhaler 2 Puff(s) Inhalation every 4 hours PRN Shortness of Breath and/or Wheezing  benzonatate 100 milliGRAM(s) Oral three times a day PRN Cough  LORazepam   Injectable 0.5 milliGRAM(s) IV Push every 6 hours PRN Agitation      Allergies    Aleve (Vomiting)  Aprodine (Vomiting)  codeine (Unknown)  morphine (Nausea)  naproxen (Vomiting)    Intolerances        Vital Signs Last 24 Hrs  T(C): 36.7 (28 Oct 2021 09:00), Max: 37.3 (27 Oct 2021 23:20)  T(F): 98 (28 Oct 2021 09:00), Max: 99.1 (27 Oct 2021 23:20)  HR: 60 (28 Oct 2021 09:59) (60 - 67)  BP: 142/45 (28 Oct 2021 09:00) (121/53 - 150/71)  BP(mean): --  RR: 24 (28 Oct 2021 09:00) (18 - 26)  SpO2: 86% (28 Oct 2021 09:59) (84% - 100%)      PHYSICAL EXAMINATION:    GENERAL APPEARANCE: Elderly male, appears aggitated, on BIPAp  HEAD: Normocephalic atraumatic   EYES: Pupils are normal. No icterus. Sclera white.   HEENT:  Mucus membranes unable to asses on BIPAP  NECK:  No stridor.  HEART:  Normal S1 and S2. No murmur  CHEST: Anterior lung fields assessed, no wheezing.   ABDOMEN:  Soft and nontender. Nondistended.   EXTREMITIES:  There is no cyanosis, clubbing or edema.   SKIN:  No rash or significant lesions are noted. No jaundice.  NEURO: Appears agitated not answering questions, no following commands, moving all extremities.     RADIOLOGY & ADDITIONAL STUDIES:   *Imaging personally reviewed  EXAM: CT CHEST  PROCEDURE DATE: 10/25/2021  LUNGS AND AIRWAYS: Patent central airways. Multifocal segmental and subsegmental consolidative changes with additional patchy airspace disease and groundglass opacities throughout the bilateral upper and lower lobes consistent with multifocal pneumonia worsened as compared to prior exam. A component of pulmonary edema cannot entirely be excluded.  PLEURA: Trace to small bilateral pleural effusions.  MEDIASTINUM AND CHANI: Up to 1 cm in short axis measurement mediastinal and hilar lymph nodes.  VESSELS: Atherosclerotic changes of the aorta and coronary vasculature. No aortic aneurysm.  HEART: Heart is enlarged. Prior aortic valve replacement. No pericardial effusion. Right-sided PICC is in place.  CHEST WALL AND LOWER NECK: Within normal limits.  VISUALIZED UPPER ABDOMEN: Prior cholecystectomy. Mild elevation the right hemidiaphragm.  BONES: Degenerative changes.    IMPRESSION:  Extensive bilateral multifocal pneumonia. A component of pulmonary edema with pleural effusions cannot be excluded.

## 2021-10-28 NOTE — CONSULT NOTE ADULT - CONVERSATION DETAILS
I spoke with the patient's son Dariel over the phone and explained the role of palliative medicine; Dariel explained that he got a phone call overnight about placing his dad on BiPAP and that he agreed at that time because he did not have time to call all of his family. Raj's son expressed that he knows that his father isn't doing well and does not want him to suffer, but he also shared that his father has been sick before and has bounced back.    We discussed a plan going forward; the patient's son would like the patient placed back on NRB and for this father to be unrestrained and in agreement with giving the patient Ativan to help with agitation.  The patient has morphine listed as an allergy (nausea). With spoke with the son, who states he would agree with giving a Dose of Dilaudid if the patient is experiencing respiratory distress.   At this time family would like to continue antibiotics; I did explain that when removing BiPAP, patient O2 may drop, family is aware and want him to be comfortable.   Emotional Support provided to the family   Confirmed plan with patient Wife Carli, who is in agreement and states that Dariel can be primary contact and decision maker at this time.   Plan discussed with EMILIA Cleary and Dr. Gomez.

## 2021-10-28 NOTE — DISCHARGE NOTE FOR THE EXPIRED PATIENT - HOSPITAL COURSE
FROM H&P:    "Pt is a pleasant  89 y/o male with a PMHx of Afib, BPH, chronic CHF, GERD, HTN, IDDM, MI, major depressive disorder, pacemaker, syncope and collapse, freq falls, SAH in 2021 off AC, UTI, h/o TAVR, vasc dementia who  presents to [facility] via Ambulance  from [facility].  He was discharged a few weeks ago after admission with a fall.   Pt has SOB and hypoxia to 88%  an outpt PCR was  COVID + from oct 21.   He denies pain,  pt is confused at baseline.      Pt was vaccinated for COVID with his 2nd Moderna dose in April. Pt is a poor historian."    89 y/o male with a PMHx of Afib, not on a/c now,  BPH, chronic CHF, GERD, HTN, IDDM, CAD,  major depressive disorder, pacemaker,  freq falls, SAH in 2021 UTI, h/o TAVR, Severe TR admitted for:     1. Severe Sepsis and Acute hypoxic respiratory failure secondary to covid 19 and possible superimposed Pneumonia,  and acute on chronic Diastolic/Systolic CHF EF 45%. Pulm HTN   -Associated RICHY and elevated Troponin  -s/p IV lasix in ED with good UO,  continued on PO lasix 40mg QD, pt has no PO intake, will hold further on diuretic  -on Decadron for covid 19, change to IV   - Remdesivir contraindicated 2/2 CKD  - On IV ABX for suspected superimposed pneumonia. Procalcitonin slightly elevated but unable to clinically determine presence of superimposed bacterial pneumonia nor the type of bacteria.   -  Pulse ox low 90s on 100% NRB, but desats to low 70s when pulls off. Trial of HFNC for better complains  -C/w wrist restrains   - continuous pulse ox  - trend inflammatory markers   - Dimer elevated. Unable to clinically determine presence of PE and unable to fully anticoagulated with reported history of SAH only cleared recently for prophylactic AC.   - Pulm eval       2 Elevated troponin. H/o CAD, MI   Likely demand ischemia from hypoxic respiratory failure and Sepsis  C/w Toprol, Nitrates   Conservative management     3. RICHY on CKD stage IVL  -Multifactorial: Decerased PO intake/prerenal/Sepsis  Baseline CR around  2.7- 3, monitor closely  Hold lasix   Monitor UO, has Sotelo   Labs in am       4. Afib,  Severe AS, s/p TAVR  -  not on AC due to SAH, freq falls   -rate control with Toprol     5. Systolic-diastolic CHF, with mild exacerbation   S/p dose of IV lasix, on Po now, will hold due to no PO intake   C/w Toprol, Isosorbide and Hydralazine with parameters (no ARBs due to CKD)     6. Recent admission for HIP FX S/p hemiarthroplasty  C/w DVT PPX     Patient continued to clinically decline. Increased O2 requirements and increased respiratory distress prompted initiation of BiPAP. O2 saturations remained low. Palliative care consulted. Discussed with wife who is 92 who deferred health decision making to son Dariel. After discussing all the comorbidities and current presentation, it was decided to initiate comfort care measures with continued use of antibiotics. This included discontinuation of BiPAP and High flow and transition to HF/Mask. Shortly afterwords patient hypoxia and respirations stopped and pulses absent. Patient pronounced  at 11:28AM. Evaluated the patient. Absent respirations and heart sounds and peripheral and central pulses. No localization of central/peripheral pain.  Pupils fixed and dilated. Family notified. All questions answered.  Date of Expiration/Discharge: 10/28/2021; Discharge Management: 44 minutes FROM H&P:    "Pt is a pleasant  89 y/o male with a PMHx of Afib, BPH, chronic CHF, GERD, HTN, IDDM, MI, major depressive disorder, pacemaker, syncope and collapse, freq falls, SAH in 2021 off AC, UTI, h/o TAVR, vasc dementia who  presents to [facility] via Ambulance  from [facility].  He was discharged a few weeks ago after admission with a fall.   Pt has SOB and hypoxia to 88%  an outpt PCR was  COVID + from oct 21.   He denies pain,  pt is confused at baseline.      Pt was vaccinated for COVID with his 2nd Moderna dose in April. Pt is a poor historian."    89 y/o male with a PMHx of Afib, not on a/c now,  BPH, chronic CHF, GERD, HTN, IDDM, CAD,  major depressive disorder, pacemaker,  freq falls, SAH in 2021 UTI, h/o TAVR, Severe TR admitted for:     1. Severe Sepsis and Acute hypoxic respiratory failure secondary to covid 19 and possible superimposed Pneumonia,  and acute on chronic Diastolic/Systolic CHF EF 45%. Pulm HTN   -Associated RICHY and elevated Troponin  -s/p IV lasix in ED with good UO,  continued on PO lasix 40mg QD, pt has no PO intake, will hold further on diuretic  -on Decadron for covid 19, change to IV   - Remdesivir contraindicated 2/2 CKD  - On IV ABX for suspected superimposed pneumonia. Procalcitonin slightly elevated but unable to clinically determine presence of superimposed bacterial pneumonia nor the type of bacteria.   -  Pulse ox low 90s on 100% NRB, but desats to low 70s when pulls off. Trial of HFNC for better complains  -C/w wrist restrains   - continuous pulse ox  - trend inflammatory markers   - Dimer elevated. Unable to clinically determine presence of PE and unable to fully anticoagulated with reported history of SAH only cleared recently for prophylactic AC.   - Pulm eval       2 Elevated troponin. H/o CAD, MI   Likely demand ischemia from hypoxic respiratory failure and Sepsis  C/w Toprol, Nitrates   Conservative management     3. RICHY on CKD stage IVL  -Multifactorial: Decerased PO intake/prerenal/Sepsis  Baseline CR around  2.7- 3, monitor closely  Hold lasix   Monitor UO, has Sotelo   Labs in am       4. Afib,  Severe AS, s/p TAVR  -  not on AC due to SAH, freq falls   -rate control with Toprol     5. Systolic-diastolic CHF, with mild exacerbation   S/p dose of IV lasix, on Po now, will hold due to no PO intake   C/w Toprol, Isosorbide and Hydralazine with parameters (no ARBs due to CKD)     6. Recent admission for HIP FX S/p hemiarthroplasty  C/w DVT PPX     Patient continued to clinically decline. Increased O2 requirements and increased respiratory distress prompted initiation of BiPAP. O2 saturations remained low. Palliative care consulted. Discussed with wife who is 92 who deferred health decision making to son Dariel. After discussing all the comorbidities and current presentation, it was decided to initiate comfort care measures with continued use of antibiotics. This included discontinuation of BiPAP and High flow and transition to HF/Mask. Shortly afterwords patient hypoxia and respirations stopped and pulses absent. Patient pronounced  at 11:28AM. Evaluated the patient. Absent respirations and heart sounds and peripheral and central pulses. No localization of central/peripheral pain.  Pupils fixed and dilated. Family notified. All questions answered.  Date of Expiration/Discharge: 10/28/2021; Discharge Management: 44 minutes

## 2021-10-30 LAB
CULTURE RESULTS: SIGNIFICANT CHANGE UP
SPECIMEN SOURCE: SIGNIFICANT CHANGE UP

## 2021-11-03 DIAGNOSIS — J15.6 PNEUMONIA DUE TO OTHER GRAM-NEGATIVE BACTERIA: ICD-10-CM

## 2021-11-03 DIAGNOSIS — R06.02 SHORTNESS OF BREATH: ICD-10-CM

## 2021-11-03 DIAGNOSIS — N17.9 ACUTE KIDNEY FAILURE, UNSPECIFIED: ICD-10-CM

## 2021-11-03 DIAGNOSIS — J12.82 PNEUMONIA DUE TO CORONAVIRUS DISEASE 2019: ICD-10-CM

## 2021-11-03 DIAGNOSIS — F05 DELIRIUM DUE TO KNOWN PHYSIOLOGICAL CONDITION: ICD-10-CM

## 2021-11-03 DIAGNOSIS — Z79.4 LONG TERM (CURRENT) USE OF INSULIN: ICD-10-CM

## 2021-11-03 DIAGNOSIS — U07.1 COVID-19: ICD-10-CM

## 2021-11-03 DIAGNOSIS — I25.2 OLD MYOCARDIAL INFARCTION: ICD-10-CM

## 2021-11-03 DIAGNOSIS — N18.4 CHRONIC KIDNEY DISEASE, STAGE 4 (SEVERE): ICD-10-CM

## 2021-11-03 DIAGNOSIS — A41.9 SEPSIS, UNSPECIFIED ORGANISM: ICD-10-CM

## 2021-11-03 DIAGNOSIS — F01.50 VASCULAR DEMENTIA WITHOUT BEHAVIORAL DISTURBANCE: ICD-10-CM

## 2021-11-03 DIAGNOSIS — I25.10 ATHEROSCLEROTIC HEART DISEASE OF NATIVE CORONARY ARTERY WITHOUT ANGINA PECTORIS: ICD-10-CM

## 2021-11-03 DIAGNOSIS — R65.20 SEVERE SEPSIS WITHOUT SEPTIC SHOCK: ICD-10-CM

## 2021-11-03 DIAGNOSIS — I50.43 ACUTE ON CHRONIC COMBINED SYSTOLIC (CONGESTIVE) AND DIASTOLIC (CONGESTIVE) HEART FAILURE: ICD-10-CM

## 2021-11-03 DIAGNOSIS — F32.9 MAJOR DEPRESSIVE DISORDER, SINGLE EPISODE, UNSPECIFIED: ICD-10-CM

## 2021-11-03 DIAGNOSIS — I48.91 UNSPECIFIED ATRIAL FIBRILLATION: ICD-10-CM

## 2021-11-03 DIAGNOSIS — Z79.899 OTHER LONG TERM (CURRENT) DRUG THERAPY: ICD-10-CM

## 2021-11-03 DIAGNOSIS — K21.9 GASTRO-ESOPHAGEAL REFLUX DISEASE WITHOUT ESOPHAGITIS: ICD-10-CM

## 2021-11-03 DIAGNOSIS — E11.9 TYPE 2 DIABETES MELLITUS WITHOUT COMPLICATIONS: ICD-10-CM

## 2021-11-03 DIAGNOSIS — I24.8 OTHER FORMS OF ACUTE ISCHEMIC HEART DISEASE: ICD-10-CM

## 2021-11-03 DIAGNOSIS — J80 ACUTE RESPIRATORY DISTRESS SYNDROME: ICD-10-CM

## 2021-11-03 DIAGNOSIS — N40.0 BENIGN PROSTATIC HYPERPLASIA WITHOUT LOWER URINARY TRACT SYMPTOMS: ICD-10-CM

## 2021-11-03 DIAGNOSIS — I27.20 PULMONARY HYPERTENSION, UNSPECIFIED: ICD-10-CM

## 2021-11-03 DIAGNOSIS — Z91.81 HISTORY OF FALLING: ICD-10-CM

## 2021-11-03 DIAGNOSIS — Z66 DO NOT RESUSCITATE: ICD-10-CM

## 2021-11-03 DIAGNOSIS — I13.0 HYPERTENSIVE HEART AND CHRONIC KIDNEY DISEASE WITH HEART FAILURE AND STAGE 1 THROUGH STAGE 4 CHRONIC KIDNEY DISEASE, OR UNSPECIFIED CHRONIC KIDNEY DISEASE: ICD-10-CM

## 2021-11-03 DIAGNOSIS — Z95.2 PRESENCE OF PROSTHETIC HEART VALVE: ICD-10-CM

## 2021-11-03 DIAGNOSIS — Z95.0 PRESENCE OF CARDIAC PACEMAKER: ICD-10-CM

## 2022-01-01 NOTE — ED PROCEDURE NOTE - CPROC ED POST PROC CARE GUIDE1
Statement Selected Verbal/written post procedure instructions were given to patient/caregiver./Instructed patient/caregiver to follow-up with primary care physician./Instructed patient/caregiver regarding signs and symptoms of infection.

## 2022-01-01 NOTE — PATIENT PROFILE ADULT - IS THERE A SUSPICION OF ABUSE/NEGLIGENCE?
[FreeTextEntry1] : \par 13 do F here for weight check.  Gained 5 oz in 4 days, surpassed birth weight. No jaundice on exam. \par \par Counseling:\par - Feeding: Recommend exclusive breastfeeding, 8-12 feedings per day. Start/ continue vit D for baby.  Mother should continue prenatal vitamins and avoid alcohol. If formula is needed, recommend iron-fortified formulations, 2-4 oz every 2-3 hrs. \par - Safety: When in car, patient should be in rear-facing car seat in back seat. Put baby to sleep on back, in own crib with no loose or soft bedding. \par - Help baby to develop sleep and feeding routines. \par - Limit baby's exposure to others, especially those with fever or unknown vaccine status. Parents counseled to call if rectal temperature >100.4 degrees F.\par \par RTC in 2 weeks for WCC, sooner if needed. \par \par 
no

## 2022-04-11 NOTE — ED ADULT NURSE NOTE - CAS EDP DISCH DISPOSITION ADMI
Called patient.  He stated that he is willing to restart the  Lovastatin.    Routed to provider to please send in medication.    When should patient recheck his labs.    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  The skin was cleansed with alcohol.  The skin was infiltrated with 1 mL of 1% lidocaine with 1-100,000 epinephrine.  After approximately 10 minutes, a 3 millimeter punch biopsy was taken by placing circular motion on the biopsy tool, the skin was elevated and clipped with iris scissors.  The specimen was sent in formalin.  The skin was closed using a simple 5-0 fast absorbing gut suture.  Antibiotic ointment was placed over the biopsy site.  The patient tolerated the procedure with minimal discomfort.    Carlos Dial MD  04/11/22  14:02 CDT    Preprocedure diagnosis: Clinically suspicious for melanoma of the right temple    Post procedure diagnosis: Same    Procedure: Punch biopsy    Details:  Patient consent was obtained.  The skin was cleansed with alcohol.  The skin was infiltrated with 1 mL of 1% lidocaine with 1-100,000 epinephrine.  After approximately 10 minutes, a 4 millimeter punch biopsy was taken by placing circular motion on the biopsy tool, the skin was elevated and clipped with iris scissors.  The specimen was sent in formalin.  The skin was closed using a simple 5-0 fast absorbing gut suture.  Antibiotic ointment was placed over the biopsy site.  The patient tolerated the procedure with minimal discomfort.    Carlos Dial MD  04/11/22  14:02 CDT       3E/Telemetry

## 2022-07-05 NOTE — ED PROVIDER NOTE - DISPOSITION TYPE
Wally Zee (: 1962) is a 61 y.o. female, established patient, Virtual Visit for evaluation of the following chief complaint(s):   Results       ASSESSMENT/PLAN:  1. Type 2 diabetes mellitus with microalbuminuria, without long-term current use of insulin (HCC)  Not controlled. She is back on Januvia, metformin and states her diet is improving. 2. Essential hypertension  Fair control, continue with current medication. 3. Pure hypercholesterolemia  Worse, patient agrees to improve diet and resume Lipitor. 4. Vitamin D deficiency  Low. She has noted more aches. Will resume weekly Rx. Return for follow up for F2F in 3 months as scheduled. .    SUBJECTIVE/OBJECTIVE:  HPI  VV for labs review and follow up. DM:  Patient is taking Januvia, Metformin BID regularly. Checking BS sporadically: 200. Is trying to do better with her diet. Occasional paresthesias in Rt foot. HTN:  Patient started Lotrel without any side effects. HLD:  Patient is not taking Lipitor. Review of Systems     Patient-Reported Systolic (Top): 471  Patient-Reported Diastolic (Bottom): 86 (right arm.)  Patient-Reported Temperature: 97.9 oral  Patient-Reported Weight: 216 lbs  Patient-Reported LMP: yrs ago. Physical Exam    On this date 2022 I have spent 11 minutes reviewing previous notes, test results and face to face (virtual) with the patient discussing the diagnosis and importance of compliance with the treatment plan as well as documenting on the day of the visit. Wally Zee, was evaluated through a synchronous (real-time) audio-video encounter. The patient (or guardian if applicable) is aware that this is a billable service, which includes applicable co-pays. This Virtual Visit was conducted with patient's (and/or legal guardian's) consent.  The visit was conducted pursuant to the emergency declaration under the [facility] [facility]5 waiver authority and the Coronavirus Preparedness and Response Supplemental Appropriations Act. Patient identification was verified, and a caregiver was present when appropriate. The patient was located at: Home: [place] [phone]  The provider was located at: Home:      Patient identification was verified at the start of the visit: YES    Services were provided through a phone synchronous discussion virtually to substitute for in-person clinic visit. Patient was located at home and provider was located in office or at home. An electronic signature was used to authenticate this note.   -- Olga Vivar MD DISCHARGE

## 2022-08-10 NOTE — ED ADULT NURSE NOTE - DURATION
Rehana Escobedo at  home and she will have the death certificate dropped off per Dr. Kali Wesley request for signature. day(s)

## 2022-10-28 NOTE — ED PROVIDER NOTE - DATE/TIME 1
Order was written/H&P was completed/Contractions pattern was reviewed/FHR was reviewed/Induction / Augmentation was discussed
03-Nov-2020 14:42

## 2022-11-14 NOTE — PATIENT PROFILE ADULT - HEALTH LITERACY
Surgeon Performing The Repair (Optional): Shadi Echavarria MD Biopsy Photograph Reviewed: Yes Size Of Lesion In Cm: 0.7 Size Of Margin In Cm: 0.5 Anesthesia Volume In Cc: 3.5 Was An Eye Clamp Used?: No Eye Clamp Note Details: An eye clamp was used during the procedure. Excision Method: Elliptical Saucerization Depth: dermis and superficial adipose tissue Repair Type: Intermediate Suturegard Retention Suture: 2-0 Nylon Retention Suture Bite Size: 3 mm Length To Time In Minutes Device Was In Place: 10 Number Of Hemigard Strips Per Side: 1 Intermediate / Complex Repair - Final Wound Length In Cm: 3 Undermining Type: Entire Wound Debridement Text: The wound edges were debrided prior to proceeding with the closure to facilitate wound healing. Helical Rim Text: The closure involved the helical rim. Vermilion Border Text: The closure involved the vermilion border. Nostril Rim Text: The closure involved the nostril rim. Retention Suture Text: Retention sutures were placed to support the closure and prevent dehiscence. Primary Defect Length (In Cm): 0 Suture Removal: 7 days Lab: [id number] Lab Facility: [id number] Graft Donor Site Bandage (Optional-Leave Blank If You Don't Want In Note): Steri-strips and a pressure bandage were applied to the donor site. Epidermal Closure Graft Donor Site (Optional): simple interrupted Billing Type: Third-Party Bill Excision Depth: adipose tissue Scalpel Size: 15 blade Anesthesia Type: 1% lidocaine with epinephrine Hemostasis: Electrocautery Estimated Blood Loss (Cc): minimal Detail Level: Detailed Deep Sutures: 4-0 PGCL Epidermal Sutures: 6-0 Prolene Epidermal Closure: running Wound Care: Petrolatum Dressing: dry sterile dressing Suturegard Intro: Intraoperative tissue expansion was performed, utilizing the SUTUREGARD device, in order to reduce wound tension. Suturegard Body: The suture ends were repeatedly re-tightened and re-clamped to achieve the desired tissue expansion. Hemigard Intro: Due to skin fragility and wound tension, it was decided to use HEMIGARD adhesive retention suture devices to permit a linear closure. The skin was cleaned and dried for a 6cm distance away from the wound. Excessive hair, if present, was removed to allow for adhesion. Hemigard Postcare Instructions: The HEMIGARD strips are to remain completely dry for at least 5-7 days. Positioning (Leave Blank If You Do Not Want): The patient was placed in a comfortable position exposing the surgical site. Pre-Excision Curettage Text (Leave Blank If You Do Not Want): Prior to drawing the surgical margin the visible lesion was removed with electrodesiccation and curettage to clearly define the lesion size. Complex Repair Preamble Text (Leave Blank If You Do Not Want): Extensive wide undermining was performed. Intermediate Repair Preamble Text (Leave Blank If You Do Not Want): Undermining was performed with blunt dissection. Curvilinear Excision Additional Text (Leave Blank If You Do Not Want): The margin was drawn around the clinically apparent lesion.  A curvilinear shape was then drawn on the skin incorporating the lesion and margins.  Incisions were then made along these lines to the appropriate tissue plane and the lesion was extirpated. Fusiform Excision Additional Text (Leave Blank If You Do Not Want): The margin was drawn around the clinically apparent lesion.  A fusiform shape was then drawn on the skin incorporating the lesion and margins.  Incisions were then made along these lines to the appropriate tissue plane and the lesion was extirpated. Elliptical Excision Additional Text (Leave Blank If You Do Not Want): The margin was drawn around the clinically apparent lesion.  An elliptical shape was then drawn on the skin incorporating the lesion and margins.  Incisions were then made along these lines to the appropriate tissue plane and the lesion was extirpated. Saucerization Excision Additional Text (Leave Blank If You Do Not Want): The margin was drawn around the clinically apparent lesion.  Incisions were then made along these lines, in a tangential fashion, to the appropriate tissue plane and the lesion was extirpated. Slit Excision Additional Text (Leave Blank If You Do Not Want): A linear line was drawn on the skin overlying the lesion. An incision was made slowly until the lesion was visualized.  Once visualized, the lesion was removed with blunt dissection. Excisional Biopsy Additional Text (Leave Blank If You Do Not Want): The margin was drawn around the clinically apparent lesion. An elliptical shape was then drawn on the skin incorporating the lesion and margins.  Incisions were then made along these lines to the appropriate tissue plane and the lesion was extirpated. Perilesional Excision Additional Text (Leave Blank If You Do Not Want): The margin was drawn around the clinically apparent lesion. Incisions were then made along these lines to the appropriate tissue plane and the lesion was extirpated. Repair Performed By Another Provider Text (Leave Blank If You Do Not Want): After the tissue was excised the defect was repaired by another provider. No Repair - Repaired With Adjacent Surgical Defect Text (Leave Blank If You Do Not Want): After the excision the defect was repaired concurrently with another surgical defect which was in close approximation. Adjacent Tissue Transfer Text: The defect edges were debeveled with a #15 scalpel blade.  Given the location of the defect and the proximity to free margins an adjacent tissue transfer was deemed most appropriate.  Using a sterile surgical marker, an appropriate flap was drawn incorporating the defect and placing the expected incisions within the relaxed skin tension lines where possible.    The area thus outlined was incised deep to adipose tissue with a #15 scalpel blade.  The skin margins were undermined to an appropriate distance in all directions utilizing iris scissors. Advancement Flap (Single) Text: The defect edges were debeveled with a #15 scalpel blade.  Given the location of the defect and the proximity to free margins a single advancement flap was deemed most appropriate.  Using a sterile surgical marker, an appropriate advancement flap was drawn incorporating the defect and placing the expected incisions within the relaxed skin tension lines where possible.    The area thus outlined was incised deep to adipose tissue with a #15 scalpel blade.  The skin margins were undermined to an appropriate distance in all directions utilizing iris scissors. Advancement Flap (Double) Text: The defect edges were debeveled with a #15 scalpel blade.  Given the location of the defect and the proximity to free margins a double advancement flap was deemed most appropriate.  Using a sterile surgical marker, the appropriate advancement flaps were drawn incorporating the defect and placing the expected incisions within the relaxed skin tension lines where possible.    The area thus outlined was incised deep to adipose tissue with a #15 scalpel blade.  The skin margins were undermined to an appropriate distance in all directions utilizing iris scissors. Burow's Advancement Flap Text: The defect edges were debeveled with a #15 scalpel blade.  Given the location of the defect and the proximity to free margins a Burow's advancement flap was deemed most appropriate.  Using a sterile surgical marker, the appropriate advancement flap was drawn incorporating the defect and placing the expected incisions within the relaxed skin tension lines where possible.    The area thus outlined was incised deep to adipose tissue with a #15 scalpel blade.  The skin margins were undermined to an appropriate distance in all directions utilizing iris scissors. Chonodrocutaneous Helical Advancement Flap Text: The defect edges were debeveled with a #15 scalpel blade.  Given the location of the defect and the proximity to free margins a chondrocutaneous helical advancement flap was deemed most appropriate.  Using a sterile surgical marker, the appropriate advancement flap was drawn incorporating the defect and placing the expected incisions within the relaxed skin tension lines where possible.    The area thus outlined was incised deep to adipose tissue with a #15 scalpel blade.  The skin margins were undermined to an appropriate distance in all directions utilizing iris scissors. Crescentic Advancement Flap Text: The defect edges were debeveled with a #15 scalpel blade.  Given the location of the defect and the proximity to free margins a crescentic advancement flap was deemed most appropriate.  Using a sterile surgical marker, the appropriate advancement flap was drawn incorporating the defect and placing the expected incisions within the relaxed skin tension lines where possible.    The area thus outlined was incised deep to adipose tissue with a #15 scalpel blade.  The skin margins were undermined to an appropriate distance in all directions utilizing iris scissors. A-T Advancement Flap Text: The defect edges were debeveled with a #15 scalpel blade.  Given the location of the defect, shape of the defect and the proximity to free margins an A-T advancement flap was deemed most appropriate.  Using a sterile surgical marker, an appropriate advancement flap was drawn incorporating the defect and placing the expected incisions within the relaxed skin tension lines where possible.    The area thus outlined was incised deep to adipose tissue with a #15 scalpel blade.  The skin margins were undermined to an appropriate distance in all directions utilizing iris scissors. O-T Advancement Flap Text: The defect edges were debeveled with a #15 scalpel blade.  Given the location of the defect, shape of the defect and the proximity to free margins an O-T advancement flap was deemed most appropriate.  Using a sterile surgical marker, an appropriate advancement flap was drawn incorporating the defect and placing the expected incisions within the relaxed skin tension lines where possible.    The area thus outlined was incised deep to adipose tissue with a #15 scalpel blade.  The skin margins were undermined to an appropriate distance in all directions utilizing iris scissors. O-L Flap Text: The defect edges were debeveled with a #15 scalpel blade.  Given the location of the defect, shape of the defect and the proximity to free margins an O-L flap was deemed most appropriate.  Using a sterile surgical marker, an appropriate advancement flap was drawn incorporating the defect and placing the expected incisions within the relaxed skin tension lines where possible.    The area thus outlined was incised deep to adipose tissue with a #15 scalpel blade.  The skin margins were undermined to an appropriate distance in all directions utilizing iris scissors. O-Z Flap Text: The defect edges were debeveled with a #15 scalpel blade.  Given the location of the defect, shape of the defect and the proximity to free margins an O-Z flap was deemed most appropriate.  Using a sterile surgical marker, an appropriate transposition flap was drawn incorporating the defect and placing the expected incisions within the relaxed skin tension lines where possible. The area thus outlined was incised deep to adipose tissue with a #15 scalpel blade.  The skin margins were undermined to an appropriate distance in all directions utilizing iris scissors. Double O-Z Flap Text: The defect edges were debeveled with a #15 scalpel blade.  Given the location of the defect, shape of the defect and the proximity to free margins a Double O-Z flap was deemed most appropriate.  Using a sterile surgical marker, an appropriate transposition flap was drawn incorporating the defect and placing the expected incisions within the relaxed skin tension lines where possible. The area thus outlined was incised deep to adipose tissue with a #15 scalpel blade.  The skin margins were undermined to an appropriate distance in all directions utilizing iris scissors. V-Y Flap Text: The defect edges were debeveled with a #15 scalpel blade.  Given the location of the defect, shape of the defect and the proximity to free margins a V-Y flap was deemed most appropriate.  Using a sterile surgical marker, an appropriate advancement flap was drawn incorporating the defect and placing the expected incisions within the relaxed skin tension lines where possible.    The area thus outlined was incised deep to adipose tissue with a #15 scalpel blade.  The skin margins were undermined to an appropriate distance in all directions utilizing iris scissors. Advancement-Rotation Flap Text: The defect edges were debeveled with a #15 scalpel blade.  Given the location of the defect, shape of the defect and the proximity to free margins an advancement-rotation flap was deemed most appropriate.  Using a sterile surgical marker, an appropriate flap was drawn incorporating the defect and placing the expected incisions within the relaxed skin tension lines where possible. The area thus outlined was incised deep to adipose tissue with a #15 scalpel blade.  The skin margins were undermined to an appropriate distance in all directions utilizing iris scissors. Mercedes Flap Text: The defect edges were debeveled with a #15 scalpel blade.  Given the location of the defect, shape of the defect and the proximity to free margins a Mercedes flap was deemed most appropriate.  Using a sterile surgical marker, an appropriate advancement flap was drawn incorporating the defect and placing the expected incisions within the relaxed skin tension lines where possible. The area thus outlined was incised deep to adipose tissue with a #15 scalpel blade.  The skin margins were undermined to an appropriate distance in all directions utilizing iris scissors. Modified Advancement Flap Text: The defect edges were debeveled with a #15 scalpel blade.  Given the location of the defect, shape of the defect and the proximity to free margins a modified advancement flap was deemed most appropriate.  Using a sterile surgical marker, an appropriate advancement flap was drawn incorporating the defect and placing the expected incisions within the relaxed skin tension lines where possible.    The area thus outlined was incised deep to adipose tissue with a #15 scalpel blade.  The skin margins were undermined to an appropriate distance in all directions utilizing iris scissors. Mucosal Advancement Flap Text: Given the location of the defect, shape of the defect and the proximity to free margins a mucosal advancement flap was deemed most appropriate. Incisions were made with a 15 blade scalpel in the appropriate fashion along the cutaneous vermilion border and the mucosal lip. The remaining actinically damaged mucosal tissue was excised.  The mucosal advancement flap was then elevated to the gingival sulcus with care taken to preserve the neurovascular structures and advanced into the primary defect. Care was taken to ensure that precise realignment of the vermilion border was achieved. Peng Advancement Flap Text: The defect edges were debeveled with a #15 scalpel blade.  Given the location of the defect, shape of the defect and the proximity to free margins a Peng advancement flap was deemed most appropriate.  Using a sterile surgical marker, an appropriate advancement flap was drawn incorporating the defect and placing the expected incisions within the relaxed skin tension lines where possible. The area thus outlined was incised deep to adipose tissue with a #15 scalpel blade.  The skin margins were undermined to an appropriate distance in all directions utilizing iris scissors. Hatchet Flap Text: The defect edges were debeveled with a #15 scalpel blade.  Given the location of the defect, shape of the defect and the proximity to free margins a hatchet flap was deemed most appropriate.  Using a sterile surgical marker, an appropriate hatchet flap was drawn incorporating the defect and placing the expected incisions within the relaxed skin tension lines where possible.    The area thus outlined was incised deep to adipose tissue with a #15 scalpel blade.  The skin margins were undermined to an appropriate distance in all directions utilizing iris scissors. Rotation Flap Text: The defect edges were debeveled with a #15 scalpel blade.  Given the location of the defect, shape of the defect and the proximity to free margins a rotation flap was deemed most appropriate.  Using a sterile surgical marker, an appropriate rotation flap was drawn incorporating the defect and placing the expected incisions within the relaxed skin tension lines where possible.    The area thus outlined was incised deep to adipose tissue with a #15 scalpel blade.  The skin margins were undermined to an appropriate distance in all directions utilizing iris scissors. Spiral Flap Text: The defect edges were debeveled with a #15 scalpel blade.  Given the location of the defect, shape of the defect and the proximity to free margins a spiral flap was deemed most appropriate.  Using a sterile surgical marker, an appropriate rotation flap was drawn incorporating the defect and placing the expected incisions within the relaxed skin tension lines where possible. The area thus outlined was incised deep to adipose tissue with a #15 scalpel blade.  The skin margins were undermined to an appropriate distance in all directions utilizing iris scissors. Staged Advancement Flap Text: The defect edges were debeveled with a #15 scalpel blade.  Given the location of the defect, shape of the defect and the proximity to free margins a staged advancement flap was deemed most appropriate.  Using a sterile surgical marker, an appropriate advancement flap was drawn incorporating the defect and placing the expected incisions within the relaxed skin tension lines where possible. The area thus outlined was incised deep to adipose tissue with a #15 scalpel blade.  The skin margins were undermined to an appropriate distance in all directions utilizing iris scissors. Star Wedge Flap Text: The defect edges were debeveled with a #15 scalpel blade.  Given the location of the defect, shape of the defect and the proximity to free margins a star wedge flap was deemed most appropriate.  Using a sterile surgical marker, an appropriate rotation flap was drawn incorporating the defect and placing the expected incisions within the relaxed skin tension lines where possible. The area thus outlined was incised deep to adipose tissue with a #15 scalpel blade.  The skin margins were undermined to an appropriate distance in all directions utilizing iris scissors. Transposition Flap Text: The defect edges were debeveled with a #15 scalpel blade.  Given the location of the defect and the proximity to free margins a transposition flap was deemed most appropriate.  Using a sterile surgical marker, an appropriate transposition flap was drawn incorporating the defect.    The area thus outlined was incised deep to adipose tissue with a #15 scalpel blade.  The skin margins were undermined to an appropriate distance in all directions utilizing iris scissors. Muscle Hinge Flap Text: The defect edges were debeveled with a #15 scalpel blade.  Given the size, depth and location of the defect and the proximity to free margins a muscle hinge flap was deemed most appropriate.  Using a sterile surgical marker, an appropriate hinge flap was drawn incorporating the defect. The area thus outlined was incised with a #15 scalpel blade.  The skin margins were undermined to an appropriate distance in all directions utilizing iris scissors. Mustarde Flap Text: The defect edges were debeveled with a #15 scalpel blade.  Given the size, depth and location of the defect and the proximity to free margins a Mustarde flap was deemed most appropriate.  Using a sterile surgical marker, an appropriate flap was drawn incorporating the defect. The area thus outlined was incised with a #15 scalpel blade.  The skin margins were undermined to an appropriate distance in all directions utilizing iris scissors. Nasal Turnover Hinge Flap Text: The defect edges were debeveled with a #15 scalpel blade.  Given the size, depth, location of the defect and the defect being full thickness a nasal turnover hinge flap was deemed most appropriate.  Using a sterile surgical marker, an appropriate hinge flap was drawn incorporating the defect. The area thus outlined was incised with a #15 scalpel blade. The flap was designed to recreate the nasal mucosal lining and the alar rim. The skin margins were undermined to an appropriate distance in all directions utilizing iris scissors. Nasalis-Muscle-Based Myocutaneous Island Pedicle Flap Text: Using a #15 blade, an incision was made around the donor flap to the level of the nasalis muscle. Wide lateral undermining was then performed in both the subcutaneous plane above the nasalis muscle, and in a submuscular plane just above periosteum. This allowed the formation of a free nasalis muscle axial pedicle (based on the angular artery) which was still attached to the actual cutaneous flap, increasing its mobility and vascular viability. Hemostasis was obtained with pinpoint electrocoagulation. The flap was mobilized into position and the pivotal anchor points positioned and stabilized with buried interrupted sutures. Subcutaneous and dermal tissues were closed in a multilayered fashion with sutures. Tissue redundancies were excised, and the epidermal edges were apposed without significant tension and sutured with sutures. Orbicularis Oris Muscle Flap Text: The defect edges were debeveled with a #15 scalpel blade.  Given that the defect affected the competency of the oral sphincter an orbicularis oris muscle flap was deemed most appropriate to restore this competency and normal muscle function.  Using a sterile surgical marker, an appropriate flap was drawn incorporating the defect. The area thus outlined was incised with a #15 scalpel blade. Melolabial Transposition Flap Text: The defect edges were debeveled with a #15 scalpel blade.  Given the location of the defect and the proximity to free margins a melolabial flap was deemed most appropriate.  Using a sterile surgical marker, an appropriate melolabial transposition flap was drawn incorporating the defect.    The area thus outlined was incised deep to adipose tissue with a #15 scalpel blade.  The skin margins were undermined to an appropriate distance in all directions utilizing iris scissors. Rhombic Flap Text: The defect edges were debeveled with a #15 scalpel blade.  Given the location of the defect and the proximity to free margins a rhombic flap was deemed most appropriate.  Using a sterile surgical marker, an appropriate rhombic flap was drawn incorporating the defect.    The area thus outlined was incised deep to adipose tissue with a #15 scalpel blade.  The skin margins were undermined to an appropriate distance in all directions utilizing iris scissors. Rhomboid Transposition Flap Text: The defect edges were debeveled with a #15 scalpel blade.  Given the location of the defect and the proximity to free margins a rhomboid transposition flap was deemed most appropriate.  Using a sterile surgical marker, an appropriate rhomboid flap was drawn incorporating the defect.    The area thus outlined was incised deep to adipose tissue with a #15 scalpel blade.  The skin margins were undermined to an appropriate distance in all directions utilizing iris scissors. Bi-Rhombic Flap Text: The defect edges were debeveled with a #15 scalpel blade.  Given the location of the defect and the proximity to free margins a bi-rhombic flap was deemed most appropriate.  Using a sterile surgical marker, an appropriate rhombic flap was drawn incorporating the defect. The area thus outlined was incised deep to adipose tissue with a #15 scalpel blade.  The skin margins were undermined to an appropriate distance in all directions utilizing iris scissors. Helical Rim Advancement Flap Text: The defect edges were debeveled with a #15 blade scalpel.  Given the location of the defect and the proximity to free margins (helical rim) a double helical rim advancement flap was deemed most appropriate.  Using a sterile surgical marker, the appropriate advancement flaps were drawn incorporating the defect and placing the expected incisions between the helical rim and antihelix where possible.  The area thus outlined was incised through and through with a #15 scalpel blade.  With a skin hook and iris scissors, the flaps were gently and sharply undermined and freed up. Bilateral Helical Rim Advancement Flap Text: The defect edges were debeveled with a #15 blade scalpel.  Given the location of the defect and the proximity to free margins (helical rim) a bilateral helical rim advancement flap was deemed most appropriate.  Using a sterile surgical marker, the appropriate advancement flaps were drawn incorporating the defect and placing the expected incisions between the helical rim and antihelix where possible.  The area thus outlined was incised through and through with a #15 scalpel blade.  With a skin hook and iris scissors, the flaps were gently and sharply undermined and freed up. Ear Star Wedge Flap Text: The defect edges were debeveled with a #15 blade scalpel.  Given the location of the defect and the proximity to free margins (helical rim) an ear star wedge flap was deemed most appropriate.  Using a sterile surgical marker, the appropriate flap was drawn incorporating the defect and placing the expected incisions between the helical rim and antihelix where possible.  The area thus outlined was incised through and through with a #15 scalpel blade. Banner Transposition Flap Text: The defect edges were debeveled with a #15 scalpel blade.  Given the location of the defect and the proximity to free margins a Banner transposition flap was deemed most appropriate.  Using a sterile surgical marker, an appropriate flap drawn around the defect. The area thus outlined was incised deep to adipose tissue with a #15 scalpel blade.  The skin margins were undermined to an appropriate distance in all directions utilizing iris scissors. Bilobed Flap Text: The defect edges were debeveled with a #15 scalpel blade.  Given the location of the defect and the proximity to free margins a bilobe flap was deemed most appropriate.  Using a sterile surgical marker, an appropriate bilobe flap drawn around the defect.    The area thus outlined was incised deep to adipose tissue with a #15 scalpel blade.  The skin margins were undermined to an appropriate distance in all directions utilizing iris scissors. Bilobed Transposition Flap Text: The defect edges were debeveled with a #15 scalpel blade.  Given the location of the defect and the proximity to free margins a bilobed transposition flap was deemed most appropriate.  Using a sterile surgical marker, an appropriate bilobe flap drawn around the defect.    The area thus outlined was incised deep to adipose tissue with a #15 scalpel blade.  The skin margins were undermined to an appropriate distance in all directions utilizing iris scissors. Trilobed Flap Text: The defect edges were debeveled with a #15 scalpel blade.  Given the location of the defect and the proximity to free margins a trilobed flap was deemed most appropriate.  Using a sterile surgical marker, an appropriate trilobed flap drawn around the defect.    The area thus outlined was incised deep to adipose tissue with a #15 scalpel blade.  The skin margins were undermined to an appropriate distance in all directions utilizing iris scissors. Dorsal Nasal Flap Text: The defect edges were debeveled with a #15 scalpel blade.  Given the location of the defect and the proximity to free margins a dorsal nasal flap was deemed most appropriate.  Using a sterile surgical marker, an appropriate dorsal nasal flap was drawn around the defect.    The area thus outlined was incised deep to adipose tissue with a #15 scalpel blade.  The skin margins were undermined to an appropriate distance in all directions utilizing iris scissors. Island Pedicle Flap Text: The defect edges were debeveled with a #15 scalpel blade.  Given the location of the defect, shape of the defect and the proximity to free margins an island pedicle advancement flap was deemed most appropriate.  Using a sterile surgical marker, an appropriate advancement flap was drawn incorporating the defect, outlining the appropriate donor tissue and placing the expected incisions within the relaxed skin tension lines where possible.    The area thus outlined was incised deep to adipose tissue with a #15 scalpel blade.  The skin margins were undermined to an appropriate distance in all directions around the primary defect and laterally outward around the island pedicle utilizing iris scissors.  There was minimal undermining beneath the pedicle flap. Island Pedicle Flap With Canthal Suspension Text: The defect edges were debeveled with a #15 scalpel blade.  Given the location of the defect, shape of the defect and the proximity to free margins an island pedicle advancement flap was deemed most appropriate.  Using a sterile surgical marker, an appropriate advancement flap was drawn incorporating the defect, outlining the appropriate donor tissue and placing the expected incisions within the relaxed skin tension lines where possible. The area thus outlined was incised deep to adipose tissue with a #15 scalpel blade.  The skin margins were undermined to an appropriate distance in all directions around the primary defect and laterally outward around the island pedicle utilizing iris scissors.  There was minimal undermining beneath the pedicle flap. A suspension suture was placed in the canthal tendon to prevent tension and prevent ectropion. Alar Island Pedicle Flap Text: The defect edges were debeveled with a #15 scalpel blade.  Given the location of the defect, shape of the defect and the proximity to the alar rim an island pedicle advancement flap was deemed most appropriate.  Using a sterile surgical marker, an appropriate advancement flap was drawn incorporating the defect, outlining the appropriate donor tissue and placing the expected incisions within the nasal ala running parallel to the alar rim. The area thus outlined was incised with a #15 scalpel blade.  The skin margins were undermined minimally to an appropriate distance in all directions around the primary defect and laterally outward around the island pedicle utilizing iris scissors.  There was minimal undermining beneath the pedicle flap. Double Island Pedicle Flap Text: The defect edges were debeveled with a #15 scalpel blade.  Given the location of the defect, shape of the defect and the proximity to free margins a double island pedicle advancement flap was deemed most appropriate.  Using a sterile surgical marker, an appropriate advancement flap was drawn incorporating the defect, outlining the appropriate donor tissue and placing the expected incisions within the relaxed skin tension lines where possible.    The area thus outlined was incised deep to adipose tissue with a #15 scalpel blade.  The skin margins were undermined to an appropriate distance in all directions around the primary defect and laterally outward around the island pedicle utilizing iris scissors.  There was minimal undermining beneath the pedicle flap. Island Pedicle Flap-Requiring Vessel Identification Text: The defect edges were debeveled with a #15 scalpel blade.  Given the location of the defect, shape of the defect and the proximity to free margins an island pedicle advancement flap was deemed most appropriate.  Using a sterile surgical marker, an appropriate advancement flap was drawn, based on the axial vessel mentioned above, incorporating the defect, outlining the appropriate donor tissue and placing the expected incisions within the relaxed skin tension lines where possible.    The area thus outlined was incised deep to adipose tissue with a #15 scalpel blade.  The skin margins were undermined to an appropriate distance in all directions around the primary defect and laterally outward around the island pedicle utilizing iris scissors.  There was minimal undermining beneath the pedicle flap. Keystone Flap Text: The defect edges were debeveled with a #15 scalpel blade.  Given the location of the defect, shape of the defect a keystone flap was deemed most appropriate.  Using a sterile surgical marker, an appropriate keystone flap was drawn incorporating the defect, outlining the appropriate donor tissue and placing the expected incisions within the relaxed skin tension lines where possible. The area thus outlined was incised deep to adipose tissue with a #15 scalpel blade.  The skin margins were undermined to an appropriate distance in all directions around the primary defect and laterally outward around the flap utilizing iris scissors. O-T Plasty Text: The defect edges were debeveled with a #15 scalpel blade.  Given the location of the defect, shape of the defect and the proximity to free margins an O-T plasty was deemed most appropriate.  Using a sterile surgical marker, an appropriate O-T plasty was drawn incorporating the defect and placing the expected incisions within the relaxed skin tension lines where possible.    The area thus outlined was incised deep to adipose tissue with a #15 scalpel blade.  The skin margins were undermined to an appropriate distance in all directions utilizing iris scissors. O-Z Plasty Text: The defect edges were debeveled with a #15 scalpel blade.  Given the location of the defect, shape of the defect and the proximity to free margins an O-Z plasty (double transposition flap) was deemed most appropriate.  Using a sterile surgical marker, the appropriate transposition flaps were drawn incorporating the defect and placing the expected incisions within the relaxed skin tension lines where possible.    The area thus outlined was incised deep to adipose tissue with a #15 scalpel blade.  The skin margins were undermined to an appropriate distance in all directions utilizing iris scissors.  Hemostasis was achieved with electrocautery.  The flaps were then transposed into place, one clockwise and the other counterclockwise, and anchored with interrupted buried subcutaneous sutures. Double O-Z Plasty Text: The defect edges were debeveled with a #15 scalpel blade.  Given the location of the defect, shape of the defect and the proximity to free margins a Double O-Z plasty (double transposition flap) was deemed most appropriate.  Using a sterile surgical marker, the appropriate transposition flaps were drawn incorporating the defect and placing the expected incisions within the relaxed skin tension lines where possible. The area thus outlined was incised deep to adipose tissue with a #15 scalpel blade.  The skin margins were undermined to an appropriate distance in all directions utilizing iris scissors.  Hemostasis was achieved with electrocautery.  The flaps were then transposed into place, one clockwise and the other counterclockwise, and anchored with interrupted buried subcutaneous sutures. V-Y Plasty Text: The defect edges were debeveled with a #15 scalpel blade.  Given the location of the defect, shape of the defect and the proximity to free margins an V-Y advancement flap was deemed most appropriate.  Using a sterile surgical marker, an appropriate advancement flap was drawn incorporating the defect and placing the expected incisions within the relaxed skin tension lines where possible.    The area thus outlined was incised deep to adipose tissue with a #15 scalpel blade.  The skin margins were undermined to an appropriate distance in all directions utilizing iris scissors. H Plasty Text: Given the location of the defect, shape of the defect and the proximity to free margins a H-plasty was deemed most appropriate for repair.  Using a sterile surgical marker, the appropriate advancement arms of the H-plasty were drawn incorporating the defect and placing the expected incisions within the relaxed skin tension lines where possible. The area thus outlined was incised deep to adipose tissue with a #15 scalpel blade. The skin margins were undermined to an appropriate distance in all directions utilizing iris scissors.  The opposing advancement arms were then advanced into place in opposite direction and anchored with interrupted buried subcutaneous sutures. W Plasty Text: The lesion was extirpated to the level of the fat with a #15 scalpel blade.  Given the location of the defect, shape of the defect and the proximity to free margins a W-plasty was deemed most appropriate for repair.  Using a sterile surgical marker, the appropriate transposition arms of the W-plasty were drawn incorporating the defect and placing the expected incisions within the relaxed skin tension lines where possible.    The area thus outlined was incised deep to adipose tissue with a #15 scalpel blade.  The skin margins were undermined to an appropriate distance in all directions utilizing iris scissors.  The opposing transposition arms were then transposed into place in opposite direction and anchored with interrupted buried subcutaneous sutures. Z Plasty Text: The lesion was extirpated to the level of the fat with a #15 scalpel blade.  Given the location of the defect, shape of the defect and the proximity to free margins a Z-plasty was deemed most appropriate for repair.  Using a sterile surgical marker, the appropriate transposition arms of the Z-plasty were drawn incorporating the defect and placing the expected incisions within the relaxed skin tension lines where possible.    The area thus outlined was incised deep to adipose tissue with a #15 scalpel blade.  The skin margins were undermined to an appropriate distance in all directions utilizing iris scissors.  The opposing transposition arms were then transposed into place in opposite direction and anchored with interrupted buried subcutaneous sutures. Zygomaticofacial Flap Text: Given the location of the defect, shape of the defect and the proximity to free margins a zygomaticofacial flap was deemed most appropriate for repair.  Using a sterile surgical marker, the appropriate flap was drawn incorporating the defect and placing the expected incisions within the relaxed skin tension lines where possible. The area thus outlined was incised deep to adipose tissue with a #15 scalpel blade with preservation of a vascular pedicle.  The skin margins were undermined to an appropriate distance in all directions utilizing iris scissors.  The flap was then placed into the defect and anchored with interrupted buried subcutaneous sutures. Cheek Interpolation Flap Text: A decision was made to reconstruct the defect utilizing an interpolation axial flap and a staged reconstruction.  A telfa template was made of the defect.  This telfa template was then used to outline the Cheek Interpolation flap.  The donor area for the pedicle flap was then injected with anesthesia.  The flap was excised through the skin and subcutaneous tissue down to the layer of the underlying musculature.  The interpolation flap was carefully excised within this deep plane to maintain its blood supply.  The edges of the donor site were undermined.   The donor site was closed in a primary fashion.  The pedicle was then rotated into position and sutured.  Once the tube was sutured into place, adequate blood supply was confirmed with blanching and refill.  The pedicle was then wrapped with xeroform gauze and dressed appropriately with a telfa and gauze bandage to ensure continued blood supply and protect the attached pedicle. Cheek-To-Nose Interpolation Flap Text: A decision was made to reconstruct the defect utilizing an interpolation axial flap and a staged reconstruction.  A telfa template was made of the defect.  This telfa template was then used to outline the Cheek-To-Nose Interpolation flap.  The donor area for the pedicle flap was then injected with anesthesia.  The flap was excised through the skin and subcutaneous tissue down to the layer of the underlying musculature.  The interpolation flap was carefully excised within this deep plane to maintain its blood supply.  The edges of the donor site were undermined.   The donor site was closed in a primary fashion.  The pedicle was then rotated into position and sutured.  Once the tube was sutured into place, adequate blood supply was confirmed with blanching and refill.  The pedicle was then wrapped with xeroform gauze and dressed appropriately with a telfa and gauze bandage to ensure continued blood supply and protect the attached pedicle. Interpolation Flap Text: A decision was made to reconstruct the defect utilizing an interpolation axial flap and a staged reconstruction.  A telfa template was made of the defect.  This telfa template was then used to outline the interpolation flap.  The donor area for the pedicle flap was then injected with anesthesia.  The flap was excised through the skin and subcutaneous tissue down to the layer of the underlying musculature.  The interpolation flap was carefully excised within this deep plane to maintain its blood supply.  The edges of the donor site were undermined.   The donor site was closed in a primary fashion.  The pedicle was then rotated into position and sutured.  Once the tube was sutured into place, adequate blood supply was confirmed with blanching and refill.  The pedicle was then wrapped with xeroform gauze and dressed appropriately with a telfa and gauze bandage to ensure continued blood supply and protect the attached pedicle. Melolabial Interpolation Flap Text: A decision was made to reconstruct the defect utilizing an interpolation axial flap and a staged reconstruction.  A telfa template was made of the defect.  This telfa template was then used to outline the melolabial interpolation flap.  The donor area for the pedicle flap was then injected with anesthesia.  The flap was excised through the skin and subcutaneous tissue down to the layer of the underlying musculature.  The pedicle flap was carefully excised within this deep plane to maintain its blood supply.  The edges of the donor site were undermined.   The donor site was closed in a primary fashion.  The pedicle was then rotated into position and sutured.  Once the tube was sutured into place, adequate blood supply was confirmed with blanching and refill.  The pedicle was then wrapped with xeroform gauze and dressed appropriately with a telfa and gauze bandage to ensure continued blood supply and protect the attached pedicle. Mastoid Interpolation Flap Text: A decision was made to reconstruct the defect utilizing an interpolation axial flap and a staged reconstruction.  A telfa template was made of the defect.  This telfa template was then used to outline the mastoid interpolation flap.  The donor area for the pedicle flap was then injected with anesthesia.  The flap was excised through the skin and subcutaneous tissue down to the layer of the underlying musculature.  The pedicle flap was carefully excised within this deep plane to maintain its blood supply.  The edges of the donor site were undermined.   The donor site was closed in a primary fashion.  The pedicle was then rotated into position and sutured.  Once the tube was sutured into place, adequate blood supply was confirmed with blanching and refill.  The pedicle was then wrapped with xeroform gauze and dressed appropriately with a telfa and gauze bandage to ensure continued blood supply and protect the attached pedicle. Posterior Auricular Interpolation Flap Text: A decision was made to reconstruct the defect utilizing an interpolation axial flap and a staged reconstruction.  A telfa template was made of the defect.  This telfa template was then used to outline the posterior auricular interpolation flap.  The donor area for the pedicle flap was then injected with anesthesia.  The flap was excised through the skin and subcutaneous tissue down to the layer of the underlying musculature.  The pedicle flap was carefully excised within this deep plane to maintain its blood supply.  The edges of the donor site were undermined.   The donor site was closed in a primary fashion.  The pedicle was then rotated into position and sutured.  Once the tube was sutured into place, adequate blood supply was confirmed with blanching and refill.  The pedicle was then wrapped with xeroform gauze and dressed appropriately with a telfa and gauze bandage to ensure continued blood supply and protect the attached pedicle. Paramedian Forehead Flap Text: A decision was made to reconstruct the defect utilizing an interpolation axial flap and a staged reconstruction.  A telfa template was made of the defect.  This telfa template was then used to outline the paramedian forehead pedicle flap.  The donor area for the pedicle flap was then injected with anesthesia.  The flap was excised through the skin and subcutaneous tissue down to the layer of the underlying musculature.  The pedicle flap was carefully excised within this deep plane to maintain its blood supply.  The edges of the donor site were undermined.   The donor site was closed in a primary fashion.  The pedicle was then rotated into position and sutured.  Once the tube was sutured into place, adequate blood supply was confirmed with blanching and refill.  The pedicle was then wrapped with xeroform gauze and dressed appropriately with a telfa and gauze bandage to ensure continued blood supply and protect the attached pedicle. Abbe Flap (Upper To Lower Lip) Text: The defect of the lower lip was assessed and measured.  Given the location and size of the defect, an Abbe flap was deemed most appropriate.  Using a sterile surgical marker, an appropriate Abbe flap was measured and drawn on the upper lip. Local anesthesia was then infiltrated.  A scalpel was then used to incise the upper lip through and through the skin, vermilion, muscle and mucosa, leaving the flap pedicled on the opposite side.  The flap was then rotated and transferred to the lower lip defect.  The flap was then sutured into place with a three layer technique, closing the orbicularis oris muscle layer with subcutaneous buried sutures, followed by a mucosal layer and an epidermal layer. Abbe Flap (Lower To Upper Lip) Text: The defect of the upper lip was assessed and measured.  Given the location and size of the defect, an Abbe flap was deemed most appropriate.  Using a sterile surgical marker, an appropriate Abbe flap was measured and drawn on the lower lip. Local anesthesia was then infiltrated. A scalpel was then used to incise the upper lip through and through the skin, vermilion, muscle and mucosa, leaving the flap pedicled on the opposite side.  The flap was then rotated and transferred to the lower lip defect.  The flap was then sutured into place with a three layer technique, closing the orbicularis oris muscle layer with subcutaneous buried sutures, followed by a mucosal layer and an epidermal layer. no Estlander Flap (Upper To Lower Lip) Text: The defect of the lower lip was assessed and measured.  Given the location and size of the defect, an Estlander flap was deemed most appropriate.  Using a sterile surgical marker, an appropriate Estlander flap was measured and drawn on the upper lip. Local anesthesia was then infiltrated. A scalpel was then used to incise the lateral aspect of the flap, through skin, muscle and mucosa, leaving the flap pedicled medially.  The flap was then rotated and positioned to fill the lower lip defect.  The flap was then sutured into place with a three layer technique, closing the orbicularis oris muscle layer with subcutaneous buried sutures, followed by a mucosal layer and an epidermal layer. Lip Wedge Excision Repair Text: Given the location of the defect and the proximity to free margins a full thickness wedge repair was deemed most appropriate.  Using a sterile surgical marker, the appropriate repair was drawn incorporating the defect and placing the expected incisions perpendicular to the vermilion border.  The vermilion border was also meticulously outlined to ensure appropriate reapproximation during the repair.  The area thus outlined was incised through and through with a #15 scalpel blade.  The muscularis and dermis were reaproximated with deep sutures following hemostasis. Care was taken to realign the vermilion border before proceeding with the superficial closure.  Once the vermilion was realigned the superfical and mucosal closure was finished. Ftsg Text: The defect edges were debeveled with a #15 scalpel blade.  Given the location of the defect, shape of the defect and the proximity to free margins a full thickness skin graft was deemed most appropriate.  Using a sterile surgical marker, the primary defect shape was transferred to the donor site. The area thus outlined was incised deep to adipose tissue with a #15 scalpel blade.  The harvested graft was then trimmed of adipose tissue until only dermis and epidermis was left.  The skin margins of the secondary defect were undermined to an appropriate distance in all directions utilizing iris scissors.  The secondary defect was closed with interrupted buried subcutaneous sutures.  The skin edges were then re-apposed with running  sutures.  The skin graft was then placed in the primary defect and oriented appropriately. Split-Thickness Skin Graft Text: The defect edges were debeveled with a #15 scalpel blade.  Given the location of the defect, shape of the defect and the proximity to free margins a split thickness skin graft was deemed most appropriate.  Using a sterile surgical marker, the primary defect shape was transferred to the donor site. The split thickness graft was then harvested.  The skin graft was then placed in the primary defect and oriented appropriately. Burow's Graft Text: The defect edges were debeveled with a #15 scalpel blade.  Given the location of the defect, shape of the defect, the proximity to free margins and the presence of a standing cone deformity a Burow's skin graft was deemed most appropriate. The standing cone was removed and this tissue was then trimmed to the shape of the primary defect. The adipose tissue was also removed until only dermis and epidermis were left.  The skin margins of the secondary defect were undermined to an appropriate distance in all directions utilizing iris scissors.  The secondary defect was closed with interrupted buried subcutaneous sutures.  The skin edges were then re-apposed with running  sutures.  The skin graft was then placed in the primary defect and oriented appropriately. Cartilage Graft Text: The defect edges were debeveled with a #15 scalpel blade.  Given the location of the defect, shape of the defect, the fact the defect involved a full thickness cartilage defect a cartilage graft was deemed most appropriate.  An appropriate donor site was identified, cleansed, and anesthetized. The cartilage graft was then harvested and transferred to the recipient site, oriented appropriately and then sutured into place.  The secondary defect was then repaired using a primary closure. Composite Graft Text: The defect edges were debeveled with a #15 scalpel blade.  Given the location of the defect, shape of the defect, the proximity to free margins and the fact the defect was full thickness a composite graft was deemed most appropriate.  The defect was outline and then transferred to the donor site.  A full thickness graft was then excised from the donor site. The graft was then placed in the primary defect, oriented appropriately and then sutured into place.  The secondary defect was then repaired using a primary closure. Epidermal Autograft Text: The defect edges were debeveled with a #15 scalpel blade.  Given the location of the defect, shape of the defect and the proximity to free margins an epidermal autograft was deemed most appropriate.  Using a sterile surgical marker, the primary defect shape was transferred to the donor site. The epidermal graft was then harvested.  The skin graft was then placed in the primary defect and oriented appropriately. Dermal Autograft Text: The defect edges were debeveled with a #15 scalpel blade.  Given the location of the defect, shape of the defect and the proximity to free margins a dermal autograft was deemed most appropriate.  Using a sterile surgical marker, the primary defect shape was transferred to the donor site. The area thus outlined was incised deep to adipose tissue with a #15 scalpel blade.  The harvested graft was then trimmed of adipose and epidermal tissue until only dermis was left.  The skin graft was then placed in the primary defect and oriented appropriately. Skin Substitute Text: The defect edges were debeveled with a #15 scalpel blade.  Given the location of the defect, shape of the defect and the proximity to free margins a skin substitute graft was deemed most appropriate.  The graft material was trimmed to fit the size of the defect. The graft was then placed in the primary defect and oriented appropriately. Tissue Cultured Epidermal Autograft Text: The defect edges were debeveled with a #15 scalpel blade.  Given the location of the defect, shape of the defect and the proximity to free margins a tissue cultured epidermal autograft was deemed most appropriate.  The graft was then trimmed to fit the size of the defect.  The graft was then placed in the primary defect and oriented appropriately. Xenograft Text: The defect edges were debeveled with a #15 scalpel blade.  Given the location of the defect, shape of the defect and the proximity to free margins a xenograft was deemed most appropriate.  The graft was then trimmed to fit the size of the defect.  The graft was then placed in the primary defect and oriented appropriately. Purse String (Intermediate) Text: Given the location of the defect and the characteristics of the surrounding skin a purse string intermediate closure was deemed most appropriate.  Undermining was performed circumfirentially around the surgical defect.  A purse string suture was then placed and tightened. Purse String (Simple) Text: Given the location of the defect and the characteristics of the surrounding skin a purse string simple closure was deemed most appropriate.  Undermining was performed circumferentially around the surgical defect.  A purse string suture was then placed and tightened. Partial Purse String (Intermediate) Text: Given the location of the defect and the characteristics of the surrounding skin an intermediate purse string closure was deemed most appropriate.  Undermining was performed circumferentially around the surgical defect.  A purse string suture was then placed and tightened. Wound tension of the circular defect prevented complete closure of the wound. Partial Purse String (Simple) Text: Given the location of the defect and the characteristics of the surrounding skin a simple purse string closure was deemed most appropriate.  Undermining was performed circumferentially around the surgical defect.  A purse string suture was then placed and tightened. Wound tension of the circular defect prevented complete closure of the wound. Complex Repair And Single Advancement Flap Text: The defect edges were debeveled with a #15 scalpel blade.  The primary defect was closed partially with a complex linear closure.  Given the location of the remaining defect, shape of the defect and the proximity to free margins a single advancement flap was deemed most appropriate for complete closure of the defect.  Using a sterile surgical marker, an appropriate advancement flap was drawn incorporating the defect and placing the expected incisions within the relaxed skin tension lines where possible.    The area thus outlined was incised deep to adipose tissue with a #15 scalpel blade.  The skin margins were undermined to an appropriate distance in all directions utilizing iris scissors. Complex Repair And Double Advancement Flap Text: The defect edges were debeveled with a #15 scalpel blade.  The primary defect was closed partially with a complex linear closure.  Given the location of the remaining defect, shape of the defect and the proximity to free margins a double advancement flap was deemed most appropriate for complete closure of the defect.  Using a sterile surgical marker, an appropriate advancement flap was drawn incorporating the defect and placing the expected incisions within the relaxed skin tension lines where possible.    The area thus outlined was incised deep to adipose tissue with a #15 scalpel blade.  The skin margins were undermined to an appropriate distance in all directions utilizing iris scissors. Complex Repair And Modified Advancement Flap Text: The defect edges were debeveled with a #15 scalpel blade.  The primary defect was closed partially with a complex linear closure.  Given the location of the remaining defect, shape of the defect and the proximity to free margins a modified advancement flap was deemed most appropriate for complete closure of the defect.  Using a sterile surgical marker, an appropriate advancement flap was drawn incorporating the defect and placing the expected incisions within the relaxed skin tension lines where possible.    The area thus outlined was incised deep to adipose tissue with a #15 scalpel blade.  The skin margins were undermined to an appropriate distance in all directions utilizing iris scissors. Complex Repair And A-T Advancement Flap Text: The defect edges were debeveled with a #15 scalpel blade.  The primary defect was closed partially with a complex linear closure.  Given the location of the remaining defect, shape of the defect and the proximity to free margins an A-T advancement flap was deemed most appropriate for complete closure of the defect.  Using a sterile surgical marker, an appropriate advancement flap was drawn incorporating the defect and placing the expected incisions within the relaxed skin tension lines where possible.    The area thus outlined was incised deep to adipose tissue with a #15 scalpel blade.  The skin margins were undermined to an appropriate distance in all directions utilizing iris scissors. Complex Repair And O-T Advancement Flap Text: The defect edges were debeveled with a #15 scalpel blade.  The primary defect was closed partially with a complex linear closure.  Given the location of the remaining defect, shape of the defect and the proximity to free margins an O-T advancement flap was deemed most appropriate for complete closure of the defect.  Using a sterile surgical marker, an appropriate advancement flap was drawn incorporating the defect and placing the expected incisions within the relaxed skin tension lines where possible.    The area thus outlined was incised deep to adipose tissue with a #15 scalpel blade.  The skin margins were undermined to an appropriate distance in all directions utilizing iris scissors. Complex Repair And O-L Flap Text: The defect edges were debeveled with a #15 scalpel blade.  The primary defect was closed partially with a complex linear closure.  Given the location of the remaining defect, shape of the defect and the proximity to free margins an O-L flap was deemed most appropriate for complete closure of the defect.  Using a sterile surgical marker, an appropriate flap was drawn incorporating the defect and placing the expected incisions within the relaxed skin tension lines where possible.    The area thus outlined was incised deep to adipose tissue with a #15 scalpel blade.  The skin margins were undermined to an appropriate distance in all directions utilizing iris scissors. Complex Repair And Bilobe Flap Text: The defect edges were debeveled with a #15 scalpel blade.  The primary defect was closed partially with a complex linear closure.  Given the location of the remaining defect, shape of the defect and the proximity to free margins a bilobe flap was deemed most appropriate for complete closure of the defect.  Using a sterile surgical marker, an appropriate advancement flap was drawn incorporating the defect and placing the expected incisions within the relaxed skin tension lines where possible.    The area thus outlined was incised deep to adipose tissue with a #15 scalpel blade.  The skin margins were undermined to an appropriate distance in all directions utilizing iris scissors. Complex Repair And Melolabial Flap Text: The defect edges were debeveled with a #15 scalpel blade.  The primary defect was closed partially with a complex linear closure.  Given the location of the remaining defect, shape of the defect and the proximity to free margins a melolabial flap was deemed most appropriate for complete closure of the defect.  Using a sterile surgical marker, an appropriate advancement flap was drawn incorporating the defect and placing the expected incisions within the relaxed skin tension lines where possible.    The area thus outlined was incised deep to adipose tissue with a #15 scalpel blade.  The skin margins were undermined to an appropriate distance in all directions utilizing iris scissors. Complex Repair And Rotation Flap Text: The defect edges were debeveled with a #15 scalpel blade.  The primary defect was closed partially with a complex linear closure.  Given the location of the remaining defect, shape of the defect and the proximity to free margins a rotation flap was deemed most appropriate for complete closure of the defect.  Using a sterile surgical marker, an appropriate advancement flap was drawn incorporating the defect and placing the expected incisions within the relaxed skin tension lines where possible.    The area thus outlined was incised deep to adipose tissue with a #15 scalpel blade.  The skin margins were undermined to an appropriate distance in all directions utilizing iris scissors. Complex Repair And Rhombic Flap Text: The defect edges were debeveled with a #15 scalpel blade.  The primary defect was closed partially with a complex linear closure.  Given the location of the remaining defect, shape of the defect and the proximity to free margins a rhombic flap was deemed most appropriate for complete closure of the defect.  Using a sterile surgical marker, an appropriate advancement flap was drawn incorporating the defect and placing the expected incisions within the relaxed skin tension lines where possible.    The area thus outlined was incised deep to adipose tissue with a #15 scalpel blade.  The skin margins were undermined to an appropriate distance in all directions utilizing iris scissors. Complex Repair And Transposition Flap Text: The defect edges were debeveled with a #15 scalpel blade.  The primary defect was closed partially with a complex linear closure.  Given the location of the remaining defect, shape of the defect and the proximity to free margins a transposition flap was deemed most appropriate for complete closure of the defect.  Using a sterile surgical marker, an appropriate advancement flap was drawn incorporating the defect and placing the expected incisions within the relaxed skin tension lines where possible.    The area thus outlined was incised deep to adipose tissue with a #15 scalpel blade.  The skin margins were undermined to an appropriate distance in all directions utilizing iris scissors. Complex Repair And V-Y Plasty Text: The defect edges were debeveled with a #15 scalpel blade.  The primary defect was closed partially with a complex linear closure.  Given the location of the remaining defect, shape of the defect and the proximity to free margins a V-Y plasty was deemed most appropriate for complete closure of the defect.  Using a sterile surgical marker, an appropriate advancement flap was drawn incorporating the defect and placing the expected incisions within the relaxed skin tension lines where possible.    The area thus outlined was incised deep to adipose tissue with a #15 scalpel blade.  The skin margins were undermined to an appropriate distance in all directions utilizing iris scissors. Complex Repair And M Plasty Text: The defect edges were debeveled with a #15 scalpel blade.  The primary defect was closed partially with a complex linear closure.  Given the location of the remaining defect, shape of the defect and the proximity to free margins an M plasty was deemed most appropriate for complete closure of the defect.  Using a sterile surgical marker, an appropriate advancement flap was drawn incorporating the defect and placing the expected incisions within the relaxed skin tension lines where possible.    The area thus outlined was incised deep to adipose tissue with a #15 scalpel blade.  The skin margins were undermined to an appropriate distance in all directions utilizing iris scissors. Complex Repair And Double M Plasty Text: The defect edges were debeveled with a #15 scalpel blade.  The primary defect was closed partially with a complex linear closure.  Given the location of the remaining defect, shape of the defect and the proximity to free margins a double M plasty was deemed most appropriate for complete closure of the defect.  Using a sterile surgical marker, an appropriate advancement flap was drawn incorporating the defect and placing the expected incisions within the relaxed skin tension lines where possible.    The area thus outlined was incised deep to adipose tissue with a #15 scalpel blade.  The skin margins were undermined to an appropriate distance in all directions utilizing iris scissors. Complex Repair And W Plasty Text: The defect edges were debeveled with a #15 scalpel blade.  The primary defect was closed partially with a complex linear closure.  Given the location of the remaining defect, shape of the defect and the proximity to free margins a W plasty was deemed most appropriate for complete closure of the defect.  Using a sterile surgical marker, an appropriate advancement flap was drawn incorporating the defect and placing the expected incisions within the relaxed skin tension lines where possible.    The area thus outlined was incised deep to adipose tissue with a #15 scalpel blade.  The skin margins were undermined to an appropriate distance in all directions utilizing iris scissors. Complex Repair And Z Plasty Text: The defect edges were debeveled with a #15 scalpel blade.  The primary defect was closed partially with a complex linear closure.  Given the location of the remaining defect, shape of the defect and the proximity to free margins a Z plasty was deemed most appropriate for complete closure of the defect.  Using a sterile surgical marker, an appropriate advancement flap was drawn incorporating the defect and placing the expected incisions within the relaxed skin tension lines where possible.    The area thus outlined was incised deep to adipose tissue with a #15 scalpel blade.  The skin margins were undermined to an appropriate distance in all directions utilizing iris scissors. Complex Repair And Dorsal Nasal Flap Text: The defect edges were debeveled with a #15 scalpel blade.  The primary defect was closed partially with a complex linear closure.  Given the location of the remaining defect, shape of the defect and the proximity to free margins a dorsal nasal flap was deemed most appropriate for complete closure of the defect.  Using a sterile surgical marker, an appropriate flap was drawn incorporating the defect and placing the expected incisions within the relaxed skin tension lines where possible.    The area thus outlined was incised deep to adipose tissue with a #15 scalpel blade.  The skin margins were undermined to an appropriate distance in all directions utilizing iris scissors. Complex Repair And Ftsg Text: The defect edges were debeveled with a #15 scalpel blade.  The primary defect was closed partially with a complex linear closure.  Given the location of the defect, shape of the defect and the proximity to free margins a full thickness skin graft was deemed most appropriate to repair the remaining defect.  The graft was trimmed to fit the size of the remaining defect.  The graft was then placed in the primary defect, oriented appropriately, and sutured into place. Complex Repair And Burow's Graft Text: The defect edges were debeveled with a #15 scalpel blade.  The primary defect was closed partially with a complex linear closure.  Given the location of the defect, shape of the defect, the proximity to free margins and the presence of a standing cone deformity a Burow's graft was deemed most appropriate to repair the remaining defect.  The graft was trimmed to fit the size of the remaining defect.  The graft was then placed in the primary defect, oriented appropriately, and sutured into place. Complex Repair And Split-Thickness Skin Graft Text: The defect edges were debeveled with a #15 scalpel blade.  The primary defect was closed partially with a complex linear closure.  Given the location of the defect, shape of the defect and the proximity to free margins a split thickness skin graft was deemed most appropriate to repair the remaining defect.  The graft was trimmed to fit the size of the remaining defect.  The graft was then placed in the primary defect, oriented appropriately, and sutured into place. Complex Repair And Epidermal Autograft Text: The defect edges were debeveled with a #15 scalpel blade.  The primary defect was closed partially with a complex linear closure.  Given the location of the defect, shape of the defect and the proximity to free margins an epidermal autograft was deemed most appropriate to repair the remaining defect.  The graft was trimmed to fit the size of the remaining defect.  The graft was then placed in the primary defect, oriented appropriately, and sutured into place. Complex Repair And Dermal Autograft Text: The defect edges were debeveled with a #15 scalpel blade.  The primary defect was closed partially with a complex linear closure.  Given the location of the defect, shape of the defect and the proximity to free margins an dermal autograft was deemed most appropriate to repair the remaining defect.  The graft was trimmed to fit the size of the remaining defect.  The graft was then placed in the primary defect, oriented appropriately, and sutured into place. Complex Repair And Tissue Cultured Epidermal Autograft Text: The defect edges were debeveled with a #15 scalpel blade.  The primary defect was closed partially with a complex linear closure.  Given the location of the defect, shape of the defect and the proximity to free margins an tissue cultured epidermal autograft was deemed most appropriate to repair the remaining defect.  The graft was trimmed to fit the size of the remaining defect.  The graft was then placed in the primary defect, oriented appropriately, and sutured into place. Complex Repair And Xenograft Text: The defect edges were debeveled with a #15 scalpel blade.  The primary defect was closed partially with a complex linear closure.  Given the location of the defect, shape of the defect and the proximity to free margins a xenograft was deemed most appropriate to repair the remaining defect.  The graft was trimmed to fit the size of the remaining defect.  The graft was then placed in the primary defect, oriented appropriately, and sutured into place. Complex Repair And Skin Substitute Graft Text: The defect edges were debeveled with a #15 scalpel blade.  The primary defect was closed partially with a complex linear closure.  Given the location of the remaining defect, shape of the defect and the proximity to free margins a skin substitute graft was deemed most appropriate to repair the remaining defect.  The graft was trimmed to fit the size of the remaining defect.  The graft was then placed in the primary defect, oriented appropriately, and sutured into place. Path Notes (To The Dermatopathologist): Please check margins. Consent was obtained from the patient. The risks and benefits to therapy were discussed in detail. Specifically, the risks of infection, scarring, bleeding, prolonged wound healing, incomplete removal, allergy to anesthesia, nerve injury and recurrence were addressed. Prior to the procedure, the treatment site was clearly identified and confirmed by the patient. All components of Universal Protocol/PAUSE Rule completed. Post-Care Instructions: I reviewed with the patient in detail post-care instructions. Patient is not to engage in any heavy lifting, exercise, or swimming for the next 14 days. Should the patient develop any fevers, chills, bleeding, severe pain patient will contact the office immediately. Home Suture Removal Text: Patient was provided a home suture removal kit and will remove their sutures at home.  If they have any questions or difficulties they will call the office. Where Do You Want The Question To Include Opioid Counseling Located?: Case Summary Tab Information: Selecting Yes will display possible errors in your note based on the variables you have selected. This validation is only offered as a suggestion for you. PLEASE NOTE THAT THE VALIDATION TEXT WILL BE REMOVED WHEN YOU FINALIZE YOUR NOTE. IF YOU WANT TO FAX A PRELIMINARY NOTE YOU WILL NEED TO TOGGLE THIS TO 'NO' IF YOU DO NOT WANT IT IN YOUR FAXED NOTE.

## 2022-12-08 NOTE — PROGRESS NOTE ADULT - SUBJECTIVE AND OBJECTIVE BOX
89 y/o male pmhx HTN, IDDM, Afib on coumadin, GERD, CAD s/p stents, recent admission w/ [facility] on 9/7 s/p fall. Patient was discharged and AC was restarted on 9/14. Now presenting to ED w/ worsening mental status over past few days. Patient was code stroke. CT head showed 2cm left ICH. He was given Kcentra, Vitamin K and 2 units of platelets in the ER. Found to be hypertensive to 200s on arrival, started on nicardipine infusion.     9/20: Patient seen in bed.  He is AAO x 1, moving all extremities and extremely hypophonic, and on a Cardene drip      PAST MEDICAL & SURGICAL HISTORY:  IDDM (insulin dependent diabetes mellitus)    HTN (hypertension)    Chronic congestive heart failure, unspecified congestive heart failure type    Atrial fibrillation, unspecified type    Gastroesophageal reflux disease, esophagitis presence not specified    Benign prostatic hyperplasia without lower urinary tract symptoms    Syncope and collapse  5/4/2018    Type 2 myocardial infarction  12/17/2017    UTI (urinary tract infection)  7/12/2017    Artificial cardiac pacemaker    History of appendectomy    Arthropathy of shoulder region    S/P TAVR (transcatheter aortic valve replacement)        FAMILY HISTORY:  No pertinent family history in first degree relatives        Social Hx:    Allergies    Aleve (Vomiting)  codeine (Unknown)  morphine (Nausea)  naproxen (Vomiting)    Intolerances        Height (cm): 170.2 (09-19 @ 19:34)  Weight (kg): 77.1 (09-19 @ 19:34)  BMI (kg/m2): 26.6 (09-19 @ 19:34)    ICU Vital Signs Last 24 Hrs  T(C): 36.6 (20 Sep 2021 08:00), Max: 37.5 (19 Sep 2021 20:10)  T(F): 97.9 (20 Sep 2021 08:00), Max: 99.5 (19 Sep 2021 20:10)  HR: 64 (20 Sep 2021 10:00) (61 - 93)  BP: 110/46 (20 Sep 2021 10:00) (110/46 - 212/105)  BP(mean): 63 (20 Sep 2021 10:00) (63 - 114)  ABP: --  ABP(mean): --  RR: 21 (20 Sep 2021 10:00) (16 - 36)  SpO2: 96% (20 Sep 2021 10:00) (95% - 100%)          I&O's Summary    19 Sep 2021 07:01  -  20 Sep 2021 07:00  --------------------------------------------------------  IN: 414 mL / OUT: 250 mL / NET: 164 mL                              11.4   11.89 )-----------( 204      ( 20 Sep 2021 06:48 )             34.2       09-20    140  |  108  |  59<H>  ----------------------------<  278<H>  4.4   |  26  |  2.68<H>    Ca    9.3      20 Sep 2021 06:48  Mg     2.3     09-20    TPro  7.4  /  Alb  3.2<L>  /  TBili  0.8  /  DBili  x   /  AST  30  /  ALT  30  /  AlkPhos  132<H>  09-20      CARDIAC MARKERS ( 19 Sep 2021 19:39 )  0.061 ng/mL / x     / x     / x     / x                    MEDICATIONS  (STANDING):  atorvastatin 40 milliGRAM(s) Oral at bedtime  cholecalciferol 1000 Unit(s) Oral daily  cyanocobalamin 1000 MICROGram(s) Oral daily  dextrose 40% Gel 15 Gram(s) Oral once  dextrose 5%. 1000 milliLiter(s) (50 mL/Hr) IV Continuous <Continuous>  dextrose 50% Injectable 25 Gram(s) IV Push once  ferrous    sulfate 325 milliGRAM(s) Oral at bedtime  folic acid 1 milliGRAM(s) Oral daily  gabapentin 100 milliGRAM(s) Oral three times a day  glucagon  Injectable 1 milliGRAM(s) IntraMuscular once  insulin glargine Injectable (LANTUS) 10 Unit(s) SubCutaneous every morning  insulin glargine Injectable (LANTUS) 10 Unit(s) SubCutaneous at bedtime  insulin lispro (ADMELOG) corrective regimen sliding scale   SubCutaneous at bedtime  isosorbide   mononitrate ER Tablet (IMDUR) 30 milliGRAM(s) Oral daily  levETIRAcetam 500 milliGRAM(s) Oral every 12 hours  magnesium oxide 400 milliGRAM(s) Oral at bedtime  metoprolol succinate ER 25 milliGRAM(s) Oral daily  Nephro-chris 1 Tablet(s) Oral daily  niCARdipine Infusion 5 mG/Hr (25 mL/Hr) IV Continuous <Continuous>  pantoprazole    Tablet 40 milliGRAM(s) Oral before breakfast  polyethylene glycol 3350 17 Gram(s) Oral at bedtime  sodium chloride 0.9%. 1000 milliLiter(s) (75 mL/Hr) IV Continuous <Continuous>  venlafaxine XR. 75 milliGRAM(s) Oral daily  vitamin B complex with vitamin C 1 Tablet(s) Oral daily    MEDICATIONS  (PRN):  ondansetron Injectable 4 milliGRAM(s) IV Push every 6 hours PRN Nausea and/or Vomiting  promethazine 25 milliGRAM(s) Oral every 6 hours PRN for nausea  senna 2 Tablet(s) Oral at bedtime PRN Constipation      DVT Prophylaxis:    Advanced Directives:  Discussed with:    Visit Information: 30 min    ** Time is exclusive of billed procedures and/or teaching and/or routine family updates.         Ambulatory

## 2023-04-12 NOTE — ED PROVIDER NOTE - MUSCULOSKELETAL [+], MLM
BACK PAIN Olanzapine Counseling- I discussed with the patient the common side effects of olanzapine including but are not limited to: lack of energy, dry mouth, increased appetite, sleepiness, tremor, constipation, dizziness, changes in behavior, or restlessness.  Explained that teenagers are more likely to experience headaches, abdominal pain, pain in the arms or legs, tiredness, and sleepiness.  Serious side effects include but are not limited: increased risk of death in elderly patients who are confused, have memory loss, or dementia-related psychosis; hyperglycemia; increased cholesterol and triglycerides; and weight gain.

## 2023-06-20 NOTE — PATIENT PROFILE ADULT - BRADEN FRICTION AND SHEAR
Detail Level: Zone Continue Regimen: Clobetasol 0.05% foam BID prn Render In Strict Bullet Format?: No (2) potential problem

## 2023-07-25 NOTE — PHARMACOTHERAPY INTERVENTION NOTE - INTERVENTION TYPE RECOOMEND
A/O x 4. Patient states that she hit her head with her own hand at approx 930pm.  Patient states that since then she has had a headache and nausea.  No LOC Therapy Recommended - Drug indicated but not ordered

## 2023-12-08 NOTE — PATIENT PROFILE ADULT - MONEY FOR FOOD
How Severe Is It?: mild Is This A New Presentation, Or A Follow-Up?: Rash Additional History: Patient reports discoloration and itching to both lower legs. He states he was told in the past to wear compression stocking but recently has not done so no

## 2024-01-11 NOTE — ED ADULT TRIAGE NOTE - CHIEF COMPLAINT QUOTE
c/o weakness and fatigue x few days. o2sat 98% HR 88 [Diarrhea: Grade 0] : Diarrhea: Grade 0 [Negative] : Allergic/Immunologic

## 2024-02-05 NOTE — DISCHARGE NOTE NURSING/CASE MANAGEMENT/SOCIAL WORK - NSDCVIVACCINE_GEN_ALL_CORE_FT
No Vaccines Administered.
[FreeTextEntry1] : MATTIE MARK is a 57-year-old M presents here for cardiac follow-up regarding new onset atrial fibrillation.  His medical history includes: - HLD - Carotid artery plaquing - Periodic BP elevations - Elevated coronary calcium score in 2014 He has a family hx of premature CAD

## 2024-05-17 NOTE — DISCHARGE NOTE NURSING/CASE MANAGEMENT/SOCIAL WORK - NSDCPEPTCOWAR_GEN_ALL_CORE
Warfarin/Coumadin - Potential for adverse drug reactions and interactions/Warfarin/Coumadin - Compliance/Warfarin/Coumadin - Dietary Advice/Warfarin/Coumadin - Follow up monitoring show

## 2024-05-25 NOTE — DISCHARGE NOTE PROVIDER - NSDCMRMEDTOKEN_GEN_ALL_CORE_FT
FAMILY HISTORY:  Father  Still living? No  Family history of throat cancer, Age at diagnosis: Age Unknown    Mother  Still living? Unknown  Family history of leukemia, Age at diagnosis: Age Unknown     amLODIPine 5 mg oral tablet: 1 tab(s) orally once a day  aspirin 81 mg oral tablet: 1 tab(s) orally once a day  B Complex 50 oral tablet, extended release: 1 tab(s) orally once a day  ferrous sulfate 325 mg (65 mg elemental iron) oral tablet: 1 tab(s) orally once a day, As Needed  furosemide 40 mg oral tablet: 1 tab(s) orally 2 times a day   gabapentin 100 mg oral capsule: 1 cap(s) orally 3 times a day with breakfast, lunch and dinner  isosorbide mononitrate 10 mg oral tablet: 1 tab(s) orally once a day  primidone 50 mg oral tablet: 0.5 tab(s) orally once a day (at bedtime)  RABEprazole 20 mg oral delayed release tablet: 1 tab(s) orally 2 times a day at lunch and before bedtime  venlafaxine 75 mg oral capsule, extended release: 1 cap(s) orally once a day  Vitamin D3 5000 intl units (125 mcg) oral tablet: 1 tab(s) orally once a day  warfarin 5 mg oral tablet: 1 tab(s) orally once a day amLODIPine 5 mg oral tablet: 1 tab(s) orally once a day  aspirin 81 mg oral tablet: 1 tab(s) orally once a day  B Complex 50 oral tablet, extended release: 1 tab(s) orally once a day  ferrous sulfate 325 mg (65 mg elemental iron) oral tablet: 1 tab(s) orally once a day, As Needed  gabapentin 100 mg oral capsule: 1 cap(s) orally 3 times a day with breakfast, lunch and dinner  isosorbide mononitrate 10 mg oral tablet: 1 tab(s) orally once a day  Lasix 20 mg oral tablet: 20 tab(s) orally once a day     Please start taking lasix starting date 2/21  primidone 50 mg oral tablet: 0.5 tab(s) orally once a day (at bedtime)  RABEprazole 20 mg oral delayed release tablet: 1 tab(s) orally 2 times a day at lunch and before bedtime  venlafaxine 75 mg oral capsule, extended release: 1 cap(s) orally once a day  Vitamin D3 5000 intl units (125 mcg) oral tablet: 1 tab(s) orally once a day  warfarin 5 mg oral tablet: 1 tab(s) orally once a day

## 2024-08-13 NOTE — ED ADULT TRIAGE NOTE - LOCATION:
Pt had to be instructed to stay in his room several times.  Pt has asked for food and drink and security stated not to give him anything.  Thuy SEAY charge discussed with security.     Left arm;

## 2024-10-02 NOTE — PHYSICAL THERAPY INITIAL EVALUATION ADULT - LEVEL OF INDEPENDENCE: SUPINE/SIT, REHAB EVAL
held transfer / gait assessment due to lethargy; pt unable to participate
supervision/contact guard
No

## 2024-11-27 NOTE — ED PROVIDER NOTE - CHPI ED SYMPTOMS NEG
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  Control is good.  The patient is advised to continue current dosage of metformin.  Continue attention to diet.    HTN.  Control is good.  The patient is advised to continue current dosage of lisinopril HCT.   HLD. Control is good.  The patient is advised to continue current dosage of atorvastatin.    
no fever

## 2024-12-19 NOTE — ED ADULT NURSE REASSESSMENT NOTE - NS ED NURSE REASSESS COMMENT FT1
pt remains aox3, c/o carmen itchy eyes, no redness or drainage noted. Dr. Doty made aware-- awaiting orders for artificial tears. Pt remains in no distress, vss, paced rhythm. Denies chest pain. [Sprain/Strain] : sprain/strain [Was the competent medical cause of the injury] : was the competent medical cause of the injury [Are consistent with the injury] : are consistent with the injury [Consistent with my objective findings] : consistent with my objective findings [Partial] : partial [Does not reveal pre-existing condition(s) that may affect treatment/prognosis] : does not reveal pre-existing condition(s) that may affect treatment/prognosis [Other: ___] : [unfilled] [Can return to work with limitations on ______] : can return to work with limitations on [unfilled] [N/A] : : Not Applicable [Patient] : patient [No Rx restrictions] : No Rx restrictions. [I provided the services listed above] :  I provided the services listed above. [FreeTextEntry1] : good

## 2025-01-02 NOTE — PATIENT PROFILE ADULT - BRADEN NUTRITION
Left VM requesting call back.   
Radha Frias called returning office call.       Please advise [phone]  
(3) adequate

## 2025-01-13 NOTE — BEHAVIORAL HEALTH ASSESSMENT NOTE - EYE CONTACT
-monitor symptoms  -symptomatic management per primary care team  -advancing diet as tolerated   Good

## 2025-01-15 NOTE — ED PROVIDER NOTE - TEMPLATE, MLM
Const:  Sleeping comfortably, no acute distress  HEENT: Normocephalic, atraumatic; Moist mucosa; Neck supple  CV: Heart regular, normal S1/2, no murmurs; Extremities WWPx4  Pulm: Lungs clear to auscultation bilaterally, no wheezing or increased WOB  GI: Abdomen non-distended; No organomegaly, no tenderness, no masses  Skin: No rash noted  Neuro: Normal tone General

## 2025-06-27 NOTE — ED PROVIDER NOTE - OBJECTIVE STATEMENT
Detail Level: Zone Detail Level: Simple Detail Level: Generalized 89 y/o male with a PMHx of Afib, BPH, chronic CHF, GERD, HTN, IDDM, MI, major depressive disorder, pacemaker, syncope and collapse, SAH, UTI, h/o TAVR presents to the ED BIBA from [facility] for SOB and hypoxia. Pt COVID +. Not vaccinated for COVID. Pt does not know why he is here. Pt is a poor historian. MOLST +DNR/DNI.